# Patient Record
Sex: FEMALE | Race: WHITE | NOT HISPANIC OR LATINO | Employment: UNEMPLOYED | URBAN - METROPOLITAN AREA
[De-identification: names, ages, dates, MRNs, and addresses within clinical notes are randomized per-mention and may not be internally consistent; named-entity substitution may affect disease eponyms.]

---

## 2017-01-11 ENCOUNTER — RESULTS ENCOUNTER (OUTPATIENT)
Dept: ENDOCRINOLOGY | Age: 42
End: 2017-01-11

## 2017-01-11 DIAGNOSIS — E88.81 DYSMETABOLIC SYNDROME: ICD-10-CM

## 2017-01-11 DIAGNOSIS — O24.419 GESTATIONAL DIABETES MELLITUS (GDM), ANTEPARTUM, GESTATIONAL DIABETES METHOD OF CONTROL UNSPECIFIED: ICD-10-CM

## 2017-01-11 DIAGNOSIS — E03.9 ADULT HYPOTHYROIDISM: ICD-10-CM

## 2017-01-11 DIAGNOSIS — E55.9 AVITAMINOSIS D: ICD-10-CM

## 2017-01-11 DIAGNOSIS — E66.9 ADIPOSITY: ICD-10-CM

## 2017-06-23 ENCOUNTER — RESULTS ENCOUNTER (OUTPATIENT)
Dept: ENDOCRINOLOGY | Age: 42
End: 2017-06-23

## 2017-06-23 DIAGNOSIS — E55.9 AVITAMINOSIS D: ICD-10-CM

## 2017-06-23 DIAGNOSIS — E03.9 ADULT HYPOTHYROIDISM: ICD-10-CM

## 2017-06-23 DIAGNOSIS — E88.81 DYSMETABOLIC SYNDROME: ICD-10-CM

## 2017-06-23 DIAGNOSIS — O24.419 GESTATIONAL DIABETES MELLITUS (GDM), ANTEPARTUM, GESTATIONAL DIABETES METHOD OF CONTROL UNSPECIFIED: ICD-10-CM

## 2017-06-23 DIAGNOSIS — E66.9 ADIPOSITY: ICD-10-CM

## 2018-03-20 ENCOUNTER — LAB (OUTPATIENT)
Dept: ENDOCRINOLOGY | Age: 43
End: 2018-03-20

## 2018-03-20 DIAGNOSIS — E03.9 ADULT HYPOTHYROIDISM: ICD-10-CM

## 2018-03-20 DIAGNOSIS — E88.81 DYSMETABOLIC SYNDROME: ICD-10-CM

## 2018-03-20 DIAGNOSIS — E55.9 AVITAMINOSIS D: ICD-10-CM

## 2018-03-20 DIAGNOSIS — E11.8 TYPE 2 DIABETES MELLITUS WITH COMPLICATION (HCC): ICD-10-CM

## 2018-03-21 LAB
25(OH)D3+25(OH)D2 SERPL-MCNC: 39.3 NG/ML (ref 30–100)
ALBUMIN SERPL-MCNC: 4.1 G/DL (ref 3.5–5.2)
ALBUMIN/GLOB SERPL: 1.4 G/DL
ALP SERPL-CCNC: 56 U/L (ref 39–117)
ALT SERPL-CCNC: 26 U/L (ref 1–33)
AST SERPL-CCNC: 21 U/L (ref 1–32)
BILIRUB SERPL-MCNC: 0.4 MG/DL (ref 0.1–1.2)
BUN SERPL-MCNC: 15 MG/DL (ref 6–20)
BUN/CREAT SERPL: 17.4 (ref 7–25)
C PEPTIDE SERPL-MCNC: 9.1 NG/ML (ref 1.1–4.4)
CALCIUM SERPL-MCNC: 10.1 MG/DL (ref 8.6–10.5)
CHLORIDE SERPL-SCNC: 98 MMOL/L (ref 98–107)
CO2 SERPL-SCNC: 28.8 MMOL/L (ref 22–29)
CREAT SERPL-MCNC: 0.86 MG/DL (ref 0.57–1)
GFR SERPLBLD CREATININE-BSD FMLA CKD-EPI: 72 ML/MIN/1.73
GFR SERPLBLD CREATININE-BSD FMLA CKD-EPI: 87 ML/MIN/1.73
GLOBULIN SER CALC-MCNC: 3 GM/DL
GLUCOSE SERPL-MCNC: 119 MG/DL (ref 65–99)
HBA1C MFR BLD: 5.4 % (ref 4.8–5.6)
MICROALBUMIN UR-MCNC: <3 UG/ML
POTASSIUM SERPL-SCNC: 4.4 MMOL/L (ref 3.5–5.2)
PROT SERPL-MCNC: 7.1 G/DL (ref 6–8.5)
SODIUM SERPL-SCNC: 139 MMOL/L (ref 136–145)
T3FREE SERPL-MCNC: 3.1 PG/ML (ref 2–4.4)
T4 FREE SERPL-MCNC: 0.89 NG/DL (ref 0.93–1.7)
T4 SERPL-MCNC: 7.02 MCG/DL (ref 4.5–11.7)
THYROGLOB AB SERPL-ACNC: <1 IU/ML (ref 0–0.9)
THYROGLOB SERPL-MCNC: 20.9 NG/ML (ref 1.5–38.5)
TSH SERPL DL<=0.005 MIU/L-ACNC: 15.64 MIU/ML (ref 0.27–4.2)

## 2018-03-27 ENCOUNTER — OFFICE VISIT (OUTPATIENT)
Dept: ENDOCRINOLOGY | Age: 43
End: 2018-03-27

## 2018-03-27 VITALS
SYSTOLIC BLOOD PRESSURE: 124 MMHG | HEIGHT: 61 IN | RESPIRATION RATE: 16 BRPM | WEIGHT: 217 LBS | DIASTOLIC BLOOD PRESSURE: 74 MMHG | BODY MASS INDEX: 40.97 KG/M2

## 2018-03-27 DIAGNOSIS — E03.9 ADULT HYPOTHYROIDISM: Primary | ICD-10-CM

## 2018-03-27 DIAGNOSIS — K75.81 NASH (NONALCOHOLIC STEATOHEPATITIS): ICD-10-CM

## 2018-03-27 DIAGNOSIS — E88.81 DYSMETABOLIC SYNDROME: ICD-10-CM

## 2018-03-27 DIAGNOSIS — E55.9 AVITAMINOSIS D: ICD-10-CM

## 2018-03-27 DIAGNOSIS — O24.419 GESTATIONAL DIABETES MELLITUS (GDM), ANTEPARTUM, GESTATIONAL DIABETES METHOD OF CONTROL UNSPECIFIED: ICD-10-CM

## 2018-03-27 PROCEDURE — 99214 OFFICE O/P EST MOD 30 MIN: CPT | Performed by: INTERNAL MEDICINE

## 2018-03-27 RX ORDER — LIRAGLUTIDE 6 MG/ML
3 INJECTION, SOLUTION SUBCUTANEOUS DAILY
Qty: 5 PEN | Refills: 5 | Status: SHIPPED | OUTPATIENT
Start: 2018-03-27 | End: 2018-10-31 | Stop reason: SDUPTHER

## 2018-03-27 RX ORDER — NORETHINDRONE 0.35 MG/1
1 TABLET ORAL DAILY
Refills: 12 | COMMUNITY
Start: 2018-03-07 | End: 2019-03-27

## 2018-03-27 RX ORDER — LEVOTHYROXINE SODIUM 175 MCG
TABLET ORAL
Qty: 90 TABLET | Refills: 3 | Status: SHIPPED | OUTPATIENT
Start: 2018-03-27 | End: 2018-10-31 | Stop reason: SDUPTHER

## 2018-03-27 NOTE — PROGRESS NOTES
"Subjective   Oralia Sánchez is a 42 y.o. female seen for follow up for DM2, hypothyroidism, vit d deficiency, lab review. Patient is not checking BG. She has been out of Synthroid since 09/2017. She denies any symptoms or concerns.     History of Present Illness this is a 42-year-old female known patient with prediabetes and hypothyroidism as well as vitamin D deficiency and fatty liver with morbid obesity.  Over the course of last 6 months she has had no significant health problems for which to go to the emergency room or hospital.    /74   Resp 16   Ht 154.9 cm (61\")   Wt 98.4 kg (217 lb)   BMI 41.00 kg/m²      Allergies   Allergen Reactions   • No Known Drug Allergy        Current Outpatient Prescriptions:   •  DESTIN 0.35 MG tablet, Take 1 tablet by mouth Daily., Disp: , Rfl: 12  •  SYNTHROID 175 MCG tablet, Take 1 tablet daily by mouth., Disp: 90 tablet, Rfl: 1      The following portions of the patient's history were reviewed and updated as appropriate: allergies, current medications, past family history, past medical history, past social history, past surgical history and problem list.    Review of Systems   Constitutional: Negative.    HENT: Negative.    Eyes: Negative.    Respiratory: Negative.    Cardiovascular: Negative.    Gastrointestinal: Negative.    Endocrine: Negative.    Genitourinary: Negative.    Musculoskeletal: Negative.    Skin: Negative.    Allergic/Immunologic: Negative.    Neurological: Negative.    Hematological: Negative.    Psychiatric/Behavioral: Negative.        Objective   Physical Exam     Results for orders placed or performed in visit on 03/20/18   Comprehensive Metabolic Panel   Result Value Ref Range    Glucose 119 (H) 65 - 99 mg/dL    BUN 15 6 - 20 mg/dL    Creatinine 0.86 0.57 - 1.00 mg/dL    eGFR Non African Am 72 >60 mL/min/1.73    eGFR African Am 87 >60 mL/min/1.73    BUN/Creatinine Ratio 17.4 7.0 - 25.0    Sodium 139 136 - 145 mmol/L    Potassium 4.4 3.5 - 5.2 " mmol/L    Chloride 98 98 - 107 mmol/L    Total CO2 28.8 22.0 - 29.0 mmol/L    Calcium 10.1 8.6 - 10.5 mg/dL    Total Protein 7.1 6.0 - 8.5 g/dL    Albumin 4.10 3.50 - 5.20 g/dL    Globulin 3.0 gm/dL    A/G Ratio 1.4 g/dL    Total Bilirubin 0.4 0.1 - 1.2 mg/dL    Alkaline Phosphatase 56 39 - 117 U/L    AST (SGOT) 21 1 - 32 U/L    ALT (SGPT) 26 1 - 33 U/L   T4 & TSH ([facility])   Result Value Ref Range    TSH 15.640 (H) 0.270 - 4.200 mIU/mL    T4, Total 7.02 4.50 - 11.70 mcg/dL   Hemoglobin A1c   Result Value Ref Range    Hemoglobin A1C 5.40 4.80 - 5.60 %   T4, Free   Result Value Ref Range    Free T4 0.89 (L) 0.93 - 1.70 ng/dL   C-Peptide   Result Value Ref Range    C-Peptide 9.1 (H) 1.1 - 4.4 ng/mL   Vitamin D 25 Hydroxy   Result Value Ref Range    25 Hydroxy, Vitamin D 39.3 30.0 - 100.0 ng/ml   MicroAlbumin, Urine, Random   Result Value Ref Range    Microalbumin, Urine <3.0 Not Estab. ug/mL   Thyroglobulin With Anti-TG   Result Value Ref Range    Thyroglobulin Ab <1.0 0.0 - 0.9 IU/mL   Thyroglobulin By PATY   Result Value Ref Range    THYROGLOBULIN BY PATY 20.9 1.5 - 38.5 ng/mL   T3, Free   Result Value Ref Range    T3, Free 3.1 2.0 - 4.4 pg/mL         Assessment/Plan   Diagnoses and all orders for this visit:    Adult hypothyroidism  -     T3, Free; Future  -     T4 & TSH ([facility]); Future  -     T4, Free; Future  -     Uric Acid; Future  -     Vitamin D 25 Hydroxy; Future  -     Thyroglobulin With Anti-TG; Future  -     Comprehensive Metabolic Panel; Future  -     C-Peptide; Future  -     Hemoglobin A1c; Future  -     Lipid Panel; Future    Gestational diabetes mellitus (GDM), antepartum, gestational diabetes method of control unspecified  -     T3, Free; Future  -     T4 & TSH ([facility]); Future  -     T4, Free; Future  -     Uric Acid; Future  -     Vitamin D 25 Hydroxy; Future  -     Thyroglobulin With Anti-TG; Future  -     Comprehensive Metabolic Panel; Future  -     C-Peptide; Future  -     Hemoglobin A1c;  Future  -     Lipid Panel; Future    Dysmetabolic syndrome  -     T3, Free; Future  -     T4 & TSH ([facility]); Future  -     T4, Free; Future  -     Uric Acid; Future  -     Vitamin D 25 Hydroxy; Future  -     Thyroglobulin With Anti-TG; Future  -     Comprehensive Metabolic Panel; Future  -     C-Peptide; Future  -     Hemoglobin A1c; Future  -     Lipid Panel; Future    Avitaminosis D  -     T3, Free; Future  -     T4 & TSH ([facility]); Future  -     T4, Free; Future  -     Uric Acid; Future  -     Vitamin D 25 Hydroxy; Future  -     Thyroglobulin With Anti-TG; Future  -     Comprehensive Metabolic Panel; Future  -     C-Peptide; Future  -     Hemoglobin A1c; Future  -     Lipid Panel; Future    YOUNG (nonalcoholic steatohepatitis)  -     T3, Free; Future  -     T4 & TSH ([facility]); Future  -     T4, Free; Future  -     Uric Acid; Future  -     Vitamin D 25 Hydroxy; Future  -     Thyroglobulin With Anti-TG; Future  -     Comprehensive Metabolic Panel; Future  -     C-Peptide; Future  -     Hemoglobin A1c; Future  -     Lipid Panel; Future    Other orders  -     SYNTHROID 175 MCG tablet; Take 1 tablet daily by mouth.  -     SAXENDA 18 MG/3ML solution pen-injector; Inject 3 mg under the skin Daily.               In summary I saw and examined this 42-year-old female for above-mentioned problems.  I reviewed her laboratory evaluation of 03/28/2018 and provided her with a hard copy of it.  Her TSH understandably is elevated by virtue of the fact that she has not been taking her medication Synthroid.  We will resume her Synthroid 175 µg daily and also for her weight as well as the fatty liver and going to start her on sex send the 0.6 mg daily for 7 days and 1.2, 1.8, 2.4 mg daily each for 7 days and then the maintenance of 3.0 mg daily.  I will see her in 6 months or sooner if needed with laboratory evaluation prior to each office visit.

## 2018-09-20 ENCOUNTER — RESULTS ENCOUNTER (OUTPATIENT)
Dept: ENDOCRINOLOGY | Age: 43
End: 2018-09-20

## 2018-09-20 DIAGNOSIS — K75.81 NASH (NONALCOHOLIC STEATOHEPATITIS): ICD-10-CM

## 2018-09-20 DIAGNOSIS — E88.81 DYSMETABOLIC SYNDROME: ICD-10-CM

## 2018-09-20 DIAGNOSIS — E03.9 ADULT HYPOTHYROIDISM: ICD-10-CM

## 2018-09-20 DIAGNOSIS — O24.419 GESTATIONAL DIABETES MELLITUS (GDM), ANTEPARTUM, GESTATIONAL DIABETES METHOD OF CONTROL UNSPECIFIED: ICD-10-CM

## 2018-09-20 DIAGNOSIS — E55.9 AVITAMINOSIS D: ICD-10-CM

## 2018-10-31 ENCOUNTER — OFFICE VISIT (OUTPATIENT)
Dept: ENDOCRINOLOGY | Age: 43
End: 2018-10-31

## 2018-10-31 VITALS
SYSTOLIC BLOOD PRESSURE: 112 MMHG | BODY MASS INDEX: 41.84 KG/M2 | DIASTOLIC BLOOD PRESSURE: 68 MMHG | WEIGHT: 221.6 LBS | HEIGHT: 61 IN

## 2018-10-31 DIAGNOSIS — E03.8 HYPOTHYROIDISM DUE TO HASHIMOTO'S THYROIDITIS: Primary | ICD-10-CM

## 2018-10-31 DIAGNOSIS — K75.81 NASH (NONALCOHOLIC STEATOHEPATITIS): ICD-10-CM

## 2018-10-31 DIAGNOSIS — E55.9 VITAMIN D DEFICIENCY: ICD-10-CM

## 2018-10-31 DIAGNOSIS — E06.3 HYPOTHYROIDISM DUE TO HASHIMOTO'S THYROIDITIS: Primary | ICD-10-CM

## 2018-10-31 DIAGNOSIS — O24.419 GESTATIONAL DIABETES MELLITUS (GDM), ANTEPARTUM, GESTATIONAL DIABETES METHOD OF CONTROL UNSPECIFIED: ICD-10-CM

## 2018-10-31 DIAGNOSIS — E88.81 METABOLIC SYNDROME: ICD-10-CM

## 2018-10-31 DIAGNOSIS — E66.01 CLASS 3 SEVERE OBESITY WITHOUT SERIOUS COMORBIDITY WITH BODY MASS INDEX (BMI) OF 40.0 TO 44.9 IN ADULT, UNSPECIFIED OBESITY TYPE (HCC): ICD-10-CM

## 2018-10-31 PROCEDURE — 99214 OFFICE O/P EST MOD 30 MIN: CPT | Performed by: INTERNAL MEDICINE

## 2018-10-31 RX ORDER — LEVOTHYROXINE SODIUM 175 MCG
TABLET ORAL
Qty: 90 TABLET | Refills: 3 | Status: SHIPPED | OUTPATIENT
Start: 2018-10-31 | End: 2019-03-27

## 2018-10-31 RX ORDER — LIRAGLUTIDE 6 MG/ML
3 INJECTION, SOLUTION SUBCUTANEOUS DAILY
Qty: 5 PEN | Refills: 5 | Status: SHIPPED | OUTPATIENT
Start: 2018-10-31 | End: 2019-03-27 | Stop reason: SDDI

## 2018-10-31 NOTE — PROGRESS NOTES
"Subjective   Oralia Sánchez is a 43 y.o. female is here for follow up for hypothyroidism, dysmetabolic syndrome and vit d deficicency. No lab review. Pt denies any issues or concerns. /68   Ht 154.9 cm (61\")   Wt 101 kg (221 lb 9.6 oz)   BMI 41.87 kg/m²   Allergies   Allergen Reactions   • No Known Drug Allergy        Current Outpatient Prescriptions:   •  DESTIN 0.35 MG tablet, Take 1 tablet by mouth Daily., Disp: , Rfl: 12  •  SYNTHROID 175 MCG tablet, Take 1 tablet daily by mouth., Disp: 90 tablet, Rfl: 3  •  SAXENDA 18 MG/3ML solution pen-injector, Inject 3 mg under the skin Daily., Disp: 5 pen, Rfl: 5      History of Present Illness this is a 43-year-old known patient for hypothyroidism as well as vitamin D deficiency and metabolic syndrome as well as obesity.  Over the course of last 6 months she has had no significant health problems for which to go to the ER or hospital.  She continues to smoke and also said because she does not want to take any more medicine she has not taken Saxenda for the past 6 months.    The following portions of the patient's history were reviewed and updated as appropriate: allergies, current medications, past family history, past medical history, past social history, past surgical history and problem list.    Review of Systems   Constitutional: Negative for fatigue.   HENT: Negative for trouble swallowing.    Eyes: Negative for visual disturbance.   Respiratory: Negative for shortness of breath.    Cardiovascular: Negative for leg swelling.   Endocrine: Negative for polyuria.   Skin: Negative for wound.   Neurological: Negative for numbness.       Objective   Physical Exam   Constitutional: She is oriented to person, place, and time. She appears well-developed and well-nourished. No distress.   HENT:   Head: Normocephalic and atraumatic.   Right Ear: External ear normal.   Left Ear: External ear normal.   Nose: Nose normal.   Mouth/Throat: Oropharynx is clear and moist. " No oropharyngeal exudate.   Eyes: Pupils are equal, round, and reactive to light. Conjunctivae and EOM are normal. Right eye exhibits no discharge. Left eye exhibits no discharge. No scleral icterus.   Neck: Normal range of motion. Neck supple. No JVD present. No tracheal deviation present. No thyromegaly present.   Cardiovascular: Normal rate, regular rhythm, normal heart sounds and intact distal pulses.  Exam reveals no gallop and no friction rub.    No murmur heard.  Pulmonary/Chest: Effort normal and breath sounds normal. No stridor. No respiratory distress. She has no wheezes. She has no rales. She exhibits no tenderness.   Abdominal: Soft. Bowel sounds are normal. She exhibits no distension and no mass. There is no tenderness. There is no rebound and no guarding. No hernia.   Musculoskeletal: Normal range of motion. She exhibits no edema, tenderness or deformity.   Lymphadenopathy:     She has no cervical adenopathy.   Neurological: She is alert and oriented to person, place, and time. She has normal reflexes. She displays normal reflexes. No cranial nerve deficit or sensory deficit. She exhibits normal muscle tone. Coordination normal.   Skin: Skin is warm and dry. No rash noted. She is not diaphoretic. No erythema. No pallor.   Psychiatric: She has a normal mood and affect. Her behavior is normal. Judgment and thought content normal.   Nursing note and vitals reviewed.        Assessment/Plan   Diagnoses and all orders for this visit:    Hypothyroidism due to Hashimoto's thyroiditis  -     T3, Free  -     T4 & TSH ([facility])  -     T4, Free  -     Thyroglobulin With Anti-TG  -     Uric Acid  -     Vitamin D 25 Hydroxy  -     Comprehensive Metabolic Panel  -     C-Peptide  -     Hemoglobin A1c  -     Lipid Panel    Vitamin D deficiency  -     T3, Free  -     T4 & TSH ([facility])  -     T4, Free  -     Thyroglobulin With Anti-TG  -     Uric Acid  -     Vitamin D 25 Hydroxy  -     Comprehensive Metabolic Panel  -      C-Peptide  -     Hemoglobin A1c  -     Lipid Panel    YOUNG (nonalcoholic steatohepatitis)  -     T3, Free  -     T4 & TSH ([facility])  -     T4, Free  -     Thyroglobulin With Anti-TG  -     Uric Acid  -     Vitamin D 25 Hydroxy  -     Comprehensive Metabolic Panel  -     C-Peptide  -     Hemoglobin A1c  -     Lipid Panel    Gestational diabetes mellitus (GDM), antepartum, gestational diabetes method of control unspecified  -     T3, Free  -     T4 & TSH ([facility])  -     T4, Free  -     Thyroglobulin With Anti-TG  -     Uric Acid  -     Vitamin D 25 Hydroxy  -     Comprehensive Metabolic Panel  -     C-Peptide  -     Hemoglobin A1c  -     Lipid Panel    Metabolic syndrome  -     T3, Free  -     T4 & TSH ([facility])  -     T4, Free  -     Thyroglobulin With Anti-TG  -     Uric Acid  -     Vitamin D 25 Hydroxy  -     Comprehensive Metabolic Panel  -     C-Peptide  -     Hemoglobin A1c  -     Lipid Panel    Class 3 severe obesity without serious comorbidity with body mass index (BMI) of 40.0 to 44.9 in adult, unspecified obesity type (CMS/HCC)  -     T3, Free  -     T4 & TSH ([facility])  -     T4, Free  -     Thyroglobulin With Anti-TG  -     Uric Acid  -     Vitamin D 25 Hydroxy  -     Comprehensive Metabolic Panel  -     C-Peptide  -     Hemoglobin A1c  -     Lipid Panel    Other orders  -     SYNTHROID 175 MCG tablet; Take 1 tablet daily by mouth.  -     SAXENDA 18 MG/3ML solution pen-injector; Inject 3 mg under the skin into the appropriate area as directed Daily.             in summary I saw and examined this 43-year-old female for above-mentioned problems.  At this time we will go ahead and order additional laboratory evaluation and once the results come back we will go ahead and call for any possible modification or new medications.  Otherwise she will continue her current medications and I will see her in 6 months or sooner if needed with laboratory evaluation prior to each office visit.  She also told me to go  ahead and give her a prescription for Saxenda in case she changed her mind.

## 2018-11-01 LAB
25(OH)D3+25(OH)D2 SERPL-MCNC: NORMAL NG/ML
ALBUMIN SERPL-MCNC: 4.5 G/DL (ref 3.5–5.2)
ALBUMIN/GLOB SERPL: 1.6 G/DL
ALP SERPL-CCNC: 61 U/L (ref 39–117)
ALT SERPL-CCNC: 47 U/L (ref 1–33)
AST SERPL-CCNC: 36 U/L (ref 1–32)
BILIRUB SERPL-MCNC: 0.2 MG/DL (ref 0.1–1.2)
BUN SERPL-MCNC: 12 MG/DL (ref 6–20)
BUN/CREAT SERPL: 14.5 (ref 7–25)
C PEPTIDE SERPL-MCNC: 5 NG/ML (ref 1.1–4.4)
CALCIUM SERPL-MCNC: 9.7 MG/DL (ref 8.6–10.5)
CHLORIDE SERPL-SCNC: 102 MMOL/L (ref 98–107)
CHOLEST SERPL-MCNC: 153 MG/DL (ref 0–200)
CO2 SERPL-SCNC: 23.7 MMOL/L (ref 22–29)
CREAT SERPL-MCNC: 0.83 MG/DL (ref 0.57–1)
GLOBULIN SER CALC-MCNC: 2.8 GM/DL
GLUCOSE SERPL-MCNC: 122 MG/DL (ref 65–99)
HBA1C MFR BLD: 5.6 % (ref 4.8–5.6)
HDLC SERPL-MCNC: 59 MG/DL (ref 40–60)
INTERPRETATION: NORMAL
LDLC SERPL CALC-MCNC: 75 MG/DL (ref 0–100)
Lab: NORMAL
POTASSIUM SERPL-SCNC: 4.6 MMOL/L (ref 3.5–5.2)
PROT SERPL-MCNC: 7.3 G/DL (ref 6–8.5)
SODIUM SERPL-SCNC: 140 MMOL/L (ref 136–145)
SPECIMEN STATUS: NORMAL
T3FREE SERPL-MCNC: 2.6 PG/ML (ref 2–4.4)
T4 FREE SERPL-MCNC: 1.45 NG/DL (ref 0.93–1.7)
T4 SERPL-MCNC: 9.17 MCG/DL (ref 4.5–11.7)
THYROGLOB AB SERPL-ACNC: <1 IU/ML (ref 0–0.9)
THYROGLOB SERPL-MCNC: 5 NG/ML (ref 1.5–38.5)
TRIGL SERPL-MCNC: 96 MG/DL (ref 0–150)
TSH SERPL DL<=0.005 MIU/L-ACNC: 1.89 MIU/ML (ref 0.27–4.2)
URATE SERPL-MCNC: 6.5 MG/DL (ref 2.4–5.7)
VLDLC SERPL CALC-MCNC: 19.2 MG/DL (ref 5–40)

## 2019-02-19 ENCOUNTER — PRIOR AUTHORIZATION (OUTPATIENT)
Dept: ENDOCRINOLOGY | Age: 44
End: 2019-02-19

## 2019-03-27 ENCOUNTER — APPOINTMENT (OUTPATIENT)
Dept: CT IMAGING | Facility: HOSPITAL | Age: 44
End: 2019-03-27

## 2019-03-27 ENCOUNTER — HOSPITAL ENCOUNTER (INPATIENT)
Facility: HOSPITAL | Age: 44
LOS: 5 days | Discharge: HOME | End: 2019-04-01
Attending: EMERGENCY MEDICINE | Admitting: HOSPITALIST

## 2019-03-27 ENCOUNTER — APPOINTMENT (OUTPATIENT)
Dept: GENERAL RADIOLOGY | Facility: HOSPITAL | Age: 44
End: 2019-03-27

## 2019-03-27 DIAGNOSIS — I67.1 ANEURYSM OF ANTERIOR CEREBRAL ARTERY: ICD-10-CM

## 2019-03-27 DIAGNOSIS — G44.53 PRIMARY THUNDERCLAP HEADACHE: Primary | ICD-10-CM

## 2019-03-27 PROBLEM — R79.89 ELEVATED LFTS: Status: ACTIVE | Noted: 2019-03-27

## 2019-03-27 LAB
ALBUMIN SERPL-MCNC: 4.1 G/DL (ref 3.5–5.2)
ALBUMIN/GLOB SERPL: 1.3 G/DL
ALP SERPL-CCNC: 60 U/L (ref 39–117)
ALT SERPL W P-5'-P-CCNC: 62 U/L (ref 1–33)
ANION GAP SERPL CALCULATED.3IONS-SCNC: 7.4 MMOL/L
APPEARANCE CSF: CLEAR
APPEARANCE CSF: CLEAR
AST SERPL-CCNC: 60 U/L (ref 1–32)
BASOPHILS # BLD AUTO: 0.04 10*3/MM3 (ref 0–0.2)
BASOPHILS NFR BLD AUTO: 0.4 % (ref 0–1.5)
BILIRUB SERPL-MCNC: 0.3 MG/DL (ref 0.2–1.2)
BUN BLD-MCNC: 12 MG/DL (ref 6–20)
BUN/CREAT SERPL: 18.5 (ref 7–25)
CALCIUM SPEC-SCNC: 9.1 MG/DL (ref 8.6–10.5)
CHLORIDE SERPL-SCNC: 97 MMOL/L (ref 98–107)
CO2 SERPL-SCNC: 29.6 MMOL/L (ref 22–29)
COLOR CSF: COLORLESS
COLOR CSF: COLORLESS
CREAT BLD-MCNC: 0.65 MG/DL (ref 0.57–1)
DEPRECATED RDW RBC AUTO: 54.9 FL (ref 37–54)
EOSINOPHIL # BLD AUTO: 0.2 10*3/MM3 (ref 0–0.4)
EOSINOPHIL NFR BLD AUTO: 2.2 % (ref 0.3–6.2)
ERYTHROCYTE [DISTWIDTH] IN BLOOD BY AUTOMATED COUNT: 15 % (ref 12.3–15.4)
GFR SERPL CREATININE-BSD FRML MDRD: 99 ML/MIN/1.73
GLOBULIN UR ELPH-MCNC: 3.2 GM/DL
GLUCOSE BLD-MCNC: 120 MG/DL (ref 65–99)
GLUCOSE CSF-MCNC: 60 MG/DL (ref 40–70)
HCG SERPL QL: NEGATIVE
HCT VFR BLD AUTO: 50.7 % (ref 34–46.6)
HGB BLD-MCNC: 17.1 G/DL (ref 12–15.9)
HOLD SPECIMEN: NORMAL
HOLD SPECIMEN: NORMAL
IMM GRANULOCYTES # BLD AUTO: 0.02 10*3/MM3 (ref 0–0.05)
IMM GRANULOCYTES NFR BLD AUTO: 0.2 % (ref 0–0.5)
INR PPP: 0.91 (ref 0.9–1.1)
LYMPHOCYTES # BLD AUTO: 1.56 10*3/MM3 (ref 0.7–3.1)
LYMPHOCYTES NFR BLD AUTO: 17.5 % (ref 19.6–45.3)
MCH RBC QN AUTO: 33.3 PG (ref 26.6–33)
MCHC RBC AUTO-ENTMCNC: 33.7 G/DL (ref 31.5–35.7)
MCV RBC AUTO: 98.6 FL (ref 79–97)
METHOD: ABNORMAL
METHOD: ABNORMAL
MONOCYTES # BLD AUTO: 0.69 10*3/MM3 (ref 0.1–0.9)
MONOCYTES NFR BLD AUTO: 7.7 % (ref 5–12)
NEUTROPHILS # BLD AUTO: 6.42 10*3/MM3 (ref 1.4–7)
NEUTROPHILS NFR BLD AUTO: 72 % (ref 42.7–76)
NRBC BLD AUTO-RTO: 0 /100 WBC (ref 0–0)
NUC CELL # CSF MANUAL: 1 /MM3 (ref 0–5)
NUC CELL # CSF MANUAL: 1 /MM3 (ref 0–5)
PLATELET # BLD AUTO: 175 10*3/MM3 (ref 140–450)
PMV BLD AUTO: 10 FL (ref 6–12)
POTASSIUM BLD-SCNC: 4.7 MMOL/L (ref 3.5–5.2)
PROT CSF-MCNC: 105 MG/DL (ref 15–45)
PROT SERPL-MCNC: 7.3 G/DL (ref 6–8.5)
PROTHROMBIN TIME: 12.1 SECONDS (ref 11.7–14.2)
RBC # BLD AUTO: 5.14 10*6/MM3 (ref 3.77–5.28)
RBC # CSF MANUAL: 10 /MM3 (ref 0–0)
RBC # CSF MANUAL: 51 /MM3 (ref 0–0)
SODIUM BLD-SCNC: 134 MMOL/L (ref 136–145)
TUBE # CSF: 1
TUBE # CSF: 4
WBC NRBC COR # BLD: 8.93 10*3/MM3 (ref 3.4–10.8)
WHOLE BLOOD HOLD SPECIMEN: NORMAL
WHOLE BLOOD HOLD SPECIMEN: NORMAL

## 2019-03-27 PROCEDURE — 70450 CT HEAD/BRAIN W/O DYE: CPT

## 2019-03-27 PROCEDURE — 89050 BODY FLUID CELL COUNT: CPT | Performed by: EMERGENCY MEDICINE

## 2019-03-27 PROCEDURE — 25010000002 ONDANSETRON PER 1 MG: Performed by: EMERGENCY MEDICINE

## 2019-03-27 PROCEDURE — 82945 GLUCOSE OTHER FLUID: CPT | Performed by: EMERGENCY MEDICINE

## 2019-03-27 PROCEDURE — 25010000002 HYDROMORPHONE PER 4 MG: Performed by: EMERGENCY MEDICINE

## 2019-03-27 PROCEDURE — 25010000002 KETOROLAC TROMETHAMINE PER 15 MG: Performed by: EMERGENCY MEDICINE

## 2019-03-27 PROCEDURE — G0378 HOSPITAL OBSERVATION PER HR: HCPCS

## 2019-03-27 PROCEDURE — 25010000002 PROCHLORPERAZINE EDISYLATE PER 10 MG: Performed by: EMERGENCY MEDICINE

## 2019-03-27 PROCEDURE — 25010000002 KETOROLAC TROMETHAMINE PER 15 MG: Performed by: NURSE PRACTITIONER

## 2019-03-27 PROCEDURE — 99284 EMERGENCY DEPT VISIT MOD MDM: CPT

## 2019-03-27 PROCEDURE — 87070 CULTURE OTHR SPECIMN AEROBIC: CPT | Performed by: EMERGENCY MEDICINE

## 2019-03-27 PROCEDURE — 25010000002 DIPHENHYDRAMINE PER 50 MG: Performed by: EMERGENCY MEDICINE

## 2019-03-27 PROCEDURE — 25010000002 IOPAMIDOL 61 % SOLUTION: Performed by: EMERGENCY MEDICINE

## 2019-03-27 PROCEDURE — 87015 SPECIMEN INFECT AGNT CONCNTJ: CPT | Performed by: EMERGENCY MEDICINE

## 2019-03-27 PROCEDURE — 85025 COMPLETE CBC W/AUTO DIFF WBC: CPT | Performed by: EMERGENCY MEDICINE

## 2019-03-27 PROCEDURE — 77003 FLUOROGUIDE FOR SPINE INJECT: CPT

## 2019-03-27 PROCEDURE — 84157 ASSAY OF PROTEIN OTHER: CPT | Performed by: EMERGENCY MEDICINE

## 2019-03-27 PROCEDURE — 87529 HSV DNA AMP PROBE: CPT | Performed by: EMERGENCY MEDICINE

## 2019-03-27 PROCEDURE — 70496 CT ANGIOGRAPHY HEAD: CPT

## 2019-03-27 PROCEDURE — 80053 COMPREHEN METABOLIC PANEL: CPT | Performed by: EMERGENCY MEDICINE

## 2019-03-27 PROCEDURE — 85610 PROTHROMBIN TIME: CPT | Performed by: EMERGENCY MEDICINE

## 2019-03-27 PROCEDURE — 87205 SMEAR GRAM STAIN: CPT | Performed by: EMERGENCY MEDICINE

## 2019-03-27 PROCEDURE — 84703 CHORIONIC GONADOTROPIN ASSAY: CPT | Performed by: EMERGENCY MEDICINE

## 2019-03-27 RX ORDER — DIPHENHYDRAMINE HYDROCHLORIDE 50 MG/ML
25 INJECTION INTRAMUSCULAR; INTRAVENOUS EVERY 6 HOURS PRN
Status: DISCONTINUED | OUTPATIENT
Start: 2019-03-27 | End: 2019-04-01 | Stop reason: HOSPADM

## 2019-03-27 RX ORDER — ONDANSETRON 4 MG/1
4 TABLET, ORALLY DISINTEGRATING ORAL EVERY 6 HOURS PRN
Status: DISCONTINUED | OUTPATIENT
Start: 2019-03-27 | End: 2019-04-01 | Stop reason: HOSPADM

## 2019-03-27 RX ORDER — SODIUM CHLORIDE 0.9 % (FLUSH) 0.9 %
3 SYRINGE (ML) INJECTION EVERY 12 HOURS SCHEDULED
Status: DISCONTINUED | OUTPATIENT
Start: 2019-03-27 | End: 2019-04-01 | Stop reason: HOSPADM

## 2019-03-27 RX ORDER — LEVOTHYROXINE SODIUM 175 MCG
175 TABLET ORAL DAILY
COMMUNITY
End: 2019-05-31 | Stop reason: SDUPTHER

## 2019-03-27 RX ORDER — ONDANSETRON 2 MG/ML
4 INJECTION INTRAMUSCULAR; INTRAVENOUS ONCE
Status: COMPLETED | OUTPATIENT
Start: 2019-03-27 | End: 2019-03-27

## 2019-03-27 RX ORDER — KETOROLAC TROMETHAMINE 30 MG/ML
30 INJECTION, SOLUTION INTRAMUSCULAR; INTRAVENOUS ONCE
Status: COMPLETED | OUTPATIENT
Start: 2019-03-27 | End: 2019-03-27

## 2019-03-27 RX ORDER — LIDOCAINE HYDROCHLORIDE 10 MG/ML
5 INJECTION, SOLUTION EPIDURAL; INFILTRATION; INTRACAUDAL; PERINEURAL ONCE
Status: COMPLETED | OUTPATIENT
Start: 2019-03-27 | End: 2019-03-27

## 2019-03-27 RX ORDER — DIPHENHYDRAMINE HYDROCHLORIDE 50 MG/ML
25 INJECTION INTRAMUSCULAR; INTRAVENOUS ONCE
Status: COMPLETED | OUTPATIENT
Start: 2019-03-27 | End: 2019-03-27

## 2019-03-27 RX ORDER — ACETAMINOPHEN AND CODEINE PHOSPHATE 120; 12 MG/5ML; MG/5ML
1 SOLUTION ORAL DAILY
COMMUNITY
End: 2020-04-01

## 2019-03-27 RX ORDER — KETOROLAC TROMETHAMINE 30 MG/ML
30 INJECTION, SOLUTION INTRAMUSCULAR; INTRAVENOUS EVERY 6 HOURS PRN
Status: DISCONTINUED | OUTPATIENT
Start: 2019-03-27 | End: 2019-04-01 | Stop reason: HOSPADM

## 2019-03-27 RX ORDER — HYDROMORPHONE HYDROCHLORIDE 1 MG/ML
0.5 INJECTION, SOLUTION INTRAMUSCULAR; INTRAVENOUS; SUBCUTANEOUS ONCE
Status: COMPLETED | OUTPATIENT
Start: 2019-03-27 | End: 2019-03-27

## 2019-03-27 RX ORDER — SODIUM CHLORIDE 0.9 % (FLUSH) 0.9 %
1-10 SYRINGE (ML) INJECTION AS NEEDED
Status: DISCONTINUED | OUTPATIENT
Start: 2019-03-27 | End: 2019-04-01 | Stop reason: HOSPADM

## 2019-03-27 RX ORDER — ONDANSETRON 2 MG/ML
4 INJECTION INTRAMUSCULAR; INTRAVENOUS EVERY 6 HOURS PRN
Status: DISCONTINUED | OUTPATIENT
Start: 2019-03-27 | End: 2019-04-01 | Stop reason: HOSPADM

## 2019-03-27 RX ORDER — ONDANSETRON 4 MG/1
4 TABLET, FILM COATED ORAL EVERY 6 HOURS PRN
Status: DISCONTINUED | OUTPATIENT
Start: 2019-03-27 | End: 2019-04-01 | Stop reason: HOSPADM

## 2019-03-27 RX ADMIN — LIDOCAINE HYDROCHLORIDE 5 ML: 10 INJECTION, SOLUTION EPIDURAL; INFILTRATION; INTRACAUDAL; PERINEURAL at 13:03

## 2019-03-27 RX ADMIN — IOPAMIDOL 95 ML: 612 INJECTION, SOLUTION INTRAVENOUS at 16:41

## 2019-03-27 RX ADMIN — SODIUM CHLORIDE, PRESERVATIVE FREE 3 ML: 5 INJECTION INTRAVENOUS at 23:19

## 2019-03-27 RX ADMIN — PROCHLORPERAZINE EDISYLATE 10 MG: 5 INJECTION INTRAMUSCULAR; INTRAVENOUS at 08:45

## 2019-03-27 RX ADMIN — DIPHENHYDRAMINE HYDROCHLORIDE 25 MG: 50 INJECTION, SOLUTION INTRAMUSCULAR; INTRAVENOUS at 08:45

## 2019-03-27 RX ADMIN — ONDANSETRON 4 MG: 2 INJECTION INTRAMUSCULAR; INTRAVENOUS at 16:53

## 2019-03-27 RX ADMIN — SODIUM CHLORIDE 1000 ML: 9 INJECTION, SOLUTION INTRAVENOUS at 08:44

## 2019-03-27 RX ADMIN — SODIUM CHLORIDE 1000 ML: 9 INJECTION, SOLUTION INTRAVENOUS at 16:51

## 2019-03-27 RX ADMIN — KETOROLAC TROMETHAMINE 30 MG: 30 INJECTION, SOLUTION INTRAMUSCULAR at 23:18

## 2019-03-27 RX ADMIN — HYDROMORPHONE HYDROCHLORIDE 0.5 MG: 1 INJECTION, SOLUTION INTRAMUSCULAR; INTRAVENOUS; SUBCUTANEOUS at 16:57

## 2019-03-27 RX ADMIN — KETOROLAC TROMETHAMINE 30 MG: 30 INJECTION INTRAMUSCULAR; INTRAVENOUS at 08:46

## 2019-03-28 ENCOUNTER — ANESTHESIA (OUTPATIENT)
Dept: PERIOP | Facility: HOSPITAL | Age: 44
End: 2019-03-28

## 2019-03-28 ENCOUNTER — APPOINTMENT (OUTPATIENT)
Dept: GENERAL RADIOLOGY | Facility: HOSPITAL | Age: 44
End: 2019-03-28

## 2019-03-28 ENCOUNTER — ANESTHESIA EVENT (OUTPATIENT)
Dept: PERIOP | Facility: HOSPITAL | Age: 44
End: 2019-03-28

## 2019-03-28 PROBLEM — E11.9 TYPE 2 DIABETES MELLITUS (HCC): Chronic | Status: ACTIVE | Noted: 2019-03-28

## 2019-03-28 PROBLEM — E66.01 MORBID OBESITY (HCC): Chronic | Status: ACTIVE | Noted: 2019-03-28

## 2019-03-28 PROBLEM — M06.9 RHEUMATOID ARTHRITIS (HCC): Chronic | Status: ACTIVE | Noted: 2019-03-28

## 2019-03-28 PROBLEM — A49.9 UTI (URINARY TRACT INFECTION), BACTERIAL: Status: ACTIVE | Noted: 2019-03-28

## 2019-03-28 PROBLEM — I60.7 CEREBRAL ANEURYSM RUPTURE (HCC): Status: ACTIVE | Noted: 2019-03-28

## 2019-03-28 PROBLEM — N39.0 UTI (URINARY TRACT INFECTION), BACTERIAL: Status: ACTIVE | Noted: 2019-03-28

## 2019-03-28 PROBLEM — R93.0 ABNORMAL CT OF THE HEAD: Status: ACTIVE | Noted: 2019-03-28

## 2019-03-28 PROBLEM — Z72.0 TOBACCO ABUSE: Chronic | Status: ACTIVE | Noted: 2019-03-28

## 2019-03-28 LAB
ALBUMIN SERPL-MCNC: 3.4 G/DL (ref 3.5–5.2)
ALBUMIN/GLOB SERPL: 1.2 G/DL
ALP SERPL-CCNC: 44 U/L (ref 39–117)
ALT SERPL W P-5'-P-CCNC: 74 U/L (ref 1–33)
ANION GAP SERPL CALCULATED.3IONS-SCNC: 12.4 MMOL/L
AST SERPL-CCNC: 94 U/L (ref 1–32)
BACTERIA UR QL AUTO: ABNORMAL /HPF
BILIRUB SERPL-MCNC: 0.5 MG/DL (ref 0.2–1.2)
BILIRUB UR QL STRIP: NEGATIVE
BUN BLD-MCNC: 11 MG/DL (ref 6–20)
BUN/CREAT SERPL: 18.6 (ref 7–25)
CALCIUM SPEC-SCNC: 8.7 MG/DL (ref 8.6–10.5)
CHLORIDE SERPL-SCNC: 103 MMOL/L (ref 98–107)
CLARITY UR: CLEAR
CO2 SERPL-SCNC: 22.6 MMOL/L (ref 22–29)
COLOR UR: YELLOW
CREAT BLD-MCNC: 0.59 MG/DL (ref 0.57–1)
DEPRECATED RDW RBC AUTO: 57.7 FL (ref 37–54)
ERYTHROCYTE [DISTWIDTH] IN BLOOD BY AUTOMATED COUNT: 15.2 % (ref 12.3–15.4)
GFR SERPL CREATININE-BSD FRML MDRD: 110 ML/MIN/1.73
GLOBULIN UR ELPH-MCNC: 2.9 GM/DL
GLUCOSE BLD-MCNC: 82 MG/DL (ref 65–99)
GLUCOSE BLDC GLUCOMTR-MCNC: 80 MG/DL (ref 70–130)
GLUCOSE BLDC GLUCOMTR-MCNC: 82 MG/DL (ref 70–130)
GLUCOSE UR STRIP-MCNC: NEGATIVE MG/DL
HCT VFR BLD AUTO: 46.6 % (ref 34–46.6)
HGB BLD-MCNC: 15.1 G/DL (ref 12–15.9)
HGB UR QL STRIP.AUTO: ABNORMAL
HYALINE CASTS UR QL AUTO: ABNORMAL /LPF
KETONES UR QL STRIP: ABNORMAL
LEUKOCYTE ESTERASE UR QL STRIP.AUTO: ABNORMAL
MCH RBC QN AUTO: 33.1 PG (ref 26.6–33)
MCHC RBC AUTO-ENTMCNC: 32.4 G/DL (ref 31.5–35.7)
MCV RBC AUTO: 102.2 FL (ref 79–97)
NITRITE UR QL STRIP: NEGATIVE
PH UR STRIP.AUTO: 5.5 [PH] (ref 5–8)
PLATELET # BLD AUTO: 162 10*3/MM3 (ref 140–450)
PMV BLD AUTO: 9.7 FL (ref 6–12)
POTASSIUM BLD-SCNC: 3.8 MMOL/L (ref 3.5–5.2)
PROT SERPL-MCNC: 6.3 G/DL (ref 6–8.5)
PROT UR QL STRIP: NEGATIVE
RBC # BLD AUTO: 4.56 10*6/MM3 (ref 3.77–5.28)
RBC # UR: ABNORMAL /HPF
REF LAB TEST METHOD: ABNORMAL
SODIUM BLD-SCNC: 138 MMOL/L (ref 136–145)
SP GR UR STRIP: >=1.03 (ref 1–1.03)
SQUAMOUS #/AREA URNS HPF: ABNORMAL /HPF
UROBILINOGEN UR QL STRIP: ABNORMAL
WBC NRBC COR # BLD: 6.65 10*3/MM3 (ref 3.4–10.8)
WBC UR QL AUTO: ABNORMAL /HPF

## 2019-03-28 PROCEDURE — 0 IODIXANOL PER 1 ML: Performed by: NEUROLOGICAL SURGERY

## 2019-03-28 PROCEDURE — 76377 3D RENDER W/INTRP POSTPROCES: CPT

## 2019-03-28 PROCEDURE — C1760 CLOSURE DEV, VASC: HCPCS | Performed by: NEUROLOGICAL SURGERY

## 2019-03-28 PROCEDURE — 36227 PLACE CATH XTRNL CAROTID: CPT | Performed by: NEUROLOGICAL SURGERY

## 2019-03-28 PROCEDURE — 93886 INTRACRANIAL COMPLETE STUDY: CPT

## 2019-03-28 PROCEDURE — 25010000002 FENTANYL CITRATE (PF) 100 MCG/2ML SOLUTION: Performed by: NURSE ANESTHETIST, CERTIFIED REGISTERED

## 2019-03-28 PROCEDURE — B31FYZZ FLUOROSCOPY OF LEFT VERTEBRAL ARTERY USING OTHER CONTRAST: ICD-10-PCS | Performed by: NEUROLOGICAL SURGERY

## 2019-03-28 PROCEDURE — C1894 INTRO/SHEATH, NON-LASER: HCPCS | Performed by: NEUROLOGICAL SURGERY

## 2019-03-28 PROCEDURE — 36226 PLACE CATH VERTEBRAL ART: CPT | Performed by: NEUROLOGICAL SURGERY

## 2019-03-28 PROCEDURE — 25010000002 FENTANYL CITRATE (PF) 100 MCG/2ML SOLUTION: Performed by: ANESTHESIOLOGY

## 2019-03-28 PROCEDURE — 99205 OFFICE O/P NEW HI 60 MIN: CPT | Performed by: NEUROLOGICAL SURGERY

## 2019-03-28 PROCEDURE — 82962 GLUCOSE BLOOD TEST: CPT

## 2019-03-28 PROCEDURE — 81001 URINALYSIS AUTO W/SCOPE: CPT | Performed by: NURSE PRACTITIONER

## 2019-03-28 PROCEDURE — B318YZZ FLUOROSCOPY OF BILATERAL INTERNAL CAROTID ARTERIES USING OTHER CONTRAST: ICD-10-PCS | Performed by: NEUROLOGICAL SURGERY

## 2019-03-28 PROCEDURE — 99254 IP/OBS CNSLTJ NEW/EST MOD 60: CPT | Performed by: PSYCHIATRY & NEUROLOGY

## 2019-03-28 PROCEDURE — 85027 COMPLETE CBC AUTOMATED: CPT | Performed by: NURSE PRACTITIONER

## 2019-03-28 PROCEDURE — 87088 URINE BACTERIA CULTURE: CPT | Performed by: NURSE PRACTITIONER

## 2019-03-28 PROCEDURE — 87086 URINE CULTURE/COLONY COUNT: CPT | Performed by: NURSE PRACTITIONER

## 2019-03-28 PROCEDURE — 25010000002 HEPARIN (PORCINE) PER 1000 UNITS: Performed by: NEUROLOGICAL SURGERY

## 2019-03-28 PROCEDURE — 36224 PLACE CATH CAROTD ART: CPT | Performed by: NEUROLOGICAL SURGERY

## 2019-03-28 PROCEDURE — 25010000002 CEFTRIAXONE PER 250 MG: Performed by: NURSE PRACTITIONER

## 2019-03-28 PROCEDURE — C1769 GUIDE WIRE: HCPCS | Performed by: NEUROLOGICAL SURGERY

## 2019-03-28 PROCEDURE — 25010000002 HYDRALAZINE PER 20 MG: Performed by: NURSE ANESTHETIST, CERTIFIED REGISTERED

## 2019-03-28 PROCEDURE — 25010000002 ONDANSETRON PER 1 MG: Performed by: NURSE ANESTHETIST, CERTIFIED REGISTERED

## 2019-03-28 PROCEDURE — 25010000002 MIDAZOLAM PER 1 MG: Performed by: NURSE ANESTHETIST, CERTIFIED REGISTERED

## 2019-03-28 PROCEDURE — 87186 SC STD MICRODIL/AGAR DIL: CPT | Performed by: NURSE PRACTITIONER

## 2019-03-28 PROCEDURE — B31CYZZ FLUOROSCOPY OF BILATERAL EXTERNAL CAROTID ARTERIES USING OTHER CONTRAST: ICD-10-PCS | Performed by: NEUROLOGICAL SURGERY

## 2019-03-28 PROCEDURE — 80053 COMPREHEN METABOLIC PANEL: CPT | Performed by: HOSPITALIST

## 2019-03-28 PROCEDURE — 25010000002 KETOROLAC TROMETHAMINE PER 15 MG: Performed by: NURSE PRACTITIONER

## 2019-03-28 RX ORDER — FENTANYL CITRATE 50 UG/ML
50 INJECTION, SOLUTION INTRAMUSCULAR; INTRAVENOUS
Status: DISCONTINUED | OUTPATIENT
Start: 2019-03-28 | End: 2019-03-28

## 2019-03-28 RX ORDER — MIDAZOLAM HYDROCHLORIDE 1 MG/ML
1 INJECTION INTRAMUSCULAR; INTRAVENOUS
Status: DISCONTINUED | OUTPATIENT
Start: 2019-03-28 | End: 2019-03-28 | Stop reason: HOSPADM

## 2019-03-28 RX ORDER — HYDRALAZINE HYDROCHLORIDE 20 MG/ML
INJECTION INTRAMUSCULAR; INTRAVENOUS AS NEEDED
Status: DISCONTINUED | OUTPATIENT
Start: 2019-03-28 | End: 2019-03-28 | Stop reason: SURG

## 2019-03-28 RX ORDER — DIPHENHYDRAMINE HCL 25 MG
25 CAPSULE ORAL
Status: DISCONTINUED | OUTPATIENT
Start: 2019-03-28 | End: 2019-03-28

## 2019-03-28 RX ORDER — ACETAMINOPHEN 325 MG/1
650 TABLET ORAL ONCE AS NEEDED
Status: DISCONTINUED | OUTPATIENT
Start: 2019-03-28 | End: 2019-03-28

## 2019-03-28 RX ORDER — ATORVASTATIN CALCIUM 20 MG/1
40 TABLET, FILM COATED ORAL NIGHTLY
Status: DISCONTINUED | OUTPATIENT
Start: 2019-03-28 | End: 2019-04-01 | Stop reason: HOSPADM

## 2019-03-28 RX ORDER — OXYCODONE AND ACETAMINOPHEN 7.5; 325 MG/1; MG/1
1 TABLET ORAL ONCE AS NEEDED
Status: DISCONTINUED | OUTPATIENT
Start: 2019-03-28 | End: 2019-03-28

## 2019-03-28 RX ORDER — PROMETHAZINE HYDROCHLORIDE 25 MG/1
25 TABLET ORAL ONCE AS NEEDED
Status: DISCONTINUED | OUTPATIENT
Start: 2019-03-28 | End: 2019-03-28

## 2019-03-28 RX ORDER — NIMODIPINE 30 MG/1
60 CAPSULE, LIQUID FILLED ORAL
Status: DISCONTINUED | OUTPATIENT
Start: 2019-03-28 | End: 2019-04-01

## 2019-03-28 RX ORDER — FLUMAZENIL 0.1 MG/ML
0.2 INJECTION INTRAVENOUS AS NEEDED
Status: DISCONTINUED | OUTPATIENT
Start: 2019-03-28 | End: 2019-03-28

## 2019-03-28 RX ORDER — HYDRALAZINE HYDROCHLORIDE 20 MG/ML
5 INJECTION INTRAMUSCULAR; INTRAVENOUS
Status: DISCONTINUED | OUTPATIENT
Start: 2019-03-28 | End: 2019-03-28

## 2019-03-28 RX ORDER — MIDAZOLAM HYDROCHLORIDE 1 MG/ML
2 INJECTION INTRAMUSCULAR; INTRAVENOUS
Status: DISCONTINUED | OUTPATIENT
Start: 2019-03-28 | End: 2019-03-28 | Stop reason: HOSPADM

## 2019-03-28 RX ORDER — LEVOTHYROXINE SODIUM 175 UG/1
175 TABLET ORAL
Status: DISCONTINUED | OUTPATIENT
Start: 2019-03-28 | End: 2019-03-28

## 2019-03-28 RX ORDER — FENTANYL CITRATE 50 UG/ML
INJECTION, SOLUTION INTRAMUSCULAR; INTRAVENOUS AS NEEDED
Status: DISCONTINUED | OUTPATIENT
Start: 2019-03-28 | End: 2019-03-28 | Stop reason: SURG

## 2019-03-28 RX ORDER — FENTANYL CITRATE 50 UG/ML
50 INJECTION, SOLUTION INTRAMUSCULAR; INTRAVENOUS
Status: DISCONTINUED | OUTPATIENT
Start: 2019-03-28 | End: 2019-03-28 | Stop reason: HOSPADM

## 2019-03-28 RX ORDER — PROMETHAZINE HYDROCHLORIDE 25 MG/ML
12.5 INJECTION, SOLUTION INTRAMUSCULAR; INTRAVENOUS ONCE AS NEEDED
Status: DISCONTINUED | OUTPATIENT
Start: 2019-03-28 | End: 2019-03-28

## 2019-03-28 RX ORDER — ONDANSETRON 2 MG/ML
4 INJECTION INTRAMUSCULAR; INTRAVENOUS ONCE AS NEEDED
Status: DISCONTINUED | OUTPATIENT
Start: 2019-03-28 | End: 2019-03-28

## 2019-03-28 RX ORDER — DIPHENHYDRAMINE HYDROCHLORIDE 50 MG/ML
12.5 INJECTION INTRAMUSCULAR; INTRAVENOUS
Status: DISCONTINUED | OUTPATIENT
Start: 2019-03-28 | End: 2019-03-28

## 2019-03-28 RX ORDER — EPHEDRINE SULFATE 50 MG/ML
5 INJECTION, SOLUTION INTRAVENOUS ONCE AS NEEDED
Status: DISCONTINUED | OUTPATIENT
Start: 2019-03-28 | End: 2019-03-28

## 2019-03-28 RX ORDER — SODIUM CHLORIDE, SODIUM LACTATE, POTASSIUM CHLORIDE, CALCIUM CHLORIDE 600; 310; 30; 20 MG/100ML; MG/100ML; MG/100ML; MG/100ML
9 INJECTION, SOLUTION INTRAVENOUS CONTINUOUS
Status: DISCONTINUED | OUTPATIENT
Start: 2019-03-28 | End: 2019-03-29

## 2019-03-28 RX ORDER — PROMETHAZINE HYDROCHLORIDE 25 MG/1
25 SUPPOSITORY RECTAL ONCE AS NEEDED
Status: DISCONTINUED | OUTPATIENT
Start: 2019-03-28 | End: 2019-03-28

## 2019-03-28 RX ORDER — HYDROMORPHONE HYDROCHLORIDE 1 MG/ML
0.5 INJECTION, SOLUTION INTRAMUSCULAR; INTRAVENOUS; SUBCUTANEOUS
Status: DISCONTINUED | OUTPATIENT
Start: 2019-03-28 | End: 2019-03-28

## 2019-03-28 RX ORDER — MIDAZOLAM HYDROCHLORIDE 1 MG/ML
INJECTION INTRAMUSCULAR; INTRAVENOUS AS NEEDED
Status: DISCONTINUED | OUTPATIENT
Start: 2019-03-28 | End: 2019-03-28 | Stop reason: SURG

## 2019-03-28 RX ORDER — LABETALOL HYDROCHLORIDE 5 MG/ML
5 INJECTION, SOLUTION INTRAVENOUS
Status: DISCONTINUED | OUTPATIENT
Start: 2019-03-28 | End: 2019-03-28

## 2019-03-28 RX ORDER — LEVOTHYROXINE SODIUM 175 UG/1
175 TABLET ORAL NIGHTLY
Status: DISCONTINUED | OUTPATIENT
Start: 2019-03-28 | End: 2019-03-28 | Stop reason: CLARIF

## 2019-03-28 RX ORDER — FAMOTIDINE 10 MG/ML
20 INJECTION, SOLUTION INTRAVENOUS ONCE
Status: COMPLETED | OUTPATIENT
Start: 2019-03-28 | End: 2019-03-28

## 2019-03-28 RX ORDER — ONDANSETRON 2 MG/ML
INJECTION INTRAMUSCULAR; INTRAVENOUS AS NEEDED
Status: DISCONTINUED | OUTPATIENT
Start: 2019-03-28 | End: 2019-03-28 | Stop reason: SURG

## 2019-03-28 RX ORDER — CEFTRIAXONE SODIUM 1 G/50ML
1 INJECTION, SOLUTION INTRAVENOUS EVERY 24 HOURS
Status: DISCONTINUED | OUTPATIENT
Start: 2019-03-28 | End: 2019-04-01

## 2019-03-28 RX ORDER — LIDOCAINE HYDROCHLORIDE 10 MG/ML
0.5 INJECTION, SOLUTION EPIDURAL; INFILTRATION; INTRACAUDAL; PERINEURAL ONCE AS NEEDED
Status: DISCONTINUED | OUTPATIENT
Start: 2019-03-28 | End: 2019-03-28 | Stop reason: HOSPADM

## 2019-03-28 RX ORDER — SODIUM CHLORIDE 0.9 % (FLUSH) 0.9 %
1-10 SYRINGE (ML) INJECTION AS NEEDED
Status: DISCONTINUED | OUTPATIENT
Start: 2019-03-28 | End: 2019-03-28 | Stop reason: HOSPADM

## 2019-03-28 RX ORDER — LEVOTHYROXINE SODIUM 175 UG/1
175 TABLET ORAL
Status: DISCONTINUED | OUTPATIENT
Start: 2019-03-29 | End: 2019-04-01 | Stop reason: HOSPADM

## 2019-03-28 RX ORDER — NALOXONE HCL 0.4 MG/ML
0.2 VIAL (ML) INJECTION AS NEEDED
Status: DISCONTINUED | OUTPATIENT
Start: 2019-03-28 | End: 2019-03-28

## 2019-03-28 RX ORDER — IODIXANOL 320 MG/ML
300 INJECTION, SOLUTION INTRAVASCULAR
Status: COMPLETED | OUTPATIENT
Start: 2019-03-28 | End: 2019-03-28

## 2019-03-28 RX ORDER — HYDROCODONE BITARTRATE AND ACETAMINOPHEN 7.5; 325 MG/1; MG/1
1 TABLET ORAL ONCE AS NEEDED
Status: DISCONTINUED | OUTPATIENT
Start: 2019-03-28 | End: 2019-03-28

## 2019-03-28 RX ADMIN — CEFTRIAXONE SODIUM 1 G: 1 INJECTION, SOLUTION INTRAVENOUS at 09:15

## 2019-03-28 RX ADMIN — FENTANYL CITRATE 50 MCG: 50 INJECTION INTRAMUSCULAR; INTRAVENOUS at 11:11

## 2019-03-28 RX ADMIN — NIMODIPINE 60 MG: 30 CAPSULE ORAL at 17:14

## 2019-03-28 RX ADMIN — SODIUM CHLORIDE, POTASSIUM CHLORIDE, SODIUM LACTATE AND CALCIUM CHLORIDE: 600; 310; 30; 20 INJECTION, SOLUTION INTRAVENOUS at 10:31

## 2019-03-28 RX ADMIN — KETOROLAC TROMETHAMINE 30 MG: 30 INJECTION, SOLUTION INTRAMUSCULAR at 06:06

## 2019-03-28 RX ADMIN — IODIXANOL 170.4 ML: 320 INJECTION, SOLUTION INTRAVASCULAR at 11:26

## 2019-03-28 RX ADMIN — HYDRALAZINE HYDROCHLORIDE 5 MG: 20 INJECTION INTRAMUSCULAR; INTRAVENOUS at 11:31

## 2019-03-28 RX ADMIN — FENTANYL CITRATE 50 MCG: 50 INJECTION INTRAMUSCULAR; INTRAVENOUS at 11:13

## 2019-03-28 RX ADMIN — HYDRALAZINE HYDROCHLORIDE 5 MG: 20 INJECTION INTRAMUSCULAR; INTRAVENOUS at 11:12

## 2019-03-28 RX ADMIN — FENTANYL CITRATE 50 MCG: 50 INJECTION INTRAMUSCULAR; INTRAVENOUS at 13:15

## 2019-03-28 RX ADMIN — NIMODIPINE 60 MG: 30 CAPSULE ORAL at 23:33

## 2019-03-28 RX ADMIN — FENTANYL CITRATE 25 MCG: 50 INJECTION INTRAMUSCULAR; INTRAVENOUS at 11:46

## 2019-03-28 RX ADMIN — NICARDIPINE HYDROCHLORIDE 5 MG/HR: 2.5 INJECTION INTRAVENOUS at 15:16

## 2019-03-28 RX ADMIN — ONDANSETRON 4 MG: 2 INJECTION INTRAMUSCULAR; INTRAVENOUS at 11:48

## 2019-03-28 RX ADMIN — HYDRALAZINE HYDROCHLORIDE 10 MG: 20 INJECTION INTRAMUSCULAR; INTRAVENOUS at 11:51

## 2019-03-28 RX ADMIN — FENTANYL CITRATE 50 MCG: 50 INJECTION INTRAMUSCULAR; INTRAVENOUS at 10:13

## 2019-03-28 RX ADMIN — ATORVASTATIN CALCIUM 40 MG: 20 TABLET, FILM COATED ORAL at 20:04

## 2019-03-28 RX ADMIN — Medication 2 MG: at 10:32

## 2019-03-28 RX ADMIN — SODIUM CHLORIDE, POTASSIUM CHLORIDE, SODIUM LACTATE AND CALCIUM CHLORIDE 9 ML/HR: 600; 310; 30; 20 INJECTION, SOLUTION INTRAVENOUS at 10:13

## 2019-03-28 RX ADMIN — SODIUM CHLORIDE, PRESERVATIVE FREE 3 ML: 5 INJECTION INTRAVENOUS at 21:11

## 2019-03-28 RX ADMIN — Medication 1 MG: at 11:51

## 2019-03-28 RX ADMIN — SODIUM CHLORIDE 5 MG/HR: 9 INJECTION, SOLUTION INTRAVENOUS at 15:16

## 2019-03-28 RX ADMIN — FENTANYL CITRATE 25 MCG: 50 INJECTION INTRAMUSCULAR; INTRAVENOUS at 11:51

## 2019-03-28 RX ADMIN — FENTANYL CITRATE 25 MCG: 50 INJECTION INTRAMUSCULAR; INTRAVENOUS at 11:10

## 2019-03-28 RX ADMIN — NIMODIPINE 60 MG: 30 CAPSULE ORAL at 20:05

## 2019-03-28 RX ADMIN — FAMOTIDINE 20 MG: 10 INJECTION INTRAVENOUS at 10:13

## 2019-03-28 RX ADMIN — NICARDIPINE HYDROCHLORIDE 3 MG/HR: 2.5 INJECTION INTRAVENOUS at 20:45

## 2019-03-28 RX ADMIN — Medication 1 MG: at 11:10

## 2019-03-28 RX ADMIN — Medication 1 MG: at 11:46

## 2019-03-28 RX ADMIN — FENTANYL CITRATE 25 MCG: 50 INJECTION INTRAMUSCULAR; INTRAVENOUS at 11:09

## 2019-03-28 RX ADMIN — FENTANYL CITRATE 50 MCG: 50 INJECTION INTRAMUSCULAR; INTRAVENOUS at 13:45

## 2019-03-28 RX ADMIN — Medication 1 MG: at 10:42

## 2019-03-28 NOTE — ANESTHESIA PREPROCEDURE EVALUATION
Anesthesia Evaluation     Patient summary reviewed and Nursing notes reviewed   no history of anesthetic complications:  NPO Solid Status: > 8 hours  NPO Liquid Status: > 8 hours           Airway   Mallampati: II  TM distance: >3 FB  Neck ROM: full  No difficulty expected  Dental - normal exam     Pulmonary    (+) a smoker Current Abstained day of surgery, sleep apnea,   (-) COPD, asthma, rhonchi, decreased breath sounds, wheezes  Cardiovascular     Rhythm: regular  Rate: normal    (-) hypertension, valvular problems/murmurs, past MI, dysrhythmias, angina, murmur, cardiac stents      Neuro/Psych  (+) headaches (thunderclap ha, r/o sAH), numbness (CTS),     GI/Hepatic/Renal/Endo    (+) obesity,   hepatitis (nonalcoholic steatohepatitis), liver disease (elevated LFTs), diabetes mellitus type 2 well controlled, hypothyroidism,     Musculoskeletal     Abdominal     Abdomen: soft.   Substance History      OB/GYN          Other   (+) arthritis (RA)                   Anesthesia Plan    ASA 3     MAC     Anesthetic plan, all risks, benefits, and alternatives have been provided, discussed and informed consent has been obtained with: patient.

## 2019-03-28 NOTE — ANESTHESIA POSTPROCEDURE EVALUATION
Patient: Oralia Sánchez    Procedure Summary     Date:  03/28/19 Room / Location:  [facility] OR 19 INV / [facility] HYBRID OR 18/19    Anesthesia Start:  1031 Anesthesia Stop:  1210    Procedure:  DIAGNOSTIC CEREBRAL ANGIOGRAM (N/A ) Diagnosis:      Surgeon:  Alexx Barrow MD Provider:  Phill Vargas MD    Anesthesia Type:  MAC ASA Status:  3          Anesthesia Type: MAC  Last vitals  BP   140/86 (03/28/19 1230)   Temp   36.8 °C (98.2 °F) (03/28/19 1210)   Pulse   78 (03/28/19 1230)   Resp   16 (03/28/19 1230)     SpO2   95 % (03/28/19 1230)     Post Anesthesia Care and Evaluation    Patient location during evaluation: PACU  Patient participation: complete - patient participated  Level of consciousness: awake and alert  Pain management: adequate  Airway patency: patent  Anesthetic complications: No anesthetic complications    Cardiovascular status: acceptable  Respiratory status: acceptable  Hydration status: acceptable    Comments: --------------------            03/28/19               1230     --------------------   BP:       140/86     Pulse:      78       Resp:       16       Temp:                SpO2:      95%      --------------------

## 2019-03-29 ENCOUNTER — ANESTHESIA (OUTPATIENT)
Dept: PERIOP | Facility: HOSPITAL | Age: 44
End: 2019-03-29

## 2019-03-29 ENCOUNTER — ANESTHESIA EVENT (OUTPATIENT)
Dept: PERIOP | Facility: HOSPITAL | Age: 44
End: 2019-03-29

## 2019-03-29 ENCOUNTER — APPOINTMENT (OUTPATIENT)
Dept: GENERAL RADIOLOGY | Facility: HOSPITAL | Age: 44
End: 2019-03-29

## 2019-03-29 LAB
ALBUMIN SERPL-MCNC: 3.7 G/DL (ref 3.5–5.2)
ALBUMIN/GLOB SERPL: 1.5 G/DL
ALP SERPL-CCNC: 49 U/L (ref 39–117)
ALT SERPL W P-5'-P-CCNC: 71 U/L (ref 1–33)
ANION GAP SERPL CALCULATED.3IONS-SCNC: 11.2 MMOL/L
ASA PLATELET INHIBITION: 397 ARU
AST SERPL-CCNC: 62 U/L (ref 1–32)
BASOPHILS # BLD AUTO: 0.02 10*3/MM3 (ref 0–0.2)
BASOPHILS NFR BLD AUTO: 0.3 % (ref 0–1.5)
BILIRUB SERPL-MCNC: 0.4 MG/DL (ref 0.2–1.2)
BUN BLD-MCNC: 10 MG/DL (ref 6–20)
BUN/CREAT SERPL: 15.2 (ref 7–25)
CALCIUM SPEC-SCNC: 8.5 MG/DL (ref 8.6–10.5)
CHLORIDE SERPL-SCNC: 104 MMOL/L (ref 98–107)
CO2 SERPL-SCNC: 25.8 MMOL/L (ref 22–29)
CREAT BLD-MCNC: 0.66 MG/DL (ref 0.57–1)
DEPRECATED RDW RBC AUTO: 56.2 FL (ref 37–54)
EOSINOPHIL # BLD AUTO: 0.19 10*3/MM3 (ref 0–0.4)
EOSINOPHIL NFR BLD AUTO: 2.7 % (ref 0.3–6.2)
ERYTHROCYTE [DISTWIDTH] IN BLOOD BY AUTOMATED COUNT: 15 % (ref 12.3–15.4)
FOLATE SERPL-MCNC: >20 NG/ML (ref 4.78–24.2)
GFR SERPL CREATININE-BSD FRML MDRD: 97 ML/MIN/1.73
GLOBULIN UR ELPH-MCNC: 2.5 GM/DL
GLUCOSE BLD-MCNC: 101 MG/DL (ref 65–99)
GLUCOSE BLDC GLUCOMTR-MCNC: 178 MG/DL (ref 70–130)
GLUCOSE BLDC GLUCOMTR-MCNC: 87 MG/DL (ref 70–130)
GLUCOSE BLDC GLUCOMTR-MCNC: 91 MG/DL (ref 70–130)
HCT VFR BLD AUTO: 47.3 % (ref 34–46.6)
HGB BLD-MCNC: 15.5 G/DL (ref 12–15.9)
IMM GRANULOCYTES # BLD AUTO: 0.03 10*3/MM3 (ref 0–0.05)
IMM GRANULOCYTES NFR BLD AUTO: 0.4 % (ref 0–0.5)
LYMPHOCYTES # BLD AUTO: 1.07 10*3/MM3 (ref 0.7–3.1)
LYMPHOCYTES NFR BLD AUTO: 15.5 % (ref 19.6–45.3)
MCH RBC QN AUTO: 32.8 PG (ref 26.6–33)
MCHC RBC AUTO-ENTMCNC: 32.8 G/DL (ref 31.5–35.7)
MCV RBC AUTO: 100.2 FL (ref 79–97)
MONOCYTES # BLD AUTO: 0.59 10*3/MM3 (ref 0.1–0.9)
MONOCYTES NFR BLD AUTO: 8.5 % (ref 5–12)
NEUTROPHILS # BLD AUTO: 5.02 10*3/MM3 (ref 1.4–7)
NEUTROPHILS NFR BLD AUTO: 72.6 % (ref 42.7–76)
NRBC BLD AUTO-RTO: 0 /100 WBC (ref 0–0)
PA ADP PRP-ACNC: 155 PRU (ref 194–418)
PLATELET # BLD AUTO: 169 10*3/MM3 (ref 140–450)
PMV BLD AUTO: 10 FL (ref 6–12)
POTASSIUM BLD-SCNC: 4.5 MMOL/L (ref 3.5–5.2)
PROT SERPL-MCNC: 6.2 G/DL (ref 6–8.5)
RBC # BLD AUTO: 4.72 10*6/MM3 (ref 3.77–5.28)
SODIUM BLD-SCNC: 141 MMOL/L (ref 136–145)
VIT B12 BLD-MCNC: 757 PG/ML (ref 211–946)
WBC NRBC COR # BLD: 6.92 10*3/MM3 (ref 3.4–10.8)

## 2019-03-29 PROCEDURE — 85347 COAGULATION TIME ACTIVATED: CPT

## 2019-03-29 PROCEDURE — 25010000002 DEXAMETHASONE PER 1 MG: Performed by: NURSE ANESTHETIST, CERTIFIED REGISTERED

## 2019-03-29 PROCEDURE — 25010000002 HEPARIN (PORCINE) PER 1000 UNITS: Performed by: NURSE ANESTHETIST, CERTIFIED REGISTERED

## 2019-03-29 PROCEDURE — 36217 PLACE CATHETER IN ARTERY: CPT | Performed by: NEUROLOGICAL SURGERY

## 2019-03-29 PROCEDURE — B316YZZ FLUOROSCOPY OF RIGHT INTERNAL CAROTID ARTERY USING OTHER CONTRAST: ICD-10-PCS | Performed by: NEUROLOGICAL SURGERY

## 2019-03-29 PROCEDURE — 94799 UNLISTED PULMONARY SVC/PX: CPT

## 2019-03-29 PROCEDURE — 25010000002 PROTAMINE SULFATE PER 10 MG: Performed by: NURSE ANESTHETIST, CERTIFIED REGISTERED

## 2019-03-29 PROCEDURE — C1887 CATHETER, GUIDING: HCPCS | Performed by: NEUROLOGICAL SURGERY

## 2019-03-29 PROCEDURE — 25810000003 SODIUM CHLORIDE 0.9 % WITH KCL 20 MEQ 20-0.9 MEQ/L-% SOLUTION: Performed by: NEUROLOGICAL SURGERY

## 2019-03-29 PROCEDURE — 25010000002 HEPARIN (PORCINE) PER 1000 UNITS: Performed by: NEUROLOGICAL SURGERY

## 2019-03-29 PROCEDURE — C1894 INTRO/SHEATH, NON-LASER: HCPCS | Performed by: NEUROLOGICAL SURGERY

## 2019-03-29 PROCEDURE — 0 IOVERSOL 68 % SOLUTION: Performed by: NEUROLOGICAL SURGERY

## 2019-03-29 PROCEDURE — 85576 BLOOD PLATELET AGGREGATION: CPT | Performed by: NEUROLOGICAL SURGERY

## 2019-03-29 PROCEDURE — 25010000002 MIDAZOLAM PER 1 MG: Performed by: ANESTHESIOLOGY

## 2019-03-29 PROCEDURE — C1876 STENT, NON-COA/NON-COV W/DEL: HCPCS | Performed by: NEUROLOGICAL SURGERY

## 2019-03-29 PROCEDURE — 25010000002 CEFTRIAXONE PER 250 MG: Performed by: NURSE PRACTITIONER

## 2019-03-29 PROCEDURE — 82607 VITAMIN B-12: CPT | Performed by: HOSPITALIST

## 2019-03-29 PROCEDURE — 61624 TCAT PERM OCCLS/EMBOLJ CNS: CPT | Performed by: NEUROLOGICAL SURGERY

## 2019-03-29 PROCEDURE — 75894 X-RAYS TRANSCATH THERAPY: CPT | Performed by: NEUROLOGICAL SURGERY

## 2019-03-29 PROCEDURE — 85025 COMPLETE CBC W/AUTO DIFF WBC: CPT | Performed by: HOSPITALIST

## 2019-03-29 PROCEDURE — 76377 3D RENDER W/INTRP POSTPROCES: CPT

## 2019-03-29 PROCEDURE — C1769 GUIDE WIRE: HCPCS | Performed by: NEUROLOGICAL SURGERY

## 2019-03-29 PROCEDURE — 25010000002 PROPOFOL 10 MG/ML EMULSION: Performed by: NURSE ANESTHETIST, CERTIFIED REGISTERED

## 2019-03-29 PROCEDURE — 75894 X-RAYS TRANSCATH THERAPY: CPT

## 2019-03-29 PROCEDURE — 25010000002 ONDANSETRON PER 1 MG: Performed by: NURSE ANESTHETIST, CERTIFIED REGISTERED

## 2019-03-29 PROCEDURE — 80053 COMPREHEN METABOLIC PANEL: CPT | Performed by: HOSPITALIST

## 2019-03-29 PROCEDURE — 75898 FOLLOW-UP ANGIOGRAPHY: CPT

## 2019-03-29 PROCEDURE — 25010000002 FENTANYL CITRATE (PF) 100 MCG/2ML SOLUTION: Performed by: NURSE ANESTHETIST, CERTIFIED REGISTERED

## 2019-03-29 PROCEDURE — 25010000002 NEOSTIGMINE PER 0.5 MG: Performed by: NURSE ANESTHETIST, CERTIFIED REGISTERED

## 2019-03-29 PROCEDURE — 82746 ASSAY OF FOLIC ACID SERUM: CPT | Performed by: HOSPITALIST

## 2019-03-29 PROCEDURE — 25010000002 VANCOMYCIN 10 G RECONSTITUTED SOLUTION: Performed by: NEUROLOGICAL SURGERY

## 2019-03-29 PROCEDURE — C1760 CLOSURE DEV, VASC: HCPCS

## 2019-03-29 PROCEDURE — 93886 INTRACRANIAL COMPLETE STUDY: CPT

## 2019-03-29 PROCEDURE — 03VG3DZ RESTRICTION OF INTRACRANIAL ARTERY WITH INTRALUMINAL DEVICE, PERCUTANEOUS APPROACH: ICD-10-PCS | Performed by: NEUROLOGICAL SURGERY

## 2019-03-29 PROCEDURE — 99231 SBSQ HOSP IP/OBS SF/LOW 25: CPT | Performed by: PSYCHIATRY & NEUROLOGY

## 2019-03-29 PROCEDURE — 82962 GLUCOSE BLOOD TEST: CPT

## 2019-03-29 DEVICE — IMPLANTABLE DEVICE: Type: IMPLANTABLE DEVICE | Status: FUNCTIONAL

## 2019-03-29 RX ORDER — LIDOCAINE HYDROCHLORIDE 10 MG/ML
0.5 INJECTION, SOLUTION EPIDURAL; INFILTRATION; INTRACAUDAL; PERINEURAL ONCE AS NEEDED
Status: DISCONTINUED | OUTPATIENT
Start: 2019-03-29 | End: 2019-03-29 | Stop reason: HOSPADM

## 2019-03-29 RX ORDER — HYDROCODONE BITARTRATE AND ACETAMINOPHEN 7.5; 325 MG/1; MG/1
1 TABLET ORAL ONCE AS NEEDED
Status: DISCONTINUED | OUTPATIENT
Start: 2019-03-29 | End: 2019-03-29 | Stop reason: HOSPADM

## 2019-03-29 RX ORDER — PROMETHAZINE HYDROCHLORIDE 25 MG/ML
12.5 INJECTION, SOLUTION INTRAMUSCULAR; INTRAVENOUS ONCE AS NEEDED
Status: DISCONTINUED | OUTPATIENT
Start: 2019-03-29 | End: 2019-03-29 | Stop reason: HOSPADM

## 2019-03-29 RX ORDER — PROMETHAZINE HYDROCHLORIDE 25 MG/1
25 SUPPOSITORY RECTAL ONCE AS NEEDED
Status: DISCONTINUED | OUTPATIENT
Start: 2019-03-29 | End: 2019-03-29 | Stop reason: HOSPADM

## 2019-03-29 RX ORDER — LABETALOL HYDROCHLORIDE 5 MG/ML
5 INJECTION, SOLUTION INTRAVENOUS
Status: DISCONTINUED | OUTPATIENT
Start: 2019-03-29 | End: 2019-03-29 | Stop reason: HOSPADM

## 2019-03-29 RX ORDER — EPHEDRINE SULFATE 50 MG/ML
INJECTION, SOLUTION INTRAVENOUS AS NEEDED
Status: DISCONTINUED | OUTPATIENT
Start: 2019-03-29 | End: 2019-03-29 | Stop reason: SURG

## 2019-03-29 RX ORDER — SODIUM CHLORIDE 9 MG/ML
INJECTION, SOLUTION INTRAVENOUS CONTINUOUS PRN
Status: DISCONTINUED | OUTPATIENT
Start: 2019-03-29 | End: 2019-03-29 | Stop reason: SURG

## 2019-03-29 RX ORDER — MIDAZOLAM HYDROCHLORIDE 1 MG/ML
1 INJECTION INTRAMUSCULAR; INTRAVENOUS
Status: DISCONTINUED | OUTPATIENT
Start: 2019-03-29 | End: 2019-03-29 | Stop reason: HOSPADM

## 2019-03-29 RX ORDER — LIDOCAINE HYDROCHLORIDE 20 MG/ML
INJECTION, SOLUTION INFILTRATION; PERINEURAL AS NEEDED
Status: DISCONTINUED | OUTPATIENT
Start: 2019-03-29 | End: 2019-03-29 | Stop reason: SURG

## 2019-03-29 RX ORDER — PROTAMINE SULFATE 10 MG/ML
INJECTION, SOLUTION INTRAVENOUS AS NEEDED
Status: DISCONTINUED | OUTPATIENT
Start: 2019-03-29 | End: 2019-03-29 | Stop reason: SURG

## 2019-03-29 RX ORDER — FAMOTIDINE 10 MG/ML
20 INJECTION, SOLUTION INTRAVENOUS ONCE
Status: COMPLETED | OUTPATIENT
Start: 2019-03-29 | End: 2019-03-29

## 2019-03-29 RX ORDER — ASPIRIN 325 MG
325 TABLET ORAL DAILY
Status: DISCONTINUED | OUTPATIENT
Start: 2019-03-29 | End: 2019-04-01 | Stop reason: HOSPADM

## 2019-03-29 RX ORDER — ONDANSETRON 2 MG/ML
4 INJECTION INTRAMUSCULAR; INTRAVENOUS ONCE AS NEEDED
Status: DISCONTINUED | OUTPATIENT
Start: 2019-03-29 | End: 2019-03-29 | Stop reason: HOSPADM

## 2019-03-29 RX ORDER — DEXAMETHASONE SODIUM PHOSPHATE 10 MG/ML
INJECTION INTRAMUSCULAR; INTRAVENOUS AS NEEDED
Status: DISCONTINUED | OUTPATIENT
Start: 2019-03-29 | End: 2019-03-29 | Stop reason: SURG

## 2019-03-29 RX ORDER — LIDOCAINE AND PRILOCAINE 25; 25 MG/G; MG/G
CREAM TOPICAL ONCE
Status: COMPLETED | OUTPATIENT
Start: 2019-03-29 | End: 2019-03-29

## 2019-03-29 RX ORDER — SODIUM CHLORIDE, SODIUM LACTATE, POTASSIUM CHLORIDE, CALCIUM CHLORIDE 600; 310; 30; 20 MG/100ML; MG/100ML; MG/100ML; MG/100ML
9 INJECTION, SOLUTION INTRAVENOUS CONTINUOUS
Status: DISCONTINUED | OUTPATIENT
Start: 2019-03-29 | End: 2019-03-29

## 2019-03-29 RX ORDER — ROCURONIUM BROMIDE 10 MG/ML
INJECTION, SOLUTION INTRAVENOUS AS NEEDED
Status: DISCONTINUED | OUTPATIENT
Start: 2019-03-29 | End: 2019-03-29 | Stop reason: SURG

## 2019-03-29 RX ORDER — HEPARIN SODIUM 1000 [USP'U]/ML
INJECTION, SOLUTION INTRAVENOUS; SUBCUTANEOUS AS NEEDED
Status: DISCONTINUED | OUTPATIENT
Start: 2019-03-29 | End: 2019-03-29 | Stop reason: SURG

## 2019-03-29 RX ORDER — GLYCOPYRROLATE 0.2 MG/ML
INJECTION INTRAMUSCULAR; INTRAVENOUS AS NEEDED
Status: DISCONTINUED | OUTPATIENT
Start: 2019-03-29 | End: 2019-03-29 | Stop reason: SURG

## 2019-03-29 RX ORDER — SODIUM CHLORIDE 9 MG/ML
100 INJECTION, SOLUTION INTRAVENOUS CONTINUOUS
Status: DISCONTINUED | OUTPATIENT
Start: 2019-03-29 | End: 2019-03-31

## 2019-03-29 RX ORDER — SODIUM CHLORIDE 0.9 % (FLUSH) 0.9 %
3 SYRINGE (ML) INJECTION EVERY 12 HOURS SCHEDULED
Status: DISCONTINUED | OUTPATIENT
Start: 2019-03-29 | End: 2019-03-29 | Stop reason: HOSPADM

## 2019-03-29 RX ORDER — SODIUM CHLORIDE 0.9 % (FLUSH) 0.9 %
1-10 SYRINGE (ML) INJECTION AS NEEDED
Status: DISCONTINUED | OUTPATIENT
Start: 2019-03-29 | End: 2019-03-29 | Stop reason: HOSPADM

## 2019-03-29 RX ORDER — FENTANYL CITRATE 50 UG/ML
50 INJECTION, SOLUTION INTRAMUSCULAR; INTRAVENOUS
Status: DISCONTINUED | OUTPATIENT
Start: 2019-03-29 | End: 2019-03-29 | Stop reason: HOSPADM

## 2019-03-29 RX ORDER — ALBUTEROL SULFATE 2.5 MG/3ML
2.5 SOLUTION RESPIRATORY (INHALATION) ONCE AS NEEDED
Status: DISCONTINUED | OUTPATIENT
Start: 2019-03-29 | End: 2019-03-29 | Stop reason: HOSPADM

## 2019-03-29 RX ORDER — ACETAMINOPHEN 325 MG/1
650 TABLET ORAL ONCE AS NEEDED
Status: DISCONTINUED | OUTPATIENT
Start: 2019-03-29 | End: 2019-03-29 | Stop reason: HOSPADM

## 2019-03-29 RX ORDER — MEPERIDINE HYDROCHLORIDE 25 MG/ML
12.5 INJECTION INTRAMUSCULAR; INTRAVENOUS; SUBCUTANEOUS
Status: DISCONTINUED | OUTPATIENT
Start: 2019-03-29 | End: 2019-03-29 | Stop reason: HOSPADM

## 2019-03-29 RX ORDER — HYDROMORPHONE HYDROCHLORIDE 1 MG/ML
0.5 INJECTION, SOLUTION INTRAMUSCULAR; INTRAVENOUS; SUBCUTANEOUS
Status: DISCONTINUED | OUTPATIENT
Start: 2019-03-29 | End: 2019-03-29 | Stop reason: HOSPADM

## 2019-03-29 RX ORDER — CLOPIDOGREL BISULFATE 75 MG/1
150 TABLET ORAL ONCE
Status: COMPLETED | OUTPATIENT
Start: 2019-03-29 | End: 2019-03-29

## 2019-03-29 RX ORDER — SCOLOPAMINE TRANSDERMAL SYSTEM 1 MG/1
1 PATCH, EXTENDED RELEASE TRANSDERMAL ONCE
Status: DISCONTINUED | OUTPATIENT
Start: 2019-03-29 | End: 2019-03-29

## 2019-03-29 RX ORDER — DIPHENHYDRAMINE HYDROCHLORIDE 50 MG/ML
12.5 INJECTION INTRAMUSCULAR; INTRAVENOUS
Status: DISCONTINUED | OUTPATIENT
Start: 2019-03-29 | End: 2019-03-29 | Stop reason: HOSPADM

## 2019-03-29 RX ORDER — FAMOTIDINE 20 MG/1
20 TABLET, FILM COATED ORAL
Status: DISCONTINUED | OUTPATIENT
Start: 2019-03-29 | End: 2019-03-29 | Stop reason: HOSPADM

## 2019-03-29 RX ORDER — PROPOFOL 10 MG/ML
VIAL (ML) INTRAVENOUS AS NEEDED
Status: DISCONTINUED | OUTPATIENT
Start: 2019-03-29 | End: 2019-03-29 | Stop reason: SURG

## 2019-03-29 RX ORDER — FLUMAZENIL 0.1 MG/ML
0.2 INJECTION INTRAVENOUS AS NEEDED
Status: DISCONTINUED | OUTPATIENT
Start: 2019-03-29 | End: 2019-03-29 | Stop reason: HOSPADM

## 2019-03-29 RX ORDER — DIPHENHYDRAMINE HCL 25 MG
25 CAPSULE ORAL
Status: DISCONTINUED | OUTPATIENT
Start: 2019-03-29 | End: 2019-03-29 | Stop reason: HOSPADM

## 2019-03-29 RX ORDER — MIDAZOLAM HYDROCHLORIDE 1 MG/ML
2 INJECTION INTRAMUSCULAR; INTRAVENOUS
Status: DISCONTINUED | OUTPATIENT
Start: 2019-03-29 | End: 2019-03-29 | Stop reason: HOSPADM

## 2019-03-29 RX ORDER — HYDRALAZINE HYDROCHLORIDE 20 MG/ML
5 INJECTION INTRAMUSCULAR; INTRAVENOUS
Status: DISCONTINUED | OUTPATIENT
Start: 2019-03-29 | End: 2019-03-29 | Stop reason: HOSPADM

## 2019-03-29 RX ORDER — ONDANSETRON 2 MG/ML
INJECTION INTRAMUSCULAR; INTRAVENOUS AS NEEDED
Status: DISCONTINUED | OUTPATIENT
Start: 2019-03-29 | End: 2019-03-29 | Stop reason: SURG

## 2019-03-29 RX ORDER — NALOXONE HCL 0.4 MG/ML
0.2 VIAL (ML) INJECTION AS NEEDED
Status: DISCONTINUED | OUTPATIENT
Start: 2019-03-29 | End: 2019-03-29 | Stop reason: HOSPADM

## 2019-03-29 RX ORDER — FENTANYL CITRATE 50 UG/ML
INJECTION, SOLUTION INTRAMUSCULAR; INTRAVENOUS AS NEEDED
Status: DISCONTINUED | OUTPATIENT
Start: 2019-03-29 | End: 2019-03-29 | Stop reason: SURG

## 2019-03-29 RX ORDER — SODIUM CHLORIDE AND POTASSIUM CHLORIDE 150; 900 MG/100ML; MG/100ML
60 INJECTION, SOLUTION INTRAVENOUS CONTINUOUS
Status: DISCONTINUED | OUTPATIENT
Start: 2019-03-29 | End: 2019-03-30

## 2019-03-29 RX ORDER — PROMETHAZINE HYDROCHLORIDE 25 MG/1
25 TABLET ORAL ONCE AS NEEDED
Status: DISCONTINUED | OUTPATIENT
Start: 2019-03-29 | End: 2019-03-29 | Stop reason: HOSPADM

## 2019-03-29 RX ADMIN — SODIUM CHLORIDE: 9 INJECTION, SOLUTION INTRAVENOUS at 13:16

## 2019-03-29 RX ADMIN — POTASSIUM CHLORIDE AND SODIUM CHLORIDE 60 ML/HR: 900; 150 INJECTION, SOLUTION INTRAVENOUS at 18:53

## 2019-03-29 RX ADMIN — FENTANYL CITRATE 50 MCG: 50 INJECTION, SOLUTION INTRAMUSCULAR; INTRAVENOUS at 16:12

## 2019-03-29 RX ADMIN — ROCURONIUM BROMIDE 50 MG: 10 INJECTION INTRAVENOUS at 13:25

## 2019-03-29 RX ADMIN — FENTANYL CITRATE 100 MCG: 50 INJECTION INTRAMUSCULAR; INTRAVENOUS at 13:22

## 2019-03-29 RX ADMIN — PROTAMINE SULFATE 40 MG: 10 INJECTION, SOLUTION INTRAVENOUS at 14:30

## 2019-03-29 RX ADMIN — GLYCOPYRROLATE 0.6 MG: 0.2 INJECTION INTRAMUSCULAR; INTRAVENOUS at 14:48

## 2019-03-29 RX ADMIN — LIDOCAINE HYDROCHLORIDE 100 MG: 20 INJECTION, SOLUTION INFILTRATION; PERINEURAL at 13:25

## 2019-03-29 RX ADMIN — HEPARIN SODIUM 7000 UNITS: 1000 INJECTION, SOLUTION INTRAVENOUS; SUBCUTANEOUS at 14:12

## 2019-03-29 RX ADMIN — NIMODIPINE 60 MG: 30 CAPSULE ORAL at 18:13

## 2019-03-29 RX ADMIN — NIMODIPINE 60 MG: 30 CAPSULE ORAL at 08:30

## 2019-03-29 RX ADMIN — NIMODIPINE 60 MG: 30 CAPSULE ORAL at 11:00

## 2019-03-29 RX ADMIN — EPHEDRINE SULFATE 5 MG: 50 INJECTION INTRAMUSCULAR; INTRAVENOUS; SUBCUTANEOUS at 14:26

## 2019-03-29 RX ADMIN — NEOSTIGMINE METHYLSULFATE 5 MG: 1 INJECTION INTRAMUSCULAR; INTRAVENOUS; SUBCUTANEOUS at 14:48

## 2019-03-29 RX ADMIN — ASPIRIN 325 MG: 325 TABLET ORAL at 10:54

## 2019-03-29 RX ADMIN — NIMODIPINE 60 MG: 30 CAPSULE ORAL at 04:00

## 2019-03-29 RX ADMIN — DEXAMETHASONE SODIUM PHOSPHATE 4 MG: 10 INJECTION INTRAMUSCULAR; INTRAVENOUS at 13:59

## 2019-03-29 RX ADMIN — SODIUM CHLORIDE 100 ML/HR: 9 INJECTION, SOLUTION INTRAVENOUS at 12:28

## 2019-03-29 RX ADMIN — CEFTRIAXONE SODIUM 1 G: 1 INJECTION, SOLUTION INTRAVENOUS at 06:30

## 2019-03-29 RX ADMIN — SODIUM CHLORIDE, PRESERVATIVE FREE 3 ML: 5 INJECTION INTRAVENOUS at 08:32

## 2019-03-29 RX ADMIN — LEVOTHYROXINE SODIUM 175 MCG: 175 TABLET ORAL at 06:30

## 2019-03-29 RX ADMIN — SCOPALAMINE 1 PATCH: 1 PATCH, EXTENDED RELEASE TRANSDERMAL at 12:40

## 2019-03-29 RX ADMIN — ATORVASTATIN CALCIUM 40 MG: 20 TABLET, FILM COATED ORAL at 21:33

## 2019-03-29 RX ADMIN — NIMODIPINE 60 MG: 30 CAPSULE ORAL at 21:33

## 2019-03-29 RX ADMIN — FAMOTIDINE 20 MG: 10 INJECTION INTRAVENOUS at 12:41

## 2019-03-29 RX ADMIN — NICARDIPINE HYDROCHLORIDE 3 MG/HR: 2.5 INJECTION INTRAVENOUS at 06:15

## 2019-03-29 RX ADMIN — ROCURONIUM BROMIDE 10 MG: 10 INJECTION INTRAVENOUS at 13:56

## 2019-03-29 RX ADMIN — CLOPIDOGREL 150 MG: 75 TABLET, FILM COATED ORAL at 10:53

## 2019-03-29 RX ADMIN — LIDOCAINE AND PRILOCAINE 1 APPLICATION: 25; 25 CREAM TOPICAL at 13:00

## 2019-03-29 RX ADMIN — MIDAZOLAM 1 MG: 1 INJECTION INTRAMUSCULAR; INTRAVENOUS at 12:43

## 2019-03-29 RX ADMIN — ONDANSETRON 4 MG: 2 INJECTION INTRAMUSCULAR; INTRAVENOUS at 14:42

## 2019-03-29 RX ADMIN — SODIUM CHLORIDE, PRESERVATIVE FREE 3 ML: 5 INJECTION INTRAVENOUS at 21:00

## 2019-03-29 RX ADMIN — FENTANYL CITRATE 50 MCG: 50 INJECTION INTRAMUSCULAR; INTRAVENOUS at 15:10

## 2019-03-29 RX ADMIN — IOVERSOL 115.8 ML: 678 INJECTION INTRA-ARTERIAL; INTRAVENOUS at 16:15

## 2019-03-29 RX ADMIN — VANCOMYCIN HYDROCHLORIDE 1500 MG: 10 INJECTION, POWDER, LYOPHILIZED, FOR SOLUTION INTRAVENOUS at 12:28

## 2019-03-29 RX ADMIN — PROPOFOL 200 MG: 10 INJECTION, EMULSION INTRAVENOUS at 13:25

## 2019-03-29 NOTE — ANESTHESIA PREPROCEDURE EVALUATION
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

## 2019-03-29 NOTE — ANESTHESIA POSTPROCEDURE EVALUATION
"Patient: Oralia Sánchez    Procedure Summary     Date:  03/29/19 Room / Location:  [facility] OR 19 INV / [facility] HYBRID OR 18/19    Anesthesia Start:  1316 Anesthesia Stop:  1515    Procedure:  Cererbal angiogram and embolization of cerebral aneurysm (N/A ) Diagnosis:       Primary thunderclap headache      (Primary thunderclap headache [G44.53])    Surgeon:  Alexx Barrow MD Provider:  Lars Ramirez MD    Anesthesia Type:  general ASA Status:  3          Anesthesia Type: general  Last vitals  BP   105/70 (03/29/19 1645)   Temp   36.8 °C (98.3 °F) (03/29/19 1509)   Pulse   61 (03/29/19 1645)   Resp   14 (03/29/19 1645)     SpO2   96 % (03/29/19 1645)     Post Anesthesia Care and Evaluation    Patient location during evaluation: bedside  Patient participation: complete - patient participated  Level of consciousness: awake  Pain score: 2  Pain management: adequate  Airway patency: patent  Anesthetic complications: No anesthetic complications  PONV Status: none  Cardiovascular status: acceptable  Respiratory status: acceptable  Hydration status: acceptable    Comments: /70   Pulse 61   Temp 36.8 °C (98.3 °F) (Oral)   Resp 14   Ht 164.6 cm (64.8\")   Wt 107 kg (235 lb 14.3 oz)   LMP 03/29/2019 Comment: hcg- neg on 3/27/19  SpO2 96%   Breastfeeding? No   BMI 39.50 kg/m²         "

## 2019-03-29 NOTE — ANESTHESIA PROCEDURE NOTES
Airway  Urgency: elective    Airway not difficult    General Information and Staff    Patient location during procedure: OR  Anesthesiologist: Lars Ramirez MD  CRNA: Sheree Marin CRNA    Indications and Patient Condition  Indications for airway management: airway protection    Preoxygenated: yes (pt pre-O2 with 100% O2)  Mask difficulty assessment: 2 - vent by mask + OA or adjuvant +/- NMBA (easy BMV )    Final Airway Details  Final airway type: endotracheal airway      Successful airway: ETT  Cuffed: yes   Successful intubation technique: direct laryngoscopy  Facilitating devices/methods: anterior pressure/BURP  Endotracheal tube insertion site: oral  Blade: Mariely  Blade size: 3  ETT size (mm): 7.0  Cormack-Lehane Classification: grade IIa - partial view of glottis  Placement verified by: chest auscultation and capnometry   Cuff volume (mL): 7  Measured from: lips  ETT to lips (cm): 21  Number of attempts at approach: 1    Additional Comments  Appears ATOETx1. No change in dentition.

## 2019-03-30 LAB
BACTERIA SPEC AEROBE CULT: ABNORMAL
BACTERIA SPEC AEROBE CULT: NO GROWTH
CLOSURE TME COLL+ADP BLD: 94 SECONDS (ref 52–122)
CLOSURE TME COLL+EPINEP BLD: 152 SECONDS (ref 68–148)
GRAM STN SPEC: NORMAL
HSV1 DNA SPEC QL NAA+PROBE: NEGATIVE
HSV2 DNA SPEC QL NAA+PROBE: NEGATIVE
PA ADP PRP-ACNC: 190 PRU (ref 194–418)

## 2019-03-30 PROCEDURE — 85576 BLOOD PLATELET AGGREGATION: CPT | Performed by: NURSE PRACTITIONER

## 2019-03-30 PROCEDURE — 93886 INTRACRANIAL COMPLETE STUDY: CPT

## 2019-03-30 PROCEDURE — 99231 SBSQ HOSP IP/OBS SF/LOW 25: CPT | Performed by: PSYCHIATRY & NEUROLOGY

## 2019-03-30 PROCEDURE — 25010000002 CEFTRIAXONE PER 250 MG: Performed by: NURSE PRACTITIONER

## 2019-03-30 PROCEDURE — 25010000002 KETOROLAC TROMETHAMINE PER 15 MG: Performed by: NURSE PRACTITIONER

## 2019-03-30 RX ORDER — TRAMADOL HYDROCHLORIDE 50 MG/1
50 TABLET ORAL EVERY 8 HOURS PRN
Status: DISCONTINUED | OUTPATIENT
Start: 2019-03-30 | End: 2019-04-01 | Stop reason: HOSPADM

## 2019-03-30 RX ADMIN — NIMODIPINE 60 MG: 30 CAPSULE ORAL at 05:10

## 2019-03-30 RX ADMIN — KETOROLAC TROMETHAMINE 30 MG: 30 INJECTION, SOLUTION INTRAMUSCULAR at 13:40

## 2019-03-30 RX ADMIN — KETOROLAC TROMETHAMINE 30 MG: 30 INJECTION, SOLUTION INTRAMUSCULAR at 20:59

## 2019-03-30 RX ADMIN — NIMODIPINE 60 MG: 30 CAPSULE ORAL at 20:30

## 2019-03-30 RX ADMIN — NIMODIPINE 60 MG: 30 CAPSULE ORAL at 01:40

## 2019-03-30 RX ADMIN — CEFTRIAXONE SODIUM 1 G: 1 INJECTION, SOLUTION INTRAVENOUS at 05:58

## 2019-03-30 RX ADMIN — NIMODIPINE 60 MG: 30 CAPSULE ORAL at 08:04

## 2019-03-30 RX ADMIN — ATORVASTATIN CALCIUM 40 MG: 20 TABLET, FILM COATED ORAL at 20:51

## 2019-03-30 RX ADMIN — TRAMADOL HYDROCHLORIDE 50 MG: 50 TABLET ORAL at 20:55

## 2019-03-30 RX ADMIN — ASPIRIN 325 MG: 325 TABLET ORAL at 08:04

## 2019-03-30 RX ADMIN — LEVOTHYROXINE SODIUM 175 MCG: 175 TABLET ORAL at 08:05

## 2019-03-30 RX ADMIN — NIMODIPINE 60 MG: 30 CAPSULE ORAL at 11:37

## 2019-03-30 RX ADMIN — KETOROLAC TROMETHAMINE 30 MG: 30 INJECTION, SOLUTION INTRAMUSCULAR at 02:11

## 2019-03-30 RX ADMIN — SODIUM CHLORIDE, PRESERVATIVE FREE 3 ML: 5 INJECTION INTRAVENOUS at 08:05

## 2019-03-30 RX ADMIN — NIMODIPINE 60 MG: 30 CAPSULE ORAL at 15:08

## 2019-03-31 LAB
ANION GAP SERPL CALCULATED.3IONS-SCNC: 12.7 MMOL/L
BUN BLD-MCNC: 15 MG/DL (ref 6–20)
BUN/CREAT SERPL: 20.8 (ref 7–25)
CALCIUM SPEC-SCNC: 8.9 MG/DL (ref 8.6–10.5)
CHLORIDE SERPL-SCNC: 104 MMOL/L (ref 98–107)
CO2 SERPL-SCNC: 23.3 MMOL/L (ref 22–29)
CREAT BLD-MCNC: 0.72 MG/DL (ref 0.57–1)
DEPRECATED RDW RBC AUTO: 55.5 FL (ref 37–54)
ERYTHROCYTE [DISTWIDTH] IN BLOOD BY AUTOMATED COUNT: 14.7 % (ref 12.3–15.4)
GFR SERPL CREATININE-BSD FRML MDRD: 88 ML/MIN/1.73
GLUCOSE BLD-MCNC: 99 MG/DL (ref 65–99)
HCT VFR BLD AUTO: 46.3 % (ref 34–46.6)
HGB BLD-MCNC: 15 G/DL (ref 12–15.9)
MCH RBC QN AUTO: 32.8 PG (ref 26.6–33)
MCHC RBC AUTO-ENTMCNC: 32.4 G/DL (ref 31.5–35.7)
MCV RBC AUTO: 101.1 FL (ref 79–97)
PLATELET # BLD AUTO: 175 10*3/MM3 (ref 140–450)
PMV BLD AUTO: 9.7 FL (ref 6–12)
POTASSIUM BLD-SCNC: 4.6 MMOL/L (ref 3.5–5.2)
RBC # BLD AUTO: 4.58 10*6/MM3 (ref 3.77–5.28)
SODIUM BLD-SCNC: 140 MMOL/L (ref 136–145)
WBC NRBC COR # BLD: 7.86 10*3/MM3 (ref 3.4–10.8)

## 2019-03-31 PROCEDURE — 25010000002 CEFTRIAXONE PER 250 MG: Performed by: NURSE PRACTITIONER

## 2019-03-31 PROCEDURE — 25010000002 KETOROLAC TROMETHAMINE PER 15 MG: Performed by: NURSE PRACTITIONER

## 2019-03-31 PROCEDURE — 80048 BASIC METABOLIC PNL TOTAL CA: CPT | Performed by: INTERNAL MEDICINE

## 2019-03-31 PROCEDURE — 85027 COMPLETE CBC AUTOMATED: CPT | Performed by: INTERNAL MEDICINE

## 2019-03-31 PROCEDURE — 99231 SBSQ HOSP IP/OBS SF/LOW 25: CPT | Performed by: PSYCHIATRY & NEUROLOGY

## 2019-03-31 PROCEDURE — 25010000002 DIPHENHYDRAMINE PER 50 MG: Performed by: NURSE PRACTITIONER

## 2019-03-31 RX ADMIN — SODIUM CHLORIDE, PRESERVATIVE FREE 3 ML: 5 INJECTION INTRAVENOUS at 20:59

## 2019-03-31 RX ADMIN — NIMODIPINE 60 MG: 30 CAPSULE ORAL at 20:57

## 2019-03-31 RX ADMIN — NIMODIPINE 60 MG: 30 CAPSULE ORAL at 15:54

## 2019-03-31 RX ADMIN — CEFTRIAXONE SODIUM 1 G: 1 INJECTION, SOLUTION INTRAVENOUS at 05:11

## 2019-03-31 RX ADMIN — KETOROLAC TROMETHAMINE 30 MG: 30 INJECTION, SOLUTION INTRAMUSCULAR at 21:03

## 2019-03-31 RX ADMIN — NIMODIPINE 60 MG: 30 CAPSULE ORAL at 12:46

## 2019-03-31 RX ADMIN — LEVOTHYROXINE SODIUM 175 MCG: 175 TABLET ORAL at 05:13

## 2019-03-31 RX ADMIN — TRAMADOL HYDROCHLORIDE 50 MG: 50 TABLET ORAL at 15:54

## 2019-03-31 RX ADMIN — NIMODIPINE 60 MG: 30 CAPSULE ORAL at 05:11

## 2019-03-31 RX ADMIN — SODIUM CHLORIDE, PRESERVATIVE FREE 3 ML: 5 INJECTION INTRAVENOUS at 08:03

## 2019-03-31 RX ADMIN — NIMODIPINE 60 MG: 30 CAPSULE ORAL at 08:03

## 2019-03-31 RX ADMIN — ATORVASTATIN CALCIUM 40 MG: 20 TABLET, FILM COATED ORAL at 20:56

## 2019-03-31 RX ADMIN — DIPHENHYDRAMINE HYDROCHLORIDE 25 MG: 50 INJECTION, SOLUTION INTRAMUSCULAR; INTRAVENOUS at 00:14

## 2019-03-31 RX ADMIN — ASPIRIN 325 MG: 325 TABLET ORAL at 08:02

## 2019-03-31 RX ADMIN — NIMODIPINE 60 MG: 30 CAPSULE ORAL at 00:14

## 2019-04-01 ENCOUNTER — TELEPHONE (OUTPATIENT)
Dept: NEUROSURGERY | Facility: CLINIC | Age: 44
End: 2019-04-01

## 2019-04-01 VITALS
OXYGEN SATURATION: 94 % | SYSTOLIC BLOOD PRESSURE: 126 MMHG | RESPIRATION RATE: 18 BRPM | DIASTOLIC BLOOD PRESSURE: 80 MMHG | WEIGHT: 238 LBS | BODY MASS INDEX: 39.65 KG/M2 | HEIGHT: 65 IN | HEART RATE: 62 BPM | TEMPERATURE: 97.6 F

## 2019-04-01 PROBLEM — R93.0 ABNORMAL CT OF THE HEAD: Status: RESOLVED | Noted: 2019-03-28 | Resolved: 2019-04-01

## 2019-04-01 LAB
ACT BLD: 125 SECONDS (ref 82–152)
ACT BLD: 125 SECONDS (ref 82–152)
ACT BLD: 257 SECONDS (ref 82–152)

## 2019-04-01 PROCEDURE — 99024 POSTOP FOLLOW-UP VISIT: CPT | Performed by: NURSE PRACTITIONER

## 2019-04-01 PROCEDURE — 25010000002 CEFTRIAXONE PER 250 MG: Performed by: INTERNAL MEDICINE

## 2019-04-01 RX ORDER — CEFTRIAXONE SODIUM 1 G/50ML
1 INJECTION, SOLUTION INTRAVENOUS EVERY 24 HOURS
Status: COMPLETED | OUTPATIENT
Start: 2019-04-01 | End: 2019-04-01

## 2019-04-01 RX ORDER — CLOPIDOGREL BISULFATE 75 MG/1
75 TABLET ORAL DAILY
Qty: 30 TABLET | Refills: 6 | Status: SHIPPED | OUTPATIENT
Start: 2019-04-02 | End: 2019-10-02 | Stop reason: HOSPADM

## 2019-04-01 RX ORDER — TRAMADOL HYDROCHLORIDE 50 MG/1
50 TABLET ORAL EVERY 8 HOURS PRN
Qty: 12 TABLET | Refills: 0 | Status: SHIPPED | OUTPATIENT
Start: 2019-04-01 | End: 2019-04-09

## 2019-04-01 RX ORDER — ASPIRIN 81 MG/1
81 TABLET ORAL DAILY
Qty: 30 TABLET | Refills: 11 | Status: SHIPPED | OUTPATIENT
Start: 2019-04-01 | End: 2020-04-01

## 2019-04-01 RX ORDER — CLOPIDOGREL BISULFATE 75 MG/1
75 TABLET ORAL DAILY
Status: DISCONTINUED | OUTPATIENT
Start: 2019-04-01 | End: 2019-04-01 | Stop reason: HOSPADM

## 2019-04-01 RX ORDER — ATORVASTATIN CALCIUM 40 MG/1
40 TABLET, FILM COATED ORAL NIGHTLY
Qty: 30 TABLET | Refills: 0 | Status: SHIPPED | OUTPATIENT
Start: 2019-04-01 | End: 2019-04-22 | Stop reason: SDUPTHER

## 2019-04-01 RX ADMIN — NIMODIPINE 60 MG: 30 CAPSULE ORAL at 09:57

## 2019-04-01 RX ADMIN — NIMODIPINE 60 MG: 30 CAPSULE ORAL at 00:29

## 2019-04-01 RX ADMIN — TRAMADOL HYDROCHLORIDE 50 MG: 50 TABLET ORAL at 08:37

## 2019-04-01 RX ADMIN — POLYETHYLENE GLYCOL 3350 17 G: 17 POWDER, FOR SOLUTION ORAL at 14:01

## 2019-04-01 RX ADMIN — CEFTRIAXONE SODIUM 1 G: 1 INJECTION, SOLUTION INTRAVENOUS at 09:57

## 2019-04-01 RX ADMIN — CLOPIDOGREL 75 MG: 75 TABLET, FILM COATED ORAL at 11:17

## 2019-04-01 RX ADMIN — NIMODIPINE 60 MG: 30 CAPSULE ORAL at 04:27

## 2019-04-01 RX ADMIN — ASPIRIN 325 MG: 325 TABLET ORAL at 09:57

## 2019-04-01 RX ADMIN — SODIUM CHLORIDE, PRESERVATIVE FREE 3 ML: 5 INJECTION INTRAVENOUS at 09:58

## 2019-04-01 RX ADMIN — NIMODIPINE 60 MG: 30 CAPSULE ORAL at 11:46

## 2019-04-01 NOTE — PROGRESS NOTES
Case Management Discharge Note    Final Note: DC'd home    Destination      No service has been selected for the patient.      Durable Medical Equipment      No service has been selected for the patient.      Dialysis/Infusion      No service has been selected for the patient.      Home Medical Care      No service has been selected for the patient.      Therapy      No service has been selected for the patient.      Community Resources      No service has been selected for the patient.             Final Discharge Disposition Code: 01 - home or self-care

## 2019-04-01 NOTE — PAYOR COMM NOTE
"Oralia Smith (43 y.o. Female)     PLEASE SEE ATTACHED CLINICAL REVIEW. PT. REMAINS ON A MONITORED TELEM FLOOR.    REF#[id number]    PLEASE CALL   OR  [phone] WITH CONTINUED STAY AUTH AND DAYS APPROVED.     THANK YOU    ANGELA JOYNER LPN CCP        Date of Birth Social Security Number Address Home Phone MRN    1975  [id number] [id number] [phone] [id number]    Confucianist Marital Status          Episcopalian        Admission Date Admission Type Admitting Provider Attending Provider Department, Room/Bed    3/27/19 Emergency Urbano Del Valle MD Hogancamp, David Ryan, MD [address], S520/1    Discharge Date Discharge Disposition Discharge Destination                       Attending Provider:  Prince Paredes MD    Allergies:  No Known Drug Allergy    Isolation:  None   Infection:  None   Code Status:  CPR    Ht:  164.6 cm (64.8\")   Wt:  108 kg (238 lb)    Admission Cmt:  None   Principal Problem:  Primary thunderclap headache [G44.53]                 Active Insurance as of 3/27/2019     Primary Coverage     Payor Plan Insurance Group Employer/Plan Group    [employer]HE[employer] BLUE CROSS AN[employer]M BLUE CROSS BLUE SHIELD PPO [id number]     Payor Plan Address Payor Plan Phone Number Payor Plan Fax Number Effective Dates    [address] [phone]  1/1/2018 - None Entered    [place] [id number]       Subscriber Name Subscriber Birth Date Member ID       ORALIA SMITH 1975 [id number]                 Emergency Contacts      (Rel.) Home Phone Work Phone Mobile Phone    LUCRECIA SMITH (Son) -- -- [phone]              Intake & Output (last day)       03/31 0701 - 04/01 0700 04/01 0701 - 04/02 0700    P.O. 360     I.V. (mL/kg)      Total Intake(mL/kg) 360 (3.3)     Urine (mL/kg/hr)      Total Output      Net +360               Lines, Drains & Airways    Active LDAs     Name:   Placement date:   Placement time:   Site:  "  Days:    Peripheral IV 19 Posterior;Right Hand   19    Hand   3                Operative/Procedure Notes (last 24 hours) (Notes from 3/31/2019 11:48 AM through 2019 11:48 AM)     No notes of this type exist for this encounter.           Physician Progress Notes (last 24 hours) (Notes from 3/31/2019 11:48 AM through 2019 11:48 AM)      Phill Hickman MD at 3/31/2019  4:20 PM            Patient Identification:  NAME:  Oralia Sánchez  Age:  43 y.o.   Sex:  female   :  1975   MRN:  [id number]       Chief complaint: Cerebral aneurysm ruptured, headache    History of present illness: She is awake alert no headache at all no complaints whatsoever and wants to go home.      Past medical history:  Past Medical History:   Diagnosis Date   • Arthritis    • Carpal tunnel syndrome    • Chest pain    • Diabetes mellitus (CMS/HCC)    • Dysmetabolic syndrome X    • Gestational diabetes mellitus    • Hypothyroidism    • Metabolic disorder    • Nonalcoholic steatohepatitis    • Obesity    • Palpitations    • Rheumatoid arthritis (CMS/HCC)    • Spinal headache    • Type 2 diabetes mellitus (CMS/HCC)    • Vitamin D deficiency        Allergies:  No known drug allergy    Home medications:  Medications Prior to Admission   Medication Sig Dispense Refill Last Dose   • norethindrone (DESTIN) 0.35 MG tablet Take 1 tablet by mouth Daily.   3/27/2019 at Unknown time   • SYNTHROID 175 MCG tablet Take 175 mcg by mouth Daily.   3/27/2019 at Unknown time        Hospital medications:    aspirin 325 mg Oral Daily   atorvastatin 40 mg Oral Nightly   ceftriaxone 1 g Intravenous Q24H   levothyroxine 175 mcg Oral Q AM   niMODipine 60 mg Oral Q4H   sodium chloride 400 mL Intravenous Once   sodium chloride 3 mL Intravenous Q12H        diphenhydrAMINE  •  ketorolac  •  ondansetron **OR** ondansetron ODT **OR** ondansetron  •  prochlorperazine  •  sodium chloride  •  traMADol      Objective:  Vitals Ranges:    Temp:  [97.5 °F (36.4 °C)-98.3 °F (36.8 °C)] 97.5 °F (36.4 °C)  Heart Rate:  [60-75] 74  Resp:  [18] 18  BP: (115-143)/(69-96) 130/88      Physical Exam:  Smiling awake alert no headache at all oriented x3 normal cranial nerves II through VII tongue is midline station gait normal reflexes trace throughout symmetrical toes downgoing bilaterally extraocular movements full without diplopia or nystagmus    Results review:   I reviewed the patient's new clinical results.    Data review:  Lab Results (last 24 hours)     Procedure Component Value Units Date/Time    Basic Metabolic Panel [[id number]] Collected:  03/31/19 0540    Specimen:  Blood Updated:  03/31/19 0646     Glucose 99 mg/dL      BUN 15 mg/dL      Creatinine 0.72 mg/dL      Sodium 140 mmol/L      Potassium 4.6 mmol/L      Chloride 104 mmol/L      CO2 23.3 mmol/L      Calcium 8.9 mg/dL      eGFR Non African Amer 88 mL/min/1.73      BUN/Creatinine Ratio 20.8     Anion Gap 12.7 mmol/L     Narrative:       GFR Normal >60  Chronic Kidney Disease <60  Kidney Failure <15    CBC (No Diff) [[id number]]  (Abnormal) Collected:  03/31/19 0540    Specimen:  Blood Updated:  03/31/19 0619     WBC 7.86 10*3/mm3      RBC 4.58 10*6/mm3      Hemoglobin 15.0 g/dL      Hematocrit 46.3 %      .1 fL      MCH 32.8 pg      MCHC 32.4 g/dL      RDW 14.7 %      RDW-SD 55.5 fl      MPV 9.7 fL      Platelets 175 10*3/mm3            Imaging:  Imaging Results (last 24 hours)     ** No results found for the last 24 hours. **             Assessment and Plan:       Primary thunderclap headache    YOUNG (nonalcoholic steatohepatitis)    Hypothyroid    Elevated LFTs    Tobacco abuse    Rheumatoid arthritis (CMS/HCC)    Type 2 diabetes mellitus (CMS/HCC)    Morbid obesity (CMS/HCC)    Abnormal CT of the head    UTI (urinary tract infection), bacterial    Cerebral aneurysm rupture (CMS/HCC)    Wow!  She looks great awake alert no headache and wants to go home at this point I will sign off  and follow-up as needed reconsult thanks      Phill Hickman MD  19  4:20 PM    Electronically signed by Phill Hickman MD at 3/31/2019  4:21 PM     Curt Donaldson MD at 3/31/2019 12:11 PM             LOS: 4 days   Patient Care Team:  Provider, No Known as PCP - General    Chief Complaint: Post op subarachnoid hemorrhage    Subjective     This patient feels pretty good.  She is a little sleepy this morning.    Interval History:     History taken from: patient chart    Objective      She has good movement in all 4 extremities.    Vital Signs  Temp:  [97.5 °F (36.4 °C)-98.5 °F (36.9 °C)] 98.3 °F (36.8 °C)  Heart Rate:  [60-75] 75  Resp:  [12-18] 18  BP: (115-147)/(69-96) 130/88       Results Review:     I reviewed the patient's new clinical results.  She is afebrile with a white count of 7.9      Assessment/Plan       Primary thunderclap headache    YOUNG (nonalcoholic steatohepatitis)    Hypothyroid    Elevated LFTs    Tobacco abuse    Rheumatoid arthritis (CMS/HCC)    Type 2 diabetes mellitus (CMS/HCC)    Morbid obesity (CMS/HCC)    Abnormal CT of the head    UTI (urinary tract infection), bacterial    Cerebral aneurysm rupture (CMS/HCC)      I told the patient she could get up and move around as much as she wants.  It is okay for her to shower.      Curt Donaldson MD  19  12:11 PM          Electronically signed by Curt Donaldson MD at 3/31/2019 12:12 PM     Prince Paredes MD at 3/31/2019 11:52 AM                  Name: Oralia Sánchez ADMIT: 3/27/2019   : 1975  PCP: Provider, No Known    MRN: [id number] LOS: 4 days   AGE/SEX: 43 y.o. female  ROOM: [facility]   Subjective   Felt feverish this morning but did not have a fever.  Body aches and productive cough are present.  Feels good today.  Had a mild headache last night.  She reports likely it was from the cold front that moved through yesterday.  No headache at all now.  No numbness or weakness.  No nausea or vomiting.  No change  in vision.  Tolerating diet without difficulty.  Doing well out of the ICU.  Objective   Vital Signs  Temp:  [97.5 °F (36.4 °C)-98.5 °F (36.9 °C)] 98.3 °F (36.8 °C)  Heart Rate:  [60-75] 75  Resp:  [12-18] 18  BP: (115-147)/(69-96) 130/88  SpO2:  [93 %-97 %] 94 %  on  Flow (L/min):  [2] 2;   Device (Oxygen Therapy): room air  Body mass index is 39.52 kg/m².    Physical Exam   Constitutional: She appears well-developed. No distress.   Sitting up comfortably in the bed   HENT:   Head: Normocephalic and atraumatic.   Mouth/Throat: No oropharyngeal exudate.   Eyes: Conjunctivae and EOM are normal. Pupils are equal, round, and reactive to light.   Cardiovascular: Normal rate and regular rhythm.   No murmur heard.  Pulmonary/Chest: Effort normal and breath sounds normal. She has no wheezes. She has no rales.   Abdominal: Soft. Bowel sounds are normal. She exhibits no distension and no mass. There is no tenderness.   Musculoskeletal: She exhibits no edema or tenderness.   Neurological: She is alert.   Skin: Skin is warm and dry. She is not diaphoretic. No erythema. No pallor.   Psychiatric: She has a normal mood and affect. Her behavior is normal.   Vitals reviewed.      Results Review:       I reviewed the patient's new clinical results.  Results from last 7 days   Lab Units 03/31/19  0540 03/29/19  0626 03/28/19 0349 03/27/19  0818   WBC 10*3/mm3 7.86 6.92 6.65 8.93   HEMOGLOBIN g/dL 15.0 15.5 15.1 17.1*   PLATELETS 10*3/mm3 175 169 162 175     Results from last 7 days   Lab Units 03/31/19  0540 03/29/19  0626 03/28/19 0349 03/27/19  0818   SODIUM mmol/L 140 141 138 134*   POTASSIUM mmol/L 4.6 4.5 3.8 4.7   CHLORIDE mmol/L 104 104 103 97*   CO2 mmol/L 23.3 25.8 22.6 29.6*   BUN mg/dL 15 10 11 12   CREATININE mg/dL 0.72 0.66 0.59 0.65   GLUCOSE mg/dL 99 101* 82 120*   Estimated Creatinine Clearance: 119.5 mL/min (by C-G formula based on SCr of 0.72 mg/dL).  Results from last 7 days   Lab Units 03/29/19  4809  03/28/19  0349 03/27/19  0818   ALBUMIN g/dL 3.70 3.40* 4.10   BILIRUBIN mg/dL 0.4 0.5 0.3   ALK PHOS U/L 49 44 60   AST (SGOT) U/L 62* 94* 60*   ALT (SGPT) U/L 71* 74* 62*     Results from last 7 days   Lab Units 03/31/19  0540 03/29/19  0626 03/28/19  0349 03/27/19  0818   CALCIUM mg/dL 8.9 8.5* 8.7 9.1   ALBUMIN g/dL  --  3.70 3.40* 4.10         Lab Results   Component Value Date    HGBA1C 5.60 10/31/2018    HGBA1C 5.40 03/20/2018    HGBA1C 5.2 02/11/2016     Glucose   Date/Time Value Ref Range Status   03/29/2019 1807 178 (H) 70 - 130 mg/dL Final   03/29/2019 1522 91 70 - 130 mg/dL Final   03/29/2019 1235 87 70 - 130 mg/dL Final   03/28/2019 1507 82 70 - 130 mg/dL Final   03/28/2019 1353 80 70 - 130 mg/dL Final           aspirin 325 mg Oral Daily   atorvastatin 40 mg Oral Nightly   ceftriaxone 1 g Intravenous Q24H   levothyroxine 175 mcg Oral Q AM   niMODipine 60 mg Oral Q4H   sodium chloride 400 mL Intravenous Once   sodium chloride 3 mL Intravenous Q12H       sodium chloride 100 mL/hr Last Rate: Stopped (03/30/19 1209)   Diet Regular      Assessment/Plan     Active Hospital Problems    Diagnosis  POA   • **Primary thunderclap headache [G44.53]  Yes   • Tobacco abuse [Z72.0]  Yes   • Rheumatoid arthritis (CMS/HCC) [M06.9]  Yes   • Type 2 diabetes mellitus (CMS/HCC) [E11.9]  Yes   • Morbid obesity (CMS/HCC) [E66.01]  Yes   • Abnormal CT of the head [R93.0]  Yes   • UTI (urinary tract infection), bacterial [N39.0, A49.9]  Yes   • Cerebral aneurysm rupture (CMS/HCC) [I60.9]  Unknown   • Elevated LFTs [R94.5]  Yes   • Hypothyroid [E03.9]  Yes   • YOUNG (nonalcoholic steatohepatitis) [K75.81]  Yes      Resolved Hospital Problems   No resolved problems to display.       · Postop day 2 status post mobilization for CATHERINE aneurysm (10 mm).  Doing well and has no symptoms  · Subarachnoid hemorrhage secondary to above.  Doing well out of ICU.  On nimodipine and aspirin per neurosurgery  · Productive cough: No fever, will  order incentive spirometer.  No need for further workup at the moment.  Likely related to her recent quitting smoking and surgery.  · Urinary tract infection: Rocephin for total of 5 days, last dose tomorrow morning  · Diet-controlled diabetes: Continue current management, blood sugars have been fine here  · Hypothyroidism: Continue Synthroid  · Mild elevation of LFTs: Secondary to nonalcoholic steatohepatitis, stable  · She needs to quit smoking  · Disposition: Home whenever okay from neurosurgery standpoint, suspect tomorrow      Prince Paredes MD  [facility]  03/31/19  1:08 PM          Electronically signed by Prince Paredes MD at 3/31/2019 11:56 AM         All medication doses during the admission are shown, including meds that are no longer on order.   Scheduled Meds Sorted by Name   for Oralia Sánchez as of 3/30/19 through 4/1/19     1 Day 3 Days 7 Days 10 Days < Today >    Legend:                          Inactive     Active     Other Encounter    Linked               Medications 03/30/19 03/31/19 04/01/19   aspirin tablet 325 mg   Dose: 325 mg  Freq: Daily Route: PO  Start: 03/29/19 0945    Admin Instructions:   Give ASAP  Do not exceed 4 grams of aspirin in a 24 hr period.    If given for pain, use the following pain scale:   Mild Pain = Pain Score of 1-3, CPOT 1-2  Moderate Pain = Pain Score of 4-6, CPOT 3-4  Severe Pain = Pain Score of 7-10, CPOT 5-8    0804          0802          0957            atorvastatin (LIPITOR) tablet 40 mg   Dose: 40 mg  Freq: Nightly Route: PO  Start: 03/28/19 2100    Admin Instructions:   Avoid grapefruit juice.    2051 2056 2100            cefTRIAXone (ROCEPHIN) IVPB 1 g   Dose: 1 g  Freq: Every 24 Hours Route: IV  Indications of Use: URINARY TRACT INFECTION  Last Dose: 1 g (04/01/19 0957)  Start: 04/01/19 0915 End: 04/01/19 1027    Admin Instructions:   Last dose....................  Caution: Look alike/sound alike  drug alert. Refrigerate      0957            cefTRIAXone (ROCEPHIN) IVPB 1 g   Dose: 1 g  Freq: Every 24 Hours Route: IV  Indications of Use: URINARY TRACT INFECTION  Last Dose: 1 g (03/31/19 0511)  Start: 03/28/19 0600 End: 04/01/19 0823    Admin Instructions:   Caution: Look alike/sound alike drug alert. Refrigerate    0558          0511          0823-D/C'd  (9972) [C]            clopidogrel (PLAVIX) tablet 150 mg   Dose: 150 mg  Freq: Once Route: PO  Start: 03/29/19 0900 End: 03/29/19 1053         clopidogrel (PLAVIX) tablet 75 mg   Dose: 75 mg  Freq: Daily Route: PO  Start: 04/01/19 1130      1117            diphenhydrAMINE (BENADRYL) injection 25 mg   Dose: 25 mg  Freq: Once Route: IV  Start: 03/27/19 0842 End: 03/27/19 0845    Admin Instructions:    This med may be ordered in other forms and routes. Before giving verify the last time the drug was given by any route/form. 25 mg may be given IV push over less than 1 minute.         famotidine (PEPCID) injection 20 mg   Dose: 20 mg  Freq: Once Route: IV  Start: 03/29/19 1315 End: 03/29/19 1241         famotidine (PEPCID) injection 20 mg   Dose: 20 mg  Freq: Once Route: IV  Start: 03/28/19 1013 End: 03/28/19 1013         famotidine (PEPCID) tablet 20 mg   Dose: 20 mg  Freq: 30 min pre-op Route: PO  Start: 03/29/19 1204 End: 03/29/19 1525    Admin Instructions:   Give with sip of water the morning of surgery         HYDROmorphone (DILAUDID) injection 0.5 mg   Dose: 0.5 mg  Freq: Once Route: IV  Start: 03/27/19 1631 End: 03/27/19 1657    Admin Instructions:   If given for pain, use the following pain scale:  Mild Pain = Pain Score of 1-3, CPOT 1-2  Moderate Pain = Pain Score of 4-6, CPOT 3-4  Severe Pain = Pain Score of 7-10, CPOT 5-8         iodixanol (VISIPAQUE) 320 MG/ML injection 300 mL   Dose: 300 mL  Freq: Once in Imaging Route: IA  Start: 03/28/19 1126 End: 03/28/19 1126         iopamidol (ISOVUE-300) 61 % injection 100 mL   Dose: 100 mL  Freq: Once in  Imaging Route: IV  Start: 03/27/19 1641 End: 03/27/19 1641         Ioversol (OPTIRAY 320) injection 300 mL   Dose: 300 mL  Freq: Once in Imaging Route: IA  Start: 03/29/19 1615 End: 03/29/19 1615         ketorolac (TORADOL) injection 30 mg   Dose: 30 mg  Freq: Once Route: IV  Start: 03/27/19 0842 End: 03/27/19 0846    Admin Instructions:       If given for pain, use the following pain scale:  Mild Pain = Pain Score of 1-3, CPOT 1-2  Moderate Pain = Pain Score of 4-6, CPOT 3-4  Severe Pain = Pain Score of 7-10, CPOT 5-8         levothyroxine (SYNTHROID, LEVOTHROID) tablet 175 mcg   Dose: 175 mcg  Freq: Every Early Morning Route: PO  Start: 03/29/19 0600    Admin Instructions:   Take on empty stomach.    0805 [C]          0513 2100            levothyroxine (SYNTHROID, LEVOTHROID) tablet 175 mcg   Dose: 175 mcg  Freq: Nightly Route: PO  Start: 03/28/19 2100 End: 03/28/19 2002    Admin Instructions:   Take on empty stomach.         levothyroxine (SYNTHROID, LEVOTHROID) tablet 175 mcg   Dose: 175 mcg  Freq: Every Early Morning Route: PO  Start: 03/28/19 0600 End: 03/28/19 1721    Admin Instructions:   Take on empty stomach.         levothyroxine (SYNTHROID, LEVOTHROID) tablet 175 mcg   Dose: 175 mcg  Freq: Every Early Morning Route: PO  Start: 03/28/19 0600 End: 03/28/19 0029    Admin Instructions:   Take on empty stomach.         lidocaine PF 1% (XYLOCAINE) injection 5 mL   Dose: 5 mL  Freq: Once Route: IJ  Start: 03/27/19 1305 End: 03/27/19 1303         lidocaine-prilocaine (EMLA) 2.5-2.5 % cream   Freq: Once Route: TOP  Indications of Use: LOCAL ANESTHESIA  Start: 03/29/19 1300 End: 03/29/19 1300    Admin Instructions:   Apply to right groin over femoral pulse after clipping.  If given for pain, use the following pain scale:  Mild Pain = Pain Score of 1-3, CPOT 1-2  Moderate Pain = Pain Score of 4-6, CPOT 3-4  Severe Pain = Pain Score of 7-10, CPOT 5-8         niMODipine (NIMOTOP) capsule 60 mg   Dose: 60  mg  Freq: Every 4 Hours Scheduled Route: PO  Start: 03/28/19 1515    Admin Instructions:   For oral administration only. If patient unable to swallow capsules, call pharmacy. Avoid grapefruit juice.    0140   0510   0804     1137   1508   2030      0014   0511 [C]   0803     1246   1554   2057      0029   0427   0957     1146   1600   2000        ondansetron (ZOFRAN) injection 4 mg   Dose: 4 mg  Freq: Once Route: IV  Start: 03/27/19 1631 End: 03/27/19 1653         prochlorperazine (COMPAZINE) injection 10 mg   Dose: 10 mg  Freq: Once Route: IV  Start: 03/27/19 0842 End: 03/27/19 0845         Scopolamine (TRANSDERM-SCOP) 1.5 MG/3DAYS patch 1 patch   Dose: 1 patch  Freq: Once Route: TD  Start: 03/29/19 1315 End: 03/29/19 1810    Admin Instructions:            sodium chloride 0.9 % bolus 1,000 mL   Dose: 1,000 mL  Freq: Once Route: IV  Last Dose: Stopped (03/27/19 1730)  Start: 03/27/19 1631 End: 03/27/19 1730         sodium chloride 0.9 % bolus 1,000 mL   Dose: 1,000 mL  Freq: Once Route: IV  Last Dose: Stopped (03/27/19 1510)  Start: 03/27/19 0842 End: 03/27/19 1510         sodium chloride 0.9 % bolus 400 mL   Dose: 400 mL  Freq: Once Route: IV  Last Dose: 400 mL (03/29/19 1947)  Start: 03/29/19 1900    Admin Instructions:   After 4 hours change to NS p;us 20meqs of KCL/L at 60 cc/hour         sodium chloride 0.9 % flush 3 mL   Dose: 3 mL  Freq: Every 12 Hours Scheduled Route: IV  Start: 03/29/19 1300 End: 03/29/19 1525         sodium chloride 0.9 % flush 3 mL   Dose: 3 mL  Freq: Every 12 Hours Scheduled Route: IV  Start: 03/27/19 2200    0805   (2100)        0803   2059        0958   2100          vancomycin 1500 mg/500 mL 0.9% NS IVPB ([facility])   Dose: 15 mg/kg  Weight Dosing Info: 104 kg  Freq: Once Route: IV  Indications of Use: PERIOPERATIVE PHARMACOPROPHYLAXIS  Last Dose: 1,500 mg (03/29/19 1228)  Start: 03/29/19 1300 End: 03/29/19 1228    Admin Instructions:   Administer within 2 hours of surgical incision.     Order specific questions:   SCIP Justification for use: Inpatient More Than 24 Hours Prior to Surgery         Medications 03/30/19 03/31/19 04/01/19       Continuous Meds Sorted by Name   for Oralia Sánchez as of 3/30/19 through 4/1/19    Legend:                          Inactive     Active     Other Encounter    Linked               Medications 03/30/19 03/31/19 04/01/19   lactated ringers infusion   Rate: 9 mL/hr Dose: 9 mL/hr  Freq: Continuous Route: IV  Last Dose: Stopped (03/29/19 1812)  Start: 03/29/19 1315 End: 03/29/19 1814         lactated ringers infusion   Rate: 9 mL/hr Dose: 9 mL/hr  Freq: Continuous Route: IV  Last Dose: 9 mL/hr (03/28/19 1013)  Start: 03/28/19 1013 End: 03/29/19 1814         niCARdipine (CARDENE) 25 mg/250 mL (0.1 mg/mL) NS infusion kit   Rate:  mL/hr Dose: 5-15 mg/hr  Freq: Titrated Route: IV  Last Dose: Stopped (03/29/19 1408)  Start: 03/28/19 1325 End: 03/30/19 1102    Admin Instructions:   Initiate Infusion at 5 mg/hr & Titrate By 2.5 mg/hr Every 15 Minutes to use the lowest dose possible to maintain SBP Less Than 140 mm Hg. Maximum Infusion Rate 15 mg/hr. Hold infusion for SBP less than 100 mm Hg. Contact provider if unable to maintain SBP Less Than 140 mm Hg on maximum dose. Once SBP target achieved obtain vitals a minimum of every 60 minutes.  Caution: Look alike/sound alike drug alert    1102-D/C'd            propofol (DIPRIVAN) infusion 10 mg/mL 100 mL   Rate: 3.21-32.1 mL/hr Dose: 5-50 mcg/kg/min  Weight Dosing Info: 107 kg  Freq: Titrated Route: IV  Start: 03/29/19 0900 End: 03/29/19 0827    Admin Instructions:   Do not administer through the same IV catheter with blood or plasma.  Tubing and any unused portions of propofol vials should be discarded after 12 hours.  Begin infusion at 5 mcg/kg/min and titrate by by 5 mcg/kg/min every 5 minutes to maintain RASS goal. Maximum rate 50 mcg/kg/min.  Notify MD if not at goal and requiring more than 50 mcg/kg/min. Infuse  into dedicated line, change vial and tube every 12 hours. Patient must be intubated.    Order specific questions:   RASS Goal: 0 TO -1         sodium chloride 0.9 % infusion   Freq: Continuous PRN  Start: 03/29/19 1316 End: 03/29/19 1515         sodium chloride 0.9 % infusion   Rate: 100 mL/hr Dose: 100 mL/hr  Freq: Continuous Route: IV  Last Dose: Stopped (03/30/19 1209)  Start: 03/29/19 1300 End: 03/31/19 1328    1209          1328-D/C'd           sodium chloride 0.9 % with KCl 20 mEq/L infusion   Rate: 60 mL/hr Dose: 60 mL/hr  Freq: Continuous Route: IV  Last Dose: 60 mL/hr (03/29/19 1853)  Start: 03/29/19 1915 End: 03/30/19 0925    0925-D/C'd            Medications 03/30/19 03/31/19 04/01/19       PRN Meds Sorted by Name   for Oralia Sánchez as of 3/30/19 through 4/1/19    Legend:                          Inactive     Active     Other Encounter    Linked               Medications 03/30/19 03/31/19 04/01/19   acetaminophen (TYLENOL) tablet 650 mg   Dose: 650 mg  Freq: Once As Needed Route: PO  PRN Reason: Mild Pain   Start: 03/29/19 1530 End: 03/29/19 1810         Or  acetaminophen (TYLENOL) suppository 650 mg   Dose: 650 mg  Freq: Once As Needed Route: RE  PRN Reason: Mild Pain   Start: 03/29/19 1530 End: 03/29/19 1810         acetaminophen (TYLENOL) tablet 650 mg   Dose: 650 mg  Freq: Once As Needed Route: PO  PRN Reason: Mild Pain   Start: 03/28/19 1203 End: 03/28/19 1442         Or  acetaminophen (TYLENOL) suppository 650 mg   Dose: 650 mg  Freq: Once As Needed Route: RE  PRN Reason: Mild Pain   Start: 03/28/19 1203 End: 03/28/19 1442         albuterol (PROVENTIL) nebulizer solution 0.083% 2.5 mg/3mL   Dose: 2.5 mg  Freq: Once As Needed Route: NEBULIZATION  PRN Reasons: Wheezing,Shortness of Air  PRN Comment: bronchospasm  Start: 03/29/19 1530 End: 03/29/19 1810         bupivacaine PF 30 mL, lidocaine 1 % 30 mL mixture   Freq: As Needed  Start: 03/29/19 1403 End: 03/29/19 1505         bupivacaine PF 30 mL,  lidocaine 1 % 30 mL, sodium bicarbonate 5 mL mixture   Freq: As Needed  Start: 03/28/19 1052 End: 03/28/19 1205         dexamethasone (DECADRON) injection   Freq: As Needed Route: IV  Start: 03/29/19 1359 End: 03/29/19 1515         diphenhydrAMINE (BENADRYL) capsule 25 mg   Dose: 25 mg  Freq: Every 30 Minutes PRN Route: PO  PRN Reason: Itching  PRN Comment: May repeat x 1  Indications of Use: EXTRAPYRAMIDAL REACTION,PRURITUS  Start: 03/29/19 1530 End: 03/29/19 1810    Admin Instructions:   This med may be ordered in other forms and routes. Before giving verify the last time the drug was given by any route/form.         diphenhydrAMINE (BENADRYL) capsule 25 mg   Dose: 25 mg  Freq: Every 30 Minutes PRN Route: PO  PRN Reason: Itching  PRN Comment: May repeat x 1  Indications of Use: EXTRAPYRAMIDAL REACTION,PRURITUS  Start: 03/28/19 1203 End: 03/28/19 1442    Admin Instructions:   This med may be ordered in other forms and routes. Before giving verify the last time the drug was given by any route/form.         diphenhydrAMINE (BENADRYL) injection 12.5 mg   Dose: 12.5 mg  Freq: Every 15 Minutes PRN Route: IV  PRN Reason: Itching  PRN Comment: May repeat x 1  Start: 03/29/19 1530 End: 03/29/19 1810    Admin Instructions:    This med may be ordered in other forms and routes. Before giving verify the last time the drug was given by any route/form. 25 mg may be given IV push over less than 1 minute.         diphenhydrAMINE (BENADRYL) injection 12.5 mg   Dose: 12.5 mg  Freq: Every 15 Minutes PRN Route: IV  PRN Reason: Itching  PRN Comment: May repeat x 1  Start: 03/28/19 1203 End: 03/28/19 1442    Admin Instructions:    This med may be ordered in other forms and routes. Before giving verify the last time the drug was given by any route/form. 25 mg may be given IV push over less than 1 minute.         diphenhydrAMINE (BENADRYL) injection 25 mg   Dose: 25 mg  Freq: Every 6 Hours PRN Route: IV  PRN Reason: Itching  Start:  03/27/19 2158    Admin Instructions:    This med may be ordered in other forms and routes. Before giving verify the last time the drug was given by any route/form. 25 mg may be given IV push over less than 1 minute.     0014             ePHEDrine injection   Freq: As Needed Route: IV  Start: 03/29/19 1426 End: 03/29/19 1515         ePHEDrine injection 5 mg   Dose: 5 mg  Freq: Once As Needed Route: IV  PRN Comment: symptomatic hypotension - Notify attending anesthesiologist if this needs to be given  Start: 03/28/19 1203 End: 03/28/19 1442    Admin Instructions:   Caution: Look alike/sound alike drug alert               Dilute with NS to 5-10 mg/mL.         fentaNYL citrate (PF) (SUBLIMAZE) injection   Freq: As Needed Route: IV  Start: 03/29/19 1322 End: 03/29/19 1515         fentaNYL citrate (PF) (SUBLIMAZE) injection   Freq: As Needed Route: IV  Start: 03/28/19 1109 End: 03/28/19 1210         fentaNYL citrate (PF) (SUBLIMAZE) injection 50 mcg   Dose: 50 mcg  Freq: Every 5 Minutes PRN Route: IV  PRN Reasons: Moderate Pain ,Severe Pain   Start: 03/29/19 1530 End: 03/29/19 1810    Admin Instructions:   May alternate fentanyl with hydromorphone using fentanyl first.    Maximum total dose of fentanyl is 200 mcg.  If given for pain, use the following pain scale:  Mild Pain = Pain Score of 1-3, CPOT 1-2  Moderate Pain = Pain Score of 4-6, CPOT 3-4  Severe Pain = Pain Score of 7-10, CPOT 5-8         fentaNYL citrate (PF) (SUBLIMAZE) injection 50 mcg   Dose: 50 mcg  Freq: Every 10 Minutes PRN Route: IV  PRN Reason: Severe Pain   Start: 03/29/19 1229 End: 03/29/19 1525    Admin Instructions:   Maximum total dose of fentanyl is 100 mcg.  If given for pain, use the following pain scale:  Mild Pain = Pain Score of 1-3, CPOT 1-2  Moderate Pain = Pain Score of 4-6, CPOT 3-4  Severe Pain = Pain Score of 7-10, CPOT 5-8         fentaNYL citrate (PF) (SUBLIMAZE) injection 50 mcg   Dose: 50 mcg  Freq: Every 5 Minutes PRN Route:  IV  PRN Reasons: Moderate Pain ,Severe Pain   Start: 03/28/19 1203 End: 03/28/19 1442    Admin Instructions:   May alternate fentanyl with hydromorphone using fentanyl first.    Maximum total dose of fentanyl is 200 mcg.  If given for pain, use the following pain scale:  Mild Pain = Pain Score of 1-3, CPOT 1-2  Moderate Pain = Pain Score of 4-6, CPOT 3-4  Severe Pain = Pain Score of 7-10, CPOT 5-8         fentaNYL citrate (PF) (SUBLIMAZE) injection 50 mcg   Dose: 50 mcg  Freq: Every 10 Minutes PRN Route: IV  PRN Reason: Severe Pain   Start: 03/28/19 1010 End: 03/28/19 1218    Admin Instructions:   Maximum total dose of fentanyl is 100 mcg.  If given for pain, use the following pain scale:  Mild Pain = Pain Score of 1-3, CPOT 1-2  Moderate Pain = Pain Score of 4-6, CPOT 3-4  Severe Pain = Pain Score of 7-10, CPOT 5-8         flumazenil (ROMAZICON) injection 0.2 mg   Dose: 0.2 mg  Freq: As Needed Route: IV  PRN Comment: for benzodiazepine induced unresponsiveness or sedation  Indications of Use: BENZODIAZEPINE-INDUCED SEDATION  Start: 03/29/19 1530 End: 03/29/19 1810    Admin Instructions:   Notify Anesthesia if given  ** give IV over 15-30 seconds **         flumazenil (ROMAZICON) injection 0.2 mg   Dose: 0.2 mg  Freq: As Needed Route: IV  PRN Comment: for benzodiazepine induced unresponsiveness or sedation  Indications of Use: BENZODIAZEPINE-INDUCED SEDATION  Start: 03/28/19 1203 End: 03/28/19 1442    Admin Instructions:   Notify Anesthesia if given  ** give IV over 15-30 seconds **         glycopyrrolate (ROBINUL) injection   Freq: As Needed Route: IV  Start: 03/29/19 1448 End: 03/29/19 1515         heparin (porcine) injection   Freq: As Needed  Start: 03/29/19 1412 End: 03/29/19 1515         hydrALAZINE (APRESOLINE) injection   Freq: As Needed  Start: 03/28/19 1112 End: 03/28/19 1210         hydrALAZINE (APRESOLINE) injection 5 mg   Dose: 5 mg  Freq: Every 10 Minutes PRN Route: IV  PRN Reason: High Blood  Pressure  PRN Comment: for systolic blood pressure greater than 180 mmHg or diastolic blood pressure greater than 105 mmHg  Start: 03/29/19 1530 End: 03/29/19 1810    Admin Instructions:   Use labetalol first THEN hydralazine second if labetalol fails to reduce systolic blood pressure to less than 180 mmHg or diastolic blood pressure less than 105 mmHg    Maximum dose of hydralazine is 20 mg  Caution: Look alike/sound alike drug alert         hydrALAZINE (APRESOLINE) injection 5 mg   Dose: 5 mg  Freq: Every 10 Minutes PRN Route: IV  PRN Reason: High Blood Pressure  PRN Comment: for systolic blood pressure greater than 180 mmHg or diastolic blood pressure greater than 105 mmHg  Start: 03/28/19 1203 End: 03/28/19 1442    Admin Instructions:   Use labetalol first THEN hydralazine second if labetalol fails to reduce systolic blood pressure to less than 180 mmHg or diastolic blood pressure less than 105 mmHg    Maximum dose of hydralazine is 20 mg  Caution: Look alike/sound alike drug alert         HYDROcodone-acetaminophen (NORCO) 7.5-325 MG per tablet 1 tablet   Dose: 1 tablet  Freq: Once As Needed Route: PO  PRN Reason: Mild Pain   Start: 03/29/19 1530 End: 03/29/19 1810         HYDROcodone-acetaminophen (NORCO) 7.5-325 MG per tablet 1 tablet   Dose: 1 tablet  Freq: Once As Needed Route: PO  PRN Reason: Mild Pain   Start: 03/28/19 1203 End: 03/28/19 1442         HYDROmorphone (DILAUDID) injection 0.5 mg   Dose: 0.5 mg  Freq: Every 5 Minutes PRN Route: IV  PRN Reasons: Moderate Pain ,Severe Pain   Start: 03/29/19 1530 End: 03/29/19 1810    Admin Instructions:   May alternate fentanyl with hydromorphone using fentanyl first.    Maximum total dose of hydromorphone is 2 mg.  If given for pain, use the following pain scale:  Mild Pain = Pain Score of 1-3, CPOT 1-2  Moderate Pain = Pain Score of 4-6, CPOT 3-4  Severe Pain = Pain Score of 7-10, CPOT 5-8         HYDROmorphone (DILAUDID) injection 0.5 mg   Dose: 0.5 mg  Freq:  Every 5 Minutes PRN Route: IV  PRN Reasons: Moderate Pain ,Severe Pain   Start: 03/28/19 1203 End: 03/28/19 1442    Admin Instructions:   May alternate fentanyl with hydromorphone using fentanyl first.    Maximum total dose of hydromorphone is 2 mg.  If given for pain, use the following pain scale:  Mild Pain = Pain Score of 1-3, CPOT 1-2  Moderate Pain = Pain Score of 4-6, CPOT 3-4  Severe Pain = Pain Score of 7-10, CPOT 5-8         ketorolac (TORADOL) injection 30 mg   Dose: 30 mg  Freq: Every 6 Hours PRN Route: IV  PRN Reason: Severe Pain   Start: 03/27/19 2158 End: 04/01/19 2157    Admin Instructions:       If given for pain, use the following pain scale:  Mild Pain = Pain Score of 1-3, CPOT 1-2  Moderate Pain = Pain Score of 4-6, CPOT 3-4  Severe Pain = Pain Score of 7-10, CPOT 5-8    0211   1340   2059      2103          2157-D/C'd          labetalol (NORMODYNE,TRANDATE) injection 5 mg   Dose: 5 mg  Freq: Every 5 Minutes PRN Route: IV  PRN Reason: High Blood Pressure  PRN Comment: for systolic blood pressure greater than 180 mmHg or diastolic blood pressure greater than 105 mmHg  Start: 03/29/19 1530 End: 03/29/19 1810    Admin Instructions:   Use labetalol first THEN hydralazine second if labetalol fails to reduce systolic blood pressure to less than 180 mmHg or diastolic blood pressure less than 105 mmHg    Hold for heart rate less than 60.    Maximum dose of labetalol is 20 mg  Give by slow IV Push each 20mg (or less) over 2 minutes         labetalol (NORMODYNE,TRANDATE) injection 5 mg   Dose: 5 mg  Freq: Every 5 Minutes PRN Route: IV  PRN Reason: High Blood Pressure  PRN Comment: for systolic blood pressure greater than 180 mmHg or diastolic blood pressure greater than 105 mmHg  Start: 03/28/19 1203 End: 03/28/19 1442    Admin Instructions:   Use labetalol first THEN hydralazine second if labetalol fails to reduce systolic blood pressure to less than 180 mmHg or diastolic blood pressure less than 105  mmHg    Hold for heart rate less than 60.    Maximum dose of labetalol is 20 mg  Give by slow IV Push each 20mg (or less) over 2 minutes         lidocaine (XYLOCAINE) 2% injection   Freq: As Needed Route: IJ  Start: 03/29/19 1325 End: 03/29/19 1515         lidocaine PF 1% (XYLOCAINE) injection 0.5 mL   Dose: 0.5 mL  Freq: Once As Needed Route: IJ  PRN Comment: IV Start  Start: 03/29/19 1229 End: 03/29/19 1525         lidocaine PF 1% (XYLOCAINE) injection 0.5 mL   Dose: 0.5 mL  Freq: Once As Needed Route: IJ  PRN Comment: IV Start  Start: 03/28/19 1010 End: 03/28/19 1218         meperidine (DEMEROL) injection 12.5 mg   Dose: 12.5 mg  Freq: Every 5 Minutes PRN Route: IV  PRN Reason: Shivering  PRN Comment: May repeat x 1  Start: 03/29/19 1530 End: 03/29/19 1810    Admin Instructions:            midazolam (VERSED) injection   Freq: As Needed Route: IV  Start: 03/28/19 1032 End: 03/28/19 1210         midazolam (VERSED) injection 1 mg   Dose: 1 mg  Freq: Every 5 Minutes PRN Route: IV  PRN Reason: Anxiety  PRN Comment: Max 4mg midazolam TOTAL  Start: 03/29/19 1229 End: 03/29/19 1525    Admin Instructions:   Maximum total dose of midazolam is 4 mg.           Or  midazolam (VERSED) injection 2 mg   Dose: 2 mg  Freq: Every 5 Minutes PRN Route: IV  PRN Reason: Anxiety  PRN Comment: Max 4mg midazolam TOTAL  Start: 03/29/19 1229 End: 03/29/19 1525    Admin Instructions:   Maximum total dose of midazolam is 4 mg.           midazolam (VERSED) injection 1 mg   Dose: 1 mg  Freq: Every 5 Minutes PRN Route: IV  PRN Reason: Anxiety  PRN Comment: Max 4mg midazolam TOTAL  Start: 03/28/19 1010 End: 03/28/19 1218    Admin Instructions:   Maximum total dose of midazolam is 4 mg.           Or  midazolam (VERSED) injection 2 mg   Dose: 2 mg  Freq: Every 5 Minutes PRN Route: IV  PRN Reason: Anxiety  PRN Comment: Max 4mg midazolam TOTAL  Start: 03/28/19 1010 End: 03/28/19 1218    Admin Instructions:   Maximum total dose of midazolam is 4 mg.            naloxone (NARCAN) injection 0.2 mg   Dose: 0.2 mg  Freq: As Needed Route: IV  PRN Reasons: Opioid Reversal,Respiratory Depression  PRN Comment: unresponsiveness, decrease oxygen saturation  Indications of Use: ACUTE RESPIRATORY FAILURE,OPIOID-INDUCED RESPIRATORY DEPRESSION  Start: 03/29/19 1530 End: 03/29/19 1810    Admin Instructions:   Notify Anesthesia if given         naloxone (NARCAN) injection 0.2 mg   Dose: 0.2 mg  Freq: As Needed Route: IV  PRN Reasons: Opioid Reversal,Respiratory Depression  PRN Comment: unresponsiveness, decrease oxygen saturation  Indications of Use: ACUTE RESPIRATORY FAILURE,OPIOID-INDUCED RESPIRATORY DEPRESSION  Start: 03/28/19 1203 End: 03/28/19 1442    Admin Instructions:   Notify Anesthesia if given         neostigmine (PROSTIGMINE) injection   Freq: As Needed Route: IV  Start: 03/29/19 1448 End: 03/29/19 1515         ondansetron (ZOFRAN) injection   Freq: As Needed Route: IV  Start: 03/29/19 1442 End: 03/29/19 1515         ondansetron (ZOFRAN) injection   Freq: As Needed  Start: 03/28/19 1148 End: 03/28/19 1210         ondansetron (ZOFRAN) injection 4 mg   Dose: 4 mg  Freq: Once As Needed Route: IV  PRN Reasons: Nausea,Vomiting  Indications of Use: POSTOPERATIVE NAUSEA AND VOMITING  Start: 03/29/19 1530 End: 03/29/19 1810    Admin Instructions:   If BOTH ondansetron (ZOFRAN) and promethazine (PHENERGAN) are ordered use ondansetron first and THEN promethazine IF ondansetron is ineffective.         ondansetron (ZOFRAN) injection 4 mg   Dose: 4 mg  Freq: Once As Needed Route: IV  PRN Reasons: Nausea,Vomiting  Indications of Use: POSTOPERATIVE NAUSEA AND VOMITING  Start: 03/28/19 1203 End: 03/28/19 1442    Admin Instructions:   If BOTH ondansetron (ZOFRAN) and promethazine (PHENERGAN) are ordered use ondansetron first and THEN promethazine IF ondansetron is ineffective.         ondansetron (ZOFRAN) tablet 4 mg   Dose: 4 mg  Freq: Every 6 Hours PRN Route: PO  PRN Reasons:  Nausea,Vomiting  Start: 03/27/19 2157    Admin Instructions:   If BOTH ondansetron (ZOFRAN) and promethazine (PHENERGAN) are ordered use ondansetron first and THEN promethazine IF ondansetron is ineffective.         Or  ondansetron ODT (ZOFRAN-ODT) disintegrating tablet 4 mg   Dose: 4 mg  Freq: Every 6 Hours PRN Route: PO  PRN Reasons: Nausea,Vomiting  Start: 03/27/19 2157    Admin Instructions:   If BOTH ondansetron (ZOFRAN) and promethazine (PHENERGAN) are ordered use ondansetron first and THEN promethazine IF ondansetron is ineffective.  Place on tongue and allow to dissolve.         Or  ondansetron (ZOFRAN) injection 4 mg   Dose: 4 mg  Freq: Every 6 Hours PRN Route: IV  PRN Reasons: Nausea,Vomiting  Start: 03/27/19 2157    Admin Instructions:   If BOTH ondansetron (ZOFRAN) and promethazine (PHENERGAN) are ordered use ondansetron first and THEN promethazine IF ondansetron is ineffective.         oxyCODONE-acetaminophen (PERCOCET) 7.5-325 MG per tablet 1 tablet   Dose: 1 tablet  Freq: Once As Needed Route: PO  PRN Reason: Moderate Pain   Start: 03/28/19 1203 End: 03/28/19 1442         prochlorperazine (COMPAZINE) injection 10 mg   Dose: 10 mg  Freq: Every 6 Hours PRN Route: IV  PRN Reasons: Nausea,Vomiting  Start: 03/27/19 2157         promethazine (PHENERGAN) injection 12.5 mg   Dose: 12.5 mg  Freq: Once As Needed Route: IV  PRN Reasons: Nausea,Vomiting  Start: 03/29/19 1530 End: 03/29/19 1810    Admin Instructions:   If BOTH ondansetron (ZOFRAN) and promethazine (PHENERGAN) are ordered use ondansetron first and THEN promethazine IF ondansetron is ineffective.  If giving IV, dilute dose in 20 mL NS and slow IV push over 3-5 minutes. Administer through large bore vein (not hand or wrist) through running IV line at port furthest from patient's vein.         Or  promethazine (PHENERGAN) injection 12.5 mg   Dose: 12.5 mg  Freq: Once As Needed Route: IM  PRN Reasons: Nausea,Vomiting  Start: 03/29/19 1530 End: 03/29/19  1810    Admin Instructions:   If BOTH ondansetron (ZOFRAN) and promethazine (PHENERGAN) are ordered use ondansetron first and THEN promethazine IF ondansetron is ineffective.  If giving IV, dilute dose in 20 mL NS and slow IV push over 3-5 minutes.  Administer through large bore vein (not hand or wrist) through running IV line at port furthest from patient's vein.         Or  promethazine (PHENERGAN) suppository 25 mg   Dose: 25 mg  Freq: Once As Needed Route: RE  PRN Reasons: Nausea,Vomiting  Start: 03/29/19 1530 End: 03/29/19 1810    Admin Instructions:   If BOTH ondansetron (ZOFRAN) and promethazine (PHENERGAN) are ordered use ondansetron first and THEN promethazine IF ondansetron is ineffective.         Or  promethazine (PHENERGAN) tablet 25 mg   Dose: 25 mg  Freq: Once As Needed Route: PO  PRN Reasons: Nausea,Vomiting  Start: 03/29/19 1530 End: 03/29/19 1810    Admin Instructions:   If BOTH ondansetron (ZOFRAN) and promethazine (PHENERGAN) are ordered use ondansetron first and THEN promethazine IF ondansetron is ineffective.           promethazine (PHENERGAN) injection 12.5 mg   Dose: 12.5 mg  Freq: Once As Needed Route: IV  PRN Reasons: Nausea,Vomiting  Start: 03/28/19 1203 End: 03/28/19 1442    Admin Instructions:   If BOTH ondansetron (ZOFRAN) and promethazine (PHENERGAN) are ordered use ondansetron first and THEN promethazine IF ondansetron is ineffective.  If giving IV, dilute dose in 20 mL NS and slow IV push over 3-5 minutes. Administer through large bore vein (not hand or wrist) through running IV line at port furthest from patient's vein.         Or  promethazine (PHENERGAN) injection 12.5 mg   Dose: 12.5 mg  Freq: Once As Needed Route: IM  PRN Reasons: Nausea,Vomiting  Start: 03/28/19 1203 End: 03/28/19 1442    Admin Instructions:   If BOTH ondansetron (ZOFRAN) and promethazine (PHENERGAN) are ordered use ondansetron first and THEN promethazine IF ondansetron is ineffective.  If giving IV, dilute dose  in 20 mL NS and slow IV push over 3-5 minutes.  Administer through large bore vein (not hand or wrist) through running IV line at port furthest from patient's vein.         Or  promethazine (PHENERGAN) suppository 25 mg   Dose: 25 mg  Freq: Once As Needed Route: RE  PRN Reasons: Nausea,Vomiting  Start: 03/28/19 1203 End: 03/28/19 1442    Admin Instructions:   If BOTH ondansetron (ZOFRAN) and promethazine (PHENERGAN) are ordered use ondansetron first and THEN promethazine IF ondansetron is ineffective.         Or  promethazine (PHENERGAN) tablet 25 mg   Dose: 25 mg  Freq: Once As Needed Route: PO  PRN Reasons: Nausea,Vomiting  Start: 03/28/19 1203 End: 03/28/19 1442    Admin Instructions:   If BOTH ondansetron (ZOFRAN) and promethazine (PHENERGAN) are ordered use ondansetron first and THEN promethazine IF ondansetron is ineffective.           Propofol (DIPRIVAN) injection   Freq: As Needed Route: IV  Start: 03/29/19 1325 End: 03/29/19 1515         protamine injection   Freq: As Needed  Start: 03/29/19 1430 End: 03/29/19 1515         rocuronium (ZEMURON) injection   Freq: As Needed Route: IV  Start: 03/29/19 1325 End: 03/29/19 1515         sodium chloride 0.9 % flush 1-10 mL   Dose: 1-10 mL  Freq: As Needed Route: IV  PRN Reason: Line Care  Start: 03/29/19 1229 End: 03/29/19 1525         sodium chloride 0.9 % flush 1-10 mL   Dose: 1-10 mL  Freq: As Needed Route: IV  PRN Reason: Line Care  Start: 03/29/19 1204 End: 03/29/19 1525         sodium chloride 0.9 % flush 1-10 mL   Dose: 1-10 mL  Freq: As Needed Route: IV  PRN Reason: Line Care  Start: 03/28/19 1010 End: 03/28/19 1218         sodium chloride 0.9 % flush 1-10 mL   Dose: 1-10 mL  Freq: As Needed Route: IV  PRN Reason: Line Care  Start: 03/27/19 2156         sodium chloride 0.9 % infusion   Freq: Continuous PRN  Start: 03/29/19 1316 End: 03/29/19 1515         sodium chloride 1,000 mL with heparin (porcine) 2,000 Units mixture   Freq: As Needed  Start: 03/28/19  1054 End: 03/28/19 1205         sodium chloride 5,000 mL with heparin (porcine) 5000 UNIT/ML 10,000 Units mixture   Freq: As Needed  Start: 03/29/19 1403 End: 03/29/19 1505         sodium chloride 500 mL with heparin (porcine) 1,000 Units mixture   Freq: As Needed  Start: 03/28/19 1053 End: 03/28/19 1205         traMADol (ULTRAM) tablet 50 mg   Dose: 50 mg  Freq: Every 8 Hours PRN Route: PO  PRN Reason: Moderate Pain   PRN Comment: ha  Start: 03/30/19 1429 End: 04/09/19 1428    Admin Instructions:       Caution: Look alike/sound alike drug alert    If given for pain, use the following pain scale:  Mild Pain = Pain Score of 1-3, CPOT 1-2  Moderate Pain = Pain Score of 4-6, CPOT 3-4  Severe Pain = Pain Score of 7-10, CPOT 5-8    [id number]            Medications 03/30/19 03/31/19 04/01/19

## 2019-04-01 NOTE — TELEPHONE ENCOUNTER
Appt with LB on 4/18 @9:45am. 5 [facility] notified of appt date/time.  Letter forms mailed to pt.

## 2019-04-01 NOTE — PROGRESS NOTES
To whom it may concern, Ms. Sánchez was admitted to my service from March 27, 2019 through April 1, 2019.  She is excused from work during this time and for 2-3 weeks after this.  She will follow-up with physicians to determine when she can return back to work.  Please call with questions.  Thank you.

## 2019-04-01 NOTE — DISCHARGE INSTRUCTIONS
No lift, push, pull more than 10 pounds for 1 month. No exertional activity. OK to drive once off any/all sedating or narcotic medications. Avoid NSAIDs (anti-inflammatory medications). OK to use Tylenol.     Go to [facility] outpatient lab on Wed afternoon or Thursday morning for labs- already ordered. Call next day for results. [phone]

## 2019-04-01 NOTE — PROGRESS NOTES
[facility] PROGRESS NOTE    PATIENT IDENTIFICATION:   Name:  Oralia Sánchez      MRN:  [id number]     43 y.o.  female               CC: POD 3 s/p LVIS embolization device of RACA aneurysm      Subjective     Interval History: Since last encounter, patient has not had new complaints. Has mild, intermittent pin stick pain in right temple. No severe headaches. No N/V. Begging to go home.     ROS:  Constitutional: No fever, chills  HEENT: mild headache, no vision changes  GI: No nausea, vomiting  Neuro: No numbness, tingling, or weakness, no speech difficulties, no balance difficulties    Objective     Vital signs in last 24 hours:  Temp:  [97.6 °F (36.4 °C)-98.6 °F (37 °C)] 97.6 °F (36.4 °C)  Heart Rate:  [62-67] 62  Resp:  [18] 18  BP: (124-145)/(73-94) 126/80      Intake/Output this shift:  No intake/output data recorded.      Intake/Output last 3 shifts:  I/O last 3 completed shifts:  In: 360 [P.O.:360]  Out: -     LABS:  Results from last 7 days   Lab Units 03/31/19  0540 03/29/19  0626 03/28/19  0349   WBC 10*3/mm3 7.86 6.92 6.65   HEMOGLOBIN g/dL 15.0 15.5 15.1   HEMATOCRIT % 46.3 47.3* 46.6   PLATELETS 10*3/mm3 175 169 162     Results from last 7 days   Lab Units 03/31/19  0540 03/29/19  0626 03/28/19  0349 03/27/19  0818   SODIUM mmol/L 140 141 138 134*   POTASSIUM mmol/L 4.6 4.5 3.8 4.7   CHLORIDE mmol/L 104 104 103 97*   CO2 mmol/L 23.3 25.8 22.6 29.6*   BUN mg/dL 15 10 11 12   CREATININE mg/dL 0.72 0.66 0.59 0.65   CALCIUM mg/dL 8.9 8.5* 8.7 9.1   BILIRUBIN mg/dL  --  0.4 0.5 0.3   ALK PHOS U/L  --  49 44 60   ALT (SGPT) U/L  --  71* 74* 62*   AST (SGOT) U/L  --  62* 94* 60*   GLUCOSE mg/dL 99 101* 82 120*       IMAGING STUDIES:  No new imaging    I personally viewed and interpreted the patient's chart.    Meds reviewed/changed: Yes    Current Facility-Administered Medications:   •  aspirin tablet 325 mg, 325 mg, Oral, Daily, Alexx Barrow MD, 325 mg at 04/01/19 0957  •   atorvastatin (LIPITOR) tablet 40 mg, 40 mg, Oral, Nightly, Concepcion Rizvi, APRN, 40 mg at 03/31/19 2056  •  clopidogrel (PLAVIX) tablet 75 mg, 75 mg, Oral, Daily, Concepcion Rizvi APRN, 75 mg at 04/01/19 1117  •  diphenhydrAMINE (BENADRYL) injection 25 mg, 25 mg, Intravenous, Q6H PRN, Asia Griffiths APRN, 25 mg at 03/31/19 0014  •  ketorolac (TORADOL) injection 30 mg, 30 mg, Intravenous, Q6H PRN, Asia Griffiths APRN, 30 mg at 03/31/19 2103  •  levothyroxine (SYNTHROID, LEVOTHROID) tablet 175 mcg, 175 mcg, Oral, Q AM, Dawn Wagner MD, 175 mcg at 03/31/19 0513  •  ondansetron (ZOFRAN) tablet 4 mg, 4 mg, Oral, Q6H PRN **OR** ondansetron ODT (ZOFRAN-ODT) disintegrating tablet 4 mg, 4 mg, Oral, Q6H PRN **OR** ondansetron (ZOFRAN) injection 4 mg, 4 mg, Intravenous, Q6H PRN, Asia Griffiths APRN  •  polyethylene glycol 3350 powder (packet), 17 g, Oral, Daily, HogPrince javier MD, 17 g at 04/01/19 1401  •  prochlorperazine (COMPAZINE) injection 10 mg, 10 mg, Intravenous, Q6H PRN, Asia Griffiths APRN  •  sodium chloride 0.9 % bolus 400 mL, 400 mL, Intravenous, Once, Alexx Barrow MD, Last Rate: 100 mL/hr at 03/29/19 1947, 400 mL at 03/29/19 1947  •  sodium chloride 0.9 % flush 1-10 mL, 1-10 mL, Intravenous, PRN, Asia Griffiths APRN  •  sodium chloride 0.9 % flush 3 mL, 3 mL, Intravenous, Q12H, Asia Griffiths APRN, 3 mL at 04/01/19 0958  •  traMADol (ULTRAM) tablet 50 mg, 50 mg, Oral, Q8H PRN, Moe Lozano MD, 50 mg at 04/01/19 0837      Physical Exam:    General:   Awake, alert, oriented x3. Speech clear with no aphasia    Neck:    supple  CN II    VFFTC  CN III IV VI: Extraocular movements are full , PERRL   CN V: Normal facial sensation and strength of muscles of mastication.  CN VII: Facial movements are symmetric. No weakness.      Motor: No drift  Station and Gait: Normal gait and station.    Coordination: Finger to nose shows no dysmetria.   Extremities:   1+ distal pulses; pink,  warm, dry; right groin site with small amount of bruising on upper thigh and groin area. No hematom    Assessment/Plan     ASSESSMENT:      Primary thunderclap headache    YOUNG (nonalcoholic steatohepatitis)    Hypothyroid    Elevated LFTs    Tobacco abuse    Rheumatoid arthritis (CMS/HCC)    Type 2 diabetes mellitus (CMS/HCC)    Morbid obesity (CMS/HCC)    Abnormal CT of the head    UTI (urinary tract infection), bacterial    Cerebral aneurysm rupture (CMS/HCC)      PLAN: Patient doing well and desires to go home. She has minimal HA now. No red flags on exam. She does not need nimotop at [place]. She is receiving DAPT and will continue at home. I have sent RX to pharmacy. She should remain off work until follow up in 2-3 weeks. No exertion or heavy lifting. Labs for medication monitoring order for later this week.     I discussed the patients findings and my recommendations with patient and family       LOS: 5 days       Concepcion Rizvi, APRN  4/1/2019  3:08 PM    Dragon disclaimer:   Much of this encounter note is an electronic transcription/translation of spoken language to printed text. The electronic translation of spoken language may permit erroneous, or at times, nonsensical words or phrases to be inadvertently transcribed; Although I have reviewed the note for such errors, some may still exist.

## 2019-04-01 NOTE — PLAN OF CARE
Problem: Patient Care Overview  Goal: Plan of Care Review  Outcome: Ongoing (interventions implemented as appropriate)   04/01/19 1432   Coping/Psychosocial   Plan of Care Reviewed With patient   Plan of Care Review   Progress improving   OTHER   Outcome Summary VSS, miralax for constipation, 1st dose of plavix today. Possible discharge today.Will continue to monitor.     Goal: Individualization and Mutuality  Outcome: Ongoing (interventions implemented as appropriate)      Problem: Pain, Acute (Adult)  Goal: Acceptable Pain Control/Comfort Level  Outcome: Ongoing (interventions implemented as appropriate)

## 2019-04-01 NOTE — PLAN OF CARE
Problem: Patient Care Overview  Goal: Plan of Care Review   04/01/19 6201   OTHER   Outcome Summary Patient has remained AOx4 overnight, VSS, treated for headache X2 with relief status post administration of Toradol IV prn per mar. NIH=0, rt groin dressing removed and band aid placed. Pt resting quietly at this time with no s/s of distress observed. Will cont to monitor.

## 2019-04-01 NOTE — TELEPHONE ENCOUNTER
----- Message from ADOLPH Phan sent at 4/1/2019  2:50 PM EDT -----  Regarding: f/u  Pt seen for cerebral aneurysm s/p Lvis Jr stent  by Dr. Alexx Barrow on March 29, 2019. Need follow up in 2 week(s)  without imaging. Ok to see NP.

## 2019-04-01 NOTE — PLAN OF CARE
Problem: Patient Care Overview  Goal: Plan of Care Review  Outcome: Ongoing (interventions implemented as appropriate)    Goal: Discharge Needs Assessment  Outcome: Ongoing (interventions implemented as appropriate)      Problem: Pain, Acute (Adult)  Goal: Acceptable Pain Control/Comfort Level  Outcome: Ongoing (interventions implemented as appropriate)

## 2019-04-01 NOTE — PROGRESS NOTES
Discharge Planning Assessment  Psychiatric     Patient Name: Oralia Sánchez  MRN: [id number]  Today's Date: 4/1/2019    Admit Date: 3/27/2019    Discharge Needs Assessment     Row Name 04/01/19 7554       Living Environment    Lives With  child(nicci), dependent;child(nicci), adult 3 dependent children and one son age 18.    Current Living Arrangements  home/apartment/condo    Primary Care Provided by  self    Provides Primary Care For  child(nicci)    Family Caregiver if Needed  child(nicci), adult    Able to Return to Prior Arrangements  yes       Transition Planning    Patient/Family Anticipates Transition to  home with family    Transportation Anticipated  family or friend will provide;car, drives self       Discharge Needs Assessment    Readmission Within the Last 30 Days  no previous admission in last 30 days    Equipment Currently Used at Home  none    Offered/Gave Vendor List  yes        Discharge Plan     Row Name 04/01/19 4662       Plan    Plan  Home with children.  Denies needs at this time.      Plan Comments  Spoke with pt at bedside.  Facesheet and pharmacy info confirmed.  Pt lives in a house with her children, is IADLs and can drive.  She has a CPAP, but does not use.  No hx of HH or SNF.  She does not have and advance directive. Pt states she does not have a PCP.  [facility] appt liason contact # given.          Destination      No service coordination in this encounter.      Durable Medical Equipment      No service coordination in this encounter.      Dialysis/Infusion      No service coordination in this encounter.      Home Medical Care      No service coordination in this encounter.      Therapy      No service coordination in this encounter.      Community Resources      No service coordination in this encounter.          Demographic Summary     Row Name 04/01/19 3983       General Information    Admission Type  inpatient    Arrived From  home    Referral Source  admission list    Reason for Consult   discharge planning    Preferred Language  English        Functional Status     Row Name 04/01/19 1353       Functional Status    Usual Activity Tolerance  good    Current Activity Tolerance  good       Functional Status, IADL    Medications  independent    Meal Preparation  independent    Housekeeping  independent    Laundry  independent    Shopping  independent       Mental Status    General Appearance WDL  WDL        Psychosocial    No documentation.       Abuse/Neglect    No documentation.       Legal    No documentation.       Substance Abuse    No documentation.       Patient Forms    No documentation.           Tsering Ro RN

## 2019-04-02 ENCOUNTER — TELEPHONE (OUTPATIENT)
Dept: NEUROSURGERY | Facility: CLINIC | Age: 44
End: 2019-04-02

## 2019-04-02 ENCOUNTER — READMISSION MANAGEMENT (OUTPATIENT)
Dept: CALL CENTER | Facility: HOSPITAL | Age: 44
End: 2019-04-02

## 2019-04-02 NOTE — PAYOR COMM NOTE
"Oralia Smith (43 y.o. Female)     PLEASE SEE ATTACHED DC SUMMARY    REF#[id number]    THANK YOU    ANGELA JOYNER LPN CCP    Date of Birth Social Security Number Address Home Phone MRN    1975  [address] [id number] [phone] [id number]    Restorationist Marital Status          Alevism        Admission Date Admission Type Admitting Provider Attending Provider Department, Room/Bed    3/27/19 Emergency Urbano Del Valle MD  [address], [address]/    Discharge Date Discharge Disposition Discharge Destination        2019 Home or Self Care              Attending Provider:  (none)   Allergies:  No Known Drug Allergy    Isolation:  None   Infection:  None   Code Status:  Prior    Ht:  164.6 cm (64.8\")   Wt:  108 kg (238 lb)    Admission Cmt:  None   Principal Problem:  Cerebral aneurysm rupture (CMS/HCC) [I60.9]                 Active Insurance as of 3/27/2019     Primary Coverage     Payor Plan Insurance Group Employer/Plan Group    [employer] PPO [id number]     Payor Plan Address Payor Plan Phone Number Payor Plan Fax Number Effective Dates    [address] [phone]  2018 - None Entered    [place] [address]       Subscriber Name Subscriber Birth Date Member ID       ORALIA SMITH 1975 [id number]                 Emergency Contacts      (Rel.) Home Phone Work Phone Mobile Phone    LUCRECIA SMITH (Son) -- -- [phone]               Discharge Summary      Prince Paredes MD at 2019  4:05 PM              Patient Name: Oralia Smith  : 1975  MRN: [id number]    Date of Admission: 3/27/2019  Date of Discharge:  2019  Primary Care Physician: Provider, No Known      Hospital Course     Chief Complaint:   Headache (pt reports \"corby had three episdoes this week of a migraine headache\". pt denies hx of migraines. pt c/o generalized headache. )      Active Hospital " Problems    Diagnosis  POA   • **Cerebral aneurysm rupture (CMS/HCC) [I60.9]  Yes   • Tobacco abuse [Z72.0]  Yes   • Rheumatoid arthritis (CMS/HCC) [M06.9]  Yes   • Type 2 diabetes mellitus (CMS/HCC) [E11.9]  Yes   • Morbid obesity (CMS/HCC) [E66.01]  Yes   • UTI (urinary tract infection), bacterial [N39.0, A49.9]  Yes   • Primary thunderclap headache [G44.53]  Yes   • Elevated LFTs [R94.5]  Yes   • Hypothyroid [E03.9]  Yes   • YOUNG (nonalcoholic steatohepatitis) [K75.81]  Yes      Resolved Hospital Problems    Diagnosis Date Resolved POA   • Abnormal CT of the head [R93.0] 04/01/2019 Yes        Hospital Course:  Ms. Sánchez is a 43 y.o. female smoker who has a history of hypothyroidism, diet controlled diabetes, Young who came to the hospital for headache.  CT in the emergency room showed no evidence of acute intracranial pathology.  Lumbar puncture showed 51 RBCs and elevated protein.  This was concerning for subarachnoid hemorrhage and CTA was obtained showing a fusiform dilation of 10 mm in the right anterior cerebral artery without a definable aneurysm.  She was admitted to our service for further care and management.  She was evaluated by neurosurgery who recommended cerebral angiography.  She underwent this procedure on March 28th which showed a 15 mm aneurysmal fusiform dilation..  She then underwent embolization of the cerebral aneurysm by Dr. Barrow on March 29.  She was watched in the ICU where she did well.  She continued to have very mild headaches but is much better than when she came in.  She had no neurologic complications.  She was watched on the floor for 2 days and has tolerated this well.  She was also treated for urinary tract infection but does not require any antibiotics on discharge.  She is medically stable for discharge today and has been cleared for discharge from a neurosurgical standpoint.  She will follow-up with them as indicated below.  They have prescribed aspirin and Plavix.      Day  of Discharge     Physical Exam:  Temp:  [97.6 °F (36.4 °C)-98.6 °F (37 °C)] 97.6 °F (36.4 °C)  Heart Rate:  [62-67] 62  Resp:  [18] 18  BP: (124-145)/(73-94) 126/80  Body mass index is 39.85 kg/m².  Physical Exam  Constitutional: She appears well-developed. No distress.   Sitting up comfortably in the bed    Cardiovascular: Normal rate and regular rhythm.   No murmur heard.  Pulmonary/Chest: Effort normal and breath sounds normal. She has no wheezes. She has no rales.   Abdominal: Soft. Bowel sounds are normal. She exhibits no distension and no mass. There is no tenderness.   Musculoskeletal: She exhibits no edema or tenderness.   Neurological: She is alert.   Skin: Skin is warm and dry. She is not diaphoretic. No erythema. No pallor.   Psychiatric: She has a normal mood and affect. Her behavior is normal.   Vitals reviewed.        Consultants     Consult Orders (all) (From admission, onward)    Start     Ordered    03/28/19 1532  Inpatient Pulmonology Consult  Once     Specialty:  Pulmonary Disease  Provider:  Dawn Wagner MD    03/28/19 1532    03/28/19 0020  Inpatient Neurology Consult General  Once     Specialty:  Neurology  Provider:  Phill Hickman MD    03/28/19 0019    03/27/19 2158  Inpatient Neurosurgery Consult  Once     Specialty:  Neurosurgery  Provider:  Curt Donaldson MD    03/27/19 2158          Pertinent Labs and Procedures     Results from last 7 days   Lab Units 03/31/19  0540 03/29/19  0626 03/28/19  0349 03/27/19  0818   WBC 10*3/mm3 7.86 6.92 6.65 8.93   HEMOGLOBIN g/dL 15.0 15.5 15.1 17.1*   PLATELETS 10*3/mm3 175 169 162 175     Results from last 7 days   Lab Units 03/31/19  0540 03/29/19  0626 03/28/19  0349 03/27/19  0818   SODIUM mmol/L 140 141 138 134*   POTASSIUM mmol/L 4.6 4.5 3.8 4.7   CHLORIDE mmol/L 104 104 103 97*   CO2 mmol/L 23.3 25.8 22.6 29.6*   BUN mg/dL 15 10 11 12   CREATININE mg/dL 0.72 0.66 0.59 0.65   GLUCOSE mg/dL 99 101* 82 120*   Estimated Creatinine Clearance:  120.1 mL/min (by C-G formula based on SCr of 0.72 mg/dL).  Results from last 7 days   Lab Units 03/29/19  0626 03/28/19  0349 03/27/19  0818   ALBUMIN g/dL 3.70 3.40* 4.10   BILIRUBIN mg/dL 0.4 0.5 0.3   ALK PHOS U/L 49 44 60   AST (SGOT) U/L 62* 94* 60*   ALT (SGPT) U/L 71* 74* 62*     Results from last 7 days   Lab Units 03/31/19  0540 03/29/19  0626 03/28/19  0349 03/27/19  0818   CALCIUM mg/dL 8.9 8.5* 8.7 9.1   ALBUMIN g/dL  --  3.70 3.40* 4.10               Invalid input(s): LDLCALC  Results from last 7 days   Lab Units 03/28/19  0124   URINECX  >100,000 CFU/mL Escherichia coli*       Cererbal angiogram and embolization of cerebral aneurysm    Ct Angiogram Head With & Without Contrast    Result Date: 3/28/2019  There is mild to moderately fusiformly dilated, dysplastic 10 mm segment of the right anterior cerebral artery from the distal A1 segment to the proximal A2 segment without definable aneurysm. There is minimal narrowing of the intracranial segments of the vertebral arteries. There is narrowing of the mid to distal right transverse sinus felt to be a chronic finding. No acute abnormality is seen to account for the patient's headaches. If the patient truly had a positive lumbar puncture for subarachnoid hemorrhage an arteriogram as goal standard could be obtained for more comprehensive assessment.  Results were communicated to Dr. Chiu from the emergency room by telephone 03/27/2019 at 6 PM.  Radiation dose reduction techniques were utilized, including automated exposure control and exposure modulation based on body size.  This report was finalized on 3/28/2019 11:53 AM by Dr. Gerber Silva M.D.      Ct Head Without Contrast    Result Date: 3/27/2019   No evidence for acute intracranial pathology.  The adenoidal pad is prominent in size. The findings may simply represent adenoidal hypertrophy. However, the adenoid pad is only partially visualized on this study and correlation with direct visual  inspection is suggested. These findings are recommendations were discussed with Dr. Lewis on 03/27/2019 at approximately 10:09 AM.  Radiation dose reduction techniques were utilized, including automated exposure control and exposure modulation based on body size.  This report was finalized on 3/27/2019 12:13 PM by Dr. Joe Mcnamara M.D.                  Test Results Pending at Discharge   None    Discharge Details        Discharge Medications      New Medications      Instructions Start Date   aspirin 81 MG EC tablet   81 mg, Oral, Daily      atorvastatin 40 MG tablet  Commonly known as:  LIPITOR   40 mg, Oral, Nightly      clopidogrel 75 MG tablet  Commonly known as:  PLAVIX   75 mg, Oral, Daily   Start Date:  4/2/2019     traMADol 50 MG tablet  Commonly known as:  ULTRAM   50 mg, Oral, Every 8 Hours PRN         Continue These Medications      Instructions Start Date   DESTIN 0.35 MG tablet  Generic drug:  norethindrone   1 tablet, Oral, Daily      SYNTHROID 175 MCG tablet  Generic drug:  levothyroxine   175 mcg, Oral, Daily             Allergies   Allergen Reactions   • No Known Drug Allergy          Discharge Disposition:  Home or Self Care    Discharge Diet:  Diet Order   Procedures   • Diet Regular       Discharge Activity:       CODE STATUS:    Code Status and Medical Interventions:   Ordered at: 03/27/19 2158     Code Status:    CPR     Medical Interventions (Level of Support Prior to Arrest):    Full       Future Appointments   Date Time Provider Department Center   4/18/2019  9:45 AM Kandy Moeller APRN [facility] None   5/28/2019  4:00 PM [facility] None   6/11/2019  2:00 PM Severino Choudhury MD [facility] None     Additional Instructions for the Follow-ups that You Need to Schedule     P2Y12 Platelet Inhibition    Apr 04, 2019 (Approximate)      Platelet Response Aspirin    Apr 04, 2019 (Approximate)        Follow-up Information     Alexx Barrow MD Follow up in 2 week(s).     Specialties:  Neurosurgery, Radiology  Why:  An appointment has been made for you  to see Preetilavonne FARFAN on April 4/18/2019 at 9:45 am.  Contact information:  Patricia PRYOR  [address] [address] [phone]             Provider, No Known .    Contact information:  [facility] [address] [phone]                   Additional Instructions for the Follow-ups that You Need to Schedule     P2Y12 Platelet Inhibition    Apr 04, 2019 (Approximate)      Platelet Response Aspirin    Apr 04, 2019 (Approximate)            Time Spent on Discharge:  Greater than 30 minutes      Electronically signed by Prince Paredes MD, 4/1/2019, 4:06 PM            Electronically signed by Prince Paredes MD at 4/1/2019  4:53 PM

## 2019-04-02 NOTE — TELEPHONE ENCOUNTER
She is defined as having a cerebral aneurysm rupture which causes a subarachnoid hemorrhage.  This is based on the lumbar puncture study results indicating positive red blood cells in her spinal fluid.      She should take this information to her insurance company to determine whether it is covered.

## 2019-04-02 NOTE — OUTREACH NOTE
Prep Survey      Responses   Facility patient discharged from?  [facility]   Is patient eligible?  Yes   Discharge diagnosis  Cerebral aneurysm rupture    Does the patient have one of the following disease processes/diagnoses(primary or secondary)?  General Surgery   Does the patient have Home health ordered?  No   Is there a DME ordered?  No   Medication alerts for this patient  ASA, Plavix    Prep survey completed?  Yes          Suri Anglin RN

## 2019-04-02 NOTE — TELEPHONE ENCOUNTER
Patient has a question about critical insurance information, states that aneurysm is not listed on the form but strokes are. Wants to know if what she had would fall under the stroke category definition listed below:       Death of a portion of the brain producing neurological sequela including infarction of brain tissue, hemorrhage and immobilization from an extracranial source. TIA, head injury, chronic cerebral insufficiency, and chronic ischemic neurological deficits are not covered.     Whatever aneurysm it was was not a typical one, states BRENDA told her this was only the third that he had seen in his career.     [facility] callback number [phone]

## 2019-04-03 ENCOUNTER — TELEPHONE (OUTPATIENT)
Dept: NEUROSURGERY | Facility: CLINIC | Age: 44
End: 2019-04-03

## 2019-04-03 ENCOUNTER — LAB (OUTPATIENT)
Dept: LAB | Facility: HOSPITAL | Age: 44
End: 2019-04-03

## 2019-04-03 DIAGNOSIS — I60.7 CEREBRAL ANEURYSM RUPTURE (HCC): Primary | ICD-10-CM

## 2019-04-03 DIAGNOSIS — I67.1 ANEURYSM OF ANTERIOR CEREBRAL ARTERY: ICD-10-CM

## 2019-04-03 LAB
ASA PLATELET INHIBITION: 350 ARU
PA ADP PRP-ACNC: 22 PRU (ref 194–418)

## 2019-04-03 PROCEDURE — 85576 BLOOD PLATELET AGGREGATION: CPT | Performed by: NURSE PRACTITIONER

## 2019-04-03 PROCEDURE — 85576 BLOOD PLATELET AGGREGATION: CPT

## 2019-04-03 PROCEDURE — 36415 COLL VENOUS BLD VENIPUNCTURE: CPT

## 2019-04-03 NOTE — TELEPHONE ENCOUNTER
Patient says she cannot come here on Monday. [place] in the [place] of  [place]. She would like to know if there is somewhere up there she can do the blood work    Oralia [phone]

## 2019-04-04 ENCOUNTER — TELEPHONE (OUTPATIENT)
Dept: NEUROSURGERY | Facility: CLINIC | Age: 44
End: 2019-04-04

## 2019-04-04 ENCOUNTER — RESULTS ENCOUNTER (OUTPATIENT)
Dept: TELEMETRY | Facility: HOSPITAL | Age: 44
End: 2019-04-04

## 2019-04-04 ENCOUNTER — READMISSION MANAGEMENT (OUTPATIENT)
Dept: CALL CENTER | Facility: HOSPITAL | Age: 44
End: 2019-04-04

## 2019-04-04 DIAGNOSIS — I67.1 ANEURYSM OF ANTERIOR CEREBRAL ARTERY: ICD-10-CM

## 2019-04-04 NOTE — TELEPHONE ENCOUNTER
Pt  Called and said that she would like to go to [facility] to do her labs. She said that they take her insurance.  [facility] [phone]

## 2019-04-04 NOTE — OUTREACH NOTE
General Surgery Week 1 Survey      Responses   Facility patient discharged from?  [place]   Does the patient have one of the following disease processes/diagnoses(primary or secondary)?  General Surgery   Is there a successful TCM telephone encounter documented?  No   Week 1 attempt successful?  Yes   Call start time  1407   Call end time  1414   Discharge diagnosis  Cerebral aneurysm rupture    Medication alerts for this patient  ASA, Plavix    Meds reviewed with patient/caregiver?  Yes   Is the patient having any side effects they believe may be caused by any medication additions or changes?  No   Does the patient have all medications related to this admission filled (includes all antibiotics, pain medications, etc.)  Yes   Is the patient taking all medications as directed (includes completed medication regime)?  Yes   Does the patient have a follow up appointment scheduled with their surgeon?  Yes   Has the patient kept scheduled appointments due by today?  N/A   Has home health visited the patient within 72 hours of discharge?  N/A   Psychosocial issues?  No   Did the patient receive a copy of their discharge instructions?  Yes   Nursing interventions  Reviewed instructions with patient   What is the patient's perception of their health status since discharge?  Improving   Nursing interventions  Nurse provided patient education   Is the patient /caregiver able to teach back basic post-op care?  Lifting as instructed by MD in discharge instructions, Continue use of incentive spirometry at least 1 week post discharge, Drive as instructed by MD in discharge instructions, No tub bath, swimming, or hot tub until instructed by MD, Keep incision areas clean,dry and protected   Is the patient/caregiver able to teach back signs and symptoms of incisional infection?  Fever, Pus or odor from incision, Incisional warmth, Increased drainage or bleeding, Increased redness, swelling or pain at the incisonal site   Is the  patient/caregiver able to teach back steps to recovery at home?  Make a list of questions for surgeon's appointment, Eat a well-balance diet, Set small, achievable goals for return to baseline health   Is the patient/caregiver able to teach back the hierarchy of who to call/visit for symptoms/problems? PCP, Specialist, Home health nurse, Urgent Care, ED, 911  Yes   Additional teach back comments  SHe says she is doing fine.  No issues to report.   Week 1 call completed?  Yes          Nelly Schwartz RN

## 2019-04-08 ENCOUNTER — RESULTS ENCOUNTER (OUTPATIENT)
Dept: NEUROSURGERY | Facility: CLINIC | Age: 44
End: 2019-04-08

## 2019-04-08 DIAGNOSIS — I60.7 CEREBRAL ANEURYSM RUPTURE (HCC): ICD-10-CM

## 2019-04-08 NOTE — TELEPHONE ENCOUNTER
Patient called to let you know that she got her labs done today at [facility].

## 2019-04-10 ENCOUNTER — TELEPHONE (OUTPATIENT)
Dept: NEUROSURGERY | Facility: CLINIC | Age: 44
End: 2019-04-10

## 2019-04-10 DIAGNOSIS — I60.7 CEREBRAL ANEURYSM RUPTURE (HCC): ICD-10-CM

## 2019-04-10 NOTE — TELEPHONE ENCOUNTER
So 532 is the result of the platelet function with ASA inhibition, which shows decreased platelet function c/w ASA.     Was a P2Y12 ordered as well to check for Plavix platelet inhibition? This is what I more concerned about

## 2019-04-10 NOTE — TELEPHONE ENCOUNTER
Patient called and wanted to know if you have looked at the labs she just had done? Pt number is [phone].

## 2019-04-10 NOTE — TELEPHONE ENCOUNTER
[facility] has been trying to fax us the pt's Platelet Response Aspirin lab and it keeps getting a busy signal.  I asked her for the result:  It is 532 (decreased)  She will keep trying.

## 2019-04-10 NOTE — TELEPHONE ENCOUNTER
It looks like the labs were done at an outlying facility, will you look in the fax que to see if in there. No new labs are in her chart. Should have been completed on Monday. thx!

## 2019-04-10 NOTE — TELEPHONE ENCOUNTER
I cannot access the fax que however I did call [facility] where she had them done and requested a copy.

## 2019-04-11 ENCOUNTER — READMISSION MANAGEMENT (OUTPATIENT)
Dept: CALL CENTER | Facility: HOSPITAL | Age: 44
End: 2019-04-11

## 2019-04-11 NOTE — TELEPHONE ENCOUNTER
P2Y12 was ordered , but not done.  I spoke with the lab and tried to have it added.  Could not be done.  Faxed the order for it to [facility] again.  LMR for pt to go get lab ASAP.

## 2019-04-11 NOTE — OUTREACH NOTE
General Surgery Week 2 Survey      Responses   Facility patient discharged from?  [place]   Does the patient have one of the following disease processes/diagnoses(primary or secondary)?  General Surgery   Week 2 attempt successful?  Yes   Call start time  1115   Call end time  1117   Meds reviewed with patient/caregiver?  Yes   Is the patient taking all medications as directed (includes completed medication regime)?  Yes   Has the patient kept scheduled appointments due by today?  Yes   Comments  Has appt. next week had labs drawn.    What is the patient's perception of their health status since discharge?  Improving   Week 2 call completed?  Yes   Wrap up additional comments  doing well , no new issues today, still has headaches today rated 0 at this time.           Antonietta Oakes, RN

## 2019-04-15 NOTE — TELEPHONE ENCOUNTER
She needs to decrease to every other day and repeat lab on Thursday or Friday, but do not have it drawn within 6 hours of taking the medication

## 2019-04-16 NOTE — TELEPHONE ENCOUNTER
Patient is calling because she said that she saw the results on line in her my chart.  She just wants to make sure that everything is ok.  Told her that they will go over results when she comes in for her appt.    Please advise

## 2019-04-16 NOTE — TELEPHONE ENCOUNTER
No. She needs to remain on daily ASA. That lab result has nothing to do with ASA response. She has appropriate response to daily ASA

## 2019-04-18 ENCOUNTER — READMISSION MANAGEMENT (OUTPATIENT)
Dept: CALL CENTER | Facility: HOSPITAL | Age: 44
End: 2019-04-18

## 2019-04-18 ENCOUNTER — TELEPHONE (OUTPATIENT)
Dept: NEUROSURGERY | Facility: CLINIC | Age: 44
End: 2019-04-18

## 2019-04-18 NOTE — OUTREACH NOTE
General Surgery Week 3 Survey      Responses   Facility patient discharged from?  [place]   Does the patient have one of the following disease processes/diagnoses(primary or secondary)?  General Surgery   Week 3 attempt successful?  No   Unsuccessful attempts  Attempt 1          Dc Leone RN

## 2019-04-18 NOTE — TELEPHONE ENCOUNTER
"I replied back to the pt via my chart with this message.    \"We have taken you off the schedule for today. Please give us a call at your earliest convenience to reschedule your appointment. Thank you.\"    "

## 2019-04-18 NOTE — TELEPHONE ENCOUNTER
----- Message from Audemat, Generic sent at 4/18/2019  1:57 AM EDT -----    Appointment Request From: Oralia Sánchez    With Provider: ADOLPH Granado [[facility]]    Preferred Date Range: 4/18/2019 - 4/19/2019    Preferred Times: Thursday Afternoon, Friday Afternoon    Reason for visit: Problem Follow-Up Visit    Comments:  I can not make it to my appointment.

## 2019-04-19 ENCOUNTER — READMISSION MANAGEMENT (OUTPATIENT)
Dept: CALL CENTER | Facility: HOSPITAL | Age: 44
End: 2019-04-19

## 2019-04-19 NOTE — OUTREACH NOTE
General Surgery Week 3 Survey      Responses   Facility patient discharged from?  [place]   Does the patient have one of the following disease processes/diagnoses(primary or secondary)?  General Surgery   Week 3 attempt successful?  No   Unsuccessful attempts  Attempt 2          Beulah Sánchez RN

## 2019-04-22 ENCOUNTER — LAB (OUTPATIENT)
Dept: LAB | Facility: HOSPITAL | Age: 44
End: 2019-04-22

## 2019-04-22 ENCOUNTER — OFFICE VISIT (OUTPATIENT)
Dept: NEUROSURGERY | Facility: CLINIC | Age: 44
End: 2019-04-22

## 2019-04-22 VITALS
TEMPERATURE: 97.1 F | SYSTOLIC BLOOD PRESSURE: 126 MMHG | HEIGHT: 64 IN | RESPIRATION RATE: 16 BRPM | HEART RATE: 80 BPM | WEIGHT: 231 LBS | BODY MASS INDEX: 39.44 KG/M2 | DIASTOLIC BLOOD PRESSURE: 84 MMHG

## 2019-04-22 DIAGNOSIS — I60.7 CEREBRAL ANEURYSM RUPTURE (HCC): ICD-10-CM

## 2019-04-22 DIAGNOSIS — G44.53 PRIMARY THUNDERCLAP HEADACHE: ICD-10-CM

## 2019-04-22 LAB — PA ADP PRP-ACNC: 58 PRU (ref 194–418)

## 2019-04-22 PROCEDURE — 85576 BLOOD PLATELET AGGREGATION: CPT

## 2019-04-22 PROCEDURE — 99213 OFFICE O/P EST LOW 20 MIN: CPT | Performed by: NURSE PRACTITIONER

## 2019-04-22 RX ORDER — ATORVASTATIN CALCIUM 40 MG/1
40 TABLET, FILM COATED ORAL NIGHTLY
Qty: 30 TABLET | Refills: 0 | Status: SHIPPED | OUTPATIENT
Start: 2019-04-22 | End: 2019-06-11 | Stop reason: ALTCHOICE

## 2019-04-22 NOTE — PROGRESS NOTES
" HPI:   Oralia Sánchez is a 43 y.o. 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 She is status post cerebral angiogram on March 28 followed by cerebral angiogram and LVIS stent assisted embolization on on March 29, 2019. She was discharged to home. She has not had postoperative complications.  She is on dual antiplatelet therapy with aspirin and Plavix.  Her Plavix response has been supratherapeutic and we have adjusted her dose.  Aspirin response has been appropriately therapeutic.  She did not complete her P2Y12 as requested at the end of last week.  She presents accompanied by her friend. She reports only mild headaches and resolved with caffeine. She works as an  at [employer] on night shift.     Review of Systems   Constitutional: Negative for chills and fever.   Eyes: Negative for visual disturbance.   Cardiovascular: Negative for leg swelling.   Genitourinary: Negative for difficulty urinating and enuresis.   Skin: Negative for color change (or coolness of procedure leg), pallor and wound (swelling, excess bruising or bleeding of groin site ).   Neurological: Positive for headaches. Negative for facial asymmetry, speech difficulty, weakness and numbness (of procedure leg).        /84 (BP Location: Left arm, Patient Position: Sitting, Cuff Size: Large Adult)   Pulse 80   Temp 97.1 °F (36.2 °C)   Resp 16   Ht 162.6 cm (64\")   Wt 105 kg (231 lb)   LMP 03/29/2019 Comment: hcg- neg on 3/27/19  BMI 39.65 kg/m²     Physical Exam   Constitutional: She is oriented to person, place, and time. She appears well-developed and well-nourished.   Neck: Neck supple.   Cardiovascular: Intact distal pulses.   Pulmonary/Chest: Effort normal.   Abdominal: Soft.   Neurological: She is alert and oriented to person, place, and time. She has a normal Finger-Nose-Finger Test and a normal Romberg Test. Gait normal. "   Skin: Skin is warm and dry. No pallor.   No significant ecchymosis   Psychiatric: She has a normal mood and affect. Her behavior is normal. Judgment normal.     Neurologic Exam     Mental Status   Oriented to person, place, and time.   Level of consciousness: alert  Knowledge: good.   Normal comprehension.     Cranial Nerves   Cranial nerves II through XII intact.     Motor Exam   Right arm pronator drift: absent  Left arm pronator drift: absent    Gait, Coordination, and Reflexes     Gait  Gait: normal    Coordination   Romberg: negative  Finger to nose coordination: normal      Findings/Results:  4/8/19-platelet response aspirin= 532 therapeutic  4/12/19-P2Y12= 6    Assessment/Plan:  Oralia was seen today for hfu  aneurysm c-angio/emb.    Diagnoses and all orders for this visit:    Primary thunderclap headache  -     atorvastatin (LIPITOR) 40 MG tablet; Take 1 tablet by mouth Every Night.      Discussion/Summary  Patient presents for follow-up of aneurysm.  She was recently treated for a dissecting right CATHERINE aneurysm by LVIS stent.  She reports only mild headaches.  Overall she is feeling well.  She continues to smoke.  She has had some heavy caffeine intake.  Her exam is as noted above with no neurologic red flags.  Her P2 Y12 has been variable.  She did not repeated as requested the end of last week.  However, she states that she is taking her Plavix every other day as directed and the aspirin daily.  I will have her repeat her lab today and contact her with results.  She remains on Lipitor.  She states that someone in the hospital told her that she needed to remain on this due to the stent.  I will discussed with Dr. Barrow, but for now I have given her a refill.  She will need follow-up cerebral angiogram in 3-6 months and I will discussed with Dr. Barrow and contact the patient with regard to appropriate timing.  She works as  I think it is okay for her to return next week.  She  should avoid heavy lifting more than 25 pounds for another 3 weeks from now.    Plan: Return in about 2 months (around 6/22/2019) for Follow-up with NP.        Patient Care Team    Patient Care Team:  Provider, No Known as PCP - General Concepcion Rizvi, APRN  4/22/2019    Dragon disclaimer:   Much of this encounter note is an electronic transcription/translation of spoken language to printed text. The electronic translation of spoken language may permit erroneous, or at times, nonsensical words or phrases to be inadvertently transcribed; Although I have reviewed the note for such errors, some may still exist.

## 2019-04-24 ENCOUNTER — TELEPHONE (OUTPATIENT)
Dept: NEUROSURGERY | Facility: CLINIC | Age: 44
End: 2019-04-24

## 2019-04-24 NOTE — TELEPHONE ENCOUNTER
----- Message from ADOLPH Phan sent at 4/22/2019 11:45 AM EDT -----  Regarding: appt follow up    Lipitor continuation? Lab results? Angiogram follow up for LVIS.

## 2019-04-24 NOTE — TELEPHONE ENCOUNTER
Patient's follow-up in medications with Dr. Barrow.  Her lab values are stable when she had them repeated on Monday.  She should continue the Plavix every other day and aspirin daily.  She can stop Lipitor from our standpoint, but if she needs to continue for reasons of hyperlipidemia per PCP, it can be provided by them.  Dr. Barrow said that she will need to remain on the Plavix and aspirin both for 6 months and we will plan follow-up cerebral angiogram at that time.  Please put her on a recall for follow-up in 5 months instead of 2.  We can cancel the 2-month follow-up. Please advise patient of above information and adjust appointment plans.

## 2019-04-25 ENCOUNTER — TELEPHONE (OUTPATIENT)
Dept: NEUROSURGERY | Facility: CLINIC | Age: 44
End: 2019-04-25

## 2019-04-25 NOTE — TELEPHONE ENCOUNTER
FMLA update sent to [employer] 4/24/19.  Fax confirmation received.  Patient called today, states that she just spoke with [employer] and they did not have them.  After informed that we sent the forms yesterday, she stated that she will call them back.

## 2019-05-02 ENCOUNTER — TELEPHONE (OUTPATIENT)
Dept: NEUROSURGERY | Facility: CLINIC | Age: 44
End: 2019-05-02

## 2019-05-02 NOTE — TELEPHONE ENCOUNTER
The foot swelling should have nothing to do with her angiogram over a month ago.  If the redness and warmth continues, I would have her discuss this with her primary care provider.  Always need to rule out an acute DVT in this situation, though likely not the case if she woke up with it this morning.

## 2019-05-02 NOTE — TELEPHONE ENCOUNTER
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 She is status post cerebral angiogram on March 28 followed by cerebral angiogram and LVIS stent assisted embolization on on March 29.  Is c/o L foot edema since she woke up this am that is red/warm to the touch.  States she has no other symptoms.  [phone]

## 2019-05-22 ENCOUNTER — TELEPHONE (OUTPATIENT)
Dept: NEUROSURGERY | Facility: CLINIC | Age: 44
End: 2019-05-22

## 2019-05-22 NOTE — TELEPHONE ENCOUNTER
Yes, technically fine to use a tanning bed; however, we typically would never recommend a patient to use a tanning bed secondary to other risks

## 2019-05-22 NOTE — TELEPHONE ENCOUNTER
Pt calls today asking if it would be ok to use a tanning bed while having the shunt.    [phone]

## 2019-05-24 DIAGNOSIS — E55.9 VITAMIN D DEFICIENCY: Primary | ICD-10-CM

## 2019-05-24 DIAGNOSIS — E03.8 HYPOTHYROIDISM DUE TO HASHIMOTO'S THYROIDITIS: ICD-10-CM

## 2019-05-24 DIAGNOSIS — R79.89 ELEVATED LFTS: ICD-10-CM

## 2019-05-24 DIAGNOSIS — E06.3 HYPOTHYROIDISM DUE TO HASHIMOTO'S THYROIDITIS: ICD-10-CM

## 2019-05-24 DIAGNOSIS — E11.69 TYPE 2 DIABETES MELLITUS WITH OTHER SPECIFIED COMPLICATION, UNSPECIFIED WHETHER LONG TERM INSULIN USE (HCC): Chronic | ICD-10-CM

## 2019-05-24 DIAGNOSIS — E88.81 METABOLIC SYNDROME: ICD-10-CM

## 2019-05-31 RX ORDER — LEVOTHYROXINE SODIUM 175 MCG
TABLET ORAL
Qty: 90 TABLET | Refills: 0 | Status: SHIPPED | OUTPATIENT
Start: 2019-05-31 | End: 2019-06-11 | Stop reason: SDUPTHER

## 2019-06-11 ENCOUNTER — OFFICE VISIT (OUTPATIENT)
Dept: ENDOCRINOLOGY | Age: 44
End: 2019-06-11

## 2019-06-11 VITALS
WEIGHT: 236.6 LBS | HEIGHT: 64 IN | BODY MASS INDEX: 40.39 KG/M2 | RESPIRATION RATE: 16 BRPM | SYSTOLIC BLOOD PRESSURE: 136 MMHG | DIASTOLIC BLOOD PRESSURE: 80 MMHG

## 2019-06-11 DIAGNOSIS — Z72.0 TOBACCO ABUSE: Chronic | ICD-10-CM

## 2019-06-11 DIAGNOSIS — E66.01 CLASS 3 SEVERE OBESITY WITHOUT SERIOUS COMORBIDITY WITH BODY MASS INDEX (BMI) OF 40.0 TO 44.9 IN ADULT, UNSPECIFIED OBESITY TYPE (HCC): ICD-10-CM

## 2019-06-11 DIAGNOSIS — E06.3 HYPOTHYROIDISM DUE TO HASHIMOTO'S THYROIDITIS: ICD-10-CM

## 2019-06-11 DIAGNOSIS — E88.81 METABOLIC SYNDROME: ICD-10-CM

## 2019-06-11 DIAGNOSIS — E55.9 VITAMIN D DEFICIENCY: ICD-10-CM

## 2019-06-11 DIAGNOSIS — E03.8 HYPOTHYROIDISM DUE TO HASHIMOTO'S THYROIDITIS: ICD-10-CM

## 2019-06-11 DIAGNOSIS — K75.81 NASH (NONALCOHOLIC STEATOHEPATITIS): ICD-10-CM

## 2019-06-11 DIAGNOSIS — E11.69 TYPE 2 DIABETES MELLITUS WITH OTHER SPECIFIED COMPLICATION, UNSPECIFIED WHETHER LONG TERM INSULIN USE (HCC): Primary | Chronic | ICD-10-CM

## 2019-06-11 PROCEDURE — 99214 OFFICE O/P EST MOD 30 MIN: CPT | Performed by: INTERNAL MEDICINE

## 2019-06-11 RX ORDER — ROSUVASTATIN CALCIUM 40 MG/1
40 TABLET, COATED ORAL DAILY
Qty: 90 TABLET | Refills: 3 | Status: SHIPPED | OUTPATIENT
Start: 2019-06-11 | End: 2020-04-01

## 2019-06-11 RX ORDER — LEVOTHYROXINE SODIUM 175 MCG
175 TABLET ORAL DAILY
Qty: 90 TABLET | Refills: 3 | Status: SHIPPED | OUTPATIENT
Start: 2019-06-11 | End: 2019-06-21 | Stop reason: SDUPTHER

## 2019-06-11 NOTE — PROGRESS NOTES
"Subjective   Oralia Sánchez is a 43 y.o. female seen for follow up for hypothyroidism, vit d deficiency. No lab review. Patient denies any problems or concerns.     History of Present Illness this is a 43-year-old female known patient with hypothyroidism and vitamin D deficiency with morbid obesity and fatty liver and type 2 diabetes.  On March 27, 2019 after experiencing an excruciating headache she went to the emergency room and after doing further evaluation was diagnosed of having a brain aneurysm which was treated.  She is a still smoking and was told in no uncertain terms to stop because of multiple risk factors.    /80   Resp 16   Ht 162.6 cm (64\")   Wt 107 kg (236 lb 9.6 oz)   BMI 40.61 kg/m²      Allergies   Allergen Reactions   • No Known Drug Allergy        Current Outpatient Medications:   •  aspirin 81 MG EC tablet, Take 1 tablet by mouth Daily., Disp: 30 tablet, Rfl: 11  •  clopidogrel (PLAVIX) 75 MG tablet, Take 1 tablet by mouth Daily., Disp: 30 tablet, Rfl: 6  •  norethindrone (DESTIN) 0.35 MG tablet, Take 1 tablet by mouth Daily., Disp: , Rfl:   •  SYNTHROID 175 MCG tablet, TAKE 1 TABLET DAILY BY MOUTH., Disp: 90 tablet, Rfl: 0      The following portions of the patient's history were reviewed and updated as appropriate: allergies, current medications, past family history, past medical history, past social history, past surgical history and problem list.    Review of Systems   Constitutional: Negative.    HENT: Negative.    Eyes: Negative.    Respiratory: Negative.    Cardiovascular: Negative.    Gastrointestinal: Negative.    Endocrine: Negative.    Genitourinary: Negative.    Musculoskeletal: Negative.    Skin: Negative.    Allergic/Immunologic: Negative.    Neurological: Negative.    Hematological: Negative.    Psychiatric/Behavioral: Negative.        Objective   Physical Exam   Constitutional: She is oriented to person, place, and time. She appears well-developed and well-nourished. " No distress.   HENT:   Head: Normocephalic and atraumatic.   Right Ear: External ear normal.   Left Ear: External ear normal.   Nose: Nose normal.   Mouth/Throat: Oropharynx is clear and moist. No oropharyngeal exudate.   Eyes: Conjunctivae and EOM are normal. Pupils are equal, round, and reactive to light. Right eye exhibits no discharge. Left eye exhibits no discharge. No scleral icterus.   Neck: Normal range of motion. Neck supple. No JVD present. No tracheal deviation present. No thyromegaly present.   Cardiovascular: Normal rate, regular rhythm, normal heart sounds and intact distal pulses. Exam reveals no gallop and no friction rub.   No murmur heard.  Pulmonary/Chest: Effort normal and breath sounds normal. No stridor. No respiratory distress. She has no wheezes. She has no rales. She exhibits no tenderness.   Abdominal: Soft. Bowel sounds are normal. She exhibits no distension and no mass. There is no tenderness. There is no rebound and no guarding. No hernia.   Musculoskeletal: Normal range of motion. She exhibits no edema, tenderness or deformity.   Lymphadenopathy:     She has no cervical adenopathy.   Neurological: She is alert and oriented to person, place, and time. She has normal reflexes. She displays normal reflexes. No cranial nerve deficit or sensory deficit. She exhibits normal muscle tone. Coordination normal.   Skin: Skin is warm and dry. No rash noted. She is not diaphoretic. No erythema. No pallor.   Psychiatric: She has a normal mood and affect. Her behavior is normal. Judgment and thought content normal.   Nursing note and vitals reviewed.        Assessment/Plan   Diagnoses and all orders for this visit:    Type 2 diabetes mellitus with other specified complication, unspecified whether long term insulin use (CMS/[facility])  -     T3, Free  -     T4 & TSH ([facility])  -     T4, Free  -     Thyroglobulin With Anti-TG  -     Uric Acid  -     Vitamin D 25 Hydroxy  -     Comprehensive Metabolic Panel  -      C-Peptide  -     Hemoglobin A1c  -     Lipid Panel  -     MicroAlbumin, Urine, Random - Urine, Clean Catch  -     ACTH  -     Cortisol    Hypothyroidism due to Hashimoto's thyroiditis  -     T3, Free  -     T4 & TSH ([facility])  -     T4, Free  -     Thyroglobulin With Anti-TG  -     Uric Acid  -     Vitamin D 25 Hydroxy  -     Comprehensive Metabolic Panel  -     C-Peptide  -     Hemoglobin A1c  -     Lipid Panel  -     MicroAlbumin, Urine, Random - Urine, Clean Catch  -     ACTH  -     Cortisol    Metabolic syndrome  -     T3, Free  -     T4 & TSH ([facility])  -     T4, Free  -     Thyroglobulin With Anti-TG  -     Uric Acid  -     Vitamin D 25 Hydroxy  -     Comprehensive Metabolic Panel  -     C-Peptide  -     Hemoglobin A1c  -     Lipid Panel  -     MicroAlbumin, Urine, Random - Urine, Clean Catch  -     ACTH  -     Cortisol    Vitamin D deficiency  -     T3, Free  -     T4 & TSH ([facility])  -     T4, Free  -     Thyroglobulin With Anti-TG  -     Uric Acid  -     Vitamin D 25 Hydroxy  -     Comprehensive Metabolic Panel  -     C-Peptide  -     Hemoglobin A1c  -     Lipid Panel  -     MicroAlbumin, Urine, Random - Urine, Clean Catch  -     ACTH  -     Cortisol    Class 3 severe obesity without serious comorbidity with body mass index (BMI) of 40.0 to 44.9 in adult, unspecified obesity type (CMS/HCC)  -     T3, Free  -     T4 & TSH ([facility])  -     T4, Free  -     Thyroglobulin With Anti-TG  -     Uric Acid  -     Vitamin D 25 Hydroxy  -     Comprehensive Metabolic Panel  -     C-Peptide  -     Hemoglobin A1c  -     Lipid Panel  -     MicroAlbumin, Urine, Random - Urine, Clean Catch  -     ACTH  -     Cortisol    YOUNG (nonalcoholic steatohepatitis)  -     T3, Free  -     T4 & TSH ([facility])  -     T4, Free  -     Thyroglobulin With Anti-TG  -     Uric Acid  -     Vitamin D 25 Hydroxy  -     Comprehensive Metabolic Panel  -     C-Peptide  -     Hemoglobin A1c  -     Lipid Panel  -     MicroAlbumin, Urine, Random -  Urine, Clean Catch  -     ACTH  -     Cortisol    Tobacco abuse  -     T3, Free  -     T4 & TSH ([facility])  -     T4, Free  -     Thyroglobulin With Anti-TG  -     Uric Acid  -     Vitamin D 25 Hydroxy  -     Comprehensive Metabolic Panel  -     C-Peptide  -     Hemoglobin A1c  -     Lipid Panel  -     MicroAlbumin, Urine, Random - Urine, Clean Catch  -     ACTH  -     Cortisol    Other orders  -     SYNTHROID 175 MCG tablet; Take 1 tablet by mouth Daily.  -     rosuvastatin (CRESTOR) 40 MG tablet; Take 1 tablet by mouth Daily.      In summary I saw and examined this 43-year-old female for above-mentioned problems.  I reviewed her laboratory evaluation of October 31, 2018 and provided her with a hard copy of it.  We will however go ahead and order additional laboratory evaluation and once the results come back we will go ahead and call for any possible modification or new medications.  I will see her in 6 months or sooner if needed with laboratory evaluation before next office visit.

## 2019-06-12 LAB
25(OH)D3+25(OH)D2 SERPL-MCNC: 37.9 NG/ML (ref 30–100)
ACTH PLAS-MCNC: 21.8 PG/ML (ref 7.2–63.3)
ALBUMIN SERPL-MCNC: 4.3 G/DL (ref 3.5–5.2)
ALBUMIN/GLOB SERPL: 1.4 G/DL
ALP SERPL-CCNC: 75 U/L (ref 39–117)
ALT SERPL-CCNC: 28 U/L (ref 1–33)
AST SERPL-CCNC: 28 U/L (ref 1–32)
BILIRUB SERPL-MCNC: 0.4 MG/DL (ref 0.2–1.2)
BUN SERPL-MCNC: 7 MG/DL (ref 6–20)
BUN/CREAT SERPL: 10.9 (ref 7–25)
C PEPTIDE SERPL-MCNC: 2.3 NG/ML (ref 1.1–4.4)
CALCIUM SERPL-MCNC: 9.7 MG/DL (ref 8.6–10.5)
CHLORIDE SERPL-SCNC: 100 MMOL/L (ref 98–107)
CHOLEST SERPL-MCNC: 169 MG/DL (ref 0–200)
CO2 SERPL-SCNC: 26.4 MMOL/L (ref 22–29)
CORTIS SERPL-MCNC: 13.2 UG/DL
CREAT SERPL-MCNC: 0.64 MG/DL (ref 0.57–1)
GLOBULIN SER CALC-MCNC: 3 GM/DL
GLUCOSE SERPL-MCNC: 84 MG/DL (ref 65–99)
HBA1C MFR BLD: 5.7 % (ref 4.8–5.6)
HDLC SERPL-MCNC: 61 MG/DL (ref 40–60)
INTERPRETATION: NORMAL
LDLC SERPL CALC-MCNC: 88 MG/DL (ref 0–100)
Lab: NORMAL
MICROALBUMIN UR-MCNC: 29.5 UG/ML
POTASSIUM SERPL-SCNC: 4 MMOL/L (ref 3.5–5.2)
PROT SERPL-MCNC: 7.3 G/DL (ref 6–8.5)
SODIUM SERPL-SCNC: 143 MMOL/L (ref 136–145)
T3FREE SERPL-MCNC: 2.3 PG/ML (ref 2–4.4)
T4 FREE SERPL-MCNC: 1.27 NG/DL (ref 0.93–1.7)
T4 SERPL-MCNC: 8.17 MCG/DL (ref 4.5–11.7)
THYROGLOB AB SERPL-ACNC: <1 IU/ML (ref 0–0.9)
THYROGLOB SERPL-MCNC: 4.3 NG/ML (ref 1.5–38.5)
TRIGL SERPL-MCNC: 102 MG/DL (ref 0–150)
TSH SERPL DL<=0.005 MIU/L-ACNC: 6.8 MIU/ML (ref 0.27–4.2)
URATE SERPL-MCNC: 6.9 MG/DL (ref 2.4–5.7)
VLDLC SERPL CALC-MCNC: 20.4 MG/DL

## 2019-06-13 RX ORDER — ALLOPURINOL 100 MG/1
100 TABLET ORAL DAILY
Qty: 90 TABLET | Refills: 3 | Status: ON HOLD | OUTPATIENT
Start: 2019-06-13 | End: 2019-10-02

## 2019-06-21 RX ORDER — LEVOTHYROXINE SODIUM 175 MCG
175 TABLET ORAL DAILY
Qty: 90 TABLET | Refills: 3 | Status: SHIPPED | OUTPATIENT
Start: 2019-06-21 | End: 2019-07-23

## 2019-06-24 ENCOUNTER — RESULTS ENCOUNTER (OUTPATIENT)
Dept: ENDOCRINOLOGY | Age: 44
End: 2019-06-24

## 2019-06-24 DIAGNOSIS — E03.8 HYPOTHYROIDISM DUE TO HASHIMOTO'S THYROIDITIS: ICD-10-CM

## 2019-06-24 DIAGNOSIS — R79.89 ELEVATED LFTS: ICD-10-CM

## 2019-06-24 DIAGNOSIS — E06.3 HYPOTHYROIDISM DUE TO HASHIMOTO'S THYROIDITIS: ICD-10-CM

## 2019-06-24 DIAGNOSIS — E11.69 TYPE 2 DIABETES MELLITUS WITH OTHER SPECIFIED COMPLICATION, UNSPECIFIED WHETHER LONG TERM INSULIN USE (HCC): Chronic | ICD-10-CM

## 2019-06-24 DIAGNOSIS — E55.9 VITAMIN D DEFICIENCY: ICD-10-CM

## 2019-06-24 DIAGNOSIS — E88.81 METABOLIC SYNDROME: ICD-10-CM

## 2019-07-23 RX ORDER — LEVOTHYROXINE SODIUM 200 MCG
200 TABLET ORAL DAILY
Qty: 30 TABLET | Refills: 11 | Status: SHIPPED | OUTPATIENT
Start: 2019-07-23 | End: 2020-07-22

## 2019-07-30 ENCOUNTER — TELEPHONE (OUTPATIENT)
Dept: NEUROSURGERY | Facility: CLINIC | Age: 44
End: 2019-07-30

## 2019-07-30 NOTE — TELEPHONE ENCOUNTER
She is perfectly fine to use nicotine gum and/or patches.  We would highly encourage smoking cessation, especially with history of a cerebral aneurysm

## 2019-07-30 NOTE — TELEPHONE ENCOUNTER
Pt had surgery on 3/29 for cerebral angiogram and embolization of aneurysm. Pt was last seen on 4/22 by KK     Pt wants to know if it is okay to use nicotine patches and gum.  Pt is a smoker and her company has a quit for life program.  Pt is on Plavix and aspirin from Dr Barrow.      Pt was taken off of lipitor by the APRN.  She says Dr Choudhury feels that she needs to be on the Lipitor because it cleans out the blood vessels.  Please advise      Oralia  [phone]

## 2019-09-19 ENCOUNTER — OFFICE VISIT (OUTPATIENT)
Dept: NEUROSURGERY | Facility: CLINIC | Age: 44
End: 2019-09-19

## 2019-09-19 VITALS
BODY MASS INDEX: 41.66 KG/M2 | DIASTOLIC BLOOD PRESSURE: 92 MMHG | HEART RATE: 64 BPM | RESPIRATION RATE: 20 BRPM | HEIGHT: 64 IN | SYSTOLIC BLOOD PRESSURE: 144 MMHG | WEIGHT: 244 LBS

## 2019-09-19 DIAGNOSIS — I60.7 CEREBRAL ANEURYSM RUPTURE (HCC): Primary | ICD-10-CM

## 2019-09-19 PROCEDURE — 99214 OFFICE O/P EST MOD 30 MIN: CPT | Performed by: NURSE PRACTITIONER

## 2019-09-19 RX ORDER — PROMETHAZINE HYDROCHLORIDE 25 MG/1
TABLET ORAL
Refills: 0 | COMMUNITY
Start: 2019-08-29 | End: 2020-04-01

## 2019-09-19 NOTE — PROGRESS NOTES
"Subjective   Patient ID: Oralia Sánchez is a 43 y.o. female is here today for follow-up for aneurysm to discuss c-angio.  Pt is unaccompanied.    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  She is status post cerebral angiogram in March 28 with stent assisted embolization on March 29, 2019.  She is on dual antiplatelet therapy with aspirin and Plavix.    She states that she is doing and feeling well.  She has been compliant with her medications taking them daily as directed.  She denies any strokelike symptoms.  She denies any other neuro deficits at this time.  She continues to smoke cigarettes.    She presents unaccompanied.       /92 (BP Location: Left arm, Patient Position: Sitting, Cuff Size: Large Adult)   Pulse 64   Resp 20   Ht 162.6 cm (64\")   Wt 111 kg (244 lb)   BMI 41.88 kg/m²       The following portions of the patient's history were reviewed and updated as appropriate: allergies, current medications, past family history, past medical history, past social history, past surgical history and problem list.    Review of Systems   Eyes: Negative for visual disturbance.   Musculoskeletal: Positive for gait problem (chiropractic).   Neurological: Positive for light-headedness (sometimes). Negative for dizziness, tremors, seizures, syncope, facial asymmetry, speech difficulty, weakness, numbness and headaches.   Psychiatric/Behavioral: Negative for confusion and decreased concentration.       Objective   Physical Exam   Constitutional: She is oriented to person, place, and time. She appears well-developed and well-nourished. She is cooperative.  Non-toxic appearance. She does not have a sickly appearance. She does not appear ill.   Very pleasant morbidly obese middle-aged female, smells heavily of cigarette smoke   HENT:   Head: " Normocephalic and atraumatic.   Eyes: EOM are normal.   Neck: Neck supple. Carotid bruit is not present.   Cardiovascular: Normal rate and intact distal pulses.   Pulmonary/Chest: Effort normal. She has wheezes.   Abdominal: Soft.   Musculoskeletal: Normal range of motion. She exhibits no deformity.   JONES well   Neurological: She is alert and oriented to person, place, and time. She has normal strength. She displays no tremor. No cranial nerve deficit. Coordination and gait normal. GCS eye subscore is 4. GCS verbal subscore is 5. GCS motor subscore is 6.   Gait is stable and upright   Skin: Skin is warm and dry.   Psychiatric: She has a normal mood and affect. Her behavior is normal. Thought content normal.   Vitals reviewed.    Neurologic Exam     Mental Status   Oriented to person, place, and time.     Cranial Nerves     CN III, IV, VI   Extraocular motions are normal.     Motor Exam     Strength   Strength 5/5 throughout.       Assessment/Plan   Independent Review of Radiographic Studies:    No new imaging    Medical Decision Making:  She returns to the office today for ongoing follow-up status post stent placement for treatment of a large fusiform aneurysmal dilatation of the right anterior cerebral artery.  Exam as noted above, no red flags.  The procedure was in March 2019 and she is due for follow-up angiogram for continued surveillance.  The aneurysm will be scheduled with Dr. Echeverria following today's office visit since Dr. Barrow is no longer with [facility].  She is agreeable to this plan.  She will be scheduled for preoperative testing.  Lab work will be reviewed accordingly.  Risks and benefits were explained.  She would like to proceed forward.  No neuro deficits on exam.    Plan: Cerebral angiogram to be scheduled with Dr. Echeverria, return to office as directed      Oralia was seen today for aneurysm  discuss c-angio.    Diagnoses and all orders for this visit:    Cerebral aneurysm rupture  (CMS/[facility])  -     Case Request; Standing  -     CBC and Differential; Future  -     Comprehensive metabolic panel; Future  -     Protime-INR; Future  -     Urinalysis With Culture If Indicated -  -     Sedimentation rate; Future  -     sodium chloride 0.9 % infusion  -     XR Chest 1 View; Future  -     Case Request    Other orders  -     Follow Anesthesia Guidelines / Standing Orders; Future  -     Obtain informed consent  -     Provide NPO Instructions to Patient; Future  -     Follow Anesthesia Guidelines / Standing Orders; Standing  -     Verify NPO Status; Standing  -     POC Glucose Once; Standing  -     Verify/Perform Chlorhexidine Skin Prep; Standing      Return for Next scheduled follow up.

## 2019-09-20 RX ORDER — SODIUM CHLORIDE 9 MG/ML
100 INJECTION, SOLUTION INTRAVENOUS CONTINUOUS
Status: CANCELLED | OUTPATIENT
Start: 2019-10-02

## 2019-09-25 ENCOUNTER — RESULTS ENCOUNTER (OUTPATIENT)
Dept: NEUROSURGERY | Facility: CLINIC | Age: 44
End: 2019-09-25

## 2019-09-25 DIAGNOSIS — I60.7 CEREBRAL ANEURYSM RUPTURE (HCC): ICD-10-CM

## 2019-09-26 ENCOUNTER — PREP FOR SURGERY (OUTPATIENT)
Dept: OTHER | Facility: HOSPITAL | Age: 44
End: 2019-09-26

## 2019-09-26 DIAGNOSIS — I67.1 CEREBRAL ANEURYSM: Primary | ICD-10-CM

## 2019-09-26 RX ORDER — SODIUM CHLORIDE 0.9 % (FLUSH) 0.9 %
3 SYRINGE (ML) INJECTION EVERY 12 HOURS SCHEDULED
Status: CANCELLED | OUTPATIENT
Start: 2019-09-26

## 2019-09-26 RX ORDER — SODIUM CHLORIDE 0.9 % (FLUSH) 0.9 %
1-10 SYRINGE (ML) INJECTION AS NEEDED
Status: CANCELLED | OUTPATIENT
Start: 2019-09-26

## 2019-09-26 RX ORDER — FAMOTIDINE 20 MG/1
20 TABLET, FILM COATED ORAL
Status: CANCELLED | OUTPATIENT
Start: 2019-09-26

## 2019-09-26 RX ORDER — LIDOCAINE AND PRILOCAINE 25; 25 MG/G; MG/G
CREAM TOPICAL ONCE
Status: CANCELLED | OUTPATIENT
Start: 2019-09-26 | End: 2019-09-26

## 2019-09-26 RX ORDER — SODIUM CHLORIDE 9 MG/ML
100 INJECTION, SOLUTION INTRAVENOUS CONTINUOUS
Status: CANCELLED | OUTPATIENT
Start: 2019-09-26

## 2019-10-02 ENCOUNTER — APPOINTMENT (OUTPATIENT)
Dept: GENERAL RADIOLOGY | Facility: HOSPITAL | Age: 44
End: 2019-10-02

## 2019-10-02 ENCOUNTER — HOSPITAL ENCOUNTER (OUTPATIENT)
Facility: HOSPITAL | Age: 44
Discharge: HOME | End: 2019-10-02
Attending: RADIOLOGY | Admitting: RADIOLOGY

## 2019-10-02 ENCOUNTER — ANESTHESIA (OUTPATIENT)
Dept: PERIOP | Facility: HOSPITAL | Age: 44
End: 2019-10-02

## 2019-10-02 ENCOUNTER — ANESTHESIA EVENT (OUTPATIENT)
Dept: PERIOP | Facility: HOSPITAL | Age: 44
End: 2019-10-02

## 2019-10-02 VITALS
WEIGHT: 246.3 LBS | OXYGEN SATURATION: 95 % | BODY MASS INDEX: 46.5 KG/M2 | SYSTOLIC BLOOD PRESSURE: 158 MMHG | HEIGHT: 61 IN | DIASTOLIC BLOOD PRESSURE: 91 MMHG | RESPIRATION RATE: 16 BRPM | HEART RATE: 99 BPM | TEMPERATURE: 98.5 F

## 2019-10-02 DIAGNOSIS — I60.7 CEREBRAL ANEURYSM RUPTURE (HCC): ICD-10-CM

## 2019-10-02 DIAGNOSIS — I67.1 CEREBRAL ANEURYSM: ICD-10-CM

## 2019-10-02 LAB
ANION GAP SERPL CALCULATED.3IONS-SCNC: 14.5 MMOL/L (ref 5–15)
BASOPHILS # BLD AUTO: 0.06 10*3/MM3 (ref 0–0.2)
BASOPHILS NFR BLD AUTO: 1 % (ref 0–1.5)
BUN BLD-MCNC: 5 MG/DL (ref 6–20)
BUN/CREAT SERPL: 8.6 (ref 7–25)
CALCIUM SPEC-SCNC: 8.6 MG/DL (ref 8.6–10.5)
CHLORIDE SERPL-SCNC: 103 MMOL/L (ref 98–107)
CO2 SERPL-SCNC: 26.5 MMOL/L (ref 22–29)
CREAT BLD-MCNC: 0.58 MG/DL (ref 0.57–1)
DEPRECATED RDW RBC AUTO: 49.7 FL (ref 37–54)
EOSINOPHIL # BLD AUTO: 0.18 10*3/MM3 (ref 0–0.4)
EOSINOPHIL NFR BLD AUTO: 3.1 % (ref 0.3–6.2)
ERYTHROCYTE [DISTWIDTH] IN BLOOD BY AUTOMATED COUNT: 14.2 % (ref 12.3–15.4)
GFR SERPL CREATININE-BSD FRML MDRD: 112 ML/MIN/1.73
GLUCOSE BLD-MCNC: 122 MG/DL (ref 65–99)
HCT VFR BLD AUTO: 47 % (ref 34–46.6)
HGB BLD-MCNC: 16.2 G/DL (ref 12–15.9)
IMM GRANULOCYTES # BLD AUTO: 0.01 10*3/MM3 (ref 0–0.05)
IMM GRANULOCYTES NFR BLD AUTO: 0.2 % (ref 0–0.5)
LYMPHOCYTES # BLD AUTO: 2.81 10*3/MM3 (ref 0.7–3.1)
LYMPHOCYTES NFR BLD AUTO: 48.8 % (ref 19.6–45.3)
MCH RBC QN AUTO: 32.9 PG (ref 26.6–33)
MCHC RBC AUTO-ENTMCNC: 34.5 G/DL (ref 31.5–35.7)
MCV RBC AUTO: 95.3 FL (ref 79–97)
MONOCYTES # BLD AUTO: 0.39 10*3/MM3 (ref 0.1–0.9)
MONOCYTES NFR BLD AUTO: 6.8 % (ref 5–12)
NEUTROPHILS # BLD AUTO: 2.31 10*3/MM3 (ref 1.7–7)
NEUTROPHILS NFR BLD AUTO: 40.1 % (ref 42.7–76)
NRBC BLD AUTO-RTO: 0 /100 WBC (ref 0–0.2)
PLATELET # BLD AUTO: 260 10*3/MM3 (ref 140–450)
PMV BLD AUTO: 8.8 FL (ref 6–12)
POTASSIUM BLD-SCNC: 3.6 MMOL/L (ref 3.5–5.2)
RBC # BLD AUTO: 4.93 10*6/MM3 (ref 3.77–5.28)
SODIUM BLD-SCNC: 144 MMOL/L (ref 136–145)
WBC NRBC COR # BLD: 5.76 10*3/MM3 (ref 3.4–10.8)

## 2019-10-02 PROCEDURE — C1769 GUIDE WIRE: HCPCS | Performed by: RADIOLOGY

## 2019-10-02 PROCEDURE — 25010000002 FENTANYL CITRATE (PF) 100 MCG/2ML SOLUTION: Performed by: NURSE ANESTHETIST, CERTIFIED REGISTERED

## 2019-10-02 PROCEDURE — 25010000002 HEPARIN (PORCINE) PER 1000 UNITS: Performed by: RADIOLOGY

## 2019-10-02 PROCEDURE — 25010000002 HYDRALAZINE PER 20 MG: Performed by: NURSE ANESTHETIST, CERTIFIED REGISTERED

## 2019-10-02 PROCEDURE — C1894 INTRO/SHEATH, NON-LASER: HCPCS | Performed by: RADIOLOGY

## 2019-10-02 PROCEDURE — 85025 COMPLETE CBC W/AUTO DIFF WBC: CPT | Performed by: RADIOLOGY

## 2019-10-02 PROCEDURE — 0 IODIXANOL PER 1 ML: Performed by: RADIOLOGY

## 2019-10-02 PROCEDURE — 25010000002 MIDAZOLAM PER 1 MG: Performed by: ANESTHESIOLOGY

## 2019-10-02 PROCEDURE — 25010000002 MIDAZOLAM PER 1 MG: Performed by: NURSE ANESTHETIST, CERTIFIED REGISTERED

## 2019-10-02 PROCEDURE — C1760 CLOSURE DEV, VASC: HCPCS | Performed by: RADIOLOGY

## 2019-10-02 PROCEDURE — 80048 BASIC METABOLIC PNL TOTAL CA: CPT | Performed by: RADIOLOGY

## 2019-10-02 PROCEDURE — 76377 3D RENDER W/INTRP POSTPROCES: CPT

## 2019-10-02 PROCEDURE — 25010000002 FENTANYL CITRATE (PF) 100 MCG/2ML SOLUTION: Performed by: ANESTHESIOLOGY

## 2019-10-02 RX ORDER — SODIUM CHLORIDE 0.9 % (FLUSH) 0.9 %
3-10 SYRINGE (ML) INJECTION AS NEEDED
Status: DISCONTINUED | OUTPATIENT
Start: 2019-10-02 | End: 2019-10-02 | Stop reason: HOSPADM

## 2019-10-02 RX ORDER — SODIUM CHLORIDE, SODIUM LACTATE, POTASSIUM CHLORIDE, CALCIUM CHLORIDE 600; 310; 30; 20 MG/100ML; MG/100ML; MG/100ML; MG/100ML
9 INJECTION, SOLUTION INTRAVENOUS CONTINUOUS
Status: DISCONTINUED | OUTPATIENT
Start: 2019-10-02 | End: 2019-10-02 | Stop reason: HOSPADM

## 2019-10-02 RX ORDER — SODIUM CHLORIDE 0.9 % (FLUSH) 0.9 %
1-10 SYRINGE (ML) INJECTION AS NEEDED
Status: DISCONTINUED | OUTPATIENT
Start: 2019-10-02 | End: 2019-10-02 | Stop reason: HOSPADM

## 2019-10-02 RX ORDER — METOPROLOL TARTRATE 5 MG/5ML
INJECTION INTRAVENOUS AS NEEDED
Status: DISCONTINUED | OUTPATIENT
Start: 2019-10-02 | End: 2019-10-02 | Stop reason: SURG

## 2019-10-02 RX ORDER — MIDAZOLAM HYDROCHLORIDE 1 MG/ML
1 INJECTION INTRAMUSCULAR; INTRAVENOUS
Status: DISCONTINUED | OUTPATIENT
Start: 2019-10-02 | End: 2019-10-02 | Stop reason: HOSPADM

## 2019-10-02 RX ORDER — PROMETHAZINE HYDROCHLORIDE 25 MG/1
25 SUPPOSITORY RECTAL ONCE AS NEEDED
Status: DISCONTINUED | OUTPATIENT
Start: 2019-10-02 | End: 2019-10-02 | Stop reason: HOSPADM

## 2019-10-02 RX ORDER — SODIUM CHLORIDE 0.9 % (FLUSH) 0.9 %
3 SYRINGE (ML) INJECTION EVERY 12 HOURS SCHEDULED
Status: DISCONTINUED | OUTPATIENT
Start: 2019-10-02 | End: 2019-10-02 | Stop reason: HOSPADM

## 2019-10-02 RX ORDER — MIDAZOLAM HYDROCHLORIDE 1 MG/ML
INJECTION INTRAMUSCULAR; INTRAVENOUS AS NEEDED
Status: DISCONTINUED | OUTPATIENT
Start: 2019-10-02 | End: 2019-10-02 | Stop reason: SURG

## 2019-10-02 RX ORDER — PROMETHAZINE HYDROCHLORIDE 25 MG/ML
6.25 INJECTION, SOLUTION INTRAMUSCULAR; INTRAVENOUS
Status: DISCONTINUED | OUTPATIENT
Start: 2019-10-02 | End: 2019-10-02 | Stop reason: HOSPADM

## 2019-10-02 RX ORDER — OXYCODONE AND ACETAMINOPHEN 7.5; 325 MG/1; MG/1
1 TABLET ORAL ONCE AS NEEDED
Status: DISCONTINUED | OUTPATIENT
Start: 2019-10-02 | End: 2019-10-02 | Stop reason: HOSPADM

## 2019-10-02 RX ORDER — ACETAMINOPHEN 325 MG/1
650 TABLET ORAL ONCE AS NEEDED
Status: DISCONTINUED | OUTPATIENT
Start: 2019-10-02 | End: 2019-10-02 | Stop reason: HOSPADM

## 2019-10-02 RX ORDER — IODIXANOL 320 MG/ML
200 INJECTION, SOLUTION INTRAVASCULAR
Status: COMPLETED | OUTPATIENT
Start: 2019-10-02 | End: 2019-10-02

## 2019-10-02 RX ORDER — FENTANYL CITRATE 50 UG/ML
50 INJECTION, SOLUTION INTRAMUSCULAR; INTRAVENOUS
Status: DISCONTINUED | OUTPATIENT
Start: 2019-10-02 | End: 2019-10-02 | Stop reason: HOSPADM

## 2019-10-02 RX ORDER — NALOXONE HCL 0.4 MG/ML
0.2 VIAL (ML) INJECTION AS NEEDED
Status: DISCONTINUED | OUTPATIENT
Start: 2019-10-02 | End: 2019-10-02 | Stop reason: HOSPADM

## 2019-10-02 RX ORDER — LIDOCAINE HYDROCHLORIDE 10 MG/ML
0.5 INJECTION, SOLUTION EPIDURAL; INFILTRATION; INTRACAUDAL; PERINEURAL ONCE AS NEEDED
Status: DISCONTINUED | OUTPATIENT
Start: 2019-10-02 | End: 2019-10-02 | Stop reason: HOSPADM

## 2019-10-02 RX ORDER — MIDAZOLAM HYDROCHLORIDE 1 MG/ML
2 INJECTION INTRAMUSCULAR; INTRAVENOUS
Status: DISCONTINUED | OUTPATIENT
Start: 2019-10-02 | End: 2019-10-02 | Stop reason: HOSPADM

## 2019-10-02 RX ORDER — PROMETHAZINE HYDROCHLORIDE 25 MG/ML
12.5 INJECTION, SOLUTION INTRAMUSCULAR; INTRAVENOUS ONCE AS NEEDED
Status: DISCONTINUED | OUTPATIENT
Start: 2019-10-02 | End: 2019-10-02 | Stop reason: HOSPADM

## 2019-10-02 RX ORDER — PROMETHAZINE HYDROCHLORIDE 25 MG/1
25 TABLET ORAL ONCE AS NEEDED
Status: DISCONTINUED | OUTPATIENT
Start: 2019-10-02 | End: 2019-10-02 | Stop reason: HOSPADM

## 2019-10-02 RX ORDER — SODIUM CHLORIDE 9 MG/ML
100 INJECTION, SOLUTION INTRAVENOUS CONTINUOUS
Status: DISCONTINUED | OUTPATIENT
Start: 2019-10-02 | End: 2019-10-02 | Stop reason: HOSPADM

## 2019-10-02 RX ORDER — HYDRALAZINE HYDROCHLORIDE 20 MG/ML
5 INJECTION INTRAMUSCULAR; INTRAVENOUS
Status: DISCONTINUED | OUTPATIENT
Start: 2019-10-02 | End: 2019-10-02 | Stop reason: HOSPADM

## 2019-10-02 RX ORDER — LIDOCAINE HYDROCHLORIDE 10 MG/ML
INJECTION, SOLUTION EPIDURAL; INFILTRATION; INTRACAUDAL; PERINEURAL AS NEEDED
Status: DISCONTINUED | OUTPATIENT
Start: 2019-10-02 | End: 2019-10-02 | Stop reason: HOSPADM

## 2019-10-02 RX ORDER — LIDOCAINE AND PRILOCAINE 25; 25 MG/G; MG/G
CREAM TOPICAL ONCE
Status: COMPLETED | OUTPATIENT
Start: 2019-10-02 | End: 2019-10-02

## 2019-10-02 RX ORDER — DIPHENHYDRAMINE HYDROCHLORIDE 50 MG/ML
12.5 INJECTION INTRAMUSCULAR; INTRAVENOUS
Status: DISCONTINUED | OUTPATIENT
Start: 2019-10-02 | End: 2019-10-02 | Stop reason: HOSPADM

## 2019-10-02 RX ORDER — HYDROMORPHONE HYDROCHLORIDE 1 MG/ML
0.5 INJECTION, SOLUTION INTRAMUSCULAR; INTRAVENOUS; SUBCUTANEOUS
Status: DISCONTINUED | OUTPATIENT
Start: 2019-10-02 | End: 2019-10-02 | Stop reason: HOSPADM

## 2019-10-02 RX ORDER — ONDANSETRON 2 MG/ML
4 INJECTION INTRAMUSCULAR; INTRAVENOUS ONCE AS NEEDED
Status: DISCONTINUED | OUTPATIENT
Start: 2019-10-02 | End: 2019-10-02 | Stop reason: HOSPADM

## 2019-10-02 RX ORDER — HYDRALAZINE HYDROCHLORIDE 20 MG/ML
INJECTION INTRAMUSCULAR; INTRAVENOUS AS NEEDED
Status: DISCONTINUED | OUTPATIENT
Start: 2019-10-02 | End: 2019-10-02 | Stop reason: SURG

## 2019-10-02 RX ORDER — EPHEDRINE SULFATE 50 MG/ML
5 INJECTION, SOLUTION INTRAVENOUS ONCE AS NEEDED
Status: DISCONTINUED | OUTPATIENT
Start: 2019-10-02 | End: 2019-10-02 | Stop reason: HOSPADM

## 2019-10-02 RX ORDER — DIPHENHYDRAMINE HCL 25 MG
25 CAPSULE ORAL
Status: DISCONTINUED | OUTPATIENT
Start: 2019-10-02 | End: 2019-10-02 | Stop reason: HOSPADM

## 2019-10-02 RX ORDER — FLUMAZENIL 0.1 MG/ML
0.2 INJECTION INTRAVENOUS AS NEEDED
Status: DISCONTINUED | OUTPATIENT
Start: 2019-10-02 | End: 2019-10-02 | Stop reason: HOSPADM

## 2019-10-02 RX ORDER — HYDROCODONE BITARTRATE AND ACETAMINOPHEN 7.5; 325 MG/1; MG/1
1 TABLET ORAL ONCE AS NEEDED
Status: DISCONTINUED | OUTPATIENT
Start: 2019-10-02 | End: 2019-10-02 | Stop reason: HOSPADM

## 2019-10-02 RX ORDER — FAMOTIDINE 10 MG/ML
20 INJECTION, SOLUTION INTRAVENOUS ONCE
Status: COMPLETED | OUTPATIENT
Start: 2019-10-02 | End: 2019-10-02

## 2019-10-02 RX ORDER — FAMOTIDINE 20 MG/1
20 TABLET, FILM COATED ORAL
Status: DISCONTINUED | OUTPATIENT
Start: 2019-10-02 | End: 2019-10-02 | Stop reason: HOSPADM

## 2019-10-02 RX ORDER — FENTANYL CITRATE 50 UG/ML
INJECTION, SOLUTION INTRAMUSCULAR; INTRAVENOUS AS NEEDED
Status: DISCONTINUED | OUTPATIENT
Start: 2019-10-02 | End: 2019-10-02 | Stop reason: SURG

## 2019-10-02 RX ADMIN — FAMOTIDINE 20 MG: 10 INJECTION, SOLUTION INTRAVENOUS at 10:35

## 2019-10-02 RX ADMIN — HYDRALAZINE HYDROCHLORIDE 10 MG: 20 INJECTION INTRAMUSCULAR; INTRAVENOUS at 16:33

## 2019-10-02 RX ADMIN — LIDOCAINE AND PRILOCAINE: 25; 25 CREAM TOPICAL at 10:14

## 2019-10-02 RX ADMIN — FENTANYL CITRATE 50 MCG: 50 INJECTION INTRAMUSCULAR; INTRAVENOUS at 16:22

## 2019-10-02 RX ADMIN — MIDAZOLAM 1 MG: 1 INJECTION INTRAMUSCULAR; INTRAVENOUS at 10:35

## 2019-10-02 RX ADMIN — METOPROLOL TARTRATE 2 MG: 1 INJECTION, SOLUTION INTRAVENOUS at 15:50

## 2019-10-02 RX ADMIN — IODIXANOL 48 ML: 320 INJECTION, SOLUTION INTRAVASCULAR at 16:41

## 2019-10-02 RX ADMIN — FENTANYL CITRATE 50 MCG: 50 INJECTION INTRAMUSCULAR; INTRAVENOUS at 15:40

## 2019-10-02 RX ADMIN — FENTANYL CITRATE 50 MCG: 50 INJECTION INTRAMUSCULAR; INTRAVENOUS at 16:01

## 2019-10-02 RX ADMIN — Medication 2 MG: at 15:40

## 2019-10-02 RX ADMIN — Medication 1 MG: at 16:01

## 2019-10-02 RX ADMIN — SODIUM CHLORIDE, POTASSIUM CHLORIDE, SODIUM LACTATE AND CALCIUM CHLORIDE: 600; 310; 30; 20 INJECTION, SOLUTION INTRAVENOUS at 15:15

## 2019-10-02 RX ADMIN — METOPROLOL TARTRATE 1 MG: 1 INJECTION, SOLUTION INTRAVENOUS at 16:03

## 2019-10-02 RX ADMIN — HYDRALAZINE HYDROCHLORIDE 10 MG: 20 INJECTION INTRAMUSCULAR; INTRAVENOUS at 16:28

## 2019-10-02 RX ADMIN — FENTANYL CITRATE 50 MCG: 50 INJECTION INTRAMUSCULAR; INTRAVENOUS at 10:36

## 2019-10-02 RX ADMIN — FENTANYL CITRATE 50 MCG: 50 INJECTION INTRAMUSCULAR; INTRAVENOUS at 15:55

## 2019-10-02 RX ADMIN — Medication 1 MG: at 16:22

## 2019-10-02 RX ADMIN — METOPROLOL TARTRATE 1 MG: 1 INJECTION, SOLUTION INTRAVENOUS at 16:07

## 2019-10-02 RX ADMIN — METOPROLOL TARTRATE 1 MG: 1 INJECTION, SOLUTION INTRAVENOUS at 16:12

## 2019-10-02 NOTE — OP NOTE
Pre-Op   Dx: Right CATHERINE Dissecting Cerebral Aneurysm    Post-Op Dx: Same    Procedure: Cerebral DSA    Surgeon: Santosh Garza MD    Findings: Widely patent stent in the R A1/ A2 segment with no in-stent stenosis or intimal hyperplasia. Artery caliber has returned to normal size since previous angiogram.    EBL: 20 cc.    MAC Sedation.    No complication.    6F FISH closure R CFA puncture site.

## 2019-10-02 NOTE — ANESTHESIA POSTPROCEDURE EVALUATION
"Patient: Oralia Sánchez    Procedure Summary     Date:  10/02/19 Room / Location:  [facility] OR 19 INV / [facility] HYBRID OR 18/19    Anesthesia Start:  1536 Anesthesia Stop:  1648    Procedure:  CEREBRAL ANGIOGRAM (N/A ) Diagnosis:       Cerebral aneurysm rupture (CMS/HCC)      (Cerebral aneurysm rupture (CMS/HCC) [I60.9])    Surgeon:  Santosh Garza MD Provider:  Lydia Koch MD    Anesthesia Type:  MAC ASA Status:  3          Anesthesia Type: MAC  Last vitals  BP   150/94 (10/02/19 1642)   Temp   36.9 °C (98.5 °F) (10/02/19 1642)   Pulse   78 (10/02/19 1642)   Resp   16 (10/02/19 1642)     SpO2   97 % (10/02/19 1642)     Post Anesthesia Care and Evaluation    Patient location during evaluation: bedside  Patient participation: complete - patient participated  Level of consciousness: awake and alert  Pain management: adequate  Airway patency: patent  Anesthetic complications: No anesthetic complications    Cardiovascular status: acceptable  Respiratory status: acceptable  Hydration status: acceptable    Comments: /94 (BP Location: Left arm, Patient Position: Lying)   Pulse 78   Temp 36.9 °C (98.5 °F) (Oral)   Resp 16   Ht 154.9 cm (61\")   Wt 112 kg (246 lb 4.8 oz)   SpO2 97%   BMI 46.54 kg/m²       "

## 2019-10-02 NOTE — ANESTHESIA PREPROCEDURE EVALUATION
Anesthesia Evaluation     Patient summary reviewed and Nursing notes reviewed   NPO Solid Status: > 8 hours  NPO Liquid Status: > 4 hours           Airway   Mallampati: II  Neck ROM: full  No difficulty expected  Dental - normal exam     Pulmonary     breath sounds clear to auscultation  (+) sleep apnea,   Cardiovascular     Rhythm: regular        Neuro/Psych  (+) CVA, headaches, numbness,     GI/Hepatic/Renal/Endo    (+) obesity, morbid obesity,  hepatitis, liver disease, diabetes mellitus, hypothyroidism,     Musculoskeletal     Abdominal   (+) obese,    Substance History      OB/GYN          Other   (+) arthritis                     Anesthesia Plan    ASA 3     MAC   (Last time after procedure, pt became agitated, Dr Barrow treated with benedryl)  intravenous induction   Anesthetic plan, all risks, benefits, and alternatives have been provided, discussed and informed consent has been obtained with: patient.

## 2019-10-02 NOTE — PERIOPERATIVE NURSING NOTE
Patient has been kept informed of emergencies and the delay of her procedure.  She states that she understands.  Dr. Garza at this time does not want to cancel her procedure today.

## 2019-10-14 ENCOUNTER — TELEPHONE (OUTPATIENT)
Dept: NEUROSURGERY | Facility: CLINIC | Age: 44
End: 2019-10-14

## 2019-10-14 NOTE — TELEPHONE ENCOUNTER
Pt was a no show for her appt today. Concepcion asked if we would call pt to reschedule her. LVM for pt tpo callbAck

## 2019-11-10 ENCOUNTER — APPOINTMENT (OUTPATIENT)
Dept: GENERAL RADIOLOGY | Facility: HOSPITAL | Age: 44
End: 2019-11-10

## 2019-11-10 ENCOUNTER — HOSPITAL ENCOUNTER (EMERGENCY)
Facility: HOSPITAL | Age: 44
Discharge: HOME | End: 2019-11-10
Attending: EMERGENCY MEDICINE | Admitting: EMERGENCY MEDICINE

## 2019-11-10 ENCOUNTER — APPOINTMENT (OUTPATIENT)
Dept: CT IMAGING | Facility: HOSPITAL | Age: 44
End: 2019-11-10

## 2019-11-10 VITALS
WEIGHT: 240 LBS | BODY MASS INDEX: 45.31 KG/M2 | OXYGEN SATURATION: 96 % | HEART RATE: 90 BPM | HEIGHT: 61 IN | DIASTOLIC BLOOD PRESSURE: 101 MMHG | RESPIRATION RATE: 14 BRPM | SYSTOLIC BLOOD PRESSURE: 155 MMHG | TEMPERATURE: 98.5 F

## 2019-11-10 DIAGNOSIS — J44.1 COPD EXACERBATION (HCC): ICD-10-CM

## 2019-11-10 DIAGNOSIS — J40 BRONCHITIS: Primary | ICD-10-CM

## 2019-11-10 LAB
ALBUMIN SERPL-MCNC: 3.7 G/DL (ref 3.5–5.2)
ALBUMIN/GLOB SERPL: 1.2 G/DL
ALP SERPL-CCNC: 98 U/L (ref 39–117)
ALT SERPL W P-5'-P-CCNC: 57 U/L (ref 1–33)
ANION GAP SERPL CALCULATED.3IONS-SCNC: 14.2 MMOL/L (ref 5–15)
AST SERPL-CCNC: 79 U/L (ref 1–32)
B PARAPERT DNA SPEC QL NAA+PROBE: NOT DETECTED
B PERT DNA SPEC QL NAA+PROBE: NOT DETECTED
BACTERIA UR QL AUTO: ABNORMAL /HPF
BASOPHILS # BLD AUTO: 0.05 10*3/MM3 (ref 0–0.2)
BASOPHILS NFR BLD AUTO: 0.5 % (ref 0–1.5)
BILIRUB SERPL-MCNC: 0.6 MG/DL (ref 0.2–1.2)
BILIRUB UR QL STRIP: NEGATIVE
BUN BLD-MCNC: 3 MG/DL (ref 6–20)
BUN/CREAT SERPL: 5.7 (ref 7–25)
C PNEUM DNA NPH QL NAA+NON-PROBE: NOT DETECTED
CALCIUM SPEC-SCNC: 9.3 MG/DL (ref 8.6–10.5)
CHLORIDE SERPL-SCNC: 88 MMOL/L (ref 98–107)
CLARITY UR: CLEAR
CO2 SERPL-SCNC: 33.8 MMOL/L (ref 22–29)
COLOR UR: YELLOW
CREAT BLD-MCNC: 0.53 MG/DL (ref 0.57–1)
D DIMER PPP FEU-MCNC: 0.55 MCGFEU/ML (ref 0–0.49)
DEPRECATED RDW RBC AUTO: 49.5 FL (ref 37–54)
EOSINOPHIL # BLD AUTO: 0.07 10*3/MM3 (ref 0–0.4)
EOSINOPHIL NFR BLD AUTO: 0.7 % (ref 0.3–6.2)
ERYTHROCYTE [DISTWIDTH] IN BLOOD BY AUTOMATED COUNT: 14.2 % (ref 12.3–15.4)
FLUAV H1 2009 PAND RNA NPH QL NAA+PROBE: NOT DETECTED
FLUAV H1 HA GENE NPH QL NAA+PROBE: NOT DETECTED
FLUAV H3 RNA NPH QL NAA+PROBE: NOT DETECTED
FLUAV SUBTYP SPEC NAA+PROBE: NOT DETECTED
FLUBV RNA ISLT QL NAA+PROBE: NOT DETECTED
GFR SERPL CREATININE-BSD FRML MDRD: 124 ML/MIN/1.73
GLOBULIN UR ELPH-MCNC: 3.2 GM/DL
GLUCOSE BLD-MCNC: 122 MG/DL (ref 65–99)
GLUCOSE UR STRIP-MCNC: NEGATIVE MG/DL
HADV DNA SPEC NAA+PROBE: NOT DETECTED
HCG SERPL QL: NEGATIVE
HCOV 229E RNA SPEC QL NAA+PROBE: NOT DETECTED
HCOV HKU1 RNA SPEC QL NAA+PROBE: NOT DETECTED
HCOV NL63 RNA SPEC QL NAA+PROBE: NOT DETECTED
HCOV OC43 RNA SPEC QL NAA+PROBE: NOT DETECTED
HCT VFR BLD AUTO: 44.3 % (ref 34–46.6)
HGB BLD-MCNC: 15.9 G/DL (ref 12–15.9)
HGB UR QL STRIP.AUTO: NEGATIVE
HMPV RNA NPH QL NAA+NON-PROBE: NOT DETECTED
HPIV1 RNA SPEC QL NAA+PROBE: NOT DETECTED
HPIV2 RNA SPEC QL NAA+PROBE: NOT DETECTED
HPIV3 RNA NPH QL NAA+PROBE: NOT DETECTED
HPIV4 P GENE NPH QL NAA+PROBE: NOT DETECTED
HYALINE CASTS UR QL AUTO: ABNORMAL /LPF
IMM GRANULOCYTES # BLD AUTO: 0.05 10*3/MM3 (ref 0–0.05)
IMM GRANULOCYTES NFR BLD AUTO: 0.5 % (ref 0–0.5)
KETONES UR QL STRIP: NEGATIVE
LEUKOCYTE ESTERASE UR QL STRIP.AUTO: ABNORMAL
LYMPHOCYTES # BLD AUTO: 1.58 10*3/MM3 (ref 0.7–3.1)
LYMPHOCYTES NFR BLD AUTO: 16.6 % (ref 19.6–45.3)
M PNEUMO IGG SER IA-ACNC: NOT DETECTED
MCH RBC QN AUTO: 34.6 PG (ref 26.6–33)
MCHC RBC AUTO-ENTMCNC: 35.9 G/DL (ref 31.5–35.7)
MCV RBC AUTO: 96.5 FL (ref 79–97)
MONOCYTES # BLD AUTO: 0.38 10*3/MM3 (ref 0.1–0.9)
MONOCYTES NFR BLD AUTO: 4 % (ref 5–12)
MUCOUS THREADS URNS QL MICRO: ABNORMAL /HPF
NEUTROPHILS # BLD AUTO: 7.36 10*3/MM3 (ref 1.7–7)
NEUTROPHILS NFR BLD AUTO: 77.7 % (ref 42.7–76)
NITRITE UR QL STRIP: NEGATIVE
NRBC BLD AUTO-RTO: 0 /100 WBC (ref 0–0.2)
NT-PROBNP SERPL-MCNC: 197.3 PG/ML (ref 5–450)
PH UR STRIP.AUTO: 8 [PH] (ref 5–8)
PLATELET # BLD AUTO: 222 10*3/MM3 (ref 140–450)
PMV BLD AUTO: 9.1 FL (ref 6–12)
POTASSIUM BLD-SCNC: 3.5 MMOL/L (ref 3.5–5.2)
PROT SERPL-MCNC: 6.9 G/DL (ref 6–8.5)
PROT UR QL STRIP: NEGATIVE
RBC # BLD AUTO: 4.59 10*6/MM3 (ref 3.77–5.28)
RBC # UR: ABNORMAL /HPF
REF LAB TEST METHOD: ABNORMAL
RHINOVIRUS RNA SPEC NAA+PROBE: NOT DETECTED
RSV RNA NPH QL NAA+NON-PROBE: NOT DETECTED
SODIUM BLD-SCNC: 136 MMOL/L (ref 136–145)
SP GR UR STRIP: 1.01 (ref 1–1.03)
SQUAMOUS #/AREA URNS HPF: ABNORMAL /HPF
TROPONIN T SERPL-MCNC: <0.01 NG/ML (ref 0–0.03)
UROBILINOGEN UR QL STRIP: ABNORMAL
WBC NRBC COR # BLD: 9.49 10*3/MM3 (ref 3.4–10.8)
WBC UR QL AUTO: ABNORMAL /HPF

## 2019-11-10 PROCEDURE — 94799 UNLISTED PULMONARY SVC/PX: CPT

## 2019-11-10 PROCEDURE — 96374 THER/PROPH/DIAG INJ IV PUSH: CPT

## 2019-11-10 PROCEDURE — 0 IOPAMIDOL PER 1 ML: Performed by: EMERGENCY MEDICINE

## 2019-11-10 PROCEDURE — 93005 ELECTROCARDIOGRAM TRACING: CPT | Performed by: EMERGENCY MEDICINE

## 2019-11-10 PROCEDURE — 71046 X-RAY EXAM CHEST 2 VIEWS: CPT

## 2019-11-10 PROCEDURE — 84703 CHORIONIC GONADOTROPIN ASSAY: CPT | Performed by: NURSE PRACTITIONER

## 2019-11-10 PROCEDURE — 96361 HYDRATE IV INFUSION ADD-ON: CPT

## 2019-11-10 PROCEDURE — 80053 COMPREHEN METABOLIC PANEL: CPT | Performed by: NURSE PRACTITIONER

## 2019-11-10 PROCEDURE — 99284 EMERGENCY DEPT VISIT MOD MDM: CPT

## 2019-11-10 PROCEDURE — 85025 COMPLETE CBC W/AUTO DIFF WBC: CPT | Performed by: NURSE PRACTITIONER

## 2019-11-10 PROCEDURE — 25010000002 KETOROLAC TROMETHAMINE PER 15 MG: Performed by: NURSE PRACTITIONER

## 2019-11-10 PROCEDURE — 71275 CT ANGIOGRAPHY CHEST: CPT

## 2019-11-10 PROCEDURE — 84484 ASSAY OF TROPONIN QUANT: CPT | Performed by: EMERGENCY MEDICINE

## 2019-11-10 PROCEDURE — 85379 FIBRIN DEGRADATION QUANT: CPT | Performed by: EMERGENCY MEDICINE

## 2019-11-10 PROCEDURE — 94640 AIRWAY INHALATION TREATMENT: CPT

## 2019-11-10 PROCEDURE — 81001 URINALYSIS AUTO W/SCOPE: CPT | Performed by: NURSE PRACTITIONER

## 2019-11-10 PROCEDURE — 0100U HC BIOFIRE FILMARRAY RESP PANEL 2: CPT | Performed by: NURSE PRACTITIONER

## 2019-11-10 PROCEDURE — 83880 ASSAY OF NATRIURETIC PEPTIDE: CPT | Performed by: EMERGENCY MEDICINE

## 2019-11-10 PROCEDURE — 93010 ELECTROCARDIOGRAM REPORT: CPT | Performed by: INTERNAL MEDICINE

## 2019-11-10 RX ORDER — IPRATROPIUM BROMIDE AND ALBUTEROL SULFATE 2.5; .5 MG/3ML; MG/3ML
3 SOLUTION RESPIRATORY (INHALATION) ONCE
Status: COMPLETED | OUTPATIENT
Start: 2019-11-10 | End: 2019-11-10

## 2019-11-10 RX ORDER — PREDNISONE 20 MG/1
20 TABLET ORAL 2 TIMES DAILY
Qty: 10 TABLET | Refills: 0 | Status: SHIPPED | OUTPATIENT
Start: 2019-11-10 | End: 2019-11-15

## 2019-11-10 RX ORDER — BENZONATATE 100 MG/1
200 CAPSULE ORAL ONCE
Status: COMPLETED | OUTPATIENT
Start: 2019-11-10 | End: 2019-11-10

## 2019-11-10 RX ORDER — ALBUTEROL SULFATE 90 UG/1
2 AEROSOL, METERED RESPIRATORY (INHALATION) EVERY 4 HOURS PRN
Qty: 1 INHALER | Refills: 0 | Status: ON HOLD | OUTPATIENT
Start: 2019-11-10 | End: 2020-04-20

## 2019-11-10 RX ORDER — SODIUM CHLORIDE 0.9 % (FLUSH) 0.9 %
10 SYRINGE (ML) INJECTION AS NEEDED
Status: DISCONTINUED | OUTPATIENT
Start: 2019-11-10 | End: 2019-11-10 | Stop reason: HOSPADM

## 2019-11-10 RX ORDER — KETOROLAC TROMETHAMINE 15 MG/ML
15 INJECTION, SOLUTION INTRAMUSCULAR; INTRAVENOUS ONCE
Status: COMPLETED | OUTPATIENT
Start: 2019-11-10 | End: 2019-11-10

## 2019-11-10 RX ORDER — ALBUTEROL SULFATE 2.5 MG/3ML
2.5 SOLUTION RESPIRATORY (INHALATION) ONCE
Status: COMPLETED | OUTPATIENT
Start: 2019-11-10 | End: 2019-11-10

## 2019-11-10 RX ADMIN — ALBUTEROL SULFATE 2.5 MG: 2.5 SOLUTION RESPIRATORY (INHALATION) at 17:50

## 2019-11-10 RX ADMIN — IOPAMIDOL 95 ML: 755 INJECTION, SOLUTION INTRAVENOUS at 20:07

## 2019-11-10 RX ADMIN — SODIUM CHLORIDE 500 ML: 9 INJECTION, SOLUTION INTRAVENOUS at 15:45

## 2019-11-10 RX ADMIN — KETOROLAC TROMETHAMINE 15 MG: 15 INJECTION, SOLUTION INTRAMUSCULAR; INTRAVENOUS at 15:45

## 2019-11-10 RX ADMIN — BENZONATATE 200 MG: 100 CAPSULE ORAL at 17:06

## 2019-11-10 RX ADMIN — IPRATROPIUM BROMIDE AND ALBUTEROL SULFATE 3 ML: 2.5; .5 SOLUTION RESPIRATORY (INHALATION) at 17:46

## 2019-11-10 NOTE — ED NOTES
Pt reports recent tx for strep with Amoxicillin since Friday, Nov 1st; has not been compliant with med regimen.  Pt reports no relief of symptoms since not taking her prescribed amoxicillin.  Reports productive cough with clear sputum, neck pain; also reports right flank pain x12 hours.  Pt coughing frequently, guarding R flank.     Steve Serrano RN  11/10/19 [id number]

## 2019-11-10 NOTE — ED PROVIDER NOTES
EMERGENCY DEPARTMENT ENCOUNTER    Room Number:  08/08  Date seen:  11/13/2019  Time seen: 3:28 PM  PCP: Provider, No Known  Historian: patient      HPI:  Chief complaint: flu-like symptoms   Context:Oralia Sánchez is a 44 y.o. female who presents to the ED with c/o flu-like symptoms that have been ongoing for a few days. Pt confirms productive cough, neck stiffness, R flank pain, fever, SOA, and decreased appetite. Pt states she went to the [facility] over a week ago and was dx with strep and bronchitis. She was given Prednisone and Amoxicillin. Pt denies any urinary sx. Pt reports she was on ASA for aneurysm, but she is no longer taking ASA anymore. No hx of COPD. There are no other complaints at this time.      MEDICAL RECORD REVIEW  Pt's previous medical records show pt was admitted to general surgery on 10/2/2019 for cerebral aneurysm rupture. Pt had a cerebral angiogram performed at that time.     ALLERGIES  No known drug allergy    PAST MEDICAL HISTORY  Active Ambulatory Problems     Diagnosis Date Noted   • Vitamin D deficiency 09/07/2016   • Awareness of heartbeats 09/07/2016   • Adiposity 09/07/2016   • YOUNG (nonalcoholic steatohepatitis) 09/07/2016   • Hypothyroid 09/07/2016   • Diabetes mellitus arising in pregnancy 09/07/2016   • Diabetes (CMS/HCC) 09/07/2016   • Metabolic syndrome 09/07/2016   • Chest pain 09/07/2016   • Primary thunderclap headache 03/27/2019   • Elevated LFTs 03/27/2019   • Tobacco abuse 03/28/2019   • Rheumatoid arthritis (CMS/HCC) 03/28/2019   • Type 2 diabetes mellitus (CMS/HCC) 03/28/2019   • Morbid obesity (CMS/HCC) 03/28/2019   • UTI (urinary tract infection), bacterial 03/28/2019   • Cerebral aneurysm 10/02/2019     Resolved Ambulatory Problems     Diagnosis Date Noted   • Abnormal CT of the head 03/28/2019   • Cerebral aneurysm rupture (CMS/HCC) 03/28/2019     Past Medical History:   Diagnosis Date   • Aneurysm (CMS/HCC)    • Arthritis    • Carpal tunnel syndrome    •  Chest pain    • Diabetes mellitus (CMS/HCC)    • Dysmetabolic syndrome X    • Gestational diabetes mellitus    • Hypothyroidism    • Metabolic disorder    • Nonalcoholic steatohepatitis    • Obesity    • Palpitations    • Rheumatoid arthritis (CMS/HCC)    • Sleep apnea    • Spinal headache    • Type 2 diabetes mellitus (CMS/HCC)    • Vitamin D deficiency        PAST SURGICAL HISTORY  Past Surgical History:   Procedure Laterality Date   • CARPAL TUNNEL RELEASE     • CEREBRAL ANGIOGRAM N/A 3/28/2019    Procedure: DIAGNOSTIC CEREBRAL ANGIOGRAM;  Surgeon: Alexx Barrow MD;  [facility] ;  Service: Neurosurgery   • CEREBRAL ANGIOGRAM N/A 10/2/2019    Procedure: CEREBRAL ANGIOGRAM;  Surgeon: Santosh Garza MD;  [facility] ;  Service: Interventional Radiology   •  SECTION      x6   • DILATATION AND CURETTAGE      x 2   • EMBOLIZATION CEREBRAL N/A 3/29/2019    Procedure: Cererbal angiogram and embolization of cerebral aneurysm;  Surgeon: Alexx Barrow MD;  [facility] ;  Service: Neurosurgery   • MYRINGOTOMY     • TONSILLECTOMY AND ADENOIDECTOMY         FAMILY HISTORY  Family History   Problem Relation Age of Onset   • Hypertension Mother    • Alcohol abuse Father    • Heart attack Father         acute MI   • Stroke Father         CVA   • Diabetes Maternal Grandmother    • Diabetes Paternal Grandmother        SOCIAL HISTORY  Social History     Socioeconomic History   • Marital status:      Spouse name: Not on file   • Number of children: Not on file   • Years of education: Not on file   • Highest education level: Not on file   Occupational History   • Occupation: admin   Tobacco Use   • Smoking status: Current Every Day Smoker     Packs/day: 1.50     Types: Cigarettes   • Smokeless tobacco: Never Used   • Tobacco comment: x 10 yrs   Substance and Sexual Activity   • Alcohol use: Yes     Alcohol/week: 0.6 - 1.2 oz     Types: 1 - 2 Shots  of liquor per week     Comment: 1-2/week   • Drug use: No   • Sexual activity: Defer       REVIEW OF SYSTEMS  Review of Systems   Constitutional: Positive for appetite change (decreased) and fever. Negative for activity change and diaphoresis.   HENT: Negative for trouble swallowing.    Eyes: Negative for visual disturbance.   Respiratory: Positive for cough (productive) and shortness of breath. Negative for chest tightness and wheezing.    Cardiovascular: Negative for chest pain, palpitations and leg swelling.   Gastrointestinal: Negative for abdominal pain, diarrhea, nausea and vomiting.   Genitourinary: Positive for flank pain (R). Negative for difficulty urinating, dysuria and hematuria.   Musculoskeletal: Positive for neck stiffness. Negative for back pain.   Skin: Negative for rash.   Neurological: Negative for dizziness, speech difficulty and light-headedness.       PHYSICAL EXAM  ED Triage Vitals   Temp Heart Rate Resp BP SpO2   11/10/19 1414 11/10/19 1414 11/10/19 1500 11/10/19 1500 11/10/19 1414   98.5 °F (36.9 °C) 108 18 (!) 145/110 97 %      Temp src Heart Rate Source Patient Position BP Location FiO2 (%)   -- -- -- -- --            Physical Exam   Constitutional: She is oriented to person, place, and time. She has a sickly appearance. No distress.   HENT:   Head: Normocephalic and atraumatic.   Mouth/Throat: Uvula is midline and mucous membranes are normal. Posterior oropharyngeal erythema present. No oropharyngeal exudate or posterior oropharyngeal edema.   No tonsillar swelling.  Tongue has white patches and yellow exudates.   Eyes: EOM are normal. Pupils are equal, round, and reactive to light.   Neck: Normal range of motion. Neck supple.   Cardiovascular: Normal rate, regular rhythm, S1 normal, S2 normal and normal heart sounds. Exam reveals no gallop and no friction rub.   No murmur heard.  Pulmonary/Chest: Effort normal. She has no decreased breath sounds. She has wheezes (bilateral upper lobes).  She has no rhonchi. She has no rales.   Pt is actively coughing on exam.   Abdominal: Soft. Normal appearance and bowel sounds are normal. There is no tenderness. There is no rebound and no guarding.   Musculoskeletal: Normal range of motion.   Neurological: She is alert and oriented to person, place, and time. GCS score is 15.   Skin: Skin is warm, dry and intact.   Skin is very warm.  Healing bruises to R lateral lower leg.   Psychiatric: Affect and judgment normal.   Nursing note and vitals reviewed.      LAB RESULTS  Labs Reviewed   COMPREHENSIVE METABOLIC PANEL - Abnormal; Notable for the following components:       Result Value    Glucose 122 (*)     BUN 3 (*)     Creatinine 0.53 (*)     Chloride 88 (*)     CO2 33.8 (*)     ALT (SGPT) 57 (*)     AST (SGOT) 79 (*)     BUN/Creatinine Ratio 5.7 (*)     All other components within normal limits    Narrative:     GFR Normal >60  Chronic Kidney Disease <60  Kidney Failure <15   URINALYSIS W/ MICROSCOPIC IF INDICATED (NO CULTURE) - Abnormal; Notable for the following components:    Leuk Esterase, UA Moderate (2+) (*)     All other components within normal limits   CBC WITH AUTO DIFFERENTIAL - Abnormal; Notable for the following components:    MCH 34.6 (*)     MCHC 35.9 (*)     Neutrophil % 77.7 (*)     Lymphocyte % 16.6 (*)     Monocyte % 4.0 (*)     Neutrophils, Absolute 7.36 (*)     All other components within normal limits   D-DIMER, QUANTITATIVE - Abnormal; Notable for the following components:    D-Dimer, Quantitative 0.55 (*)     All other components within normal limits    Narrative:     The Stago D-Dimer test used in conjunction with a clinical pretest probability (PTP) assessment model, has been approved by the FDA to rule out the presence of venous thromboembolism (VTE) in outpatients suspected of deep venous thrombosis (DVT) or pulmonary embolism (PE). The cut-off for negative predictive value is <0.50 MCGFEU/mL.   URINALYSIS, MICROSCOPIC ONLY - Abnormal;  Notable for the following components:    WBC, UA 6-12 (*)     Bacteria, UA Trace (*)     All other components within normal limits   RESPIRATORY PANEL, PCR - Normal   HCG, SERUM, QUALITATIVE - Normal   TROPONIN (IN-HOUSE) - Normal    Narrative:     Troponin T Reference Range:  <= 0.03 ng/mL-   Negative for AMI  >0.03 ng/mL-     Abnormal for myocardial necrosis.  Clinicians would have to utilize clinical acumen, EKG, Troponin and serial changes to determine if it is an Acute Myocardial Infarction or myocardial injury due to an underlying chronic condition.    BNP (IN-HOUSE) - Normal    Narrative:     Among patients with dyspnea, NT-proBNP is highly sensitive for the detection of acute congestive heart failure. In addition NT-proBNP of <300 pg/ml effectively rules out acute congestive heart failure with 99% negative predictive value.   CBC AND DIFFERENTIAL    Narrative:     The following orders were created for panel order CBC & Differential.  Procedure                               Abnormality         Status                     ---------                               -----------         ------                     CBC Auto Differential[[id number]]        Abnormal            Final result                 Please view results for these tests on the individual orders.       Ordered the above labs and independently reviewed the results.     RADIOLOGY  CT Angiogram Chest   Final Result       1. No acute pulmonary thromboembolus seen.   2. Enlargement of the main pulmonary artery, which can be seen in   setting of pulmonary arterial hypertension.   3. Hepatomegaly and diffuse hepatic steatosis.       Radiation dose reduction techniques were utilized, including automated   exposure control and exposure modulation based on body size.       This report was finalized on 11/10/2019 8:38 PM by Dr. Homa Fontana M.D.          XR Chest 2 View   Final Result   No evidence for acute pulmonary process. Follow-up as   clinical  "indications persist.       This report was finalized on 11/10/2019 3:55 PM by Dr. Rogerio Campo M.D.              I ordered the above noted radiological studies and reviewed the images on the PACS system.  Reviewed by me and discussed with radiologist.  See dictation for official radiology interpretation.       MEDICATIONS GIVEN IN ER  Medications   benzonatate (TESSALON) capsule 200 mg (200 mg Oral Given 11/10/19 1706)   sodium chloride 0.9 % bolus 500 mL (0 mL Intravenous Stopped 11/10/19 1658)   ketorolac (TORADOL) injection 15 mg (15 mg Intravenous Given 11/10/19 1545)   ipratropium-albuterol (DUO-NEB) nebulizer solution 3 mL (3 mL Nebulization Given 11/10/19 1746)   albuterol (PROVENTIL) nebulizer solution 0.083% 2.5 mg/3mL (2.5 mg Nebulization Given 11/10/19 1750)   iopamidol (ISOVUE-370) 76 % injection 100 mL (95 mL Intravenous Given by Other 11/10/19 2007)       EKG  Interpreted by ED Physician      PROCEDURES  Procedures        PROGRESS, DATA ANALYSIS, CONSULTS AND MEDICAL DECISION MAKING  All labs have been independently reviewed by me.  All radiology studies have been reviewed by me and discussed with radiologist dictating the report.  EKG's independently viewed and interpreted by me unless stated otherwise. Discussion below represents my analysis of pertinent findings related to patient's condition, differential diagnosis, treatment plan and final disposition.     ED Course as of Nov 13 1000   Sun Nov 10, 2019   1613 With my exam the \"flank\"  pain seemed much more to be musculoskeletal and more likely related to her coughing.   [EP]      ED Course User Index  [EP] Irlanda Stapleton, APRN     1520  Ordered CBC, CMP, HCG, and UA for further evaluation.     1524  Ordered CXR for further evaluation.     1532  Ordered Toradol for pain, IVF bolus for hydration, and Tessalon for pt's sx.    1700  Reviewed pt's history and workup with Dr. Pandey (ER physician). After a bedside evaluation, Dr. Pandey " "agrees with the plan of care.    1709  Ordered Duo-Neb breathing treatment.    1820  Ordered CTA of chest for further evaluation. Pt care turned over to Dr. Pandey (ER physician) pending remainder of workup and final disposition.     Disposition vitals:  BP (!) 155/101   Pulse 90   Temp 98.5 °F (36.9 °C)   Resp 14   Ht 154.9 cm (61\")   Wt 109 kg (240 lb)   LMP 11/10/2019   SpO2 96%   BMI 45.35 kg/m²       DIAGNOSIS  Final diagnoses:   Bronchitis   COPD exacerbation (CMS/[facility])       Documentation assistance provided by michell Mckenzie for ADOLPH Patterson.  Information recorded by the michell was done at my direction and has been verified and validated by me.               Shania Mckenzie  11/10/19 1823       Irlanda Stapleton APRN  11/13/19 1001    "

## 2019-11-10 NOTE — ED PROVIDER NOTES
MD ATTESTATION NOTE  The MIREYA and I have discussed this patient's history, physical exam, and treatment plan.  I have reviewed the documentation and personally had a face to face interaction with the patient. I affirm the documentation and agree with the treatment and plan.  The attached note describes my personal findings.    Hx- Pt presents c/o intermittent cough for the past few weeks, becoming constant about 3 weeks ago. Pt reports that she went to [facility] 9 days ago and was given prednisone and Amoxicillin for Dx of Strep Throat and Bronchitis (through positive strep test). She finished her course of prednisone, but only took 4 doses of the Amoxicillin. Her cough is mostly dry, but she notes she will occasionally have clear sputum. She confirms CP with cough, SOA with cough, post-tussive emesis, subjective fever, lower back pain, and chills; all of which began yesterday. She confirms that she came back to the ED today due to her continuing cough and the onset of these new Sx. Current smoker. Denies urinary Sx. No known Hx of COPD.     PEx-  On exam, pt is well-appearing and in NAD.  Cardio: RRR, no murmur  Pulm: mild expiratory wheeze, no respiratory distress, O2 is 94% on RA  Abd: soft, non-tender, no rebound, no guarding, morbidly obese  Neuro:  A/Ox3    Plan- Labs and imaging reviewed. Respiratory PCR negative. CXR is NAD. Will check additional labs, EKG, and administer a breathing treatment.    EKG          EKG time: 1815  Rhythm/Rate: Sinus tach 101  QRS, axis: narrow complex, nml axis, borderline RAD  ST and T waves: nonspecific ST and T wave changes  Nml QT    Interpreted Contemporaneously by me, independently viewed  No prior     PROGRESS NOTES AND CONSULTS  1820- D-dimer elevated at 0.55. Will ordered CTA chest. At this time, pt care was assumed from ADOLPH Patterson pending remaining work-up and final disposition.     2126- Rechecked pt. Pt is resting comfortably and is sleeping. Her O2 is 97% on RA.  Notified pt of her CTA chest results and remaining lab results. Discussed the plan to discharge the pt home with Rx for Prednisone and Albuterol. I instructed the pt to f/u with her PCP. Advised pt to use OTC cough medications. RTED instructions given. Pt understands and agrees with the plan, all questions answered.    DIAGNOSES:  Final diagnoses:   Bronchitis   COPD exacerbation (CMS/[facility])       DISPOSITION  DISCHARGE    Patient discharged in stable condition.    Reviewed implications of results, diagnosis, meds, responsibility to follow up, warning signs and symptoms of possible worsening, potential complications and reasons to return to ER.    Patient/Family voiced understanding of above instructions.    Discussed plan for discharge, as there is no emergent indication for admission. Patient referred to primary care provider for BP management due to today's BP. Pt/family is agreeable and understands need for follow up and repeat testing.  Pt is aware that discharge does not mean that nothing is wrong but it indicates no emergency is present that requires admission and they must continue care with follow-up as given below or physician of their choice.     FOLLOW-UP  PATIENT LIAISON [place] [id number]  [phone]    Call tomorrow morning to arrange a follow-up with a new primary care provider in the next 1 to 2 weeks.  Return if worsening of symptoms,, shortness of breath, fever, any concerns         Medication List      New Prescriptions    albuterol sulfate  (90 Base) MCG/ACT inhaler  Commonly known as:  PROVENTIL HFA;VENTOLIN HFA;PROAIR HFA  Inhale 2 puffs Every 4 (Four) Hours As Needed for Wheezing.     predniSONE 20 MG tablet  Commonly known as:  DELTASONE  Take 1 tablet by mouth 2 (Two) Times a Day for 5 days.          --  Documentation assistance provided by michell Lobato for Dr. Katherin MD.  Information recorded by the michell was done at my direction and has been verified  and validated by me.       Vinay Lobato  11/10/19 2133       Luis Pandey MD  11/10/19 [id number]

## 2019-11-11 ENCOUNTER — APPOINTMENT (OUTPATIENT)
Dept: WOMENS IMAGING | Facility: HOSPITAL | Age: 44
End: 2019-11-11

## 2019-11-11 DIAGNOSIS — G44.53 PRIMARY THUNDERCLAP HEADACHE: ICD-10-CM

## 2019-11-11 PROCEDURE — 77067 SCR MAMMO BI INCL CAD: CPT | Performed by: RADIOLOGY

## 2019-11-11 RX ORDER — CLOPIDOGREL BISULFATE 75 MG/1
TABLET ORAL
Qty: 90 TABLET | Refills: 2 | OUTPATIENT
Start: 2019-11-11

## 2019-11-11 NOTE — TELEPHONE ENCOUNTER
I spoke to Dr. Garza. She can stop the Plavix but must remain on baby aspirin indefinitely. She still should have follow up to discuss the angio and ongoing care.

## 2019-11-11 NOTE — DISCHARGE INSTRUCTIONS
Take over-the-counter cough medicine.  Take albuterol and prednisone as prescribed.  Try to stop smoking to the best that you can.

## 2020-04-01 ENCOUNTER — HOSPITAL ENCOUNTER (INPATIENT)
Facility: HOSPITAL | Age: 45
LOS: 19 days | End: 2020-04-20
Attending: EMERGENCY MEDICINE | Admitting: HOSPITALIST

## 2020-04-01 ENCOUNTER — APPOINTMENT (OUTPATIENT)
Dept: GENERAL RADIOLOGY | Facility: HOSPITAL | Age: 45
End: 2020-04-01

## 2020-04-01 DIAGNOSIS — R06.00 DYSPNEA, UNSPECIFIED TYPE: Primary | ICD-10-CM

## 2020-04-01 DIAGNOSIS — R05.9 COUGH: ICD-10-CM

## 2020-04-01 DIAGNOSIS — R53.83 FATIGUE, UNSPECIFIED TYPE: ICD-10-CM

## 2020-04-01 DIAGNOSIS — E83.42 HYPOMAGNESEMIA: ICD-10-CM

## 2020-04-01 DIAGNOSIS — I60.7 CEREBRAL ANEURYSM RUPTURE (HCC): ICD-10-CM

## 2020-04-01 DIAGNOSIS — E87.20 METABOLIC ACIDOSIS: ICD-10-CM

## 2020-04-01 DIAGNOSIS — Z87.09 HISTORY OF COPD: ICD-10-CM

## 2020-04-01 DIAGNOSIS — R93.3 ABNORMAL CT SCAN, COLON: ICD-10-CM

## 2020-04-01 LAB
ALBUMIN SERPL-MCNC: 3.7 G/DL (ref 3.5–5.2)
ALBUMIN/GLOB SERPL: 1.1 G/DL
ALP SERPL-CCNC: 128 U/L (ref 39–117)
ALT SERPL W P-5'-P-CCNC: 65 U/L (ref 1–33)
ANION GAP SERPL CALCULATED.3IONS-SCNC: 32.3 MMOL/L (ref 5–15)
ARTERIAL PATENCY WRIST A: POSITIVE
AST SERPL-CCNC: 124 U/L (ref 1–32)
ATMOSPHERIC PRESS: 753 MMHG
B PARAPERT DNA SPEC QL NAA+PROBE: NOT DETECTED
B PERT DNA SPEC QL NAA+PROBE: NOT DETECTED
BASE EXCESS BLDA CALC-SCNC: -1 MMOL/L (ref 0–2)
BASOPHILS # BLD AUTO: 0.05 10*3/MM3 (ref 0–0.2)
BASOPHILS NFR BLD AUTO: 0.6 % (ref 0–1.5)
BDY SITE: ABNORMAL
BILIRUB SERPL-MCNC: 1.4 MG/DL (ref 0.2–1.2)
BUN BLD-MCNC: 3 MG/DL (ref 6–20)
BUN/CREAT SERPL: 3.5 (ref 7–25)
C PNEUM DNA NPH QL NAA+NON-PROBE: NOT DETECTED
CALCIUM SPEC-SCNC: 8.5 MG/DL (ref 8.6–10.5)
CHLORIDE SERPL-SCNC: 84 MMOL/L (ref 98–107)
CO2 SERPL-SCNC: 18.7 MMOL/L (ref 22–29)
CREAT BLD-MCNC: 0.85 MG/DL (ref 0.57–1)
CRP SERPL-MCNC: 0.63 MG/DL (ref 0–0.5)
DEPRECATED RDW RBC AUTO: 57.9 FL (ref 37–54)
EOSINOPHIL # BLD AUTO: 0.03 10*3/MM3 (ref 0–0.4)
EOSINOPHIL NFR BLD AUTO: 0.4 % (ref 0.3–6.2)
ERYTHROCYTE [DISTWIDTH] IN BLOOD BY AUTOMATED COUNT: 15.4 % (ref 12.3–15.4)
ETHANOL BLD-MCNC: <10 MG/DL (ref 0–10)
ETHANOL UR QL: <0.01 %
FERRITIN SERPL-MCNC: 821 NG/ML (ref 13–150)
FLUAV H1 2009 PAND RNA NPH QL NAA+PROBE: NOT DETECTED
FLUAV H1 HA GENE NPH QL NAA+PROBE: NOT DETECTED
FLUAV H3 RNA NPH QL NAA+PROBE: NOT DETECTED
FLUAV SUBTYP SPEC NAA+PROBE: NOT DETECTED
FLUBV RNA ISLT QL NAA+PROBE: NOT DETECTED
GFR SERPL CREATININE-BSD FRML MDRD: 72 ML/MIN/1.73
GLOBULIN UR ELPH-MCNC: 3.4 GM/DL
GLUCOSE BLD-MCNC: 127 MG/DL (ref 65–99)
GLUCOSE BLDC GLUCOMTR-MCNC: 127 MG/DL (ref 70–130)
GLUCOSE BLDC GLUCOMTR-MCNC: 132 MG/DL (ref 70–130)
HADV DNA SPEC NAA+PROBE: NOT DETECTED
HBA1C MFR BLD: 6.68 % (ref 4.8–5.6)
HCO3 BLDA-SCNC: 20.9 MMOL/L (ref 22–28)
HCOV 229E RNA SPEC QL NAA+PROBE: NOT DETECTED
HCOV HKU1 RNA SPEC QL NAA+PROBE: NOT DETECTED
HCOV NL63 RNA SPEC QL NAA+PROBE: NOT DETECTED
HCOV OC43 RNA SPEC QL NAA+PROBE: NOT DETECTED
HCT VFR BLD AUTO: 46.2 % (ref 34–46.6)
HGB BLD-MCNC: 16.3 G/DL (ref 12–15.9)
HMPV RNA NPH QL NAA+NON-PROBE: NOT DETECTED
HOLD SPECIMEN: NORMAL
HOLD SPECIMEN: NORMAL
HPIV1 RNA SPEC QL NAA+PROBE: NOT DETECTED
HPIV2 RNA SPEC QL NAA+PROBE: NOT DETECTED
HPIV3 RNA NPH QL NAA+PROBE: NOT DETECTED
HPIV4 P GENE NPH QL NAA+PROBE: NOT DETECTED
IMM GRANULOCYTES # BLD AUTO: 0.03 10*3/MM3 (ref 0–0.05)
IMM GRANULOCYTES NFR BLD AUTO: 0.4 % (ref 0–0.5)
LDH SERPL-CCNC: 349 U/L (ref 135–214)
LYMPHOCYTES # BLD AUTO: 1.82 10*3/MM3 (ref 0.7–3.1)
LYMPHOCYTES NFR BLD AUTO: 22.6 % (ref 19.6–45.3)
M PNEUMO IGG SER IA-ACNC: NOT DETECTED
MAGNESIUM SERPL-MCNC: 1.1 MG/DL (ref 1.6–2.6)
MCH RBC QN AUTO: 36.4 PG (ref 26.6–33)
MCHC RBC AUTO-ENTMCNC: 35.3 G/DL (ref 31.5–35.7)
MCV RBC AUTO: 103.1 FL (ref 79–97)
MODALITY: ABNORMAL
MONOCYTES # BLD AUTO: 0.58 10*3/MM3 (ref 0.1–0.9)
MONOCYTES NFR BLD AUTO: 7.2 % (ref 5–12)
NEUTROPHILS # BLD AUTO: 5.53 10*3/MM3 (ref 1.7–7)
NEUTROPHILS NFR BLD AUTO: 68.8 % (ref 42.7–76)
NRBC BLD AUTO-RTO: 0.5 /100 WBC (ref 0–0.2)
PCO2 BLDA: 28.2 MM HG (ref 35–45)
PH BLDA: 7.48 PH UNITS (ref 7.35–7.45)
PLATELET # BLD AUTO: 244 10*3/MM3 (ref 140–450)
PMV BLD AUTO: 9.5 FL (ref 6–12)
PO2 BLDA: 99.2 MM HG (ref 80–100)
POTASSIUM BLD-SCNC: 3.2 MMOL/L (ref 3.5–5.2)
PROCALCITONIN SERPL-MCNC: 0.16 NG/ML (ref 0.1–0.25)
PROT SERPL-MCNC: 7.1 G/DL (ref 6–8.5)
RBC # BLD AUTO: 4.48 10*6/MM3 (ref 3.77–5.28)
RHINOVIRUS RNA SPEC NAA+PROBE: NOT DETECTED
RSV RNA NPH QL NAA+NON-PROBE: NOT DETECTED
SALICYLATES SERPL-MCNC: <0.3 MG/DL
SAO2 % BLDCOA: 98.3 % (ref 92–99)
SET MECH RESP RATE: 28
SODIUM BLD-SCNC: 135 MMOL/L (ref 136–145)
TROPONIN T SERPL-MCNC: <0.01 NG/ML (ref 0–0.03)
WBC NRBC COR # BLD: 8.04 10*3/MM3 (ref 3.4–10.8)
WHOLE BLOOD HOLD SPECIMEN: NORMAL
WHOLE BLOOD HOLD SPECIMEN: NORMAL

## 2020-04-01 PROCEDURE — G0378 HOSPITAL OBSERVATION PER HR: HCPCS

## 2020-04-01 PROCEDURE — 25010000002 PROMETHAZINE PER 50 MG: Performed by: HOSPITALIST

## 2020-04-01 PROCEDURE — 82728 ASSAY OF FERRITIN: CPT | Performed by: EMERGENCY MEDICINE

## 2020-04-01 PROCEDURE — 87040 BLOOD CULTURE FOR BACTERIA: CPT | Performed by: HOSPITALIST

## 2020-04-01 PROCEDURE — 25010000002 CEFTRIAXONE PER 250 MG: Performed by: HOSPITALIST

## 2020-04-01 PROCEDURE — U0002 COVID-19 LAB TEST NON-CDC: HCPCS | Performed by: EMERGENCY MEDICINE

## 2020-04-01 PROCEDURE — 86140 C-REACTIVE PROTEIN: CPT | Performed by: EMERGENCY MEDICINE

## 2020-04-01 PROCEDURE — 25010000002 MAGNESIUM SULFATE 2 GM/50ML SOLUTION: Performed by: EMERGENCY MEDICINE

## 2020-04-01 PROCEDURE — 84484 ASSAY OF TROPONIN QUANT: CPT | Performed by: EMERGENCY MEDICINE

## 2020-04-01 PROCEDURE — 82962 GLUCOSE BLOOD TEST: CPT

## 2020-04-01 PROCEDURE — 80307 DRUG TEST PRSMV CHEM ANLYZR: CPT | Performed by: EMERGENCY MEDICINE

## 2020-04-01 PROCEDURE — 85025 COMPLETE CBC W/AUTO DIFF WBC: CPT | Performed by: EMERGENCY MEDICINE

## 2020-04-01 PROCEDURE — 93010 ELECTROCARDIOGRAM REPORT: CPT | Performed by: INTERNAL MEDICINE

## 2020-04-01 PROCEDURE — 80053 COMPREHEN METABOLIC PANEL: CPT | Performed by: EMERGENCY MEDICINE

## 2020-04-01 PROCEDURE — 36600 WITHDRAWAL OF ARTERIAL BLOOD: CPT

## 2020-04-01 PROCEDURE — 84145 PROCALCITONIN (PCT): CPT | Performed by: EMERGENCY MEDICINE

## 2020-04-01 PROCEDURE — 71045 X-RAY EXAM CHEST 1 VIEW: CPT

## 2020-04-01 PROCEDURE — 63710000001 PROMETHAZINE PER 12.5 MG: Performed by: HOSPITALIST

## 2020-04-01 PROCEDURE — 83735 ASSAY OF MAGNESIUM: CPT | Performed by: EMERGENCY MEDICINE

## 2020-04-01 PROCEDURE — 99284 EMERGENCY DEPT VISIT MOD MDM: CPT

## 2020-04-01 PROCEDURE — 83615 LACTATE (LD) (LDH) ENZYME: CPT | Performed by: EMERGENCY MEDICINE

## 2020-04-01 PROCEDURE — 82803 BLOOD GASES ANY COMBINATION: CPT

## 2020-04-01 PROCEDURE — 83036 HEMOGLOBIN GLYCOSYLATED A1C: CPT | Performed by: HOSPITALIST

## 2020-04-01 PROCEDURE — 93005 ELECTROCARDIOGRAM TRACING: CPT | Performed by: EMERGENCY MEDICINE

## 2020-04-01 PROCEDURE — 25010000002 AZITHROMYCIN PER 500 MG: Performed by: HOSPITALIST

## 2020-04-01 PROCEDURE — 0100U HC BIOFIRE FILMARRAY RESP PANEL 2: CPT | Performed by: HOSPITALIST

## 2020-04-01 RX ORDER — ACETAMINOPHEN 650 MG/1
650 SUPPOSITORY RECTAL EVERY 4 HOURS PRN
Status: DISCONTINUED | OUTPATIENT
Start: 2020-04-01 | End: 2020-04-20 | Stop reason: HOSPADM

## 2020-04-01 RX ORDER — DEXTROSE MONOHYDRATE 25 G/50ML
25 INJECTION, SOLUTION INTRAVENOUS
Status: DISCONTINUED | OUTPATIENT
Start: 2020-04-01 | End: 2020-04-20 | Stop reason: HOSPADM

## 2020-04-01 RX ORDER — POTASSIUM CHLORIDE 7.45 MG/ML
10 INJECTION INTRAVENOUS
Status: DISCONTINUED | OUTPATIENT
Start: 2020-04-01 | End: 2020-04-20 | Stop reason: HOSPADM

## 2020-04-01 RX ORDER — SODIUM BICARBONATE 650 MG/1
650 TABLET ORAL 3 TIMES DAILY
Status: DISCONTINUED | OUTPATIENT
Start: 2020-04-01 | End: 2020-04-20

## 2020-04-01 RX ORDER — POTASSIUM CHLORIDE 750 MG/1
40 CAPSULE, EXTENDED RELEASE ORAL AS NEEDED
Status: DISCONTINUED | OUTPATIENT
Start: 2020-04-01 | End: 2020-04-20 | Stop reason: HOSPADM

## 2020-04-01 RX ORDER — ACETAMINOPHEN 160 MG/5ML
650 SOLUTION ORAL EVERY 4 HOURS PRN
Status: DISCONTINUED | OUTPATIENT
Start: 2020-04-01 | End: 2020-04-20 | Stop reason: HOSPADM

## 2020-04-01 RX ORDER — GUAIFENESIN/DEXTROMETHORPHAN 100-10MG/5
5 SYRUP ORAL EVERY 4 HOURS PRN
Status: DISCONTINUED | OUTPATIENT
Start: 2020-04-01 | End: 2020-04-20 | Stop reason: HOSPADM

## 2020-04-01 RX ORDER — CALCIUM CARBONATE 200(500)MG
2 TABLET,CHEWABLE ORAL 3 TIMES DAILY PRN
Status: DISCONTINUED | OUTPATIENT
Start: 2020-04-01 | End: 2020-04-07

## 2020-04-01 RX ORDER — PROMETHAZINE HYDROCHLORIDE 12.5 MG/1
12.5 TABLET ORAL EVERY 6 HOURS PRN
Status: DISCONTINUED | OUTPATIENT
Start: 2020-04-01 | End: 2020-04-20 | Stop reason: HOSPADM

## 2020-04-01 RX ORDER — SODIUM CHLORIDE 9 MG/ML
100 INJECTION, SOLUTION INTRAVENOUS CONTINUOUS
Status: DISCONTINUED | OUTPATIENT
Start: 2020-04-01 | End: 2020-04-07

## 2020-04-01 RX ORDER — NICOTINE POLACRILEX 4 MG
15 LOZENGE BUCCAL
Status: DISCONTINUED | OUTPATIENT
Start: 2020-04-01 | End: 2020-04-20 | Stop reason: HOSPADM

## 2020-04-01 RX ORDER — PROMETHAZINE HYDROCHLORIDE 25 MG/1
12.5 SUPPOSITORY RECTAL EVERY 6 HOURS PRN
Status: DISCONTINUED | OUTPATIENT
Start: 2020-04-01 | End: 2020-04-20 | Stop reason: HOSPADM

## 2020-04-01 RX ORDER — CEFTRIAXONE SODIUM 1 G/50ML
1 INJECTION, SOLUTION INTRAVENOUS EVERY 24 HOURS
Status: DISCONTINUED | OUTPATIENT
Start: 2020-04-01 | End: 2020-04-04

## 2020-04-01 RX ORDER — LEVOTHYROXINE SODIUM 0.1 MG/1
200 TABLET ORAL DAILY
Status: DISCONTINUED | OUTPATIENT
Start: 2020-04-01 | End: 2020-04-20 | Stop reason: HOSPADM

## 2020-04-01 RX ORDER — ACETAMINOPHEN 325 MG/1
650 TABLET ORAL EVERY 4 HOURS PRN
Status: DISCONTINUED | OUTPATIENT
Start: 2020-04-01 | End: 2020-04-20 | Stop reason: HOSPADM

## 2020-04-01 RX ORDER — SODIUM CHLORIDE 0.9 % (FLUSH) 0.9 %
10 SYRINGE (ML) INJECTION EVERY 12 HOURS SCHEDULED
Status: DISCONTINUED | OUTPATIENT
Start: 2020-04-01 | End: 2020-04-08

## 2020-04-01 RX ORDER — PROMETHAZINE HYDROCHLORIDE 25 MG/ML
12.5 INJECTION, SOLUTION INTRAMUSCULAR; INTRAVENOUS EVERY 6 HOURS PRN
Status: DISCONTINUED | OUTPATIENT
Start: 2020-04-01 | End: 2020-04-20 | Stop reason: HOSPADM

## 2020-04-01 RX ORDER — IBUPROFEN 400 MG/1
400 TABLET ORAL EVERY 6 HOURS PRN
Status: ON HOLD | COMMUNITY
End: 2020-04-20

## 2020-04-01 RX ORDER — POTASSIUM CHLORIDE 1.5 G/1.77G
40 POWDER, FOR SOLUTION ORAL AS NEEDED
Status: DISCONTINUED | OUTPATIENT
Start: 2020-04-01 | End: 2020-04-20 | Stop reason: HOSPADM

## 2020-04-01 RX ORDER — SODIUM CHLORIDE 0.9 % (FLUSH) 0.9 %
10 SYRINGE (ML) INJECTION AS NEEDED
Status: DISCONTINUED | OUTPATIENT
Start: 2020-04-01 | End: 2020-04-20 | Stop reason: HOSPADM

## 2020-04-01 RX ORDER — MAGNESIUM SULFATE HEPTAHYDRATE 40 MG/ML
2 INJECTION, SOLUTION INTRAVENOUS ONCE
Status: COMPLETED | OUTPATIENT
Start: 2020-04-01 | End: 2020-04-01

## 2020-04-01 RX ADMIN — CEFTRIAXONE SODIUM 1 G: 1 INJECTION, SOLUTION INTRAVENOUS at 17:18

## 2020-04-01 RX ADMIN — Medication 2 TABLET: at 15:10

## 2020-04-01 RX ADMIN — MAGNESIUM SULFATE 2 G: 2 INJECTION INTRAVENOUS at 10:03

## 2020-04-01 RX ADMIN — POTASSIUM CHLORIDE 40 MEQ: 1.5 POWDER, FOR SOLUTION ORAL at 20:52

## 2020-04-01 RX ADMIN — SODIUM BICARBONATE 650 MG: 650 TABLET ORAL at 15:10

## 2020-04-01 RX ADMIN — SODIUM BICARBONATE 650 MG: 650 TABLET ORAL at 20:50

## 2020-04-01 RX ADMIN — GUAIFENESIN AND DEXTROMETHORPHAN 5 ML: 100; 10 SYRUP ORAL at 22:06

## 2020-04-01 RX ADMIN — PROMETHAZINE HYDROCHLORIDE 12.5 MG: 25 INJECTION INTRAMUSCULAR; INTRAVENOUS at 22:06

## 2020-04-01 RX ADMIN — GUAIFENESIN AND DEXTROMETHORPHAN 5 ML: 100; 10 SYRUP ORAL at 15:10

## 2020-04-01 RX ADMIN — AZITHROMYCIN DIHYDRATE 500 MG: 500 INJECTION, POWDER, LYOPHILIZED, FOR SOLUTION INTRAVENOUS at 20:51

## 2020-04-01 RX ADMIN — POTASSIUM CHLORIDE 40 MEQ: 10 CAPSULE, COATED, EXTENDED RELEASE ORAL at 15:10

## 2020-04-01 RX ADMIN — PROMETHAZINE HYDROCHLORIDE 12.5 MG: 12.5 TABLET ORAL at 17:18

## 2020-04-01 RX ADMIN — SODIUM CHLORIDE, PRESERVATIVE FREE 10 ML: 5 INJECTION INTRAVENOUS at 15:11

## 2020-04-01 RX ADMIN — SODIUM CHLORIDE 1000 ML: 9 INJECTION, SOLUTION INTRAVENOUS at 10:46

## 2020-04-01 RX ADMIN — SODIUM CHLORIDE 100 ML/HR: 9 INJECTION, SOLUTION INTRAVENOUS at 15:11

## 2020-04-01 RX ADMIN — SODIUM CHLORIDE, PRESERVATIVE FREE 10 ML: 5 INJECTION INTRAVENOUS at 21:00

## 2020-04-01 NOTE — H&P
HISTORY AND PHYSICAL   [facility]        Patient Identification:  Name: Oralia Sánchez  Age: 44 y.o.  Sex: female  :  1975  MRN: [id number]                     Primary Care Physician: Provider, No Known    Chief Complaint: Cough and short of air    History of Present Illness:         The patient is a 44-year-old white female with history of arthritis, diabetes, hypothyroidism, Mendez, sleep apnea and obesity who was admitted with a one-week history of a bad cough with shortness of air and some fevers.  She has not had any nausea or vomiting.  She denies having any chest pain.  The patient was evaluated in the ER and admitted for further evaluation with concern for possible coronavirus infection.  The patient was admitted to hospital for further evaluation treatment.  She is also been extremely fatigued.    Past Medical History:  Past Medical History:   Diagnosis Date   • Aneurysm (CMS/HCC)    • Arthritis    • Carpal tunnel syndrome    • Chest pain    • Diabetes mellitus (CMS/HCC)    • Dysmetabolic syndrome X    • Gestational diabetes mellitus    • Hypothyroidism    • Metabolic disorder    • Nonalcoholic steatohepatitis    • Obesity    • Palpitations    • Sleep apnea    • Spinal headache    • Type 2 diabetes mellitus (CMS/HCC)    • Vitamin D deficiency      Past Surgical History:  Past Surgical History:   Procedure Laterality Date   • CARPAL TUNNEL RELEASE     • CEREBRAL ANGIOGRAM N/A 3/28/2019    Procedure: DIAGNOSTIC CEREBRAL ANGIOGRAM;  Surgeon: Alexx Barrow MD;  Location: [facility] OR ;  Service: Neurosurgery   • CEREBRAL ANGIOGRAM N/A 10/2/2019    Procedure: CEREBRAL ANGIOGRAM;  Surgeon: Santosh Garza MD;  Location: [facility] OR ;  Service: Interventional Radiology   •  SECTION      x6   • DILATATION AND CURETTAGE      x 2   • EMBOLIZATION CEREBRAL N/A 3/29/2019    Procedure: Cererbal angiogram and embolization of cerebral aneurysm;  Surgeon: Danial  Alexx SINGH MD;  Location: [facility] OR 18/19;  Service: Neurosurgery   • MYRINGOTOMY     • TONSILLECTOMY AND ADENOIDECTOMY        Home Meds:  Medications Prior to Admission   Medication Sig Dispense Refill Last Dose   • albuterol sulfate  (90 Base) MCG/ACT inhaler Inhale 2 puffs Every 4 (Four) Hours As Needed for Wheezing. 1 inhaler 0 3/31/2020 at Unknown time   • ibuprofen (ADVIL,MOTRIN) 400 MG tablet Take 400 mg by mouth Every 6 (Six) Hours As Needed for Mild Pain .   4/1/2020 at Unknown time   • SYNTHROID 200 MCG tablet Take 1 tablet by mouth Daily. 30 tablet 11 3/31/2020 at Unknown time     Current meds    Current Facility-Administered Medications:   •  acetaminophen (TYLENOL) tablet 650 mg, 650 mg, Oral, Q4H PRN **OR** acetaminophen (TYLENOL) 160 MG/5ML solution 650 mg, 650 mg, Oral, Q4H PRN **OR** acetaminophen (TYLENOL) suppository 650 mg, 650 mg, Rectal, Q4H PRN, Tylor Cruz MD  •  calcium carbonate (TUMS) chewable tablet 500 mg (200 mg elemental), 2 tablet, Oral, TID PRN, Tylor Cruz MD, 2 tablet at 04/01/20 1510  •  dextrose (D50W) 25 g/ 50mL Intravenous Solution 25 g, 25 g, Intravenous, Q15 Min PRN, Tylor Cruz MD  •  dextrose (GLUTOSE) oral gel 15 g, 15 g, Oral, Q15 Min PRN, Tylor Cruz MD  •  glucagon (human recombinant) (GLUCAGEN DIAGNOSTIC) injection 1 mg, 1 mg, Subcutaneous, Q15 Min PRN, Tylor Cruz MD  •  guaiFENesin-dextromethorphan (ROBITUSSIN DM) 100-10 MG/5ML syrup 5 mL, 5 mL, Oral, Q4H PRN, Tylor Cruz MD, 5 mL at 04/01/20 1510  •  insulin lispro (humaLOG) injection 0-7 Units, 0-7 Units, Subcutaneous, 4x Daily With Meals & Nightly, Tylor Cruz MD  •  levothyroxine (SYNTHROID, LEVOTHROID) tablet 200 mcg, 200 mcg, Oral, Daily, Tylor Cruz MD  •  potassium chloride (MICRO-K) CR capsule 40 mEq, 40 mEq, Oral, PRN, 40 mEq at 04/01/20 1510 **OR** potassium chloride (KLOR-CON) packet 40 mEq, 40 mEq, Oral, PRN **OR** potassium chloride 10 mEq in 100 mL IVPB, 10 mEq,  Intravenous, Q1H PRN, Tylor Cruz MD  •  promethazine (PHENERGAN) tablet 12.5 mg, 12.5 mg, Oral, Q6H PRN **OR** promethazine (PHENERGAN) injection 12.5 mg, 12.5 mg, Intramuscular, Q6H PRN **OR** promethazine (PHENERGAN) injection 12.5 mg, 12.5 mg, Intravenous, Q6H PRN **OR** promethazine (PHENERGAN) suppository 12.5 mg, 12.5 mg, Rectal, Q6H PRN, Tylor Cruz MD  •  sodium bicarbonate tablet 650 mg, 650 mg, Oral, TID, Tylor Cruz MD, 650 mg at 04/01/20 1510  •  sodium chloride 0.9 % flush 10 mL, 10 mL, Intravenous, Q12H, Tylor Cruz MD, 10 mL at 04/01/20 1511  •  sodium chloride 0.9 % flush 10 mL, 10 mL, Intravenous, PRN, Tylor Cruz MD  •  sodium chloride 0.9 % infusion, 100 mL/hr, Intravenous, Continuous, Tylor Cruz MD, Last Rate: 100 mL/hr at 04/01/20 1511, 100 mL/hr at 04/01/20 1511  Allergies:  Allergies   Allergen Reactions   • No Known Drug Allergy      Immunizations:    There is no immunization history on file for this patient.  Social History:   Social History     Social History Narrative   • Not on file     Social History     Socioeconomic History   • Marital status:      Spouse name: Not on file   • Number of children: Not on file   • Years of education: Not on file   • Highest education level: Not on file   Occupational History   • Occupation: admin   Tobacco Use   • Smoking status: Current Every Day Smoker     Packs/day: 1.50     Types: Cigarettes   • Smokeless tobacco: Never Used   • Tobacco comment: x 10 yrs   Substance and Sexual Activity   • Alcohol use: Yes     Alcohol/week: 1.0 - 2.0 standard drinks     Types: 1 - 2 Shots of liquor per week     Comment: 1-2/week   • Drug use: No   • Sexual activity: Defer       Family History:  Family History   Problem Relation Age of Onset   • Hypertension Mother    • Alcohol abuse Father    • Heart attack Father         acute MI   • Stroke Father         CVA   • Diabetes Maternal Grandmother    • Diabetes Paternal Grandmother      "    Review of Systems  See history of present illness and past medical history.  Patient denies headache, dizziness, syncope, falls, trauma, change in vision, change in hearing, change in taste, changes in weight, changes in appetite, focal weakness, numbness, or paresthesia.  Patient denies chest pain, palpitations,  orthopnea, PND,  sinus pressure, rhinorrhea, epistaxis, hemoptysis, nausea, vomiting, hematemesis, diarrhea, constipation or hematchezia.  Denies cold or heat intolerance, polydipsia, polyuria, polyphagia. Denies hematuria, pyuria, dysuria, hesitancy, frequency or urgency. Denies consumption of raw and under cooked meats foods or change in water source.  Denies fever, chills, sweats, night sweats.  Denies missing any routine medications. Remainder of ROS is negative.    Objective:  tMax 24 hrs: Temp (24hrs), Av.7 °F (37.6 °C), Min:99.2 °F (37.3 °C), Max:100.1 °F (37.8 °C)    Vitals Ranges:   Temp:  [99.2 °F (37.3 °C)-100.1 °F (37.8 °C)] 99.2 °F (37.3 °C)  Heart Rate:  [104-111] 104  Resp:  [19-22] 20  BP: (104-138)/(60-75) 104/63      Exam:  /63 (BP Location: Left arm)   Pulse 104   Temp 99.2 °F (37.3 °C) (Oral)   Resp 20   Ht 154.9 cm (61\")   Wt 108 kg (237 lb)   LMP 2020   SpO2 94%   BMI 44.78 kg/m²     General Appearance:    Alert, cooperative, no distress, appears stated age   Head:    Normocephalic, without obvious abnormality, atraumatic   Eyes:    PERRL, conjunctiva/corneas clear, EOM's intact, both eyes   Ears:    Normal external ear canals, both ears   Nose:   Nares normal, septum midline, mucosa normal, no drainage    or sinus tenderness   Throat:   Lips, mucosa, and tongue normal   Neck:   Supple, symmetrical, trachea midline, no adenopathy;     thyroid:  no enlargement/tenderness/nodules; no carotid    bruit or JVD   Back:     Symmetric, no curvature, ROM normal, no CVA tenderness   Lungs:     Clear to auscultation bilaterally, respirations unlabored   Chest Wall:    " No tenderness or deformity    Heart:    Regular rate and rhythm, S1 and S2 normal, no murmur, rub   or gallop   Abdomen:     Soft, non-tender, bowel sounds active all four quadrants,     no masses, no hepatomegaly, no splenomegaly   Extremities:   Extremities normal, atraumatic, no cyanosis or edema   Pulses:   2+ and symmetric all extremities   Skin:   Skin color, texture, turgor normal, no rashes or lesions   Lymph nodes:   Cervical, supraclavicular, and axillary nodes normal   Neurologic:   CNII-XII intact, normal strength, sensation intact throughout      .    Data Review:  Lab Results (last 72 hours)     Procedure Component Value Units Date/Time    Respiratory Panel, PCR - Swab, Nasopharynx [[id number]] Collected:  04/01/20 1515    Specimen:  Swab from Nasopharynx Updated:  04/01/20 1525    Hemoglobin A1c [[id number]]  (Abnormal) Collected:  04/01/20 0809    Specimen:  Blood Updated:  04/01/20 1417     Hemoglobin A1C 6.68 %     Narrative:       Hemoglobin A1C Ranges:    Increased Risk for Diabetes  5.7% to 6.4%  Diabetes                     >= 6.5%  Diabetic Goal                < 7.0%    Salicylate Level [[id number]]  (Normal) Collected:  04/01/20 0809    Specimen:  Blood Updated:  04/01/20 1129     Salicylate <0.3 mg/dL     Narrative:       Therapeutic range for Salicylates:  3.0 - 10.0 mg/dL for antipyretic/analgesic conditions  15.0 - 30.0 mg/dL for anti-inflammatory conditions    CORONAVIRUS(COVID-19),RT-PCR,UOFL LAB,NP/OP Swab in Transport Media - Swab, Nasopharynx [[id number]] Collected:  04/01/20 1049    Specimen:  Swab from Nasopharynx Updated:  04/01/20 1101    Blood Gas, Arterial [[id number]]  (Abnormal) Collected:  04/01/20 1051    Specimen:  Arterial Blood Updated:  04/01/20 1053     Site Arterial: left radial     Jovi's Test Positive     pH, Arterial 7.477 pH units      pCO2, Arterial 28.2 mm Hg      pO2, Arterial 99.2 mm Hg      HCO3, Arterial 20.9 mmol/L      Base Excess, Arterial -1.0 mmol/L      O2  Saturation Calculated 98.3 %      Barometric Pressure for Blood Gas 753.0 mmHg      Modality Room Air     Set Mech Resp Rate 28    Ethanol [[id number]] Collected:  04/01/20 0809    Specimen:  Blood Updated:  04/01/20 0951     Ethanol <10 mg/dL      Ethanol % <0.010 %     Ferritin [[id number]]  (Abnormal) Collected:  04/01/20 0809    Specimen:  Blood Updated:  04/01/20 0946     Ferritin 821.00 ng/mL     Narrative:       Results may be falsely decreased if patient taking Biotin.      Calvin Draw [[id number]] Collected:  04/01/20 0809    Specimen:  Blood Updated:  04/01/20 0915    Narrative:       The following orders were created for panel order Calvin Draw.  Procedure                               Abnormality         Status                     ---------                               -----------         ------                     Light Blue Top[[id number]]                                   Final result               Green Top (Gel)[[id number]]                                  Final result               Lavender Top[[id number]]                                     Final result               Gold Top - SST[[id number]]                                   Final result                 Please view results for these tests on the individual orders.    Green Top (Gel) [[id number]] Collected:  04/01/20 0809    Specimen:  Blood Updated:  04/01/20 0915     Extra Tube Hold for add-ons.     Comment: Auto resulted.       Lavender Top [[id number]] Collected:  04/01/20 0809    Specimen:  Blood Updated:  04/01/20 0915     Extra Tube hold for add-on     Comment: Auto resulted       Light Blue Top [[id number]] Collected:  04/01/20 0809    Specimen:  Blood Updated:  04/01/20 0915     Extra Tube hold for add-on     Comment: Auto resulted       Gold Top - SST [[id number]] Collected:  04/01/20 0809    Specimen:  Blood Updated:  04/01/20 0915     Extra Tube Hold for add-ons.     Comment: Auto resulted.       Procalcitonin [[id number]]  (Normal) Collected:  " 04/01/20 0809    Specimen:  Blood Updated:  04/01/20 0846     Procalcitonin 0.16 ng/mL     Narrative:       As a Marker for Sepsis (Non-Neonates):   1. <0.5 ng/mL represents a low risk of severe sepsis and/or septic shock.  1. >2 ng/mL represents a high risk of severe sepsis and/or septic shock.    As a Marker for Lower Respiratory Tract Infections that require antibiotic therapy:  PCT on Admission     Antibiotic Therapy             6-12 Hrs later  > 0.5                Strongly Recommended            >0.25 - <0.5         Recommended  0.1 - 0.25           Discouraged                   Remeasure/reassess PCT  <0.1                 Strongly Discouraged          Remeasure/reassess PCT      As 28 day mortality risk marker: \"Change in Procalcitonin Result\" (> 80 % or <=80 %) if Day 0 (or Day 1) and Day 4 values are available. Refer to http://www.[employer]pct-calculator.[employer]/   Change in PCT <=80 %   A decrease of PCT levels below or equal to 80 % defines a positive change in PCT test result representing a higher risk for 28-day all-cause mortality of patients diagnosed with severe sepsis or septic shock.  Change in PCT > 80 %   A decrease of PCT levels of more than 80 % defines a negative change in PCT result representing a lower risk for 28-day all-cause mortality of patients diagnosed with severe sepsis or septic shock.                Results may be falsely decreased if patient taking Biotin.     Comprehensive Metabolic Panel [[id number]]  (Abnormal) Collected:  04/01/20 0809    Specimen:  Blood Updated:  04/01/20 0841     Glucose 127 mg/dL      BUN 3 mg/dL      Creatinine 0.85 mg/dL      Sodium 135 mmol/L      Potassium 3.2 mmol/L      Chloride 84 mmol/L      CO2 18.7 mmol/L      Calcium 8.5 mg/dL      Total Protein 7.1 g/dL      Albumin 3.70 g/dL      ALT (SGPT) 65 U/L      AST (SGOT) 124 U/L      Alkaline Phosphatase 128 U/L      Total Bilirubin 1.4 mg/dL      eGFR Non African Amer 72 mL/min/1.73      Globulin 3.4 gm/dL  "     A/G Ratio 1.1 g/dL      BUN/Creatinine Ratio 3.5     Anion Gap 32.3 mmol/L     Narrative:       GFR Normal >60  Chronic Kidney Disease <60  Kidney Failure <15      C-reactive Protein [[id number]]  (Abnormal) Collected:  04/01/20 0809    Specimen:  Blood Updated:  04/01/20 0841     C-Reactive Protein 0.63 mg/dL     Lactate Dehydrogenase [[id number]]  (Abnormal) Collected:  04/01/20 0809    Specimen:  Blood Updated:  04/01/20 0841      U/L     Magnesium [[id number]]  (Abnormal) Collected:  04/01/20 0809    Specimen:  Blood Updated:  04/01/20 0841     Magnesium 1.1 mg/dL     Troponin [[id number]]  (Normal) Collected:  04/01/20 0809    Specimen:  Blood Updated:  04/01/20 0841     Troponin T <0.010 ng/mL     Narrative:       Troponin T Reference Range:  <= 0.03 ng/mL-   Negative for AMI  >0.03 ng/mL-     Abnormal for myocardial necrosis.  Clinicians would have to utilize clinical acumen, EKG, Troponin and serial changes to determine if it is an Acute Myocardial Infarction or myocardial injury due to an underlying chronic condition.       Results may be falsely decreased if patient taking Biotin.      CBC Auto Differential [[id number]]  (Abnormal) Collected:  04/01/20 0809    Specimen:  Blood Updated:  04/01/20 0828     WBC 8.04 10*3/mm3      RBC 4.48 10*6/mm3      Hemoglobin 16.3 g/dL      Hematocrit 46.2 %      .1 fL      MCH 36.4 pg      MCHC 35.3 g/dL      RDW 15.4 %      RDW-SD 57.9 fl      MPV 9.5 fL      Platelets 244 10*3/mm3      Neutrophil % 68.8 %      Lymphocyte % 22.6 %      Monocyte % 7.2 %      Eosinophil % 0.4 %      Basophil % 0.6 %      Immature Grans % 0.4 %      Neutrophils, Absolute 5.53 10*3/mm3      Lymphocytes, Absolute 1.82 10*3/mm3      Monocytes, Absolute 0.58 10*3/mm3      Eosinophils, Absolute 0.03 10*3/mm3      Basophils, Absolute 0.05 10*3/mm3      Immature Grans, Absolute 0.03 10*3/mm3      nRBC 0.5 /100 WBC               Results from last 7 days   Lab Units 04/01/20  0806    HEMOGLOBIN A1C % 6.68*      Imaging Results (All)     Procedure Component Value Units Date/Time    XR Chest AP [[id number]] Collected:  04/01/20 1030     Updated:  04/01/20 1114    Narrative:       ONE VIEW PORTABLE CHEST     HISTORY: Cough and fever.     FINDINGS: The exam is slightly limited by the patient's large size. The  lungs are well-expanded and clear and the heart size is normal. The  overall appearance shows no change from 11/10/2019.     This report was finalized on 4/1/2020 11:11 AM by Dr. Elmo Mooney M.D.           Past Medical History:   Diagnosis Date   • Aneurysm (CMS/HCC)    • Arthritis    • Carpal tunnel syndrome    • Chest pain    • Diabetes mellitus (CMS/HCC)    • Dysmetabolic syndrome X    • Gestational diabetes mellitus    • Hypothyroidism    • Metabolic disorder    • Nonalcoholic steatohepatitis    • Obesity    • Palpitations    • Sleep apnea    • Spinal headache    • Type 2 diabetes mellitus (CMS/HCC)    • Vitamin D deficiency        Assessment:  Active Hospital Problems    Diagnosis  POA   • **Dyspnea [R06.00]  Yes   • Cough [R05]  Unknown   • Hypomagnesemia [E83.42]  Unknown   • Metabolic acidosis [E87.2]  Unknown   • Fatigue [R53.83]  Unknown   • History of COPD [Z87.09]  Not Applicable   • Tobacco abuse [Z72.0]  Yes   • Rheumatoid arthritis (CMS/HCC) [M06.9]  Yes   • Type 2 diabetes mellitus (CMS/HCC) [E11.9]  Yes   • Morbid obesity (CMS/HCC) [E66.01]  Yes   • Elevated LFTs [R94.5]  Yes   • YOUNG (nonalcoholic steatohepatitis) [K75.81]  Yes   • Diabetes mellitus arising in pregnancy [O24.419]  Yes   • Hypothyroid [E03.9]  Yes      Resolved Hospital Problems   No resolved problems to display.       Plan:  The patient is admitted to the hospital and will check for the coronavirus and also get respiratory PCR.  We will get cultures and start antibiotics for possible community-acquired pneumonia.  We will get some follow-up lab studies.    Tylor Cruz MD  4/1/2020  15:51

## 2020-04-01 NOTE — ED PROVIDER NOTES
EMERGENCY DEPARTMENT ENCOUNTER    Room Number:  N542/1  Date of encounter:  4/1/2020  PCP: Provider, No Known  Historian: Patient      HPI:  Chief Complaint: Cough, shortness of breath  A complete HPI/ROS/PMH/PSH/SH/FH are unobtainable due to: None    Context: Oralia Sánchez is a 44 y.o. female who presents to the ED c/o 1 week of persistent dry cough, shortness of breath, subjective fevers, and fatigue.  Patient has been at home and self quarantine, but was feeling worse today.  Shortness of breath is worse after coughing and with movement.  Has had some nausea without specific vomiting or diarrhea.  Has been drinking Pedialyte for hydration.  Has been taking some ibuprofen for fever control at home.  Denies any specific chest pain, abdominal pain.  Denies recent travel or known positive COVID contacts. Works for [employer].      MEDICAL RECORD REVIEW    ED visit November 2019 for flulike symptoms, no other recent ED visits.    PAST MEDICAL HISTORY  Active Ambulatory Problems     Diagnosis Date Noted   • Vitamin D deficiency 09/07/2016   • Awareness of heartbeats 09/07/2016   • Adiposity 09/07/2016   • YOUNG (nonalcoholic steatohepatitis) 09/07/2016   • Hypothyroid 09/07/2016   • Diabetes mellitus arising in pregnancy 09/07/2016   • Diabetes (CMS/HCC) 09/07/2016   • Metabolic syndrome 09/07/2016   • Chest pain 09/07/2016   • Primary thunderclap headache 03/27/2019   • Elevated LFTs 03/27/2019   • Tobacco abuse 03/28/2019   • Rheumatoid arthritis (CMS/HCC) 03/28/2019   • Type 2 diabetes mellitus (CMS/HCC) 03/28/2019   • Morbid obesity (CMS/HCC) 03/28/2019   • UTI (urinary tract infection), bacterial 03/28/2019   • Cerebral aneurysm 10/02/2019     Resolved Ambulatory Problems     Diagnosis Date Noted   • Abnormal CT of the head 03/28/2019   • Cerebral aneurysm rupture (CMS/HCC) 03/28/2019     Past Medical History:   Diagnosis Date   • Aneurysm (CMS/[facility])    • Arthritis    • Carpal tunnel syndrome    • Diabetes mellitus  (CMS/[facility])    • Dysmetabolic syndrome X    • Gestational diabetes mellitus    • Hypothyroidism    • Metabolic disorder    • Nonalcoholic steatohepatitis    • Obesity    • Palpitations    • Sleep apnea    • Spinal headache          PAST SURGICAL HISTORY  Past Surgical History:   Procedure Laterality Date   • CARPAL TUNNEL RELEASE     • CEREBRAL ANGIOGRAM N/A 3/28/2019    Procedure: DIAGNOSTIC CEREBRAL ANGIOGRAM;  Surgeon: Alexx Barrow MD;  Location: [facility] OR ;  Service: Neurosurgery   • CEREBRAL ANGIOGRAM N/A 10/2/2019    Procedure: CEREBRAL ANGIOGRAM;  Surgeon: Santosh Garza MD;  Location: [facility] OR ;  Service: Interventional Radiology   •  SECTION      x6   • DILATATION AND CURETTAGE      x 2   • EMBOLIZATION CEREBRAL N/A 3/29/2019    Procedure: Cererbal angiogram and embolization of cerebral aneurysm;  Surgeon: Alexx Barrow MD;  Location: [facility] OR ;  Service: Neurosurgery   • MYRINGOTOMY     • TONSILLECTOMY AND ADENOIDECTOMY           FAMILY HISTORY  Family History   Problem Relation Age of Onset   • Hypertension Mother    • Alcohol abuse Father    • Heart attack Father         acute MI   • Stroke Father         CVA   • Diabetes Maternal Grandmother    • Diabetes Paternal Grandmother          SOCIAL HISTORY  Social History     Socioeconomic History   • Marital status:      Spouse name: Not on file   • Number of children: Not on file   • Years of education: Not on file   • Highest education level: Not on file   Occupational History   • Occupation: admin   Tobacco Use   • Smoking status: Current Every Day Smoker     Packs/day: 1.50     Types: Cigarettes   • Smokeless tobacco: Never Used   • Tobacco comment: x 10 yrs   Substance and Sexual Activity   • Alcohol use: Yes     Alcohol/week: 1.0 - 2.0 standard drinks     Types: 1 - 2 Shots of liquor per week     Comment: 1-2/week   • Drug use: No   • Sexual activity: Defer         ALLERGIES  No known  drug allergy        REVIEW OF SYSTEMS  Review of Systems     All systems reviewed and negative except for those discussed in HPI.       PHYSICAL EXAM    I have reviewed the triage vital signs and nursing notes.    ED Triage Vitals   Temp Heart Rate Resp BP SpO2   04/01/20 0804 04/01/20 0800 04/01/20 0800 04/01/20 0800 04/01/20 0800   100.1 °F (37.8 °C) 110 22 138/75 99 %      Temp src Heart Rate Source Patient Position BP Location FiO2 (%)   04/01/20 0804 -- -- -- --   Tympanic           Physical Exam  General: Awake, alert, mildly ill-appearing, nontoxic, nondiaphoretic  HEENT: Mucous membranes moist, atraumatic, normocephalic, EOMI  Neck: Full ROM  Pulm: Symmetric, mild tachypnea, lungs CTAB without overt wheezes/rales/rhonchi  Cardiovascular: Mild regular rhythm tachycardia, normal S1/S2, intact distal pulses, no peripheral edema  GI: Soft, non-tender, non-distended, no rebound, no guarding, bowel sounds present  MSK: Full ROM, no deformity  Skin: Warm, dry  Neuro: Alert and oriented x 3, GCS 15, moving all extremities, no focal deficits  Psych: Calm, cooperative      Surgical mask, gown, appropriate eye protection, and gloves used during this encounter.      LAB RESULTS  Recent Results (from the past 24 hour(s))   Light Blue Top    Collection Time: 04/01/20  8:09 AM   Result Value Ref Range    Extra Tube hold for add-on    Green Top (Gel)    Collection Time: 04/01/20  8:09 AM   Result Value Ref Range    Extra Tube Hold for add-ons.    Lavender Top    Collection Time: 04/01/20  8:09 AM   Result Value Ref Range    Extra Tube hold for add-on    Gold Top - SST    Collection Time: 04/01/20  8:09 AM   Result Value Ref Range    Extra Tube Hold for add-ons.    C-reactive Protein    Collection Time: 04/01/20  8:09 AM   Result Value Ref Range    C-Reactive Protein 0.63 (H) 0.00 - 0.50 mg/dL   CBC Auto Differential    Collection Time: 04/01/20  8:09 AM   Result Value Ref Range    WBC 8.04 3.40 - 10.80 10*3/mm3    RBC 4.48  3.77 - 5.28 10*6/mm3    Hemoglobin 16.3 (H) 12.0 - 15.9 g/dL    Hematocrit 46.2 34.0 - 46.6 %    .1 (H) 79.0 - 97.0 fL    MCH 36.4 (H) 26.6 - 33.0 pg    MCHC 35.3 31.5 - 35.7 g/dL    RDW 15.4 12.3 - 15.4 %    RDW-SD 57.9 (H) 37.0 - 54.0 fl    MPV 9.5 6.0 - 12.0 fL    Platelets 244 140 - 450 10*3/mm3    Neutrophil % 68.8 42.7 - 76.0 %    Lymphocyte % 22.6 19.6 - 45.3 %    Monocyte % 7.2 5.0 - 12.0 %    Eosinophil % 0.4 0.3 - 6.2 %    Basophil % 0.6 0.0 - 1.5 %    Immature Grans % 0.4 0.0 - 0.5 %    Neutrophils, Absolute 5.53 1.70 - 7.00 10*3/mm3    Lymphocytes, Absolute 1.82 0.70 - 3.10 10*3/mm3    Monocytes, Absolute 0.58 0.10 - 0.90 10*3/mm3    Eosinophils, Absolute 0.03 0.00 - 0.40 10*3/mm3    Basophils, Absolute 0.05 0.00 - 0.20 10*3/mm3    Immature Grans, Absolute 0.03 0.00 - 0.05 10*3/mm3    nRBC 0.5 (H) 0.0 - 0.2 /100 WBC   Comprehensive Metabolic Panel    Collection Time: 04/01/20  8:09 AM   Result Value Ref Range    Glucose 127 (H) 65 - 99 mg/dL    BUN 3 (L) 6 - 20 mg/dL    Creatinine 0.85 0.57 - 1.00 mg/dL    Sodium 135 (L) 136 - 145 mmol/L    Potassium 3.2 (L) 3.5 - 5.2 mmol/L    Chloride 84 (L) 98 - 107 mmol/L    CO2 18.7 (L) 22.0 - 29.0 mmol/L    Calcium 8.5 (L) 8.6 - 10.5 mg/dL    Total Protein 7.1 6.0 - 8.5 g/dL    Albumin 3.70 3.50 - 5.20 g/dL    ALT (SGPT) 65 (H) 1 - 33 U/L    AST (SGOT) 124 (H) 1 - 32 U/L    Alkaline Phosphatase 128 (H) 39 - 117 U/L    Total Bilirubin 1.4 (H) 0.2 - 1.2 mg/dL    eGFR Non African Amer 72 >60 mL/min/1.73    Globulin 3.4 gm/dL    A/G Ratio 1.1 g/dL    BUN/Creatinine Ratio 3.5 (L) 7.0 - 25.0    Anion Gap 32.3 (H) 5.0 - 15.0 mmol/L   Ferritin    Collection Time: 04/01/20  8:09 AM   Result Value Ref Range    Ferritin 821.00 (H) 13.00 - 150.00 ng/mL   Lactate Dehydrogenase    Collection Time: 04/01/20  8:09 AM   Result Value Ref Range     (H) 135 - 214 U/L   Magnesium    Collection Time: 04/01/20  8:09 AM   Result Value Ref Range    Magnesium 1.1 (L) 1.6 - 2.6  mg/dL   Procalcitonin    Collection Time: 04/01/20  8:09 AM   Result Value Ref Range    Procalcitonin 0.16 0.10 - 0.25 ng/mL   Troponin    Collection Time: 04/01/20  8:09 AM   Result Value Ref Range    Troponin T <0.010 0.000 - 0.030 ng/mL   Ethanol    Collection Time: 04/01/20  8:09 AM   Result Value Ref Range    Ethanol <10 0 - 10 mg/dL    Ethanol % <0.010 %   Salicylate Level    Collection Time: 04/01/20  8:09 AM   Result Value Ref Range    Salicylate <0.3 <=30.0 mg/dL   Blood Gas, Arterial    Collection Time: 04/01/20 10:51 AM   Result Value Ref Range    Site Arterial: left radial     Jovi's Test Positive     pH, Arterial 7.477 (H) 7.350 - 7.450 pH units    pCO2, Arterial 28.2 (L) 35.0 - 45.0 mm Hg    pO2, Arterial 99.2 80.0 - 100.0 mm Hg    HCO3, Arterial 20.9 (L) 22.0 - 28.0 mmol/L    Base Excess, Arterial -1.0 (L) 0.0 - 2.0 mmol/L    O2 Saturation Calculated 98.3 92.0 - 99.0 %    Barometric Pressure for Blood Gas 753.0 mmHg    Modality Room Air     Set [facility] Resp Rate 28        Ordered the above labs and independently reviewed the results.        RADIOLOGY  Xr Chest Ap    Result Date: 4/1/2020  ONE VIEW PORTABLE CHEST  HISTORY: Cough and fever.  FINDINGS: The exam is slightly limited by the patient's large size. The lungs are well-expanded and clear and the heart size is normal. The overall appearance shows no change from 11/10/2019.  This report was finalized on 4/1/2020 11:11 AM by Dr. Elmo Mooney M.D.        I ordered the above noted radiological studies. Reviewed by me.  See dictation for official radiology interpretation.      PROCEDURES    Procedures  EKG    EKG Time: 0954  Rhythm/Rate: Sinus tachycardia rate of 104  Borderline right axis deviation  Nml intervals   No acute ischemic changes  No STEMI     Interpreted Contemporaneously by me, independently viewed  No emergent changes as compared to November 2019      MEDICATIONS GIVEN IN ER    Medications   levothyroxine (SYNTHROID, LEVOTHROID) tablet  200 mcg (has no administration in time range)   dextrose (GLUTOSE) oral gel 15 g (has no administration in time range)   dextrose (D50W) 25 g/ 50mL Intravenous Solution 25 g (has no administration in time range)   glucagon (human recombinant) (GLUCAGEN DIAGNOSTIC) injection 1 mg (has no administration in time range)   insulin lispro (humaLOG) injection 0-7 Units (has no administration in time range)   potassium chloride (MICRO-K) CR capsule 40 mEq (has no administration in time range)     Or   potassium chloride (KLOR-CON) packet 40 mEq (has no administration in time range)     Or   potassium chloride 10 mEq in 100 mL IVPB (has no administration in time range)   sodium chloride 0.9 % flush 10 mL (has no administration in time range)   sodium chloride 0.9 % flush 10 mL (has no administration in time range)   acetaminophen (TYLENOL) tablet 650 mg (has no administration in time range)     Or   acetaminophen (TYLENOL) 160 MG/5ML solution 650 mg (has no administration in time range)     Or   acetaminophen (TYLENOL) suppository 650 mg (has no administration in time range)   promethazine (PHENERGAN) tablet 12.5 mg (has no administration in time range)     Or   promethazine (PHENERGAN) injection 12.5 mg (has no administration in time range)     Or   promethazine (PHENERGAN) injection 12.5 mg (has no administration in time range)     Or   promethazine (PHENERGAN) suppository 12.5 mg (has no administration in time range)   guaiFENesin-dextromethorphan (ROBITUSSIN DM) 100-10 MG/5ML syrup 5 mL (has no administration in time range)   calcium carbonate (TUMS) chewable tablet 500 mg (200 mg elemental) (has no administration in time range)   sodium chloride 0.9 % infusion (has no administration in time range)   sodium bicarbonate tablet 650 mg (has no administration in time range)   magnesium sulfate 2g/50 mL (PREMIX) infusion (0 g Intravenous Stopped 4/1/20 8082)   sodium chloride 0.9 % bolus 1,000 mL (0 mL Intravenous Stopped  4/1/20 1355)         PROGRESS, DATA ANALYSIS, CONSULTS, AND MEDICAL DECISION MAKING    All labs have been independently reviewed by me.  All radiology studies have been reviewed by me and discussed with radiologist dictating the report.   EKG's independently viewed and interpreted by me.  Discussion below represents my analysis of pertinent findings related to patient's condition, differential diagnosis, treatment plan and final disposition.    Patient presentation today was initially concerning for respiratory illness, consideration for bacterial pneumonia versus viral pneumonia including COVID-19, evaluation for underlying cardiac issue, with consideration for pleural effusions, pulmonary edema, among others also considered.  Labs are non-concerning for any renal failure or anemia, no leukocytosis.  She does have a negative troponin and normal procalcitonin.  Magnesium noted to be low at 1.1 and has been repleted.  Patient does not have any lymphopenia or thrombocytopenia, but does have elevated CRP, LDH, and ferritin levels which is concerning for possible COVID-19 infection.  Patient also noted to have a significant anion gap elevation of 32 with acidosis with a CO2 of 18 on CMP, ABG shows no evidence of any respiratory acidosis and I will assume that this is a metabolic acidosis at this time.  Unclear etiology of this, patient is not hyperglycemic.  Alcohol level is negative.  Have given IV fluids and will admit for the significant metabolic acidosis and concerns for COVID-19, no pneumonia on CXR.     ED Course as of Apr 01 1405   Wed Apr 01, 2020   1046 Spoke with Dr. Cruz (Hospitalist), and discussed clinical course, lab and imaging findings, and need for hospitalization.  He was updated on the need for COVID-19 testing.     [DC]      ED Course User Index  [DC] Reagan Nichole MD       AS OF 14:05 VITALS:    BP - 104/63  HR - 111  TEMP - 100.1 °F (37.8 °C) (Tympanic)  02 SATS - 97%        DIAGNOSIS  Final  diagnoses:   Dyspnea, unspecified type   Metabolic acidosis   Cough   Fatigue, unspecified type   History of COPD   Hypomagnesemia         DISPOSITION  Admission             Reagan Nichole MD  04/01/20 2592

## 2020-04-01 NOTE — PLAN OF CARE
New admission from er. Intermittent nausea. Awaiting blood cultures to be drawn to start abx. On room air.

## 2020-04-01 NOTE — ED NOTES
Patient was placed in face mask in first look.  Patient was wearing a face mask throughout our encounter.  I wore protective equipment a face mask throughout the encounter.  Hand hygiene was performed before and after patient encounter.        Sobeida Castro RN  04/01/20 0913

## 2020-04-02 LAB
ALBUMIN SERPL-MCNC: 3.4 G/DL (ref 3.5–5.2)
ALBUMIN/GLOB SERPL: 1.4 G/DL
ALP SERPL-CCNC: 103 U/L (ref 39–117)
ALT SERPL W P-5'-P-CCNC: 49 U/L (ref 1–33)
ANION GAP SERPL CALCULATED.3IONS-SCNC: 25.8 MMOL/L (ref 5–15)
AST SERPL-CCNC: 97 U/L (ref 1–32)
BASOPHILS # BLD AUTO: 0.06 10*3/MM3 (ref 0–0.2)
BASOPHILS NFR BLD AUTO: 0.8 % (ref 0–1.5)
BILIRUB SERPL-MCNC: 1.3 MG/DL (ref 0.2–1.2)
BUN BLD-MCNC: 8 MG/DL (ref 6–20)
BUN/CREAT SERPL: 10.5 (ref 7–25)
CALCIUM SPEC-SCNC: 7.8 MG/DL (ref 8.6–10.5)
CHLORIDE SERPL-SCNC: 90 MMOL/L (ref 98–107)
CO2 SERPL-SCNC: 20.2 MMOL/L (ref 22–29)
CREAT BLD-MCNC: 0.76 MG/DL (ref 0.57–1)
DEPRECATED RDW RBC AUTO: 53.6 FL (ref 37–54)
EOSINOPHIL # BLD AUTO: 0.02 10*3/MM3 (ref 0–0.4)
EOSINOPHIL NFR BLD AUTO: 0.3 % (ref 0.3–6.2)
ERYTHROCYTE [DISTWIDTH] IN BLOOD BY AUTOMATED COUNT: 14.3 % (ref 12.3–15.4)
FERRITIN SERPL-MCNC: 803 NG/ML (ref 13–150)
GFR SERPL CREATININE-BSD FRML MDRD: 82 ML/MIN/1.73
GLOBULIN UR ELPH-MCNC: 2.5 GM/DL
GLUCOSE BLD-MCNC: 117 MG/DL (ref 65–99)
GLUCOSE BLDC GLUCOMTR-MCNC: 116 MG/DL (ref 70–130)
GLUCOSE BLDC GLUCOMTR-MCNC: 124 MG/DL (ref 70–130)
GLUCOSE BLDC GLUCOMTR-MCNC: 144 MG/DL (ref 70–130)
GLUCOSE BLDC GLUCOMTR-MCNC: 145 MG/DL (ref 70–130)
HCT VFR BLD AUTO: 40.1 % (ref 34–46.6)
HGB BLD-MCNC: 14.2 G/DL (ref 12–15.9)
IMM GRANULOCYTES # BLD AUTO: 0.04 10*3/MM3 (ref 0–0.05)
IMM GRANULOCYTES NFR BLD AUTO: 0.5 % (ref 0–0.5)
LDH SERPL-CCNC: 336 U/L (ref 135–214)
LYMPHOCYTES # BLD AUTO: 1.81 10*3/MM3 (ref 0.7–3.1)
LYMPHOCYTES NFR BLD AUTO: 24.5 % (ref 19.6–45.3)
MCH RBC QN AUTO: 35.9 PG (ref 26.6–33)
MCHC RBC AUTO-ENTMCNC: 35.4 G/DL (ref 31.5–35.7)
MCV RBC AUTO: 101.5 FL (ref 79–97)
MONOCYTES # BLD AUTO: 0.6 10*3/MM3 (ref 0.1–0.9)
MONOCYTES NFR BLD AUTO: 8.1 % (ref 5–12)
NEUTROPHILS # BLD AUTO: 4.85 10*3/MM3 (ref 1.7–7)
NEUTROPHILS NFR BLD AUTO: 65.8 % (ref 42.7–76)
NRBC BLD AUTO-RTO: 0.4 /100 WBC (ref 0–0.2)
PLATELET # BLD AUTO: 195 10*3/MM3 (ref 140–450)
PMV BLD AUTO: 9.4 FL (ref 6–12)
POTASSIUM BLD-SCNC: 3.8 MMOL/L (ref 3.5–5.2)
PROT SERPL-MCNC: 5.9 G/DL (ref 6–8.5)
RBC # BLD AUTO: 3.95 10*6/MM3 (ref 3.77–5.28)
SODIUM BLD-SCNC: 136 MMOL/L (ref 136–145)
WBC NRBC COR # BLD: 7.38 10*3/MM3 (ref 3.4–10.8)

## 2020-04-02 PROCEDURE — 25010000002 AZITHROMYCIN PER 500 MG: Performed by: HOSPITALIST

## 2020-04-02 PROCEDURE — 25010000002 PROMETHAZINE PER 50 MG: Performed by: HOSPITALIST

## 2020-04-02 PROCEDURE — 25010000002 CEFTRIAXONE PER 250 MG: Performed by: HOSPITALIST

## 2020-04-02 PROCEDURE — 83615 LACTATE (LD) (LDH) ENZYME: CPT | Performed by: HOSPITALIST

## 2020-04-02 PROCEDURE — 85025 COMPLETE CBC W/AUTO DIFF WBC: CPT | Performed by: HOSPITALIST

## 2020-04-02 PROCEDURE — G0378 HOSPITAL OBSERVATION PER HR: HCPCS

## 2020-04-02 PROCEDURE — 82962 GLUCOSE BLOOD TEST: CPT

## 2020-04-02 PROCEDURE — 63710000001 DIPHENHYDRAMINE PER 50 MG: Performed by: HOSPITALIST

## 2020-04-02 PROCEDURE — 82728 ASSAY OF FERRITIN: CPT | Performed by: HOSPITALIST

## 2020-04-02 PROCEDURE — 80053 COMPREHEN METABOLIC PANEL: CPT | Performed by: HOSPITALIST

## 2020-04-02 RX ORDER — DIPHENHYDRAMINE HCL 25 MG
25 CAPSULE ORAL EVERY 6 HOURS PRN
Status: DISCONTINUED | OUTPATIENT
Start: 2020-04-02 | End: 2020-04-07

## 2020-04-02 RX ADMIN — AZITHROMYCIN DIHYDRATE 500 MG: 500 INJECTION, POWDER, LYOPHILIZED, FOR SOLUTION INTRAVENOUS at 16:51

## 2020-04-02 RX ADMIN — SODIUM CHLORIDE, PRESERVATIVE FREE 10 ML: 5 INJECTION INTRAVENOUS at 09:58

## 2020-04-02 RX ADMIN — Medication 2 TABLET: at 17:45

## 2020-04-02 RX ADMIN — CEFTRIAXONE SODIUM 1 G: 1 INJECTION, SOLUTION INTRAVENOUS at 17:45

## 2020-04-02 RX ADMIN — SODIUM CHLORIDE 100 ML/HR: 9 INJECTION, SOLUTION INTRAVENOUS at 11:58

## 2020-04-02 RX ADMIN — GUAIFENESIN AND DEXTROMETHORPHAN 5 ML: 100; 10 SYRUP ORAL at 22:38

## 2020-04-02 RX ADMIN — SODIUM BICARBONATE 650 MG: 650 TABLET ORAL at 20:31

## 2020-04-02 RX ADMIN — SODIUM CHLORIDE 100 ML/HR: 9 INJECTION, SOLUTION INTRAVENOUS at 04:05

## 2020-04-02 RX ADMIN — GUAIFENESIN AND DEXTROMETHORPHAN 5 ML: 100; 10 SYRUP ORAL at 04:55

## 2020-04-02 RX ADMIN — DIPHENHYDRAMINE HYDROCHLORIDE 25 MG: 25 CAPSULE ORAL at 14:14

## 2020-04-02 RX ADMIN — Medication 2 TABLET: at 04:55

## 2020-04-02 RX ADMIN — LEVOTHYROXINE SODIUM 200 MCG: 100 TABLET ORAL at 06:11

## 2020-04-02 RX ADMIN — SODIUM BICARBONATE 650 MG: 650 TABLET ORAL at 09:58

## 2020-04-02 RX ADMIN — SODIUM CHLORIDE, PRESERVATIVE FREE 10 ML: 5 INJECTION INTRAVENOUS at 20:05

## 2020-04-02 RX ADMIN — SODIUM CHLORIDE 100 ML/HR: 9 INJECTION, SOLUTION INTRAVENOUS at 22:38

## 2020-04-02 RX ADMIN — PROMETHAZINE HYDROCHLORIDE 12.5 MG: 25 INJECTION INTRAMUSCULAR; INTRAVENOUS at 20:32

## 2020-04-02 RX ADMIN — PROMETHAZINE HYDROCHLORIDE 12.5 MG: 25 INJECTION INTRAMUSCULAR; INTRAVENOUS at 04:55

## 2020-04-02 RX ADMIN — SODIUM BICARBONATE 650 MG: 650 TABLET ORAL at 16:50

## 2020-04-02 RX ADMIN — GUAIFENESIN AND DEXTROMETHORPHAN 5 ML: 100; 10 SYRUP ORAL at 17:45

## 2020-04-02 RX ADMIN — GUAIFENESIN AND DEXTROMETHORPHAN 5 ML: 100; 10 SYRUP ORAL at 11:49

## 2020-04-02 RX ADMIN — PROMETHAZINE HYDROCHLORIDE 12.5 MG: 25 INJECTION INTRAMUSCULAR; INTRAVENOUS at 11:59

## 2020-04-02 NOTE — PLAN OF CARE
Vitals stable. Afebrile. On room air. Fatigued, vomiting after coughing clear liquid. Not eating much. Patient c/o 'boils' and wanting something for them. Dr. Cruz to address tomorrow. Awaiting covid-19 results.

## 2020-04-02 NOTE — PLAN OF CARE
Problem: Patient Care Overview  Goal: Plan of Care Review  Outcome: Ongoing (interventions implemented as appropriate)  Flowsheets (Taken 4/2/2020 0512)  Plan of Care Reviewed With: patient  Outcome Summary: pt A/O x 4. NSR on monitor. BPs running lower at night. afebrile. medicated for nausea with IV phenergran per MAR. PRN cough medicine given. no SOB. covid19 results still pending. Will CTM.

## 2020-04-02 NOTE — PROGRESS NOTES
Discharge Planning Assessment  [facility]     Patient Name: Oralia Sánchez  MRN: [id number]  Today's Date: 4/2/2020    Admit Date: 4/1/2020    Discharge Needs Assessment     Row Name 04/02/20 1150       Living Environment    Lives With  child(nicci), adult;child(nicci), dependent    Current Living Arrangements  home/apartment/condo    Primary Care Provided by  self    Able to Return to Prior Arrangements  yes       Transition Planning    Patient/Family Anticipates Transition to  home with family    Patient/Family Anticipated Services at Transition  none    Transportation Anticipated  family or friend will provide       Discharge Needs Assessment    Concerns to be Addressed  adjustment to diagnosis/illness;basic needs    Equipment Currently Used at Home  bipap/cpap;glucometer        Discharge Plan     Row Name 04/02/20 1151       Plan    Plan  Plans are home.     Plan Comments  CCP spoke with pt via phone.  Confirmed face sheet and primary pharmacy as [facility] [address].  Pt states she is independent with ADL's / drives.  She says she is compliant with CPAP, and has a glucometer, but does not use that.  Pt also does not have a PMD, but says she is covered by her specialty MD's.  Plans are home.  Instructed that CCP would cont to follow and offer assist as needed. Herlinda Knapp RN        Destination      Coordination has not been started for this encounter.      Durable Medical Equipment      Coordination has not been started for this encounter.      Dialysis/Infusion      Coordination has not been started for this encounter.      Home Medical Care      Coordination has not been started for this encounter.      Therapy      Coordination has not been started for this encounter.      Community Resources      Coordination has not been started for this encounter.          Demographic Summary    No documentation.       Functional Status     Row Name 04/02/20 1149       Functional Status    Current Activity Tolerance  good        Functional Status, IADL    Medications  independent        Psychosocial    No documentation.       Abuse/Neglect    No documentation.       Legal     Row Name 04/02/20 1149       Financial/Legal    Who Manages Finances if Patient Unable  no living will or HCS         Substance Abuse    No documentation.       Patient Forms    No documentation.           Herlinda Knapp RN

## 2020-04-02 NOTE — PROGRESS NOTES
"DAILY PROGRESS NOTE  [facility]    Patient Identification:  Name: Oralia Sánchez  Age: 44 y.o.  Sex: female  :  1975  MRN: [id number]         Primary Care Physician: Provider, No Known    Subjective:  Interval History:She has a bad cough and is short of air. Not requiring any O 2.    Objective:    Scheduled Meds:  azithromycin 500 mg Intravenous Q24H   cefTRIAXone 1 g Intravenous Q24H   insulin lispro 0-7 Units Subcutaneous 4x Daily With Meals & Nightly   levothyroxine 200 mcg Oral Daily   sodium bicarbonate 650 mg Oral TID   sodium chloride 10 mL Intravenous Q12H     Continuous Infusions:  sodium chloride 100 mL/hr Last Rate: 100 mL/hr (20 1158)       Vital signs in last 24 hours:  Temp:  [96.4 °F (35.8 °C)-99.2 °F (37.3 °C)] 96.4 °F (35.8 °C)  Heart Rate:  [] 99  Resp:  [18-20] 18  BP: ()/(47-63) 106/63    Intake/Output:  No intake or output data in the 24 hours ending 20 1342    Exam:  /63   Pulse 99   Temp 96.4 °F (35.8 °C) (Oral)   Resp 18   Ht 154.9 cm (61\")   Wt 108 kg (237 lb)   LMP 2020   SpO2 97%   BMI 44.78 kg/m²     General Appearance:    Alert, cooperative, no distress   Head:    Normocephalic, without obvious abnormality, atraumatic   Eyes:       Throat:   Lips, tongue, gums normal   Neck:   Supple, symmetrical, trachea midline, no JVD   Lungs:     Crackles and wheezes. bilaterally, respirations unlabored   Chest Wall:    No tenderness or deformity    Heart:    Regular rate and rhythm, S1 and S2 normal, no murmur,no  Rub or gallop   Abdomen:     Soft, non-tender, bowel sounds active, no masses, no organomegaly    Extremities:   Extremities normal, atraumatic, no cyanosis or edema   Pulses:      Skin:   Skin is warm and dry,  no rashes or palpable lesions   Neurologic:   no focal deficits noted      Lab Results (last 72 hours)     Procedure Component Value Units Date/Time    POC Glucose Once [[id number]]  (Abnormal) Collected:  " 04/02/20 1147    Specimen:  Blood Updated:  04/02/20 1148     Glucose 144 mg/dL     Comprehensive Metabolic Panel [[id number]]  (Abnormal) Collected:  04/02/20 0634    Specimen:  Blood Updated:  04/02/20 0736     Glucose 117 mg/dL      BUN 8 mg/dL      Creatinine 0.76 mg/dL      Sodium 136 mmol/L      Potassium 3.8 mmol/L      Chloride 90 mmol/L      CO2 20.2 mmol/L      Calcium 7.8 mg/dL      Total Protein 5.9 g/dL      Albumin 3.40 g/dL      ALT (SGPT) 49 U/L      AST (SGOT) 97 U/L      Alkaline Phosphatase 103 U/L      Total Bilirubin 1.3 mg/dL      eGFR Non African Amer 82 mL/min/1.73      Globulin 2.5 gm/dL      A/G Ratio 1.4 g/dL      BUN/Creatinine Ratio 10.5     Anion Gap 25.8 mmol/L     Narrative:       GFR Normal >60  Chronic Kidney Disease <60  Kidney Failure <15      Ferritin [[id number]]  (Abnormal) Collected:  04/02/20 0634    Specimen:  Blood Updated:  04/02/20 0726     Ferritin 803.00 ng/mL     Narrative:       Results may be falsely decreased if patient taking Biotin.      Lactate Dehydrogenase [[id number]]  (Abnormal) Collected:  04/02/20 0634    Specimen:  Blood Updated:  04/02/20 0722      U/L     CBC & Differential [[id number]] Collected:  04/02/20 0639    Specimen:  Blood Updated:  04/02/20 0652    Narrative:       The following orders were created for panel order CBC & Differential.  Procedure                               Abnormality         Status                     ---------                               -----------         ------                     CBC Auto Differential[[id number]]        Abnormal            Final result                 Please view results for these tests on the individual orders.    CBC Auto Differential [[id number]]  (Abnormal) Collected:  04/02/20 0639    Specimen:  Blood Updated:  04/02/20 0652     WBC 7.38 10*3/mm3      RBC 3.95 10*6/mm3      Hemoglobin 14.2 g/dL      Hematocrit 40.1 %      .5 fL      MCH 35.9 pg      MCHC 35.4 g/dL      RDW 14.3 %       RDW-SD 53.6 fl      MPV 9.4 fL      Platelets 195 10*3/mm3      Neutrophil % 65.8 %      Lymphocyte % 24.5 %      Monocyte % 8.1 %      Eosinophil % 0.3 %      Basophil % 0.8 %      Immature Grans % 0.5 %      Neutrophils, Absolute 4.85 10*3/mm3      Lymphocytes, Absolute 1.81 10*3/mm3      Monocytes, Absolute 0.60 10*3/mm3      Eosinophils, Absolute 0.02 10*3/mm3      Basophils, Absolute 0.06 10*3/mm3      Immature Grans, Absolute 0.04 10*3/mm3      nRBC 0.4 /100 WBC     POC Glucose Once [[id number]]  (Normal) Collected:  04/02/20 0616    Specimen:  Blood Updated:  04/02/20 0618     Glucose 116 mg/dL     POC Glucose Once [[id number]]  (Normal) Collected:  04/01/20 2059    Specimen:  Blood Updated:  04/01/20 2059     Glucose 127 mg/dL     Blood Culture - Blood, Arm, Right [[id number]] Collected:  04/01/20 1654    Specimen:  Blood from Arm, Right Updated:  04/01/20 1654    Blood Culture - Blood, Arm, Left [[id number]] Collected:  04/01/20 1654    Specimen:  Blood from Arm, Left Updated:  04/01/20 1654    Respiratory Panel, PCR - Swab, Nasopharynx [[id number]]  (Normal) Collected:  04/01/20 1515    Specimen:  Swab from Nasopharynx Updated:  04/01/20 1653     ADENOVIRUS, PCR Not Detected     Coronavirus 229E Not Detected     Coronavirus HKU1 Not Detected     Coronavirus NL63 Not Detected     Coronavirus OC43 Not Detected     Human Metapneumovirus Not Detected     Human Rhinovirus/Enterovirus Not Detected     Influenza B PCR Not Detected     Parainfluenza Virus 1 Not Detected     Parainfluenza Virus 2 Not Detected     Parainfluenza Virus 3 Not Detected     Parainfluenza Virus 4 Not Detected     Bordetella pertussis pcr Not Detected     Influenza A H1 2009 PCR Not Detected     Chlamydophila pneumoniae PCR Not Detected     Mycoplasma pneumo by PCR Not Detected     Influenza A PCR Not Detected     Influenza A H3 Not Detected     Influenza A H1 Not Detected     RSV, PCR Not Detected     Bordetella parapertussis PCR Not Detected     Narrative:       The coronavirus on the RVP is NOT COVID-19 and is NOT indicative of infection with COVID-19.     POC Glucose Once [[id number]]  (Abnormal) Collected:  04/01/20 1559    Specimen:  Blood Updated:  04/01/20 1601     Glucose 132 mg/dL     Hemoglobin A1c [[id number]]  (Abnormal) Collected:  04/01/20 0809    Specimen:  Blood Updated:  04/01/20 1417     Hemoglobin A1C 6.68 %     Narrative:       Hemoglobin A1C Ranges:    Increased Risk for Diabetes  5.7% to 6.4%  Diabetes                     >= 6.5%  Diabetic Goal                < 7.0%    Salicylate Level [[id number]]  (Normal) Collected:  04/01/20 0809    Specimen:  Blood Updated:  04/01/20 1129     Salicylate <0.3 mg/dL     Narrative:       Therapeutic range for Salicylates:  3.0 - 10.0 mg/dL for antipyretic/analgesic conditions  15.0 - 30.0 mg/dL for anti-inflammatory conditions    CORONAVIRUS(COVID-19),RT-PCR,UOFL LAB,NP/OP Swab in Transport Media - Swab, Nasopharynx [[id number]] Collected:  04/01/20 1049    Specimen:  Swab from Nasopharynx Updated:  04/01/20 1101    Blood Gas, Arterial [[id number]]  (Abnormal) Collected:  04/01/20 1051    Specimen:  Arterial Blood Updated:  04/01/20 1053     Site Arterial: left radial     Jovi's Test Positive     pH, Arterial 7.477 pH units      pCO2, Arterial 28.2 mm Hg      pO2, Arterial 99.2 mm Hg      HCO3, Arterial 20.9 mmol/L      Base Excess, Arterial -1.0 mmol/L      O2 Saturation Calculated 98.3 %      Barometric Pressure for Blood Gas 753.0 mmHg      Modality Room Air     Set Mech Resp Rate 28    Ethanol [[id number]] Collected:  04/01/20 0809    Specimen:  Blood Updated:  04/01/20 0951     Ethanol <10 mg/dL      Ethanol % <0.010 %     Ferritin [[id number]]  (Abnormal) Collected:  04/01/20 0809    Specimen:  Blood Updated:  04/01/20 0946     Ferritin 821.00 ng/mL     Narrative:       Results may be falsely decreased if patient taking Biotin.      [place] Draw [[id number]] Collected:  04/01/20 0809    Specimen:   Blood Updated:  04/01/20 0915    Narrative:       The following orders were created for panel order [place] Draw.  Procedure                               Abnormality         Status                     ---------                               -----------         ------                     Light Blue Top[[id number]]                                   Final result               Green Top (Gel)[[id number]]                                  Final result               Lavender Top[[id number]]                                     Final result               Gold Top - SST[[id number]]                                   Final result                 Please view results for these tests on the individual orders.    Green Top (Gel) [[id number]] Collected:  04/01/20 0809    Specimen:  Blood Updated:  04/01/20 0915     Extra Tube Hold for add-ons.     Comment: Auto resulted.       Lavender Top [[id number]] Collected:  04/01/20 0809    Specimen:  Blood Updated:  04/01/20 0915     Extra Tube hold for add-on     Comment: Auto resulted       Light Blue Top [[id number]] Collected:  04/01/20 0809    Specimen:  Blood Updated:  04/01/20 0915     Extra Tube hold for add-on     Comment: Auto resulted       Gold Top - SST [[id number]] Collected:  04/01/20 0809    Specimen:  Blood Updated:  04/01/20 0915     Extra Tube Hold for add-ons.     Comment: Auto resulted.       Procalcitonin [[id number]]  (Normal) Collected:  04/01/20 0809    Specimen:  Blood Updated:  04/01/20 0846     Procalcitonin 0.16 ng/mL     Narrative:       As a Marker for Sepsis (Non-Neonates):   1. <0.5 ng/mL represents a low risk of severe sepsis and/or septic shock.  1. >2 ng/mL represents a high risk of severe sepsis and/or septic shock.    As a Marker for Lower Respiratory Tract Infections that require antibiotic therapy:  PCT on Admission     Antibiotic Therapy             6-12 Hrs later  > 0.5                Strongly Recommended            >0.25 - <0.5         Recommended  0.1 -  "0.25           Discouraged                   Remeasure/reassess PCT  <0.1                 Strongly Discouraged          Remeasure/reassess PCT      As 28 day mortality risk marker: \"Change in Procalcitonin Result\" (> 80 % or <=80 %) if Day 0 (or Day 1) and Day 4 values are available. Refer to http://www.[employer]pct-calculator.com/   Change in PCT <=80 %   A decrease of PCT levels below or equal to 80 % defines a positive change in PCT test result representing a higher risk for 28-day all-cause mortality of patients diagnosed with severe sepsis or septic shock.  Change in PCT > 80 %   A decrease of PCT levels of more than 80 % defines a negative change in PCT result representing a lower risk for 28-day all-cause mortality of patients diagnosed with severe sepsis or septic shock.                Results may be falsely decreased if patient taking Biotin.     Comprehensive Metabolic Panel [[id number]]  (Abnormal) Collected:  04/01/20 0809    Specimen:  Blood Updated:  04/01/20 0841     Glucose 127 mg/dL      BUN 3 mg/dL      Creatinine 0.85 mg/dL      Sodium 135 mmol/L      Potassium 3.2 mmol/L      Chloride 84 mmol/L      CO2 18.7 mmol/L      Calcium 8.5 mg/dL      Total Protein 7.1 g/dL      Albumin 3.70 g/dL      ALT (SGPT) 65 U/L      AST (SGOT) 124 U/L      Alkaline Phosphatase 128 U/L      Total Bilirubin 1.4 mg/dL      eGFR Non African Amer 72 mL/min/1.73      Globulin 3.4 gm/dL      A/G Ratio 1.1 g/dL      BUN/Creatinine Ratio 3.5     Anion Gap 32.3 mmol/L     Narrative:       GFR Normal >60  Chronic Kidney Disease <60  Kidney Failure <15      C-reactive Protein [[id number]]  (Abnormal) Collected:  04/01/20 0809    Specimen:  Blood Updated:  04/01/20 0841     C-Reactive Protein 0.63 mg/dL     Lactate Dehydrogenase [[id number]]  (Abnormal) Collected:  04/01/20 0809    Specimen:  Blood Updated:  04/01/20 0841      U/L     Magnesium [[id number]]  (Abnormal) Collected:  04/01/20 0809    Specimen:  Blood Updated:  " 04/01/20 0841     Magnesium 1.1 mg/dL     Troponin [[id number]]  (Normal) Collected:  04/01/20 0809    Specimen:  Blood Updated:  04/01/20 0841     Troponin T <0.010 ng/mL     Narrative:       Troponin T Reference Range:  <= 0.03 ng/mL-   Negative for AMI  >0.03 ng/mL-     Abnormal for myocardial necrosis.  Clinicians would have to utilize clinical acumen, EKG, Troponin and serial changes to determine if it is an Acute Myocardial Infarction or myocardial injury due to an underlying chronic condition.       Results may be falsely decreased if patient taking Biotin.      CBC Auto Differential [[id number]]  (Abnormal) Collected:  04/01/20 0809    Specimen:  Blood Updated:  04/01/20 0828     WBC 8.04 10*3/mm3      RBC 4.48 10*6/mm3      Hemoglobin 16.3 g/dL      Hematocrit 46.2 %      .1 fL      MCH 36.4 pg      MCHC 35.3 g/dL      RDW 15.4 %      RDW-SD 57.9 fl      MPV 9.5 fL      Platelets 244 10*3/mm3      Neutrophil % 68.8 %      Lymphocyte % 22.6 %      Monocyte % 7.2 %      Eosinophil % 0.4 %      Basophil % 0.6 %      Immature Grans % 0.4 %      Neutrophils, Absolute 5.53 10*3/mm3      Lymphocytes, Absolute 1.82 10*3/mm3      Monocytes, Absolute 0.58 10*3/mm3      Eosinophils, Absolute 0.03 10*3/mm3      Basophils, Absolute 0.05 10*3/mm3      Immature Grans, Absolute 0.03 10*3/mm3      nRBC 0.5 /100 WBC         Data Review:  Results from last 7 days   Lab Units 04/02/20  0634 04/01/20 0809   SODIUM mmol/L 136 135*   POTASSIUM mmol/L 3.8 3.2*   CHLORIDE mmol/L 90* 84*   CO2 mmol/L 20.2* 18.7*   BUN mg/dL 8 3*   CREATININE mg/dL 0.76 0.85   GLUCOSE mg/dL 117* 127*   CALCIUM mg/dL 7.8* 8.5*     Results from last 7 days   Lab Units 04/02/20  0639 04/01/20 0809   WBC 10*3/mm3 7.38 8.04   HEMOGLOBIN g/dL 14.2 16.3*   HEMATOCRIT % 40.1 46.2   PLATELETS 10*3/mm3 195 244         Results from last 7 days   Lab Units 04/01/20  0809   HEMOGLOBIN A1C % 6.68*     Lab Results   Lab Value Date/Time    TROPONINT <0.010  04/01/2020 0809    TROPONINT <0.010 11/10/2019 1525         Results from last 7 days   Lab Units 04/02/20  0634 04/01/20  0809   ALK PHOS U/L 103 128*   BILIRUBIN mg/dL 1.3* 1.4*   ALT (SGPT) U/L 49* 65*   AST (SGOT) U/L 97* 124*         Results from last 7 days   Lab Units 04/01/20  0809   HEMOGLOBIN A1C % 6.68*     Glucose   Date/Time Value Ref Range Status   04/02/2020 1147 144 (H) 70 - 130 mg/dL Final   04/02/2020 0616 116 70 - 130 mg/dL Final   04/01/2020 2059 127 70 - 130 mg/dL Final   04/01/2020 1559 132 (H) 70 - 130 mg/dL Final           Past Medical History:   Diagnosis Date   • Aneurysm (CMS/HCC)    • Arthritis    • Carpal tunnel syndrome    • Chest pain    • Diabetes mellitus (CMS/HCC)    • Dysmetabolic syndrome X    • Gestational diabetes mellitus    • Hypothyroidism    • Metabolic disorder    • Nonalcoholic steatohepatitis    • Obesity    • Palpitations    • Sleep apnea    • Spinal headache    • Type 2 diabetes mellitus (CMS/HCC)    • Vitamin D deficiency        Assessment:  Active Hospital Problems    Diagnosis  POA   • **Dyspnea [R06.00]  Yes   • Cough [R05]  Unknown   • Hypomagnesemia [E83.42]  Unknown   • Metabolic acidosis [E87.2]  Unknown   • Fatigue [R53.83]  Unknown   • History of COPD [Z87.09]  Not Applicable   • Tobacco abuse [Z72.0]  Yes   • Rheumatoid arthritis (CMS/HCC) [M06.9]  Yes   • Type 2 diabetes mellitus (CMS/HCC) [E11.9]  Yes   • Morbid obesity (CMS/HCC) [E66.01]  Yes   • Elevated LFTs [R94.5]  Yes   • YOUNG (nonalcoholic steatohepatitis) [K75.81]  Yes   • Diabetes mellitus arising in pregnancy [O24.419]  Yes   • Hypothyroid [E03.9]  Yes      Resolved Hospital Problems   No resolved problems to display.       Plan:  Continue with IV antibiotics and follow lab. Await cultures and COVID 19 test.    Tylor Cruz MD  4/2/2020  13:42

## 2020-04-02 NOTE — CONSULTS
Adult Nutrition  Assessment/PES    Patient Name:  Oralia Sánchez  YOB: 1975  MRN: [id number]  Admit Date:  4/1/2020    Assessment Date:  4/2/2020    Comments: Ms Sánchez is a 44-year-old female with history of arthritis, diabetes, hypothyroidism, Mendez, sleep apnea and obesity who was admitted with a one-week history of a bad cough with shortness of air and some fevers. She is on a Consist Carb diet. Wt stable. BMI 44.78.    Reason for Assessment     Row Name 04/02/20 0805          Reason for Assessment    Reason For Assessment  identified at risk by screening criteria     Diagnosis  pulmonary disease SOA, nausea, DM, obesity, liver disease, hypothyroidism COPD         Nutrition/Diet History     Row Name 04/02/20 0818          Nutrition/Diet History    Typical Food/Fluid Intake  appetite usually fine, has been down the last couple of days, c/o nausea         Anthropometrics     Row Name 04/02/20 0806          Body Mass Index (BMI)    BMI Assessment  BMI 40 or greater: obesity grade III         Labs/Tests/Procedures/Meds     Row Name 04/02/20 0809 04/02/20 0807       Labs/Procedures/Meds    Lab Results Reviewed  --  reviewed    Lab Results Comments  ferritin, CRP, Tbili, LDH, A1C, na, k  --       Diagnostic Tests/Procedures    Diagnostic Test/Procedure Reviewed  --  reviewed       Medications    Pertinent Medications Reviewed  --  reviewed    Pertinent Medications Comments  abx, insulin, synthroid, IVF  --        Physical Findings     Row Name 04/02/20 0810          Physical Findings    Overall Physical Appearance  obese     Gastrointestinal  nausea     Skin  -- matti 17           Nutrition Prescription Ordered     Row Name 04/02/20 0813          Nutrition Prescription PO    Common Modifiers  Consistent Carbohydrate                 Problem/Interventions:  Problem 1     Row Name 04/02/20 0818          Nutrition Diagnoses Problem 1    Problem 1  Predicted Suboptimal Intake     Etiology (related to)   Medical Diagnosis               Intervention Goal     Row Name 04/02/20 0819          Intervention Goal    General  Maintain nutrition     PO  Tolerate PO;PO intake (%)     PO Intake %  75 %     Weight  No significant weight loss         Nutrition Intervention     Row Name 04/02/20 0819          Nutrition Intervention    RD/Tech Action  Follow Tx progress;Care plan reviewd           Education/Evaluation     Row Name 04/02/20 0819          Education    Education  Will Instruct as appropriate        Monitor/Evaluation    Monitor  Per protocol           Electronically signed by:  Cara Krause RD  04/02/20 08:28

## 2020-04-03 LAB
ALBUMIN SERPL-MCNC: 3.4 G/DL (ref 3.5–5.2)
ALBUMIN/GLOB SERPL: 1.2 G/DL
ALP SERPL-CCNC: 132 U/L (ref 39–117)
ALT SERPL W P-5'-P-CCNC: 64 U/L (ref 1–33)
ANION GAP SERPL CALCULATED.3IONS-SCNC: 28.4 MMOL/L (ref 5–15)
AST SERPL-CCNC: 177 U/L (ref 1–32)
BASOPHILS # BLD AUTO: 0.02 10*3/MM3 (ref 0–0.2)
BASOPHILS NFR BLD AUTO: 0.3 % (ref 0–1.5)
BILIRUB SERPL-MCNC: 1.6 MG/DL (ref 0.2–1.2)
BUN BLD-MCNC: 13 MG/DL (ref 6–20)
BUN/CREAT SERPL: 16.5 (ref 7–25)
CALCIUM SPEC-SCNC: 8.2 MG/DL (ref 8.6–10.5)
CHLORIDE SERPL-SCNC: 87 MMOL/L (ref 98–107)
CO2 SERPL-SCNC: 17.6 MMOL/L (ref 22–29)
CREAT BLD-MCNC: 0.79 MG/DL (ref 0.57–1)
CRP SERPL-MCNC: 0.55 MG/DL (ref 0–0.5)
D-LACTATE SERPL-SCNC: 11.3 MMOL/L (ref 0.5–2)
D-LACTATE SERPL-SCNC: 11.8 MMOL/L (ref 0.5–2)
DEPRECATED RDW RBC AUTO: 55.2 FL (ref 37–54)
EOSINOPHIL # BLD AUTO: 0.01 10*3/MM3 (ref 0–0.4)
EOSINOPHIL NFR BLD AUTO: 0.1 % (ref 0.3–6.2)
ERYTHROCYTE [DISTWIDTH] IN BLOOD BY AUTOMATED COUNT: 14.7 % (ref 12.3–15.4)
FERRITIN SERPL-MCNC: 1210 NG/ML (ref 13–150)
GFR SERPL CREATININE-BSD FRML MDRD: 78 ML/MIN/1.73
GLOBULIN UR ELPH-MCNC: 2.8 GM/DL
GLUCOSE BLD-MCNC: 129 MG/DL (ref 65–99)
GLUCOSE BLDC GLUCOMTR-MCNC: 144 MG/DL (ref 70–130)
GLUCOSE BLDC GLUCOMTR-MCNC: 148 MG/DL (ref 70–130)
GLUCOSE BLDC GLUCOMTR-MCNC: 161 MG/DL (ref 70–130)
HCT VFR BLD AUTO: 43.4 % (ref 34–46.6)
HGB BLD-MCNC: 15.1 G/DL (ref 12–15.9)
IMM GRANULOCYTES # BLD AUTO: 0.04 10*3/MM3 (ref 0–0.05)
IMM GRANULOCYTES NFR BLD AUTO: 0.5 % (ref 0–0.5)
LACTATE HOLD SPECIMEN: NORMAL
LYMPHOCYTES # BLD AUTO: 1.52 10*3/MM3 (ref 0.7–3.1)
LYMPHOCYTES NFR BLD AUTO: 19 % (ref 19.6–45.3)
MCH RBC QN AUTO: 35.7 PG (ref 26.6–33)
MCHC RBC AUTO-ENTMCNC: 34.8 G/DL (ref 31.5–35.7)
MCV RBC AUTO: 102.6 FL (ref 79–97)
MONOCYTES # BLD AUTO: 0.56 10*3/MM3 (ref 0.1–0.9)
MONOCYTES NFR BLD AUTO: 7 % (ref 5–12)
NEUTROPHILS # BLD AUTO: 5.85 10*3/MM3 (ref 1.7–7)
NEUTROPHILS NFR BLD AUTO: 73.1 % (ref 42.7–76)
NRBC BLD AUTO-RTO: 0.6 /100 WBC (ref 0–0.2)
PLATELET # BLD AUTO: 208 10*3/MM3 (ref 140–450)
PMV BLD AUTO: 9.5 FL (ref 6–12)
POTASSIUM BLD-SCNC: 3.6 MMOL/L (ref 3.5–5.2)
PROT SERPL-MCNC: 6.2 G/DL (ref 6–8.5)
RBC # BLD AUTO: 4.23 10*6/MM3 (ref 3.77–5.28)
SODIUM BLD-SCNC: 133 MMOL/L (ref 136–145)
WBC NRBC COR # BLD: 8 10*3/MM3 (ref 3.4–10.8)

## 2020-04-03 PROCEDURE — 82962 GLUCOSE BLOOD TEST: CPT

## 2020-04-03 PROCEDURE — 25010000002 PROMETHAZINE PER 50 MG: Performed by: HOSPITALIST

## 2020-04-03 PROCEDURE — 80053 COMPREHEN METABOLIC PANEL: CPT | Performed by: HOSPITALIST

## 2020-04-03 PROCEDURE — 82803 BLOOD GASES ANY COMBINATION: CPT

## 2020-04-03 PROCEDURE — 82728 ASSAY OF FERRITIN: CPT | Performed by: HOSPITALIST

## 2020-04-03 PROCEDURE — 63710000001 DIPHENHYDRAMINE PER 50 MG: Performed by: HOSPITALIST

## 2020-04-03 PROCEDURE — 85025 COMPLETE CBC W/AUTO DIFF WBC: CPT | Performed by: HOSPITALIST

## 2020-04-03 PROCEDURE — 36600 WITHDRAWAL OF ARTERIAL BLOOD: CPT

## 2020-04-03 PROCEDURE — 83605 ASSAY OF LACTIC ACID: CPT | Performed by: HOSPITALIST

## 2020-04-03 PROCEDURE — 86140 C-REACTIVE PROTEIN: CPT | Performed by: HOSPITALIST

## 2020-04-03 PROCEDURE — 25010000002 CEFTRIAXONE PER 250 MG: Performed by: HOSPITALIST

## 2020-04-03 RX ORDER — SUCRALFATE ORAL 1 G/10ML
1 SUSPENSION ORAL
Status: DISCONTINUED | OUTPATIENT
Start: 2020-04-03 | End: 2020-04-20 | Stop reason: HOSPADM

## 2020-04-03 RX ADMIN — SODIUM CHLORIDE 100 ML/HR: 9 INJECTION, SOLUTION INTRAVENOUS at 19:49

## 2020-04-03 RX ADMIN — SODIUM BICARBONATE 650 MG: 650 TABLET ORAL at 19:48

## 2020-04-03 RX ADMIN — LEVOTHYROXINE SODIUM 200 MCG: 100 TABLET ORAL at 05:22

## 2020-04-03 RX ADMIN — SODIUM BICARBONATE 650 MG: 650 TABLET ORAL at 09:14

## 2020-04-03 RX ADMIN — Medication 2 TABLET: at 19:53

## 2020-04-03 RX ADMIN — SODIUM BICARBONATE 650 MG: 650 TABLET ORAL at 16:49

## 2020-04-03 RX ADMIN — DIPHENHYDRAMINE HYDROCHLORIDE 25 MG: 25 CAPSULE ORAL at 22:21

## 2020-04-03 RX ADMIN — ACETAMINOPHEN 650 MG: 325 TABLET, FILM COATED ORAL at 09:13

## 2020-04-03 RX ADMIN — GUAIFENESIN AND DEXTROMETHORPHAN 5 ML: 100; 10 SYRUP ORAL at 09:13

## 2020-04-03 RX ADMIN — PROMETHAZINE HYDROCHLORIDE 12.5 MG: 25 INJECTION INTRAMUSCULAR; INTRAVENOUS at 16:43

## 2020-04-03 RX ADMIN — AZITHROMYCIN DIHYDRATE 500 MG: 500 INJECTION, POWDER, LYOPHILIZED, FOR SOLUTION INTRAVENOUS at 17:00

## 2020-04-03 RX ADMIN — CEFTRIAXONE SODIUM 1 G: 1 INJECTION, SOLUTION INTRAVENOUS at 16:50

## 2020-04-03 RX ADMIN — PROMETHAZINE HYDROCHLORIDE 12.5 MG: 25 INJECTION INTRAMUSCULAR; INTRAVENOUS at 09:13

## 2020-04-03 RX ADMIN — SODIUM CHLORIDE, PRESERVATIVE FREE 10 ML: 5 INJECTION INTRAVENOUS at 19:49

## 2020-04-03 NOTE — PROGRESS NOTES
"DAILY PROGRESS NOTE  [facility]    Patient Identification:  Name: Oralia Sánchez  Age: 44 y.o.  Sex: female  :  1975  MRN: [id number]         Primary Care Physician: Provider, No Known    Subjective:  Interval History:She has a bad cough and is short of air. Not requiring any O 2. Complains of boil in groin area.    Objective:    Scheduled Meds:    azithromycin 500 mg Intravenous Q24H   cefTRIAXone 1 g Intravenous Q24H   insulin lispro 0-7 Units Subcutaneous 4x Daily With Meals & Nightly   levothyroxine 200 mcg Oral Daily   sodium bicarbonate 650 mg Oral TID   sodium chloride 10 mL Intravenous Q12H     Continuous Infusions:    sodium chloride 100 mL/hr Last Rate: 100 mL/hr (20 0502)       Vital signs in last 24 hours:  Temp:  [98 °F (36.7 °C)-98.6 °F (37 °C)] 98 °F (36.7 °C)  Heart Rate:  [101-105] 101  Resp:  [20] 20  BP: (102-105)/(43-49) 105/49    Intake/Output:  No intake or output data in the 24 hours ending 20 1501    Exam:  /49 (BP Location: Right arm, Patient Position: Lying)   Pulse 101   Temp 98 °F (36.7 °C) (Oral)   Resp 20   Ht 154.9 cm (61\")   Wt 108 kg (237 lb)   LMP 2020   SpO2 95%   BMI 44.78 kg/m²     General Appearance:    Alert, cooperative, no distress   Head:    Normocephalic, without obvious abnormality, atraumatic   Eyes:       Throat:   Lips, tongue, gums normal   Neck:   Supple, symmetrical, trachea midline, no JVD   Lungs:     Crackles and wheezes. bilaterally, respirations unlabored   Chest Wall:    No tenderness or deformity    Heart:    Regular rate and rhythm, S1 and S2 normal, no murmur,no  Rub or gallop   Abdomen:     Soft, non-tender, bowel sounds active, no masses, no organomegaly    Extremities:   Extremities normal, atraumatic, no cyanosis or edema   Pulses:      Skin:   Skin is warm and dry,  Boil in pubic area   Neurologic:   no focal deficits noted      Lab Results (last 72 hours)     Procedure Component Value Units " Date/Time    POC Glucose Once [[id number]]  (Abnormal) Collected:  04/02/20 1147    Specimen:  Blood Updated:  04/02/20 1148     Glucose 144 mg/dL     Comprehensive Metabolic Panel [[id number]]  (Abnormal) Collected:  04/02/20 0634    Specimen:  Blood Updated:  04/02/20 0736     Glucose 117 mg/dL      BUN 8 mg/dL      Creatinine 0.76 mg/dL      Sodium 136 mmol/L      Potassium 3.8 mmol/L      Chloride 90 mmol/L      CO2 20.2 mmol/L      Calcium 7.8 mg/dL      Total Protein 5.9 g/dL      Albumin 3.40 g/dL      ALT (SGPT) 49 U/L      AST (SGOT) 97 U/L      Alkaline Phosphatase 103 U/L      Total Bilirubin 1.3 mg/dL      eGFR Non African Amer 82 mL/min/1.73      Globulin 2.5 gm/dL      A/G Ratio 1.4 g/dL      BUN/Creatinine Ratio 10.5     Anion Gap 25.8 mmol/L     Narrative:       GFR Normal >60  Chronic Kidney Disease <60  Kidney Failure <15      Ferritin [[id number]]  (Abnormal) Collected:  04/02/20 0634    Specimen:  Blood Updated:  04/02/20 0726     Ferritin 803.00 ng/mL     Narrative:       Results may be falsely decreased if patient taking Biotin.      Lactate Dehydrogenase [[id number]]  (Abnormal) Collected:  04/02/20 0634    Specimen:  Blood Updated:  04/02/20 0722      U/L     CBC & Differential [[id number]] Collected:  04/02/20 0639    Specimen:  Blood Updated:  04/02/20 0652    Narrative:       The following orders were created for panel order CBC & Differential.  Procedure                               Abnormality         Status                     ---------                               -----------         ------                     CBC Auto Differential[[id number]]        Abnormal            Final result                 Please view results for these tests on the individual orders.    CBC Auto Differential [[id number]]  (Abnormal) Collected:  04/02/20 0639    Specimen:  Blood Updated:  04/02/20 0652     WBC 7.38 10*3/mm3      RBC 3.95 10*6/mm3      Hemoglobin 14.2 g/dL      Hematocrit 40.1 %      .5  fL      MCH 35.9 pg      MCHC 35.4 g/dL      RDW 14.3 %      RDW-SD 53.6 fl      MPV 9.4 fL      Platelets 195 10*3/mm3      Neutrophil % 65.8 %      Lymphocyte % 24.5 %      Monocyte % 8.1 %      Eosinophil % 0.3 %      Basophil % 0.8 %      Immature Grans % 0.5 %      Neutrophils, Absolute 4.85 10*3/mm3      Lymphocytes, Absolute 1.81 10*3/mm3      Monocytes, Absolute 0.60 10*3/mm3      Eosinophils, Absolute 0.02 10*3/mm3      Basophils, Absolute 0.06 10*3/mm3      Immature Grans, Absolute 0.04 10*3/mm3      nRBC 0.4 /100 WBC     POC Glucose Once [[id number]]  (Normal) Collected:  04/02/20 0616    Specimen:  Blood Updated:  04/02/20 0618     Glucose 116 mg/dL     POC Glucose Once [[id number]]  (Normal) Collected:  04/01/20 2059    Specimen:  Blood Updated:  04/01/20 2059     Glucose 127 mg/dL     Blood Culture - Blood, Arm, Right [[id number]] Collected:  04/01/20 1654    Specimen:  Blood from Arm, Right Updated:  04/01/20 1654    Blood Culture - Blood, Arm, Left [[id number]] Collected:  04/01/20 1654    Specimen:  Blood from Arm, Left Updated:  04/01/20 1654    Respiratory Panel, PCR - Swab, Nasopharynx [[id number]]  (Normal) Collected:  04/01/20 1515    Specimen:  Swab from Nasopharynx Updated:  04/01/20 1653     ADENOVIRUS, PCR Not Detected     Coronavirus 229E Not Detected     Coronavirus HKU1 Not Detected     Coronavirus NL63 Not Detected     Coronavirus OC43 Not Detected     Human Metapneumovirus Not Detected     Human Rhinovirus/Enterovirus Not Detected     Influenza B PCR Not Detected     Parainfluenza Virus 1 Not Detected     Parainfluenza Virus 2 Not Detected     Parainfluenza Virus 3 Not Detected     Parainfluenza Virus 4 Not Detected     Bordetella pertussis pcr Not Detected     Influenza A H1 2009 PCR Not Detected     Chlamydophila pneumoniae PCR Not Detected     Mycoplasma pneumo by PCR Not Detected     Influenza A PCR Not Detected     Influenza A H3 Not Detected     Influenza A H1 Not Detected     RSV,  PCR Not Detected     Bordetella parapertussis PCR Not Detected    Narrative:       The coronavirus on the RVP is NOT COVID-19 and is NOT indicative of infection with COVID-19.     POC Glucose Once [[id number]]  (Abnormal) Collected:  04/01/20 1559    Specimen:  Blood Updated:  04/01/20 1601     Glucose 132 mg/dL     Hemoglobin A1c [[id number]]  (Abnormal) Collected:  04/01/20 0809    Specimen:  Blood Updated:  04/01/20 1417     Hemoglobin A1C 6.68 %     Narrative:       Hemoglobin A1C Ranges:    Increased Risk for Diabetes  5.7% to 6.4%  Diabetes                     >= 6.5%  Diabetic Goal                < 7.0%    Salicylate Level [[id number]]  (Normal) Collected:  04/01/20 0809    Specimen:  Blood Updated:  04/01/20 1129     Salicylate <0.3 mg/dL     Narrative:       Therapeutic range for Salicylates:  3.0 - 10.0 mg/dL for antipyretic/analgesic conditions  15.0 - 30.0 mg/dL for anti-inflammatory conditions    CORONAVIRUS(COVID-19),RT-PCR,UOFL LAB,NP/OP Swab in Transport Media - Swab, Nasopharynx [[id number]] Collected:  04/01/20 1049    Specimen:  Swab from Nasopharynx Updated:  04/01/20 1101    Blood Gas, Arterial [[id number]]  (Abnormal) Collected:  04/01/20 1051    Specimen:  Arterial Blood Updated:  04/01/20 1053     Site Arterial: left radial     Jovi's Test Positive     pH, Arterial 7.477 pH units      pCO2, Arterial 28.2 mm Hg      pO2, Arterial 99.2 mm Hg      HCO3, Arterial 20.9 mmol/L      Base Excess, Arterial -1.0 mmol/L      O2 Saturation Calculated 98.3 %      Barometric Pressure for Blood Gas 753.0 mmHg      Modality Room Air     Set Mech Resp Rate 28    Ethanol [[id number]] Collected:  04/01/20 0809    Specimen:  Blood Updated:  04/01/20 0951     Ethanol <10 mg/dL      Ethanol % <0.010 %     Ferritin [[id number]]  (Abnormal) Collected:  04/01/20 0809    Specimen:  Blood Updated:  04/01/20 0946     Ferritin 821.00 ng/mL     Narrative:       Results may be falsely decreased if patient taking Biotin.       [place] Draw [[id number]] Collected:  04/01/20 0809    Specimen:  Blood Updated:  04/01/20 0915    Narrative:       The following orders were created for panel order [place] Draw.  Procedure                               Abnormality         Status                     ---------                               -----------         ------                     Light Blue Top[[id number]]                                   Final result               Green Top (Gel)[[id number]]                                  Final result               Lavender Top[[id number]]                                     Final result               Gold Top - SST[[id number]]                                   Final result                 Please view results for these tests on the individual orders.    Green Top (Gel) [[id number]] Collected:  04/01/20 0809    Specimen:  Blood Updated:  04/01/20 0915     Extra Tube Hold for add-ons.     Comment: Auto resulted.       Lavender Top [[id number]] Collected:  04/01/20 0809    Specimen:  Blood Updated:  04/01/20 0915     Extra Tube hold for add-on     Comment: Auto resulted       Light Blue Top [[id number]] Collected:  04/01/20 0809    Specimen:  Blood Updated:  04/01/20 0915     Extra Tube hold for add-on     Comment: Auto resulted       Gold Top - SST [[id number]] Collected:  04/01/20 0809    Specimen:  Blood Updated:  04/01/20 0915     Extra Tube Hold for add-ons.     Comment: Auto resulted.       Procalcitonin [[id number]]  (Normal) Collected:  04/01/20 0809    Specimen:  Blood Updated:  04/01/20 0846     Procalcitonin 0.16 ng/mL     Narrative:       As a Marker for Sepsis (Non-Neonates):   1. <0.5 ng/mL represents a low risk of severe sepsis and/or septic shock.  1. >2 ng/mL represents a high risk of severe sepsis and/or septic shock.    As a Marker for Lower Respiratory Tract Infections that require antibiotic therapy:  PCT on Admission     Antibiotic Therapy             6-12 Hrs later  > 0.5                Strongly  "Recommended            >0.25 - <0.5         Recommended  0.1 - 0.25           Discouraged                   Remeasure/reassess PCT  <0.1                 Strongly Discouraged          Remeasure/reassess PCT      As 28 day mortality risk marker: \"Change in Procalcitonin Result\" (> 80 % or <=80 %) if Day 0 (or Day 1) and Day 4 values are available. Refer to [email or web address]   Change in PCT <=80 %   A decrease of PCT levels below or equal to 80 % defines a positive change in PCT test result representing a higher risk for 28-day all-cause mortality of patients diagnosed with severe sepsis or septic shock.  Change in PCT > 80 %   A decrease of PCT levels of more than 80 % defines a negative change in PCT result representing a lower risk for 28-day all-cause mortality of patients diagnosed with severe sepsis or septic shock.                Results may be falsely decreased if patient taking Biotin.     Comprehensive Metabolic Panel [[id number]]  (Abnormal) Collected:  04/01/20 0809    Specimen:  Blood Updated:  04/01/20 0841     Glucose 127 mg/dL      BUN 3 mg/dL      Creatinine 0.85 mg/dL      Sodium 135 mmol/L      Potassium 3.2 mmol/L      Chloride 84 mmol/L      CO2 18.7 mmol/L      Calcium 8.5 mg/dL      Total Protein 7.1 g/dL      Albumin 3.70 g/dL      ALT (SGPT) 65 U/L      AST (SGOT) 124 U/L      Alkaline Phosphatase 128 U/L      Total Bilirubin 1.4 mg/dL      eGFR Non African Amer 72 mL/min/1.73      Globulin 3.4 gm/dL      A/G Ratio 1.1 g/dL      BUN/Creatinine Ratio 3.5     Anion Gap 32.3 mmol/L     Narrative:       GFR Normal >60  Chronic Kidney Disease <60  Kidney Failure <15      C-reactive Protein [[id number]]  (Abnormal) Collected:  04/01/20 0809    Specimen:  Blood Updated:  04/01/20 0841     C-Reactive Protein 0.63 mg/dL     Lactate Dehydrogenase [[id number]]  (Abnormal) Collected:  04/01/20 0809    Specimen:  Blood Updated:  04/01/20 0841      U/L     Magnesium [[id number]]  " (Abnormal) Collected:  04/01/20 0809    Specimen:  Blood Updated:  04/01/20 0841     Magnesium 1.1 mg/dL     Troponin [[id number]]  (Normal) Collected:  04/01/20 0809    Specimen:  Blood Updated:  04/01/20 0841     Troponin T <0.010 ng/mL     Narrative:       Troponin T Reference Range:  <= 0.03 ng/mL-   Negative for AMI  >0.03 ng/mL-     Abnormal for myocardial necrosis.  Clinicians would have to utilize clinical acumen, EKG, Troponin and serial changes to determine if it is an Acute Myocardial Infarction or myocardial injury due to an underlying chronic condition.       Results may be falsely decreased if patient taking Biotin.      CBC Auto Differential [[id number]]  (Abnormal) Collected:  04/01/20 0809    Specimen:  Blood Updated:  04/01/20 0828     WBC 8.04 10*3/mm3      RBC 4.48 10*6/mm3      Hemoglobin 16.3 g/dL      Hematocrit 46.2 %      .1 fL      MCH 36.4 pg      MCHC 35.3 g/dL      RDW 15.4 %      RDW-SD 57.9 fl      MPV 9.5 fL      Platelets 244 10*3/mm3      Neutrophil % 68.8 %      Lymphocyte % 22.6 %      Monocyte % 7.2 %      Eosinophil % 0.4 %      Basophil % 0.6 %      Immature Grans % 0.4 %      Neutrophils, Absolute 5.53 10*3/mm3      Lymphocytes, Absolute 1.82 10*3/mm3      Monocytes, Absolute 0.58 10*3/mm3      Eosinophils, Absolute 0.03 10*3/mm3      Basophils, Absolute 0.05 10*3/mm3      Immature Grans, Absolute 0.03 10*3/mm3      nRBC 0.5 /100 WBC         Data Review:  Results from last 7 days   Lab Units 04/03/20  0620 04/02/20  0634 04/01/20 0809   SODIUM mmol/L 133* 136 135*   POTASSIUM mmol/L 3.6 3.8 3.2*   CHLORIDE mmol/L 87* 90* 84*   CO2 mmol/L 17.6* 20.2* 18.7*   BUN mg/dL 13 8 3*   CREATININE mg/dL 0.79 0.76 0.85   GLUCOSE mg/dL 129* 117* 127*   CALCIUM mg/dL 8.2* 7.8* 8.5*     Results from last 7 days   Lab Units 04/03/20  0647 04/02/20  0639 04/01/20  0809   WBC 10*3/mm3 8.00 7.38 8.04   HEMOGLOBIN g/dL 15.1 14.2 16.3*   HEMATOCRIT % 43.4 40.1 46.2   PLATELETS 10*3/mm3 208  195 244         Results from last 7 days   Lab Units 04/01/20  0809   HEMOGLOBIN A1C % 6.68*     Lab Results   Lab Value Date/Time    TROPONINT <0.010 04/01/2020 0809    TROPONINT <0.010 11/10/2019 1525         Results from last 7 days   Lab Units 04/03/20  0620 04/02/20  0634 04/01/20  0809   ALK PHOS U/L 132* 103 128*   BILIRUBIN mg/dL 1.6* 1.3* 1.4*   ALT (SGPT) U/L 64* 49* 65*   AST (SGOT) U/L 177* 97* 124*         Results from last 7 days   Lab Units 04/01/20  0809   HEMOGLOBIN A1C % 6.68*     Glucose   Date/Time Value Ref Range Status   04/03/2020 0920 144 (H) 70 - 130 mg/dL Final   04/02/2020 2002 145 (H) 70 - 130 mg/dL Final   04/02/2020 1650 124 70 - 130 mg/dL Final   04/02/2020 1147 144 (H) 70 - 130 mg/dL Final   04/02/2020 0616 116 70 - 130 mg/dL Final   04/01/2020 2059 127 70 - 130 mg/dL Final   04/01/2020 1559 132 (H) 70 - 130 mg/dL Final           Past Medical History:   Diagnosis Date   • Aneurysm (CMS/HCC)    • Arthritis    • Carpal tunnel syndrome    • Chest pain    • Diabetes mellitus (CMS/HCC)    • Dysmetabolic syndrome X    • Gestational diabetes mellitus    • Hypothyroidism    • Metabolic disorder    • Nonalcoholic steatohepatitis    • Obesity    • Palpitations    • Sleep apnea    • Spinal headache    • Type 2 diabetes mellitus (CMS/HCC)    • Vitamin D deficiency        Assessment:  Active Hospital Problems    Diagnosis  POA   • **Dyspnea [R06.00]  Yes   • Cough [R05]  Unknown   • Hypomagnesemia [E83.42]  Unknown   • Metabolic acidosis [E87.2]  Unknown   • Fatigue [R53.83]  Unknown   • History of COPD [Z87.09]  Not Applicable   • Tobacco abuse [Z72.0]  Yes   • Rheumatoid arthritis (CMS/HCC) [M06.9]  Yes   • Type 2 diabetes mellitus (CMS/HCC) [E11.9]  Yes   • Morbid obesity (CMS/HCC) [E66.01]  Yes   • Elevated LFTs [R94.5]  Yes   • YOUNG (nonalcoholic steatohepatitis) [K75.81]  Yes   • Diabetes mellitus arising in pregnancy [O24.419]  Yes   • Hypothyroid [E03.9]  Yes      Resolved Hospital  Problems   No resolved problems to display.       Plan:  Continue with IV antibiotics and follow lab. Await cultures and COVID 19 test. Ask for ID and pulmonary to see. I think she will be positive but no results yet.  Elevated inflammatory markers. She has severe cough that makes her vomit.  Tylor Cruz MD  4/3/2020  15:01

## 2020-04-03 NOTE — CONSULTS
[facility]  [phone]  Moe Lozano MD      Subjective   LOS: 0 days     Thank you for this consultation.  44-year-old female whom I was asked to see for complaints of severe cough intractable for the past 1 week.  She went to an [facility] care center.  She was asked to self isolate.  She came in here because of intractable cough and initial chest x-ray was clear on .  Her cough is so severe that it causes her to sometimes throw up.  She does have acid reflux issues now.  She reports some sore throat.  She had a fever of 103 about a week ago.  She states intermittent low-grade fever since then though the highest I have seen is 100.1 °F since admission.  In the last 24 hours her temperature is 98.4 or lower.  She is a current smoker of a pack and a half a day since age 18.  She reports intermittent wheezing at baseline with shortness of breath on climbing a couple of flights of stairs.  She denies chest pain.  Her cough is nonproductive and dry.  She has prediabetes.  Denies heart disease or kidney disease.  She has sleep apnea and is compliant with her CPAP.  History of cerebral aneurysm presenting with headache last year in 2019 and resolved with clipping and no residual deficit.  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    Oralia Sánchez  reports that she drinks about 1.0 - 2.0 standard drinks of alcohol per week.,  reports that she has been smoking cigarettes. She has been smoking about 1.50 packs per day. She has never used smokeless tobacco.     Past Hx:  has a past medical history of Aneurysm (CMS/HCC), Arthritis, Carpal tunnel syndrome, Chest pain, Diabetes mellitus (CMS/HCC), Dysmetabolic syndrome X, Gestational diabetes mellitus, Hypothyroidism, Metabolic disorder, Nonalcoholic steatohepatitis, Obesity, Palpitations, Sleep apnea, Spinal headache, Type 2 diabetes mellitus (CMS/HCC), and Vitamin D deficiency.  Surg Hx:  has a past surgical history that includes   section; Myringotomy; Tonsillectomy and adenoidectomy; Dilation and curettage of uterus; Cerebral angiogram (N/A, 3/28/2019); embolization cerebral (N/A, 3/29/2019); Carpal tunnel release; and Cerebral angiogram (N/A, 10/2/2019).  FH: family history includes Alcohol abuse in her father; Diabetes in her maternal grandmother and paternal grandmother; Heart attack in her father; Hypertension in her mother; Stroke in her father.  SH:  reports that she has been smoking cigarettes. She has been smoking about 1.50 packs per day. She has never used smokeless tobacco. She reports that she drinks about 1.0 - 2.0 standard drinks of alcohol per week. She reports that she does not use drugs.    Medications Prior to Admission   Medication Sig Dispense Refill Last Dose   • albuterol sulfate  (90 Base) MCG/ACT inhaler Inhale 2 puffs Every 4 (Four) Hours As Needed for Wheezing. 1 inhaler 0 3/31/2020 at Unknown time   • ibuprofen (ADVIL,MOTRIN) 400 MG tablet Take 400 mg by mouth Every 6 (Six) Hours As Needed for Mild Pain .   4/1/2020 at Unknown time   • SYNTHROID 200 MCG tablet Take 1 tablet by mouth Daily. 30 tablet 11 3/31/2020 at Unknown time     Allergies   Allergen Reactions   • No Known Drug Allergy        Review of Systems   Constitutional: Positive for fever. Negative for chills.   HENT: Negative for congestion, postnasal drip and trouble swallowing.    Respiratory: Positive for cough, shortness of breath and wheezing.    Cardiovascular: Negative for chest pain and leg swelling.   Gastrointestinal: Negative for abdominal pain, diarrhea, nausea and vomiting.   Endocrine: Negative for cold intolerance and heat intolerance.   Genitourinary: Negative for frequency and urgency.   Musculoskeletal: Negative for arthralgias and back pain.   Skin: Negative for pallor and rash.   Neurological: Positive for weakness (generalized). Negative for seizures and headaches.   Psychiatric/Behavioral: Negative for confusion. The patient  is not nervous/anxious.      Vital Signs past 24hrs  BP range: BP: (102-105)/(43-49) 105/49  Pulse range: Heart Rate:  [101-105] 101  Resp rate range: Resp:  [20] 20  Temp range: Temp (24hrs), Av.3 °F (36.8 °C), Min:98 °F (36.7 °C), Max:98.6 °F (37 °C)    Oxygen range: SpO2:  [95 %-96 %] 95 %;  ;   Device (Oxygen Therapy): room air  108 kg (237 lb); Body mass index is 44.78 kg/m².  No intake/output data recorded.    Adult female sitting up in bed.  No acute distress.  Currently on room air with saturation adequate.  Pupils equal and reactive to light.  Oropharynx dry with a class III-IV Mallampati airway.  I could not examine properly in the posterior pharynx.  No obvious lesions seen.  Nasopharynx without discharge.  JVP not elevated trachea midline thyroid not enlarged.  Neck is large in size.  Lungs reveal bilateral air entry.  Clear to auscultation no rales rhonchi wheeze.  Percussion note resonant chest expansion equal with no chest wall deformity or tenderness.  Examination will be limited by morbid obesity.  Heart examination S1-S2 present rhythm regular no murmurs.  No edema lower extremities.  Abdomen is morbidly obese, soft, nontender.  Bowel sounds present no liver spleen enlargement.  No peripheral cyanosis clubbing.  Moves all 4 extremities sensorimotor intact.  No cervical, axillary, inguinal adenopathy.  Morbid obesity limits all of the above examination.    Results Review:    I have reviewed the laboratory and imaging data from current admission. My annotations are as noted in assessment and plan.    Medication Review:  I have reviewed the current MAR. My annotations are as noted in assessment and plan.    Plan   PCCM Problems  Acute lactic acidosis  Intractable cough  Acid reflux disease  ERLIN on CPAP  Morbid obesity  Exposure history for Covid-19  COPD without exacerbation  Current cigarette smoker  Relevant Medical Diagnoses  Diabetes mellitus type 2, prediabetes    Plan of Treatment  Etiology  of acute metabolic acidosis with elevated lactate is unclear at this time.  I will obtain a blood gas to review.  She does not appear septic and I suspect this is a type II lactic acidosis.    Despite positive lactic acid the procalcitonin was unremarkable and I do not see a strong indication for antibacterials at this time.  Repeat chest x-ray ordered for the morning.    Intractable cough for the last 10 days or so.  Nonproductive and dry.  However clear chest x-ray on admission.  Fevers initially but now minimal.  My suspicion for Covid-19 is not high.  Will consider repeat chest x-ray in the morning.    Acid reflux disease and episodes of vomiting after severe cough.  She could have severe reflux which is contributing to her cough.  Ideally would obtain an esophagogram study.  For now will wait until results of Covid-19 tests are available.    She is on a CPAP at home.  This should be resumed once Covid-19 has been ruled out.    While no obvious exacerbation of COPD on clinical exam she does have a lot of cough.  She may benefit from a short course of steroids but since this is a soft indication I would again await ruling out of Covid-19.  She should be placed on bronchodilators after rule out.    She is a current smoker and was strongly advised to quit smoking completely.      Part of this note may be an electronic transcription/translation of spoken language to printed text using the Dragon Dictation System.

## 2020-04-03 NOTE — PROGRESS NOTES
Continued Stay Note  [facility]     Patient Name: Oralia Sánchez  MRN: [id number]  Today's Date: 4/3/2020    Admit Date: 4/1/2020    Discharge Plan     Row Name 04/03/20 1230       Plan    Plan  Home- following for additional dc needs    Patient/Family in Agreement with Plan  yes    Plan Comments  Spoke with pt via inbound call to pts room. Introduced self and explained CCP role. Pt denies any concerns, but wishes to have different night nurse. CCP updated Charge nurse. Pt states plan is home, CCP explained will continue to follow along for any dc planning. Pt remains on room air. Jozef MOORE/CCP        Discharge Codes    No documentation.             Melissa Mejia, RN

## 2020-04-03 NOTE — PAYOR COMM NOTE
"Oralia Smith (44 y.o. Female)   PLEASE SEE ATTACHED CLINICAL REVIEW.     REF#[id number]    PLEASE CALL   OR  [phone] WITH INPT AUTH AND DAYS APPROVED.     THANK YOU    ANGELA JOYNER LPN CCP          Date of Birth Social Security Number Address Home Phone MRN    1975  [id number] [id number] [phone] [id number]    [employer] Marital Status          T[employer]        Admission Date Admission Type Admitting Provider Attending Provider Department, Room/Bed    20 Emergency Tylor Cruz MD Beard, Lyle E, MD [address], [address]/1    Discharge Date Discharge Disposition Discharge Destination                       Attending Provider:  Tylor Cruz MD    Allergies:  No Known Drug Allergy    Isolation:  Enh Drop/Con   Infection:  COVID (rule out) (20)   Code Status:  CPR    Ht:  154.9 cm (61\")   Wt:  108 kg (237 lb)    Admission Cmt:  None   Principal Problem:  Dyspnea [R06.00]                 Active Insurance as of 2020     Primary Coverage     Payor Plan Insurance Group Employer/Plan Group    [employer] BLUE CROSS AN[employer] CROSS BLUE SHIELD PPO [id number]     Payor Plan Address Payor Plan Phone Number Payor Plan Fax Number Effective Dates    [address] [phone]  2018 - None Entered    [facility] [id number]       Subscriber Name Subscriber Birth Date Member ID       ORALIA SMITH 1975 [id number]                 Emergency Contacts      (Rel.) Home Phone Work Phone Mobile Phone    LUCRECIA SMITH (Son) -- -- [phone]    scott miller -- -- [phone]               History & Physical      Tylor Cruz MD at 20 1551          HISTORY AND PHYSICAL   [facility]        Patient Identification:  Name: Oralia Smiht  Age: 44 y.o.  Sex: female  :  1975  MRN: [id number]                     Primary Care Physician: Provider, No Known    Chief Complaint: Cough and " short of air    History of Present Illness:         The patient is a 44-year-old white female with history of arthritis, diabetes, hypothyroidism, Mendez, sleep apnea and obesity who was admitted with a one-week history of a bad cough with shortness of air and some fevers.  She has not had any nausea or vomiting.  She denies having any chest pain.  The patient was evaluated in the ER and admitted for further evaluation with concern for possible coronavirus infection.  The patient was admitted to hospital for further evaluation treatment.  She is also been extremely fatigued.    Past Medical History:  Past Medical History:   Diagnosis Date   • Aneurysm (CMS/HCC)    • Arthritis    • Carpal tunnel syndrome    • Chest pain    • Diabetes mellitus (CMS/HCC)    • Dysmetabolic syndrome X    • Gestational diabetes mellitus    • Hypothyroidism    • Metabolic disorder    • Nonalcoholic steatohepatitis    • Obesity    • Palpitations    • Sleep apnea    • Spinal headache    • Type 2 diabetes mellitus (CMS/HCC)    • Vitamin D deficiency      Past Surgical History:  Past Surgical History:   Procedure Laterality Date   • CARPAL TUNNEL RELEASE     • CEREBRAL ANGIOGRAM N/A 3/28/2019    Procedure: DIAGNOSTIC CEREBRAL ANGIOGRAM;  Surgeon: Alexx Barrow MD;  Location: [facility] ;  Service: Neurosurgery   • CEREBRAL ANGIOGRAM N/A 10/2/2019    Procedure: CEREBRAL ANGIOGRAM;  Surgeon: Santosh Garza MD;  Location: [facility] ;  Service: Interventional Radiology   •  SECTION      x6   • DILATATION AND CURETTAGE      x 2   • EMBOLIZATION CEREBRAL N/A 3/29/2019    Procedure: Cererbal angiogram and embolization of cerebral aneurysm;  Surgeon: Alexx Barrow MD;  Location: [facility] ;  Service: Neurosurgery   • MYRINGOTOMY     • TONSILLECTOMY AND ADENOIDECTOMY        Home Meds:  Medications Prior to Admission   Medication Sig Dispense Refill Last Dose   • albuterol sulfate  (90 Base)  MCG/ACT inhaler Inhale 2 puffs Every 4 (Four) Hours As Needed for Wheezing. 1 inhaler 0 3/31/2020 at Unknown time   • ibuprofen (ADVIL,MOTRIN) 400 MG tablet Take 400 mg by mouth Every 6 (Six) Hours As Needed for Mild Pain .   4/1/2020 at Unknown time   • SYNTHROID 200 MCG tablet Take 1 tablet by mouth Daily. 30 tablet 11 3/31/2020 at Unknown time     Current meds    Current Facility-Administered Medications:   •  acetaminophen (TYLENOL) tablet 650 mg, 650 mg, Oral, Q4H PRN **OR** acetaminophen (TYLENOL) 160 MG/5ML solution 650 mg, 650 mg, Oral, Q4H PRN **OR** acetaminophen (TYLENOL) suppository 650 mg, 650 mg, Rectal, Q4H PRN, Tylor Cruz MD  •  calcium carbonate (TUMS) chewable tablet 500 mg (200 mg elemental), 2 tablet, Oral, TID PRN, Tylor Cruz MD, 2 tablet at 04/01/20 1510  •  dextrose (D50W) 25 g/ 50mL Intravenous Solution 25 g, 25 g, Intravenous, Q15 Min PRN, Tylor Cruz MD  •  dextrose (GLUTOSE) oral gel 15 g, 15 g, Oral, Q15 Min PRN, Tylor Cruz MD  •  glucagon (human recombinant) (GLUCAGEN DIAGNOSTIC) injection 1 mg, 1 mg, Subcutaneous, Q15 Min PRN, Tylor Cruz MD  •  guaiFENesin-dextromethorphan (ROBITUSSIN DM) 100-10 MG/5ML syrup 5 mL, 5 mL, Oral, Q4H PRN, Tylor Cruz MD, 5 mL at 04/01/20 1510  •  insulin lispro (humaLOG) injection 0-7 Units, 0-7 Units, Subcutaneous, 4x Daily With Meals & Nightly, Tylor Cruz MD  •  levothyroxine (SYNTHROID, LEVOTHROID) tablet 200 mcg, 200 mcg, Oral, Daily, Tylor Cruz MD  •  potassium chloride (MICRO-K) CR capsule 40 mEq, 40 mEq, Oral, PRN, 40 mEq at 04/01/20 1510 **OR** potassium chloride (KLOR-CON) packet 40 mEq, 40 mEq, Oral, PRN **OR** potassium chloride 10 mEq in 100 mL IVPB, 10 mEq, Intravenous, Q1H PRN, Tylor Cruz MD  •  promethazine (PHENERGAN) tablet 12.5 mg, 12.5 mg, Oral, Q6H PRN **OR** promethazine (PHENERGAN) injection 12.5 mg, 12.5 mg, Intramuscular, Q6H PRN **OR** promethazine (PHENERGAN) injection 12.5 mg, 12.5 mg,  Intravenous, Q6H PRN **OR** promethazine (PHENERGAN) suppository 12.5 mg, 12.5 mg, Rectal, Q6H PRN, Tylor Cruz MD  •  sodium bicarbonate tablet 650 mg, 650 mg, Oral, TID, Tylor Cruz MD, 650 mg at 04/01/20 1510  •  sodium chloride 0.9 % flush 10 mL, 10 mL, Intravenous, Q12H, Tylor Cruz MD, 10 mL at 04/01/20 1511  •  sodium chloride 0.9 % flush 10 mL, 10 mL, Intravenous, PRN, Tylor Cruz MD  •  sodium chloride 0.9 % infusion, 100 mL/hr, Intravenous, Continuous, Tylor Cruz MD, Last Rate: 100 mL/hr at 04/01/20 1511, 100 mL/hr at 04/01/20 1511  Allergies:  Allergies   Allergen Reactions   • No Known Drug Allergy      Immunizations:    There is no immunization history on file for this patient.  Social History:   Social History     Social History Narrative   • Not on file     Social History     Socioeconomic History   • Marital status:      Spouse name: Not on file   • Number of children: Not on file   • Years of education: Not on file   • Highest education level: Not on file   Occupational History   • Occupation: admin   Tobacco Use   • Smoking status: Current Every Day Smoker     Packs/day: 1.50     Types: Cigarettes   • Smokeless tobacco: Never Used   • Tobacco comment: x 10 yrs   Substance and Sexual Activity   • Alcohol use: Yes     Alcohol/week: 1.0 - 2.0 standard drinks     Types: 1 - 2 Shots of liquor per week     Comment: 1-2/week   • Drug use: No   • Sexual activity: Defer       Family History:  Family History   Problem Relation Age of Onset   • Hypertension Mother    • Alcohol abuse Father    • Heart attack Father         acute MI   • Stroke Father         CVA   • Diabetes Maternal Grandmother    • Diabetes Paternal Grandmother         Review of Systems  See history of present illness and past medical history.  Patient denies headache, dizziness, syncope, falls, trauma, change in vision, change in hearing, change in taste, changes in weight, changes in appetite, focal weakness,  "numbness, or paresthesia.  Patient denies chest pain, palpitations,  orthopnea, PND,  sinus pressure, rhinorrhea, epistaxis, hemoptysis, nausea, vomiting, hematemesis, diarrhea, constipation or hematchezia.  Denies cold or heat intolerance, polydipsia, polyuria, polyphagia. Denies hematuria, pyuria, dysuria, hesitancy, frequency or urgency. Denies consumption of raw and under cooked meats foods or change in water source.  Denies fever, chills, sweats, night sweats.  Denies missing any routine medications. Remainder of ROS is negative.    Objective:  tMax 24 hrs: Temp (24hrs), Av.7 °F (37.6 °C), Min:99.2 °F (37.3 °C), Max:100.1 °F (37.8 °C)    Vitals Ranges:   Temp:  [99.2 °F (37.3 °C)-100.1 °F (37.8 °C)] 99.2 °F (37.3 °C)  Heart Rate:  [104-111] 104  Resp:  [19-22] 20  BP: (104-138)/(60-75) 104/63      Exam:  /63 (BP Location: Left arm)   Pulse 104   Temp 99.2 °F (37.3 °C) (Oral)   Resp 20   Ht 154.9 cm (61\")   Wt 108 kg (237 lb)   LMP 2020   SpO2 94%   BMI 44.78 kg/m²      General Appearance:    Alert, cooperative, no distress, appears stated age   Head:    Normocephalic, without obvious abnormality, atraumatic   Eyes:    PERRL, conjunctiva/corneas clear, EOM's intact, both eyes   Ears:    Normal external ear canals, both ears   Nose:   Nares normal, septum midline, mucosa normal, no drainage    or sinus tenderness   Throat:   Lips, mucosa, and tongue normal   Neck:   Supple, symmetrical, trachea midline, no adenopathy;     thyroid:  no enlargement/tenderness/nodules; no carotid    bruit or JVD   Back:     Symmetric, no curvature, ROM normal, no CVA tenderness   Lungs:     Clear to auscultation bilaterally, respirations unlabored   Chest Wall:    No tenderness or deformity    Heart:    Regular rate and rhythm, S1 and S2 normal, no murmur, rub   or gallop   Abdomen:     Soft, non-tender, bowel sounds active all four quadrants,     no masses, no hepatomegaly, no splenomegaly   Extremities:   " Extremities normal, atraumatic, no cyanosis or edema   Pulses:   2+ and symmetric all extremities   Skin:   Skin color, texture, turgor normal, no rashes or lesions   Lymph nodes:   Cervical, supraclavicular, and axillary nodes normal   Neurologic:   CNII-XII intact, normal strength, sensation intact throughout      .    Past Medical History:   Diagnosis Date   • Aneurysm (CMS/HCC)    • Arthritis    • Carpal tunnel syndrome    • Chest pain    • Diabetes mellitus (CMS/HCC)    • Dysmetabolic syndrome X    • Gestational diabetes mellitus    • Hypothyroidism    • Metabolic disorder    • Nonalcoholic steatohepatitis    • Obesity    • Palpitations    • Sleep apnea    • Spinal headache    • Type 2 diabetes mellitus (CMS/HCC)    • Vitamin D deficiency        Assessment:  Active Hospital Problems    Diagnosis  POA   • **Dyspnea [R06.00]  Yes   • Cough [R05]  Unknown   • Hypomagnesemia [E83.42]  Unknown   • Metabolic acidosis [E87.2]  Unknown   • Fatigue [R53.83]  Unknown   • History of COPD [Z87.09]  Not Applicable   • Tobacco abuse [Z72.0]  Yes   • Rheumatoid arthritis (CMS/HCC) [M06.9]  Yes   • Type 2 diabetes mellitus (CMS/HCC) [E11.9]  Yes   • Morbid obesity (CMS/HCC) [E66.01]  Yes   • Elevated LFTs [R94.5]  Yes   • YOUNG (nonalcoholic steatohepatitis) [K75.81]  Yes   • Diabetes mellitus arising in pregnancy [O24.419]  Yes   • Hypothyroid [E03.9]  Yes      Resolved Hospital Problems   No resolved problems to display.       Plan:  The patient is admitted to the hospital and will check for the coronavirus and also get respiratory PCR.  We will get cultures and start antibiotics for possible community-acquired pneumonia.  We will get some follow-up lab studies.    Tylor Cruz MD  4/1/2020  15:51    Electronically signed by Tylor Cruz MD at 04/01/20 1600          Emergency Department Notes      Sobeida Castro RN at 04/01/20 0830        Patient was placed in face mask in first look.  Patient was wearing a face mask  throughout our encounter.  I wore protective equipment a face mask throughout the encounter.  Hand hygiene was performed before and after patient encounter.        Sobeida Castro RN  04/01/20 0913      Electronically signed by Sobeida Castro RN at 04/01/20 0913     Reagan Nichole MD at 04/01/20 1027           EMERGENCY DEPARTMENT ENCOUNTER    Room Number:  N542/1  Date of encounter:  4/1/2020  PCP: Provider, No Known  Historian: Patient      HPI:  Chief Complaint: Cough, shortness of breath  A complete HPI/ROS/PMH/PSH/SH/FH are unobtainable due to: None    Context: Oralia Sánchez is a 44 y.o. female who presents to the ED c/o 1 week of persistent dry cough, shortness of breath, subjective fevers, and fatigue.  Patient has been at home and self quarantine, but was feeling worse today.  Shortness of breath is worse after coughing and with movement.  Has had some nausea without specific vomiting or diarrhea.  Has been drinking Pedialyte for hydration.  Has been taking some ibuprofen for fever control at home.  Denies any specific chest pain, abdominal pain.  Denies recent travel or known positive COVID contacts. Works for [employer].      MEDICAL RECORD REVIEW    ED visit November 2019 for flulike symptoms, no other recent ED visits.    PAST MEDICAL HISTORY  Active Ambulatory Problems     Diagnosis Date Noted   • Vitamin D deficiency 09/07/2016   • Awareness of heartbeats 09/07/2016   • Adiposity 09/07/2016   • YOUNG (nonalcoholic steatohepatitis) 09/07/2016   • Hypothyroid 09/07/2016   • Diabetes mellitus arising in pregnancy 09/07/2016   • Diabetes (CMS/[facility]) 09/07/2016   • Metabolic syndrome 09/07/2016   • Chest pain 09/07/2016   • Primary thunderclap headache 03/27/2019   • Elevated LFTs 03/27/2019   • Tobacco abuse 03/28/2019   • Rheumatoid arthritis (CMS/[facility]) 03/28/2019   • Type 2 diabetes mellitus (CMS/[facility]) 03/28/2019   • Morbid obesity (CMS/[facility]) 03/28/2019   • UTI (urinary tract infection), bacterial 03/28/2019   •  Cerebral aneurysm 10/02/2019     Resolved Ambulatory Problems     Diagnosis Date Noted   • Abnormal CT of the head 2019   • Cerebral aneurysm rupture (CMS/HCC) 2019     Past Medical History:   Diagnosis Date   • Aneurysm (CMS/HCC)    • Arthritis    • Carpal tunnel syndrome    • Diabetes mellitus (CMS/HCC)    • Dysmetabolic syndrome X    • Gestational diabetes mellitus    • Hypothyroidism    • Metabolic disorder    • Nonalcoholic steatohepatitis    • Obesity    • Palpitations    • Sleep apnea    • Spinal headache          PAST SURGICAL HISTORY  Past Surgical History:   Procedure Laterality Date   • CARPAL TUNNEL RELEASE     • CEREBRAL ANGIOGRAM N/A 3/28/2019    Procedure: DIAGNOSTIC CEREBRAL ANGIOGRAM;  Surgeon: Alexx Barrow MD;  Location: [facility] OR ;  Service: Neurosurgery   • CEREBRAL ANGIOGRAM N/A 10/2/2019    Procedure: CEREBRAL ANGIOGRAM;  Surgeon: Santosh Garza MD;  Location: [facility] OR ;  Service: Interventional Radiology   •  SECTION      x6   • DILATATION AND CURETTAGE      x 2   • EMBOLIZATION CEREBRAL N/A 3/29/2019    Procedure: Cererbal angiogram and embolization of cerebral aneurysm;  Surgeon: Alexx Barrow MD;  Location: [facility] OR ;  Service: Neurosurgery   • MYRINGOTOMY     • TONSILLECTOMY AND ADENOIDECTOMY           FAMILY HISTORY  Family History   Problem Relation Age of Onset   • Hypertension Mother    • Alcohol abuse Father    • Heart attack Father         acute MI   • Stroke Father         CVA   • Diabetes Maternal Grandmother    • Diabetes Paternal Grandmother          SOCIAL HISTORY  Social History     Socioeconomic History   • Marital status:      Spouse name: Not on file   • Number of children: Not on file   • Years of education: Not on file   • Highest education level: Not on file   Occupational History   • Occupation: admin   Tobacco Use   • Smoking status: Current Every Day Smoker     Packs/day: 1.50     Types:  Cigarettes   • Smokeless tobacco: Never Used   • Tobacco comment: x 10 yrs   Substance and Sexual Activity   • Alcohol use: Yes     Alcohol/week: 1.0 - 2.0 standard drinks     Types: 1 - 2 Shots of liquor per week     Comment: 1-2/week   • Drug use: No   • Sexual activity: Defer         ALLERGIES  No known drug allergy        REVIEW OF SYSTEMS  Review of Systems     All systems reviewed and negative except for those discussed in HPI.       PHYSICAL EXAM    I have reviewed the triage vital signs and nursing notes.    ED Triage Vitals   Temp Heart Rate Resp BP SpO2   04/01/20 0804 04/01/20 0800 04/01/20 0800 04/01/20 0800 04/01/20 0800   100.1 °F (37.8 °C) 110 22 138/75 99 %      Temp src Heart Rate Source Patient Position BP Location FiO2 (%)   04/01/20 0804 -- -- -- --   Tympanic           Physical Exam  General: Awake, alert, mildly ill-appearing, nontoxic, nondiaphoretic  HEENT: Mucous membranes moist, atraumatic, normocephalic, EOMI  Neck: Full ROM  Pulm: Symmetric, mild tachypnea, lungs CTAB without overt wheezes/rales/rhonchi  Cardiovascular: Mild regular rhythm tachycardia, normal S1/S2, intact distal pulses, no peripheral edema  GI: Soft, non-tender, non-distended, no rebound, no guarding, bowel sounds present  MSK: Full ROM, no deformity  Skin: Warm, dry  Neuro: Alert and oriented x 3, GCS 15, moving all extremities, no focal deficits  Psych: Calm, cooperative      Surgical mask, gown, appropriate eye protection, and gloves used during this encounter.        RADIOLOGY  Xr Chest Ap    Result Date: 4/1/2020  ONE VIEW PORTABLE CHEST  HISTORY: Cough and fever.  FINDINGS: The exam is slightly limited by the patient's large size. The lungs are well-expanded and clear and the heart size is normal. The overall appearance shows no change from 11/10/2019.  This report was finalized on 4/1/2020 11:11 AM by Dr. Elmo Mooney M.D.        I ordered the above noted radiological studies. Reviewed by me.  See dictation for  official radiology interpretation.      PROCEDURES    Procedures  EKG    EKG Time: 0954  Rhythm/Rate: Sinus tachycardia rate of 104  Borderline right axis deviation  Nml intervals   No acute ischemic changes  No STEMI     Interpreted Contemporaneously by me, independently viewed  No emergent changes as compared to November 2019      MEDICATIONS GIVEN IN ER    Medications   levothyroxine (SYNTHROID, LEVOTHROID) tablet 200 mcg (has no administration in time range)   dextrose (GLUTOSE) oral gel 15 g (has no administration in time range)   dextrose (D50W) 25 g/ 50mL Intravenous Solution 25 g (has no administration in time range)   glucagon (human recombinant) (GLUCAGEN DIAGNOSTIC) injection 1 mg (has no administration in time range)   insulin lispro (humaLOG) injection 0-7 Units (has no administration in time range)   potassium chloride (MICRO-K) CR capsule 40 mEq (has no administration in time range)     Or   potassium chloride (KLOR-CON) packet 40 mEq (has no administration in time range)     Or   potassium chloride 10 mEq in 100 mL IVPB (has no administration in time range)   sodium chloride 0.9 % flush 10 mL (has no administration in time range)   sodium chloride 0.9 % flush 10 mL (has no administration in time range)   acetaminophen (TYLENOL) tablet 650 mg (has no administration in time range)     Or   acetaminophen (TYLENOL) 160 MG/5ML solution 650 mg (has no administration in time range)     Or   acetaminophen (TYLENOL) suppository 650 mg (has no administration in time range)   promethazine (PHENERGAN) tablet 12.5 mg (has no administration in time range)     Or   promethazine (PHENERGAN) injection 12.5 mg (has no administration in time range)     Or   promethazine (PHENERGAN) injection 12.5 mg (has no administration in time range)     Or   promethazine (PHENERGAN) suppository 12.5 mg (has no administration in time range)   guaiFENesin-dextromethorphan (ROBITUSSIN DM) 100-10 MG/5ML syrup 5 mL (has no  administration in time range)   calcium carbonate (TUMS) chewable tablet 500 mg (200 mg elemental) (has no administration in time range)   sodium chloride 0.9 % infusion (has no administration in time range)   sodium bicarbonate tablet 650 mg (has no administration in time range)   magnesium sulfate 2g/50 mL (PREMIX) infusion (0 g Intravenous Stopped 4/1/20 1202)   sodium chloride 0.9 % bolus 1,000 mL (0 mL Intravenous Stopped 4/1/20 1355)         PROGRESS, DATA ANALYSIS, CONSULTS, AND MEDICAL DECISION MAKING    All labs have been independently reviewed by me.  All radiology studies have been reviewed by me and discussed with radiologist dictating the report.   EKG's independently viewed and interpreted by me.  Discussion below represents my analysis of pertinent findings related to patient's condition, differential diagnosis, treatment plan and final disposition.    Patient presentation today was initially concerning for respiratory illness, consideration for bacterial pneumonia versus viral pneumonia including COVID-19, evaluation for underlying cardiac issue, with consideration for pleural effusions, pulmonary edema, among others also considered.  Labs are non-concerning for any renal failure or anemia, no leukocytosis.  She does have a negative troponin and normal procalcitonin.  Magnesium noted to be low at 1.1 and has been repleted.  Patient does not have any lymphopenia or thrombocytopenia, but does have elevated CRP, LDH, and ferritin levels which is concerning for possible COVID-19 infection.  Patient also noted to have a significant anion gap elevation of 32 with acidosis with a CO2 of 18 on CMP, ABG shows no evidence of any respiratory acidosis and I will assume that this is a metabolic acidosis at this time.  Unclear etiology of this, patient is not hyperglycemic.  Alcohol level is negative.  Have given IV fluids and will admit for the significant metabolic acidosis and concerns for COVID-19, no  pneumonia on CXR.     ED Course as of Apr 01 1405   Wed Apr 01, 2020   1046 Spoke with Dr. Cruz (Hospitalist), and discussed clinical course, lab and imaging findings, and need for hospitalization.  He was updated on the need for COVID-19 testing.     [DC]      ED Course User Index  [DC] Reagan Nichole MD       AS OF 14:05 VITALS:    BP - 104/63  HR - 111  TEMP - 100.1 °F (37.8 °C) (Tympanic)  02 SATS - 97%        DIAGNOSIS  Final diagnoses:   Dyspnea, unspecified type   Metabolic acidosis   Cough   Fatigue, unspecified type   History of COPD   Hypomagnesemia         DISPOSITION  Admission             Reagan Nichole MD  04/01/20 1408      Electronically signed by Reagan Nichole MD at 04/01/20 1408       {Outbreak/Travel/Exposure Documentation......;  Question Available Choices Patient Response   Outbreak Screen: Do you currently have the following symptoms?        Fever/Cough/Shortness of breath/No/Unknown  (!) Cough;Shortness of breath (04/01/20 0909)   Outbreak Screen: In the last 14 days, have you had contact with anyone to have the 2019 Novel Coronavirus or anyone being tested for the 2019 Novel Coronavirus?  Yes/No/Unknown              No (04/01/20 0909)   Outbreak Screen: Who was notified?    Free text  (not recorded)   Travel Screen: Have you traveled in the last month? If so, to what country have you traveled? If [place] what state? Yes/No/Unknown  List of all countries  List of all States No (04/01/20 0806)  (not recorded)  (not recorded)   Infection Risk: Do you currently have the following symptoms?  (If cough is selected, the Tuberculosis Screen is performed.) Cough/Fever/Rash/No (!) Cough;Fever (04/01/20 0806)   Tuberculosis Screen: Do you have any of the following Tuberculosis Risks?  · Have you lived or spent time with anyone who had or may have TB?  · Have you lived in or visited any of the following areas for more than one month: [place], [place], [place], [place] or [place], [place] or  [place]?  · Do you have HIV/AIDS?  · Have you lived in or worked in a nursing home, homeless shelter, correctional facility, or substance abuse treatment facility?   · No    If Yes do you have any of the following symptoms? Yes responses display to the right    If Yes symptoms listed are:  Cough greater than or equal to 3 weeks/Loss of appetite/Unexplained weight loss/Night sweats/Bloody sputum or hemoptysis/Hoarseness/Fever/Fatique/Chest pain/No No (04/01/20 0806)  (not recorded)   Exposure Screen: Have you been exposed to any of these contagious diseases in the last month? Measles/Chickenpox/Meningitis/Pertussis/Whooping Cough/No No (04/01/20 0806)     Oxygen Therapy (since admission)     Date/Time   SpO2   Device (Oxygen Therapy)   Flow (L/min)   Oxygen Concentration (%)   ETCO2 (mmHg)    04/03/20 0914   95   room air   --   --   --    04/02/20 2008   96   --   --   --   --    04/02/20 1414   --   room air   --   --   --    04/02/20 1413   94   room air   --   --   --    04/02/20 1000   --   room air   --   --   --    04/02/20 0934   --   room air   --   --   --    04/02/20 0453   --   room air   --   --   --    04/02/20 0028   97   room air   --   --   --    04/01/20 2056   --   room air   --   --   --    04/01/20 1507   94   --   --   --   --    04/01/20 1256   --   room air   --   --   --    04/01/20 1246   97   room air   --   --   --    04/01/20 1131   95   --   --   --   --    04/01/20 0920   98   --   --   --   --    04/01/20 0800   99   room air   --   --   --            Intake & Output (last 3 days)       03/31 0701 - 04/01 0700 04/01 0701 - 04/02 0700 04/02 0701 - 04/03 0700 04/03 0701 - 04/04 0700    I.V. (mL/kg)  50 (0.5)      Total Intake(mL/kg)  50 (0.5)      Net  +50                   Lines, Drains & Airways    Active LDAs     Name:   Placement date:   Placement time:   Site:   Days:    Peripheral IV 04/01/20 0817 Right Antecubital   04/01/20 0817    Antecubital   2    Peripheral IV  04/02/20 1604 Left Forearm   04/02/20    1604    Forearm   less than 1         Inactive LDAs     None                Lab Results (all)     Procedure Component Value Units Date/Time    Lactic Acid, Reflex [[id number]] Collected:  04/03/20 1036    Specimen:  Blood Updated:  04/03/20 1057    Lactic Acid, Reflex Timer (This will reflex a repeat order 3-3:15 hours after ordered.) [[id number]] Collected:  04/03/20 0620    Specimen:  Blood Updated:  04/03/20 1015     Hold Tube Hold for add-ons.     Comment: Auto resulted.       POC Glucose Once [[id number]]  (Abnormal) Collected:  04/03/20 0920    Specimen:  Blood Updated:  04/03/20 0932     Glucose 144 mg/dL     Ferritin [[id number]]  (Abnormal) Collected:  04/03/20 0620    Specimen:  Blood Updated:  04/03/20 0728     Ferritin 1,210.00 ng/mL     Narrative:       Results may be falsely decreased if patient taking Biotin.      Comprehensive Metabolic Panel [[id number]]  (Abnormal) Collected:  04/03/20 0620    Specimen:  Blood Updated:  04/03/20 0723     Glucose 129 mg/dL      BUN 13 mg/dL      Creatinine 0.79 mg/dL      Sodium 133 mmol/L      Potassium 3.6 mmol/L      Chloride 87 mmol/L      CO2 17.6 mmol/L      Calcium 8.2 mg/dL      Total Protein 6.2 g/dL      Albumin 3.40 g/dL      ALT (SGPT) 64 U/L      AST (SGOT) 177 U/L      Alkaline Phosphatase 132 U/L      Total Bilirubin 1.6 mg/dL      eGFR Non African Amer 78 mL/min/1.73      Globulin 2.8 gm/dL      A/G Ratio 1.2 g/dL      BUN/Creatinine Ratio 16.5     Anion Gap 28.4 mmol/L     Narrative:       C-reactive Protein [[id number]]  (Abnormal) Collected:  04/03/20 0620    Specimen:  Blood Updated:  04/03/20 0722     C-Reactive Protein 0.55 mg/dL     Lactic Acid, Plasma [[id number]]  (Abnormal) Collected:  04/03/20 0620    Specimen:  Blood Updated:  04/03/20 0705     Lactate 11.3 mmol/L     CBC & Differential [[id number]] Collected:  04/03/20 0647    Specimen:  Blood Updated:  04/03/20 0654    Narrative:       CBC Auto  Differential [[id number]]  (Abnormal) Collected:  04/03/20 0647    Specimen:  Blood Updated:  04/03/20 0654     WBC 8.00 10*3/mm3      RBC 4.23 10*6/mm3      Hemoglobin 15.1 g/dL      Hematocrit 43.4 %      .6 fL      MCH 35.7 pg      MCHC 34.8 g/dL      RDW 14.7 %      RDW-SD 55.2 fl      MPV 9.5 fL      Platelets 208 10*3/mm3      Neutrophil % 73.1 %      Lymphocyte % 19.0 %      Monocyte % 7.0 %      Eosinophil % 0.1 %      Basophil % 0.3 %      Immature Grans % 0.5 %      Neutrophils, Absolute 5.85 10*3/mm3      Lymphocytes, Absolute 1.52 10*3/mm3      Monocytes, Absolute 0.56 10*3/mm3      Eosinophils, Absolute 0.01 10*3/mm3      Basophils, Absolute 0.02 10*3/mm3      Immature Grans, Absolute 0.04 10*3/mm3      nRBC 0.6 /100 WBC     POC Glucose Once [[id number]]  (Abnormal) Collected:  04/02/20 2002    Specimen:  Blood Updated:  04/02/20 2003     Glucose 145 mg/dL     Blood Culture - Blood, Arm, Left [[id number]] Collected:  04/01/20 1654    Specimen:  Blood from Arm, Left Updated:  04/02/20 1700     Blood Culture No growth at 24 hours    Blood Culture - Blood, Arm, Right [[id number]] Collected:  04/01/20 1654    Specimen:  Blood from Arm, Right Updated:  04/02/20 1700     Blood Culture No growth at 24 hours    POC Glucose Once [[id number]]  (Normal) Collected:  04/02/20 1650    Specimen:  Blood Updated:  04/02/20 1651     Glucose 124 mg/dL     POC Glucose Once [[id number]]  (Abnormal) Collected:  04/02/20 1147    Specimen:  Blood Updated:  04/02/20 1148     Glucose 144 mg/dL     Comprehensive Metabolic Panel [[id number]]  (Abnormal) Collected:  04/02/20 0634    Specimen:  Blood Updated:  04/02/20 0736     Glucose 117 mg/dL      BUN 8 mg/dL      Creatinine 0.76 mg/dL      Sodium 136 mmol/L      Potassium 3.8 mmol/L      Chloride 90 mmol/L      CO2 20.2 mmol/L      Calcium 7.8 mg/dL      Total Protein 5.9 g/dL      Albumin 3.40 g/dL      ALT (SGPT) 49 U/L      AST (SGOT) 97 U/L      Alkaline Phosphatase 103 U/L       Total Bilirubin 1.3 mg/dL      eGFR Non African Amer 82 mL/min/1.73      Globulin 2.5 gm/dL      A/G Ratio 1.4 g/dL      BUN/Creatinine Ratio 10.5     Anion Gap 25.8 mmol/L     Narrative:       Ferritin [[id number]]  (Abnormal) Collected:  04/02/20 0634    Specimen:  Blood Updated:  04/02/20 0726     Ferritin 803.00 ng/mL     Narrative:       Results may be falsely decreased if patient taking Biotin.      Lactate Dehydrogenase [[id number]]  (Abnormal) Collected:  04/02/20 0634    Specimen:  Blood Updated:  04/02/20 0722      U/L     CBC & Differential [[id number]] Collected:  04/02/20 0639    Specimen:  Blood Updated:  04/02/20 0652    Narrative:       CBC Auto Differential [[id number]]  (Abnormal) Collected:  04/02/20 0639    Specimen:  Blood Updated:  04/02/20 0652     WBC 7.38 10*3/mm3      RBC 3.95 10*6/mm3      Hemoglobin 14.2 g/dL      Hematocrit 40.1 %      .5 fL      MCH 35.9 pg      MCHC 35.4 g/dL      RDW 14.3 %      RDW-SD 53.6 fl      MPV 9.4 fL      Platelets 195 10*3/mm3      Neutrophil % 65.8 %      Lymphocyte % 24.5 %      Monocyte % 8.1 %      Eosinophil % 0.3 %      Basophil % 0.8 %      Immature Grans % 0.5 %      Neutrophils, Absolute 4.85 10*3/mm3      Lymphocytes, Absolute 1.81 10*3/mm3      Monocytes, Absolute 0.60 10*3/mm3      Eosinophils, Absolute 0.02 10*3/mm3      Basophils, Absolute 0.06 10*3/mm3      Immature Grans, Absolute 0.04 10*3/mm3      nRBC 0.4 /100 WBC     POC Glucose Once [[id number]]  (Normal) Collected:  04/02/20 0616    Specimen:  Blood Updated:  04/02/20 0618     Glucose 116 mg/dL     POC Glucose Once [[id number]]  (Normal) Collected:  04/01/20 2059    Specimen:  Blood Updated:  04/01/20 2059     Glucose 127 mg/dL     Respiratory Panel, PCR - Swab, Nasopharynx [[id number]]  (Normal) Collected:  04/01/20 1515    Specimen:  Swab from Nasopharynx Updated:  04/01/20 1653     ADENOVIRUS, PCR Not Detected     Coronavirus 229E Not Detected     Coronavirus HKU1 Not  Detected     Coronavirus NL63 Not Detected     Coronavirus OC43 Not Detected     Human Metapneumovirus Not Detected     Human Rhinovirus/Enterovirus Not Detected     Influenza B PCR Not Detected     Parainfluenza Virus 1 Not Detected     Parainfluenza Virus 2 Not Detected     Parainfluenza Virus 3 Not Detected     Parainfluenza Virus 4 Not Detected     Bordetella pertussis pcr Not Detected     Influenza A H1 2009 PCR Not Detected     Chlamydophila pneumoniae PCR Not Detected     Mycoplasma pneumo by PCR Not Detected     Influenza A PCR Not Detected     Influenza A H3 Not Detected     Influenza A H1 Not Detected     RSV, PCR Not Detected     Bordetella parapertussis PCR Not Detected    Narrative:       The coronavirus on the RVP is NOT COVID-19 and is NOT indicative of infection with COVID-19.     POC Glucose Once [[id number]]  (Abnormal) Collected:  04/01/20 1559    Specimen:  Blood Updated:  04/01/20 1601     Glucose 132 mg/dL     Hemoglobin A1c [[id number]]  (Abnormal) Collected:  04/01/20 0809    Specimen:  Blood Updated:  04/01/20 1417     Hemoglobin A1C 6.68 %     Narrative:       Salicylate Level [[id number]]  (Normal) Collected:  04/01/20 0809    Specimen:  Blood Updated:  04/01/20 1129     Salicylate <0.3 mg/dL     Narrative:       CORONAVIRUS(COVID-19),RT-PCR,UOFL LAB,NP/OP Swab in Transport Media - Swab, Nasopharynx [[id number]] Collected:  04/01/20 1049    Specimen:  Swab from Nasopharynx Updated:  04/01/20 1101    Blood Gas, Arterial [[id number]]  (Abnormal) Collected:  04/01/20 1051    Specimen:  Arterial Blood Updated:  04/01/20 1053     Site Arterial: left radial     Jovi's Test Positive     pH, Arterial 7.477 pH units      pCO2, Arterial 28.2 mm Hg      pO2, Arterial 99.2 mm Hg      HCO3, Arterial 20.9 mmol/L      Base Excess, Arterial -1.0 mmol/L      O2 Saturation Calculated 98.3 %      Barometric Pressure for Blood Gas 753.0 mmHg      Modality Room Air     Set [facility] Resp Rate 28    Ethanol [[id number]]  Collected:  04/01/20 0809    Specimen:  Blood Updated:  04/01/20 0951     Ethanol <10 mg/dL      Ethanol % <0.010 %     Ferritin [[id number]]  (Abnormal) Collected:  04/01/20 0809    Specimen:  Blood Updated:  04/01/20 0946     Ferritin 821.00 ng/mL      Procalcitonin 0.16 ng/mL     Narrative:       Comprehensive Metabolic Panel [[id number]]  (Abnormal) Collected:  04/01/20 0809    Specimen:  Blood Updated:  04/01/20 0841     Glucose 127 mg/dL      BUN 3 mg/dL      Creatinine 0.85 mg/dL      Sodium 135 mmol/L      Potassium 3.2 mmol/L      Chloride 84 mmol/L      CO2 18.7 mmol/L      Calcium 8.5 mg/dL      Total Protein 7.1 g/dL      Albumin 3.70 g/dL      ALT (SGPT) 65 U/L      AST (SGOT) 124 U/L      Alkaline Phosphatase 128 U/L      Total Bilirubin 1.4 mg/dL      eGFR Non African Amer 72 mL/min/1.73      Globulin 3.4 gm/dL      A/G Ratio 1.1 g/dL      BUN/Creatinine Ratio 3.5     Anion Gap 32.3 mmol/L     Narrative:       C-reactive Protein [[id number]]  (Abnormal) Collected:  04/01/20 0809    Specimen:  Blood Updated:  04/01/20 0841     C-Reactive Protein 0.63 mg/dL     Lactate Dehydrogenase [[id number]]  (Abnormal) Collected:  04/01/20 0809    Specimen:  Blood Updated:  04/01/20 0841      U/L     Magnesium [[id number]]  (Abnormal) Collected:  04/01/20 0809    Specimen:  Blood Updated:  04/01/20 0841     Magnesium 1.1 mg/dL     Troponin [[id number]]  (Normal) Collected:  04/01/20 0809    Specimen:  Blood Updated:  04/01/20 0841     Troponin T <0.010 ng/mL     Narrative:       CBC Auto Differential [[id number]]  (Abnormal) Collected:  04/01/20 0809    Specimen:  Blood Updated:  04/01/20 0828     WBC 8.04 10*3/mm3      RBC 4.48 10*6/mm3      Hemoglobin 16.3 g/dL      Hematocrit 46.2 %      .1 fL      MCH 36.4 pg      MCHC 35.3 g/dL      RDW 15.4 %      RDW-SD 57.9 fl      MPV 9.5 fL      Platelets 244 10*3/mm3      Neutrophil % 68.8 %      Lymphocyte % 22.6 %      Monocyte % 7.2 %      Eosinophil % 0.4 %       Basophil % 0.6 %      Immature Grans % 0.4 %      Neutrophils, Absolute 5.53 10*3/mm3      Lymphocytes, Absolute 1.82 10*3/mm3      Monocytes, Absolute 0.58 10*3/mm3      Eosinophils, Absolute 0.03 10*3/mm3      Basophils, Absolute 0.05 10*3/mm3      Immature Grans, Absolute 0.03 10*3/mm3      nRBC 0.5 /100 WBC           Imaging Results (All)     Procedure Component Value Units Date/Time    XR Chest AP [[id number]] Collected:  04/01/20 1030     Updated:  04/01/20 1114    Narrative:       ONE VIEW PORTABLE CHEST     HISTORY: Cough and fever.     FINDINGS: The exam is slightly limited by the patient's large size. The  lungs are well-expanded and clear and the heart size is normal. The  overall appearance shows no change from 11/10/2019.     This report was finalized on 4/1/2020 11:11 AM by Dr. Elmo Mooney M.D.             ECG/EMG Results (all)     Procedure Component Value Units Date/Time    ECG 12 Lead [[id number]] Collected:  04/01/20 0954     Updated:  04/01/20 1013    Narrative:       HEART RATE= 104  bpm  RR Interval= 576  ms  CA Interval= 141  ms  P Horizontal Axis= -18  deg  P Front Axis= 63  deg  QRSD Interval= 78  ms  QT Interval= 370  ms  QRS Axis= 128  deg  T Wave Axis= 51  deg  - BORDERLINE ECG -  Sinus tachycardia  Probable left atrial enlargement  Probable lateral infarct, old  When compared with ECG of 10-Nov-2019 18:15:28,  No significant change   Electronically Signed By: Chano ArnoldBanner) 01-Apr-2020 10:12:00  Date and Time of Study: 2020-04-01 09:54:40          Orders (all)      Start     Ordered    04/03/20 0916  Inpatient Admission  Once      04/03/20 0915    04/01/20 1700  POC Glucose 4x Daily AC & at Bedtime  4 Times Daily Before Meals & at Bedtime      04/01/20 1346    04/01/20 1600  Vital Signs  Every 4 Hours      04/01/20 1348    04/01/20 1349  Intake & Output  Every Shift      04/01/20 1348    04/01/20 1349  Weigh Patient  Once      04/01/20 1348    04/01/20 1349  Oxygen Therapy-  Nasal Cannula; Titrate for SPO2: 90% - 95%  Continuous      04/01/20 1348    04/01/20 1349  Insert Peripheral IV  Once      04/01/20 1348    04/01/20 1349  Saline Lock & Maintain IV Access  Continuous      04/01/20 1348    04/01/20 1349  Place Sequential Compression Device  Once      04/01/20 1348    04/01/20 1349  Maintain Sequential Compression Device  Continuous      04/01/20 1348    04/01/20 1349  Diet Regular; Consistent Carbohydrate  Diet Effective Now      04/01/20 1348    04/01/20 1348  Patient Currently On Electrolyte Replacement Protocol - Please Refer to MAR for Protocol Details  Misc Nursing Order (Specify)  Daily     Comments:  Patient Currently On Electrolyte Replacement Protocol - Please Refer to MAR for Protocol Details    04/01/20 1347    04/01/20 1347  potassium chloride (MICRO-K) CR capsule 40 mEq  As Needed      04/01/20 1347    04/01/20 1347  potassium chloride (KLOR-CON) packet 40 mEq  As Needed      04/01/20 1347    04/01/20 1347  potassium chloride 10 mEq in 100 mL IVPB  Every 1 Hour PRN      04/01/20 1347    04/01/20 1347  Do NOT Hold Basal or Correction Scale Insulin When Patient is NPO, Hold Scheduled Mealtime (Bolus) Insulin if NPO  Continuous      04/01/20 1346    04/01/20 1347  Follow [facility] Hypoglycemia Standing Orders For Blood Glucose Less Than 70 mg/dL  Until Discontinued     Comments:  ALERT PATIENT - NOT NPO & CAN SAFELY SWALLOW  Administer 4 oz Fruit Juice OR 4 oz Regular Soda OR 8 oz Milk OR 15-30 grams (1 tube) of Glucose Gel.  Recheck Blood Glucose Approximately 15 Minutes After Ingestion, Repeat Treatment & Continue to Recheck Blood Sugar Approximately Every 15 Minutes Until Blood Glucose is 70 or Higher.  Once Blood Glucose is 70 or Higher & if It Will Be More Than 60 Minutes Until Next Meal, Provide Appropriate Snack (Including Carbohydrate Food) Based on Meal Plan Order. Give Meal Tray As Soon As Possible.    PATIENT HAS IV ACCESS - UNRESPONSIVE, NPO OR UNABLE TO SAFELY  SWALLOW  Administer 25g (50ml) D50W IV Push.  Recheck Blood Glucose Approximately 15 Minutes After Administration, if Blood Glucose Remains Less Than 70, Repeat Treatment   Recheck Blood Glucose Approximately 15 Minutes After 2nd Administration, if Blood Glucose Remains Less Than 70 After 2nd Dose of D50W, Contact Provider for Further Treatment Orders & Consider Adding IVF With D5W for Maintenance    PATIENT WITHOUT IV ACCESS - UNRESPONSIVE, NPO OR UNABLE TO SAFELY SWALLOW  Administer 1mg Glucagon SQ & Establish IV Access.  Turn Patient on Side - Nausea / Vomiting May Occur.  Recheck Blood Glucose Approximately 15 Minutes After Administration.  If Blood Glucose Remains Less Than 70, Administer 25g D50W IV Push (50ml).  Recheck Blood Glucose Approximately 15 Minutes After Administration of D50W, if Blood Glucose Remains Less Than 70, Contact Provider for Further Treatment Orders & Consider Adding IVF With D5 for Maintenance    Document Event & Patient Response to Interventions in EMR, Document Medications on MAR  Notify Provider if Hypoglycemia Treatment Needed    04/01/20 1346    04/01/20 1347  Hemoglobin A1c  Once      04/01/20 1346    04/01/20 1346  dextrose (GLUTOSE) oral gel 15 g  Every 15 Minutes PRN      04/01/20 1346    04/01/20 1346  dextrose (D50W) 25 g/ 50mL Intravenous Solution 25 g  Every 15 Minutes PRN      04/01/20 1346    04/01/20 1346  glucagon (human recombinant) (GLUCAGEN DIAGNOSTIC) injection 1 mg  Every 15 Minutes PRN      04/01/20 1346    04/01/20 1258  Inpatient Nutrition Consult  Once     Provider:  (Not yet assigned)    04/01/20 1258    04/01/20 1101  Initiate Observation Status  Once      04/01/20 1100    04/01/20 1039  [facility] (on-call MD unless specified) Details  STAT     Specialty:  Hospitalist  Provider:  (Not yet assigned)    04/01/20 1039    04/01/20 1039  CORONAVIRUS(COVID-19),RT-PCR,UOFL LAB,NP/OP Swab in Transport Media - Swab, Nasopharynx  STAT      04/01/20 1039                    "  Physician Progress Notes (all)      Tylor Cruz MD at 20 1342          DAILY PROGRESS NOTE  [facility]    Patient Identification:  Name: Oralia Sánchez  Age: 44 y.o.  Sex: female  :  1975  MRN: [id number]         Primary Care Physician: Provider, No Known    Subjective:  Interval History:She has a bad cough and is short of air. Not requiring any O 2.    Objective:    Scheduled Meds:  azithromycin 500 mg Intravenous Q24H   cefTRIAXone 1 g Intravenous Q24H   insulin lispro 0-7 Units Subcutaneous 4x Daily With Meals & Nightly   levothyroxine 200 mcg Oral Daily   sodium bicarbonate 650 mg Oral TID   sodium chloride 10 mL Intravenous Q12H     Continuous Infusions:  sodium chloride 100 mL/hr Last Rate: 100 mL/hr (20 1158)       Vital signs in last 24 hours:  Temp:  [96.4 °F (35.8 °C)-99.2 °F (37.3 °C)] 96.4 °F (35.8 °C)  Heart Rate:  [] 99  Resp:  [18-20] 18  BP: ()/(47-63) 106/63    Intake/Output:  No intake or output data in the 24 hours ending 20 1342    Exam:  /63   Pulse 99   Temp 96.4 °F (35.8 °C) (Oral)   Resp 18   Ht 154.9 cm (61\")   Wt 108 kg (237 lb)   LMP 2020   SpO2 97%   BMI 44.78 kg/m²      General Appearance:    Alert, cooperative, no distress   Head:    Normocephalic, without obvious abnormality, atraumatic   Eyes:       Throat:   Lips, tongue, gums normal   Neck:   Supple, symmetrical, trachea midline, no JVD   Lungs:     Crackles and wheezes. bilaterally, respirations unlabored   Chest Wall:    No tenderness or deformity    Heart:    Regular rate and rhythm, S1 and S2 normal, no murmur,no  Rub or gallop   Abdomen:     Soft, non-tender, bowel sounds active, no masses, no organomegaly    Extremities:   Extremities normal, atraumatic, no cyanosis or edema   Pulses:      Skin:   Skin is warm and dry,  no rashes or palpable lesions   Neurologic:   no focal deficits noted                Past Medical History:   Diagnosis Date   • " Aneurysm (CMS/HCC)    • Arthritis    • Carpal tunnel syndrome    • Chest pain    • Diabetes mellitus (CMS/HCC)    • Dysmetabolic syndrome X    • Gestational diabetes mellitus    • Hypothyroidism    • Metabolic disorder    • Nonalcoholic steatohepatitis    • Obesity    • Palpitations    • Sleep apnea    • Spinal headache    • Type 2 diabetes mellitus (CMS/HCC)    • Vitamin D deficiency        Assessment:  Active Hospital Problems    Diagnosis  POA   • **Dyspnea [R06.00]  Yes   • Cough [R05]  Unknown   • Hypomagnesemia [E83.42]  Unknown   • Metabolic acidosis [E87.2]  Unknown   • Fatigue [R53.83]  Unknown   • History of COPD [Z87.09]  Not Applicable   • Tobacco abuse [Z72.0]  Yes   • Rheumatoid arthritis (CMS/HCC) [M06.9]  Yes   • Type 2 diabetes mellitus (CMS/HCC) [E11.9]  Yes   • Morbid obesity (CMS/HCC) [E66.01]  Yes   • Elevated LFTs [R94.5]  Yes   • YOUNG (nonalcoholic steatohepatitis) [K75.81]  Yes   • Diabetes mellitus arising in pregnancy [O24.419]  Yes   • Hypothyroid [E03.9]  Yes      Resolved Hospital Problems   No resolved problems to display.       Plan:  Continue with IV antibiotics and follow lab. Await cultures and COVID 19 test.    Tylor Cruz MD  4/2/2020  13:42      Electronically signed by Tylor Cruz MD at 04/02/20 1344      Karen Montes, RN   Registered Nurse      Plan of Care   Signed   Date of Service:  04/02/20 1841   Creation Time:  04/02/20 1841            Signed             Show:Clear all  [x]Manual[]Template[]Copied    Added by:  [x]Karen Montes, RN    []Hover for details  Vitals stable. Afebrile. On room air. Fatigued, vomiting after coughing clear liquid. Not eating much. Patient c/o 'boils' and wanting something for them. Dr. Cruz to address tomorrow. Awaiting covid-19 results.              All medication doses during the admission are shown, including meds that are no longer on order.   Scheduled Meds Sorted by Name   for Doretha Sánchezissa ANNIE as of 4/1/20 through 4/3/20      1 Day 3 Days 7 Days 10 Days < Today >    Legend:                          Discontinued    Completed    Future    MAR Hold     Other Encounter    Linked           Medications 04/01/20 04/02/20 04/03/20   azithromycin (ZITHROMAX) 500 mg 0.9% NaCl (Add-vantage) 250 mL   Dose: 500 mg  Freq: Every 24 Hours Route: IV  Indications of Use: PNEUMONIA  Last Dose: Stopped (04/02/20 1751)  Start: 04/01/20 1645 End: 04/04/20 1644    Admin Instructions:   Activate vial before using.    2051 1651 1751 1645            cefTRIAXone (ROCEPHIN) IVPB 1 g   Dose: 1 g  Freq: Every 24 Hours Route: IV  Indications of Use: PNEUMONIA  Last Dose: Stopped (04/02/20 2004)  Start: 04/01/20 1645 End: 04/08/20 1644    Admin Instructions:   Caution: Look alike/sound alike drug alert. Refrigerate    8809 0150   2004 1645            insulin lispro (humaLOG) injection 0-7 Units   Dose: 0-7 Units  Freq: 4 Times Daily With Meals & Nightly Route: SC  Start: 04/01/20 1800    Admin Instructions:   Correction - Low Dose.  Less than 40 units/day total insulin dose or lean, elderly, renal patients    Blood glucose 150-199 mg/dL - 2 units  Blood glucose 200-249 mg/dL - 3 units  Blood glucose 250-299 mg/dL - 4 units  Blood glucose 300-349 mg/dL - 5 units  Blood glucose 350-400 mg/dL - 6 units  Blood glucose greater than 400 mg/dL - 7 units and call provider   Caution: Look alike/sound alike drug alert    (5833)   (2100)        (5931)   (1149)   (1731)     (2005)          (5848)   1200   1800     2100            levothyroxine (SYNTHROID, LEVOTHROID) tablet 200 mcg   Dose: 200 mcg  Freq: Daily Route: PO  Start: 04/01/20 1400 End: 07/22/20 0559    Admin Instructions:   Take on empty stomach.    (0250) [phone]            magnesium sulfate 2g/50 mL (PREMIX) infusion   Dose: 2 g  Freq: Once Route: IV  Last Dose: Stopped (04/01/20 1202)  Start: 04/01/20 0901 End: 04/01/20 1202    1003   1202            sodium  bicarbonate tablet 650 mg   Dose: 650 mg  Freq: 3 Times Daily Route: PO  Start: 04/01/20 1600    1510   2050        0958   1650   2031      0914   1600   2100        sodium chloride 0.9 % bolus 1,000 mL   Dose: 1,000 mL  Freq: Once Route: IV  Last Dose: Stopped (04/01/20 1355)  Start: 04/01/20 0930 End: 04/01/20 1355    1046   1355            sodium chloride 0.9 % flush 10 mL   Dose: 10 mL  Freq: Every 12 Hours Scheduled Route: IV  Start: 04/01/20 1445    1511   2100        0958   2005         2100          Medications 04/01/20 04/02/20 04/03/20       Continuous Meds Sorted by Name   for Oralia Sánchez as of 4/1/20 through 4/3/20    Legend:                          Discontinued    Completed    Future    MAR Hold     Other Encounter    Linked           Medications 04/01/20 04/02/20 04/03/20   sodium chloride 0.9 % infusion   Rate: 100 mL/hr Dose: 100 mL/hr  Freq: Continuous Route: IV  Last Dose: 100 mL/hr (04/03/20 0502)  Start: 04/01/20 1445    1511   1859        0405   0959   1158     1502   1730   2004     2238          0231   0502              PRN Meds Sorted by Name   for Oralia Sánchez as of 4/1/20 through 4/3/20    Legend:                          Discontinued    Completed    Future    MAR Hold     Other Encounter    Linked           Medications 04/01/20 04/02/20 04/03/20   acetaminophen (TYLENOL) tablet 650 mg   Dose: 650 mg  Freq: Every 4 Hours PRN Route: PO  PRN Reason: Mild Pain   Start: 04/01/20 1348    Admin Instructions:   Do not exceed 4 grams of acetaminophen in a 24 hr period.    If given for pain, use the following pain scale:   Mild Pain = Pain Score of 1-3, CPOT 1-2  Moderate Pain = Pain Score of 4-6, CPOT 3-4  Severe Pain = Pain Score of 7-10, CPOT 5-8      0913            Or  acetaminophen (TYLENOL) 160 MG/5ML solution 650 mg   Dose: 650 mg  Freq: Every 4 Hours PRN Route: PO  PRN Reason: Mild Pain   Start: 04/01/20 1348    Admin Instructions:   Do not exceed 4 grams of acetaminophen in a  24 hr period.    If given for pain, use the following pain scale:   Mild Pain = Pain Score of 1-3, CPOT 1-2  Moderate Pain = Pain Score of 4-6, CPOT 3-4  Severe Pain = Pain Score of 7-10, CPOT 5-8                Or  acetaminophen (TYLENOL) suppository 650 mg   Dose: 650 mg  Freq: Every 4 Hours PRN Route: RE  PRN Reason: Mild Pain   Start: 04/01/20 1348    Admin Instructions:   Do not exceed 4 grams of acetaminophen in a 24 hr period.    If given for pain, use the following pain scale:   Mild Pain = Pain Score of 1-3, CPOT 1-2  Moderate Pain = Pain Score of 4-6, CPOT 3-4  Severe Pain = Pain Score of 7-10, CPOT 5-8                calcium carbonate (TUMS) chewable tablet 500 mg (200 mg elemental)   Dose: 2 tablet  Freq: 3 Times Daily PRN Route: PO  PRN Reasons: Indigestion,Heartburn  Start: 04/01/20 1349    Admin Instructions:   One tablet contains 200 mg elemental calcium. Take with food.    [id number]           dextrose (D50W) 25 g/ 50mL Intravenous Solution 25 g   Dose: 25 g  Freq: Every 15 Minutes PRN Route: IV  PRN Reason: Low Blood Sugar  PRN Comment: Blood Sugar Less Than 70  Start: 04/01/20 1346    Admin Instructions:   Blood sugar less than 70; patient has IV access - Unresponsive, NPO or Unable To Safely Swallow         dextrose (GLUTOSE) oral gel 15 g   Dose: 15 g  Freq: Every 15 Minutes PRN Route: PO  PRN Reason: Low Blood Sugar  PRN Comment: Blood sugar less than 70  Start: 04/01/20 1346    Admin Instructions:   BS<70, Patient Alert, Is not NPO, Can safely swallow.         diphenhydrAMINE (BENADRYL) capsule 25 mg   Dose: 25 mg  Freq: Every 6 Hours PRN Route: PO  PRN Reason: Itching  Start: 04/02/20 1345    Admin Instructions:   Caution: Look alike/sound alike drug alert. This med may be ordered in other forms and routes. Before giving verify the last time the drug was given by any route/form.     1414             glucagon (human recombinant) (GLUCAGEN DIAGNOSTIC) injection 1 mg   Dose: 1  mg  Freq: Every 15 Minutes PRN Route: SC  PRN Reason: Low Blood Sugar  PRN Comment: Blood Glucose Less Than 70  Start: 04/01/20 1346    Admin Instructions:   Blood Glucose Less Than 70 - Patient Without IV Access - Unresponsive, NPO or Unable To Safely Swallow         guaiFENesin-dextromethorphan (ROBITUSSIN DM) 100-10 MG/5ML syrup 5 mL   Dose: 5 mL  Freq: Every 4 Hours PRN Route: PO  PRN Reason: Cough  Start: 04/01/20 1349    Admin Instructions:   Caution: Look alike/sound alike drug alert    1510   2206        0455   1149   1745     2238          0913            potassium chloride (MICRO-K) CR capsule 40 mEq   Dose: 40 mEq  Freq: As Needed Route: PO  PRN Comment: Potassium Replacement.  See Admin Instructions  Start: 04/01/20 1347    Admin Instructions:   Potassium 3.1 or Less Give KCl 40 mEq q4h x3 Doses   Potassium 3.2 - 3.6 Give KCl 40 mEq q4h x2 Doses     Check Potassium 4 Hours After Last Dose Given   Check Magnesium if Potassium Level Remains Low After Replacement   DO NOT GIVE if CrCl is Less Than 30 mL/minute or Urine Output Less Than 30 mL/hr    1510             Or  potassium chloride (KLOR-CON) packet 40 mEq   Dose: 40 mEq  Freq: As Needed Route: PO  PRN Comment: potassium replacement, see admin instructions  Start: 04/01/20 1347    Admin Instructions:   Potassium 3.1 or Less Give KCl 40 mEq q4h x3 Doses   Potassium 3.2 - 3.6 Give KCl 40 mEq q4h x2 Doses     Check Potassium 4 Hours After Last Dose Given   Check Magnesium if Potassium Level Remains Low After Replacement   DO NOT GIVE if CrCl is Less Than 30 mL/minute or Urine Output Less Than 30 mL/hr     2052            Or  potassium chloride 10 mEq in 100 mL IVPB   Dose: 10 mEq  Freq: Every 1 Hour PRN Route: IV  PRN Comment: Potassium Replacement - See Admin Instructions  Start: 04/01/20 1347    Admin Instructions:   Peripheral or Central IV  Potassium 3.1 or Less Give KCl 10 mEq/100 mL NS IV q1h x6 Doses  Potassium 3.2 - 3.6 Give KCl 10 mEq/100 mL NS  q1h x4 Doses    Check Potassium 4 Hours After Last Dose Given  Check Magnesium if Potassium Remains Low After Replacement  DO NOT GIVE if CrCl is Less Than 30 mL/minute or Urine Output Less Than 30 mL/hr.     Rates Greater Than 10 mEq/hr Require ECG Monitoring.  OUTPATIENT/NON-MONITORED UNITS: Potassium Chloride standard bolus infusion rate is a maximum of 10 mEq/hr on unmonitored patients    MONITORED UNITS: Potassium Chloride standard bolus infusion rate is a maximum of 20 mEq/hr on ECG monitored patients ONLY               promethazine (PHENERGAN) tablet 12.5 mg   Dose: 12.5 mg  Freq: Every 6 Hours PRN Route: PO  PRN Reasons: Nausea,Vomiting  Start: 04/01/20 1348    Admin Instructions:       1718          (1158)                        Or  promethazine (PHENERGAN) injection 12.5 mg   Dose: 12.5 mg  Freq: Every 6 Hours PRN Route: IM  PRN Reasons: Nausea,Vomiting  Start: 04/01/20 1348    Admin Instructions:   IF GIVING IV ONLY, dilute dose in 20 mL NS and infuse over 10 minutes. Administer through large bore vein (not hand or wrist) through running IV line at port furthest from patient's vein.                                  Or  promethazine (PHENERGAN) injection 12.5 mg   Dose: 12.5 mg  Freq: Every 6 Hours PRN Route: IV  PRN Reasons: Nausea,Vomiting  Start: 04/01/20 1348    Admin Instructions:   IF GIVING IV ONLY, dilute dose in 20 mL NS and infuse over 10 minutes. Administer through large bore vein (not hand or wrist) through running IV line at port furthest from patient's vein.     2206        0455    1159     2032          0913            Or  promethazine (PHENERGAN) suppository 12.5 mg   Dose: 12.5 mg  Freq: Every 6 Hours PRN Route: RE  PRN Reasons: Nausea,Vomiting  Start: 04/01/20 1348                                  sodium chloride 0.9 % flush 10 mL   Dose: 10 mL  Freq: As Needed Route: IV  PRN Reason: Line Care  Start: 04/01/20 1348         Medications 04/01/20 04/02/20 04/03/20

## 2020-04-04 ENCOUNTER — APPOINTMENT (OUTPATIENT)
Dept: CT IMAGING | Facility: HOSPITAL | Age: 45
End: 2020-04-04

## 2020-04-04 ENCOUNTER — APPOINTMENT (OUTPATIENT)
Dept: GENERAL RADIOLOGY | Facility: HOSPITAL | Age: 45
End: 2020-04-04

## 2020-04-04 LAB
ALBUMIN SERPL-MCNC: 3.2 G/DL (ref 3.5–5.2)
ALBUMIN/GLOB SERPL: 1.5 G/DL
ALP SERPL-CCNC: 132 U/L (ref 39–117)
ALT SERPL W P-5'-P-CCNC: 72 U/L (ref 1–33)
ANION GAP SERPL CALCULATED.3IONS-SCNC: 24.2 MMOL/L (ref 5–15)
ARTERIAL PATENCY WRIST A: POSITIVE
AST SERPL-CCNC: 193 U/L (ref 1–32)
ATMOSPHERIC PRESS: 751.4 MMHG
BASE EXCESS BLDA CALC-SCNC: -3.4 MMOL/L (ref 0–2)
BASOPHILS # BLD AUTO: 0.02 10*3/MM3 (ref 0–0.2)
BASOPHILS NFR BLD AUTO: 0.3 % (ref 0–1.5)
BDY SITE: ABNORMAL
BILIRUB SERPL-MCNC: 1.6 MG/DL (ref 0.2–1.2)
BUN BLD-MCNC: 14 MG/DL (ref 6–20)
BUN/CREAT SERPL: 19.4 (ref 7–25)
CALCIUM SPEC-SCNC: 8.3 MG/DL (ref 8.6–10.5)
CHLORIDE SERPL-SCNC: 90 MMOL/L (ref 98–107)
CK MB SERPL-CCNC: 5.59 NG/ML
CK SERPL-CCNC: 216 U/L (ref 20–180)
CO2 SERPL-SCNC: 18.8 MMOL/L (ref 22–29)
CREAT BLD-MCNC: 0.72 MG/DL (ref 0.57–1)
D-LACTATE SERPL-SCNC: 10.7 MMOL/L (ref 0.5–2)
D-LACTATE SERPL-SCNC: 8.1 MMOL/L (ref 0.5–2)
DEPRECATED RDW RBC AUTO: 54.6 FL (ref 37–54)
EOSINOPHIL # BLD AUTO: 0.02 10*3/MM3 (ref 0–0.4)
EOSINOPHIL NFR BLD AUTO: 0.3 % (ref 0.3–6.2)
ERYTHROCYTE [DISTWIDTH] IN BLOOD BY AUTOMATED COUNT: 14.5 % (ref 12.3–15.4)
FERRITIN SERPL-MCNC: 1260 NG/ML (ref 13–150)
GFR SERPL CREATININE-BSD FRML MDRD: 87 ML/MIN/1.73
GLOBULIN UR ELPH-MCNC: 2.2 GM/DL
GLUCOSE BLD-MCNC: 151 MG/DL (ref 65–99)
GLUCOSE BLDC GLUCOMTR-MCNC: 152 MG/DL (ref 70–130)
GLUCOSE BLDC GLUCOMTR-MCNC: 153 MG/DL (ref 70–130)
GLUCOSE BLDC GLUCOMTR-MCNC: 161 MG/DL (ref 70–130)
HCO3 BLDA-SCNC: 18.5 MMOL/L (ref 22–28)
HCT VFR BLD AUTO: 39.8 % (ref 34–46.6)
HGB BLD-MCNC: 14.1 G/DL (ref 12–15.9)
IMM GRANULOCYTES # BLD AUTO: 0.05 10*3/MM3 (ref 0–0.05)
IMM GRANULOCYTES NFR BLD AUTO: 0.6 % (ref 0–0.5)
LDH SERPL-CCNC: 414 U/L (ref 135–214)
LYMPHOCYTES # BLD AUTO: 1.22 10*3/MM3 (ref 0.7–3.1)
LYMPHOCYTES NFR BLD AUTO: 15.3 % (ref 19.6–45.3)
MAGNESIUM SERPL-MCNC: 1.7 MG/DL (ref 1.6–2.6)
MCH RBC QN AUTO: 36.1 PG (ref 26.6–33)
MCHC RBC AUTO-ENTMCNC: 35.4 G/DL (ref 31.5–35.7)
MCV RBC AUTO: 101.8 FL (ref 79–97)
MODALITY: ABNORMAL
MONOCYTES # BLD AUTO: 0.64 10*3/MM3 (ref 0.1–0.9)
MONOCYTES NFR BLD AUTO: 8.1 % (ref 5–12)
NEUTROPHILS # BLD AUTO: 6 10*3/MM3 (ref 1.7–7)
NEUTROPHILS NFR BLD AUTO: 75.4 % (ref 42.7–76)
NRBC BLD AUTO-RTO: 1.6 /100 WBC (ref 0–0.2)
PCO2 BLDA: 25.6 MM HG (ref 35–45)
PH BLDA: 7.47 PH UNITS (ref 7.35–7.45)
PLATELET # BLD AUTO: 209 10*3/MM3 (ref 140–450)
PMV BLD AUTO: 9.8 FL (ref 6–12)
PO2 BLDA: 93.9 MM HG (ref 80–100)
POTASSIUM BLD-SCNC: 3.3 MMOL/L (ref 3.5–5.2)
PROT SERPL-MCNC: 5.4 G/DL (ref 6–8.5)
RBC # BLD AUTO: 3.91 10*6/MM3 (ref 3.77–5.28)
REF LAB TEST METHOD: NORMAL
SAO2 % BLDCOA: 97.9 % (ref 92–99)
SARS-COV-2 RNA RESP QL NAA+PROBE: NOT DETECTED
SET MECH RESP RATE: 20
SODIUM BLD-SCNC: 133 MMOL/L (ref 136–145)
WBC NRBC COR # BLD: 7.95 10*3/MM3 (ref 3.4–10.8)

## 2020-04-04 PROCEDURE — 82962 GLUCOSE BLOOD TEST: CPT

## 2020-04-04 PROCEDURE — 83735 ASSAY OF MAGNESIUM: CPT | Performed by: HOSPITALIST

## 2020-04-04 PROCEDURE — 82550 ASSAY OF CK (CPK): CPT | Performed by: INTERNAL MEDICINE

## 2020-04-04 PROCEDURE — 85025 COMPLETE CBC W/AUTO DIFF WBC: CPT | Performed by: HOSPITALIST

## 2020-04-04 PROCEDURE — 82085 ASSAY OF ALDOLASE: CPT | Performed by: INTERNAL MEDICINE

## 2020-04-04 PROCEDURE — 82728 ASSAY OF FERRITIN: CPT | Performed by: HOSPITALIST

## 2020-04-04 PROCEDURE — 71045 X-RAY EXAM CHEST 1 VIEW: CPT

## 2020-04-04 PROCEDURE — 83605 ASSAY OF LACTIC ACID: CPT | Performed by: INTERNAL MEDICINE

## 2020-04-04 PROCEDURE — 83615 LACTATE (LD) (LDH) ENZYME: CPT | Performed by: HOSPITALIST

## 2020-04-04 PROCEDURE — 25010000002 PROMETHAZINE PER 50 MG: Performed by: HOSPITALIST

## 2020-04-04 PROCEDURE — 25010000002 FLUCONAZOLE PER 200 MG: Performed by: INTERNAL MEDICINE

## 2020-04-04 PROCEDURE — 71250 CT THORAX DX C-: CPT

## 2020-04-04 PROCEDURE — 63710000001 PROMETHAZINE PER 12.5 MG: Performed by: HOSPITALIST

## 2020-04-04 PROCEDURE — 82553 CREATINE MB FRACTION: CPT | Performed by: INTERNAL MEDICINE

## 2020-04-04 PROCEDURE — 80053 COMPREHEN METABOLIC PANEL: CPT | Performed by: HOSPITALIST

## 2020-04-04 PROCEDURE — 99254 IP/OBS CNSLTJ NEW/EST MOD 60: CPT | Performed by: INTERNAL MEDICINE

## 2020-04-04 PROCEDURE — 63710000001 DIPHENHYDRAMINE PER 50 MG: Performed by: HOSPITALIST

## 2020-04-04 RX ORDER — FLUCONAZOLE 2 MG/ML
100 INJECTION, SOLUTION INTRAVENOUS EVERY 24 HOURS
Status: DISCONTINUED | OUTPATIENT
Start: 2020-04-04 | End: 2020-04-08 | Stop reason: CLARIF

## 2020-04-04 RX ORDER — BENZONATATE 100 MG/1
200 CAPSULE ORAL 3 TIMES DAILY PRN
Status: DISCONTINUED | OUTPATIENT
Start: 2020-04-04 | End: 2020-04-20 | Stop reason: HOSPADM

## 2020-04-04 RX ORDER — PANTOPRAZOLE SODIUM 40 MG/10ML
40 INJECTION, POWDER, LYOPHILIZED, FOR SOLUTION INTRAVENOUS EVERY 12 HOURS SCHEDULED
Status: DISCONTINUED | OUTPATIENT
Start: 2020-04-04 | End: 2020-04-06

## 2020-04-04 RX ADMIN — PROMETHAZINE HYDROCHLORIDE 12.5 MG: 12.5 TABLET ORAL at 00:09

## 2020-04-04 RX ADMIN — GUAIFENESIN AND DEXTROMETHORPHAN 5 ML: 100; 10 SYRUP ORAL at 00:10

## 2020-04-04 RX ADMIN — SUCRALFATE 1 G: 1 SUSPENSION ORAL at 20:37

## 2020-04-04 RX ADMIN — PROMETHAZINE HYDROCHLORIDE 12.5 MG: 25 INJECTION INTRAMUSCULAR; INTRAVENOUS at 11:53

## 2020-04-04 RX ADMIN — BENZONATATE 200 MG: 100 CAPSULE ORAL at 20:37

## 2020-04-04 RX ADMIN — PROMETHAZINE HYDROCHLORIDE 12.5 MG: 25 INJECTION INTRAMUSCULAR; INTRAVENOUS at 18:04

## 2020-04-04 RX ADMIN — LEVOTHYROXINE SODIUM 200 MCG: 100 TABLET ORAL at 12:37

## 2020-04-04 RX ADMIN — PANTOPRAZOLE SODIUM 40 MG: 40 INJECTION, POWDER, FOR SOLUTION INTRAVENOUS at 20:37

## 2020-04-04 RX ADMIN — PANTOPRAZOLE SODIUM 40 MG: 40 INJECTION, POWDER, FOR SOLUTION INTRAVENOUS at 14:48

## 2020-04-04 RX ADMIN — SUCRALFATE 1 G: 1 SUSPENSION ORAL at 16:36

## 2020-04-04 RX ADMIN — GUAIFENESIN AND DEXTROMETHORPHAN 5 ML: 100; 10 SYRUP ORAL at 11:53

## 2020-04-04 RX ADMIN — SODIUM CHLORIDE, PRESERVATIVE FREE 10 ML: 5 INJECTION INTRAVENOUS at 09:10

## 2020-04-04 RX ADMIN — BENZONATATE 200 MG: 100 CAPSULE ORAL at 14:48

## 2020-04-04 RX ADMIN — SUCRALFATE 1 G: 1 SUSPENSION ORAL at 09:09

## 2020-04-04 RX ADMIN — PROMETHAZINE HYDROCHLORIDE 12.5 MG: 25 INJECTION INTRAMUSCULAR; INTRAVENOUS at 23:45

## 2020-04-04 RX ADMIN — SODIUM BICARBONATE 650 MG: 650 TABLET ORAL at 09:09

## 2020-04-04 RX ADMIN — SODIUM BICARBONATE 650 MG: 650 TABLET ORAL at 16:36

## 2020-04-04 RX ADMIN — SODIUM BICARBONATE 650 MG: 650 TABLET ORAL at 20:37

## 2020-04-04 RX ADMIN — DIPHENHYDRAMINE HYDROCHLORIDE 25 MG: 25 CAPSULE ORAL at 22:21

## 2020-04-04 RX ADMIN — FLUCONAZOLE 100 MG: 2 INJECTION, SOLUTION INTRAVENOUS at 14:49

## 2020-04-04 RX ADMIN — SUCRALFATE 1 G: 1 SUSPENSION ORAL at 11:20

## 2020-04-04 RX ADMIN — PROMETHAZINE HYDROCHLORIDE 12.5 MG: 25 INJECTION INTRAMUSCULAR; INTRAVENOUS at 07:07

## 2020-04-04 RX ADMIN — Medication 2 TABLET: at 18:04

## 2020-04-04 NOTE — PLAN OF CARE
Problem: Patient Care Overview  Goal: Plan of Care Review  Outcome: Ongoing (interventions implemented as appropriate)  Flowsheets (Taken 4/4/2020 6667)  Progress: improving  Plan of Care Reviewed With: patient  Outcome Summary: Afebrile this shift. VSS- HR tachy. Continues to complain of cough, nausea and weakness. SOB when ambulaing. Medicated PRN for nausea. Remains on room air. Continue to monitor.

## 2020-04-04 NOTE — PROGRESS NOTES
LOS: 1 day   Patient Care Team:  Provider, No Known as PCP - General    Subjective     Following up cough lactic acidosis.  I reviewed with patient extensively cough started over a month ago she thought it was just like a smoker's cough.  It was not too bad when it started she did not have any infection type symptoms no fevers chills sweats sore throat or runny nose.  The cough started getting more intense about 11 days ago.  Starting about 5 or 6 days ago she did have a fever to 103 degrees and some off and on low-grade fever.  Her cough has been hard and paroxysmal she will get up a small amount of mucus mostly clear.  No sore throat no runny nose no chest pain she is short of breath after a coughing paroxysm.  Otherwise not really terribly short of breath if she climbs a couple flights of stairs she will get short of breath but that is not necessarily acute that is been more chronic.  No chest pain.  She has had reflux for a long time but it has been worse the last week or so she can feel the burning stuff sort of coming up the middle of her chest sometimes it goes back down sometimes it comes on up.  Her cough is been so bad that she has been throwing up but she is found it hard to eat or drink she is having trouble keeping medicines down.  Couple of days ago she did have a little bit of a sore throat.  She has some benzocaine spray that helps the sore throat currently.  She is a smoker and has been smoking for many years she has no history of TB or TB risk factors no history of HIV or HIV risk factors.  No recent travel she does work she is not been knowingly exposed any Covid 19.  Dr. Lozano's consult reviewed and Dr. Cruz's notes.  Review of Systems:   No abdominal pain no diarrhea she has had a couple of bowel movements since she has been in the hospital so they were small but normal since she has not eaten a whole lot so she did not expect a large amount.  No hematochezia or melena.  No dysuria  hematuria urinary urgency or frequency no easy bleeding or bruising no new skin rashes lumps bumps or nodules no chest pain or palpitations.  She did have a little bit of pain in both legs below her knees a few days ago but that resolved she went real sure what it was it did last real long.  She does have some sleep apnea and has a Pap machine at home       Objective     Vital Signs  Vital Sign Min/Max for last 24 hours  Temp  Min: 97.5 °F (36.4 °C)  Max: 98.6 °F (37 °C)   BP  Min: 98/51  Max: 118/66   Pulse  Min: 106  Max: 109   Resp  Min: 20  Max: 20   SpO2  Min: 95 %  Max: 95 %   No data recorded   No data recorded        Ventilator/Non-Invasive Ventilation Settings (From admission, onward)    None                       Body mass index is 44.78 kg/m².  I/O last 3 completed shifts:  In: 1000 [P.O.:1000]  Out: -   No intake/output data recorded.        Physical Exam:  General Appearance: Morbidly obese white female she is resting comfortably in bed she does not appear in any acute distress  Eyes: Conjunctiva are clear and anicteric pupils are equal and reactive to light  ENT: Mucous membranes are moist she has extensive white plaques with an erythematous base on the soft palate and uvula there is also a little bit of coating at the posterior tongue base.  Mallampati type II airway nasal septum appears midline  Neck: No palpable adenopathy or thyromegaly no jugular venous tension trachea midline  Lungs: Clear no wheezes no rales no rhonchi she had no cough with deep respiration and she did not cough once while I was in the room which was about 20 minutes  Cardiac: Regular rate rhythm no murmur no gallop  Abdomen: Soft nontender no palpable hepatosplenomegaly or masses obese  : No flank or costovertebral angle tenderness  Musculoskeletal: She has no tenderness major muscle groups upper and lower extremities  Skin: No jaundice no petechiae skin is warm and dry  Neuro: She is alert oriented cooperative following  commands moving extremities grossly intact  Extremities/P Vascular: No clubbing no cyanosis no edema palpable radial and dorsalis pedis pulses bilaterally  MSE: She is anxious to get home but otherwise seems to be fairly appropriate       Labs:  Results from last 7 days   Lab Units 04/04/20  0549 04/03/20  0620 04/02/20  0634 04/01/20  0809   GLUCOSE mg/dL 151* 129* 117* 127*   SODIUM mmol/L 133* 133* 136 135*   POTASSIUM mmol/L 3.3* 3.6 3.8 3.2*   MAGNESIUM mg/dL 1.7  --   --  1.1*   CO2 mmol/L 18.8* 17.6* 20.2* 18.7*   CHLORIDE mmol/L 90* 87* 90* 84*   ANION GAP mmol/L 24.2* 28.4* 25.8* 32.3*   CREATININE mg/dL 0.72 0.79 0.76 0.85   BUN mg/dL 14 13 8 3*   BUN / CREAT RATIO  19.4 16.5 10.5 3.5*   CALCIUM mg/dL 8.3* 8.2* 7.8* 8.5*   EGFR IF NONAFRICN AM mL/min/1.73 87 78 82 72   ALK PHOS U/L 132* 132* 103 128*   TOTAL PROTEIN g/dL 5.4* 6.2 5.9* 7.1   ALT (SGPT) U/L 72* 64* 49* 65*   AST (SGOT) U/L 193* 177* 97* 124*   BILIRUBIN mg/dL 1.6* 1.6* 1.3* 1.4*   ALBUMIN g/dL 3.20* 3.40* 3.40* 3.70   GLOBULIN gm/dL 2.2 2.8 2.5 3.4     Estimated Creatinine Clearance: 110.8 mL/min (by C-G formula based on SCr of 0.72 mg/dL).      Results from last 7 days   Lab Units 04/04/20  0549 04/03/20  0647 04/02/20  0639 04/01/20  0809   WBC 10*3/mm3 7.95 8.00 7.38 8.04   RBC 10*6/mm3 3.91 4.23 3.95 4.48   HEMOGLOBIN g/dL 14.1 15.1 14.2 16.3*   HEMATOCRIT % 39.8 43.4 40.1 46.2   MCV fL 101.8* 102.6* 101.5* 103.1*   MCH pg 36.1* 35.7* 35.9* 36.4*   MCHC g/dL 35.4 34.8 35.4 35.3   RDW % 14.5 14.7 14.3 15.4   RDW-SD fl 54.6* 55.2* 53.6 57.9*   MPV fL 9.8 9.5 9.4 9.5   PLATELETS 10*3/mm3 209 208 195 244   NEUTROPHIL % % 75.4 73.1 65.8 68.8   LYMPHOCYTE % % 15.3* 19.0* 24.5 22.6   MONOCYTES % % 8.1 7.0 8.1 7.2   EOSINOPHIL % % 0.3 0.1* 0.3 0.4   BASOPHIL % % 0.3 0.3 0.8 0.6   IMM GRAN % % 0.6* 0.5 0.5 0.4   NEUTROS ABS 10*3/mm3 6.00 5.85 4.85 5.53   LYMPHS ABS 10*3/mm3 1.22 1.52 1.81 1.82   MONOS ABS 10*3/mm3 0.64 0.56 0.60 0.58   EOS ABS  10*3/mm3 0.02 0.01 0.02 0.03   BASOS ABS 10*3/mm3 0.02 0.02 0.06 0.05   IMMATURE GRANS (ABS) 10*3/mm3 0.05 0.04 0.04 0.03   NRBC /100 WBC 1.6* 0.6* 0.4* 0.5*     Results from last 7 days   Lab Units 04/03/20  2358   PH, ARTERIAL pH units 7.466*   PO2 ART mm Hg 93.9   PCO2, ARTERIAL mm Hg 25.6*   HCO3 ART mmol/L 18.5*     Results from last 7 days   Lab Units 04/04/20  0549 04/01/20  0809   CK TOTAL U/L 216*  --    TROPONIN T ng/mL  --  <0.010             Results from last 7 days   Lab Units 04/04/20  0549 04/03/20  1036 04/03/20  0620 04/01/20  0809   LACTATE mmol/L 10.7* 11.8* 11.3*  --    PROCALCITONIN ng/mL  --   --   --  0.16         Microbiology Results (last 10 days)     Procedure Component Value - Date/Time    Blood Culture - Blood, Arm, Left [[id number]] Collected:  04/01/20 1654    Lab Status:  Preliminary result Specimen:  Blood from Arm, Left Updated:  04/03/20 1700     Blood Culture No growth at 2 days    Blood Culture - Blood, Arm, Right [[id number]] Collected:  04/01/20 1654    Lab Status:  Preliminary result Specimen:  Blood from Arm, Right Updated:  04/03/20 1700     Blood Culture No growth at 2 days    Respiratory Panel, PCR - Swab, Nasopharynx [[id number]]  (Normal) Collected:  04/01/20 1515    Lab Status:  Final result Specimen:  Swab from Nasopharynx Updated:  04/01/20 1653     ADENOVIRUS, PCR Not Detected     Coronavirus 229E Not Detected     Coronavirus HKU1 Not Detected     Coronavirus NL63 Not Detected     Coronavirus OC43 Not Detected     Human Metapneumovirus Not Detected     Human Rhinovirus/Enterovirus Not Detected     Influenza B PCR Not Detected     Parainfluenza Virus 1 Not Detected     Parainfluenza Virus 2 Not Detected     Parainfluenza Virus 3 Not Detected     Parainfluenza Virus 4 Not Detected     Bordetella pertussis pcr Not Detected     Influenza A H1 2009 PCR Not Detected     Chlamydophila pneumoniae PCR Not Detected     Mycoplasma pneumo by PCR Not Detected     Influenza A PCR Not  Detected     Influenza A H3 Not Detected     Influenza A H1 Not Detected     RSV, PCR Not Detected     Bordetella parapertussis PCR Not Detected    Narrative:       The coronavirus on the RVP is NOT COVID-19 and is NOT indicative of infection with COVID-19.                 cefTRIAXone 1 g Intravenous Q24H   insulin lispro 0-7 Units Subcutaneous 4x Daily With Meals & Nightly   levothyroxine 200 mcg Oral Daily   sodium bicarbonate 650 mg Oral TID   sodium chloride 10 mL Intravenous Q12H   sucralfate 1 g Oral 4x Daily AC & at Bedtime       sodium chloride 100 mL/hr Last Rate: 100 mL/hr (04/03/20 1949)       Diagnostics:  Xr Chest 1 View    Result Date: 4/4/2020  ONE VIEW PORTABLE CHEST AT 5:50 AM  HISTORY: Cough and fever.  FINDINGS: The exam is limited by the patient's marked obesity. The lungs remain well-expanded with some minimal vague haziness in the lower left chest predominantly related to overlying soft tissues. I cannot completely exclude some minimal underlying pneumonia in this region. The heart size is normal.  This report was finalized on 4/4/2020 7:30 AM by Dr. Elmo Mooney M.D.      Xr Chest Ap    Result Date: 4/1/2020  ONE VIEW PORTABLE CHEST  HISTORY: Cough and fever.  FINDINGS: The exam is slightly limited by the patient's large size. The lungs are well-expanded and clear and the heart size is normal. The overall appearance shows no change from 11/10/2019.  This report was finalized on 4/1/2020 11:11 AM by Dr. Elmo Mooney M.D.               Active Hospital Problems    Diagnosis  POA   • **Dyspnea [R06.00]  Yes   • Cough [R05]  Unknown   • Hypomagnesemia [E83.42]  Unknown   • Metabolic acidosis [E87.2]  Unknown   • Fatigue [R53.83]  Unknown   • History of COPD [Z87.09]  Not Applicable   • Tobacco abuse [Z72.0]  Yes   • Rheumatoid arthritis (CMS/HCC) [M06.9]  Yes   • Type 2 diabetes mellitus (CMS/HCC) [E11.9]  Yes   • Morbid obesity (CMS/HCC) [E66.01]  Yes   • Elevated LFTs [R94.5]  Yes   • YOUNG  (nonalcoholic steatohepatitis) [K75.81]  Yes   • Diabetes mellitus arising in pregnancy [O24.419]  Yes   • Hypothyroid [E03.9]  Yes      Resolved Hospital Problems   No resolved problems to display.     Chest x-ray reviewed I do not see any definite infiltrates lung markings however are increased    Assessment/Plan     1. Cough I am not certain etiology I wonder if reflux is an exacerbating it, she probably has some underlying obstructive lung disease as well with her smoking history.  Continue antitussive regimen treat reflux and obstructive lung disease.  Her x-ray and her chest exam is clear I do not really suspect pneumonia no history for foreign body aspiration etc. if this cough does not clear again it is markedly better today than I would do a CT scan first  2. Lactic acidosis, the etiology is not entirely clear it is slowly improving it needs to be continued monitoring.  This may be the most puzzling piece she is not been on any medications other than a little bit of albuterol at home not enough that I would expect the lactic acidosis is not on lactated Ringer's she is not been on metformin, she does not seem to have any hypoperfused areas compartment syndromes etc. that would cause this, she is not hypoxic.  Again I would follow this until it normalizes  3. Rule out Covid 19 she has a mildly elevated LDH and ferritin is fairly high is an acute phase reactant short of Covid 19 I am not sure why it would be elevated, as I really cannot find that significant and inflammation other than little bit of thrush  4. Tobacco abuse 1-1/2 packs a day for 26 years absolutely needs to stop smoking.  I did discuss this with her  5. Suspected COPD he should have pulmonary functions when all the Covid 19 issues have resolved  6. Obstructive sleep apnea continue home CPAP once Covid 19 is ruled out  7. Gastroesophageal reflux disease she is having a lot of issues with this and all her abdominal exam is benign if this does  not respond, she may need GI consult I am good of in addition to the Carafate she is on put her on twice daily PPI and have instructed on reflux precautions not to lie down for at least 2 hours after eating or drinking and try and keep the head of her bed when she sleeps and sits at least 30 to 45 degree elevation.  8. Diabetes mellitus type 2 diet controlled  9. History of cerebral aneurysm with clipping  10. Morbid obesity weight loss would be beneficial  11. Elevated liver functions mildly elevated history of nonalcoholic steatohepatitis this will just need to be followed  12. Hyponatremia mild follow.  13. Thrush she does have fairly significant thrush she has been on antibiotics I will defer to Dr. Gamez continue antibiotics.  I wanted to give the patient some nystatin swish and swallow she did not think that she would be able to keep it down and I am not sure swish and spit she is going to be effective it looks like she is got fairly deep involvement on the base of her tongue, go ahead and give her some Diflucan.  And since she says she is having trouble keeping pills down I will give her IV dose to start    Plan for disposition:    Everton Mcrae MD  04/04/20  11:42  I wore a surgical facemask, gown, gloves and goggles for exam  Time: Spent over 45 minutes on patient's case today

## 2020-04-04 NOTE — CONSULTS
Referring Provider: Tylor Cruz [place] [address]  Reason for Consultation: Fever    Subjective   History of present illness: This is a 44-year-old female with a history of type 2 diabetes, nonalcoholic steatohepatitis, hypothyroidism, and obstructive sleep apnea who was admitted on April 1 with dry cough and subjective fevers.  Patient states she developed a cough about 1 month ago.  This was dry.  About 10 days ago the cough started to increase in frequency.  She states 5 to 6 days prior to admission she developed a fever with a T-max of 103.  Her cough got so bad that she had some posttussive emesis.  She denies any rhinorrhea or sore throat.  No myalgias or arthralgias.  No real shortness of breath.  She does report some heartburn that is also been giving her problems.  Presented to the emergency room on April 1.  Admission blood work revealed a normal white blood cell count and a negative procalcitonin.  Chest x-ray was clear but due to fever and cough COVID testing was obtained.  He came back negative today.  Of note her other labs are significant for a lactic acidosis with a lactate of 11.8 yesterday and 10.7 today.  Significantly elevated ferritin of 1260, hyponatremia and hypokalemia.  Respiratory viral is negative.  Blood cultures are negative to date.  She has remained afebrile in the hospital.  She is oxygenating well on room air.  Repeat chest x-ray continues to be clear.    Past Medical History:   Diagnosis Date   • Aneurysm (CMS/HCC)    • Arthritis    • Carpal tunnel syndrome    • Dysmetabolic syndrome X    • Hypothyroidism    • Metabolic disorder    • Nonalcoholic steatohepatitis    • Sleep apnea    • Type 2 diabetes mellitus (CMS/HCC)    • Vitamin D deficiency        Past Surgical History:   Procedure Laterality Date   • CARPAL TUNNEL RELEASE     • CEREBRAL ANGIOGRAM N/A 3/28/2019    Procedure: DIAGNOSTIC CEREBRAL ANGIOGRAM;  Surgeon: Alexx Barrow MD;  Location:   [facility] OR 18/19;  Service: Neurosurgery   • CEREBRAL ANGIOGRAM N/A 10/2/2019   • EMBOLIZATION CEREBRAL N/A 3/29/2019    Procedure: Cererbal angiogram and embolization of cerebral aneurysm;  Surgeon: Alexx Barrow MD;  Location: [facility] OR 18/19;  Service: Neurosurgery   • MYRINGOTOMY     • TONSILLECTOMY AND ADENOIDECTOMY          reports that she has been smoking cigarettes. She has been smoking about 1.50 packs per day. She has never used smokeless tobacco. She reports that she drinks about 1.0 - 2.0 standard drinks of alcohol per week. She reports that she does not use drugs.    family history includes Alcohol abuse in her father; Diabetes in her maternal grandmother and paternal grandmother; Heart attack in her father; Hypertension in her mother; Stroke in her father.    Allergies   Allergen Reactions   • No Known Drug Allergy        Medication:  Antibiotics:  Ceftriaxone 1 g IV every 24 hours  Fluconazole 100 mg IV every 24 hours    Please refer to the medical record for a full medication list    Review of Systems  Pertinent items are noted in HPI, all other systems reviewed and negative    Objective   Vital Signs   Temp:  [97.5 °F (36.4 °C)-98.9 °F (37.2 °C)] 98.9 °F (37.2 °C)  Heart Rate:  [106-109] 106  Resp:  [20] 20  BP: ()/(51-75) 120/75    Physical Exam:   General: In no acute distress  HEENT: Normocephalic, atraumatic,  no scleral icterus. Oropharynx + thrush   Neck: Supple, trachea is midline  Cardiovascular: Tachycardic, regular rhythm, oly S1 and S2, no murmurs, rubs, or gallops    Respiratory: Lungs are cleat to ascultation bilaterally, no wheezing   GI: Abdomen is soft, non-tender, non-distended, positive bowel sounds bilaterally  Musculoskeletal:  no edema, tenderness or deformity  Skin: No rashes   Extremities: no C/C, + LE edema   Neurological: Alert and oriented, moving all 4 extremities  Psychiatric: Normal mood and affect     Results Review:   I reviewed the patient's  new clinical results.  I reviewed the patient's new imaging results and agree with the interpretation.    Lab Results   Component Value Date    WBC 7.95 04/04/2020    HGB 14.1 04/04/2020    HCT 39.8 04/04/2020    .8 (H) 04/04/2020     04/04/2020       Lab Results   Component Value Date    GLUCOSE 151 (H) 04/04/2020    BUN 14 04/04/2020    CREATININE 0.72 04/04/2020    EGFRIFNONA 87 04/04/2020    EGFRIFAFRI 122 06/11/2019    BCR 19.4 04/04/2020    CO2 18.8 (L) 04/04/2020    CALCIUM 8.3 (L) 04/04/2020    PROTENTOTREF 7.3 06/11/2019    ALBUMIN 3.20 (L) 04/04/2020    LABIL2 1.4 06/11/2019     (H) 04/04/2020    ALT 72 (H) 04/04/2020      CK-MB 5.59    Ferritin 821 -> 803 -> 1210 -> 1260  Lactic acid 11.8 -> 10.7  Procalcitonin 0.16  CRP 0.63 -> 0.55    Microbiology:  4/1 BCx NGTD x2  4/1 RVP neg  4/1 COVID 19 negative     Radiology:  Admission chest x-ray personally reviewed by me shows no evidence of infiltrate pleural effusion pneumothorax    4/4 chest x-ray does not reveal any infiltrates or pleural effusion    Assessment/Plan   Hyponatremia  Hypokalemia  Lactic acidosis  Elevated ferritin  Fever  Elevated LFTs  Thrush    Obtain an acute hepatitis panel in the morning as well as HIV, EBV and CMV PCR studies.    Discontinue ceftriaxone there is no evidence of a bacterial infection at this time.    Continue fluconazole for thrush  Elevated LFTs    COVID-19 testing is negative.  Her chest x-ray is clear.  Having said that though I do not have a good explanation for her elevated ferritin and this is used as a surrogate marker for COVID infection.  I discussed the case with Dr. Atif Mcrae and we will obtain a CT of the chest.  Maintain enhanced isolation for now    I discussed the patients findings and my recommendations with patient, nursing staff and consulting provider

## 2020-04-04 NOTE — PROGRESS NOTES
[facility]     LOS: 1 day     Name: Oralia Sánchez  Age: 44 y.o.  Sex: female  :  1975  MRN: [id number]         Primary Care Physician: Provider, No Known    Diet Regular; Consistent Carbohydrate    Subjective     Patient is seen by bedside, no new complaints.    Objective   Temp:  [97.5 °F (36.4 °C)-98.9 °F (37.2 °C)] 98.9 °F (37.2 °C)  Heart Rate:  [106-109] 106  Resp:  [20] 20  BP: ()/(51-75) 120/75  SpO2:  [94 %-95 %] 94 %  on   ;   Device (Oxygen Therapy): room air  Body mass index is 44.78 kg/m².    Physical Exam     General: patient is awake and alert.  Head and ENT: normocephalic and atraumatic.  Lungs: symmetric expansion and equal air entry bilaterally.  Heart: regular rate, rhythm, no murmurs.  Abdomen: soft, nontender, bowel sounds present.  Extremities:  No clubbing, cyanosis or edema.  Neurologic:  No focal neurological deficits present.  Cranial nerves intact.  Psychiatry:  Normal mood and affect.  Skin:  Warm and no rash.    Reviewed medications and new clinical results        Results from last 7 days   Lab Units 20  0549 20  0647 20  0639 20  0809   WBC 10*3/mm3 7.95 8.00 7.38 8.04   HEMOGLOBIN g/dL 14.1 15.1 14.2 16.3*   PLATELETS 10*3/mm3 209 208 195 244     Results from last 7 days   Lab Units 20  0549 20  0620 20  0634 20  0809   SODIUM mmol/L 133* 133* 136 135*   POTASSIUM mmol/L 3.3* 3.6 3.8 3.2*   CHLORIDE mmol/L 90* 87* 90* 84*   CO2 mmol/L 18.8* 17.6* 20.2* 18.7*   BUN mg/dL 14 13 8 3*   CREATININE mg/dL 0.72 0.79 0.76 0.85   CALCIUM mg/dL 8.3* 8.2* 7.8* 8.5*   GLUCOSE mg/dL 151* 129* 117* 127*     Lab Results   Component Value Date    ANIONGAP 24.2 (H) 2020     Glucose   Date/Time Value Ref Range Status   2020 1616 153 (H) 70 - 130 mg/dL Final   2020 1104 152 (H) 70 - 130 mg/dL Final   2020 2225 148 (H) 70 - 130 mg/dL Final   2020 1649 161  (H) 70 - 130 mg/dL Final   04/03/2020 0920 144 (H) 70 - 130 mg/dL Final   04/02/2020 2002 145 (H) 70 - 130 mg/dL Final   04/02/2020 1650 124 70 - 130 mg/dL Final   04/02/2020 1147 144 (H) 70 - 130 mg/dL Final     No results found for: HGBA1C  Estimated Creatinine Clearance: 110.8 mL/min (by C-G formula based on SCr of 0.72 mg/dL).    Assessment/Plan   Active Hospital Problems    Diagnosis  POA   • **Dyspnea [R06.00]  Yes   • Cough [R05]  Unknown   • Hypomagnesemia [E83.42]  Unknown   • Metabolic acidosis [E87.2]  Unknown   • Fatigue [R53.83]  Unknown   • History of COPD [Z87.09]  Not Applicable   • Tobacco abuse [Z72.0]  Yes   • Rheumatoid arthritis (CMS/HCC) [M06.9]  Yes   • Type 2 diabetes mellitus (CMS/HCC) [E11.9]  Yes   • Morbid obesity (CMS/HCC) [E66.01]  Yes   • Elevated LFTs [R94.5]  Yes   • YOUNG (nonalcoholic steatohepatitis) [K75.81]  Yes   • Diabetes mellitus arising in pregnancy [O24.419]  Yes   • Hypothyroid [E03.9]  Yes      Resolved Hospital Problems   No resolved problems to display.     44 y.o. female       Assessment and plan:  1. Cough and dyspnea. Pulmonary service on board and following.  Continue to follow recommendations.  2.  Tobacco abuse disorder, patient was counseled to quit smoking.  3. COVID rule out.  Test results are still pending.  4. Morbid obesity, patient was encouraged to lose weight.  5.  Further plans based on hospital course.      Rony Bowman MD   04/04/20  18:54

## 2020-04-05 LAB
ANION GAP SERPL CALCULATED.3IONS-SCNC: 23.1 MMOL/L (ref 5–15)
BASOPHILS # BLD AUTO: 0.04 10*3/MM3 (ref 0–0.2)
BASOPHILS NFR BLD AUTO: 0.4 % (ref 0–1.5)
BUN BLD-MCNC: 14 MG/DL (ref 6–20)
BUN/CREAT SERPL: 19.4 (ref 7–25)
CALCIUM SPEC-SCNC: 8.9 MG/DL (ref 8.6–10.5)
CHLORIDE SERPL-SCNC: 89 MMOL/L (ref 98–107)
CO2 SERPL-SCNC: 19.9 MMOL/L (ref 22–29)
CREAT BLD-MCNC: 0.72 MG/DL (ref 0.57–1)
D-LACTATE SERPL-SCNC: 5.4 MMOL/L (ref 0.5–2)
D-LACTATE SERPL-SCNC: 6 MMOL/L (ref 0.5–2)
D-LACTATE SERPL-SCNC: 7.4 MMOL/L (ref 0.5–2)
D-LACTATE SERPL-SCNC: 7.5 MMOL/L (ref 0.5–2)
DEPRECATED RDW RBC AUTO: 56 FL (ref 37–54)
EOSINOPHIL # BLD AUTO: 0.05 10*3/MM3 (ref 0–0.4)
EOSINOPHIL NFR BLD AUTO: 0.5 % (ref 0.3–6.2)
ERYTHROCYTE [DISTWIDTH] IN BLOOD BY AUTOMATED COUNT: 14.7 % (ref 12.3–15.4)
FERRITIN SERPL-MCNC: 1616 NG/ML (ref 13–150)
GFR SERPL CREATININE-BSD FRML MDRD: 87 ML/MIN/1.73
GLUCOSE BLD-MCNC: 154 MG/DL (ref 65–99)
GLUCOSE BLDC GLUCOMTR-MCNC: 169 MG/DL (ref 70–130)
GLUCOSE BLDC GLUCOMTR-MCNC: 173 MG/DL (ref 70–130)
GLUCOSE BLDC GLUCOMTR-MCNC: 185 MG/DL (ref 70–130)
GLUCOSE BLDC GLUCOMTR-MCNC: 188 MG/DL (ref 70–130)
HAV IGM SERPL QL IA: NORMAL
HBV CORE IGM SERPL QL IA: NORMAL
HBV SURFACE AG SERPL QL IA: NORMAL
HCT VFR BLD AUTO: 43.7 % (ref 34–46.6)
HCV AB SER DONR QL: NORMAL
HGB BLD-MCNC: 15.4 G/DL (ref 12–15.9)
HIV1+2 AB SER QL: NORMAL
IMM GRANULOCYTES # BLD AUTO: 0.05 10*3/MM3 (ref 0–0.05)
IMM GRANULOCYTES NFR BLD AUTO: 0.5 % (ref 0–0.5)
LDH SERPL-CCNC: 501 U/L (ref 135–214)
LYMPHOCYTES # BLD AUTO: 1.27 10*3/MM3 (ref 0.7–3.1)
LYMPHOCYTES NFR BLD AUTO: 13.5 % (ref 19.6–45.3)
MCH RBC QN AUTO: 36.5 PG (ref 26.6–33)
MCHC RBC AUTO-ENTMCNC: 35.2 G/DL (ref 31.5–35.7)
MCV RBC AUTO: 103.6 FL (ref 79–97)
MONOCYTES # BLD AUTO: 0.76 10*3/MM3 (ref 0.1–0.9)
MONOCYTES NFR BLD AUTO: 8.1 % (ref 5–12)
NEUTROPHILS # BLD AUTO: 7.22 10*3/MM3 (ref 1.7–7)
NEUTROPHILS NFR BLD AUTO: 77 % (ref 42.7–76)
NRBC BLD AUTO-RTO: 1.9 /100 WBC (ref 0–0.2)
PLATELET # BLD AUTO: 184 10*3/MM3 (ref 140–450)
PMV BLD AUTO: 9.8 FL (ref 6–12)
POTASSIUM BLD-SCNC: 3.4 MMOL/L (ref 3.5–5.2)
RBC # BLD AUTO: 4.22 10*6/MM3 (ref 3.77–5.28)
SODIUM BLD-SCNC: 132 MMOL/L (ref 136–145)
WBC NRBC COR # BLD: 9.39 10*3/MM3 (ref 3.4–10.8)

## 2020-04-05 PROCEDURE — 80074 ACUTE HEPATITIS PANEL: CPT | Performed by: INTERNAL MEDICINE

## 2020-04-05 PROCEDURE — 83615 LACTATE (LD) (LDH) ENZYME: CPT | Performed by: HOSPITALIST

## 2020-04-05 PROCEDURE — 87799 DETECT AGENT NOS DNA QUANT: CPT | Performed by: INTERNAL MEDICINE

## 2020-04-05 PROCEDURE — 82728 ASSAY OF FERRITIN: CPT | Performed by: HOSPITALIST

## 2020-04-05 PROCEDURE — 80048 BASIC METABOLIC PNL TOTAL CA: CPT | Performed by: INTERNAL MEDICINE

## 2020-04-05 PROCEDURE — G0432 EIA HIV-1/HIV-2 SCREEN: HCPCS | Performed by: INTERNAL MEDICINE

## 2020-04-05 PROCEDURE — 83605 ASSAY OF LACTIC ACID: CPT | Performed by: INTERNAL MEDICINE

## 2020-04-05 PROCEDURE — 85025 COMPLETE CBC W/AUTO DIFF WBC: CPT | Performed by: INTERNAL MEDICINE

## 2020-04-05 PROCEDURE — 63710000001 INSULIN LISPRO (HUMAN) PER 5 UNITS: Performed by: HOSPITALIST

## 2020-04-05 PROCEDURE — 25010000002 FLUCONAZOLE PER 200 MG: Performed by: INTERNAL MEDICINE

## 2020-04-05 PROCEDURE — 82962 GLUCOSE BLOOD TEST: CPT

## 2020-04-05 RX ADMIN — INSULIN LISPRO 2 UNITS: 100 INJECTION, SOLUTION INTRAVENOUS; SUBCUTANEOUS at 11:51

## 2020-04-05 RX ADMIN — SODIUM BICARBONATE 650 MG: 650 TABLET ORAL at 21:21

## 2020-04-05 RX ADMIN — POTASSIUM CHLORIDE 40 MEQ: 10 CAPSULE, COATED, EXTENDED RELEASE ORAL at 17:23

## 2020-04-05 RX ADMIN — SUCRALFATE 1 G: 1 SUSPENSION ORAL at 07:57

## 2020-04-05 RX ADMIN — SODIUM BICARBONATE 650 MG: 650 TABLET ORAL at 17:23

## 2020-04-05 RX ADMIN — SUCRALFATE 1 G: 1 SUSPENSION ORAL at 21:22

## 2020-04-05 RX ADMIN — POTASSIUM CHLORIDE 40 MEQ: 10 CAPSULE, COATED, EXTENDED RELEASE ORAL at 11:51

## 2020-04-05 RX ADMIN — Medication 2 TABLET: at 17:26

## 2020-04-05 RX ADMIN — FLUCONAZOLE 100 MG: 2 INJECTION, SOLUTION INTRAVENOUS at 15:56

## 2020-04-05 RX ADMIN — PANTOPRAZOLE SODIUM 40 MG: 40 INJECTION, POWDER, FOR SOLUTION INTRAVENOUS at 21:21

## 2020-04-05 RX ADMIN — PANTOPRAZOLE SODIUM 40 MG: 40 INJECTION, POWDER, FOR SOLUTION INTRAVENOUS at 08:58

## 2020-04-05 RX ADMIN — INSULIN LISPRO 2 UNITS: 100 INJECTION, SOLUTION INTRAVENOUS; SUBCUTANEOUS at 07:57

## 2020-04-05 RX ADMIN — SUCRALFATE 1 G: 1 SUSPENSION ORAL at 11:51

## 2020-04-05 RX ADMIN — INSULIN LISPRO 2 UNITS: 100 INJECTION, SOLUTION INTRAVENOUS; SUBCUTANEOUS at 17:23

## 2020-04-05 RX ADMIN — SODIUM CHLORIDE 100 ML/HR: 9 INJECTION, SOLUTION INTRAVENOUS at 15:56

## 2020-04-05 RX ADMIN — INSULIN LISPRO 2 UNITS: 100 INJECTION, SOLUTION INTRAVENOUS; SUBCUTANEOUS at 21:22

## 2020-04-05 RX ADMIN — SUCRALFATE 1 G: 1 SUSPENSION ORAL at 17:23

## 2020-04-05 RX ADMIN — SODIUM BICARBONATE 650 MG: 650 TABLET ORAL at 07:57

## 2020-04-05 RX ADMIN — Medication 2 TABLET: at 01:30

## 2020-04-05 RX ADMIN — LEVOTHYROXINE SODIUM 200 MCG: 100 TABLET ORAL at 07:57

## 2020-04-05 NOTE — PROGRESS NOTES
[facility]     LOS: 2 days     Name: Oralia Sánchez  Age: 44 y.o.  Sex: female  :  1975  MRN: [id number]         Primary Care Physician: Provider, No Known    Diet Regular; Consistent Carbohydrate    Subjective   Patient is seen by bedside, she has been ruled out for covid 19.    Objective   Temp:  [98.6 °F (37 °C)-99.4 °F (37.4 °C)] 98.7 °F (37.1 °C)  Heart Rate:  [101-110] 109  Resp:  [18-20] 18  BP: (104-126)/(45-75) 120/70  SpO2:  [94 %-97 %] 95 %  on   ;   Device (Oxygen Therapy): room air  Body mass index is 44.78 kg/m².    Physical Exam    General: patient is awake and alert.  Head and ENT: normocephalic and atraumatic.  Lungs: symmetric expansion and equal air entry bilaterally.  Heart: regular rate, rhythm, no murmurs.  Abdomen: soft, nontender, bowel sounds present.  Extremities:  No clubbing, cyanosis or edema.  Neurologic:  No focal neurological deficits present.  Cranial nerves intact.  Psychiatry:  Normal mood and affect.  Skin:  Warm and no rash.    Reviewed medications and new clinical results    Results from last 7 days   Lab Units 20  0601 20  0549 20  0647 20  0639 20  0809   WBC 10*3/mm3 9.39 7.95 8.00 7.38 8.04   HEMOGLOBIN g/dL 15.4 14.1 15.1 14.2 16.3*   PLATELETS 10*3/mm3 184 209 208 195 244     Results from last 7 days   Lab Units 20  0601 20  0549 20  0620 20  0634 20  0809   SODIUM mmol/L 132* 133* 133* 136 135*   POTASSIUM mmol/L 3.4* 3.3* 3.6 3.8 3.2*   CHLORIDE mmol/L 89* 90* 87* 90* 84*   CO2 mmol/L 19.9* 18.8* 17.6* 20.2* 18.7*   BUN mg/dL 14 14 13 8 3*   CREATININE mg/dL 0.72 0.72 0.79 0.76 0.85   CALCIUM mg/dL 8.9 8.3* 8.2* 7.8* 8.5*   GLUCOSE mg/dL 154* 151* 129* 117* 127*     Lab Results   Component Value Date    ANIONGAP 23.1 (H) 2020     Glucose   Date/Time Value Ref Range Status   2020 1143 185 (H) 70 - 130 mg/dL Final   2020 0736 173  (H) 70 - 130 mg/dL Final   04/04/2020 2045 161 (H) 70 - 130 mg/dL Final   04/04/2020 1616 153 (H) 70 - 130 mg/dL Final   04/04/2020 1104 152 (H) 70 - 130 mg/dL Final   04/03/2020 2225 148 (H) 70 - 130 mg/dL Final   04/03/2020 1649 161 (H) 70 - 130 mg/dL Final   04/03/2020 0920 144 (H) 70 - 130 mg/dL Final     No results found for: HGBA1C  Estimated Creatinine Clearance: 110.8 mL/min (by C-G formula based on SCr of 0.72 mg/dL).    Assessment/Plan   Active Hospital Problems    Diagnosis  POA   • **Dyspnea [R06.00]  Yes   • Cough [R05]  Unknown   • Hypomagnesemia [E83.42]  Unknown   • Metabolic acidosis [E87.2]  Unknown   • Fatigue [R53.83]  Unknown   • History of COPD [Z87.09]  Not Applicable   • Tobacco abuse [Z72.0]  Yes   • Rheumatoid arthritis (CMS/HCC) [M06.9]  Yes   • Type 2 diabetes mellitus (CMS/HCC) [E11.9]  Yes   • Morbid obesity (CMS/HCC) [E66.01]  Yes   • Elevated LFTs [R94.5]  Yes   • YOUNG (nonalcoholic steatohepatitis) [K75.81]  Yes   • Diabetes mellitus arising in pregnancy [O24.419]  Yes   • Hypothyroid [E03.9]  Yes      Resolved Hospital Problems   No resolved problems to display.     44 y.o. female          Assessment and plan:  1. Cough and dyspnea. Pulmonary service on board and following.  Continue to follow recommendations.    2.  Tobacco abuse disorder, patient was counseled to quit smoking.  3. COVID rule out. She has been ruled out for covid 19.  4. Morbid obesity, patient was encouraged to lose weight.  5.  Further plans based on hospital course. Infectious Disease and Pulmonary Service following.    Rony Bowman MD   04/05/20  13:54

## 2020-04-05 NOTE — PLAN OF CARE
Problem: Patient Care Overview  Goal: Plan of Care Review  Outcome: Ongoing (interventions implemented as appropriate)  Flowsheets (Taken 4/5/2020 1502)  Progress: improving  Plan of Care Reviewed With: patient  Outcome Summary: Patient on room air, no c/o SOA. Occasional cough. No c/o pain or nausea, resting comfortably between care. IVF infusing. NCS diet. ID ordering more tests, pulmonary continues to follow.

## 2020-04-05 NOTE — PROGRESS NOTES
LOS: 2 days   Patient Care Team:  Provider, No Known as PCP - General    Subjective     Patient still has a cough its not the worst she is throwing up or not able to eat now she is anxious to actually go home today.  She is not short of breath.  No pain.    Objective     Vital Signs  Vital Sign Min/Max for last 24 hours  Temp  Min: 98.6 °F (37 °C)  Max: 99.4 °F (37.4 °C)   BP  Min: 104/45  Max: 126/66   Pulse  Min: 101  Max: 110   Resp  Min: 18  Max: 20   SpO2  Min: 94 %  Max: 97 %   No data recorded   No data recorded        Ventilator/Non-Invasive Ventilation Settings (From admission, onward)    None                       Body mass index is 44.78 kg/m².  I/O last 3 completed shifts:  In: 1000 [P.O.:1000]  Out: -   No intake/output data recorded.        Physical Exam:  General Appearance: Morbidly obese white female she is sitting up in a chair in no acute distress  Eyes: Conjunctiva are clear and anicteric pupils are equal and reactive to light  ENT: Mucous membranes are moist marked improvement in the white exudates on her soft palate and the base of her tongue  Neck: No palpable adenopathy or thyromegaly no jugular venous tension trachea midline  Lungs: Clear no wheezes no rales no rhonchi she had no cough with deep respiration and she did not cough once while I was in the room   Cardiac: Regular rate rhythm no murmur no gallop  Abdomen: Soft nontender no palpable hepatosplenomegaly or masses obese  : No flank or costovertebral angle tenderness  Musculoskeletal: Grossly normal  Skin: No jaundice no petechiae skin is warm and dry  Neuro: She is alert oriented cooperative following commands moving extremities grossly intact  Extremities/P Vascular: No clubbing no cyanosis no edema palpable radial and dorsalis pedis pulses bilaterally  MSE: She is anxious to get home but otherwise seems to be fairly appropriate       Labs:  Results from last 7 days   Lab Units 04/05/20  0601 04/04/20  0549 04/03/20  0620  04/02/20  0634 04/01/20  0809   GLUCOSE mg/dL 154* 151* 129* 117* 127*   SODIUM mmol/L 132* 133* 133* 136 135*   POTASSIUM mmol/L 3.4* 3.3* 3.6 3.8 3.2*   MAGNESIUM mg/dL  --  1.7  --   --  1.1*   CO2 mmol/L 19.9* 18.8* 17.6* 20.2* 18.7*   CHLORIDE mmol/L 89* 90* 87* 90* 84*   ANION GAP mmol/L 23.1* 24.2* 28.4* 25.8* 32.3*   CREATININE mg/dL 0.72 0.72 0.79 0.76 0.85   BUN mg/dL 14 14 13 8 3*   BUN / CREAT RATIO  19.4 19.4 16.5 10.5 3.5*   CALCIUM mg/dL 8.9 8.3* 8.2* 7.8* 8.5*   EGFR IF NONAFRICN AM mL/min/1.73 87 87 78 82 72   ALK PHOS U/L  --  132* 132* 103 128*   TOTAL PROTEIN g/dL  --  5.4* 6.2 5.9* 7.1   ALT (SGPT) U/L  --  72* 64* 49* 65*   AST (SGOT) U/L  --  193* 177* 97* 124*   BILIRUBIN mg/dL  --  1.6* 1.6* 1.3* 1.4*   ALBUMIN g/dL  --  3.20* 3.40* 3.40* 3.70   GLOBULIN gm/dL  --  2.2 2.8 2.5 3.4     Estimated Creatinine Clearance: 110.8 mL/min (by C-G formula based on SCr of 0.72 mg/dL).      Results from last 7 days   Lab Units 04/05/20  0601 04/04/20  0549 04/03/20  0647 04/02/20  0639 04/01/20  0809   WBC 10*3/mm3 9.39 7.95 8.00 7.38 8.04   RBC 10*6/mm3 4.22 3.91 4.23 3.95 4.48   HEMOGLOBIN g/dL 15.4 14.1 15.1 14.2 16.3*   HEMATOCRIT % 43.7 39.8 43.4 40.1 46.2   MCV fL 103.6* 101.8* 102.6* 101.5* 103.1*   MCH pg 36.5* 36.1* 35.7* 35.9* 36.4*   MCHC g/dL 35.2 35.4 34.8 35.4 35.3   RDW % 14.7 14.5 14.7 14.3 15.4   RDW-SD fl 56.0* 54.6* 55.2* 53.6 57.9*   MPV fL 9.8 9.8 9.5 9.4 9.5   PLATELETS 10*3/mm3 184 209 208 195 244   NEUTROPHIL % % 77.0* 75.4 73.1 65.8 68.8   LYMPHOCYTE % % 13.5* 15.3* 19.0* 24.5 22.6   MONOCYTES % % 8.1 8.1 7.0 8.1 7.2   EOSINOPHIL % % 0.5 0.3 0.1* 0.3 0.4   BASOPHIL % % 0.4 0.3 0.3 0.8 0.6   IMM GRAN % % 0.5 0.6* 0.5 0.5 0.4   NEUTROS ABS 10*3/mm3 7.22* 6.00 5.85 4.85 5.53   LYMPHS ABS 10*3/mm3 1.27 1.22 1.52 1.81 1.82   MONOS ABS 10*3/mm3 0.76 0.64 0.56 0.60 0.58   EOS ABS 10*3/mm3 0.05 0.02 0.01 0.02 0.03   BASOS ABS 10*3/mm3 0.04 0.02 0.02 0.06 0.05   IMMATURE GRANS (ABS)  10*3/mm3 0.05 0.05 0.04 0.04 0.03   NRBC /100 WBC 1.9* 1.6* 0.6* 0.4* 0.5*     Results from last 7 days   Lab Units 04/03/20  2358   PH, ARTERIAL pH units 7.466*   PO2 ART mm Hg 93.9   PCO2, ARTERIAL mm Hg 25.6*   HCO3 ART mmol/L 18.5*     Results from last 7 days   Lab Units 04/04/20  0549 04/01/20  0809   CK TOTAL U/L 216*  --    TROPONIN T ng/mL  --  <0.010             Results from last 7 days   Lab Units 04/05/20  0601 04/05/20  0006 04/04/20  1813 04/04/20  0549 04/03/20  1036 04/03/20  0620 04/01/20  0809   LACTATE mmol/L 7.4* 7.5* 8.1* 10.7* 11.8* 11.3*  --    PROCALCITONIN ng/mL  --   --   --   --   --   --  0.16         Microbiology Results (last 10 days)     Procedure Component Value - Date/Time    Blood Culture - Blood, Arm, Left [[id number]] Collected:  04/01/20 1654    Lab Status:  Preliminary result Specimen:  Blood from Arm, Left Updated:  04/04/20 1700     Blood Culture No growth at 3 days    Blood Culture - Blood, Arm, Right [[id number]] Collected:  04/01/20 1654    Lab Status:  Preliminary result Specimen:  Blood from Arm, Right Updated:  04/04/20 1700     Blood Culture No growth at 3 days    Respiratory Panel, PCR - Swab, Nasopharynx [[id number]]  (Normal) Collected:  04/01/20 1515    Lab Status:  Final result Specimen:  Swab from Nasopharynx Updated:  04/01/20 1653     ADENOVIRUS, PCR Not Detected     Coronavirus 229E Not Detected     Coronavirus HKU1 Not Detected     Coronavirus NL63 Not Detected     Coronavirus OC43 Not Detected     Human Metapneumovirus Not Detected     Human Rhinovirus/Enterovirus Not Detected     Influenza B PCR Not Detected     Parainfluenza Virus 1 Not Detected     Parainfluenza Virus 2 Not Detected     Parainfluenza Virus 3 Not Detected     Parainfluenza Virus 4 Not Detected     Bordetella pertussis pcr Not Detected     Influenza A H1 2009 PCR Not Detected     Chlamydophila pneumoniae PCR Not Detected     Mycoplasma pneumo by PCR Not Detected     Influenza A PCR Not Detected      Influenza A H3 Not Detected     Influenza A H1 Not Detected     RSV, PCR Not Detected     Bordetella parapertussis PCR Not Detected    Narrative:       The coronavirus on the RVP is NOT COVID-19 and is NOT indicative of infection with COVID-19.     CORONAVIRUS(COVID-19),RT-PCR,UOFL LAB,NP/OP Swab in [employer] - Swab, Nasopharynx [[id number]] Collected:  04/01/20 1049    Lab Status:  Final result Specimen:  Swab from Nasopharynx Updated:  04/04/20 1406     Reference Lab Report Original report from ID[facility] available in [facility].     COVID19 Not Detected    Narrative:       Test was performed at:   [facility]   [address], [place] [address]                fluconazole 100 mg Intravenous Q24H   insulin lispro 0-7 Units Subcutaneous 4x Daily With Meals & Nightly   levothyroxine 200 mcg Oral Daily   pantoprazole 40 mg Intravenous Q12H   sodium bicarbonate 650 mg Oral TID   sodium chloride 10 mL Intravenous Q12H   sucralfate 1 g Oral 4x Daily AC & at Bedtime       sodium chloride 100 mL/hr Last Rate: 100 mL/hr (04/03/20 1949)       Diagnostics:  Xr Chest 1 View    Result Date: 4/4/2020  ONE VIEW PORTABLE CHEST AT 5:50 AM  HISTORY: Cough and fever.  FINDINGS: The exam is limited by the patient's marked obesity. The lungs remain well-expanded with some minimal vague haziness in the lower left chest predominantly related to overlying soft tissues. I cannot completely exclude some minimal underlying pneumonia in this region. The heart size is normal.  This report was finalized on 4/4/2020 7:30 AM by Dr. Elmo Mooney M.D.      Xr Chest Ap    Result Date: 4/1/2020  ONE VIEW PORTABLE CHEST  HISTORY: Cough and fever.  FINDINGS: The exam is slightly limited by the patient's large size. The lungs are well-expanded and clear and the heart size is normal. The overall appearance shows no change from 11/10/2019.  This report was  finalized on 4/1/2020 11:11 AM by Dr. Elmo Mooney M.D.               Active Hospital Problems    Diagnosis  POA   • **Dyspnea [R06.00]  Yes   • Cough [R05]  Unknown   • Hypomagnesemia [E83.42]  Unknown   • Metabolic acidosis [E87.2]  Unknown   • Fatigue [R53.83]  Unknown   • History of COPD [Z87.09]  Not Applicable   • Tobacco abuse [Z72.0]  Yes   • Rheumatoid arthritis (CMS/HCC) [M06.9]  Yes   • Type 2 diabetes mellitus (CMS/HCC) [E11.9]  Yes   • Morbid obesity (CMS/HCC) [E66.01]  Yes   • Elevated LFTs [R94.5]  Yes   • YOUNG (nonalcoholic steatohepatitis) [K75.81]  Yes   • Diabetes mellitus arising in pregnancy [O24.419]  Yes   • Hypothyroid [E03.9]  Yes      Resolved Hospital Problems   No resolved problems to display.     I reviewed CT chest lungs are clear    Assessment/Plan     1. Cough I am not certain etiology I wonder if reflux is an exacerbating it, she probably has some underlying obstructive lung disease as well with her smoking history.  This may all have started with a post infectious type cough syndrome. continue antitussive regimen treat reflux and obstructive lung disease.  CT is clear  2. Lactic acidosis, the etiology is not entirely clear it is slowly improving it needs to be continued monitoring.  This may be the most puzzling piece she is not been on any medications other than a little bit of albuterol at home not enough that I would expect the lactic acidosis is not on lactated Ringer's she is not been on metformin, she does not seem to have any hypoperfused areas compartment syndromes etc. that would cause this, she is not hypoxic or hypovolemic..  Slowly continues to improve etiology eludes me, this is one instance where I definitely would want to see what an HIV test reveals..  3. Covid 19 I think ruled out based on test and CT scan  4. Tobacco abuse 1-1/2 packs a day for 26 years absolutely needs to stop smoking.  I did discuss this with her  5. Suspected COPD he should have pulmonary  functions when all the Covid 19 issues have resolved  6. Obstructive sleep apnea continue home CPAP once Covid 19 is ruled out  7. Gastroesophageal reflux disease she is having a lot of issues with this and all her abdominal exam is benign if this does not respond, she may need GI consult I am good of in addition to the Carafate she is on put her on twice daily PPI and have instructed on reflux precautions not to lie down for at least 2 hours after eating or drinking and try and keep the head of her bed when she sleeps and sits at least 30 to 45 degree elevation.  8. Diabetes mellitus type 2 diet controlled  9. History of cerebral aneurysm with clipping  10. Morbid obesity weight loss would be beneficial  11. Elevated liver functions mildly elevated history of nonalcoholic steatohepatitis this will just need to be followed  12. Hyponatremia mild follow.  13. Thrush much improved today  14. I agree with ID particularly checking HIV with her thrush and is lactic acidosis that we cannot explain well.    Plan for disposition:    Everton Mcrae MD  04/05/20  09:29  I wore a surgical facemask,

## 2020-04-05 NOTE — NURSING NOTE
Patient found wandering her room without her IV trying to take a shower and looking for her dog. I assessed her and verified she is still alert and oriented. Patient encouraged to go to sleep.

## 2020-04-05 NOTE — PLAN OF CARE
Problem: Patient Care Overview  Goal: Plan of Care Review  Outcome: Ongoing (interventions implemented as appropriate)  Flowsheets (Taken 4/5/2020 0405)  Progress: improving  Plan of Care Reviewed With: patient  Outcome Summary: Afebrile this shift. VSS. No pain. Cough and nausea both improving. Some odd behavior overnight like seeing her dog in the room and saying she was in group home. Reoriented and now resting. Continue to monitor

## 2020-04-06 LAB
ALDOLASE SERPL-CCNC: 13.7 U/L (ref 3.3–10.3)
ANION GAP SERPL CALCULATED.3IONS-SCNC: 13.6 MMOL/L (ref 5–15)
BACTERIA SPEC AEROBE CULT: NORMAL
BACTERIA SPEC AEROBE CULT: NORMAL
BASOPHILS # BLD AUTO: 0.03 10*3/MM3 (ref 0–0.2)
BASOPHILS NFR BLD AUTO: 0.4 % (ref 0–1.5)
BUN BLD-MCNC: 14 MG/DL (ref 6–20)
BUN/CREAT SERPL: 25.5 (ref 7–25)
CALCIUM SPEC-SCNC: 8.1 MG/DL (ref 8.6–10.5)
CHLORIDE SERPL-SCNC: 94 MMOL/L (ref 98–107)
CO2 SERPL-SCNC: 23.4 MMOL/L (ref 22–29)
CREAT BLD-MCNC: 0.55 MG/DL (ref 0.57–1)
D-LACTATE SERPL-SCNC: 3.6 MMOL/L (ref 0.5–2)
D-LACTATE SERPL-SCNC: 4.2 MMOL/L (ref 0.5–2)
D-LACTATE SERPL-SCNC: 4.3 MMOL/L (ref 0.5–2)
D-LACTATE SERPL-SCNC: 4.7 MMOL/L (ref 0.5–2)
DEPRECATED RDW RBC AUTO: 55.7 FL (ref 37–54)
EOSINOPHIL # BLD AUTO: 0.08 10*3/MM3 (ref 0–0.4)
EOSINOPHIL NFR BLD AUTO: 1 % (ref 0.3–6.2)
ERYTHROCYTE [DISTWIDTH] IN BLOOD BY AUTOMATED COUNT: 14.9 % (ref 12.3–15.4)
GFR SERPL CREATININE-BSD FRML MDRD: 119 ML/MIN/1.73
GLUCOSE BLD-MCNC: 147 MG/DL (ref 65–99)
GLUCOSE BLDC GLUCOMTR-MCNC: 140 MG/DL (ref 70–130)
GLUCOSE BLDC GLUCOMTR-MCNC: 150 MG/DL (ref 70–130)
GLUCOSE BLDC GLUCOMTR-MCNC: 151 MG/DL (ref 70–130)
GLUCOSE BLDC GLUCOMTR-MCNC: 186 MG/DL (ref 70–130)
HCT VFR BLD AUTO: 41.1 % (ref 34–46.6)
HGB BLD-MCNC: 14.4 G/DL (ref 12–15.9)
HOLD SPECIMEN: NORMAL
LYMPHOCYTES # BLD AUTO: 1.19 10*3/MM3 (ref 0.7–3.1)
LYMPHOCYTES NFR BLD AUTO: 15.2 % (ref 19.6–45.3)
MCH RBC QN AUTO: 36 PG (ref 26.6–33)
MCHC RBC AUTO-ENTMCNC: 35 G/DL (ref 31.5–35.7)
MCV RBC AUTO: 102.8 FL (ref 79–97)
MONOCYTES # BLD AUTO: 0.7 10*3/MM3 (ref 0.1–0.9)
MONOCYTES NFR BLD AUTO: 9 % (ref 5–12)
NEUTROPHILS # BLD AUTO: 5.78 10*3/MM3 (ref 1.7–7)
NEUTROPHILS NFR BLD AUTO: 73.9 % (ref 42.7–76)
PLATELET # BLD AUTO: 195 10*3/MM3 (ref 140–450)
PMV BLD AUTO: 9.8 FL (ref 6–12)
POTASSIUM BLD-SCNC: 3.9 MMOL/L (ref 3.5–5.2)
RBC # BLD AUTO: 4 10*6/MM3 (ref 3.77–5.28)
SODIUM BLD-SCNC: 131 MMOL/L (ref 136–145)
WBC NRBC COR # BLD: 7.82 10*3/MM3 (ref 3.4–10.8)

## 2020-04-06 PROCEDURE — 82962 GLUCOSE BLOOD TEST: CPT

## 2020-04-06 PROCEDURE — 85025 COMPLETE CBC W/AUTO DIFF WBC: CPT | Performed by: INTERNAL MEDICINE

## 2020-04-06 PROCEDURE — 94664 DEMO&/EVAL PT USE INHALER: CPT

## 2020-04-06 PROCEDURE — 63710000001 DIPHENHYDRAMINE PER 50 MG: Performed by: HOSPITALIST

## 2020-04-06 PROCEDURE — 94640 AIRWAY INHALATION TREATMENT: CPT

## 2020-04-06 PROCEDURE — 80048 BASIC METABOLIC PNL TOTAL CA: CPT | Performed by: INTERNAL MEDICINE

## 2020-04-06 PROCEDURE — 63710000001 INSULIN LISPRO (HUMAN) PER 5 UNITS: Performed by: HOSPITALIST

## 2020-04-06 PROCEDURE — 25010000002 FLUCONAZOLE PER 200 MG: Performed by: INTERNAL MEDICINE

## 2020-04-06 PROCEDURE — 83605 ASSAY OF LACTIC ACID: CPT | Performed by: INTERNAL MEDICINE

## 2020-04-06 RX ORDER — PANTOPRAZOLE SODIUM 40 MG/1
40 TABLET, DELAYED RELEASE ORAL
Status: DISCONTINUED | OUTPATIENT
Start: 2020-04-06 | End: 2020-04-20 | Stop reason: HOSPADM

## 2020-04-06 RX ORDER — BUDESONIDE AND FORMOTEROL FUMARATE DIHYDRATE 160; 4.5 UG/1; UG/1
2 AEROSOL RESPIRATORY (INHALATION)
Status: DISCONTINUED | OUTPATIENT
Start: 2020-04-06 | End: 2020-04-20 | Stop reason: HOSPADM

## 2020-04-06 RX ADMIN — SODIUM CHLORIDE 100 ML/HR: 9 INJECTION, SOLUTION INTRAVENOUS at 22:28

## 2020-04-06 RX ADMIN — SODIUM BICARBONATE 650 MG: 650 TABLET ORAL at 21:29

## 2020-04-06 RX ADMIN — INSULIN LISPRO 2 UNITS: 100 INJECTION, SOLUTION INTRAVENOUS; SUBCUTANEOUS at 08:54

## 2020-04-06 RX ADMIN — SUCRALFATE 1 G: 1 SUSPENSION ORAL at 21:29

## 2020-04-06 RX ADMIN — INSULIN LISPRO 2 UNITS: 100 INJECTION, SOLUTION INTRAVENOUS; SUBCUTANEOUS at 18:10

## 2020-04-06 RX ADMIN — SODIUM CHLORIDE, PRESERVATIVE FREE 10 ML: 5 INJECTION INTRAVENOUS at 08:55

## 2020-04-06 RX ADMIN — LEVOTHYROXINE SODIUM 200 MCG: 100 TABLET ORAL at 08:54

## 2020-04-06 RX ADMIN — SUCRALFATE 1 G: 1 SUSPENSION ORAL at 16:27

## 2020-04-06 RX ADMIN — SODIUM BICARBONATE 650 MG: 650 TABLET ORAL at 08:54

## 2020-04-06 RX ADMIN — SODIUM BICARBONATE 650 MG: 650 TABLET ORAL at 18:10

## 2020-04-06 RX ADMIN — INSULIN LISPRO 2 UNITS: 100 INJECTION, SOLUTION INTRAVENOUS; SUBCUTANEOUS at 12:18

## 2020-04-06 RX ADMIN — BENZOCAINE 1 APPLICATION: 20 OINTMENT TOPICAL at 10:22

## 2020-04-06 RX ADMIN — BUDESONIDE AND FORMOTEROL FUMARATE DIHYDRATE 2 PUFF: 160; 4.5 AEROSOL RESPIRATORY (INHALATION) at 20:17

## 2020-04-06 RX ADMIN — Medication 2 TABLET: at 18:20

## 2020-04-06 RX ADMIN — SODIUM CHLORIDE 100 ML/HR: 9 INJECTION, SOLUTION INTRAVENOUS at 02:19

## 2020-04-06 RX ADMIN — SUCRALFATE 1 G: 1 SUSPENSION ORAL at 06:33

## 2020-04-06 RX ADMIN — DIPHENHYDRAMINE HYDROCHLORIDE 25 MG: 25 CAPSULE ORAL at 18:10

## 2020-04-06 RX ADMIN — FLUCONAZOLE 100 MG: 2 INJECTION, SOLUTION INTRAVENOUS at 15:05

## 2020-04-06 RX ADMIN — SODIUM CHLORIDE 100 ML/HR: 9 INJECTION, SOLUTION INTRAVENOUS at 11:57

## 2020-04-06 RX ADMIN — PANTOPRAZOLE SODIUM 40 MG: 40 TABLET, DELAYED RELEASE ORAL at 22:29

## 2020-04-06 RX ADMIN — PANTOPRAZOLE SODIUM 40 MG: 40 INJECTION, POWDER, FOR SOLUTION INTRAVENOUS at 08:54

## 2020-04-06 RX ADMIN — Medication 2 TABLET: at 03:50

## 2020-04-06 NOTE — PLAN OF CARE
"  Problem: Patient Care Overview  Goal: Plan of Care Review  Outcome: Ongoing (interventions implemented as appropriate)  Flowsheets (Taken 4/6/2020 3076)  Progress: no change  Plan of Care Reviewed With: patient  Outcome Summary: No c/o SOA, cough infrequent and nonproductive. No c/o pain or nausea. Tums given x1 for heartburn. Up with assist. IVFs infusing. Blood sugars monitored, sliding scale insulin given as ordered. Lactate level drawn q 6 hrs - slowly improving. More labs ordered for this AM. Pt stated feeling \"swollen\" and requested to be weighed via standing scale, 261.8 lbs - last weight 237 lbs on 4/1. VSS. Rested comfortably between care. Will continue to monitor.     "

## 2020-04-06 NOTE — PROGRESS NOTES
"DAILY PROGRESS NOTE  [facility]    Patient Identification:  Name: Oralia Sánchez  Age: 44 y.o.  Sex: female  :  1975  MRN: [id number]         Primary Care Physician: Provider, No Known    Subjective:  Interval History:She has a bad cough and is short of air but better. Not requiring any O 2. Extreme fatigue.    Objective:    Scheduled Meds:    fluconazole 100 mg Intravenous Q24H   insulin lispro 0-7 Units Subcutaneous 4x Daily With Meals & Nightly   levothyroxine 200 mcg Oral Daily   pantoprazole 40 mg Intravenous Q12H   sodium bicarbonate 650 mg Oral TID   sodium chloride 10 mL Intravenous Q12H   sucralfate 1 g Oral 4x Daily AC & at Bedtime     Continuous Infusions:    sodium chloride 100 mL/hr Last Rate: 100 mL/hr (20 0219)       Vital signs in last 24 hours:  Temp:  [98.1 °F (36.7 °C)-98.3 °F (36.8 °C)] 98.3 °F (36.8 °C)  Heart Rate:  [] 96  Resp:  [18] 18  BP: ()/(57-70) 100/57    Intake/Output:    Intake/Output Summary (Last 24 hours) at 2020 1115  Last data filed at 2020 0918  Gross per 24 hour   Intake 2466.7 ml   Output --   Net 2466.7 ml       Exam:  /57 (BP Location: Right arm, Patient Position: Lying)   Pulse 96   Temp 98.3 °F (36.8 °C) (Oral)   Resp 18   Ht 154.9 cm (61\")   Wt 119 kg (261 lb 12.8 oz)   LMP 2020   SpO2 99%   BMI 49.47 kg/m²     General Appearance:    Alert, cooperative, no distress   Head:    Normocephalic, without obvious abnormality, atraumatic   Eyes:       Throat:   Lips, tongue, gums normal   Neck:   Supple, symmetrical, trachea midline, no JVD   Lungs:     Crackles and wheezes. bilaterally, respirations unlabored   Chest Wall:    No tenderness or deformity    Heart:    Regular rate and rhythm, S1 and S2 normal, no murmur,no  Rub or gallop   Abdomen:     Soft, non-tender, bowel sounds active, no masses, no organomegaly    Extremities:   Extremities normal, atraumatic, no cyanosis or edema   Pulses:      Skin:   " Skin is warm and dry,  Boil in pubic area   Neurologic:   no focal deficits noted      Lab Results (last 72 hours)     Procedure Component Value Units Date/Time    POC Glucose Once [[id number]]  (Abnormal) Collected:  04/02/20 1147    Specimen:  Blood Updated:  04/02/20 1148     Glucose 144 mg/dL     Comprehensive Metabolic Panel [[id number]]  (Abnormal) Collected:  04/02/20 0634    Specimen:  Blood Updated:  04/02/20 0736     Glucose 117 mg/dL      BUN 8 mg/dL      Creatinine 0.76 mg/dL      Sodium 136 mmol/L      Potassium 3.8 mmol/L      Chloride 90 mmol/L      CO2 20.2 mmol/L      Calcium 7.8 mg/dL      Total Protein 5.9 g/dL      Albumin 3.40 g/dL      ALT (SGPT) 49 U/L      AST (SGOT) 97 U/L      Alkaline Phosphatase 103 U/L      Total Bilirubin 1.3 mg/dL      eGFR Non African Amer 82 mL/min/1.73      Globulin 2.5 gm/dL      A/G Ratio 1.4 g/dL      BUN/Creatinine Ratio 10.5     Anion Gap 25.8 mmol/L     Narrative:       GFR Normal >60  Chronic Kidney Disease <60  Kidney Failure <15      Ferritin [[id number]]  (Abnormal) Collected:  04/02/20 0634    Specimen:  Blood Updated:  04/02/20 0726     Ferritin 803.00 ng/mL     Narrative:       Results may be falsely decreased if patient taking Biotin.      Lactate Dehydrogenase [[id number]]  (Abnormal) Collected:  04/02/20 0634    Specimen:  Blood Updated:  04/02/20 0722      U/L     CBC & Differential [[id number]] Collected:  04/02/20 0639    Specimen:  Blood Updated:  04/02/20 0652    Narrative:       The following orders were created for panel order CBC & Differential.  Procedure                               Abnormality         Status                     ---------                               -----------         ------                     CBC Auto Differential[[id number]]        Abnormal            Final result                 Please view results for these tests on the individual orders.    CBC Auto Differential [[id number]]  (Abnormal) Collected:  04/02/20 0639     Specimen:  Blood Updated:  04/02/20 0652     WBC 7.38 10*3/mm3      RBC 3.95 10*6/mm3      Hemoglobin 14.2 g/dL      Hematocrit 40.1 %      .5 fL      MCH 35.9 pg      MCHC 35.4 g/dL      RDW 14.3 %      RDW-SD 53.6 fl      MPV 9.4 fL      Platelets 195 10*3/mm3      Neutrophil % 65.8 %      Lymphocyte % 24.5 %      Monocyte % 8.1 %      Eosinophil % 0.3 %      Basophil % 0.8 %      Immature Grans % 0.5 %      Neutrophils, Absolute 4.85 10*3/mm3      Lymphocytes, Absolute 1.81 10*3/mm3      Monocytes, Absolute 0.60 10*3/mm3      Eosinophils, Absolute 0.02 10*3/mm3      Basophils, Absolute 0.06 10*3/mm3      Immature Grans, Absolute 0.04 10*3/mm3      nRBC 0.4 /100 WBC     POC Glucose Once [[id number]]  (Normal) Collected:  04/02/20 0616    Specimen:  Blood Updated:  04/02/20 0618     Glucose 116 mg/dL     POC Glucose Once [[id number]]  (Normal) Collected:  04/01/20 2059    Specimen:  Blood Updated:  04/01/20 2059     Glucose 127 mg/dL     Blood Culture - Blood, Arm, Right [[id number]] Collected:  04/01/20 1654    Specimen:  Blood from Arm, Right Updated:  04/01/20 1654    Blood Culture - Blood, Arm, Left [[id number]] Collected:  04/01/20 1654    Specimen:  Blood from Arm, Left Updated:  04/01/20 1654    Respiratory Panel, PCR - Swab, Nasopharynx [[id number]]  (Normal) Collected:  04/01/20 1515    Specimen:  Swab from Nasopharynx Updated:  04/01/20 1653     ADENOVIRUS, PCR Not Detected     Coronavirus 229E Not Detected     Coronavirus HKU1 Not Detected     Coronavirus NL63 Not Detected     Coronavirus OC43 Not Detected     Human Metapneumovirus Not Detected     Human Rhinovirus/Enterovirus Not Detected     Influenza B PCR Not Detected     Parainfluenza Virus 1 Not Detected     Parainfluenza Virus 2 Not Detected     Parainfluenza Virus 3 Not Detected     Parainfluenza Virus 4 Not Detected     Bordetella pertussis pcr Not Detected     Influenza A H1 2009 PCR Not Detected     Chlamydophila pneumoniae PCR Not  Detected     Mycoplasma pneumo by PCR Not Detected     Influenza A PCR Not Detected     Influenza A H3 Not Detected     Influenza A H1 Not Detected     RSV, PCR Not Detected     Bordetella parapertussis PCR Not Detected    Narrative:       The coronavirus on the RVP is NOT COVID-19 and is NOT indicative of infection with COVID-19.     POC Glucose Once [[id number]]  (Abnormal) Collected:  04/01/20 1559    Specimen:  Blood Updated:  04/01/20 1601     Glucose 132 mg/dL     Hemoglobin A1c [[id number]]  (Abnormal) Collected:  04/01/20 0809    Specimen:  Blood Updated:  04/01/20 1417     Hemoglobin A1C 6.68 %     Narrative:       Hemoglobin A1C Ranges:    Increased Risk for Diabetes  5.7% to 6.4%  Diabetes                     >= 6.5%  Diabetic Goal                < 7.0%    Salicylate Level [[id number]]  (Normal) Collected:  04/01/20 0809    Specimen:  Blood Updated:  04/01/20 1129     Salicylate <0.3 mg/dL     Narrative:       Therapeutic range for Salicylates:  3.0 - 10.0 mg/dL for antipyretic/analgesic conditions  15.0 - 30.0 mg/dL for anti-inflammatory conditions    CORONAVIRUS(COVID-19),RT-PCR,UOFL LAB,NP/OP Swab in Transport Media - Swab, Nasopharynx [[id number]] Collected:  04/01/20 1049    Specimen:  Swab from Nasopharynx Updated:  04/01/20 1101    Blood Gas, Arterial [[id number]]  (Abnormal) Collected:  04/01/20 1051    Specimen:  Arterial Blood Updated:  04/01/20 1053     Site Arterial: left radial     Jovi's Test Positive     pH, Arterial 7.477 pH units      pCO2, Arterial 28.2 mm Hg      pO2, Arterial 99.2 mm Hg      HCO3, Arterial 20.9 mmol/L      Base Excess, Arterial -1.0 mmol/L      O2 Saturation Calculated 98.3 %      Barometric Pressure for Blood Gas 753.0 mmHg      Modality Room Air     Set Mech Resp Rate 28    Ethanol [[id number]] Collected:  04/01/20 0809    Specimen:  Blood Updated:  04/01/20 0951     Ethanol <10 mg/dL      Ethanol % <0.010 %     Ferritin [[id number]]  (Abnormal) Collected:  04/01/20 0809     Specimen:  Blood Updated:  04/01/20 0946     Ferritin 821.00 ng/mL     Narrative:       Results may be falsely decreased if patient taking Biotin.      [place] Draw [[id number]] Collected:  04/01/20 0809    Specimen:  Blood Updated:  04/01/20 0915    Narrative:       The following orders were created for panel order [place] Draw.  Procedure                               Abnormality         Status                     ---------                               -----------         ------                     Light Blue Top[[id number]]                                   Final result               Green Top (Gel)[[id number]]                                  Final result               Lavender Top[[id number]]                                     Final result               Gold Top - SST[[id number]]                                   Final result                 Please view results for these tests on the individual orders.    Green Top (Gel) [[id number]] Collected:  04/01/20 0809    Specimen:  Blood Updated:  04/01/20 0915     Extra Tube Hold for add-ons.     Comment: Auto resulted.       Lavender Top [[id number]] Collected:  04/01/20 0809    Specimen:  Blood Updated:  04/01/20 0915     Extra Tube hold for add-on     Comment: Auto resulted       Light Blue Top [[id number]] Collected:  04/01/20 0809    Specimen:  Blood Updated:  04/01/20 0915     Extra Tube hold for add-on     Comment: Auto resulted       Gold Top - SST [[id number]] Collected:  04/01/20 0809    Specimen:  Blood Updated:  04/01/20 0915     Extra Tube Hold for add-ons.     Comment: Auto resulted.       Procalcitonin [[id number]]  (Normal) Collected:  04/01/20 0809    Specimen:  Blood Updated:  04/01/20 0846     Procalcitonin 0.16 ng/mL     Narrative:       As a Marker for Sepsis (Non-Neonates):   1. <0.5 ng/mL represents a low risk of severe sepsis and/or septic shock.  1. >2 ng/mL represents a high risk of severe sepsis and/or septic shock.    As a Marker for Lower  "Respiratory Tract Infections that require antibiotic therapy:  PCT on Admission     Antibiotic Therapy             6-12 Hrs later  > 0.5                Strongly Recommended            >0.25 - <0.5         Recommended  0.1 - 0.25           Discouraged                   Remeasure/reassess PCT  <0.1                 Strongly Discouraged          Remeasure/reassess PCT      As 28 day mortality risk marker: \"Change in Procalcitonin Result\" (> 80 % or <=80 %) if Day 0 (or Day 1) and Day 4 values are available. Refer to [email or web address]   Change in PCT <=80 %   A decrease of PCT levels below or equal to 80 % defines a positive change in PCT test result representing a higher risk for 28-day all-cause mortality of patients diagnosed with severe sepsis or septic shock.  Change in PCT > 80 %   A decrease of PCT levels of more than 80 % defines a negative change in PCT result representing a lower risk for 28-day all-cause mortality of patients diagnosed with severe sepsis or septic shock.                Results may be falsely decreased if patient taking Biotin.     Comprehensive Metabolic Panel [[id number]]  (Abnormal) Collected:  04/01/20 0809    Specimen:  Blood Updated:  04/01/20 0841     Glucose 127 mg/dL      BUN 3 mg/dL      Creatinine 0.85 mg/dL      Sodium 135 mmol/L      Potassium 3.2 mmol/L      Chloride 84 mmol/L      CO2 18.7 mmol/L      Calcium 8.5 mg/dL      Total Protein 7.1 g/dL      Albumin 3.70 g/dL      ALT (SGPT) 65 U/L      AST (SGOT) 124 U/L      Alkaline Phosphatase 128 U/L      Total Bilirubin 1.4 mg/dL      eGFR Non African Amer 72 mL/min/1.73      Globulin 3.4 gm/dL      A/G Ratio 1.1 g/dL      BUN/Creatinine Ratio 3.5     Anion Gap 32.3 mmol/L     Narrative:       GFR Normal >60  Chronic Kidney Disease <60  Kidney Failure <15      C-reactive Protein [[id number]]  (Abnormal) Collected:  04/01/20 0809    Specimen:  Blood Updated:  04/01/20 0841     C-Reactive Protein 0.63 mg/dL     " Lactate Dehydrogenase [[id number]]  (Abnormal) Collected:  04/01/20 0809    Specimen:  Blood Updated:  04/01/20 0841      U/L     Magnesium [[id number]]  (Abnormal) Collected:  04/01/20 0809    Specimen:  Blood Updated:  04/01/20 0841     Magnesium 1.1 mg/dL     Troponin [[id number]]  (Normal) Collected:  04/01/20 0809    Specimen:  Blood Updated:  04/01/20 0841     Troponin T <0.010 ng/mL     Narrative:       Troponin T Reference Range:  <= 0.03 ng/mL-   Negative for AMI  >0.03 ng/mL-     Abnormal for myocardial necrosis.  Clinicians would have to utilize clinical acumen, EKG, Troponin and serial changes to determine if it is an Acute Myocardial Infarction or myocardial injury due to an underlying chronic condition.       Results may be falsely decreased if patient taking Biotin.      CBC Auto Differential [[id number]]  (Abnormal) Collected:  04/01/20 0809    Specimen:  Blood Updated:  04/01/20 0828     WBC 8.04 10*3/mm3      RBC 4.48 10*6/mm3      Hemoglobin 16.3 g/dL      Hematocrit 46.2 %      .1 fL      MCH 36.4 pg      MCHC 35.3 g/dL      RDW 15.4 %      RDW-SD 57.9 fl      MPV 9.5 fL      Platelets 244 10*3/mm3      Neutrophil % 68.8 %      Lymphocyte % 22.6 %      Monocyte % 7.2 %      Eosinophil % 0.4 %      Basophil % 0.6 %      Immature Grans % 0.4 %      Neutrophils, Absolute 5.53 10*3/mm3      Lymphocytes, Absolute 1.82 10*3/mm3      Monocytes, Absolute 0.58 10*3/mm3      Eosinophils, Absolute 0.03 10*3/mm3      Basophils, Absolute 0.05 10*3/mm3      Immature Grans, Absolute 0.03 10*3/mm3      nRBC 0.5 /100 WBC         Data Review:  Results from last 7 days   Lab Units 04/06/20 0551 04/05/20 0601 04/04/20  0549   SODIUM mmol/L 131* 132* 133*   POTASSIUM mmol/L 3.9 3.4* 3.3*   CHLORIDE mmol/L 94* 89* 90*   CO2 mmol/L 23.4 19.9* 18.8*   BUN mg/dL 14 14 14   CREATININE mg/dL 0.55* 0.72 0.72   GLUCOSE mg/dL 147* 154* 151*   CALCIUM mg/dL 8.1* 8.9 8.3*     Results from last 7 days   Lab Units  04/06/20  0551 04/05/20  0601 04/04/20  0549   WBC 10*3/mm3 7.82 9.39 7.95   HEMOGLOBIN g/dL 14.4 15.4 14.1   HEMATOCRIT % 41.1 43.7 39.8   PLATELETS 10*3/mm3 195 184 209         Results from last 7 days   Lab Units 04/01/20  0809   HEMOGLOBIN A1C % 6.68*     Lab Results   Lab Value Date/Time    TROPONINT <0.010 04/01/2020 0809    TROPONINT <0.010 11/10/2019 1525         Results from last 7 days   Lab Units 04/04/20  0549 04/03/20  0620 04/02/20  0634   ALK PHOS U/L 132* 132* 103   BILIRUBIN mg/dL 1.6* 1.6* 1.3*   ALT (SGPT) U/L 72* 64* 49*   AST (SGOT) U/L 193* 177* 97*         Results from last 7 days   Lab Units 04/01/20  0809   HEMOGLOBIN A1C % 6.68*     Glucose   Date/Time Value Ref Range Status   04/06/2020 0640 150 (H) 70 - 130 mg/dL Final   04/05/2020 2114 169 (H) 70 - 130 mg/dL Final   04/05/2020 1716 188 (H) 70 - 130 mg/dL Final   04/05/2020 1143 185 (H) 70 - 130 mg/dL Final   04/05/2020 0736 173 (H) 70 - 130 mg/dL Final   04/04/2020 2045 161 (H) 70 - 130 mg/dL Final   04/04/2020 1616 153 (H) 70 - 130 mg/dL Final   04/04/2020 1104 152 (H) 70 - 130 mg/dL Final           Past Medical History:   Diagnosis Date   • Aneurysm (CMS/HCC)    • Arthritis    • Carpal tunnel syndrome    • Chest pain    • Diabetes mellitus (CMS/HCC)    • Dysmetabolic syndrome X    • Gestational diabetes mellitus    • Hypothyroidism    • Metabolic disorder    • Nonalcoholic steatohepatitis    • Obesity    • Palpitations    • Sleep apnea    • Spinal headache    • Type 2 diabetes mellitus (CMS/HCC)    • Vitamin D deficiency        Assessment:  Active Hospital Problems    Diagnosis  POA   • **Dyspnea [R06.00]  Yes   • Cough [R05]  Unknown   • Hypomagnesemia [E83.42]  Unknown   • Metabolic acidosis [E87.2]  Unknown   • Fatigue [R53.83]  Unknown   • History of COPD [Z87.09]  Not Applicable   • Tobacco abuse [Z72.0]  Yes   • Rheumatoid arthritis (CMS/HCC) [M06.9]  Yes   • Type 2 diabetes mellitus (CMS/HCC) [E11.9]  Yes   • Morbid obesity  (CMS/HCC) [E66.01]  Yes   • Elevated LFTs [R94.5]  Yes   • YOUNG (nonalcoholic steatohepatitis) [K75.81]  Yes   • Diabetes mellitus arising in pregnancy [O24.419]  Yes   • Hypothyroid [E03.9]  Yes      Resolved Hospital Problems   No resolved problems to display.       Plan:  Continue with IV fluids and follow lab. Check ECHO.  Elevated inflammatory markers. Will follow  Tylor Cruz MD  4/6/2020  11:15

## 2020-04-06 NOTE — PROGRESS NOTES
"[facility]     Mar/chart reviewed  Follow up cough  Says her cough is improved  Says she feels weak, tired and dizzy when she stands    Vital Sign Min/Max for last 24 hours  Temp  Min: 98.1 °F (36.7 °C)  Max: 98.3 °F (36.8 °C)   BP  Min: 98/61  Max: 113/70   Pulse  Min: 95  Max: 101   Resp  Min: 18  Max: 20   SpO2  Min: 96 %  Max: 99 %   No data recorded   Weight  Min: 119 kg (261 lb 12.8 oz)  Max: 119 kg (261 lb 12.8 oz)     Nad, axox3,   perrl, eomi, no icterus,  mmm, no jvd, trachea midline, neck supple, no palpable thyroid nodules  chest cta bilaterally, no crackles, no wheezes,   rrr,   soft, nt, nd +bs,  no c/c/ e  Skin warm dry no rashes    A/P:  1. Cough -- will start symbicort bid continue treatment for gerd, ok to d/c from pulmonary standpoint  2. Tobacco abuse -- needs outpatient pft's  3. Probable COPD -- \"  \"  4. GERD  5. ERLIN    Ok to d/c from  My standpoint follow up in office in 4-6 weeks    Patient is new to me today  "

## 2020-04-06 NOTE — PROGRESS NOTES
Pt remains afebrile. Chest CT with no infiltrate. Discussed with Dr. Mcnamara there are some minimal ground glass opacities at the R apex. Non specific.     COVID-19 negative    HIV negative    I will f/u CMV/EBV PCR    ID will sign off. Please do not hesitate to call us with further questions or concerns.

## 2020-04-06 NOTE — PLAN OF CARE
Problem: Patient Care Overview  Goal: Plan of Care Review  Outcome: Ongoing (interventions implemented as appropriate)  Flowsheets (Taken 4/6/2020 1721)  Progress: no change  Plan of Care Reviewed With: patient  Outcome Summary: vss and afebrile, up in chair x2 today, significant generalized edema today, pt c/o generalized swelling to extremities, MD aware and ECHO ordered - not completed today, ivf infusing, urine dark yellow, scds on while in bed, dry non-productive cough noted, denies SOA, c/o weakness to legs while ambulating, monitoring blood sugars

## 2020-04-07 ENCOUNTER — APPOINTMENT (OUTPATIENT)
Dept: CARDIOLOGY | Facility: HOSPITAL | Age: 45
End: 2020-04-07

## 2020-04-07 ENCOUNTER — APPOINTMENT (OUTPATIENT)
Dept: CT IMAGING | Facility: HOSPITAL | Age: 45
End: 2020-04-07

## 2020-04-07 LAB
ALBUMIN SERPL-MCNC: 2.5 G/DL (ref 3.5–5.2)
ALBUMIN/GLOB SERPL: 1.2 G/DL
ALP SERPL-CCNC: 169 U/L (ref 39–117)
ALT SERPL W P-5'-P-CCNC: 64 U/L (ref 1–33)
ANION GAP SERPL CALCULATED.3IONS-SCNC: 11.4 MMOL/L (ref 5–15)
AORTIC DIMENSIONLESS INDEX: 0.9 (DI)
AST SERPL-CCNC: 161 U/L (ref 1–32)
BASOPHILS # BLD AUTO: 0.02 10*3/MM3 (ref 0–0.2)
BASOPHILS NFR BLD AUTO: 0.3 % (ref 0–1.5)
BH CV ECHO MEAS - AO MAX PG (FULL): -5.4 MMHG
BH CV ECHO MEAS - AO MAX PG: 10.1 MMHG
BH CV ECHO MEAS - AO MEAN PG (FULL): -1 MMHG
BH CV ECHO MEAS - AO MEAN PG: 6 MMHG
BH CV ECHO MEAS - AO ROOT AREA (BSA CORRECTED): 1.4
BH CV ECHO MEAS - AO ROOT AREA: 7.1 CM^2
BH CV ECHO MEAS - AO ROOT DIAM: 3 CM
BH CV ECHO MEAS - AO V2 MAX: 159 CM/SEC
BH CV ECHO MEAS - AO V2 MEAN: 119 CM/SEC
BH CV ECHO MEAS - AO V2 VTI: 26.4 CM
BH CV ECHO MEAS - AVA(I,A): 4.4 CM^2
BH CV ECHO MEAS - AVA(I,D): 4.4 CM^2
BH CV ECHO MEAS - AVA(V,A): 5.1 CM^2
BH CV ECHO MEAS - AVA(V,D): 5.1 CM^2
BH CV ECHO MEAS - BSA(HAYCOCK): 2.4 M^2
BH CV ECHO MEAS - BSA: 2.1 M^2
BH CV ECHO MEAS - BZI_BMI: 51.2 KILOGRAMS/M^2
BH CV ECHO MEAS - BZI_METRIC_HEIGHT: 154.9 CM
BH CV ECHO MEAS - BZI_METRIC_WEIGHT: 122.9 KG
BH CV ECHO MEAS - EDV(CUBED): 85.2 ML
BH CV ECHO MEAS - EDV(MOD-SP2): 58 ML
BH CV ECHO MEAS - EDV(MOD-SP4): 65 ML
BH CV ECHO MEAS - EDV(TEICH): 87.7 ML
BH CV ECHO MEAS - EF(CUBED): 74.2 %
BH CV ECHO MEAS - EF(MOD-BP): 69 %
BH CV ECHO MEAS - EF(MOD-SP2): 70.7 %
BH CV ECHO MEAS - EF(MOD-SP4): 67.7 %
BH CV ECHO MEAS - EF(TEICH): 66.3 %
BH CV ECHO MEAS - ESV(CUBED): 22 ML
BH CV ECHO MEAS - ESV(MOD-SP2): 17 ML
BH CV ECHO MEAS - ESV(MOD-SP4): 21 ML
BH CV ECHO MEAS - ESV(TEICH): 29.6 ML
BH CV ECHO MEAS - FS: 36.4 %
BH CV ECHO MEAS - IVS/LVPW: 1.1
BH CV ECHO MEAS - IVSD: 1 CM
BH CV ECHO MEAS - LAT PEAK E' VEL: 11.3 CM/SEC
BH CV ECHO MEAS - LV DIASTOLIC VOL/BSA (35-75): 30.2 ML/M^2
BH CV ECHO MEAS - LV MASS(C)D: 137.8 GRAMS
BH CV ECHO MEAS - LV MASS(C)DI: 64.1 GRAMS/M^2
BH CV ECHO MEAS - LV MAX PG: 15.5 MMHG
BH CV ECHO MEAS - LV MEAN PG: 7 MMHG
BH CV ECHO MEAS - LV SYSTOLIC VOL/BSA (12-30): 9.8 ML/M^2
BH CV ECHO MEAS - LV V1 MAX: 197 CM/SEC
BH CV ECHO MEAS - LV V1 MEAN: 119 CM/SEC
BH CV ECHO MEAS - LV V1 VTI: 27.8 CM
BH CV ECHO MEAS - LVIDD: 4.4 CM
BH CV ECHO MEAS - LVIDS: 2.8 CM
BH CV ECHO MEAS - LVLD AP2: 6.7 CM
BH CV ECHO MEAS - LVLD AP4: 6.7 CM
BH CV ECHO MEAS - LVLS AP2: 5.2 CM
BH CV ECHO MEAS - LVLS AP4: 5.5 CM
BH CV ECHO MEAS - LVOT AREA (M): 4.2 CM^2
BH CV ECHO MEAS - LVOT AREA: 4.2 CM^2
BH CV ECHO MEAS - LVOT DIAM: 2.3 CM
BH CV ECHO MEAS - LVPWD: 0.9 CM
BH CV ECHO MEAS - MED PEAK E' VEL: 9.4 CM/SEC
BH CV ECHO MEAS - MV A MAX VEL: 85.9 CM/SEC
BH CV ECHO MEAS - MV DEC SLOPE: 484.5 CM/SEC^2
BH CV ECHO MEAS - MV DEC TIME: 0.22 SEC
BH CV ECHO MEAS - MV E MAX VEL: 97 CM/SEC
BH CV ECHO MEAS - MV E/A: 1.1
BH CV ECHO MEAS - MV MAX PG: 3.9 MMHG
BH CV ECHO MEAS - MV MEAN PG: 2 MMHG
BH CV ECHO MEAS - MV P1/2T MAX VEL: 98.5 CM/SEC
BH CV ECHO MEAS - MV P1/2T: 59.5 MSEC
BH CV ECHO MEAS - MV V2 MAX: 99.1 CM/SEC
BH CV ECHO MEAS - MV V2 MEAN: 75.7 CM/SEC
BH CV ECHO MEAS - MV V2 VTI: 25.4 CM
BH CV ECHO MEAS - MVA P1/2T LCG: 2.2 CM^2
BH CV ECHO MEAS - MVA(P1/2T): 3.7 CM^2
BH CV ECHO MEAS - MVA(VTI): 4.5 CM^2
BH CV ECHO MEAS - RAP SYSTOLE: 3 MMHG
BH CV ECHO MEAS - SI(AO): 86.8 ML/M^2
BH CV ECHO MEAS - SI(CUBED): 29.4 ML/M^2
BH CV ECHO MEAS - SI(LVOT): 53.7 ML/M^2
BH CV ECHO MEAS - SI(MOD-SP2): 19.1 ML/M^2
BH CV ECHO MEAS - SI(MOD-SP4): 20.5 ML/M^2
BH CV ECHO MEAS - SI(TEICH): 27 ML/M^2
BH CV ECHO MEAS - SV(AO): 186.6 ML
BH CV ECHO MEAS - SV(CUBED): 63.2 ML
BH CV ECHO MEAS - SV(LVOT): 115.5 ML
BH CV ECHO MEAS - SV(MOD-SP2): 41 ML
BH CV ECHO MEAS - SV(MOD-SP4): 44 ML
BH CV ECHO MEAS - SV(TEICH): 58.1 ML
BH CV ECHO MEASUREMENTS AVERAGE E/E' RATIO: 9.37
BH CV XLRA - RV BASE: 3.5 CM
BILIRUB SERPL-MCNC: 1.1 MG/DL (ref 0.2–1.2)
BUN BLD-MCNC: 12 MG/DL (ref 6–20)
BUN/CREAT SERPL: 22.2 (ref 7–25)
CALCIUM SPEC-SCNC: 7.6 MG/DL (ref 8.6–10.5)
CHLORIDE SERPL-SCNC: 94 MMOL/L (ref 98–107)
CO2 SERPL-SCNC: 24.6 MMOL/L (ref 22–29)
CREAT BLD-MCNC: 0.54 MG/DL (ref 0.57–1)
D-LACTATE SERPL-SCNC: 3.6 MMOL/L (ref 0.5–2)
D-LACTATE SERPL-SCNC: 4.1 MMOL/L (ref 0.5–2)
D-LACTATE SERPL-SCNC: 4.1 MMOL/L (ref 0.5–2)
D-LACTATE SERPL-SCNC: 5.4 MMOL/L (ref 0.5–2)
DEPRECATED RDW RBC AUTO: 55.2 FL (ref 37–54)
EOSINOPHIL # BLD AUTO: 0.07 10*3/MM3 (ref 0–0.4)
EOSINOPHIL NFR BLD AUTO: 0.9 % (ref 0.3–6.2)
ERYTHROCYTE [DISTWIDTH] IN BLOOD BY AUTOMATED COUNT: 14.5 % (ref 12.3–15.4)
FERRITIN SERPL-MCNC: 1890 NG/ML (ref 13–150)
GFR SERPL CREATININE-BSD FRML MDRD: 122 ML/MIN/1.73
GLOBULIN UR ELPH-MCNC: 2.1 GM/DL
GLUCOSE BLD-MCNC: 172 MG/DL (ref 65–99)
GLUCOSE BLDC GLUCOMTR-MCNC: 152 MG/DL (ref 70–130)
GLUCOSE BLDC GLUCOMTR-MCNC: 155 MG/DL (ref 70–130)
GLUCOSE BLDC GLUCOMTR-MCNC: 188 MG/DL (ref 70–130)
GLUCOSE BLDC GLUCOMTR-MCNC: 188 MG/DL (ref 70–130)
HCT VFR BLD AUTO: 37.8 % (ref 34–46.6)
HGB BLD-MCNC: 13.4 G/DL (ref 12–15.9)
IMM GRANULOCYTES # BLD AUTO: 0.03 10*3/MM3 (ref 0–0.05)
IMM GRANULOCYTES NFR BLD AUTO: 0.4 % (ref 0–0.5)
LEFT ATRIUM VOLUME INDEX: 13 ML/M2
LV EF 2D ECHO EST: 69 %
LYMPHOCYTES # BLD AUTO: 1.05 10*3/MM3 (ref 0.7–3.1)
LYMPHOCYTES NFR BLD AUTO: 14 % (ref 19.6–45.3)
MCH RBC QN AUTO: 36.6 PG (ref 26.6–33)
MCHC RBC AUTO-ENTMCNC: 35.4 G/DL (ref 31.5–35.7)
MCV RBC AUTO: 103.3 FL (ref 79–97)
MONOCYTES # BLD AUTO: 0.73 10*3/MM3 (ref 0.1–0.9)
MONOCYTES NFR BLD AUTO: 9.7 % (ref 5–12)
NEUTROPHILS # BLD AUTO: 5.61 10*3/MM3 (ref 1.7–7)
NEUTROPHILS NFR BLD AUTO: 74.7 % (ref 42.7–76)
NRBC BLD AUTO-RTO: 1.6 /100 WBC (ref 0–0.2)
PLATELET # BLD AUTO: 202 10*3/MM3 (ref 140–450)
PMV BLD AUTO: 10 FL (ref 6–12)
POTASSIUM BLD-SCNC: 3.9 MMOL/L (ref 3.5–5.2)
PROT SERPL-MCNC: 4.6 G/DL (ref 6–8.5)
RBC # BLD AUTO: 3.66 10*6/MM3 (ref 3.77–5.28)
SODIUM BLD-SCNC: 130 MMOL/L (ref 136–145)
WBC NRBC COR # BLD: 7.51 10*3/MM3 (ref 3.4–10.8)

## 2020-04-07 PROCEDURE — 94799 UNLISTED PULMONARY SVC/PX: CPT

## 2020-04-07 PROCEDURE — 93306 TTE W/DOPPLER COMPLETE: CPT | Performed by: INTERNAL MEDICINE

## 2020-04-07 PROCEDURE — 85025 COMPLETE CBC W/AUTO DIFF WBC: CPT | Performed by: HOSPITALIST

## 2020-04-07 PROCEDURE — 25010000002 FLUCONAZOLE PER 200 MG: Performed by: INTERNAL MEDICINE

## 2020-04-07 PROCEDURE — 87635 SARS-COV-2 COVID-19 AMP PRB: CPT | Performed by: INTERNAL MEDICINE

## 2020-04-07 PROCEDURE — 93306 TTE W/DOPPLER COMPLETE: CPT

## 2020-04-07 PROCEDURE — 82728 ASSAY OF FERRITIN: CPT | Performed by: HOSPITALIST

## 2020-04-07 PROCEDURE — 82962 GLUCOSE BLOOD TEST: CPT

## 2020-04-07 PROCEDURE — 74176 CT ABD & PELVIS W/O CONTRAST: CPT

## 2020-04-07 PROCEDURE — 63710000001 INSULIN LISPRO (HUMAN) PER 5 UNITS: Performed by: HOSPITALIST

## 2020-04-07 PROCEDURE — 71250 CT THORAX DX C-: CPT

## 2020-04-07 PROCEDURE — 63710000001 DIPHENHYDRAMINE PER 50 MG: Performed by: HOSPITALIST

## 2020-04-07 PROCEDURE — 80053 COMPREHEN METABOLIC PANEL: CPT | Performed by: HOSPITALIST

## 2020-04-07 PROCEDURE — 83605 ASSAY OF LACTIC ACID: CPT | Performed by: INTERNAL MEDICINE

## 2020-04-07 RX ORDER — DIPHENHYDRAMINE HCL 25 MG
25 CAPSULE ORAL EVERY 4 HOURS PRN
Status: DISCONTINUED | OUTPATIENT
Start: 2020-04-07 | End: 2020-04-20 | Stop reason: HOSPADM

## 2020-04-07 RX ORDER — MIDODRINE HYDROCHLORIDE 5 MG/1
5 TABLET ORAL
Status: DISCONTINUED | OUTPATIENT
Start: 2020-04-07 | End: 2020-04-08

## 2020-04-07 RX ORDER — CALCIUM CARBONATE 200(500)MG
2 TABLET,CHEWABLE ORAL 4 TIMES DAILY PRN
Status: DISCONTINUED | OUTPATIENT
Start: 2020-04-07 | End: 2020-04-20 | Stop reason: HOSPADM

## 2020-04-07 RX ADMIN — DIPHENHYDRAMINE HYDROCHLORIDE 25 MG: 25 CAPSULE ORAL at 09:11

## 2020-04-07 RX ADMIN — SUCRALFATE 1 G: 1 SUSPENSION ORAL at 17:11

## 2020-04-07 RX ADMIN — DIPHENHYDRAMINE HYDROCHLORIDE 25 MG: 25 CAPSULE ORAL at 14:37

## 2020-04-07 RX ADMIN — FLUCONAZOLE 100 MG: 2 INJECTION, SOLUTION INTRAVENOUS at 17:10

## 2020-04-07 RX ADMIN — LEVOTHYROXINE SODIUM 200 MCG: 100 TABLET ORAL at 05:43

## 2020-04-07 RX ADMIN — BUDESONIDE AND FORMOTEROL FUMARATE DIHYDRATE 2 PUFF: 160; 4.5 AEROSOL RESPIRATORY (INHALATION) at 19:49

## 2020-04-07 RX ADMIN — INSULIN LISPRO 2 UNITS: 100 INJECTION, SOLUTION INTRAVENOUS; SUBCUTANEOUS at 09:03

## 2020-04-07 RX ADMIN — SODIUM BICARBONATE 650 MG: 650 TABLET ORAL at 09:03

## 2020-04-07 RX ADMIN — SUCRALFATE 1 G: 1 SUSPENSION ORAL at 20:44

## 2020-04-07 RX ADMIN — INSULIN LISPRO 2 UNITS: 100 INJECTION, SOLUTION INTRAVENOUS; SUBCUTANEOUS at 18:19

## 2020-04-07 RX ADMIN — SODIUM CHLORIDE, PRESERVATIVE FREE 10 ML: 5 INJECTION INTRAVENOUS at 20:46

## 2020-04-07 RX ADMIN — MIDODRINE HYDROCHLORIDE 5 MG: 5 TABLET ORAL at 17:11

## 2020-04-07 RX ADMIN — SODIUM BICARBONATE 650 MG: 650 TABLET ORAL at 17:11

## 2020-04-07 RX ADMIN — SUCRALFATE 1 G: 1 SUSPENSION ORAL at 11:51

## 2020-04-07 RX ADMIN — PANTOPRAZOLE SODIUM 40 MG: 40 TABLET, DELAYED RELEASE ORAL at 18:18

## 2020-04-07 RX ADMIN — SUCRALFATE 1 G: 1 SUSPENSION ORAL at 06:31

## 2020-04-07 RX ADMIN — SODIUM BICARBONATE 650 MG: 650 TABLET ORAL at 20:45

## 2020-04-07 RX ADMIN — PANTOPRAZOLE SODIUM 40 MG: 40 TABLET, DELAYED RELEASE ORAL at 06:31

## 2020-04-07 RX ADMIN — INSULIN LISPRO 2 UNITS: 100 INJECTION, SOLUTION INTRAVENOUS; SUBCUTANEOUS at 20:45

## 2020-04-07 RX ADMIN — BUDESONIDE AND FORMOTEROL FUMARATE DIHYDRATE 2 PUFF: 160; 4.5 AEROSOL RESPIRATORY (INHALATION) at 07:26

## 2020-04-07 RX ADMIN — INSULIN LISPRO 2 UNITS: 100 INJECTION, SOLUTION INTRAVENOUS; SUBCUTANEOUS at 12:42

## 2020-04-07 NOTE — SIGNIFICANT NOTE
04/07/20 1559   Rehab Time/Intention   Evaluation Not Performed patient unavailable for evaluation  (Pt being transferred to 4E. Will follow up tomorrow)   Rehab Treatment   Discipline physical therapist   Recommendation   PT - Next Appointment 04/08/20

## 2020-04-07 NOTE — PLAN OF CARE
Problem: Patient Care Overview  Goal: Plan of Care Review  Outcome: Ongoing (interventions implemented as appropriate)   Ivf, up with assist, lactic acid level decreasing, echo TBD, open area on pubis-pt states its a carbuncle

## 2020-04-07 NOTE — PROGRESS NOTES
"DAILY PROGRESS NOTE  [facility]    Patient Identification:  Name: Oralia Sánchez  Age: 44 y.o.  Sex: female  :  1975  MRN: [id number]         Primary Care Physician: Provider, No Known    Subjective:  Interval History:She has a bad cough and is short of air but better. Not requiring any O 2. Extreme fatigue.  Abdominal pain and swelling.    Objective:    Scheduled Meds:    budesonide-formoterol 2 puff Inhalation BID - RT   fluconazole 100 mg Intravenous Q24H   insulin lispro 0-7 Units Subcutaneous 4x Daily With Meals & Nightly   levothyroxine 200 mcg Oral Daily   midodrine 5 mg Oral TID AC   pantoprazole 40 mg Oral BID AC   sodium bicarbonate 650 mg Oral TID   sodium chloride 10 mL Intravenous Q12H   sucralfate 1 g Oral 4x Daily AC & at Bedtime     Continuous Infusions:       Vital signs in last 24 hours:  Temp:  [97.5 °F (36.4 °C)-98.3 °F (36.8 °C)] 98.3 °F (36.8 °C)  Heart Rate:  [] 98  Resp:  [16-18] 18  BP: ()/(50-60) 94/50    Intake/Output:    Intake/Output Summary (Last 24 hours) at 2020 1339  Last data filed at 2020 1300  Gross per 24 hour   Intake 1938 ml   Output 1000 ml   Net 938 ml       Exam:  BP 94/50 (BP Location: Right arm, Patient Position: Lying)   Pulse 98   Temp 98.3 °F (36.8 °C) (Oral)   Resp 18   Ht 154.9 cm (61\")   Wt 123 kg (271 lb 8 oz)   LMP 2020   SpO2 97%   BMI 51.30 kg/m²     General Appearance:    Alert, cooperative, no distress   Head:    Normocephalic, without obvious abnormality, atraumatic   Eyes:       Throat:   Lips, tongue, gums normal   Neck:   Supple, symmetrical, trachea midline, no JVD   Lungs:     Crackles and wheezes. bilaterally, respirations unlabored   Chest Wall:    No tenderness or deformity    Heart:    Regular rate and rhythm, S1 and S2 normal, no murmur,no  Rub or gallop   Abdomen:     Soft, non-tender, bowel sounds active, no masses, no organomegaly    Extremities:   Extremities normal, atraumatic, no " cyanosis or edema   Pulses:      Skin:   Skin is warm and dry,  Boil in pubic area   Neurologic:   no focal deficits noted      Lab Results (last 72 hours)     Procedure Component Value Units Date/Time    POC Glucose Once [[id number]]  (Abnormal) Collected:  04/02/20 1147    Specimen:  Blood Updated:  04/02/20 1148     Glucose 144 mg/dL     Comprehensive Metabolic Panel [[id number]]  (Abnormal) Collected:  04/02/20 0634    Specimen:  Blood Updated:  04/02/20 0736     Glucose 117 mg/dL      BUN 8 mg/dL      Creatinine 0.76 mg/dL      Sodium 136 mmol/L      Potassium 3.8 mmol/L      Chloride 90 mmol/L      CO2 20.2 mmol/L      Calcium 7.8 mg/dL      Total Protein 5.9 g/dL      Albumin 3.40 g/dL      ALT (SGPT) 49 U/L      AST (SGOT) 97 U/L      Alkaline Phosphatase 103 U/L      Total Bilirubin 1.3 mg/dL      eGFR Non African Amer 82 mL/min/1.73      Globulin 2.5 gm/dL      A/G Ratio 1.4 g/dL      BUN/Creatinine Ratio 10.5     Anion Gap 25.8 mmol/L     Narrative:       GFR Normal >60  Chronic Kidney Disease <60  Kidney Failure <15      Ferritin [[id number]]  (Abnormal) Collected:  04/02/20 0634    Specimen:  Blood Updated:  04/02/20 0726     Ferritin 803.00 ng/mL     Narrative:       Results may be falsely decreased if patient taking Biotin.      Lactate Dehydrogenase [[id number]]  (Abnormal) Collected:  04/02/20 0634    Specimen:  Blood Updated:  04/02/20 0722      U/L     CBC & Differential [[id number]] Collected:  04/02/20 0639    Specimen:  Blood Updated:  04/02/20 0652    Narrative:       The following orders were created for panel order CBC & Differential.  Procedure                               Abnormality         Status                     ---------                               -----------         ------                     CBC Auto Differential[[id number]]        Abnormal            Final result                 Please view results for these tests on the individual orders.    CBC Auto Differential  [[id number]]  (Abnormal) Collected:  04/02/20 0639    Specimen:  Blood Updated:  04/02/20 0652     WBC 7.38 10*3/mm3      RBC 3.95 10*6/mm3      Hemoglobin 14.2 g/dL      Hematocrit 40.1 %      .5 fL      MCH 35.9 pg      MCHC 35.4 g/dL      RDW 14.3 %      RDW-SD 53.6 fl      MPV 9.4 fL      Platelets 195 10*3/mm3      Neutrophil % 65.8 %      Lymphocyte % 24.5 %      Monocyte % 8.1 %      Eosinophil % 0.3 %      Basophil % 0.8 %      Immature Grans % 0.5 %      Neutrophils, Absolute 4.85 10*3/mm3      Lymphocytes, Absolute 1.81 10*3/mm3      Monocytes, Absolute 0.60 10*3/mm3      Eosinophils, Absolute 0.02 10*3/mm3      Basophils, Absolute 0.06 10*3/mm3      Immature Grans, Absolute 0.04 10*3/mm3      nRBC 0.4 /100 WBC     POC Glucose Once [[id number]]  (Normal) Collected:  04/02/20 0616    Specimen:  Blood Updated:  04/02/20 0618     Glucose 116 mg/dL     POC Glucose Once [[id number]]  (Normal) Collected:  04/01/20 2059    Specimen:  Blood Updated:  04/01/20 2059     Glucose 127 mg/dL     Blood Culture - Blood, Arm, Right [[id number]] Collected:  04/01/20 1654    Specimen:  Blood from Arm, Right Updated:  04/01/20 1654    Blood Culture - Blood, Arm, Left [[id number]] Collected:  04/01/20 1654    Specimen:  Blood from Arm, Left Updated:  04/01/20 1654    Respiratory Panel, PCR - Swab, Nasopharynx [[id number]]  (Normal) Collected:  04/01/20 1515    Specimen:  Swab from Nasopharynx Updated:  04/01/20 1653     ADENOVIRUS, PCR Not Detected     Coronavirus 229E Not Detected     Coronavirus HKU1 Not Detected     Coronavirus NL63 Not Detected     Coronavirus OC43 Not Detected     Human Metapneumovirus Not Detected     Human Rhinovirus/Enterovirus Not Detected     Influenza B PCR Not Detected     Parainfluenza Virus 1 Not Detected     Parainfluenza Virus 2 Not Detected     Parainfluenza Virus 3 Not Detected     Parainfluenza Virus 4 Not Detected     Bordetella pertussis pcr Not Detected     Influenza A H1 2009 PCR Not  Detected     Chlamydophila pneumoniae PCR Not Detected     Mycoplasma pneumo by PCR Not Detected     Influenza A PCR Not Detected     Influenza A H3 Not Detected     Influenza A H1 Not Detected     RSV, PCR Not Detected     Bordetella parapertussis PCR Not Detected    Narrative:       The coronavirus on the RVP is NOT COVID-19 and is NOT indicative of infection with COVID-19.     POC Glucose Once [[id number]]  (Abnormal) Collected:  04/01/20 1559    Specimen:  Blood Updated:  04/01/20 1601     Glucose 132 mg/dL     Hemoglobin A1c [[id number]]  (Abnormal) Collected:  04/01/20 0809    Specimen:  Blood Updated:  04/01/20 1417     Hemoglobin A1C 6.68 %     Narrative:       Hemoglobin A1C Ranges:    Increased Risk for Diabetes  5.7% to 6.4%  Diabetes                     >= 6.5%  Diabetic Goal                < 7.0%    Salicylate Level [[id number]]  (Normal) Collected:  04/01/20 0809    Specimen:  Blood Updated:  04/01/20 1129     Salicylate <0.3 mg/dL     Narrative:       Therapeutic range for Salicylates:  3.0 - 10.0 mg/dL for antipyretic/analgesic conditions  15.0 - 30.0 mg/dL for anti-inflammatory conditions    CORONAVIRUS(COVID-19),RT-PCR,UOFL LAB,NP/OP Swab in Transport Media - Swab, Nasopharynx [[id number]] Collected:  04/01/20 1049    Specimen:  Swab from Nasopharynx Updated:  04/01/20 1101    Blood Gas, Arterial [[id number]]  (Abnormal) Collected:  04/01/20 1051    Specimen:  Arterial Blood Updated:  04/01/20 1053     Site Arterial: left radial     Jovi's Test Positive     pH, Arterial 7.477 pH units      pCO2, Arterial 28.2 mm Hg      pO2, Arterial 99.2 mm Hg      HCO3, Arterial 20.9 mmol/L      Base Excess, Arterial -1.0 mmol/L      O2 Saturation Calculated 98.3 %      Barometric Pressure for Blood Gas 753.0 mmHg      Modality Room Air     Set [facility] Resp Rate 28    Ethanol [[id number]] Collected:  04/01/20 0809    Specimen:  Blood Updated:  04/01/20 0951     Ethanol <10 mg/dL      Ethanol % <0.010 %     Ferritin  [[id number]]  (Abnormal) Collected:  04/01/20 0809    Specimen:  Blood Updated:  04/01/20 0946     Ferritin 821.00 ng/mL     Narrative:       Results may be falsely decreased if patient taking Biotin.      [place] Draw [[id number]] Collected:  04/01/20 0809    Specimen:  Blood Updated:  04/01/20 0915    Narrative:       The following orders were created for panel order [place] Draw.  Procedure                               Abnormality         Status                     ---------                               -----------         ------                     Light Blue Top[[id number]]                                   Final result               Green Top (Gel)[[id number]]                                  Final result               Lavender Top[[id number]]                                     Final result               Gold Top - SST[[id number]]                                   Final result                 Please view results for these tests on the individual orders.    Green Top (Gel) [[id number]] Collected:  04/01/20 0809    Specimen:  Blood Updated:  04/01/20 0915     Extra Tube Hold for add-ons.     Comment: Auto resulted.       Lavender Top [[id number]] Collected:  04/01/20 0809    Specimen:  Blood Updated:  04/01/20 0915     Extra Tube hold for add-on     Comment: Auto resulted       Light Blue Top [[id number]] Collected:  04/01/20 0809    Specimen:  Blood Updated:  04/01/20 0915     Extra Tube hold for add-on     Comment: Auto resulted       Gold Top - SST [[id number]] Collected:  04/01/20 0809    Specimen:  Blood Updated:  04/01/20 0915     Extra Tube Hold for add-ons.     Comment: Auto resulted.       Procalcitonin [[id number]]  (Normal) Collected:  04/01/20 0809    Specimen:  Blood Updated:  04/01/20 0846     Procalcitonin 0.16 ng/mL     Narrative:       As a Marker for Sepsis (Non-Neonates):   1. <0.5 ng/mL represents a low risk of severe sepsis and/or septic shock.  1. >2 ng/mL represents a high risk of severe sepsis  "and/or septic shock.    As a Marker for Lower Respiratory Tract Infections that require antibiotic therapy:  PCT on Admission     Antibiotic Therapy             6-12 Hrs later  > 0.5                Strongly Recommended            >0.25 - <0.5         Recommended  0.1 - 0.25           Discouraged                   Remeasure/reassess PCT  <0.1                 Strongly Discouraged          Remeasure/reassess PCT      As 28 day mortality risk marker: \"Change in Procalcitonin Result\" (> 80 % or <=80 %) if Day 0 (or Day 1) and Day 4 values are available. Refer to http://[email or web address] [email or web address]-[email or web address]/   Change in PCT <=80 %   A decrease of PCT levels below or equal to 80 % defines a positive change in PCT test result representing a higher risk for 28-day all-cause mortality of patients diagnosed with severe sepsis or septic shock.  Change in PCT > 80 %   A decrease of PCT levels of more than 80 % defines a negative change in PCT result representing a lower risk for 28-day all-cause mortality of patients diagnosed with severe sepsis or septic shock.                Results may be falsely decreased if patient taking Biotin.     Comprehensive Metabolic Panel [[id number]]  (Abnormal) Collected:  04/01/20 0809    Specimen:  Blood Updated:  04/01/20 0841     Glucose 127 mg/dL      BUN 3 mg/dL      Creatinine 0.85 mg/dL      Sodium 135 mmol/L      Potassium 3.2 mmol/L      Chloride 84 mmol/L      CO2 18.7 mmol/L      Calcium 8.5 mg/dL      Total Protein 7.1 g/dL      Albumin 3.70 g/dL      ALT (SGPT) 65 U/L      AST (SGOT) 124 U/L      Alkaline Phosphatase 128 U/L      Total Bilirubin 1.4 mg/dL      eGFR Non African Amer 72 mL/min/1.73      Globulin 3.4 gm/dL      A/G Ratio 1.1 g/dL      BUN/Creatinine Ratio 3.5     Anion Gap 32.3 mmol/L     Narrative:       GFR Normal >60  Chronic Kidney Disease <60  Kidney Failure <15      C-reactive Protein [[id number]]  (Abnormal) Collected:  04/01/20 0809    Specimen:  Blood Updated:  04/01/20 " 0841     C-Reactive Protein 0.63 mg/dL     Lactate Dehydrogenase [[id number]]  (Abnormal) Collected:  04/01/20 0809    Specimen:  Blood Updated:  04/01/20 0841      U/L     Magnesium [[id number]]  (Abnormal) Collected:  04/01/20 0809    Specimen:  Blood Updated:  04/01/20 0841     Magnesium 1.1 mg/dL     Troponin [[id number]]  (Normal) Collected:  04/01/20 0809    Specimen:  Blood Updated:  04/01/20 0841     Troponin T <0.010 ng/mL     Narrative:       Troponin T Reference Range:  <= 0.03 ng/mL-   Negative for AMI  >0.03 ng/mL-     Abnormal for myocardial necrosis.  Clinicians would have to utilize clinical acumen, EKG, Troponin and serial changes to determine if it is an Acute Myocardial Infarction or myocardial injury due to an underlying chronic condition.       Results may be falsely decreased if patient taking Biotin.      CBC Auto Differential [[id number]]  (Abnormal) Collected:  04/01/20 0809    Specimen:  Blood Updated:  04/01/20 0828     WBC 8.04 10*3/mm3      RBC 4.48 10*6/mm3      Hemoglobin 16.3 g/dL      Hematocrit 46.2 %      .1 fL      MCH 36.4 pg      MCHC 35.3 g/dL      RDW 15.4 %      RDW-SD 57.9 fl      MPV 9.5 fL      Platelets 244 10*3/mm3      Neutrophil % 68.8 %      Lymphocyte % 22.6 %      Monocyte % 7.2 %      Eosinophil % 0.4 %      Basophil % 0.6 %      Immature Grans % 0.4 %      Neutrophils, Absolute 5.53 10*3/mm3      Lymphocytes, Absolute 1.82 10*3/mm3      Monocytes, Absolute 0.58 10*3/mm3      Eosinophils, Absolute 0.03 10*3/mm3      Basophils, Absolute 0.05 10*3/mm3      Immature Grans, Absolute 0.03 10*3/mm3      nRBC 0.5 /100 WBC         Data Review:  Results from last 7 days   Lab Units 04/07/20  0545 04/06/20  0551 04/05/20  0601   SODIUM mmol/L 130* 131* 132*   POTASSIUM mmol/L 3.9 3.9 3.4*   CHLORIDE mmol/L 94* 94* 89*   CO2 mmol/L 24.6 23.4 19.9*   BUN mg/dL 12 14 14   CREATININE mg/dL 0.54* 0.55* 0.72   GLUCOSE mg/dL 172* 147* 154*   CALCIUM mg/dL 7.6* 8.1* 8.9      Results from last 7 days   Lab Units 04/07/20  0545 04/06/20  0551 04/05/20  0601   WBC 10*3/mm3 7.51 7.82 9.39   HEMOGLOBIN g/dL 13.4 14.4 15.4   HEMATOCRIT % 37.8 41.1 43.7   PLATELETS 10*3/mm3 202 195 184         Results from last 7 days   Lab Units 04/01/20  0809   HEMOGLOBIN A1C % 6.68*     Lab Results   Lab Value Date/Time    TROPONINT <0.010 04/01/2020 0809    TROPONINT <0.010 11/10/2019 1525         Results from last 7 days   Lab Units 04/07/20  0545 04/04/20  0549 04/03/20  0620   ALK PHOS U/L 169* 132* 132*   BILIRUBIN mg/dL 1.1 1.6* 1.6*   ALT (SGPT) U/L 64* 72* 64*   AST (SGOT) U/L 161* 193* 177*         Results from last 7 days   Lab Units 04/01/20  0809   HEMOGLOBIN A1C % 6.68*     Glucose   Date/Time Value Ref Range Status   04/07/2020 1106 188 (H) 70 - 130 mg/dL Final   04/07/2020 0623 188 (H) 70 - 130 mg/dL Final   04/06/2020 2122 140 (H) 70 - 130 mg/dL Final   04/06/2020 1636 151 (H) 70 - 130 mg/dL Final   04/06/2020 1159 186 (H) 70 - 130 mg/dL Final   04/06/2020 0640 150 (H) 70 - 130 mg/dL Final   04/05/2020 2114 169 (H) 70 - 130 mg/dL Final   04/05/2020 1716 188 (H) 70 - 130 mg/dL Final           Past Medical History:   Diagnosis Date   • Aneurysm (CMS/HCC)    • Arthritis    • Carpal tunnel syndrome    • Chest pain    • Diabetes mellitus (CMS/HCC)    • Dysmetabolic syndrome X    • Gestational diabetes mellitus    • Hypothyroidism    • Metabolic disorder    • Nonalcoholic steatohepatitis    • Obesity    • Palpitations    • Sleep apnea    • Spinal headache    • Type 2 diabetes mellitus (CMS/HCC)    • Vitamin D deficiency        Assessment:  Active Hospital Problems    Diagnosis  POA   • **Dyspnea [R06.00]  Yes   • Cough [R05]  Unknown   • Hypomagnesemia [E83.42]  Unknown   • Metabolic acidosis [E87.2]  Unknown   • Fatigue [R53.83]  Unknown   • History of COPD [Z87.09]  Not Applicable   • Tobacco abuse [Z72.0]  Yes   • Rheumatoid arthritis (CMS/HCC) [M06.9]  Yes   • Type 2 diabetes mellitus  (CMS/HCC) [E11.9]  Yes   • Morbid obesity (CMS/HCC) [E66.01]  Yes   • Elevated LFTs [R94.5]  Yes   • YOUNG (nonalcoholic steatohepatitis) [K75.81]  Yes   • Diabetes mellitus arising in pregnancy [O24.419]  Yes   • Hypothyroid [E03.9]  Yes      Resolved Hospital Problems   No resolved problems to display.       Plan:  Continue with IV fluids and follow lab. ECHO report noted. Will DC IV fluids and add proamatine for low BP   Elevated inflammatory markers. Will follow. Check CY abdomen. Repeat COVID test.  PT eval.  Tylor Cruz MD  4/7/2020  13:39

## 2020-04-07 NOTE — PROGRESS NOTES
Nothing to add from pulmonary standpoint  Continue symbicort and PPI  Follow up outpatient in 4-6 weeks    Will see prn while here.

## 2020-04-08 LAB
ALBUMIN SERPL-MCNC: 2.3 G/DL (ref 3.5–5.2)
ALBUMIN/GLOB SERPL: 0.9 G/DL
ALP SERPL-CCNC: 177 U/L (ref 39–117)
ALT SERPL W P-5'-P-CCNC: 58 U/L (ref 1–33)
ANION GAP SERPL CALCULATED.3IONS-SCNC: 14.7 MMOL/L (ref 5–15)
AST SERPL-CCNC: 129 U/L (ref 1–32)
ATMOSPHERIC PRESS: 745 MMHG
B PARAPERT DNA SPEC QL NAA+PROBE: NOT DETECTED
B PERT DNA SPEC QL NAA+PROBE: NOT DETECTED
BASE EXCESS BLDV CALC-SCNC: 2.7 MMOL/L
BASOPHILS # BLD AUTO: 0.03 10*3/MM3 (ref 0–0.2)
BASOPHILS NFR BLD AUTO: 0.4 % (ref 0–1.5)
BDY SITE: ABNORMAL
BILIRUB SERPL-MCNC: 0.9 MG/DL (ref 0.2–1.2)
BUN BLD-MCNC: 10 MG/DL (ref 6–20)
BUN/CREAT SERPL: 17.9 (ref 7–25)
C PNEUM DNA NPH QL NAA+NON-PROBE: NOT DETECTED
CALCIUM SPEC-SCNC: 8 MG/DL (ref 8.6–10.5)
CHLORIDE SERPL-SCNC: 93 MMOL/L (ref 98–107)
CO2 SERPL-SCNC: 23.3 MMOL/L (ref 22–29)
CORTIS SERPL-MCNC: 14.34 MCG/DL
CREAT BLD-MCNC: 0.56 MG/DL (ref 0.57–1)
D-LACTATE SERPL-SCNC: 4.1 MMOL/L (ref 0.5–2)
D-LACTATE SERPL-SCNC: 6.2 MMOL/L (ref 0.5–2)
DEPRECATED RDW RBC AUTO: 53.8 FL (ref 37–54)
EOSINOPHIL # BLD AUTO: 0.07 10*3/MM3 (ref 0–0.4)
EOSINOPHIL NFR BLD AUTO: 0.9 % (ref 0.3–6.2)
ERYTHROCYTE [DISTWIDTH] IN BLOOD BY AUTOMATED COUNT: 14.2 % (ref 12.3–15.4)
FERRITIN SERPL-MCNC: 1790 NG/ML (ref 13–150)
FLUAV H1 2009 PAND RNA NPH QL NAA+PROBE: NOT DETECTED
FLUAV H1 HA GENE NPH QL NAA+PROBE: NOT DETECTED
FLUAV H3 RNA NPH QL NAA+PROBE: NOT DETECTED
FLUAV SUBTYP SPEC NAA+PROBE: NOT DETECTED
FLUBV RNA ISLT QL NAA+PROBE: NOT DETECTED
GFR SERPL CREATININE-BSD FRML MDRD: 117 ML/MIN/1.73
GLOBULIN UR ELPH-MCNC: 2.6 GM/DL
GLUCOSE BLD-MCNC: 178 MG/DL (ref 65–99)
GLUCOSE BLDC GLUCOMTR-MCNC: 129 MG/DL (ref 70–130)
GLUCOSE BLDC GLUCOMTR-MCNC: 137 MG/DL (ref 70–130)
GLUCOSE BLDC GLUCOMTR-MCNC: 200 MG/DL (ref 70–130)
GLUCOSE BLDC GLUCOMTR-MCNC: 204 MG/DL (ref 70–130)
HADV DNA SPEC NAA+PROBE: NOT DETECTED
HCO3 BLDV-SCNC: 26.5 MMOL/L (ref 22–28)
HCOV 229E RNA SPEC QL NAA+PROBE: NOT DETECTED
HCOV HKU1 RNA SPEC QL NAA+PROBE: NOT DETECTED
HCOV NL63 RNA SPEC QL NAA+PROBE: NOT DETECTED
HCOV OC43 RNA SPEC QL NAA+PROBE: NOT DETECTED
HCT VFR BLD AUTO: 37 % (ref 34–46.6)
HGB BLD-MCNC: 13.2 G/DL (ref 12–15.9)
HMPV RNA NPH QL NAA+NON-PROBE: NOT DETECTED
HOROWITZ INDEX BLD+IHG-RTO: 21 %
HPIV1 RNA SPEC QL NAA+PROBE: NOT DETECTED
HPIV2 RNA SPEC QL NAA+PROBE: NOT DETECTED
HPIV3 RNA NPH QL NAA+PROBE: NOT DETECTED
HPIV4 P GENE NPH QL NAA+PROBE: NOT DETECTED
IMM GRANULOCYTES # BLD AUTO: 0.03 10*3/MM3 (ref 0–0.05)
IMM GRANULOCYTES NFR BLD AUTO: 0.4 % (ref 0–0.5)
L PNEUMO1 AG UR QL IA: NEGATIVE
LDH SERPL-CCNC: 311 U/L (ref 135–214)
LYMPHOCYTES # BLD AUTO: 1.07 10*3/MM3 (ref 0.7–3.1)
LYMPHOCYTES NFR BLD AUTO: 13.5 % (ref 19.6–45.3)
M PNEUMO IGG SER IA-ACNC: NOT DETECTED
MCH RBC QN AUTO: 36.5 PG (ref 26.6–33)
MCHC RBC AUTO-ENTMCNC: 35.7 G/DL (ref 31.5–35.7)
MCV RBC AUTO: 102.2 FL (ref 79–97)
MODALITY: ABNORMAL
MONOCYTES # BLD AUTO: 0.87 10*3/MM3 (ref 0.1–0.9)
MONOCYTES NFR BLD AUTO: 11 % (ref 5–12)
NEUTROPHILS # BLD AUTO: 5.86 10*3/MM3 (ref 1.7–7)
NEUTROPHILS NFR BLD AUTO: 73.8 % (ref 42.7–76)
NRBC BLD AUTO-RTO: 1.9 /100 WBC (ref 0–0.2)
PCO2 BLDV: 37.3 MM HG (ref 41–51)
PH BLDV: 7.46 PH UNITS (ref 7.31–7.41)
PLATELET # BLD AUTO: 215 10*3/MM3 (ref 140–450)
PMV BLD AUTO: 9.4 FL (ref 6–12)
PO2 BLDV: 45.5 MM HG (ref 35–45)
POTASSIUM BLD-SCNC: 3.6 MMOL/L (ref 3.5–5.2)
PROCALCITONIN SERPL-MCNC: 0.29 NG/ML (ref 0.1–0.25)
PROT SERPL-MCNC: 4.9 G/DL (ref 6–8.5)
RBC # BLD AUTO: 3.62 10*6/MM3 (ref 3.77–5.28)
RHINOVIRUS RNA SPEC NAA+PROBE: NOT DETECTED
RSV RNA NPH QL NAA+NON-PROBE: NOT DETECTED
SAO2 % BLDCOA: 83.7 % (ref 92–99)
SARS-COV-2 RNA RESP QL NAA+PROBE: NOT DETECTED
SODIUM BLD-SCNC: 131 MMOL/L (ref 136–145)
TOTAL RATE: 16 BREATHS/MINUTE
WBC NRBC COR # BLD: 7.93 10*3/MM3 (ref 3.4–10.8)

## 2020-04-08 PROCEDURE — 83605 ASSAY OF LACTIC ACID: CPT | Performed by: INTERNAL MEDICINE

## 2020-04-08 PROCEDURE — 0100U HC BIOFIRE FILMARRAY RESP PANEL 2: CPT | Performed by: INTERNAL MEDICINE

## 2020-04-08 PROCEDURE — 82962 GLUCOSE BLOOD TEST: CPT

## 2020-04-08 PROCEDURE — 87899 AGENT NOS ASSAY W/OPTIC: CPT | Performed by: INTERNAL MEDICINE

## 2020-04-08 PROCEDURE — 84145 PROCALCITONIN (PCT): CPT | Performed by: INTERNAL MEDICINE

## 2020-04-08 PROCEDURE — 99254 IP/OBS CNSLTJ NEW/EST MOD 60: CPT | Performed by: INTERNAL MEDICINE

## 2020-04-08 PROCEDURE — 83615 LACTATE (LD) (LDH) ENZYME: CPT | Performed by: HOSPITALIST

## 2020-04-08 PROCEDURE — 82728 ASSAY OF FERRITIN: CPT | Performed by: HOSPITALIST

## 2020-04-08 PROCEDURE — 94799 UNLISTED PULMONARY SVC/PX: CPT

## 2020-04-08 PROCEDURE — 97162 PT EVAL MOD COMPLEX 30 MIN: CPT

## 2020-04-08 PROCEDURE — 63710000001 DIPHENHYDRAMINE PER 50 MG: Performed by: HOSPITALIST

## 2020-04-08 PROCEDURE — 63710000001 INSULIN LISPRO (HUMAN) PER 5 UNITS: Performed by: HOSPITALIST

## 2020-04-08 PROCEDURE — 80053 COMPREHEN METABOLIC PANEL: CPT | Performed by: HOSPITALIST

## 2020-04-08 PROCEDURE — 82803 BLOOD GASES ANY COMBINATION: CPT

## 2020-04-08 PROCEDURE — 85025 COMPLETE CBC W/AUTO DIFF WBC: CPT | Performed by: HOSPITALIST

## 2020-04-08 PROCEDURE — 25010000002 FLUCONAZOLE PER 200 MG: Performed by: INTERNAL MEDICINE

## 2020-04-08 PROCEDURE — 82533 TOTAL CORTISOL: CPT | Performed by: HOSPITALIST

## 2020-04-08 PROCEDURE — 97110 THERAPEUTIC EXERCISES: CPT

## 2020-04-08 PROCEDURE — 25010000002 FUROSEMIDE PER 20 MG: Performed by: INTERNAL MEDICINE

## 2020-04-08 RX ORDER — MIDODRINE HYDROCHLORIDE 5 MG/1
10 TABLET ORAL
Status: DISCONTINUED | OUTPATIENT
Start: 2020-04-08 | End: 2020-04-13

## 2020-04-08 RX ORDER — FUROSEMIDE 10 MG/ML
40 INJECTION INTRAMUSCULAR; INTRAVENOUS EVERY 6 HOURS
Status: COMPLETED | OUTPATIENT
Start: 2020-04-08 | End: 2020-04-08

## 2020-04-08 RX ORDER — FLUCONAZOLE 100 MG/1
100 TABLET ORAL EVERY 24 HOURS
Status: COMPLETED | OUTPATIENT
Start: 2020-04-09 | End: 2020-04-10

## 2020-04-08 RX ADMIN — DIPHENHYDRAMINE HYDROCHLORIDE 25 MG: 25 CAPSULE ORAL at 13:16

## 2020-04-08 RX ADMIN — MIDODRINE HYDROCHLORIDE 5 MG: 5 TABLET ORAL at 06:38

## 2020-04-08 RX ADMIN — SODIUM BICARBONATE 650 MG: 650 TABLET ORAL at 08:14

## 2020-04-08 RX ADMIN — INSULIN LISPRO 3 UNITS: 100 INJECTION, SOLUTION INTRAVENOUS; SUBCUTANEOUS at 08:13

## 2020-04-08 RX ADMIN — BUDESONIDE AND FORMOTEROL FUMARATE DIHYDRATE 2 PUFF: 160; 4.5 AEROSOL RESPIRATORY (INHALATION) at 19:36

## 2020-04-08 RX ADMIN — SODIUM BICARBONATE 650 MG: 650 TABLET ORAL at 20:56

## 2020-04-08 RX ADMIN — INSULIN LISPRO 3 UNITS: 100 INJECTION, SOLUTION INTRAVENOUS; SUBCUTANEOUS at 11:08

## 2020-04-08 RX ADMIN — MIDODRINE HYDROCHLORIDE 5 MG: 5 TABLET ORAL at 10:56

## 2020-04-08 RX ADMIN — SUCRALFATE 1 G: 1 SUSPENSION ORAL at 20:56

## 2020-04-08 RX ADMIN — BUDESONIDE AND FORMOTEROL FUMARATE DIHYDRATE 2 PUFF: 160; 4.5 AEROSOL RESPIRATORY (INHALATION) at 08:30

## 2020-04-08 RX ADMIN — FUROSEMIDE 40 MG: 10 INJECTION, SOLUTION INTRAMUSCULAR; INTRAVENOUS at 16:32

## 2020-04-08 RX ADMIN — SODIUM CHLORIDE, PRESERVATIVE FREE 10 ML: 5 INJECTION INTRAVENOUS at 08:14

## 2020-04-08 RX ADMIN — SODIUM BICARBONATE 650 MG: 650 TABLET ORAL at 16:32

## 2020-04-08 RX ADMIN — FLUCONAZOLE 100 MG: 2 INJECTION, SOLUTION INTRAVENOUS at 11:09

## 2020-04-08 RX ADMIN — SUCRALFATE 1 G: 1 SUSPENSION ORAL at 06:38

## 2020-04-08 RX ADMIN — SUCRALFATE 1 G: 1 SUSPENSION ORAL at 11:03

## 2020-04-08 RX ADMIN — LEVOTHYROXINE SODIUM 200 MCG: 100 TABLET ORAL at 06:38

## 2020-04-08 RX ADMIN — PANTOPRAZOLE SODIUM 40 MG: 40 TABLET, DELAYED RELEASE ORAL at 06:38

## 2020-04-08 RX ADMIN — SUCRALFATE 1 G: 1 SUSPENSION ORAL at 16:32

## 2020-04-08 RX ADMIN — FUROSEMIDE 40 MG: 10 INJECTION, SOLUTION INTRAMUSCULAR; INTRAVENOUS at 10:57

## 2020-04-08 RX ADMIN — PANTOPRAZOLE SODIUM 40 MG: 40 TABLET, DELAYED RELEASE ORAL at 16:32

## 2020-04-08 NOTE — PLAN OF CARE
Problem: Patient Care Overview  Goal: Plan of Care Review  Outcome: Ongoing (interventions implemented as appropriate)  Flowsheets (Taken 4/8/2020 5169)  Progress: no change  Plan of Care Reviewed With: patient  Outcome Summary: VSS. RA. Afebrile. SR/ST with movement.  Generalized edema. No complaints of pain. Up with 1 with walker to BR. Dry cough. Complains of SOA with movement. Complains of weakness in legs while ambulating. Covid swab redone tonight. Results pending. Will continue to monitor.

## 2020-04-08 NOTE — PLAN OF CARE
Problem: Patient Care Overview  Goal: Plan of Care Review  4/8/2020 1732 by Shanta Vila, RN  Outcome: Ongoing (interventions implemented as appropriate)  Flowsheets (Taken 4/8/2020 1732)  Plan of Care Reviewed With: patient  Note:   Pt is transfer from .  No longer needs to be in Isolation as per ID.  Asst x1, refusing to have bed alarm on . Weakness and lots of coughing.  Will continue to monitor.   4/8/2020 1728 by Shanta Vila, RN  Outcome: Ongoing (interventions implemented as appropriate)  Flowsheets (Taken 4/8/2020 1556 by [place] Ray, PT)  Plan of Care Reviewed With: patient  Note:   Pt transferred from .  Tested neg for covid.  Pt is stand by asst.  Pt will be npo of    Goal: Individualization and Mutuality  Outcome: Ongoing (interventions implemented as appropriate)  Goal: Discharge Needs Assessment  Outcome: Ongoing (interventions implemented as appropriate)  Goal: Interprofessional Rounds/Family Conf  Outcome: Ongoing (interventions implemented as appropriate)

## 2020-04-08 NOTE — PROGRESS NOTES
[facility]     Mar/chart reviewed  Follow up cough  Still with cough, dry  Has had lactic acid continued to be checked in and remains elevated  She says her legs are weak.    Vital Sign Min/Max for last 24 hours  Temp  Min: 97.4 °F (36.3 °C)  Max: 98.9 °F (37.2 °C)   BP  Min: 89/42  Max: 109/55   Pulse  Min: 91  Max: 105   Resp  Min: 18  Max: 18   SpO2  Min: 93 %  Max: 99 %   No data recorded   Weight  Min: 128 kg (282 lb)  Max: 128 kg (282 lb)     Nad, axox3,   perrl, eomi, no icterus,  mmm, no jvd, trachea midline, neck supple,  chest cta bilaterally, no crackles, no wheezes,   rrr,   soft, nt, nd +bs,  no c/c/ 1+edema  Skin warm, dry no rashes    Labs: 4/8: reviewed:  Lactate 6.2  Na 131  Bicarb 23  Alt 58  ast 129  Alk phos 177  Ferritin 1790  ldh 311  Albumin 2.3    Ct chest/abd/pelvis: there are some very faint bilateral upper lobe infiltrates  3.3cm density in the right adnexa -- ultrasound advised; associated stranding  Presacral edema/stranding and fluid -- infiltrative process involving the rectum?      A/P:  1. Abnormal ct chest --   Repeat procalcitonin.  Doubtful significance.  I don't think it is the cause of her problems  2. Lactic acidosis -- unusual as her bicarb is 23.  Will check gas.  Her lactic has been much higher and there does not appear to be any clear reason for it.  I would consider stopping checking the lactic acid.  3. Elevated liver enzymes -- she does have some cholecylithiasis on ct  4. Hyponatremia  5. Cough -- continue symbicort and ppi   6. Abnormal ct abd/pelvis -- check ultrasound and ask GI to see  7. Anasarca -- diuresis    Discussed with Dr. Gamez

## 2020-04-08 NOTE — PROGRESS NOTES
"DAILY PROGRESS NOTE  [facility]    Patient Identification:  Name: Oralia Sánchez  Age: 44 y.o.  Sex: female  :  1975  MRN: [id number]         Primary Care Physician: Provider, No Known    Subjective:  Interval History:She has a bad cough and is short of air but better. Not requiring any O 2. Extreme fatigue.  Abdominal pain and swelling.    Objective:    Scheduled Meds:    budesonide-formoterol 2 puff Inhalation BID - RT   [START ON 2020] fluconazole 100 mg Oral Q24H   furosemide 40 mg Intravenous Q6H   insulin lispro 0-7 Units Subcutaneous 4x Daily With Meals & Nightly   levothyroxine 200 mcg Oral Daily   midodrine 10 mg Oral TID AC   pantoprazole 40 mg Oral BID AC   sodium bicarbonate 650 mg Oral TID   sodium chloride 10 mL Intravenous Q12H   sucralfate 1 g Oral 4x Daily AC & at Bedtime     Continuous Infusions:       Vital signs in last 24 hours:  Temp:  [97.4 °F (36.3 °C)-98.9 °F (37.2 °C)] 98.5 °F (36.9 °C)  Heart Rate:  [] 102  Resp:  [18] 18  BP: ()/(42-85) 94/56    Intake/Output:    Intake/Output Summary (Last 24 hours) at 2020 1144  Last data filed at 2020 0636  Gross per 24 hour   Intake 720 ml   Output 400 ml   Net 320 ml       Exam:  BP 94/56 (BP Location: Left arm, Patient Position: Sitting)   Pulse 102   Temp 98.5 °F (36.9 °C) (Oral)   Resp 18   Ht 154.9 cm (61\")   Wt 128 kg (282 lb)   LMP 2020   SpO2 97%   BMI 53.28 kg/m²     General Appearance:    Alert, cooperative, no distress   Head:    Normocephalic, without obvious abnormality, atraumatic   Eyes:       Throat:   Lips, tongue, gums normal   Neck:   Supple, symmetrical, trachea midline, no JVD   Lungs:     Crackles and wheezes. bilaterally, respirations unlabored   Chest Wall:    No tenderness or deformity    Heart:    Regular rate and rhythm, S1 and S2 normal, no murmur,no  Rub or gallop   Abdomen:     Soft, non-tender, bowel sounds active, no masses, no organomegaly    Extremities: "   Extremities normal, atraumatic, no cyanosis or edema   Pulses:      Skin:   Skin is warm and dry,  Boil in pubic area   Neurologic:   no focal deficits noted      Lab Results (last 72 hours)     Procedure Component Value Units Date/Time    POC Glucose Once [[id number]]  (Abnormal) Collected:  04/02/20 1147    Specimen:  Blood Updated:  04/02/20 1148     Glucose 144 mg/dL     Comprehensive Metabolic Panel [[id number]]  (Abnormal) Collected:  04/02/20 0634    Specimen:  Blood Updated:  04/02/20 0736     Glucose 117 mg/dL      BUN 8 mg/dL      Creatinine 0.76 mg/dL      Sodium 136 mmol/L      Potassium 3.8 mmol/L      Chloride 90 mmol/L      CO2 20.2 mmol/L      Calcium 7.8 mg/dL      Total Protein 5.9 g/dL      Albumin 3.40 g/dL      ALT (SGPT) 49 U/L      AST (SGOT) 97 U/L      Alkaline Phosphatase 103 U/L      Total Bilirubin 1.3 mg/dL      eGFR Non African Amer 82 mL/min/1.73      Globulin 2.5 gm/dL      A/G Ratio 1.4 g/dL      BUN/Creatinine Ratio 10.5     Anion Gap 25.8 mmol/L     Narrative:       GFR Normal >60  Chronic Kidney Disease <60  Kidney Failure <15      Ferritin [[id number]]  (Abnormal) Collected:  04/02/20 0634    Specimen:  Blood Updated:  04/02/20 0726     Ferritin 803.00 ng/mL     Narrative:       Results may be falsely decreased if patient taking Biotin.      Lactate Dehydrogenase [[id number]]  (Abnormal) Collected:  04/02/20 0634    Specimen:  Blood Updated:  04/02/20 0722      U/L     CBC & Differential [[id number]] Collected:  04/02/20 0639    Specimen:  Blood Updated:  04/02/20 0652    Narrative:       The following orders were created for panel order CBC & Differential.  Procedure                               Abnormality         Status                     ---------                               -----------         ------                     CBC Auto Differential[[id number]]        Abnormal            Final result                 Please view results for these tests on the individual  orders.    CBC Auto Differential [[id number]]  (Abnormal) Collected:  04/02/20 0639    Specimen:  Blood Updated:  04/02/20 0652     WBC 7.38 10*3/mm3      RBC 3.95 10*6/mm3      Hemoglobin 14.2 g/dL      Hematocrit 40.1 %      .5 fL      MCH 35.9 pg      MCHC 35.4 g/dL      RDW 14.3 %      RDW-SD 53.6 fl      MPV 9.4 fL      Platelets 195 10*3/mm3      Neutrophil % 65.8 %      Lymphocyte % 24.5 %      Monocyte % 8.1 %      Eosinophil % 0.3 %      Basophil % 0.8 %      Immature Grans % 0.5 %      Neutrophils, Absolute 4.85 10*3/mm3      Lymphocytes, Absolute 1.81 10*3/mm3      Monocytes, Absolute 0.60 10*3/mm3      Eosinophils, Absolute 0.02 10*3/mm3      Basophils, Absolute 0.06 10*3/mm3      Immature Grans, Absolute 0.04 10*3/mm3      nRBC 0.4 /100 WBC     POC Glucose Once [[id number]]  (Normal) Collected:  04/02/20 0616    Specimen:  Blood Updated:  04/02/20 0618     Glucose 116 mg/dL     POC Glucose Once [[id number]]  (Normal) Collected:  04/01/20 2059    Specimen:  Blood Updated:  04/01/20 2059     Glucose 127 mg/dL     Blood Culture - Blood, Arm, Right [[id number]] Collected:  04/01/20 1654    Specimen:  Blood from Arm, Right Updated:  04/01/20 1654    Blood Culture - Blood, Arm, Left [[id number]] Collected:  04/01/20 1654    Specimen:  Blood from Arm, Left Updated:  04/01/20 1654    Respiratory Panel, PCR - Swab, Nasopharynx [[id number]]  (Normal) Collected:  04/01/20 1515    Specimen:  Swab from Nasopharynx Updated:  04/01/20 1653     ADENOVIRUS, PCR Not Detected     Coronavirus 229E Not Detected     Coronavirus HKU1 Not Detected     Coronavirus NL63 Not Detected     Coronavirus OC43 Not Detected     Human Metapneumovirus Not Detected     Human Rhinovirus/Enterovirus Not Detected     Influenza B PCR Not Detected     Parainfluenza Virus 1 Not Detected     Parainfluenza Virus 2 Not Detected     Parainfluenza Virus 3 Not Detected     Parainfluenza Virus 4 Not Detected     Bordetella pertussis pcr Not Detected      Influenza A H1 2009 PCR Not Detected     Chlamydophila pneumoniae PCR Not Detected     Mycoplasma pneumo by PCR Not Detected     Influenza A PCR Not Detected     Influenza A H3 Not Detected     Influenza A H1 Not Detected     RSV, PCR Not Detected     Bordetella parapertussis PCR Not Detected    Narrative:       The coronavirus on the RVP is NOT COVID-19 and is NOT indicative of infection with COVID-19.     POC Glucose Once [[id number]]  (Abnormal) Collected:  04/01/20 1559    Specimen:  Blood Updated:  04/01/20 1601     Glucose 132 mg/dL     Hemoglobin A1c [[id number]]  (Abnormal) Collected:  04/01/20 0809    Specimen:  Blood Updated:  04/01/20 1417     Hemoglobin A1C 6.68 %     Narrative:       Hemoglobin A1C Ranges:    Increased Risk for Diabetes  5.7% to 6.4%  Diabetes                     >= 6.5%  Diabetic Goal                < 7.0%    Salicylate Level [[id number]]  (Normal) Collected:  04/01/20 0809    Specimen:  Blood Updated:  04/01/20 1129     Salicylate <0.3 mg/dL     Narrative:       Therapeutic range for Salicylates:  3.0 - 10.0 mg/dL for antipyretic/analgesic conditions  15.0 - 30.0 mg/dL for anti-inflammatory conditions    CORONAVIRUS(COVID-19),RT-PCR,UOFL LAB,NP/OP Swab in Transport Media - Swab, Nasopharynx [[id number]] Collected:  04/01/20 1049    Specimen:  Swab from Nasopharynx Updated:  04/01/20 1101    Blood Gas, Arterial [[id number]]  (Abnormal) Collected:  04/01/20 1051    Specimen:  Arterial Blood Updated:  04/01/20 1053     Site Arterial: left radial     Jovi's Test Positive     pH, Arterial 7.477 pH units      pCO2, Arterial 28.2 mm Hg      pO2, Arterial 99.2 mm Hg      HCO3, Arterial 20.9 mmol/L      Base Excess, Arterial -1.0 mmol/L      O2 Saturation Calculated 98.3 %      Barometric Pressure for Blood Gas 753.0 mmHg      Modality Room Air     Set Mech Resp Rate 28    Ethanol [[id number]] Collected:  04/01/20 0809    Specimen:  Blood Updated:  04/01/20 0951     Ethanol <10 mg/dL       Ethanol % <0.010 %     Ferritin [[id number]]  (Abnormal) Collected:  04/01/20 0809    Specimen:  Blood Updated:  04/01/20 0946     Ferritin 821.00 ng/mL     Narrative:       Results may be falsely decreased if patient taking Biotin.      [place] Draw [[id number]] Collected:  04/01/20 0809    Specimen:  Blood Updated:  04/01/20 0915    Narrative:       The following orders were created for panel order [place] Draw.  Procedure                               Abnormality         Status                     ---------                               -----------         ------                     Light Blue Top[[id number]]                                   Final result               Green Top (Gel)[[id number]]                                  Final result               Lavender Top[[id number]]                                     Final result               Gold Top - SST[[id number]]                                   Final result                 Please view results for these tests on the individual orders.    Green Top (Gel) [[id number]] Collected:  04/01/20 0809    Specimen:  Blood Updated:  04/01/20 0915     Extra Tube Hold for add-ons.     Comment: Auto resulted.       Lavender Top [[id number]] Collected:  04/01/20 0809    Specimen:  Blood Updated:  04/01/20 0915     Extra Tube hold for add-on     Comment: Auto resulted       Light Blue Top [[id number]] Collected:  04/01/20 0809    Specimen:  Blood Updated:  04/01/20 0915     Extra Tube hold for add-on     Comment: Auto resulted       Gold Top - SST [[id number]] Collected:  04/01/20 0809    Specimen:  Blood Updated:  04/01/20 0915     Extra Tube Hold for add-ons.     Comment: Auto resulted.       Procalcitonin [[id number]]  (Normal) Collected:  04/01/20 0809    Specimen:  Blood Updated:  04/01/20 0846     Procalcitonin 0.16 ng/mL     Narrative:       As a Marker for Sepsis (Non-Neonates):   1. <0.5 ng/mL represents a low risk of severe sepsis and/or septic shock.  1. >2 ng/mL represents  "a high risk of severe sepsis and/or septic shock.    As a Marker for Lower Respiratory Tract Infections that require antibiotic therapy:  PCT on Admission     Antibiotic Therapy             6-12 Hrs later  > 0.5                Strongly Recommended            >0.25 - <0.5         Recommended  0.1 - 0.25           Discouraged                   Remeasure/reassess PCT  <0.1                 Strongly Discouraged          Remeasure/reassess PCT      As 28 day mortality risk marker: \"Change in Procalcitonin Result\" (> 80 % or <=80 %) if Day 0 (or Day 1) and Day 4 values are available. Refer to [email or web address] [email or web address]ct-[email or web address]/   Change in PCT <=80 %   A decrease of PCT levels below or equal to 80 % defines a positive change in PCT test result representing a higher risk for 28-day all-cause mortality of patients diagnosed with severe sepsis or septic shock.  Change in PCT > 80 %   A decrease of PCT levels of more than 80 % defines a negative change in PCT result representing a lower risk for 28-day all-cause mortality of patients diagnosed with severe sepsis or septic shock.                Results may be falsely decreased if patient taking Biotin.     Comprehensive Metabolic Panel [[id number]]  (Abnormal) Collected:  04/01/20 0809    Specimen:  Blood Updated:  04/01/20 0841     Glucose 127 mg/dL      BUN 3 mg/dL      Creatinine 0.85 mg/dL      Sodium 135 mmol/L      Potassium 3.2 mmol/L      Chloride 84 mmol/L      CO2 18.7 mmol/L      Calcium 8.5 mg/dL      Total Protein 7.1 g/dL      Albumin 3.70 g/dL      ALT (SGPT) 65 U/L      AST (SGOT) 124 U/L      Alkaline Phosphatase 128 U/L      Total Bilirubin 1.4 mg/dL      eGFR Non African Amer 72 mL/min/1.73      Globulin 3.4 gm/dL      A/G Ratio 1.1 g/dL      BUN/Creatinine Ratio 3.5     Anion Gap 32.3 mmol/L     Narrative:       GFR Normal >60  Chronic Kidney Disease <60  Kidney Failure <15      C-reactive Protein [[id number]]  (Abnormal) Collected:  04/01/20 0809    " Specimen:  Blood Updated:  04/01/20 0841     C-Reactive Protein 0.63 mg/dL     Lactate Dehydrogenase [[id number]]  (Abnormal) Collected:  04/01/20 0809    Specimen:  Blood Updated:  04/01/20 0841      U/L     Magnesium [[id number]]  (Abnormal) Collected:  04/01/20 0809    Specimen:  Blood Updated:  04/01/20 0841     Magnesium 1.1 mg/dL     Troponin [[id number]]  (Normal) Collected:  04/01/20 0809    Specimen:  Blood Updated:  04/01/20 0841     Troponin T <0.010 ng/mL     Narrative:       Troponin T Reference Range:  <= 0.03 ng/mL-   Negative for AMI  >0.03 ng/mL-     Abnormal for myocardial necrosis.  Clinicians would have to utilize clinical acumen, EKG, Troponin and serial changes to determine if it is an Acute Myocardial Infarction or myocardial injury due to an underlying chronic condition.       Results may be falsely decreased if patient taking Biotin.      CBC Auto Differential [[id number]]  (Abnormal) Collected:  04/01/20 0809    Specimen:  Blood Updated:  04/01/20 0828     WBC 8.04 10*3/mm3      RBC 4.48 10*6/mm3      Hemoglobin 16.3 g/dL      Hematocrit 46.2 %      .1 fL      MCH 36.4 pg      MCHC 35.3 g/dL      RDW 15.4 %      RDW-SD 57.9 fl      MPV 9.5 fL      Platelets 244 10*3/mm3      Neutrophil % 68.8 %      Lymphocyte % 22.6 %      Monocyte % 7.2 %      Eosinophil % 0.4 %      Basophil % 0.6 %      Immature Grans % 0.4 %      Neutrophils, Absolute 5.53 10*3/mm3      Lymphocytes, Absolute 1.82 10*3/mm3      Monocytes, Absolute 0.58 10*3/mm3      Eosinophils, Absolute 0.03 10*3/mm3      Basophils, Absolute 0.05 10*3/mm3      Immature Grans, Absolute 0.03 10*3/mm3      nRBC 0.5 /100 WBC         Data Review:  Results from last 7 days   Lab Units 04/08/20  0623 04/07/20  0545 04/06/20  0551   SODIUM mmol/L 131* 130* 131*   POTASSIUM mmol/L 3.6 3.9 3.9   CHLORIDE mmol/L 93* 94* 94*   CO2 mmol/L 23.3 24.6 23.4   BUN mg/dL 10 12 14   CREATININE mg/dL 0.56* 0.54* 0.55*   GLUCOSE mg/dL 178* 172*  147*   CALCIUM mg/dL 8.0* 7.6* 8.1*     Results from last 7 days   Lab Units 04/08/20  0623 04/07/20  0545 04/06/20  0551   WBC 10*3/mm3 7.93 7.51 7.82   HEMOGLOBIN g/dL 13.2 13.4 14.4   HEMATOCRIT % 37.0 37.8 41.1   PLATELETS 10*3/mm3 215 202 195             Lab Results   Lab Value Date/Time    TROPONINT <0.010 04/01/2020 0809    TROPONINT <0.010 11/10/2019 1525         Results from last 7 days   Lab Units 04/08/20  0623 04/07/20  0545 04/04/20  0549   ALK PHOS U/L 177* 169* 132*   BILIRUBIN mg/dL 0.9 1.1 1.6*   ALT (SGPT) U/L 58* 64* 72*   AST (SGOT) U/L 129* 161* 193*             Glucose   Date/Time Value Ref Range Status   04/08/2020 1056 200 (H) 70 - 130 mg/dL Final   04/08/2020 0621 204 (H) 70 - 130 mg/dL Final   04/07/2020 2028 152 (H) 70 - 130 mg/dL Final   04/07/2020 1733 155 (H) 70 - 130 mg/dL Final   04/07/2020 1106 188 (H) 70 - 130 mg/dL Final   04/07/2020 0623 188 (H) 70 - 130 mg/dL Final   04/06/2020 2122 140 (H) 70 - 130 mg/dL Final   04/06/2020 1636 151 (H) 70 - 130 mg/dL Final           Past Medical History:   Diagnosis Date   • Aneurysm (CMS/HCC)    • Arthritis    • Carpal tunnel syndrome    • Chest pain    • Diabetes mellitus (CMS/HCC)    • Dysmetabolic syndrome X    • Gestational diabetes mellitus    • Hypothyroidism    • Metabolic disorder    • Nonalcoholic steatohepatitis    • Obesity    • Palpitations    • Sleep apnea    • Spinal headache    • Type 2 diabetes mellitus (CMS/HCC)    • Vitamin D deficiency        Assessment:  Active Hospital Problems    Diagnosis  POA   • **Dyspnea [R06.00]  Yes   • Cough [R05]  Unknown   • Hypomagnesemia [E83.42]  Unknown   • Metabolic acidosis [E87.2]  Unknown   • Fatigue [R53.83]  Unknown   • History of COPD [Z87.09]  Not Applicable   • Tobacco abuse [Z72.0]  Yes   • Rheumatoid arthritis (CMS/HCC) [M06.9]  Yes   • Type 2 diabetes mellitus (CMS/HCC) [E11.9]  Yes   • Morbid obesity (CMS/HCC) [E66.01]  Yes   • Elevated LFTs [R94.5]  Yes   • YOUNG (nonalcoholic  steatohepatitis) [K75.81]  Yes   • Diabetes mellitus arising in pregnancy [O24.419]  Yes   • Hypothyroid [E03.9]  Yes      Resolved Hospital Problems   No resolved problems to display.       Plan:  Continue with IV fluids and follow lab. ECHO report noted. Off IV fluids and increase proamatine for low BP   Elevated inflammatory markers. Will follow. CT abdomen, chest and GI consult noted.. Repeat COVID test was negative..  PT eval.  Tylor Cruz MD  4/8/2020  11:44

## 2020-04-08 NOTE — PROGRESS NOTES
Continued Stay Note  [facility]     Patient Name: Oralia Sánchez  MRN: [id number]  Today's Date: 4/8/2020    Admit Date: 4/1/2020    Discharge Plan     Row Name 04/08/20 1055       Plan    Plan  Home with no needs    Patient/Family in Agreement with Plan  yes    Plan Comments  Covid 19 negative times 2. Plan to transfer off Covid 19 unit to [facility] today. Avelino Griffith RN-BC        Discharge Codes    No documentation.             Avelino Griffith, RN

## 2020-04-08 NOTE — PLAN OF CARE
Problem: Patient Care Overview  Goal: Plan of Care Review  Flowsheets (Taken 4/8/2020 0140)  Plan of Care Reviewed With: patient  Outcome Summary: Pt is a 45 yo  F who was admitted with SOA and cough. Pt presents to PT with impaired functional mobility and gait secondary to generalized weakness, imparied balance, and decreased activity tolerance. Pt may benefit from skilled PT to address strength, mobility, and gait.

## 2020-04-08 NOTE — CONSULTS
"[facility]  Initial Inpatient Consult Note    Referring Provider: Dr. FAMILIA Cruz    Reason for Consultation: Abnormal CT abd showing \"infiltrative process involving the rectum\"    Subjective     History of present illness:    44 y.o. female [employer] employee and smoker who was admitted 4/1/2020 with cough and shortness of breath, exhaustion and lightheadedness.  The patient has ruled out for Covid 19 with 2 different tests that have been negative.  Patient does feel better.  She does smoke 1 and half packs per day.  She also drinks 2 to 3 glasses of alcohol per week.  She has 6 children although she lives alone.  3 of her children live with her ex-.  Patient states she is better though she does walk with a walker.  She has no vomiting now.  She does complain of some mid abdominal discomfort.  This is worse when she is gagging and decreased when she sleeps.  Her weight is increasing.  She has no diarrhea.  She is constipated.  No rectal bleeding or melena.  Patient is never had a colonoscopy and never had an EGD.  She does have heartburn.  No dysphasia.  She did have a CT of the abdomen and pelvis on 4/7/2020 that showed:    IMPRESSION:     1. Mild groundglass opacities in the right more than left upper lungs  show interval worsening, clinical correlation and continued follow-up  recommended.  2. Presacral edema/stranding and fluid may represent inflammatory  process or potentially infiltrative process involving the rectum,  suggest clinical correlation and direct visualization as indicated.  3. Stranding around the right adnexa may be evidence of inflammatory  process involving the right adnexa, and an indeterminate rounded density  is apparent at the right adnexa. Follow-up evaluation with ultrasound is  advised. No hydronephrosis. Suspected cholelithiasis. Hepatic steatosis,  hepatomegaly.  4. Skin thickening at the anterior lower abdomen with subcutaneous  stranding density may reflect " cellulitis, correlate with clinical exam.      This report was finalized on 2020 4:14 PM by Dr. Rogerio Campo M.D.     Past Medical History:  Past Medical History:   Diagnosis Date   • Aneurysm (CMS/HCC)    • Arthritis    • Carpal tunnel syndrome    • Chest pain    • Diabetes mellitus (CMS/HCC)    • Dysmetabolic syndrome X    • Gestational diabetes mellitus    • Hypothyroidism    • Metabolic disorder    • Nonalcoholic steatohepatitis    • Obesity    • Palpitations    • Sleep apnea    • Spinal headache    • Type 2 diabetes mellitus (CMS/HCC)    • Vitamin D deficiency      Past Surgical History:  Past Surgical History:   Procedure Laterality Date   • CARPAL TUNNEL RELEASE     • CEREBRAL ANGIOGRAM N/A 3/28/2019    Procedure: DIAGNOSTIC CEREBRAL ANGIOGRAM;  Surgeon: Alexx Barrow MD;  Location: [facility] ;  Service: Neurosurgery   • CEREBRAL ANGIOGRAM N/A 10/2/2019    Procedure: CEREBRAL ANGIOGRAM;  Surgeon: Santosh Garza MD;  Location: [facility] ;  Service: Interventional Radiology   •  SECTION      x6   • DILATATION AND CURETTAGE      x 2   • EMBOLIZATION CEREBRAL N/A 3/29/2019    Procedure: Cererbal angiogram and embolization of cerebral aneurysm;  Surgeon: Alexx Barrow MD;  Location: [facility] ;  Service: Neurosurgery   • MYRINGOTOMY     • TONSILLECTOMY AND ADENOIDECTOMY        Social History:   Social History     Tobacco Use   • Smoking status: Current Every Day Smoker     Packs/day: 1.50     Types: Cigarettes   • Smokeless tobacco: Never Used   • Tobacco comment: x 10 yrs   Substance Use Topics   • Alcohol use: Yes     Alcohol/week: 1.0 - 2.0 standard drinks     Types: 1 - 2 Shots of liquor per week     Comment: 1-2/week      Family History:  Family History   Problem Relation Age of Onset   • Hypertension Mother    • Alcohol abuse Father    • Heart attack Father         acute MI   • Stroke Father         CVA   • Diabetes Maternal Grandmother    •  Diabetes Paternal Grandmother        Home Meds:  Medications Prior to Admission   Medication Sig Dispense Refill Last Dose   • albuterol sulfate  (90 Base) MCG/ACT inhaler Inhale 2 puffs Every 4 (Four) Hours As Needed for Wheezing. 1 inhaler 0 3/31/2020 at Unknown time   • ibuprofen (ADVIL,MOTRIN) 400 MG tablet Take 400 mg by mouth Every 6 (Six) Hours As Needed for Mild Pain .   4/1/2020 at Unknown time   • SYNTHROID 200 MCG tablet Take 1 tablet by mouth Daily. 30 tablet 11 3/31/2020 at Unknown time     Current Meds:     budesonide-formoterol 2 puff Inhalation BID - RT   [START ON 4/9/2020] fluconazole 100 mg Oral Q24H   furosemide 40 mg Intravenous Q6H   insulin lispro 0-7 Units Subcutaneous 4x Daily With Meals & Nightly   levothyroxine 200 mcg Oral Daily   midodrine 10 mg Oral TID AC   pantoprazole 40 mg Oral BID AC   sodium bicarbonate 650 mg Oral TID   sodium chloride 10 mL Intravenous Q12H   sucralfate 1 g Oral 4x Daily AC & at Bedtime     Allergies:  Allergies   Allergen Reactions   • No Known Drug Allergy      Review of Systems  The following systems were reviewed and negative;  constitution, ENT, genitourinary, breast, hematologic / lymphatic, musculoskeletal and neurological     Objective     Vital Signs  Temp:  [97.4 °F (36.3 °C)-98.9 °F (37.2 °C)] 98.5 °F (36.9 °C)  Heart Rate:  [] 102  Resp:  [18] 18  BP: ()/(42-85) 94/56  Physical Exam:  General Appearance:    Alert, cooperative, in no acute distress   Head:    Normocephalic, without obvious abnormality, atraumatic   Eyes:            Lids and lashes normal, conjunctivae and sclerae normal, no   icterus   Throat:   No oral lesions, no thrush, oral mucosa moist   Neck:   No adenopathy, supple, trachea midline, no thyromegaly, no   carotid bruit, no JVD   Lungs:     Clear to auscultation,respirations regular, even and                   unlabored    Heart:    Regular rhythm and normal rate, normal S1 and S2, no            murmur, no  gallop, no rub, no click   Chest Wall:    No abnormalities observed   Abdomen:     Normal bowel sounds, no masses, no organomegaly, soft        non-tender, non-distended, no guarding, no rebound                 tenderness   Rectal:     Deferred   Extremities:   no edema, no cyanosis, no redness   Skin:   No bleeding, bruising or rash   Lymph nodes:   No palpable adenopathy   Psychiatric:  Judgement and insight: normal   Orientation to person place and time: normal   Mood and affect: normal   Results Review:   I reviewed the patient's new clinical results.    Results from last 7 days   Lab Units 04/08/20 0623 04/07/20  0545 04/06/20  0551   WBC 10*3/mm3 7.93 7.51 7.82   HEMOGLOBIN g/dL 13.2 13.4 14.4   HEMATOCRIT % 37.0 37.8 41.1   PLATELETS 10*3/mm3 215 202 195     Results from last 7 days   Lab Units 04/08/20 0623 04/07/20  0545 04/06/20  0551  04/04/20  0549   SODIUM mmol/L 131* 130* 131*   < > 133*   POTASSIUM mmol/L 3.6 3.9 3.9   < > 3.3*   CHLORIDE mmol/L 93* 94* 94*   < > 90*   CO2 mmol/L 23.3 24.6 23.4   < > 18.8*   BUN mg/dL 10 12 14   < > 14   CREATININE mg/dL 0.56* 0.54* 0.55*   < > 0.72   CALCIUM mg/dL 8.0* 7.6* 8.1*   < > 8.3*   BILIRUBIN mg/dL 0.9 1.1  --   --  1.6*   ALK PHOS U/L 177* 169*  --   --  132*   ALT (SGPT) U/L 58* 64*  --   --  72*   AST (SGOT) U/L 129* 161*  --   --  193*   GLUCOSE mg/dL 178* 172* 147*   < > 151*    < > = values in this interval not displayed.         No results found for: LIPASE    Radiology:  CT Abdomen Pelvis Stone Protocol   Final Result           1. Mild groundglass opacities in the right more than left upper lungs   show interval worsening, clinical correlation and continued follow-up   recommended.   2. Presacral edema/stranding and fluid may represent inflammatory   process or potentially infiltrative process involving the rectum,   suggest clinical correlation and direct visualization as indicated.   3. Stranding around the right adnexa may be evidence of  inflammatory   process involving the right adnexa, and an indeterminate rounded density   is apparent at the right adnexa. Follow-up evaluation with ultrasound is   advised. No hydronephrosis. Suspected cholelithiasis. Hepatic steatosis,   hepatomegaly.   4. Skin thickening at the anterior lower abdomen with subcutaneous   stranding density may reflect cellulitis, correlate with clinical exam.        This report was finalized on 4/7/2020 4:14 PM by Dr. Rogerio Campo M.D.          CT Chest Without Contrast   Final Result           1. Mild groundglass opacities in the right more than left upper lungs   show interval worsening, clinical correlation and continued follow-up   recommended.   2. Presacral edema/stranding and fluid may represent inflammatory   process or potentially infiltrative process involving the rectum,   suggest clinical correlation and direct visualization as indicated.   3. Stranding around the right adnexa may be evidence of inflammatory   process involving the right adnexa, and an indeterminate rounded density   is apparent at the right adnexa. Follow-up evaluation with ultrasound is   advised. No hydronephrosis. Suspected cholelithiasis. Hepatic steatosis,   hepatomegaly.   4. Skin thickening at the anterior lower abdomen with subcutaneous   stranding density may reflect cellulitis, correlate with clinical exam.        This report was finalized on 4/7/2020 4:14 PM by Dr. Rogerio Campo M.D.          CT Chest Without Contrast   Final Result       There is no evidence for an acute infiltrate within the left lung base.   However, there are very subtle small patchy areas of groundglass opacity   within the right lung apex as best seen on images #15 and 19 that were   not evident on the prior chest CT of 11/10/2019. These findings are   entirely nonspecific in nature and could represent an age-indeterminate   pneumonitis. If the patient's symptoms do not improve or worsen, I would    recommend a follow-up CT scan of the chest without use of IV contrast   during this admission. However, if the patient's symptoms do improve, I   would still recommend a follow-up CT scan of the chest but, in this   particular case, at interval of approximately 2-3 weeks. These findings   and recommendations were directly discussed with Dr. Johana Gamez on the   morning of 04/06/2020 at approximately 7:45 AM.       Radiation dose reduction techniques were utilized, including automated   exposure control and exposure modulation based on body size.       This report was finalized on 4/6/2020 7:56 AM by Dr. Joe Mcnamara M.D.          XR Chest 1 View   Final Result      XR Chest AP   Final Result      XR Chest 1 View    (Results Pending)   US Pelvis Complete    (Results Pending)       Assessment/Plan   Assessment:   1.  Abnormal CAT scan of the abdomen showing a possible infiltrative process involving the rectum?  The patient has never had a colonoscopy.  I feel that she is too ill to have a colonoscopy at this moment.  2.  This patient has some type of respiratory illness that does not appear to be coronavirus.  3.  She has elevated liver function tests with steatosis of the liver on CAT scan of the abdomen.  Her last liver function tests were normal in 6 of 2019 and seem to be intermittently elevated.  4.  The patient is obese.    Plan:   1.  I will check a hepatitis profile as well as an ultrasound of the gallbladder.  2.  I would recommend that she stop smoking.  3.  She needs to lose weight.  4.  She should eventually have a colonoscopy but I feel that this pulmonary illness needs to be resolved before a colonoscopy is done.  The colonoscopy could be done as an outpatient.    I discussed the patients findings and my recommendations with patient and primary care team.         Edson Sharma M.D.  [facility]  [address] [place] [address]  Office: [phone]

## 2020-04-08 NOTE — THERAPY EVALUATION
Patient Name: Oralia Sánchez  : 1975    MRN: [id number]                              Today's Date: 2020       Admit Date: 2020    Visit Dx:     ICD-10-CM ICD-9-CM   1. Dyspnea, unspecified type R06.00 786.09   2. Metabolic acidosis E87.2 276.2   3. Cough R05 786.2   4. Fatigue, unspecified type R53.83 780.79   5. History of COPD Z87.09 V12.69   6. Hypomagnesemia E83.42 275.2   7. Cerebral aneurysm rupture (CMS/HCC) I60.7 430     Patient Active Problem List   Diagnosis   • Vitamin D deficiency   • Awareness of heartbeats   • Adiposity   • YOUNG (nonalcoholic steatohepatitis)   • Hypothyroid   • Diabetes mellitus arising in pregnancy   • Diabetes (CMS/HCC)   • Metabolic syndrome   • Chest pain   • Primary thunderclap headache   • Elevated LFTs   • Tobacco abuse   • Rheumatoid arthritis (CMS/HCC)   • Type 2 diabetes mellitus (CMS/HCC)   • Morbid obesity (CMS/HCC)   • UTI (urinary tract infection), bacterial   • Cerebral aneurysm   • Dyspnea   • Cough   • Hypomagnesemia   • Metabolic acidosis   • Fatigue   • History of COPD     Past Medical History:   Diagnosis Date   • Aneurysm (CMS/HCC)    • Arthritis    • Carpal tunnel syndrome    • Chest pain    • Diabetes mellitus (CMS/HCC)    • Dysmetabolic syndrome X    • Gestational diabetes mellitus    • Hypothyroidism    • Metabolic disorder    • Nonalcoholic steatohepatitis    • Obesity    • Palpitations    • Sleep apnea    • Spinal headache    • Type 2 diabetes mellitus (CMS/HCC)    • Vitamin D deficiency      Past Surgical History:   Procedure Laterality Date   • CARPAL TUNNEL RELEASE     • CEREBRAL ANGIOGRAM N/A 3/28/2019    Procedure: DIAGNOSTIC CEREBRAL ANGIOGRAM;  Surgeon: Alexx Barrow MD;  Location: [facility] ;  Service: Neurosurgery   • CEREBRAL ANGIOGRAM N/A 10/2/2019    Procedure: CEREBRAL ANGIOGRAM;  Surgeon: Santosh Garza MD;  Location: [facility] ;  Service: Interventional Radiology   •  SECTION      x6    • DILATATION AND CURETTAGE      x 2   • EMBOLIZATION CEREBRAL N/A 3/29/2019    Procedure: Cererbal angiogram and embolization of cerebral aneurysm;  Surgeon: Alexx Barrow MD;  Location: [facility] 18/19;  Service: Neurosurgery   • MYRINGOTOMY     • TONSILLECTOMY AND ADENOIDECTOMY       General Information     Row Name 04/08/20 1551          PT Evaluation Time/Intention    Document Type  evaluation  -     Mode of Treatment  individual therapy;physical therapy  -     Row Name 04/08/20 1551          General Information    Prior Level of Function  independent:;gait;transfer;bed mobility no AD  -     Existing Precautions/Restrictions  fall  -     Barriers to Rehab  medically complex  -     Row Name 04/08/20 1551          Relationship/Environment    Lives With  child(nicci), dependent  -     Row Name 04/08/20 1551          Resource/Environmental Concerns    Current Living Arrangements  home/apartment/condo  -     Row Name 04/08/20 1551          Cognitive Assessment/Intervention- PT/OT    Orientation Status (Cognition)  oriented x 3  -     Row Name 04/08/20 1551          Safety Issues, Functional Mobility    Impairments Affecting Function (Mobility)  balance;endurance/activity tolerance;pain;strength  -       User Key  (r) = Recorded By, (t) = Taken By, (c) = Cosigned By    Initials Name Provider Type     [place] Ray, PT Physical Therapist        Mobility     Row Name 04/08/20 1552          Bed Mobility Assessment/Treatment    Bed Mobility Assessment/Treatment  supine-sit;sit-supine  -     Supine-Sit [place] (Bed Mobility)  verbal cues;nonverbal cues (demo/gesture);contact guard  -     Sit-Supine [place] (Bed Mobility)  verbal cues;nonverbal cues (demo/gesture);minimum assist (75% patient effort)  -     Row Name 04/08/20 1552          Sit-Stand Transfer    Sit-Stand [place] (Transfers)  verbal cues;nonverbal cues (demo/gesture);minimum assist (75% patient effort)   -     Assistive Device (Sit-Stand Transfers)  walker, front-wheeled  -CH     Row Name 04/08/20 1552          Gait/Stairs Assessment/Training    [place] Level (Gait)  verbal cues;nonverbal cues (demo/gesture);minimum assist (75% patient effort)  -     Assistive Device (Gait)  walker, front-wheeled  -     Distance in Feet (Gait)  7  -     Deviations/Abnormal Patterns (Gait)  quang decreased;gait speed decreased;stride length decreased;ataxic  -     Bilateral Gait Deviations  forward flexed posture  -     Comment (Gait/Stairs)  B LE very shakey, pt having some difficulty with weight shifting and feels as though knees will buckle  -       User Key  (r) = Recorded By, (t) = Taken By, (c) = Cosigned By    Initials Name Provider Type    [place] Britton, PT Physical Therapist        Obj/Interventions     Row Name 04/08/20 1558          General ROM    GENERAL ROM COMMENTS  B LE AROM WFL for age  -     Row Name 04/08/20 1553          MMT (Manual Muscle Testing)    General MMT Comments  B LE strength is grossly >/=3/5, generalized weakness noted with functional mobility  -     Row Name 04/08/20 1553          Therapeutic Exercise    Comment (Therapeutic Exercise)  10 reps B LE LAQ and AP  -     Row Name 04/08/20 1553          Static Standing Balance    Level of [place] (Supported Standing, Static Balance)  minimal assist, 75% patient effort  -     Assistive Device Utilized (Supported Standing, Static Balance)  walker, rolling  -     Row Name 04/08/20 1553          Dynamic Standing Balance    Level of [place], Reaches Outside Midline (Standing, Dynamic Balance)  minimal assist, 75% patient effort  -     Assistive Device Utilized (Supported Standing, Dynamic Balance)  walker, rolling  -       User Key  (r) = Recorded By, (t) = Taken By, (c) = Cosigned By    Initials Name Provider Type    Florida Britton PT Physical Therapist        Goals/Plan     Row Name 04/08/20 3839           Bed Mobility Goal 1 (PT)    Activity/Assistive Device (Bed Mobility Goal 1, PT)  bed mobility activities, all  -CH     [place] Level/Cues Needed (Bed Mobility Goal 1, PT)  supervision required  -CH     Time Frame (Bed Mobility Goal 1, PT)  1 week  -     Row Name 04/08/20 1559          Transfer Goal 1 (PT)    Activity/Assistive Device (Transfer Goal 1, PT)  transfers, all;walker, rolling  -CH     [place] Level/Cues Needed (Transfer Goal 1, PT)  supervision required  -CH     Time Frame (Transfer Goal 1, PT)  1 week  -     Row Name 04/08/20 1559          Gait Training Goal 1 (PT)    Activity/Assistive Device (Gait Training Goal 1, PT)  gait (walking locomotion);walker, rolling  -CH     [place] Level (Gait Training Goal 1, PT)  supervision required  -CH     Distance (Gait Goal 1, PT)  100  -CH     Time Frame (Gait Training Goal 1, PT)  1 week  -       User Key  (r) = Recorded By, (t) = Taken By, (c) = Cosigned By    Initials Name Provider Type     [place] Ray, PT Physical Therapist        Clinical Impression     Row Name 04/08/20 1558          Pain Assessment    Additional Documentation  Pain Scale: FACES Pre/Post-Treatment (Group)  -[facility] Name 04/08/20 9424          Pain Scale: Numbers Pre/Post-Treatment    Pain Location - Side  Bilateral  -     Pain Location - Orientation  lower  -     Pain Location  extremity and abdomin  -     Pain Intervention(s)  Repositioned  -     Row Name 04/08/20 6339          Pain Scale: FACES Pre/Post-Treatment    Pain: FACES Scale, Pretreatment  4-->hurts little more  -     Pain: FACES Scale, Post-Treatment  6-->hurts even more  -     Row Name 04/08/20 6242          Plan of Care Review    Plan of Care Reviewed With  patient  -     Outcome Summary  Pt is a 43 yo  F who was admitted with SOA and cough. Pt presents to PT with impaired functional mobility and gait secondary to generalized weakness, imparied balance, and decreased activity  tolerance. Pt may benefit from skilled PT to address strength, mobility, and gait.  -     Row Name 04/08/20 1556          Physical Therapy Clinical Impression    Patient/Family Goals Statement (PT Clinical Impression)  to return to [facility]  -     Criteria for Skilled Interventions Met (PT Clinical Impression)  treatment indicated  -     Rehab Potential (PT Clinical Summary)  good, to achieve stated therapy goals  -     Row Name 04/08/20 1556          Positioning and Restraints    Pre-Treatment Position  in bed  -     Post Treatment Position  bed  -CH     In Bed  supine;call light within reach;encouraged to call for assist  -       User Key  (r) = Recorded By, (t) = Taken By, (c) = Cosigned By    Initials Name Provider Type     [place] Ray, PT Physical Therapist        Outcome Measures     Row Name 04/08/20 1559          How much help from another person do you currently need...    Turning from your back to your side while in flat bed without using bedrails?  3  -CH     Moving from lying on back to sitting on the side of a flat bed without bedrails?  3  -CH     Moving to and from a bed to a chair (including a wheelchair)?  3  -CH     Standing up from a chair using your arms (e.g., wheelchair, bedside chair)?  3  -CH     Climbing 3-5 steps with a railing?  1  -CH     To walk in hospital room?  2  -CH     AM-PAC 6 Clicks Score (PT)  15  -     Row Name 04/08/20 1559          Functional Assessment    Outcome Measure Options  AM-PAC 6 Clicks Basic Mobility (PT)  -       User Key  (r) = Recorded By, (t) = Taken By, (c) = Cosigned By    Initials Name Provider Type    [place] Britton, PT Physical Therapist          PT Recommendation and Plan  Planned Therapy Interventions (PT Eval): balance training, bed mobility training, gait training, home exercise program, patient/family education, strengthening, transfer training  Outcome Summary/Treatment Plan (PT)  Anticipated Discharge Disposition (PT):  home with home health, skilled nursing facility(pending progress)  Plan of Care Reviewed With: patient  Outcome Summary: Pt is a 43 yo  F who was admitted with SOA and cough. Pt presents to PT with impaired functional mobility and gait secondary to generalized weakness, imparied balance, and decreased activity tolerance. Pt may benefit from skilled PT to address strength, mobility, and gait.     Time Calculation:   PT Charges     Row Name 04/08/20 1600             Time Calculation    Start Time  1432  -      Stop Time  1451  -      Time Calculation (min)  19 min  -      PT Received On  04/08/20  -      PT - Next Appointment  04/09/20  -      PT Goal Re-Cert Due Date  04/15/20  -         Time Calculation- PT    Total Timed Code Minutes- PT  10 minute(s)  -        User Key  (r) = Recorded By, (t) = Taken By, (c) = Cosigned By    Initials Name Provider Type     [place] Ray, PT Physical Therapist        Therapy Charges for Today     Code Description Service Date Service Provider Modifiers Qty    [id number]  PT EVAL MOD COMPLEXITY 2 4/8/2020 Florida Ray, PT GP 1    [id number]  PT THER PROC EA 15 MIN 4/8/2020 Florida Ray, PT GP 1          PT G-Codes  Outcome Measure Options: AM-PAC 6 Clicks Basic Mobility (PT)  AM-PAC 6 Clicks Score (PT): 15    Florida Ray PT  4/8/2020

## 2020-04-08 NOTE — PROGRESS NOTES
4/7 COVID-19 (in house) is negative    I discussed the case with Dr. Shawn Lopez.  Both of us agree that the patient's clinical presentation is not consistent with COVID-19 infection.     Given the abnormalities see on the CAT scan of the abdomen pelvis I am concerned about a possible malignancy.  Agree with GI consultation.    Okay to discontinue COVID isolation

## 2020-04-09 ENCOUNTER — APPOINTMENT (OUTPATIENT)
Dept: ULTRASOUND IMAGING | Facility: HOSPITAL | Age: 45
End: 2020-04-09

## 2020-04-09 LAB
ALBUMIN SERPL-MCNC: 2.6 G/DL (ref 3.5–5.2)
ALBUMIN/GLOB SERPL: 1 G/DL
ALP SERPL-CCNC: 145 U/L (ref 39–117)
ALT SERPL W P-5'-P-CCNC: 51 U/L (ref 1–33)
ANION GAP SERPL CALCULATED.3IONS-SCNC: 13.6 MMOL/L (ref 5–15)
AST SERPL-CCNC: 98 U/L (ref 1–32)
BASOPHILS # BLD AUTO: 0.03 10*3/MM3 (ref 0–0.2)
BASOPHILS NFR BLD AUTO: 0.4 % (ref 0–1.5)
BILIRUB SERPL-MCNC: 1.3 MG/DL (ref 0.2–1.2)
BUN BLD-MCNC: 7 MG/DL (ref 6–20)
BUN/CREAT SERPL: 14.9 (ref 7–25)
CALCIUM SPEC-SCNC: 8.2 MG/DL (ref 8.6–10.5)
CHLORIDE SERPL-SCNC: 92 MMOL/L (ref 98–107)
CO2 SERPL-SCNC: 29.4 MMOL/L (ref 22–29)
CREAT BLD-MCNC: 0.47 MG/DL (ref 0.57–1)
DEPRECATED RDW RBC AUTO: 50.6 FL (ref 37–54)
EOSINOPHIL # BLD AUTO: 0.05 10*3/MM3 (ref 0–0.4)
EOSINOPHIL NFR BLD AUTO: 0.6 % (ref 0.3–6.2)
ERYTHROCYTE [DISTWIDTH] IN BLOOD BY AUTOMATED COUNT: 13.8 % (ref 12.3–15.4)
FERRITIN SERPL-MCNC: 1509 NG/ML (ref 13–150)
GFR SERPL CREATININE-BSD FRML MDRD: 143 ML/MIN/1.73
GLOBULIN UR ELPH-MCNC: 2.6 GM/DL
GLUCOSE BLD-MCNC: 115 MG/DL (ref 65–99)
GLUCOSE BLDC GLUCOMTR-MCNC: 124 MG/DL (ref 70–130)
GLUCOSE BLDC GLUCOMTR-MCNC: 130 MG/DL (ref 70–130)
GLUCOSE BLDC GLUCOMTR-MCNC: 139 MG/DL (ref 70–130)
GLUCOSE BLDC GLUCOMTR-MCNC: 157 MG/DL (ref 70–130)
HAV IGM SERPL QL IA: NORMAL
HBV CORE IGM SERPL QL IA: NORMAL
HBV SURFACE AG SERPL QL IA: NORMAL
HCT VFR BLD AUTO: 35.2 % (ref 34–46.6)
HCV AB SER DONR QL: NORMAL
HGB BLD-MCNC: 12.6 G/DL (ref 12–15.9)
HOLD SPECIMEN: NORMAL
IMM GRANULOCYTES # BLD AUTO: 0.04 10*3/MM3 (ref 0–0.05)
IMM GRANULOCYTES NFR BLD AUTO: 0.5 % (ref 0–0.5)
LDH SERPL-CCNC: 284 U/L (ref 135–214)
LYMPHOCYTES # BLD AUTO: 1.34 10*3/MM3 (ref 0.7–3.1)
LYMPHOCYTES NFR BLD AUTO: 15.7 % (ref 19.6–45.3)
MCH RBC QN AUTO: 36.2 PG (ref 26.6–33)
MCHC RBC AUTO-ENTMCNC: 35.8 G/DL (ref 31.5–35.7)
MCV RBC AUTO: 101.1 FL (ref 79–97)
MONOCYTES # BLD AUTO: 0.7 10*3/MM3 (ref 0.1–0.9)
MONOCYTES NFR BLD AUTO: 8.2 % (ref 5–12)
NEUTROPHILS # BLD AUTO: 6.4 10*3/MM3 (ref 1.7–7)
NEUTROPHILS NFR BLD AUTO: 74.6 % (ref 42.7–76)
NRBC BLD AUTO-RTO: 0.8 /100 WBC (ref 0–0.2)
PLATELET # BLD AUTO: 235 10*3/MM3 (ref 140–450)
PMV BLD AUTO: 9.5 FL (ref 6–12)
POTASSIUM BLD-SCNC: 3.5 MMOL/L (ref 3.5–5.2)
PROT SERPL-MCNC: 5.2 G/DL (ref 6–8.5)
RBC # BLD AUTO: 3.48 10*6/MM3 (ref 3.77–5.28)
SODIUM BLD-SCNC: 135 MMOL/L (ref 136–145)
WBC NRBC COR # BLD: 8.56 10*3/MM3 (ref 3.4–10.8)

## 2020-04-09 PROCEDURE — 63710000001 DIPHENHYDRAMINE PER 50 MG: Performed by: HOSPITALIST

## 2020-04-09 PROCEDURE — 25010000002 FUROSEMIDE PER 20 MG: Performed by: INTERNAL MEDICINE

## 2020-04-09 PROCEDURE — 86160 COMPLEMENT ANTIGEN: CPT | Performed by: INTERNAL MEDICINE

## 2020-04-09 PROCEDURE — 86225 DNA ANTIBODY NATIVE: CPT | Performed by: INTERNAL MEDICINE

## 2020-04-09 PROCEDURE — 86235 NUCLEAR ANTIGEN ANTIBODY: CPT | Performed by: INTERNAL MEDICINE

## 2020-04-09 PROCEDURE — 63710000001 INSULIN LISPRO (HUMAN) PER 5 UNITS: Performed by: HOSPITALIST

## 2020-04-09 PROCEDURE — 83520 IMMUNOASSAY QUANT NOS NONAB: CPT | Performed by: INTERNAL MEDICINE

## 2020-04-09 PROCEDURE — 80074 ACUTE HEPATITIS PANEL: CPT | Performed by: INTERNAL MEDICINE

## 2020-04-09 PROCEDURE — 85025 COMPLETE CBC W/AUTO DIFF WBC: CPT | Performed by: HOSPITALIST

## 2020-04-09 PROCEDURE — 83615 LACTATE (LD) (LDH) ENZYME: CPT | Performed by: HOSPITALIST

## 2020-04-09 PROCEDURE — 82962 GLUCOSE BLOOD TEST: CPT

## 2020-04-09 PROCEDURE — 86256 FLUORESCENT ANTIBODY TITER: CPT | Performed by: INTERNAL MEDICINE

## 2020-04-09 PROCEDURE — 83516 IMMUNOASSAY NONANTIBODY: CPT | Performed by: INTERNAL MEDICINE

## 2020-04-09 PROCEDURE — 94799 UNLISTED PULMONARY SVC/PX: CPT

## 2020-04-09 PROCEDURE — 76856 US EXAM PELVIC COMPLETE: CPT

## 2020-04-09 PROCEDURE — 82728 ASSAY OF FERRITIN: CPT | Performed by: HOSPITALIST

## 2020-04-09 PROCEDURE — 76830 TRANSVAGINAL US NON-OB: CPT

## 2020-04-09 PROCEDURE — 76705 ECHO EXAM OF ABDOMEN: CPT

## 2020-04-09 PROCEDURE — 80053 COMPREHEN METABOLIC PANEL: CPT | Performed by: HOSPITALIST

## 2020-04-09 PROCEDURE — 99232 SBSQ HOSP IP/OBS MODERATE 35: CPT | Performed by: INTERNAL MEDICINE

## 2020-04-09 RX ORDER — FUROSEMIDE 10 MG/ML
40 INJECTION INTRAMUSCULAR; INTRAVENOUS EVERY 6 HOURS
Status: COMPLETED | OUTPATIENT
Start: 2020-04-09 | End: 2020-04-09

## 2020-04-09 RX ADMIN — SODIUM BICARBONATE 650 MG: 650 TABLET ORAL at 20:48

## 2020-04-09 RX ADMIN — MIDODRINE HYDROCHLORIDE 10 MG: 5 TABLET ORAL at 16:51

## 2020-04-09 RX ADMIN — BUDESONIDE AND FORMOTEROL FUMARATE DIHYDRATE 2 PUFF: 160; 4.5 AEROSOL RESPIRATORY (INHALATION) at 08:14

## 2020-04-09 RX ADMIN — MIDODRINE HYDROCHLORIDE 10 MG: 5 TABLET ORAL at 11:19

## 2020-04-09 RX ADMIN — PANTOPRAZOLE SODIUM 40 MG: 40 TABLET, DELAYED RELEASE ORAL at 16:52

## 2020-04-09 RX ADMIN — SUCRALFATE 1 G: 1 SUSPENSION ORAL at 11:19

## 2020-04-09 RX ADMIN — LEVOTHYROXINE SODIUM 200 MCG: 100 TABLET ORAL at 11:20

## 2020-04-09 RX ADMIN — FLUCONAZOLE 100 MG: 100 TABLET ORAL at 11:20

## 2020-04-09 RX ADMIN — DIPHENHYDRAMINE HYDROCHLORIDE 25 MG: 25 CAPSULE ORAL at 15:03

## 2020-04-09 RX ADMIN — SUCRALFATE 1 G: 1 SUSPENSION ORAL at 16:51

## 2020-04-09 RX ADMIN — FUROSEMIDE 40 MG: 10 INJECTION, SOLUTION INTRAMUSCULAR; INTRAVENOUS at 16:52

## 2020-04-09 RX ADMIN — SODIUM BICARBONATE 650 MG: 650 TABLET ORAL at 16:51

## 2020-04-09 RX ADMIN — INSULIN LISPRO 2 UNITS: 100 INJECTION, SOLUTION INTRAVENOUS; SUBCUTANEOUS at 17:00

## 2020-04-09 RX ADMIN — SUCRALFATE 1 G: 1 SUSPENSION ORAL at 20:48

## 2020-04-09 RX ADMIN — BUDESONIDE AND FORMOTEROL FUMARATE DIHYDRATE 2 PUFF: 160; 4.5 AEROSOL RESPIRATORY (INHALATION) at 21:38

## 2020-04-09 RX ADMIN — FUROSEMIDE 40 MG: 10 INJECTION, SOLUTION INTRAMUSCULAR; INTRAVENOUS at 11:20

## 2020-04-09 NOTE — PROGRESS NOTES
Adult Nutrition  Assessment/PES    Patient Name:  Oralia Sánchez  YOB: 1975  MRN: [id number]  Admit Date:  4/1/2020    Assessment Date:  4/9/2020   Nutrition follow up.  Reason for Assessment     Row Name 04/09/20 1404          Reason for Assessment    Reason For Assessment  follow-up protocol         Nutrition/Diet History     Row Name 04/09/20 1404          Nutrition/Diet History    Typical Food/Fluid Intake  EMR reviewed. spoke with RN via phone regarding patient's care. was NPO this morning. Lunch 100% consumed. +fatigue  RD to continue to follow/monitor           Labs/Tests/Procedures/Meds     Row Name 04/09/20 1404          Labs/Procedures/Meds    Lab Results Reviewed  reviewed, pertinent     Lab Results Comments  Na, AST, ALT        Diagnostic Tests/Procedures    Diagnostic Test/Procedure Reviewed  reviewed, pertinent        Medications    Pertinent Medications Reviewed  reviewed, pertinent             Nutrition Prescription Ordered     Row Name 04/09/20 1405          Nutrition Prescription PO    Common Modifiers  Consistent Carbohydrate         Evaluation of Received Nutrient/Fluid Intake     Row Name 04/09/20 1405          PO Evaluation    Number of Meals  1     % PO Intake  100               Problem/Interventions:          Intervention Goal     Row Name 04/09/20 1402          Intervention Goal    General  Maintain nutrition;Meet nutritional needs for age/condition     PO  Tolerate PO;Maintain intake     Weight  Appropriate weight loss         Nutrition Intervention     Row Name 04/09/20 1406          Nutrition Intervention    RD/Tech Action  Follow Tx progress;Care plan reviewd           Education/Evaluation     Row Name 04/09/20 1407          Education    Education  Will Instruct as appropriate        Monitor/Evaluation    Monitor  Per protocol;I&O;PO intake;Pertinent labs;Weight;Skin status;Symptoms           Electronically signed by:  Cristal Troncoso RD  04/09/20 14:05

## 2020-04-09 NOTE — PLAN OF CARE
Problem: Patient Care Overview  Goal: Plan of Care Review  Outcome: Ongoing (interventions implemented as appropriate)  Flowsheets (Taken 4/9/2020 0606)  Outcome Summary: VSS, no CPAP overnight. No c/o pain overnight. Rested through entire shift, some confusion on waking for assessments and med passes, reorients easily. Will continue to monitor  Goal: Individualization and Mutuality  Outcome: Ongoing (interventions implemented as appropriate)  Goal: Discharge Needs Assessment  Outcome: Ongoing (interventions implemented as appropriate)  Goal: Interprofessional Rounds/Family Conf  Outcome: Ongoing (interventions implemented as appropriate)     Problem: Fall Risk (Adult)  Goal: Absence of Fall  Outcome: Ongoing (interventions implemented as appropriate)     Problem: Skin Injury Risk (Adult)  Goal: Skin Health and Integrity  Outcome: Ongoing (interventions implemented as appropriate)     Problem: Nausea/Vomiting (Adult)  Goal: Symptom Relief  Outcome: Ongoing (interventions implemented as appropriate)  Goal: Adequate Hydration  Outcome: Ongoing (interventions implemented as appropriate)     Problem: Infection, Risk/Actual (Adult)  Goal: Infection Prevention/Resolution  Outcome: Ongoing (interventions implemented as appropriate)     Problem: Diabetes, Type 1 (Adult)  Goal: Signs and Symptoms of Listed Potential Problems Will be Absent, Minimized or Managed (Diabetes, Type 1)  Outcome: Ongoing (interventions implemented as appropriate)

## 2020-04-09 NOTE — PROGRESS NOTES
[facility]      Mar/chart reviewed  Follow up cough  Still with cough, dry  Says she feels a lot better after lasix  She doesn't cough during our interview    Vital Sign Min/Max for last 24 hours  Temp  Min: 98.3 °F (36.8 °C)  Max: 98.6 °F (37 °C)   BP  Min: 94/48  Max: 121/63   Pulse  Min: 95  Max: 102   Resp  Min: 18  Max: 18   SpO2  Min: 93 %  Max: 97 %   Flow (L/min)  Min: 2  Max: 2   Weight  Min: 119 kg (262 lb 11.2 oz)  Max: 122 kg (269 lb 4.8 oz)   I?/3100    Nad, axox3,   perrl, eomi, no icterus,  mmm, no jvd, trachea midline, neck supple,  chest cta bilaterally, no crackles, no wheezes,   rrr,   soft, nt, nd +bs,  no c/c/ 1+edema  Skin warm, dry no rashes    Labs: 4/9: reviewed:  Na 135  Bicarb 29  Alt 51  ast 98  Alk phos 145  tbili 1.3  Ferritin 1509  ldh 284  Wbc 8.5  hgb 12.6  plts 235  Cardiac echo: unremarkable    A/P:  1. Abnormal ct chest --   Dr. Sharma thinks her primary problem is respiratory,  I will discussed bronch with patient --defer for now.   2. Lactic acidosis -- unusual as her bicarb is 23.   Her lactic has been much higher and there does not appear to be any clear reason for it.  I would consider stopping checking the lactic acid.  3. Elevated liver enzymes -- she does have some cholecylithiasis on ct --ultrasound has been ordered  4. Hyponatremia  5. Cough -- continue symbicort and ppi   6. Abnormal ct abd/pelvis -- ultrasound ordered, consider GyN consult  7. Anasarca -- diuresis    Could she have dermatomyositis?  Other autoimmune disorder?  Will check autoimmune work up  Consider emperic steroids vs. Bronch with biopsy

## 2020-04-09 NOTE — PROGRESS NOTES
"DAILY PROGRESS NOTE  [facility]    Patient Identification:  Name: Oralia Sánchez  Age: 44 y.o.  Sex: female  :  1975  MRN: [id number]         Primary Care Physician: Provider, No Known    Subjective:  Interval History:She has a bad cough and is short of air but not much better.  requiring  O 2 at night. Extreme fatigue.  Abdominal pain and swelling.    Objective:    Scheduled Meds:    budesonide-formoterol 2 puff Inhalation BID - RT   fluconazole 100 mg Oral Q24H   furosemide 40 mg Intravenous Q6H   insulin lispro 0-7 Units Subcutaneous 4x Daily With Meals & Nightly   levothyroxine 200 mcg Oral Daily   midodrine 10 mg Oral TID AC   pantoprazole 40 mg Oral BID AC   sodium bicarbonate 650 mg Oral TID   sucralfate 1 g Oral 4x Daily AC & at Bedtime     Continuous Infusions:       Vital signs in last 24 hours:  Temp:  [98 °F (36.7 °C)-98.6 °F (37 °C)] 98 °F (36.7 °C)  Heart Rate:  [85-98] 89  Resp:  [16-18] 18  BP: ()/(48-74) 123/74    Intake/Output:    Intake/Output Summary (Last 24 hours) at 2020 1343  Last data filed at 2020 1100  Gross per 24 hour   Intake --   Output 4000 ml   Net -4000 ml       Exam:  /74 (BP Location: Left arm, Patient Position: Lying)   Pulse 89   Temp 98 °F (36.7 °C) (Oral)   Resp 18   Ht 154.9 cm (61\")   Wt 119 kg (262 lb 11.2 oz)   LMP 2020   SpO2 92%   BMI 49.64 kg/m²     General Appearance:    Alert, cooperative, no distress   Head:    Normocephalic, without obvious abnormality, atraumatic   Eyes:       Throat:   Lips, tongue, gums normal   Neck:   Supple, symmetrical, trachea midline, no JVD   Lungs:     Crackles and wheezes. bilaterally, respirations unlabored   Chest Wall:    No tenderness or deformity    Heart:    Regular rate and rhythm, S1 and S2 normal, no murmur,no  Rub or gallop   Abdomen:     Soft, non-tender, bowel sounds active, no masses, no organomegaly    Extremities:   Extremities normal, atraumatic, no cyanosis or " edema   Pulses:      Skin:   Skin is warm and dry,  Boil in pubic area   Neurologic:   no focal deficits noted      Lab Results (last 72 hours)     Procedure Component Value Units Date/Time    POC Glucose Once [[id number]]  (Abnormal) Collected:  04/02/20 1147    Specimen:  Blood Updated:  04/02/20 1148     Glucose 144 mg/dL     Comprehensive Metabolic Panel [[id number]]  (Abnormal) Collected:  04/02/20 0634    Specimen:  Blood Updated:  04/02/20 0736     Glucose 117 mg/dL      BUN 8 mg/dL      Creatinine 0.76 mg/dL      Sodium 136 mmol/L      Potassium 3.8 mmol/L      Chloride 90 mmol/L      CO2 20.2 mmol/L      Calcium 7.8 mg/dL      Total Protein 5.9 g/dL      Albumin 3.40 g/dL      ALT (SGPT) 49 U/L      AST (SGOT) 97 U/L      Alkaline Phosphatase 103 U/L      Total Bilirubin 1.3 mg/dL      eGFR Non African Amer 82 mL/min/1.73      Globulin 2.5 gm/dL      A/G Ratio 1.4 g/dL      BUN/Creatinine Ratio 10.5     Anion Gap 25.8 mmol/L     Narrative:       GFR Normal >60  Chronic Kidney Disease <60  Kidney Failure <15      Ferritin [[id number]]  (Abnormal) Collected:  04/02/20 0634    Specimen:  Blood Updated:  04/02/20 0726     Ferritin 803.00 ng/mL     Narrative:       Results may be falsely decreased if patient taking Biotin.      Lactate Dehydrogenase [[id number]]  (Abnormal) Collected:  04/02/20 0634    Specimen:  Blood Updated:  04/02/20 0722      U/L     CBC & Differential [[id number]] Collected:  04/02/20 0639    Specimen:  Blood Updated:  04/02/20 0652    Narrative:       The following orders were created for panel order CBC & Differential.  Procedure                               Abnormality         Status                     ---------                               -----------         ------                     CBC Auto Differential[[id number]]        Abnormal            Final result                 Please view results for these tests on the individual orders.    CBC Auto Differential [[id number]]   (Abnormal) Collected:  04/02/20 0639    Specimen:  Blood Updated:  04/02/20 0652     WBC 7.38 10*3/mm3      RBC 3.95 10*6/mm3      Hemoglobin 14.2 g/dL      Hematocrit 40.1 %      .5 fL      MCH 35.9 pg      MCHC 35.4 g/dL      RDW 14.3 %      RDW-SD 53.6 fl      MPV 9.4 fL      Platelets 195 10*3/mm3      Neutrophil % 65.8 %      Lymphocyte % 24.5 %      Monocyte % 8.1 %      Eosinophil % 0.3 %      Basophil % 0.8 %      Immature Grans % 0.5 %      Neutrophils, Absolute 4.85 10*3/mm3      Lymphocytes, Absolute 1.81 10*3/mm3      Monocytes, Absolute 0.60 10*3/mm3      Eosinophils, Absolute 0.02 10*3/mm3      Basophils, Absolute 0.06 10*3/mm3      Immature Grans, Absolute 0.04 10*3/mm3      nRBC 0.4 /100 WBC     POC Glucose Once [[id number]]  (Normal) Collected:  04/02/20 0616    Specimen:  Blood Updated:  04/02/20 0618     Glucose 116 mg/dL     POC Glucose Once [[id number]]  (Normal) Collected:  04/01/20 2059    Specimen:  Blood Updated:  04/01/20 2059     Glucose 127 mg/dL     Blood Culture - Blood, Arm, Right [[id number]] Collected:  04/01/20 1654    Specimen:  Blood from Arm, Right Updated:  04/01/20 1654    Blood Culture - Blood, Arm, Left [[id number]] Collected:  04/01/20 1654    Specimen:  Blood from Arm, Left Updated:  04/01/20 1654    Respiratory Panel, PCR - Swab, Nasopharynx [[id number]]  (Normal) Collected:  04/01/20 1515    Specimen:  Swab from Nasopharynx Updated:  04/01/20 1653     ADENOVIRUS, PCR Not Detected     Coronavirus 229E Not Detected     Coronavirus HKU1 Not Detected     Coronavirus NL63 Not Detected     Coronavirus OC43 Not Detected     Human Metapneumovirus Not Detected     Human Rhinovirus/Enterovirus Not Detected     Influenza B PCR Not Detected     Parainfluenza Virus 1 Not Detected     Parainfluenza Virus 2 Not Detected     Parainfluenza Virus 3 Not Detected     Parainfluenza Virus 4 Not Detected     Bordetella pertussis pcr Not Detected     Influenza A H1 2009 PCR Not Detected      Chlamydophila pneumoniae PCR Not Detected     Mycoplasma pneumo by PCR Not Detected     Influenza A PCR Not Detected     Influenza A H3 Not Detected     Influenza A H1 Not Detected     RSV, PCR Not Detected     Bordetella parapertussis PCR Not Detected    Narrative:       The coronavirus on the RVP is NOT COVID-19 and is NOT indicative of infection with COVID-19.     POC Glucose Once [[id number]]  (Abnormal) Collected:  04/01/20 1559    Specimen:  Blood Updated:  04/01/20 1601     Glucose 132 mg/dL     Hemoglobin A1c [[id number]]  (Abnormal) Collected:  04/01/20 0809    Specimen:  Blood Updated:  04/01/20 1417     Hemoglobin A1C 6.68 %     Narrative:       Hemoglobin A1C Ranges:    Increased Risk for Diabetes  5.7% to 6.4%  Diabetes                     >= 6.5%  Diabetic Goal                < 7.0%    Salicylate Level [[id number]]  (Normal) Collected:  04/01/20 0809    Specimen:  Blood Updated:  04/01/20 1129     Salicylate <0.3 mg/dL     Narrative:       Therapeutic range for Salicylates:  3.0 - 10.0 mg/dL for antipyretic/analgesic conditions  15.0 - 30.0 mg/dL for anti-inflammatory conditions    CORONAVIRUS(COVID-19),RT-PCR,UOFL LAB,NP/OP Swab in Transport Media - Swab, Nasopharynx [[id number]] Collected:  04/01/20 1049    Specimen:  Swab from Nasopharynx Updated:  04/01/20 1101    Blood Gas, Arterial [[id number]]  (Abnormal) Collected:  04/01/20 1051    Specimen:  Arterial Blood Updated:  04/01/20 1053     Site Arterial: left radial     Jovi's Test Positive     pH, Arterial 7.477 pH units      pCO2, Arterial 28.2 mm Hg      pO2, Arterial 99.2 mm Hg      HCO3, Arterial 20.9 mmol/L      Base Excess, Arterial -1.0 mmol/L      O2 Saturation Calculated 98.3 %      Barometric Pressure for Blood Gas 753.0 mmHg      Modality Room Air     Set Mech Resp Rate 28    Ethanol [[id number]] Collected:  04/01/20 0809    Specimen:  Blood Updated:  04/01/20 0951     Ethanol <10 mg/dL      Ethanol % <0.010 %     Ferritin [[id number]]   (Abnormal) Collected:  04/01/20 0809    Specimen:  Blood Updated:  04/01/20 0946     Ferritin 821.00 ng/mL     Narrative:       Results may be falsely decreased if patient taking Biotin.      [place] Draw [[id number]] Collected:  04/01/20 0809    Specimen:  Blood Updated:  04/01/20 0915    Narrative:       The following orders were created for panel order [place] Draw.  Procedure                               Abnormality         Status                     ---------                               -----------         ------                     Light Blue Top[[id number]]                                   Final result               Green Top (Gel)[[id number]]                                  Final result               Lavender Top[[id number]]                                     Final result               Gold Top - SST[[id number]]                                   Final result                 Please view results for these tests on the individual orders.    Green Top (Gel) [[id number]] Collected:  04/01/20 0809    Specimen:  Blood Updated:  04/01/20 0915     Extra Tube Hold for add-ons.     Comment: Auto resulted.       Lavender Top [[id number]] Collected:  04/01/20 0809    Specimen:  Blood Updated:  04/01/20 0915     Extra Tube hold for add-on     Comment: Auto resulted       Light Blue Top [[id number]] Collected:  04/01/20 0809    Specimen:  Blood Updated:  04/01/20 0915     Extra Tube hold for add-on     Comment: Auto resulted       Gold Top - SST [[id number]] Collected:  04/01/20 0809    Specimen:  Blood Updated:  04/01/20 0915     Extra Tube Hold for add-ons.     Comment: Auto resulted.       Procalcitonin [[id number]]  (Normal) Collected:  04/01/20 0809    Specimen:  Blood Updated:  04/01/20 0846     Procalcitonin 0.16 ng/mL     Narrative:       As a Marker for Sepsis (Non-Neonates):   1. <0.5 ng/mL represents a low risk of severe sepsis and/or septic shock.  1. >2 ng/mL represents a high risk of severe sepsis and/or septic  "shock.    As a Marker for Lower Respiratory Tract Infections that require antibiotic therapy:  PCT on Admission     Antibiotic Therapy             6-12 Hrs later  > 0.5                Strongly Recommended            >0.25 - <0.5         Recommended  0.1 - 0.25           Discouraged                   Remeasure/reassess PCT  <0.1                 Strongly Discouraged          Remeasure/reassess PCT      As 28 day mortality risk marker: \"Change in Procalcitonin Result\" (> 80 % or <=80 %) if Day 0 (or Day 1) and Day 4 values are available. Refer to http://[email or web address]-[email or web address]   Change in PCT <=80 %   A decrease of PCT levels below or equal to 80 % defines a positive change in PCT test result representing a higher risk for 28-day all-cause mortality of patients diagnosed with severe sepsis or septic shock.  Change in PCT > 80 %   A decrease of PCT levels of more than 80 % defines a negative change in PCT result representing a lower risk for 28-day all-cause mortality of patients diagnosed with severe sepsis or septic shock.                Results may be falsely decreased if patient taking Biotin.     Comprehensive Metabolic Panel [[id number]]  (Abnormal) Collected:  04/01/20 0809    Specimen:  Blood Updated:  04/01/20 0841     Glucose 127 mg/dL      BUN 3 mg/dL      Creatinine 0.85 mg/dL      Sodium 135 mmol/L      Potassium 3.2 mmol/L      Chloride 84 mmol/L      CO2 18.7 mmol/L      Calcium 8.5 mg/dL      Total Protein 7.1 g/dL      Albumin 3.70 g/dL      ALT (SGPT) 65 U/L      AST (SGOT) 124 U/L      Alkaline Phosphatase 128 U/L      Total Bilirubin 1.4 mg/dL      eGFR Non African Amer 72 mL/min/1.73      Globulin 3.4 gm/dL      A/G Ratio 1.1 g/dL      BUN/Creatinine Ratio 3.5     Anion Gap 32.3 mmol/L     Narrative:       GFR Normal >60  Chronic Kidney Disease <60  Kidney Failure <15      C-reactive Protein [[id number]]  (Abnormal) Collected:  04/01/20 0809    Specimen:  Blood Updated:  04/01/20 0841     " C-Reactive Protein 0.63 mg/dL     Lactate Dehydrogenase [[id number]]  (Abnormal) Collected:  04/01/20 0809    Specimen:  Blood Updated:  04/01/20 0841      U/L     Magnesium [[id number]]  (Abnormal) Collected:  04/01/20 0809    Specimen:  Blood Updated:  04/01/20 0841     Magnesium 1.1 mg/dL     Troponin [[id number]]  (Normal) Collected:  04/01/20 0809    Specimen:  Blood Updated:  04/01/20 0841     Troponin T <0.010 ng/mL     Narrative:       Troponin T Reference Range:  <= 0.03 ng/mL-   Negative for AMI  >0.03 ng/mL-     Abnormal for myocardial necrosis.  Clinicians would have to utilize clinical acumen, EKG, Troponin and serial changes to determine if it is an Acute Myocardial Infarction or myocardial injury due to an underlying chronic condition.       Results may be falsely decreased if patient taking Biotin.      CBC Auto Differential [[id number]]  (Abnormal) Collected:  04/01/20 0809    Specimen:  Blood Updated:  04/01/20 0828     WBC 8.04 10*3/mm3      RBC 4.48 10*6/mm3      Hemoglobin 16.3 g/dL      Hematocrit 46.2 %      .1 fL      MCH 36.4 pg      MCHC 35.3 g/dL      RDW 15.4 %      RDW-SD 57.9 fl      MPV 9.5 fL      Platelets 244 10*3/mm3      Neutrophil % 68.8 %      Lymphocyte % 22.6 %      Monocyte % 7.2 %      Eosinophil % 0.4 %      Basophil % 0.6 %      Immature Grans % 0.4 %      Neutrophils, Absolute 5.53 10*3/mm3      Lymphocytes, Absolute 1.82 10*3/mm3      Monocytes, Absolute 0.58 10*3/mm3      Eosinophils, Absolute 0.03 10*3/mm3      Basophils, Absolute 0.05 10*3/mm3      Immature Grans, Absolute 0.03 10*3/mm3      nRBC 0.5 /100 WBC         Data Review:  Results from last 7 days   Lab Units 04/09/20  0532 04/08/20  0623 04/07/20  0545   SODIUM mmol/L 135* 131* 130*   POTASSIUM mmol/L 3.5 3.6 3.9   CHLORIDE mmol/L 92* 93* 94*   CO2 mmol/L 29.4* 23.3 24.6   BUN mg/dL 7 10 12   CREATININE mg/dL 0.47* 0.56* 0.54*   GLUCOSE mg/dL 115* 178* 172*   CALCIUM mg/dL 8.2* 8.0* 7.6*     Results  from last 7 days   Lab Units 04/09/20  0532 04/08/20  0623 04/07/20  0545   WBC 10*3/mm3 8.56 7.93 7.51   HEMOGLOBIN g/dL 12.6 13.2 13.4   HEMATOCRIT % 35.2 37.0 37.8   PLATELETS 10*3/mm3 235 215 202             Lab Results   Lab Value Date/Time    TROPONINT <0.010 04/01/2020 0809    TROPONINT <0.010 11/10/2019 1525         Results from last 7 days   Lab Units 04/09/20  0532 04/08/20  0623 04/07/20  0545   ALK PHOS U/L 145* 177* 169*   BILIRUBIN mg/dL 1.3* 0.9 1.1   ALT (SGPT) U/L 51* 58* 64*   AST (SGOT) U/L 98* 129* 161*             Glucose   Date/Time Value Ref Range Status   04/09/2020 1150 124 70 - 130 mg/dL Final   04/09/2020 0618 130 70 - 130 mg/dL Final   04/08/2020 2025 129 70 - 130 mg/dL Final   04/08/2020 1637 137 (H) 70 - 130 mg/dL Final   04/08/2020 1056 200 (H) 70 - 130 mg/dL Final   04/08/2020 0621 204 (H) 70 - 130 mg/dL Final   04/07/2020 2028 152 (H) 70 - 130 mg/dL Final   04/07/2020 1733 155 (H) 70 - 130 mg/dL Final           Past Medical History:   Diagnosis Date   • Aneurysm (CMS/HCC)    • Arthritis    • Carpal tunnel syndrome    • Chest pain    • Diabetes mellitus (CMS/HCC)    • Dysmetabolic syndrome X    • Gestational diabetes mellitus    • Hypothyroidism    • Metabolic disorder    • Nonalcoholic steatohepatitis    • Obesity    • Palpitations    • Sleep apnea    • Spinal headache    • Type 2 diabetes mellitus (CMS/HCC)    • Vitamin D deficiency        Assessment:  Active Hospital Problems    Diagnosis  POA   • **Dyspnea [R06.00]  Yes   • Cough [R05]  Unknown   • Hypomagnesemia [E83.42]  Unknown   • Metabolic acidosis [E87.2]  Unknown   • Fatigue [R53.83]  Unknown   • History of COPD [Z87.09]  Not Applicable   • Tobacco abuse [Z72.0]  Yes   • Rheumatoid arthritis (CMS/HCC) [M06.9]  Yes   • Type 2 diabetes mellitus (CMS/HCC) [E11.9]  Yes   • Morbid obesity (CMS/HCC) [E66.01]  Yes   • Elevated LFTs [R94.5]  Yes   • YOUNG (nonalcoholic steatohepatitis) [K75.81]  Yes   • Diabetes mellitus arising in  pregnancy [O24.419]  Yes   • Hypothyroid [E03.9]  Yes      Resolved Hospital Problems   No resolved problems to display.       Plan:  Continue with IV fluids and follow lab. ECHO report noted. Off IV fluids and increase proamatine for low BP. Got diuretics.  Elevated inflammatory markers. Will follow. CT abdomen, chest and GI consult noted.. Repeat COVID test was negative..  PT eval.  Colonoscopy later as outpatient.  Continue supportive care.  Tylor Cruz MD  4/9/2020  13:43

## 2020-04-09 NOTE — PROGRESS NOTES
[facility]  Inpatient Progress Note    Reason for Follow Up: Abnormal imaging, rectum    Subjective     Interval History:   Shortness of breath this morning, no rectal pain rectal bleeding    Current Facility-Administered Medications:   •  acetaminophen (TYLENOL) tablet 650 mg, 650 mg, Oral, Q4H PRN, 650 mg at 04/03/20 0913 **OR** acetaminophen (TYLENOL) 160 MG/5ML solution 650 mg, 650 mg, Oral, Q4H PRN **OR** acetaminophen (TYLENOL) suppository 650 mg, 650 mg, Rectal, Q4H PRN, Tylor Cruz MD  •  Benzocaine 20 % ointment 1 application, 1 application, Apply externally, BID PRN, Tylor Cruz MD, 1 application at 04/06/20 1022  •  benzonatate (TESSALON) capsule 200 mg, 200 mg, Oral, TID PRN, Everton Mcrae MD, 200 mg at 04/04/20 2037  •  budesonide-formoterol (SYMBICORT) 160-4.5 MCG/ACT inhaler 2 puff, 2 puff, Inhalation, BID - RT, Shawn Lopez MD, 2 puff at 04/09/20 0814  •  calcium carbonate (TUMS) chewable tablet 500 mg (200 mg elemental), 2 tablet, Oral, 4x Daily PRN, Tylor Cruz MD  •  dextrose (D50W) 25 g/ 50mL Intravenous Solution 25 g, 25 g, Intravenous, Q15 Min PRN, Tylor Cruz MD  •  dextrose (GLUTOSE) oral gel 15 g, 15 g, Oral, Q15 Min PRN, Tylor Cruz MD  •  diphenhydrAMINE (BENADRYL) capsule 25 mg, 25 mg, Oral, Q4H PRN, Tylor Cruz MD, 25 mg at 04/08/20 1316  •  fluconazole (DIFLUCAN) tablet 100 mg, 100 mg, Oral, Q24H, Everton Mcrae MD, 100 mg at 04/09/20 1120  •  furosemide (LASIX) injection 40 mg, 40 mg, Intravenous, Q6H, Shawn Lopez MD, 40 mg at 04/09/20 1120  •  glucagon (human recombinant) (GLUCAGEN DIAGNOSTIC) injection 1 mg, 1 mg, Subcutaneous, Q15 Min PRN, Tylor Cruz MD  •  guaiFENesin-dextromethorphan (ROBITUSSIN DM) 100-10 MG/5ML syrup 5 mL, 5 mL, Oral, Q4H PRN, Tylor Cruz MD, 5 mL at 04/04/20 1153  •  insulin lispro (humaLOG) injection 0-7 Units, 0-7 Units, Subcutaneous, 4x Daily With Meals & Nightly, Tylor Cruz MD, 3  Units at 04/08/20 1108  •  levothyroxine (SYNTHROID, LEVOTHROID) tablet 200 mcg, 200 mcg, Oral, Daily, Tylor Cruz MD, 200 mcg at 04/09/20 1120  •  midodrine (PROAMATINE) tablet 10 mg, 10 mg, Oral, TID AC, Tylor Cruz MD, 10 mg at 04/09/20 1119  •  pantoprazole (PROTONIX) EC tablet 40 mg, 40 mg, Oral, BID AC, Jayshree Payne, APRN, 40 mg at 04/08/20 1632  •  potassium chloride (MICRO-K) CR capsule 40 mEq, 40 mEq, Oral, PRN, 40 mEq at 04/05/20 1723 **OR** potassium chloride (KLOR-CON) packet 40 mEq, 40 mEq, Oral, PRN, 40 mEq at 04/01/20 2052 **OR** potassium chloride 10 mEq in 100 mL IVPB, 10 mEq, Intravenous, Q1H PRN, Tylor Cruz MD  •  promethazine (PHENERGAN) tablet 12.5 mg, 12.5 mg, Oral, Q6H PRN, 12.5 mg at 04/04/20 0009 **OR** promethazine (PHENERGAN) injection 12.5 mg, 12.5 mg, Intramuscular, Q6H PRN **OR** promethazine (PHENERGAN) injection 12.5 mg, 12.5 mg, Intravenous, Q6H PRN, 12.5 mg at 04/04/20 2345 **OR** promethazine (PHENERGAN) suppository 12.5 mg, 12.5 mg, Rectal, Q6H PRN, Tylor Cruz MD  •  sodium bicarbonate tablet 650 mg, 650 mg, Oral, TID, Tylor Cruz MD, 650 mg at 04/08/20 2056  •  sodium chloride 0.9 % flush 10 mL, 10 mL, Intravenous, PRN, Tylor Cruz MD  •  sucralfate (CARAFATE) 1 GM/10ML suspension 1 g, 1 g, Oral, 4x Daily AC & at Bedtime, Moe Lozano MD, 1 g at 04/09/20 1119  Review of Systems:    She has weakness and fatigue all other systems reviewed and negative    Objective     Vital Signs  Temp:  [98 °F (36.7 °C)-98.6 °F (37 °C)] 98 °F (36.7 °C)  Heart Rate:  [85-98] 89  Resp:  [16-18] 18  BP: ()/(48-74) 123/74  Body mass index is 49.64 kg/m².    Intake/Output Summary (Last 24 hours) at 4/9/2020 1325  Last data filed at 4/9/2020 1100  Gross per 24 hour   Intake --   Output 4000 ml   Net -4000 ml     I/O this shift:  In: -   Out: 900 [Urine:900]     Physical Exam:   General: patient awake, alert and cooperative   Eyes: Normal lids and lashes, no scleral  icterus   Neck: supple, normal ROM   Skin: warm and dry, not jaundiced   Cardiovascular: regular rhythm and rate, no murmurs auscultated   Pulm: clear to auscultation bilaterally, regular and unlabored   Abdomen: soft, nontender, nondistended; normal bowel sounds   Extremities: no rash or edema   Psychiatric: Normal mood and behavior; memory intact     Results Review:     I reviewed the patient's new clinical results.    Results from last 7 days   Lab Units 04/09/20  0532 04/08/20  0623 04/07/20  0545   WBC 10*3/mm3 8.56 7.93 7.51   HEMOGLOBIN g/dL 12.6 13.2 13.4   HEMATOCRIT % 35.2 37.0 37.8   PLATELETS 10*3/mm3 235 215 202     Results from last 7 days   Lab Units 04/09/20  0532 04/08/20  0623 04/07/20  0545   SODIUM mmol/L 135* 131* 130*   POTASSIUM mmol/L 3.5 3.6 3.9   CHLORIDE mmol/L 92* 93* 94*   CO2 mmol/L 29.4* 23.3 24.6   BUN mg/dL 7 10 12   CREATININE mg/dL 0.47* 0.56* 0.54*   CALCIUM mg/dL 8.2* 8.0* 7.6*   BILIRUBIN mg/dL 1.3* 0.9 1.1   ALK PHOS U/L 145* 177* 169*   ALT (SGPT) U/L 51* 58* 64*   AST (SGOT) U/L 98* 129* 161*   GLUCOSE mg/dL 115* 178* 172*         No results found for: LIPASE    Radiology:  US Pelvis Complete   Final Result   1. There is a 4.0 cm right ovarian benign-appearing cyst which may   represent a paraovarian cyst or possibly a follicular cyst. Six-month   sonographic follow-up is recommended.   2. Normal sonographic appearance of the pelvis and left ovary.       This report was finalized on 4/9/2020 10:48 AM by Dr. Gregorio Price M.D.          US Liver   Final Result   1. There are sonographic features of the liver that are consistent with   hepatic steatosis. Chronic hepatitis, possibly due to chronic hepatic   steatosis is also suspected.   2. Gallbladder sludge.       This report was finalized on 4/9/2020 10:09 AM by Dr. Gregorio Price M.D.          CT Abdomen Pelvis Stone Protocol   Final Result           1. Mild groundglass opacities in the right more than left upper lungs    show interval worsening, clinical correlation and continued follow-up   recommended.   2. Presacral edema/stranding and fluid may represent inflammatory   process or potentially infiltrative process involving the rectum,   suggest clinical correlation and direct visualization as indicated.   3. Stranding around the right adnexa may be evidence of inflammatory   process involving the right adnexa, and an indeterminate rounded density   is apparent at the right adnexa. Follow-up evaluation with ultrasound is   advised. No hydronephrosis. Suspected cholelithiasis. Hepatic steatosis,   hepatomegaly.   4. Skin thickening at the anterior lower abdomen with subcutaneous   stranding density may reflect cellulitis, correlate with clinical exam.        This report was finalized on 4/7/2020 4:14 PM by Dr. Rogerio Campo M.D.          CT Chest Without Contrast   Final Result           1. Mild groundglass opacities in the right more than left upper lungs   show interval worsening, clinical correlation and continued follow-up   recommended.   2. Presacral edema/stranding and fluid may represent inflammatory   process or potentially infiltrative process involving the rectum,   suggest clinical correlation and direct visualization as indicated.   3. Stranding around the right adnexa may be evidence of inflammatory   process involving the right adnexa, and an indeterminate rounded density   is apparent at the right adnexa. Follow-up evaluation with ultrasound is   advised. No hydronephrosis. Suspected cholelithiasis. Hepatic steatosis,   hepatomegaly.   4. Skin thickening at the anterior lower abdomen with subcutaneous   stranding density may reflect cellulitis, correlate with clinical exam.        This report was finalized on 4/7/2020 4:14 PM by Dr. Rogerio Campo M.D.          CT Chest Without Contrast   Final Result       There is no evidence for an acute infiltrate within the left lung base.   However, there are  very subtle small patchy areas of groundglass opacity   within the right lung apex as best seen on images #15 and 19 that were   not evident on the prior chest CT of 11/10/2019. These findings are   entirely nonspecific in nature and could represent an age-indeterminate   pneumonitis. If the patient's symptoms do not improve or worsen, I would   recommend a follow-up CT scan of the chest without use of IV contrast   during this admission. However, if the patient's symptoms do improve, I   would still recommend a follow-up CT scan of the chest but, in this   particular case, at interval of approximately 2-3 weeks. These findings   and recommendations were directly discussed with Dr. Johana Gamez on the   morning of 04/06/2020 at approximately 7:45 AM.       Radiation dose reduction techniques were utilized, including automated   exposure control and exposure modulation based on body size.       This report was finalized on 4/6/2020 7:56 AM by Dr. Joe Mcnamara M.D.          XR Chest 1 View   Final Result      XR Chest AP   Final Result      XR Chest 1 View    (Results Pending)   US Non-ob Transvaginal    (Results Pending)   CT Head Without Contrast    (Results Pending)       Assessment/Plan     Patient Active Problem List   Diagnosis   • Vitamin D deficiency   • Awareness of heartbeats   • Adiposity   • YOUNG (nonalcoholic steatohepatitis)   • Hypothyroid   • Diabetes mellitus arising in pregnancy   • Diabetes (CMS/HCC)   • Metabolic syndrome   • Chest pain   • Primary thunderclap headache   • Elevated LFTs   • Tobacco abuse   • Rheumatoid arthritis (CMS/HCC)   • Type 2 diabetes mellitus (CMS/HCC)   • Morbid obesity (CMS/HCC)   • UTI (urinary tract infection), bacterial   • Cerebral aneurysm   • Dyspnea   • Cough   • Hypomagnesemia   • Metabolic acidosis   • Fatigue   • History of COPD   FINDINGS: Sonographic evaluation of the liver and selected structures of  the right upper quadrant was performed. Comparison is made to  a CT of  the abdomen dated 4/7/2020.  The right kidney has a normal appearance. A  coarsened echotexture with increased echogenicity is seen throughout the  liver without evidence for focal abnormality. The liver measures on the  order of 17.9 cm in anterior posterior dimension. The portal vein is  patent with appropriate directional flow. There is no intrahepatic bile  duct dilatation. Minimal sludge is seen within the gallbladder. There is  no gallbladder wall thickening or pericholecystic fluid. The common bile  duct measures 0.5 cm in diameter. The visualized portion of the pancreas  appears normal.     IMPRESSION:  1. There are sonographic features of the liver that are consistent with  hepatic steatosis. Chronic hepatitis, possibly due to chronic hepatic  steatosis is also suspected.  2. Gallbladder sludge     Assessment:   1.  Abnormal CAT scan of the abdomen showing a possible infiltrative process involving the rectum.  The patient has never had a colonoscopy.  I feel that she is too ill (SOA)  to have a colonoscopy at this moment.  2.  This patient has some type of respiratory illness that does not appear to be coronavirus.  3.  She has elevated liver function tests with steatosis of the liver on CAT scan of the abdomen.  Her last liver function tests were normal in 6 of 2019 and seem to be intermittently elevated.  4.  The patient is obese.     Plan:   1.  Gallbladder sludge, steatosis on ultrasound.  Steatosis likely source of elevated liver tests, asymptomatic from the sludge in the gallbladder absolutely does not need a surgical evaluation  2.  I would recommend that she stop smoking.  3.  She needs to lose weight.  4.  She should eventually have a colonoscopy but I feel that this pulmonary illness needs to be resolved before a colonoscopy is done.  The colonoscopy could be done as an outpatient.     I discussed the patients findings and my recommendations with patient and nursing staff.    Chano RICHMOND  MD [place]

## 2020-04-10 ENCOUNTER — APPOINTMENT (OUTPATIENT)
Dept: GENERAL RADIOLOGY | Facility: HOSPITAL | Age: 45
End: 2020-04-10

## 2020-04-10 ENCOUNTER — APPOINTMENT (OUTPATIENT)
Dept: CT IMAGING | Facility: HOSPITAL | Age: 45
End: 2020-04-10

## 2020-04-10 LAB
ALBUMIN SERPL-MCNC: 2.9 G/DL (ref 3.5–5.2)
ALBUMIN/GLOB SERPL: 1.2 G/DL
ALP SERPL-CCNC: 144 U/L (ref 39–117)
ALT SERPL W P-5'-P-CCNC: 45 U/L (ref 1–33)
ANION GAP SERPL CALCULATED.3IONS-SCNC: 15.5 MMOL/L (ref 5–15)
AST SERPL-CCNC: 91 U/L (ref 1–32)
BILIRUB SERPL-MCNC: 1 MG/DL (ref 0.2–1.2)
BUN BLD-MCNC: 8 MG/DL (ref 6–20)
BUN/CREAT SERPL: 17.8 (ref 7–25)
CALCIUM SPEC-SCNC: 8.6 MG/DL (ref 8.6–10.5)
CHLORIDE SERPL-SCNC: 90 MMOL/L (ref 98–107)
CO2 SERPL-SCNC: 27.5 MMOL/L (ref 22–29)
CREAT BLD-MCNC: 0.45 MG/DL (ref 0.57–1)
CRP SERPL-MCNC: 5.14 MG/DL (ref 0–0.5)
ERYTHROCYTE [SEDIMENTATION RATE] IN BLOOD: 27 MM/HR (ref 0–20)
FERRITIN SERPL-MCNC: 1348 NG/ML (ref 13–150)
GFR SERPL CREATININE-BSD FRML MDRD: 150 ML/MIN/1.73
GLOBULIN UR ELPH-MCNC: 2.4 GM/DL
GLUCOSE BLD-MCNC: 137 MG/DL (ref 65–99)
GLUCOSE BLDC GLUCOMTR-MCNC: 115 MG/DL (ref 70–130)
GLUCOSE BLDC GLUCOMTR-MCNC: 126 MG/DL (ref 70–130)
GLUCOSE BLDC GLUCOMTR-MCNC: 155 MG/DL (ref 70–130)
GLUCOSE BLDC GLUCOMTR-MCNC: 187 MG/DL (ref 70–130)
LDH SERPL-CCNC: 314 U/L (ref 135–214)
POTASSIUM BLD-SCNC: 3.5 MMOL/L (ref 3.5–5.2)
PROT SERPL-MCNC: 5.3 G/DL (ref 6–8.5)
SODIUM BLD-SCNC: 133 MMOL/L (ref 136–145)

## 2020-04-10 PROCEDURE — 70450 CT HEAD/BRAIN W/O DYE: CPT

## 2020-04-10 PROCEDURE — 80053 COMPREHEN METABOLIC PANEL: CPT | Performed by: HOSPITALIST

## 2020-04-10 PROCEDURE — 94762 N-INVAS EAR/PLS OXIMTRY CONT: CPT

## 2020-04-10 PROCEDURE — 25010000002 FUROSEMIDE PER 20 MG: Performed by: INTERNAL MEDICINE

## 2020-04-10 PROCEDURE — 82728 ASSAY OF FERRITIN: CPT | Performed by: HOSPITALIST

## 2020-04-10 PROCEDURE — 83615 LACTATE (LD) (LDH) ENZYME: CPT | Performed by: HOSPITALIST

## 2020-04-10 PROCEDURE — 63710000001 PREDNISONE PER 1 MG: Performed by: INTERNAL MEDICINE

## 2020-04-10 PROCEDURE — 94799 UNLISTED PULMONARY SVC/PX: CPT

## 2020-04-10 PROCEDURE — 86140 C-REACTIVE PROTEIN: CPT | Performed by: HOSPITALIST

## 2020-04-10 PROCEDURE — 71046 X-RAY EXAM CHEST 2 VIEWS: CPT

## 2020-04-10 PROCEDURE — 97110 THERAPEUTIC EXERCISES: CPT

## 2020-04-10 PROCEDURE — 85652 RBC SED RATE AUTOMATED: CPT | Performed by: HOSPITALIST

## 2020-04-10 PROCEDURE — 63710000001 DIPHENHYDRAMINE PER 50 MG: Performed by: HOSPITALIST

## 2020-04-10 PROCEDURE — 63710000001 INSULIN LISPRO (HUMAN) PER 5 UNITS: Performed by: HOSPITALIST

## 2020-04-10 PROCEDURE — 99232 SBSQ HOSP IP/OBS MODERATE 35: CPT | Performed by: INTERNAL MEDICINE

## 2020-04-10 PROCEDURE — 82962 GLUCOSE BLOOD TEST: CPT

## 2020-04-10 RX ORDER — HYDROCODONE BITARTRATE AND ACETAMINOPHEN 5; 325 MG/1; MG/1
1 TABLET ORAL EVERY 6 HOURS PRN
Status: DISCONTINUED | OUTPATIENT
Start: 2020-04-10 | End: 2020-04-20

## 2020-04-10 RX ORDER — FUROSEMIDE 10 MG/ML
40 INJECTION INTRAMUSCULAR; INTRAVENOUS EVERY 6 HOURS
Status: COMPLETED | OUTPATIENT
Start: 2020-04-10 | End: 2020-04-10

## 2020-04-10 RX ORDER — PREDNISONE 20 MG/1
40 TABLET ORAL
Status: DISCONTINUED | OUTPATIENT
Start: 2020-04-10 | End: 2020-04-20 | Stop reason: HOSPADM

## 2020-04-10 RX ADMIN — MIDODRINE HYDROCHLORIDE 10 MG: 5 TABLET ORAL at 08:35

## 2020-04-10 RX ADMIN — INSULIN LISPRO 2 UNITS: 100 INJECTION, SOLUTION INTRAVENOUS; SUBCUTANEOUS at 08:34

## 2020-04-10 RX ADMIN — SUCRALFATE 1 G: 1 SUSPENSION ORAL at 08:35

## 2020-04-10 RX ADMIN — PANTOPRAZOLE SODIUM 40 MG: 40 TABLET, DELAYED RELEASE ORAL at 17:47

## 2020-04-10 RX ADMIN — MIDODRINE HYDROCHLORIDE 10 MG: 5 TABLET ORAL at 17:48

## 2020-04-10 RX ADMIN — DIPHENHYDRAMINE HYDROCHLORIDE 25 MG: 25 CAPSULE ORAL at 08:39

## 2020-04-10 RX ADMIN — SUCRALFATE 1 G: 1 SUSPENSION ORAL at 11:11

## 2020-04-10 RX ADMIN — ANTACID TABLETS 2 TABLET: 500 TABLET, CHEWABLE ORAL at 18:32

## 2020-04-10 RX ADMIN — SODIUM BICARBONATE 650 MG: 650 TABLET ORAL at 17:47

## 2020-04-10 RX ADMIN — FUROSEMIDE 40 MG: 10 INJECTION, SOLUTION INTRAMUSCULAR; INTRAVENOUS at 17:48

## 2020-04-10 RX ADMIN — INSULIN LISPRO 2 UNITS: 100 INJECTION, SOLUTION INTRAVENOUS; SUBCUTANEOUS at 21:15

## 2020-04-10 RX ADMIN — HYDROCODONE BITARTRATE AND ACETAMINOPHEN 1 TABLET: 5; 325 TABLET ORAL at 21:27

## 2020-04-10 RX ADMIN — SODIUM BICARBONATE 650 MG: 650 TABLET ORAL at 08:35

## 2020-04-10 RX ADMIN — SUCRALFATE 1 G: 1 SUSPENSION ORAL at 21:15

## 2020-04-10 RX ADMIN — BUDESONIDE AND FORMOTEROL FUMARATE DIHYDRATE 2 PUFF: 160; 4.5 AEROSOL RESPIRATORY (INHALATION) at 07:37

## 2020-04-10 RX ADMIN — FUROSEMIDE 40 MG: 10 INJECTION, SOLUTION INTRAMUSCULAR; INTRAVENOUS at 11:45

## 2020-04-10 RX ADMIN — PANTOPRAZOLE SODIUM 40 MG: 40 TABLET, DELAYED RELEASE ORAL at 08:35

## 2020-04-10 RX ADMIN — HYDROCODONE BITARTRATE AND ACETAMINOPHEN 1 TABLET: 5; 325 TABLET ORAL at 13:32

## 2020-04-10 RX ADMIN — SODIUM BICARBONATE 650 MG: 650 TABLET ORAL at 21:15

## 2020-04-10 RX ADMIN — FLUCONAZOLE 100 MG: 100 TABLET ORAL at 11:11

## 2020-04-10 RX ADMIN — BUDESONIDE AND FORMOTEROL FUMARATE DIHYDRATE 2 PUFF: 160; 4.5 AEROSOL RESPIRATORY (INHALATION) at 20:20

## 2020-04-10 RX ADMIN — SUCRALFATE 1 G: 1 SUSPENSION ORAL at 17:47

## 2020-04-10 RX ADMIN — PREDNISONE 40 MG: 20 TABLET ORAL at 17:47

## 2020-04-10 RX ADMIN — MIDODRINE HYDROCHLORIDE 10 MG: 5 TABLET ORAL at 11:11

## 2020-04-10 RX ADMIN — LEVOTHYROXINE SODIUM 200 MCG: 100 TABLET ORAL at 08:35

## 2020-04-10 NOTE — PROGRESS NOTES
[facility]      Mar/chart reviewed  Follow up cough  Still with cough, dry  Says she feels a lot better after lasix    Vital Sign Min/Max for last 24 hours  Temp  Min: 97.5 °F (36.4 °C)  Max: 98.8 °F (37.1 °C)   BP  Min: 92/51  Max: 123/74   Pulse  Min: 85  Max: 99   Resp  Min: 11  Max: 20   SpO2  Min: 91 %  Max: 98 %   Flow (L/min)  Min: 1  Max: 1   Weight  Min: 115 kg (253 lb 3.2 oz)  Max: 115 kg (253 lb 3.2 oz)   540/4425  Nad, axox3,   perrl, eomi, no icterus,  mmm, no jvd, trachea midline, neck supple,  chest cta bilaterally, no crackles, no wheezes,   rrr,   soft, nt, nd +bs,  no c/c/ 1+edema  Skin warm, dry no rashes    Labs: 4/10:  Na 133  Bicarb 27  Alt 45  ast 91  Alk phos 144  Ferritin 1348  ldh 314  crp 5  Esr 27    A/P:  1. Abnormal ct chest --   Dr. Sharma thinks her primary problem is respiratory,  I will discussed bronch with patient --defer for now.   2. Lactic acidosis -- unusual as her bicarb is 23.   Her lactic has been much higher and there does not appear to be any clear reason for it.  I would consider stopping checking the lactic acid.  3. Elevated liver enzymes -- she does have some cholecylithiasis on ct --ultrasound has been ordered  4. Hyponatremia  5. Cough -- continue symbicort and ppi   6. Abnormal ct abd/pelvis -- ultrasound ordered, consider GyN consult  7. Anasarca -- diuresis     Could she have dermatomyositis?  Other autoimmune disorder?  Will check autoimmune work up  Continue diuresis  Consider emperic steroids vs. Bronch with biopsy.    Discussed with patient, will trial emperic steroids

## 2020-04-10 NOTE — PLAN OF CARE
Problem: Patient Care Overview  Goal: Plan of Care Review  Outcome: Ongoing (interventions implemented as appropriate)  Flowsheets (Taken 4/10/2020 1538)  Progress: improving  Plan of Care Reviewed With: patient  Outcome Summary: pt still presents w/pain and swelling in extremities, skin hypersensitive to touch, agreeable to amb, but req seated rest due to fatigue; pt will need some follow up PT prior to DC home alone; feel pt motivated and would benefit from acute RHB, but also open to SNU pending progress and INS

## 2020-04-10 NOTE — PLAN OF CARE
Problem: Patient Care Overview  Goal: Plan of Care Review  Outcome: Ongoing (interventions implemented as appropriate)  Flowsheets  Taken 4/10/2020 1451 by Sheree Welsh PTA  Progress: improving  Plan of Care Reviewed With: patient  Taken 4/10/2020 1834 by Monisha Maciel, RN  Outcome Summary: VSS, pt complains of pain in abdomen, MD aware and prn medication prescribed which pt reports has eased the pain, steroids ordered to help with shortness of air, pt up with PT today up to bedside commode often due to diuretics, wcm     Problem: Fall Risk (Adult)  Goal: Absence of Fall  Outcome: Ongoing (interventions implemented as appropriate)  Flowsheets (Taken 4/9/2020 1633 by Ashely Mcdonough, RN)  Absence of Fall: making progress toward outcome     Problem: Infection, Risk/Actual (Adult)  Goal: Infection Prevention/Resolution  Outcome: Ongoing (interventions implemented as appropriate)  Flowsheets (Taken 4/6/2020 1721 by Silva Gao, RN)  Infection Prevention/Resolution: making progress toward outcome

## 2020-04-10 NOTE — PROGRESS NOTES
"DAILY PROGRESS NOTE  [facility]    Patient Identification:  Name: Oralia Sánchez  Age: 44 y.o.  Sex: female  :  1975  MRN: [id number]         Primary Care Physician: Provider, No Known    Subjective:  Interval History:She has a bad cough and is short of air but worse today.  requiring  O 2 at night. Extreme fatigue.  Abdominal pain and swelling.    Objective:    Scheduled Meds:    budesonide-formoterol 2 puff Inhalation BID - RT   furosemide 40 mg Intravenous Q6H   insulin lispro 0-7 Units Subcutaneous 4x Daily With Meals & Nightly   levothyroxine 200 mcg Oral Daily   midodrine 10 mg Oral TID AC   pantoprazole 40 mg Oral BID AC   sodium bicarbonate 650 mg Oral TID   sucralfate 1 g Oral 4x Daily AC & at Bedtime     Continuous Infusions:       Vital signs in last 24 hours:  Temp:  [97.7 °F (36.5 °C)-98.8 °F (37.1 °C)] 98.1 °F (36.7 °C)  Heart Rate:  [93-99] 93  Resp:  [11-20] 11  BP: ()/(51-70) 112/64    Intake/Output:    Intake/Output Summary (Last 24 hours) at 4/10/2020 1310  Last data filed at 4/10/2020 0601  Gross per 24 hour   Intake 240 ml   Output 3525 ml   Net -3285 ml       Exam:  /64   Pulse 93   Temp 98.1 °F (36.7 °C) (Oral)   Resp 11   Ht 154.9 cm (61\")   Wt 115 kg (253 lb 3.2 oz)   LMP 2020   SpO2 98%   BMI 47.84 kg/m²     General Appearance:    Alert, cooperative, no distress   Head:    Normocephalic, without obvious abnormality, atraumatic   Eyes:       Throat:   Lips, tongue, gums normal   Neck:   Supple, symmetrical, trachea midline, no JVD   Lungs:     Crackles and wheezes. bilaterally, respirations unlabored   Chest Wall:    No tenderness or deformity    Heart:    Regular rate and rhythm, S1 and S2 normal, no murmur,no  Rub or gallop   Abdomen:     Soft, non-tender, bowel sounds active, no masses, no organomegaly    Extremities:   Extremities normal, atraumatic, no cyanosis or edema   Pulses:      Skin:   Skin is warm and dry,  Boil in pubic area "   Neurologic:   no focal deficits noted      Lab Results (last 72 hours)     Procedure Component Value Units Date/Time    POC Glucose Once [[id number]]  (Abnormal) Collected:  04/02/20 1147    Specimen:  Blood Updated:  04/02/20 1148     Glucose 144 mg/dL     Comprehensive Metabolic Panel [[id number]]  (Abnormal) Collected:  04/02/20 0634    Specimen:  Blood Updated:  04/02/20 0736     Glucose 117 mg/dL      BUN 8 mg/dL      Creatinine 0.76 mg/dL      Sodium 136 mmol/L      Potassium 3.8 mmol/L      Chloride 90 mmol/L      CO2 20.2 mmol/L      Calcium 7.8 mg/dL      Total Protein 5.9 g/dL      Albumin 3.40 g/dL      ALT (SGPT) 49 U/L      AST (SGOT) 97 U/L      Alkaline Phosphatase 103 U/L      Total Bilirubin 1.3 mg/dL      eGFR Non African Amer 82 mL/min/1.73      Globulin 2.5 gm/dL      A/G Ratio 1.4 g/dL      BUN/Creatinine Ratio 10.5     Anion Gap 25.8 mmol/L     Narrative:       GFR Normal >60  Chronic Kidney Disease <60  Kidney Failure <15      Ferritin [[id number]]  (Abnormal) Collected:  04/02/20 0634    Specimen:  Blood Updated:  04/02/20 0726     Ferritin 803.00 ng/mL     Narrative:       Results may be falsely decreased if patient taking Biotin.      Lactate Dehydrogenase [[id number]]  (Abnormal) Collected:  04/02/20 0634    Specimen:  Blood Updated:  04/02/20 0722      U/L     CBC & Differential [[id number]] Collected:  04/02/20 0639    Specimen:  Blood Updated:  04/02/20 0652    Narrative:       The following orders were created for panel order CBC & Differential.  Procedure                               Abnormality         Status                     ---------                               -----------         ------                     CBC Auto Differential[[id number]]        Abnormal            Final result                 Please view results for these tests on the individual orders.    CBC Auto Differential [[id number]]  (Abnormal) Collected:  04/02/20 0639    Specimen:  Blood Updated:  04/02/20 0652      WBC 7.38 10*3/mm3      RBC 3.95 10*6/mm3      Hemoglobin 14.2 g/dL      Hematocrit 40.1 %      .5 fL      MCH 35.9 pg      MCHC 35.4 g/dL      RDW 14.3 %      RDW-SD 53.6 fl      MPV 9.4 fL      Platelets 195 10*3/mm3      Neutrophil % 65.8 %      Lymphocyte % 24.5 %      Monocyte % 8.1 %      Eosinophil % 0.3 %      Basophil % 0.8 %      Immature Grans % 0.5 %      Neutrophils, Absolute 4.85 10*3/mm3      Lymphocytes, Absolute 1.81 10*3/mm3      Monocytes, Absolute 0.60 10*3/mm3      Eosinophils, Absolute 0.02 10*3/mm3      Basophils, Absolute 0.06 10*3/mm3      Immature Grans, Absolute 0.04 10*3/mm3      nRBC 0.4 /100 WBC     POC Glucose Once [[id number]]  (Normal) Collected:  04/02/20 0616    Specimen:  Blood Updated:  04/02/20 0618     Glucose 116 mg/dL     POC Glucose Once [[id number]]  (Normal) Collected:  04/01/20 2059    Specimen:  Blood Updated:  04/01/20 2059     Glucose 127 mg/dL     Blood Culture - Blood, Arm, Right [[id number]] Collected:  04/01/20 1654    Specimen:  Blood from Arm, Right Updated:  04/01/20 1654    Blood Culture - Blood, Arm, Left [[id number]] Collected:  04/01/20 1654    Specimen:  Blood from Arm, Left Updated:  04/01/20 1654    Respiratory Panel, PCR - Swab, Nasopharynx [[id number]]  (Normal) Collected:  04/01/20 1515    Specimen:  Swab from Nasopharynx Updated:  04/01/20 1653     ADENOVIRUS, PCR Not Detected     Coronavirus 229E Not Detected     Coronavirus HKU1 Not Detected     Coronavirus NL63 Not Detected     Coronavirus OC43 Not Detected     Human Metapneumovirus Not Detected     Human Rhinovirus/Enterovirus Not Detected     Influenza B PCR Not Detected     Parainfluenza Virus 1 Not Detected     Parainfluenza Virus 2 Not Detected     Parainfluenza Virus 3 Not Detected     Parainfluenza Virus 4 Not Detected     Bordetella pertussis pcr Not Detected     Influenza A H1 2009 PCR Not Detected     Chlamydophila pneumoniae PCR Not Detected     Mycoplasma pneumo by PCR Not Detected      Influenza A PCR Not Detected     Influenza A H3 Not Detected     Influenza A H1 Not Detected     RSV, PCR Not Detected     Bordetella parapertussis PCR Not Detected    Narrative:       The coronavirus on the RVP is NOT COVID-19 and is NOT indicative of infection with COVID-19.     POC Glucose Once [[id number]]  (Abnormal) Collected:  04/01/20 1559    Specimen:  Blood Updated:  04/01/20 1601     Glucose 132 mg/dL     Hemoglobin A1c [[id number]]  (Abnormal) Collected:  04/01/20 0809    Specimen:  Blood Updated:  04/01/20 1417     Hemoglobin A1C 6.68 %     Narrative:       Hemoglobin A1C Ranges:    Increased Risk for Diabetes  5.7% to 6.4%  Diabetes                     >= 6.5%  Diabetic Goal                < 7.0%    Salicylate Level [[id number]]  (Normal) Collected:  04/01/20 0809    Specimen:  Blood Updated:  04/01/20 1129     Salicylate <0.3 mg/dL     Narrative:       Therapeutic range for Salicylates:  3.0 - 10.0 mg/dL for antipyretic/analgesic conditions  15.0 - 30.0 mg/dL for anti-inflammatory conditions    CORONAVIRUS(COVID-19),RT-PCR,UOFL LAB,NP/OP Swab in Transport Media - Swab, Nasopharynx [[id number]] Collected:  04/01/20 1049    Specimen:  Swab from Nasopharynx Updated:  04/01/20 1101    Blood Gas, Arterial [[id number]]  (Abnormal) Collected:  04/01/20 1051    Specimen:  Arterial Blood Updated:  04/01/20 1053     Site Arterial: left radial     Jovi's Test Positive     pH, Arterial 7.477 pH units      pCO2, Arterial 28.2 mm Hg      pO2, Arterial 99.2 mm Hg      HCO3, Arterial 20.9 mmol/L      Base Excess, Arterial -1.0 mmol/L      O2 Saturation Calculated 98.3 %      Barometric Pressure for Blood Gas 753.0 mmHg      Modality Room Air     Set [facility], Inch Resp Rate 28    Ethanol [[id number]] Collected:  04/01/20 0809    Specimen:  Blood Updated:  04/01/20 0951     Ethanol <10 mg/dL      Ethanol % <0.010 %     Ferritin [[id number]]  (Abnormal) Collected:  04/01/20 0809    Specimen:  Blood Updated:  04/01/20 0946      Ferritin 821.00 ng/mL     Narrative:       Results may be falsely decreased if patient taking Biotin.      [place] Draw [[id number]] Collected:  04/01/20 0809    Specimen:  Blood Updated:  04/01/20 0915    Narrative:       The following orders were created for panel order [place] Draw.  Procedure                               Abnormality         Status                     ---------                               -----------         ------                     Light Blue Top[[id number]]                                   Final result               Green Top (Gel)[[id number]]                                  Final result               Lavender Top[[id number]]                                     Final result               Gold Top - SST[[id number]]                                   Final result                 Please view results for these tests on the individual orders.    Green Top (Gel) [[id number]] Collected:  04/01/20 0809    Specimen:  Blood Updated:  04/01/20 0915     Extra Tube Hold for add-ons.     Comment: Auto resulted.       Lavender Top [[id number]] Collected:  04/01/20 0809    Specimen:  Blood Updated:  04/01/20 0915     Extra Tube hold for add-on     Comment: Auto resulted       Light Blue Top [[id number]] Collected:  04/01/20 0809    Specimen:  Blood Updated:  04/01/20 0915     Extra Tube hold for add-on     Comment: Auto resulted       Gold Top - SST [[id number]] Collected:  04/01/20 0809    Specimen:  Blood Updated:  04/01/20 0915     Extra Tube Hold for add-ons.     Comment: Auto resulted.       Procalcitonin [[id number]]  (Normal) Collected:  04/01/20 0809    Specimen:  Blood Updated:  04/01/20 0846     Procalcitonin 0.16 ng/mL     Narrative:       As a Marker for Sepsis (Non-Neonates):   1. <0.5 ng/mL represents a low risk of severe sepsis and/or septic shock.  1. >2 ng/mL represents a high risk of severe sepsis and/or septic shock.    As a Marker for Lower Respiratory Tract Infections that require antibiotic  "therapy:  PCT on Admission     Antibiotic Therapy             6-12 Hrs later  > 0.5                Strongly Recommended            >0.25 - <0.5         Recommended  0.1 - 0.25           Discouraged                   Remeasure/reassess PCT  <0.1                 Strongly Discouraged          Remeasure/reassess PCT      As 28 day mortality risk marker: \"Change in Procalcitonin Result\" (> 80 % or <=80 %) if Day 0 (or Day 1) and Day 4 values are available. Refer to http://[email or web address]-[email or web address]/   Change in PCT <=80 %   A decrease of PCT levels below or equal to 80 % defines a positive change in PCT test result representing a higher risk for 28-day all-cause mortality of patients diagnosed with severe sepsis or septic shock.  Change in PCT > 80 %   A decrease of PCT levels of more than 80 % defines a negative change in PCT result representing a lower risk for 28-day all-cause mortality of patients diagnosed with severe sepsis or septic shock.                Results may be falsely decreased if patient taking Biotin.     Comprehensive Metabolic Panel [[id number]]  (Abnormal) Collected:  04/01/20 0809    Specimen:  Blood Updated:  04/01/20 0841     Glucose 127 mg/dL      BUN 3 mg/dL      Creatinine 0.85 mg/dL      Sodium 135 mmol/L      Potassium 3.2 mmol/L      Chloride 84 mmol/L      CO2 18.7 mmol/L      Calcium 8.5 mg/dL      Total Protein 7.1 g/dL      Albumin 3.70 g/dL      ALT (SGPT) 65 U/L      AST (SGOT) 124 U/L      Alkaline Phosphatase 128 U/L      Total Bilirubin 1.4 mg/dL      eGFR Non African Amer 72 mL/min/1.73      Globulin 3.4 gm/dL      A/G Ratio 1.1 g/dL      BUN/Creatinine Ratio 3.5     Anion Gap 32.3 mmol/L     Narrative:       GFR Normal >60  Chronic Kidney Disease <60  Kidney Failure <15      C-reactive Protein [[id number]]  (Abnormal) Collected:  04/01/20 0809    Specimen:  Blood Updated:  04/01/20 0841     C-Reactive Protein 0.63 mg/dL     Lactate Dehydrogenase [[id number]]  (Abnormal) Collected: "  04/01/20 0809    Specimen:  Blood Updated:  04/01/20 0841      U/L     Magnesium [[id number]]  (Abnormal) Collected:  04/01/20 0809    Specimen:  Blood Updated:  04/01/20 0841     Magnesium 1.1 mg/dL     Troponin [[id number]]  (Normal) Collected:  04/01/20 0809    Specimen:  Blood Updated:  04/01/20 0841     Troponin T <0.010 ng/mL     Narrative:       Troponin T Reference Range:  <= 0.03 ng/mL-   Negative for AMI  >0.03 ng/mL-     Abnormal for myocardial necrosis.  Clinicians would have to utilize clinical acumen, EKG, Troponin and serial changes to determine if it is an Acute Myocardial Infarction or myocardial injury due to an underlying chronic condition.       Results may be falsely decreased if patient taking Biotin.      CBC Auto Differential [[id number]]  (Abnormal) Collected:  04/01/20 0809    Specimen:  Blood Updated:  04/01/20 0828     WBC 8.04 10*3/mm3      RBC 4.48 10*6/mm3      Hemoglobin 16.3 g/dL      Hematocrit 46.2 %      .1 fL      MCH 36.4 pg      MCHC 35.3 g/dL      RDW 15.4 %      RDW-SD 57.9 fl      MPV 9.5 fL      Platelets 244 10*3/mm3      Neutrophil % 68.8 %      Lymphocyte % 22.6 %      Monocyte % 7.2 %      Eosinophil % 0.4 %      Basophil % 0.6 %      Immature Grans % 0.4 %      Neutrophils, Absolute 5.53 10*3/mm3      Lymphocytes, Absolute 1.82 10*3/mm3      Monocytes, Absolute 0.58 10*3/mm3      Eosinophils, Absolute 0.03 10*3/mm3      Basophils, Absolute 0.05 10*3/mm3      Immature Grans, Absolute 0.03 10*3/mm3      nRBC 0.5 /100 WBC         Data Review:  Results from last 7 days   Lab Units 04/10/20  0524 04/09/20  0532 04/08/20 0623   SODIUM mmol/L 133* 135* 131*   POTASSIUM mmol/L 3.5 3.5 3.6   CHLORIDE mmol/L 90* 92* 93*   CO2 mmol/L 27.5 29.4* 23.3   BUN mg/dL 8 7 10   CREATININE mg/dL 0.45* 0.47* 0.56*   GLUCOSE mg/dL 137* 115* 178*   CALCIUM mg/dL 8.6 8.2* 8.0*     Results from last 7 days   Lab Units 04/09/20  0532 04/08/20  0623 04/07/20  0545   WBC 10*3/mm3  8.56 7.93 7.51   HEMOGLOBIN g/dL 12.6 13.2 13.4   HEMATOCRIT % 35.2 37.0 37.8   PLATELETS 10*3/mm3 235 215 202             Lab Results   Lab Value Date/Time    TROPONINT <0.010 04/01/2020 0809    TROPONINT <0.010 11/10/2019 1525         Results from last 7 days   Lab Units 04/10/20  0524 04/09/20  0532 04/08/20  0623   ALK PHOS U/L 144* 145* 177*   BILIRUBIN mg/dL 1.0 1.3* 0.9   ALT (SGPT) U/L 45* 51* 58*   AST (SGOT) U/L 91* 98* 129*             Glucose   Date/Time Value Ref Range Status   04/10/2020 1123 126 70 - 130 mg/dL Final   04/10/2020 0555 155 (H) 70 - 130 mg/dL Final   04/09/2020 2119 139 (H) 70 - 130 mg/dL Final   04/09/2020 1638 157 (H) 70 - 130 mg/dL Final   04/09/2020 1150 124 70 - 130 mg/dL Final   04/09/2020 0618 130 70 - 130 mg/dL Final   04/08/2020 2025 129 70 - 130 mg/dL Final   04/08/2020 1637 137 (H) 70 - 130 mg/dL Final           Past Medical History:   Diagnosis Date   • Aneurysm (CMS/HCC)    • Arthritis    • Carpal tunnel syndrome    • Chest pain    • Diabetes mellitus (CMS/HCC)    • Dysmetabolic syndrome X    • Gestational diabetes mellitus    • Hypothyroidism    • Metabolic disorder    • Nonalcoholic steatohepatitis    • Obesity    • Palpitations    • Sleep apnea    • Spinal headache    • Type 2 diabetes mellitus (CMS/HCC)    • Vitamin D deficiency        Assessment:  Active Hospital Problems    Diagnosis  POA   • **Dyspnea [R06.00]  Yes   • Cough [R05]  Unknown   • Hypomagnesemia [E83.42]  Unknown   • Metabolic acidosis [E87.2]  Unknown   • Fatigue [R53.83]  Unknown   • History of COPD [Z87.09]  Not Applicable   • Tobacco abuse [Z72.0]  Yes   • Rheumatoid arthritis (CMS/HCC) [M06.9]  Yes   • Type 2 diabetes mellitus (CMS/HCC) [E11.9]  Yes   • Morbid obesity (CMS/HCC) [E66.01]  Yes   • Elevated LFTs [R94.5]  Yes   • YOUNG (nonalcoholic steatohepatitis) [K75.81]  Yes   • Diabetes mellitus arising in pregnancy [O24.419]  Yes   • Hypothyroid [E03.9]  Yes      Resolved Hospital Problems   No  resolved problems to display.       Plan:  Continue OFF IV fluids and follow lab. ECHO report noted. Off IV fluids and increase proamatine for low BP. Got diuretics.  Elevated inflammatory markers. Will follow. CT abdomen, chest and GI consult noted.. Repeat COVID test was negative..  PT eval.  Colonoscopy later as outpatient.  Continue supportive care. Repeat CXR.  Tylor Cruz MD  4/10/2020  13:10

## 2020-04-10 NOTE — PROGRESS NOTES
Continued Stay Note  [facility]     Patient Name: Oralia Sánchez  MRN: [id number]  Today's Date: 4/10/2020    Admit Date: 4/1/2020    Discharge Plan     Row Name 04/10/20 1207       Plan    Plan  Probable SNF vs HH    Patient/Family in Agreement with Plan  yes    Plan Comments  Met with patient at bedside to discuss discharge plans. Pt states she is not comfortable returning home at discharge as she feels she needs more physical therapy to get stronger. She states she does not want to return home with home health and feels she needs SNF/Rehab. CCP confirmed with Physical Therapy that they will see patient today and discussed the patient's concerns with them. SNF/Rehab Quality Measures List given to patient. Await patient's choices. CCP will continue to follow. --OSCAR Aguilar        Discharge Codes    No documentation.             Karen Lopez RN

## 2020-04-10 NOTE — PROGRESS NOTES
[facility]  Inpatient Progress Note    Reason for Follow Up: Abnormal imaging elevated liver test steatosis    Subjective     Interval History:   Still short of air, awaiting pulmonary critical care doctor to see her this morning as she has questions    Current Facility-Administered Medications:   •  acetaminophen (TYLENOL) tablet 650 mg, 650 mg, Oral, Q4H PRN, 650 mg at 04/03/20 0913 **OR** acetaminophen (TYLENOL) 160 MG/5ML solution 650 mg, 650 mg, Oral, Q4H PRN **OR** acetaminophen (TYLENOL) suppository 650 mg, 650 mg, Rectal, Q4H PRN, Tylor rCuz MD  •  Benzocaine 20 % ointment 1 application, 1 application, Apply externally, BID PRN, Tylor Cruz MD, 1 application at 04/06/20 1022  •  benzonatate (TESSALON) capsule 200 mg, 200 mg, Oral, TID PRN, Everton Mcrae MD, 200 mg at 04/04/20 2037  •  budesonide-formoterol (SYMBICORT) 160-4.5 MCG/ACT inhaler 2 puff, 2 puff, Inhalation, BID - RT, Shawn Lopez MD, 2 puff at 04/10/20 0737  •  calcium carbonate (TUMS) chewable tablet 500 mg (200 mg elemental), 2 tablet, Oral, 4x Daily PRN, Tylor Cruz MD  •  dextrose (D50W) 25 g/ 50mL Intravenous Solution 25 g, 25 g, Intravenous, Q15 Min PRN, Tylor Cruz MD  •  dextrose (GLUTOSE) oral gel 15 g, 15 g, Oral, Q15 Min PRN, Tylor Cruz MD  •  diphenhydrAMINE (BENADRYL) capsule 25 mg, 25 mg, Oral, Q4H PRN, Tylor Cruz MD, 25 mg at 04/10/20 0839  •  furosemide (LASIX) injection 40 mg, 40 mg, Intravenous, Q6H, Shawn Lopez MD, 40 mg at 04/10/20 1145  •  glucagon (human recombinant) (GLUCAGEN DIAGNOSTIC) injection 1 mg, 1 mg, Subcutaneous, Q15 Min PRN, Tylor Cruz MD  •  guaiFENesin-dextromethorphan (ROBITUSSIN DM) 100-10 MG/5ML syrup 5 mL, 5 mL, Oral, Q4H PRN, Tylor Cruz MD, 5 mL at 04/04/20 1153  •  insulin lispro (humaLOG) injection 0-7 Units, 0-7 Units, Subcutaneous, 4x Daily With Meals & Nightly, Tylor Cruz MD, 2 Units at 04/10/20 0834  •  levothyroxine  (SYNTHROID, LEVOTHROID) tablet 200 mcg, 200 mcg, Oral, Daily, Tylor Cruz MD, 200 mcg at 04/10/20 0835  •  midodrine (PROAMATINE) tablet 10 mg, 10 mg, Oral, TID AC, Tylor Cruz MD, 10 mg at 04/10/20 1111  •  pantoprazole (PROTONIX) EC tablet 40 mg, 40 mg, Oral, BID AC, Jayshree Payne APRN, 40 mg at 04/10/20 0835  •  potassium chloride (MICRO-K) CR capsule 40 mEq, 40 mEq, Oral, PRN, 40 mEq at 04/05/20 1723 **OR** potassium chloride (KLOR-CON) packet 40 mEq, 40 mEq, Oral, PRN, 40 mEq at 04/01/20 2052 **OR** potassium chloride 10 mEq in 100 mL IVPB, 10 mEq, Intravenous, Q1H PRN, Tylor Cruz MD  •  promethazine (PHENERGAN) tablet 12.5 mg, 12.5 mg, Oral, Q6H PRN, 12.5 mg at 04/04/20 0009 **OR** promethazine (PHENERGAN) injection 12.5 mg, 12.5 mg, Intramuscular, Q6H PRN **OR** promethazine (PHENERGAN) injection 12.5 mg, 12.5 mg, Intravenous, Q6H PRN, 12.5 mg at 04/04/20 2345 **OR** promethazine (PHENERGAN) suppository 12.5 mg, 12.5 mg, Rectal, Q6H PRN, Tylor Cruz MD  •  sodium bicarbonate tablet 650 mg, 650 mg, Oral, TID, Tylor Cruz MD, 650 mg at 04/10/20 0835  •  sodium chloride 0.9 % flush 10 mL, 10 mL, Intravenous, PRN, Tylor Cruz MD  •  sucralfate (CARAFATE) 1 GM/10ML suspension 1 g, 1 g, Oral, 4x Daily AC & at Bedtime, Moe Lozano MD, 1 g at 04/10/20 1111  Review of Systems:    She has weakness fatigue all other systems reviewed and negative    Objective     Vital Signs  Temp:  [97.7 °F (36.5 °C)-98.8 °F (37.1 °C)] 98.1 °F (36.7 °C)  Heart Rate:  [93-99] 93  Resp:  [11-20] 11  BP: ()/(51-70) 112/64  Body mass index is 47.84 kg/m².    Intake/Output Summary (Last 24 hours) at 4/10/2020 1216  Last data filed at 4/10/2020 0601  Gross per 24 hour   Intake 540 ml   Output 3525 ml   Net -2985 ml     No intake/output data recorded.     Physical Exam:   General: patient awake, alert and cooperative   Eyes: Normal lids and lashes, no scleral icterus   Neck: supple, normal ROM   Skin: warm and  dry, not jaundiced   Cardiovascular: regular rhythm and rate, no murmurs auscultated   Pulm: clear to auscultation bilaterally, regular and unlabored   Abdomen: soft, nontender, nondistended; normal bowel sounds   Extremities: no rash or edema   Psychiatric: Normal mood and behavior; memory intact     Results Review:     I reviewed the patient's new clinical results.    Results from last 7 days   Lab Units 04/09/20  0532 04/08/20  0623 04/07/20  0545   WBC 10*3/mm3 8.56 7.93 7.51   HEMOGLOBIN g/dL 12.6 13.2 13.4   HEMATOCRIT % 35.2 37.0 37.8   PLATELETS 10*3/mm3 235 215 202     Results from last 7 days   Lab Units 04/10/20  0524 04/09/20  0532 04/08/20  0623   SODIUM mmol/L 133* 135* 131*   POTASSIUM mmol/L 3.5 3.5 3.6   CHLORIDE mmol/L 90* 92* 93*   CO2 mmol/L 27.5 29.4* 23.3   BUN mg/dL 8 7 10   CREATININE mg/dL 0.45* 0.47* 0.56*   CALCIUM mg/dL 8.6 8.2* 8.0*   BILIRUBIN mg/dL 1.0 1.3* 0.9   ALK PHOS U/L 144* 145* 177*   ALT (SGPT) U/L 45* 51* 58*   AST (SGOT) U/L 91* 98* 129*   GLUCOSE mg/dL 137* 115* 178*         No results found for: LIPASE    Radiology:  CT Head Without Contrast   Final Result      US Pelvis Complete   Final Result   1. There is a 4.0 cm right ovarian benign-appearing cyst which may   represent a paraovarian cyst or possibly a follicular cyst. Six-month   sonographic follow-up is recommended.   2. Normal sonographic appearance of the pelvis and left ovary.       This report was finalized on 4/9/2020 10:48 AM by Dr. Gregorio Price M.D.          US Liver   Final Result   1. There are sonographic features of the liver that are consistent with   hepatic steatosis. Chronic hepatitis, possibly due to chronic hepatic   steatosis is also suspected.   2. Gallbladder sludge.       This report was finalized on 4/9/2020 10:09 AM by Dr. Gregorio Price M.D.          CT Abdomen Pelvis Stone Protocol   Final Result           1. Mild groundglass opacities in the right more than left upper lungs   show  interval worsening, clinical correlation and continued follow-up   recommended.   2. Presacral edema/stranding and fluid may represent inflammatory   process or potentially infiltrative process involving the rectum,   suggest clinical correlation and direct visualization as indicated.   3. Stranding around the right adnexa may be evidence of inflammatory   process involving the right adnexa, and an indeterminate rounded density   is apparent at the right adnexa. Follow-up evaluation with ultrasound is   advised. No hydronephrosis. Suspected cholelithiasis. Hepatic steatosis,   hepatomegaly.   4. Skin thickening at the anterior lower abdomen with subcutaneous   stranding density may reflect cellulitis, correlate with clinical exam.        This report was finalized on 4/7/2020 4:14 PM by Dr. Rogerio Campo M.D.          CT Chest Without Contrast   Final Result           1. Mild groundglass opacities in the right more than left upper lungs   show interval worsening, clinical correlation and continued follow-up   recommended.   2. Presacral edema/stranding and fluid may represent inflammatory   process or potentially infiltrative process involving the rectum,   suggest clinical correlation and direct visualization as indicated.   3. Stranding around the right adnexa may be evidence of inflammatory   process involving the right adnexa, and an indeterminate rounded density   is apparent at the right adnexa. Follow-up evaluation with ultrasound is   advised. No hydronephrosis. Suspected cholelithiasis. Hepatic steatosis,   hepatomegaly.   4. Skin thickening at the anterior lower abdomen with subcutaneous   stranding density may reflect cellulitis, correlate with clinical exam.        This report was finalized on 4/7/2020 4:14 PM by Dr. Rogerio Campo M.D.          CT Chest Without Contrast   Final Result       There is no evidence for an acute infiltrate within the left lung base.   However, there are very  subtle small patchy areas of groundglass opacity   within the right lung apex as best seen on images #15 and 19 that were   not evident on the prior chest CT of 11/10/2019. These findings are   entirely nonspecific in nature and could represent an age-indeterminate   pneumonitis. If the patient's symptoms do not improve or worsen, I would   recommend a follow-up CT scan of the chest without use of IV contrast   during this admission. However, if the patient's symptoms do improve, I   would still recommend a follow-up CT scan of the chest but, in this   particular case, at interval of approximately 2-3 weeks. These findings   and recommendations were directly discussed with Dr. Johana Gamez on the   morning of 04/06/2020 at approximately 7:45 AM.       Radiation dose reduction techniques were utilized, including automated   exposure control and exposure modulation based on body size.       This report was finalized on 4/6/2020 7:56 AM by Dr. Joe Mcnamara M.D.          XR Chest 1 View   Final Result      XR Chest AP   Final Result      XR Chest 1 View    (Results Pending)   US Non-ob Transvaginal    (Results Pending)       Assessment/Plan     Patient Active Problem List   Diagnosis   • Vitamin D deficiency   • Awareness of heartbeats   • Adiposity   • YOUNG (nonalcoholic steatohepatitis)   • Hypothyroid   • Diabetes mellitus arising in pregnancy   • Diabetes (CMS/HCC)   • Metabolic syndrome   • Chest pain   • Primary thunderclap headache   • Elevated LFTs   • Tobacco abuse   • Rheumatoid arthritis (CMS/HCC)   • Type 2 diabetes mellitus (CMS/HCC)   • Morbid obesity (CMS/HCC)   • UTI (urinary tract infection), bacterial   • Cerebral aneurysm   • Dyspnea   • Cough   • Hypomagnesemia   • Metabolic acidosis   • Fatigue   • History of COPD       Assessment:   1.  Abnormal CAT scan of the abdomen showing a possible infiltrative process involving the rectum.  The patient has never had a colonoscopy.  I feel that she is too ill  (SOA)  to have a colonoscopy at this moment.  2.  This patient has some type of respiratory illness that does not appear to be coronavirus.  3.  She has elevated liver function tests with steatosis of the liver on CAT scan of the abdomen.  Her last liver function tests were normal in 6 of 2019 and seem to be intermittently elevated.  4.  The patient is obese.     Plan:   1.  Gallbladder sludge, steatosis on ultrasound.  Steatosis likely source of elevated liver tests, asymptomatic from the sludge in the gallbladder absolutely does not need a surgical evaluation  2.  I would recommend that she stop smoking.  3.  She needs to lose weight.  4.  She should eventually have a colonoscopy but I feel that this pulmonary illness needs to be resolved before a colonoscopy is done.  The colonoscopy could be done as an outpatient.     I discussed the patients findings and my recommendations with patient and nursing staff.    Chano Monique MD

## 2020-04-10 NOTE — PAYOR COMM NOTE
"P: [phone]  F: [phone]    CONTINUED STAY REVIEW    REF #[id number]        Oralia Smith (44 y.o. Female)     Date of Birth Social Security Number Address Home Phone MRN    1975  [id number] [facility] [address] [phone] [id number]    P[employer]al Marital Status          Nondenominational        Admission Date Admission Type Admitting Provider Attending Provider Department, Room/Bed    20 Emergency Tylor Cruz MD Beard, Lyle E, MD [facility] 5 [facility], E564/1    Discharge Date Discharge Disposition Discharge Destination                       Attending Provider:  Tylor Cruz MD    Allergies:  No Known Drug Allergy    Isolation:  None   Infection:  None   Code Status:  CPR    Ht:  154.9 cm (61\")   Wt:  115 kg (253 lb 3.2 oz)    Admission Cmt:  None   Principal Problem:  Dyspnea [R06.00]                 Active Insurance as of 2020     Primary Coverage     Payor Plan Insurance Group Employer/Plan Group    [employer]HEM BLUE CROSS ANTHEM BLUE CROSS BLUE SHIELD PPO [id number]     Payor Plan Address Payor Plan Phone Number Payor Plan Fax Number Effective Dates    [address] [phone]  2018 - None Entered    [place] [address]       Subscriber Name Subscriber Birth Date Member ID       ORALIA SMITH 1975 [id number]                 Emergency Contacts      (Rel.) Home Phone Work Phone Mobile Phone    LUCRECIA SMITH (Son) -- -- [phone]    scott miller -- -- [phone]          Lines, Drains & Airways    Active LDAs     Name:   Placement date:   Placement time:   Site:   Days:    Peripheral IV 20 1809 Right Forearm   20    1809    Forearm   2                     Physician Progress Notes       Tylor Cruz MD at 04/10/20 1310          DAILY PROGRESS NOTE  [facility]    Patient Identification:  Name: Oralia Smith  Age: 44 y.o.  Sex: female  :  1975  MRN: [id number]         Primary Care " "Physician: Provider, No Known    Subjective:  Interval History:She has a bad cough and is short of air but worse today.  requiring  O 2 at night. Extreme fatigue.  Abdominal pain and swelling.    Objective:    Scheduled Meds:    budesonide-formoterol 2 puff Inhalation BID - RT   furosemide 40 mg Intravenous Q6H   insulin lispro 0-7 Units Subcutaneous 4x Daily With Meals & Nightly   levothyroxine 200 mcg Oral Daily   midodrine 10 mg Oral TID AC   pantoprazole 40 mg Oral BID AC   sodium bicarbonate 650 mg Oral TID   sucralfate 1 g Oral 4x Daily AC & at Bedtime     Continuous Infusions:       Vital signs in last 24 hours:  Temp:  [97.7 °F (36.5 °C)-98.8 °F (37.1 °C)] 98.1 °F (36.7 °C)  Heart Rate:  [93-99] 93  Resp:  [11-20] 11  BP: ()/(51-70) 112/64    Intake/Output:    Intake/Output Summary (Last 24 hours) at 4/10/2020 1310  Last data filed at 4/10/2020 0601  Gross per 24 hour   Intake 240 ml   Output 3525 ml   Net -3285 ml       Exam:  /64   Pulse 93   Temp 98.1 °F (36.7 °C) (Oral)   Resp 11   Ht 154.9 cm (61\")   Wt 115 kg (253 lb 3.2 oz)   LMP 03/28/2020   SpO2 98%   BMI 47.84 kg/m²      General Appearance:    Alert, cooperative, no distress   Head:    Normocephalic, without obvious abnormality, atraumatic   Eyes:       Throat:   Lips, tongue, gums normal   Neck:   Supple, symmetrical, trachea midline, no JVD   Lungs:     Crackles and wheezes. bilaterally, respirations unlabored   Chest Wall:    No tenderness or deformity    Heart:    Regular rate and rhythm, S1 and S2 normal, no murmur,no  Rub or gallop   Abdomen:     Soft, non-tender, bowel sounds active, no masses, no organomegaly    Extremities:   Extremities normal, atraumatic, no cyanosis or edema   Pulses:      Skin:   Skin is warm and dry,  Boil in pubic area   Neurologic:   no focal deficits noted      Data Review:  Results from last 7 days   Lab Units 04/10/20  0524 04/09/20  0532 04/08/20  0623   SODIUM mmol/L 133* 135* 131* "   POTASSIUM mmol/L 3.5 3.5 3.6   CHLORIDE mmol/L 90* 92* 93*   CO2 mmol/L 27.5 29.4* 23.3   BUN mg/dL 8 7 10   CREATININE mg/dL 0.45* 0.47* 0.56*   GLUCOSE mg/dL 137* 115* 178*   CALCIUM mg/dL 8.6 8.2* 8.0*     Results from last 7 days   Lab Units 04/09/20  0532 04/08/20  0623 04/07/20  0545   WBC 10*3/mm3 8.56 7.93 7.51   HEMOGLOBIN g/dL 12.6 13.2 13.4   HEMATOCRIT % 35.2 37.0 37.8   PLATELETS 10*3/mm3 235 215 202               Results from last 7 days   Lab Units 04/10/20  0524 04/09/20  0532 04/08/20  0623   ALK PHOS U/L 144* 145* 177*   BILIRUBIN mg/dL 1.0 1.3* 0.9   ALT (SGPT) U/L 45* 51* 58*   AST (SGOT) U/L 91* 98* 129*             Glucose   Date/Time Value Ref Range Status   04/10/2020 1123 126 70 - 130 mg/dL Final   04/10/2020 0555 155 (H) 70 - 130 mg/dL Final   04/09/2020 2119 139 (H) 70 - 130 mg/dL Final   04/09/2020 1638 157 (H) 70 - 130 mg/dL Final   04/09/2020 1150 124 70 - 130 mg/dL Final   04/09/2020 0618 130 70 - 130 mg/dL Final   04/08/2020 2025 129 70 - 130 mg/dL Final   04/08/2020 1637 137 (H) 70 - 130 mg/dL Final         Assessment:  Active Hospital Problems    Diagnosis  POA   • **Dyspnea [R06.00]  Yes   • Cough [R05]  Unknown   • Hypomagnesemia [E83.42]  Unknown   • Metabolic acidosis [E87.2]  Unknown   • Fatigue [R53.83]  Unknown   • History of COPD [Z87.09]  Not Applicable   • Tobacco abuse [Z72.0]  Yes   • Rheumatoid arthritis (CMS/HCC) [M06.9]  Yes   • Type 2 diabetes mellitus (CMS/HCC) [E11.9]  Yes   • Morbid obesity (CMS/HCC) [E66.01]  Yes   • Elevated LFTs [R94.5]  Yes   • YOUNG (nonalcoholic steatohepatitis) [K75.81]  Yes   • Diabetes mellitus arising in pregnancy [O24.419]  Yes   • Hypothyroid [E03.9]  Yes      Resolved Hospital Problems   No resolved problems to display.       Plan:  Continue OFF IV fluids and follow lab. ECHO report noted. Off IV fluids and increase proamatine for low BP. Got diuretics.  Elevated inflammatory markers. Will follow. CT abdomen, chest and GI consult  noted.. Repeat COVID test was negative..  PT eval.  Colonoscopy later as outpatient.  Continue supportive care. Repeat CXR.  Tylor Cruz MD  4/10/2020  13:10      Electronically signed by Tylor Cruz MD at 04/10/20 1312     Chano Monique MD at 04/10/20 1216          [facility]  Inpatient Progress Note    Reason for Follow Up: Abnormal imaging elevated liver test steatosis    Subjective     Interval History:   Still short of air, awaiting pulmonary critical care doctor to see her this morning as she has questions    Current Facility-Administered Medications:   •  acetaminophen (TYLENOL) tablet 650 mg, 650 mg, Oral, Q4H PRN, 650 mg at 04/03/20 0913 **OR** acetaminophen (TYLENOL) 160 MG/5ML solution 650 mg, 650 mg, Oral, Q4H PRN **OR** acetaminophen (TYLENOL) suppository 650 mg, 650 mg, Rectal, Q4H PRN, Tylor Cruz MD  •  Benzocaine 20 % ointment 1 application, 1 application, Apply externally, BID PRN, Tylor Cruz MD, 1 application at 04/06/20 1022  •  benzonatate (TESSALON) capsule 200 mg, 200 mg, Oral, TID PRN, Everton Mcrae MD, 200 mg at 04/04/20 2037  •  budesonide-formoterol (SYMBICORT) 160-4.5 MCG/ACT inhaler 2 puff, 2 puff, Inhalation, BID - RT, Shawn Lopez MD, 2 puff at 04/10/20 0737  •  calcium carbonate (TUMS) chewable tablet 500 mg (200 mg elemental), 2 tablet, Oral, 4x Daily PRN, Tylor Cruz MD  •  dextrose (D50W) 25 g/ 50mL Intravenous Solution 25 g, 25 g, Intravenous, Q15 Min PRN, Tylor Cruz MD  •  dextrose (GLUTOSE) oral gel 15 g, 15 g, Oral, Q15 Min PRN, Tylor Cruz MD  •  diphenhydrAMINE (BENADRYL) capsule 25 mg, 25 mg, Oral, Q4H PRN, Tylor Cruz MD, 25 mg at 04/10/20 0839  •  furosemide (LASIX) injection 40 mg, 40 mg, Intravenous, Q6H, Shawn Lopez MD, 40 mg at 04/10/20 1145  •  glucagon (human recombinant) (GLUCAGEN DIAGNOSTIC) injection 1 mg, 1 mg, Subcutaneous, Q15 Min PRN, Tylor Cruz MD  •  guaiFENesin-dextromethorphan  (ROBITUSSIN DM) 100-10 MG/5ML syrup 5 mL, 5 mL, Oral, Q4H PRN, Tylor Curz MD, 5 mL at 04/04/20 1153  •  insulin lispro (humaLOG) injection 0-7 Units, 0-7 Units, Subcutaneous, 4x Daily With Meals & Nightly, Tylor Cruz MD, 2 Units at 04/10/20 0834  •  levothyroxine (SYNTHROID, LEVOTHROID) tablet 200 mcg, 200 mcg, Oral, Daily, Tylor Cruz MD, 200 mcg at 04/10/20 0835  •  midodrine (PROAMATINE) tablet 10 mg, 10 mg, Oral, TID AC, Tylor Cruz MD, 10 mg at 04/10/20 1111  •  pantoprazole (PROTONIX) EC tablet 40 mg, 40 mg, Oral, BID AC, Jayshree Payne, APRN, 40 mg at 04/10/20 0835  •  potassium chloride (MICRO-K) CR capsule 40 mEq, 40 mEq, Oral, PRN, 40 mEq at 04/05/20 1723 **OR** potassium chloride (KLOR-CON) packet 40 mEq, 40 mEq, Oral, PRN, 40 mEq at 04/01/20 2052 **OR** potassium chloride 10 mEq in 100 mL IVPB, 10 mEq, Intravenous, Q1H PRN, Tylor Cruz MD  •  promethazine (PHENERGAN) tablet 12.5 mg, 12.5 mg, Oral, Q6H PRN, 12.5 mg at 04/04/20 0009 **OR** promethazine (PHENERGAN) injection 12.5 mg, 12.5 mg, Intramuscular, Q6H PRN **OR** promethazine (PHENERGAN) injection 12.5 mg, 12.5 mg, Intravenous, Q6H PRN, 12.5 mg at 04/04/20 2345 **OR** promethazine (PHENERGAN) suppository 12.5 mg, 12.5 mg, Rectal, Q6H PRN, Tylor Cruz MD  •  sodium bicarbonate tablet 650 mg, 650 mg, Oral, TID, Tylor Cruz MD, 650 mg at 04/10/20 0835  •  sodium chloride 0.9 % flush 10 mL, 10 mL, Intravenous, PRN, Tylor Cruz MD  •  sucralfate (CARAFATE) 1 GM/10ML suspension 1 g, 1 g, Oral, 4x Daily AC & at Bedtime, Moe Lozano MD, 1 g at 04/10/20 1111  Review of Systems:    She has weakness fatigue all other systems reviewed and negative    Objective     Vital Signs  Temp:  [97.7 °F (36.5 °C)-98.8 °F (37.1 °C)] 98.1 °F (36.7 °C)  Heart Rate:  [93-99] 93  Resp:  [11-20] 11  BP: ()/(51-70) 112/64  Body mass index is 47.84 kg/m².    Intake/Output Summary (Last 24 hours) at 4/10/2020 1216  Last data filed at  4/10/2020 0601  Gross per 24 hour   Intake 540 ml   Output 3525 ml   Net -2985 ml     No intake/output data recorded.     Physical Exam:   General: patient awake, alert and cooperative   Eyes: Normal lids and lashes, no scleral icterus   Neck: supple, normal ROM   Skin: warm and dry, not jaundiced   Cardiovascular: regular rhythm and rate, no murmurs auscultated   Pulm: clear to auscultation bilaterally, regular and unlabored   Abdomen: soft, nontender, nondistended; normal bowel sounds   Extremities: no rash or edema   Psychiatric: Normal mood and behavior; memory intact     Results Review:     I reviewed the patient's new clinical results.    Results from last 7 days   Lab Units 04/09/20  0532 04/08/20  0623 04/07/20  0545   WBC 10*3/mm3 8.56 7.93 7.51   HEMOGLOBIN g/dL 12.6 13.2 13.4   HEMATOCRIT % 35.2 37.0 37.8   PLATELETS 10*3/mm3 235 215 202     Results from last 7 days   Lab Units 04/10/20  0524 04/09/20  0532 04/08/20  0623   SODIUM mmol/L 133* 135* 131*   POTASSIUM mmol/L 3.5 3.5 3.6   CHLORIDE mmol/L 90* 92* 93*   CO2 mmol/L 27.5 29.4* 23.3   BUN mg/dL 8 7 10   CREATININE mg/dL 0.45* 0.47* 0.56*   CALCIUM mg/dL 8.6 8.2* 8.0*   BILIRUBIN mg/dL 1.0 1.3* 0.9   ALK PHOS U/L 144* 145* 177*   ALT (SGPT) U/L 45* 51* 58*   AST (SGOT) U/L 91* 98* 129*   GLUCOSE mg/dL 137* 115* 178*         No results found for: LIPASE    Radiology:  CT Head Without Contrast   Final Result      US Pelvis Complete   Final Result   1. There is a 4.0 cm right ovarian benign-appearing cyst which may   represent a paraovarian cyst or possibly a follicular cyst. Six-month   sonographic follow-up is recommended.   2. Normal sonographic appearance of the pelvis and left ovary.       This report was finalized on 4/9/2020 10:48 AM by Dr. Gregorio Price M.D.          US Liver   Final Result   1. There are sonographic features of the liver that are consistent with   hepatic steatosis. Chronic hepatitis, possibly due to chronic hepatic    steatosis is also suspected.   2. Gallbladder sludge.       This report was finalized on 4/9/2020 10:09 AM by Dr. Gregorio Price M.D.          CT Abdomen Pelvis Stone Protocol   Final Result           1. Mild groundglass opacities in the right more than left upper lungs   show interval worsening, clinical correlation and continued follow-up   recommended.   2. Presacral edema/stranding and fluid may represent inflammatory   process or potentially infiltrative process involving the rectum,   suggest clinical correlation and direct visualization as indicated.   3. Stranding around the right adnexa may be evidence of inflammatory   process involving the right adnexa, and an indeterminate rounded density   is apparent at the right adnexa. Follow-up evaluation with ultrasound is   advised. No hydronephrosis. Suspected cholelithiasis. Hepatic steatosis,   hepatomegaly.   4. Skin thickening at the anterior lower abdomen with subcutaneous   stranding density may reflect cellulitis, correlate with clinical exam.        This report was finalized on 4/7/2020 4:14 PM by Dr. Rogerio Campo M.D.          CT Chest Without Contrast   Final Result           1. Mild groundglass opacities in the right more than left upper lungs   show interval worsening, clinical correlation and continued follow-up   recommended.   2. Presacral edema/stranding and fluid may represent inflammatory   process or potentially infiltrative process involving the rectum,   suggest clinical correlation and direct visualization as indicated.   3. Stranding around the right adnexa may be evidence of inflammatory   process involving the right adnexa, and an indeterminate rounded density   is apparent at the right adnexa. Follow-up evaluation with ultrasound is   advised. No hydronephrosis. Suspected cholelithiasis. Hepatic steatosis,   hepatomegaly.   4. Skin thickening at the anterior lower abdomen with subcutaneous   stranding density may reflect  cellulitis, correlate with clinical exam.        This report was finalized on 4/7/2020 4:14 PM by Dr. Rogerio Campo M.D.          CT Chest Without Contrast   Final Result       There is no evidence for an acute infiltrate within the left lung base.   However, there are very subtle small patchy areas of groundglass opacity   within the right lung apex as best seen on images #15 and 19 that were   not evident on the prior chest CT of 11/10/2019. These findings are   entirely nonspecific in nature and could represent an age-indeterminate   pneumonitis. If the patient's symptoms do not improve or worsen, I would   recommend a follow-up CT scan of the chest without use of IV contrast   during this admission. However, if the patient's symptoms do improve, I   would still recommend a follow-up CT scan of the chest but, in this   particular case, at interval of approximately 2-3 weeks. These findings   and recommendations were directly discussed with Dr. Johana Gamez on the   morning of 04/06/2020 at approximately 7:45 AM.       Radiation dose reduction techniques were utilized, including automated   exposure control and exposure modulation based on body size.       This report was finalized on 4/6/2020 7:56 AM by Dr. Joe Mcnamara M.D.          XR Chest 1 View   Final Result      XR Chest AP   Final Result      XR Chest 1 View    (Results Pending)   US Non-ob Transvaginal    (Results Pending)       Assessment/Plan     Patient Active Problem List   Diagnosis   • Vitamin D deficiency   • Awareness of heartbeats   • Adiposity   • YOUNG (nonalcoholic steatohepatitis)   • Hypothyroid   • Diabetes mellitus arising in pregnancy   • Diabetes (CMS/HCC)   • Metabolic syndrome   • Chest pain   • Primary thunderclap headache   • Elevated LFTs   • Tobacco abuse   • Rheumatoid arthritis (CMS/HCC)   • Type 2 diabetes mellitus (CMS/HCC)   • Morbid obesity (CMS/HCC)   • UTI (urinary tract infection), bacterial   • Cerebral aneurysm   •  Dyspnea   • Cough   • Hypomagnesemia   • Metabolic acidosis   • Fatigue   • History of COPD       Assessment:   1.  Abnormal CAT scan of the abdomen showing a possible infiltrative process involving the rectum.  The patient has never had a colonoscopy.  I feel that she is too ill (SOA)  to have a colonoscopy at this moment.  2.  This patient has some type of respiratory illness that does not appear to be coronavirus.  3.  She has elevated liver function tests with steatosis of the liver on CAT scan of the abdomen.  Her last liver function tests were normal in 6 of 2019 and seem to be intermittently elevated.  4.  The patient is obese.     Plan:   1.  Gallbladder sludge, steatosis on ultrasound.  Steatosis likely source of elevated liver tests, asymptomatic from the sludge in the gallbladder absolutely does not need a surgical evaluation  2.  I would recommend that she stop smoking.  3.  She needs to lose weight.  4.  She should eventually have a colonoscopy but I feel that this pulmonary illness needs to be resolved before a colonoscopy is done.  The colonoscopy could be done as an outpatient.     I discussed the patients findings and my recommendations with patient and nursing staff.    Chano Monique MD                Electronically signed by Chano Monique MD at 04/10/20 1217             Karen Lopez RN      Case Management   Progress Notes   Signed   Date of Service:  04/10/20 1213   Creation Time:  04/10/20 1213            Signed             Continued Stay Note  [facility]     Patient Name: Oralia Sánchez                 MRN: [id number]  Today's Date: 4/10/2020                     Admit Date: 4/1/2020          Discharge Plan      Row Name 04/10/20 1207           Plan     Plan  Probable SNF vs      Patient/Family in Agreement with Plan  yes     Plan Comments  Met with patient at bedside to discuss discharge plans. Pt states she is not comfortable returning home at discharge as she  feels she needs more physical therapy to get stronger. She states she does not want to return home with home health and feels she needs SNF/Rehab. CCP confirmed with Physical Therapy that they will see patient today and discussed the patient's concerns with them. SNF/Rehab Quality Measures List given to patient. Await patient's choices. CCP will continue to follow. --OSCAR Aguilar

## 2020-04-10 NOTE — THERAPY TREATMENT NOTE
Acute Care - Physical Therapy Treatment Note  [facility]     Patient Name: Oralia Sánchez  : 1975  MRN: [id number]  Today's Date: 4/10/2020             Admit Date: 2020    Visit Dx:    ICD-10-CM ICD-9-CM   1. Dyspnea, unspecified type R06.00 786.09   2. Metabolic acidosis E87.2 276.2   3. Cough R05 786.2   4. Fatigue, unspecified type R53.83 780.79   5. History of COPD Z87.09 V12.69   6. Hypomagnesemia E83.42 275.2   7. Cerebral aneurysm rupture (CMS/HCC) I60.7 430     Patient Active Problem List   Diagnosis   • Vitamin D deficiency   • Awareness of heartbeats   • Adiposity   • YOUNG (nonalcoholic steatohepatitis)   • Hypothyroid   • Diabetes mellitus arising in pregnancy   • Diabetes (CMS/HCC)   • Metabolic syndrome   • Chest pain   • Primary thunderclap headache   • Elevated LFTs   • Tobacco abuse   • Rheumatoid arthritis (CMS/HCC)   • Type 2 diabetes mellitus (CMS/HCC)   • Morbid obesity (CMS/HCC)   • UTI (urinary tract infection), bacterial   • Cerebral aneurysm   • Dyspnea   • Cough   • Hypomagnesemia   • Metabolic acidosis   • Fatigue   • History of COPD       Therapy Treatment    Rehabilitation Treatment Summary     Row Name 04/10/20 0945             Treatment Time/Intention    Discipline  physical therapy assistant  -      Document Type  therapy note (daily note)  -CHANTAL      Subjective Information  complains of;weakness;fatigue;pain  -CHANTAL      Mode of Treatment  individual therapy;physical therapy  -      Care Plan Review  patient/other agree to care plan  -      Comment  very agreeable, wkness and extreme fatigue limiting  -      Recorded by [CHANTAL] Sheree Welsh PTA 04/10/20 1421      Row Name 04/10/20 7018             Bed Mobility Assessment/Treatment    Supine-Sit Alexander (Bed Mobility)  moderate assist (50% patient effort)  -CHANTAL      Sit-Supine Alexander (Bed Mobility)  minimum assist (75% patient effort);contact guard  -CHANTAL      Bed Mobility, Safety Issues  decreased use of  arms for pushing/pulling;decreased use of legs for bridging/pushing;impaired trunk control for bed mobility  -      Assistive Device (Bed Mobility)  bed rails;draw sheet  -      Comment (Bed Mobility)  post lean initially, then sat SBA EOB  -JM      Recorded by [] Sheree Welsh PTA 04/10/20 1421      Row Name 04/10/20 0915             Sit-Stand Transfer    Sit-Stand Rye (Transfers)  2 person assist;moderate assist (50% patient effort);minimum assist (75% patient effort);verbal cues  -      Assistive Device (Sit-Stand Transfers)  walker, front-wheeled  -      Recorded by [JM] Sheree Welsh PTA 04/10/20 1451      Row Name 04/10/20 0902             Gait/Stairs Assessment/Training    Rye Level (Gait)  2 person assist;minimum assist (75% patient effort);moderate assist (50% patient effort)  -      Assistive Device (Gait)  walker, front-wheeled  -      Distance in Feet (Gait)  25ft x2, req seated rest  -      Deviations/Abnormal Patterns (Gait)  antalgic;base of support, wide;quang decreased;festinating/shuffling;stride length decreased  -JM      Bilateral Gait Deviations  forward flexed posture;weight shift ability decreased  -      Comment (Gait/Stairs)  has to lock knees into ext bilat to avoid buckling, also has to lock elbows to keep trunk upright for amb  -JM      Recorded by [] Sheree Welsh PTA 04/10/20 1451      Row Name 04/10/20 0920             Therapeutic Exercise    Lower Extremity (Therapeutic Exercise)  heel slides, bilateral;LAQ (long arc quad), bilateral  -      Lower Extremity Range of Motion (Therapeutic Exercise)  ankle dorsiflexion/plantar flexion, bilateral  -      Sets/Reps (Therapeutic Exercise)  10 reps  -JM      Comment (Therapeutic Exercise)  difficulty w/all due to swelling  -      Recorded by [] Shreee Welsh PTA 04/10/20 1451      Row Name 04/10/20 0968             Positioning and Restraints    Pre-Treatment Position  in bed  -       In Bed  side lying left;call light within reach;encouraged to call for assist;exit alarm on  -      Recorded by [CHANTAL] Sheree Welsh PTA 04/10/20 2309      Row Name 04/10/20 0945             Pain Scale: Numbers Pre/Post-Treatment    Pain Scale: Numbers, Pretreatment  5/10  -      Pain Location - Side  Bilateral  -      Pain Location - Orientation  lower  -      Pain Location  extremity  -      Pre/Post Treatment Pain Comment  some abd pain also  -      Pain Intervention(s)  Repositioned;Ambulation/increased activity  -      Recorded by [CHANTAL] Sheree Welsh PTA 04/10/20 2501        User Key  (r) = Recorded By, (t) = Taken By, (c) = Cosigned By    Initials Name Effective Dates Discipline    Sheree Morton PTA 03/07/18 -  PT                       PT Recommendation and Plan     Plan of Care Reviewed With: patient  Progress: improving  Outcome Summary: pt still presents w/pain and swelling in extremities, skin hypersensitive to touch, agreeable to amb, but req seated rest due to fatigue; pt will need some follow up PT prior to DC home alone; feel pt motivated and would benefit from acute RHB, but also open to SNU pending progress and INS  Outcome Measures     Row Name 04/10/20 1400             How much help from another person do you currently need...    Turning from your back to your side while in flat bed without using bedrails?  3  -JM      Moving from lying on back to sitting on the side of a flat bed without bedrails?  2  -JM      Moving to and from a bed to a chair (including a wheelchair)?  3  -JM      Standing up from a chair using your arms (e.g., wheelchair, bedside chair)?  3  -JM      Climbing 3-5 steps with a railing?  1  -JM      To walk in hospital room?  2  -JM      AM-PAC 6 Clicks Score (PT)  14  -        User Key  (r) = Recorded By, (t) = Taken By, (c) = Cosigned By    Initials Name Provider Type    Sheree Morton PTA Physical Therapy Assistant         Time  Calculation:   PT Charges     Row Name 04/10/20 1416 04/10/20 0906          Time Calculation    Start Time  0938  -  --     Stop Time  1002  -  --     Time Calculation (min)  24 min  -CHANTAL  --     PT Received On  04/10/20  -CHANTAL  --     PT - Next Appointment  04/11/20  -CHANTAL  04/10/20  -CHANTAL       User Key  (r) = Recorded By, (t) = Taken By, (c) = Cosigned By    Initials Name Provider Type    Sheree Morton PTA Physical Therapy Assistant        Therapy Charges for Today     Code Description Service Date Service Provider Modifiers Qty    52116751755 HC PT THER PROC EA 15 MIN 4/10/2020 Sheree Welsh PTA GP 2    53574613167 HC PT THER SUPP EA 15 MIN 4/10/2020 Sheree Welsh PTA GP 2          PT G-Codes  Outcome Measure Options: AM-PAC 6 Clicks Basic Mobility (PT)  AM-PAC 6 Clicks Score (PT): 14    Sheree Welsh PTA  4/10/2020

## 2020-04-10 NOTE — PLAN OF CARE
Problem: Patient Care Overview  Goal: Plan of Care Review  Outcome: Ongoing (interventions implemented as appropriate)  Flowsheets (Taken 4/10/2020 0601)  Outcome Summary: VSS< c/o skin tenderness to abd. Asking for diuretics r/t weight gain, told to discuss with MD in AM. Spoke of wanting to increase activity and walk but did not occur. Slept and snacked through night, will continue to monitor  Goal: Individualization and Mutuality  Outcome: Ongoing (interventions implemented as appropriate)  Goal: Discharge Needs Assessment  Outcome: Ongoing (interventions implemented as appropriate)  Goal: Interprofessional Rounds/Family Conf  Outcome: Ongoing (interventions implemented as appropriate)     Problem: Fall Risk (Adult)  Goal: Absence of Fall  Outcome: Ongoing (interventions implemented as appropriate)     Problem: Skin Injury Risk (Adult)  Goal: Skin Health and Integrity  Outcome: Ongoing (interventions implemented as appropriate)     Problem: Nausea/Vomiting (Adult)  Goal: Symptom Relief  Outcome: Ongoing (interventions implemented as appropriate)  Goal: Adequate Hydration  Outcome: Ongoing (interventions implemented as appropriate)     Problem: Infection, Risk/Actual (Adult)  Goal: Infection Prevention/Resolution  Outcome: Ongoing (interventions implemented as appropriate)     Problem: Diabetes, Type 1 (Adult)  Goal: Signs and Symptoms of Listed Potential Problems Will be Absent, Minimized or Managed (Diabetes, Type 1)  Outcome: Ongoing (interventions implemented as appropriate)

## 2020-04-10 NOTE — PROGRESS NOTES
Continued Stay Note  Psychiatric     Patient Name: Oralia Sánchez  MRN: [id number]  Today's Date: 4/10/2020    Admit Date: 4/1/2020    Discharge Plan     Row Name 04/10/20 1542       Plan    Plan  Acute Rehab    Provided Post Acute Provider List?  Yes    Post Acute Provider List  Inpatient Rehab    Provided Post Acute Provider Quality & Resource List?  Yes    Post Acute Provider Quality and Resource List  Inpatient Rehab    Delivered To  Patient    Method of Delivery  In person    Patient/Family in Agreement with Plan  yes    Plan Comments  Met patient at bedside to discuss probable need for acute rehab at discharge. Patient is agreeable and would like to go to [facility]. Consult placed and spoke with April/BRENDON. Await BAR determination. CCP will continue to follow. --OSCAR Aguilar        Discharge Codes    No documentation.             Karen Lopez RN

## 2020-04-11 LAB
ACTIN IGG SERPL-ACNC: 11 UNITS (ref 0–19)
ALBUMIN SERPL-MCNC: 3 G/DL (ref 3.5–5.2)
ALBUMIN/GLOB SERPL: 1 G/DL
ALP SERPL-CCNC: 147 U/L (ref 39–117)
ALT SERPL W P-5'-P-CCNC: 49 U/L (ref 1–33)
ANION GAP SERPL CALCULATED.3IONS-SCNC: 15.9 MMOL/L (ref 5–15)
AST SERPL-CCNC: 85 U/L (ref 1–32)
BASOPHILS # BLD AUTO: 0.02 10*3/MM3 (ref 0–0.2)
BASOPHILS NFR BLD AUTO: 0.2 % (ref 0–1.5)
BILIRUB SERPL-MCNC: 1 MG/DL (ref 0.2–1.2)
BUN BLD-MCNC: 9 MG/DL (ref 6–20)
BUN/CREAT SERPL: 15.3 (ref 7–25)
C3 SERPL-MCNC: 192 MG/DL (ref 82–167)
C4 SERPL-MCNC: 20 MG/DL (ref 14–44)
CALCIUM SPEC-SCNC: 9.2 MG/DL (ref 8.6–10.5)
CHLORIDE SERPL-SCNC: 93 MMOL/L (ref 98–107)
CMV DNA SERPL NAA+PROBE-ACNC: NEGATIVE IU/ML
CO2 SERPL-SCNC: 27.1 MMOL/L (ref 22–29)
CREAT BLD-MCNC: 0.59 MG/DL (ref 0.57–1)
DEPRECATED RDW RBC AUTO: 53.6 FL (ref 37–54)
EOSINOPHIL # BLD AUTO: 0.01 10*3/MM3 (ref 0–0.4)
EOSINOPHIL NFR BLD AUTO: 0.1 % (ref 0.3–6.2)
ERYTHROCYTE [DISTWIDTH] IN BLOOD BY AUTOMATED COUNT: 14.2 % (ref 12.3–15.4)
FERRITIN SERPL-MCNC: 1370 NG/ML (ref 13–150)
GFR SERPL CREATININE-BSD FRML MDRD: 110 ML/MIN/1.73
GLOBULIN UR ELPH-MCNC: 3.1 GM/DL
GLUCOSE BLD-MCNC: 148 MG/DL (ref 65–99)
GLUCOSE BLDC GLUCOMTR-MCNC: 116 MG/DL (ref 70–130)
GLUCOSE BLDC GLUCOMTR-MCNC: 120 MG/DL (ref 70–130)
GLUCOSE BLDC GLUCOMTR-MCNC: 191 MG/DL (ref 70–130)
GLUCOSE BLDC GLUCOMTR-MCNC: 235 MG/DL (ref 70–130)
HCT VFR BLD AUTO: 35 % (ref 34–46.6)
HGB BLD-MCNC: 12.3 G/DL (ref 12–15.9)
IMM GRANULOCYTES # BLD AUTO: 0.06 10*3/MM3 (ref 0–0.05)
IMM GRANULOCYTES NFR BLD AUTO: 0.7 % (ref 0–0.5)
LDH SERPL-CCNC: 293 U/L (ref 135–214)
LOG 10 CMV QN DNA PI: NORMAL
LYMPHOCYTES # BLD AUTO: 1.23 10*3/MM3 (ref 0.7–3.1)
LYMPHOCYTES NFR BLD AUTO: 15.3 % (ref 19.6–45.3)
MCH RBC QN AUTO: 36.2 PG (ref 26.6–33)
MCHC RBC AUTO-ENTMCNC: 35.1 G/DL (ref 31.5–35.7)
MCV RBC AUTO: 102.9 FL (ref 79–97)
MONOCYTES # BLD AUTO: 0.7 10*3/MM3 (ref 0.1–0.9)
MONOCYTES NFR BLD AUTO: 8.7 % (ref 5–12)
NEUTROPHILS # BLD AUTO: 6.04 10*3/MM3 (ref 1.7–7)
NEUTROPHILS NFR BLD AUTO: 75 % (ref 42.7–76)
NRBC BLD AUTO-RTO: 0.6 /100 WBC (ref 0–0.2)
PLATELET # BLD AUTO: 320 10*3/MM3 (ref 140–450)
PMV BLD AUTO: 9.6 FL (ref 6–12)
POTASSIUM BLD-SCNC: 4 MMOL/L (ref 3.5–5.2)
PROT SERPL-MCNC: 6.1 G/DL (ref 6–8.5)
RBC # BLD AUTO: 3.4 10*6/MM3 (ref 3.77–5.28)
SODIUM BLD-SCNC: 136 MMOL/L (ref 136–145)
WBC NRBC COR # BLD: 8.06 10*3/MM3 (ref 3.4–10.8)

## 2020-04-11 PROCEDURE — 80053 COMPREHEN METABOLIC PANEL: CPT | Performed by: HOSPITALIST

## 2020-04-11 PROCEDURE — 82962 GLUCOSE BLOOD TEST: CPT

## 2020-04-11 PROCEDURE — 63710000001 INSULIN LISPRO (HUMAN) PER 5 UNITS: Performed by: HOSPITALIST

## 2020-04-11 PROCEDURE — 83615 LACTATE (LD) (LDH) ENZYME: CPT | Performed by: HOSPITALIST

## 2020-04-11 PROCEDURE — 94799 UNLISTED PULMONARY SVC/PX: CPT

## 2020-04-11 PROCEDURE — 82728 ASSAY OF FERRITIN: CPT | Performed by: HOSPITALIST

## 2020-04-11 PROCEDURE — 63710000001 PREDNISONE PER 1 MG: Performed by: INTERNAL MEDICINE

## 2020-04-11 PROCEDURE — 99232 SBSQ HOSP IP/OBS MODERATE 35: CPT | Performed by: INTERNAL MEDICINE

## 2020-04-11 PROCEDURE — 85025 COMPLETE CBC W/AUTO DIFF WBC: CPT | Performed by: HOSPITALIST

## 2020-04-11 PROCEDURE — 63710000001 DIPHENHYDRAMINE PER 50 MG: Performed by: HOSPITALIST

## 2020-04-11 RX ADMIN — INSULIN LISPRO 3 UNITS: 100 INJECTION, SOLUTION INTRAVENOUS; SUBCUTANEOUS at 17:50

## 2020-04-11 RX ADMIN — PANTOPRAZOLE SODIUM 40 MG: 40 TABLET, DELAYED RELEASE ORAL at 17:50

## 2020-04-11 RX ADMIN — MIDODRINE HYDROCHLORIDE 10 MG: 5 TABLET ORAL at 12:12

## 2020-04-11 RX ADMIN — HYDROCODONE BITARTRATE AND ACETAMINOPHEN 1 TABLET: 5; 325 TABLET ORAL at 09:32

## 2020-04-11 RX ADMIN — BUDESONIDE AND FORMOTEROL FUMARATE DIHYDRATE 2 PUFF: 160; 4.5 AEROSOL RESPIRATORY (INHALATION) at 19:32

## 2020-04-11 RX ADMIN — SUCRALFATE 1 G: 1 SUSPENSION ORAL at 20:56

## 2020-04-11 RX ADMIN — DIPHENHYDRAMINE HYDROCHLORIDE 25 MG: 25 CAPSULE ORAL at 09:32

## 2020-04-11 RX ADMIN — SODIUM BICARBONATE 650 MG: 650 TABLET ORAL at 17:50

## 2020-04-11 RX ADMIN — MIDODRINE HYDROCHLORIDE 10 MG: 5 TABLET ORAL at 06:32

## 2020-04-11 RX ADMIN — HYDROCODONE BITARTRATE AND ACETAMINOPHEN 1 TABLET: 5; 325 TABLET ORAL at 15:45

## 2020-04-11 RX ADMIN — PREDNISONE 40 MG: 20 TABLET ORAL at 09:32

## 2020-04-11 RX ADMIN — SUCRALFATE 1 G: 1 SUSPENSION ORAL at 17:50

## 2020-04-11 RX ADMIN — DIPHENHYDRAMINE HYDROCHLORIDE 25 MG: 25 CAPSULE ORAL at 22:03

## 2020-04-11 RX ADMIN — SODIUM BICARBONATE 650 MG: 650 TABLET ORAL at 20:56

## 2020-04-11 RX ADMIN — SODIUM BICARBONATE 650 MG: 650 TABLET ORAL at 09:33

## 2020-04-11 RX ADMIN — SUCRALFATE 1 G: 1 SUSPENSION ORAL at 06:32

## 2020-04-11 RX ADMIN — SUCRALFATE 1 G: 1 SUSPENSION ORAL at 12:12

## 2020-04-11 RX ADMIN — LEVOTHYROXINE SODIUM 200 MCG: 100 TABLET ORAL at 06:32

## 2020-04-11 RX ADMIN — BUDESONIDE AND FORMOTEROL FUMARATE DIHYDRATE 2 PUFF: 160; 4.5 AEROSOL RESPIRATORY (INHALATION) at 08:06

## 2020-04-11 RX ADMIN — INSULIN LISPRO 2 UNITS: 100 INJECTION, SOLUTION INTRAVENOUS; SUBCUTANEOUS at 20:56

## 2020-04-11 RX ADMIN — PANTOPRAZOLE SODIUM 40 MG: 40 TABLET, DELAYED RELEASE ORAL at 06:33

## 2020-04-11 RX ADMIN — DIPHENHYDRAMINE HYDROCHLORIDE 25 MG: 25 CAPSULE ORAL at 15:45

## 2020-04-11 RX ADMIN — HYDROCODONE BITARTRATE AND ACETAMINOPHEN 1 TABLET: 5; 325 TABLET ORAL at 22:03

## 2020-04-11 NOTE — PROGRESS NOTES
GI Daily Progress Note    Assessment/Plan:      Dyspnea    YOUNG (nonalcoholic steatohepatitis)    Hypothyroid    Diabetes mellitus arising in pregnancy    Elevated LFTs    Tobacco abuse    Rheumatoid arthritis (CMS/HCC)    Type 2 diabetes mellitus (CMS/HCC)    Morbid obesity (CMS/HCC)    Cough    Hypomagnesemia    Metabolic acidosis    Fatigue    History of COPD       LOS: 8 days     Oralia Sánchez is a 44 y.o. female who was admitted with Dyspnea. She reports the symptoms are improving with treatment. Still mildly dyspneic o conversation but eting OK and finishing her trays despite her dyspepsia and complains of constipation too. Reviewed recs for EGD colonoscopy as OP in 4-6 weeks as scheduling allows she feels she is going to rehab from here, possibly today.    Subjective:    Patient expresses abdominal pain, constipation and Dyspnea  Patient denies vomiting and diarrhea    Objective:    Vital signs in last 24 hours:  Temp:  [97.9 °F (36.6 °C)-98.1 °F (36.7 °C)] 97.9 °F (36.6 °C)  Heart Rate:  [85-95] 95  Resp:  [14-16] 16  BP: ()/(58-72) 108/60    Intake/Output last 3 shifts:  I/O last 3 completed shifts:  In: 1080 [P.O.:1080]  Out: 7525 [Urine:7525]  Intake/Output this shift:  No intake/output data recorded.    Physical Exam:Abdomen  Sounds Normal Active Bowel Sounds   Distension Soft Obese   Tenderness Mildly Tender     Imaging Results (Last 72 Hours)     Procedure Component Value Units Date/Time    XR Chest PA & Lateral [[id number]] Collected:  04/10/20 1410     Updated:  04/10/20 1415    Narrative:       XR CHEST PA AND LATERAL-     HISTORY: Female who is 44 years-old,  cough, short of breath     TECHNIQUE: Frontal and lateral views of the chest     COMPARISON: 4/4/2020     FINDINGS: Heart, mediastinum and pulmonary vasculature are unremarkable.  Previous CT demonstrated upper lung groundglass densities are not  clearly visualized radiographically. No focal pulmonary consolidation is  identified  radiographically. No pleural effusion, or pneumothorax. No  acute osseous process.       Impression:       Previous CT demonstrated upper lung groundglass densities  are not clearly visualized radiographically. Follow-up as indications  persist.     This report was finalized on 4/10/2020 2:12 PM by Dr. Rogerio Campo M.D.       CT Head Without Contrast [[id number]] Collected:  04/10/20 1002     Updated:  04/10/20 1015    Narrative:       CT HEAD WITHOUT CONTRAST     HISTORY: Headache. Weakness, fatigue.     COMPARISON: Comparison is made to the CT angiogram of the head  03/27/2019.     FINDINGS: A vascular stent related to the proximal aspect of right  anterior cerebral artery is noted. There is no evidence of intracranial  hemorrhage, hydrocephalus or of abnormal extra-axial fluid. No focal  area of decreased attenuation to suggest acute infarction is identified.  Further evaluation could be performed with MRI examination of the brain  and CT angiogram/MRA of the head as indicated.        Radiation dose reduction techniques were utilized, including automated  exposure control and exposure modulation based on body size.     This report was finalized on 4/10/2020 10:12 AM by Dr. Henry Hdz M.D.        Pelvis Complete [[id number]] Collected:  04/09/20 1046     Updated:  04/09/20 1051    Narrative:       COMPLETE PELVIC SONOGRAM     HISTORY: 44-year-old female with a history of an abnormal CT of the  pelvis presenting for further evaluation.     FINDINGS: Transabdominal and transvaginal pelvic sonography was  performed. The uterus is anteflexed and measures 8.9 x 3.7 x 5.1 cm. The  endometrium measures 5 mm in thickness. No abnormality of the uterus is  appreciated.     The right ovary measures 2.1 x 1.3 x 1.6 cm and the left ovary measures  2.2 x 1.4 x 1.6 cm. There is a 4.0 x 2.6 x 3.4 cm anechoic cyst with a  thin internal septation seen in the right ovary. No nodularity is seen  within the cyst. No  abnormality of the left ovary is appreciated.       Impression:       1. There is a 4.0 cm right ovarian benign-appearing cyst which may  represent a paraovarian cyst or possibly a follicular cyst. Six-month  sonographic follow-up is recommended.  2. Normal sonographic appearance of the pelvis and left ovary.     This report was finalized on 4/9/2020 10:48 AM by Dr. Gregorio Price M.D.        Liver [[id number]] Collected:  04/09/20 0956     Updated:  04/09/20 1012    Narrative:       Examination: Liver sonogram     HISTORY: 44-year-old female with a history of elevated liver function  tests with abdominal pain, nausea, vomiting and diarrhea     FINDINGS: Sonographic evaluation of the liver and selected structures of  the right upper quadrant was performed. Comparison is made to a CT of  the abdomen dated 4/7/2020.  The right kidney has a normal appearance. A  coarsened echotexture with increased echogenicity is seen throughout the  liver without evidence for focal abnormality. The liver measures on the  order of 17.9 cm in anterior posterior dimension. The portal vein is  patent with appropriate directional flow. There is no intrahepatic bile  duct dilatation. Minimal sludge is seen within the gallbladder. There is  no gallbladder wall thickening or pericholecystic fluid. The common bile  duct measures 0.5 cm in diameter. The visualized portion of the pancreas  appears normal.       Impression:       1. There are sonographic features of the liver that are consistent with  hepatic steatosis. Chronic hepatitis, possibly due to chronic hepatic  steatosis is also suspected.  2. Gallbladder sludge.     This report was finalized on 4/9/2020 10:09 AM by Dr. Gregorio Price M.D.        Non-ob Transvaginal [[id number]] Resulted:  04/09/20 0947     Updated:  04/09/20 0948          WBC   Date Value Ref Range Status   04/11/2020 8.06 3.40 - 10.80 10*3/mm3 Final     RBC   Date Value Ref Range Status   04/11/2020 3.40 (L)  3.77 - 5.28 10*6/mm3 Final     Hemoglobin   Date Value Ref Range Status   04/11/2020 12.3 12.0 - 15.9 g/dL Final     Hematocrit   Date Value Ref Range Status   04/11/2020 35.0 34.0 - 46.6 % Final     MCV   Date Value Ref Range Status   04/11/2020 102.9 (H) 79.0 - 97.0 fL Final     MCH   Date Value Ref Range Status   04/11/2020 36.2 (H) 26.6 - 33.0 pg Final     MCHC   Date Value Ref Range Status   04/11/2020 35.1 31.5 - 35.7 g/dL Final     RDW   Date Value Ref Range Status   04/11/2020 14.2 12.3 - 15.4 % Final     RDW-SD   Date Value Ref Range Status   04/11/2020 53.6 37.0 - 54.0 fl Final     MPV   Date Value Ref Range Status   04/11/2020 9.6 6.0 - 12.0 fL Final     Platelets   Date Value Ref Range Status   04/11/2020 320 140 - 450 10*3/mm3 Final     Neutrophil %   Date Value Ref Range Status   04/11/2020 75.0 42.7 - 76.0 % Final     Lymphocyte %   Date Value Ref Range Status   04/11/2020 15.3 (L) 19.6 - 45.3 % Final     Monocyte %   Date Value Ref Range Status   04/11/2020 8.7 5.0 - 12.0 % Final     Eosinophil %   Date Value Ref Range Status   04/11/2020 0.1 (L) 0.3 - 6.2 % Final     Basophil %   Date Value Ref Range Status   04/11/2020 0.2 0.0 - 1.5 % Final     Immature Grans %   Date Value Ref Range Status   04/11/2020 0.7 (H) 0.0 - 0.5 % Final     Neutrophils, Absolute   Date Value Ref Range Status   04/11/2020 6.04 1.70 - 7.00 10*3/mm3 Final     Lymphocytes, Absolute   Date Value Ref Range Status   04/11/2020 1.23 0.70 - 3.10 10*3/mm3 Final     Monocytes, Absolute   Date Value Ref Range Status   04/11/2020 0.70 0.10 - 0.90 10*3/mm3 Final     Eosinophils, Absolute   Date Value Ref Range Status   04/11/2020 0.01 0.00 - 0.40 10*3/mm3 Final     Basophils, Absolute   Date Value Ref Range Status   04/11/2020 0.02 0.00 - 0.20 10*3/mm3 Final     Immature Grans, Absolute   Date Value Ref Range Status   04/11/2020 0.06 (H) 0.00 - 0.05 10*3/mm3 Final     nRBC   Date Value Ref Range Status   04/11/2020 0.6 (H) 0.0 - 0.2  /100 WBC Final       Glucose   Date Value Ref Range Status   04/10/2020 137 (H) 65 - 99 mg/dL Final     Sodium   Date Value Ref Range Status   04/10/2020 133 (L) 136 - 145 mmol/L Final     Potassium   Date Value Ref Range Status   04/10/2020 3.5 3.5 - 5.2 mmol/L Final     CO2   Date Value Ref Range Status   04/10/2020 27.5 22.0 - 29.0 mmol/L Final     Chloride   Date Value Ref Range Status   04/10/2020 90 (L) 98 - 107 mmol/L Final     Anion Gap   Date Value Ref Range Status   04/10/2020 15.5 (H) 5.0 - 15.0 mmol/L Final     Creatinine   Date Value Ref Range Status   04/10/2020 0.45 (L) 0.57 - 1.00 mg/dL Final     BUN   Date Value Ref Range Status   04/10/2020 8 6 - 20 mg/dL Final     BUN/Creatinine Ratio   Date Value Ref Range Status   04/10/2020 17.8 7.0 - 25.0 Final     Calcium   Date Value Ref Range Status   04/10/2020 8.6 8.6 - 10.5 mg/dL Final     eGFR Non  Amer   Date Value Ref Range Status   04/10/2020 150 >60 mL/min/1.73 Final     Alkaline Phosphatase   Date Value Ref Range Status   04/10/2020 144 (H) 39 - 117 U/L Final     Total Protein   Date Value Ref Range Status   04/10/2020 5.3 (L) 6.0 - 8.5 g/dL Final     ALT (SGPT)   Date Value Ref Range Status   04/10/2020 45 (H) 1 - 33 U/L Final     AST (SGOT)   Date Value Ref Range Status   04/10/2020 91 (H) 1 - 32 U/L Final     Total Bilirubin   Date Value Ref Range Status   04/10/2020 1.0 0.2 - 1.2 mg/dL Final     Albumin   Date Value Ref Range Status   04/10/2020 2.90 (L) 3.50 - 5.20 g/dL Final     Globulin   Date Value Ref Range Status   04/10/2020 2.4 gm/dL Final     1.  Abnormal CAT scan of the abdomen showing a possible infiltrative process involving the rectum.  The patient has never had a colonoscopy.  I feel that she is too ill (SOA)  to have a colonoscopy at this moment.  2.  This patient has some type of respiratory illness that does not appear to be coronavirus.  3.  She has elevated liver function tests with steatosis of the liver on CAT scan  of the abdomen.  Her last liver function tests were normal in 6 of 2019 and seem to be intermittently elevated.  4.  The patient is obese.  5. Dyspepsia    Agree with rehab and would continue Carafate and Daily PPI- Follow up with Dr Sharma for OP EGD/Colonsocpy- YOUNG

## 2020-04-11 NOTE — NURSING NOTE
"4/11/20  735    Pt states \"that she wants to go home but her legs won't work.\"  Night shift nurse states that she has seen no evidence that patient can't move her legs.  She was able to stand and up to commode and standing scale.  It seems like patient might not want to go home as stated.  Wondering if access might need to address some issues. Will continue to monitor.     Shanta Vila RN  "

## 2020-04-11 NOTE — PLAN OF CARE
Problem: Patient Care Overview  Goal: Plan of Care Review  Outcome: Ongoing (interventions implemented as appropriate)  Flowsheets (Taken 4/11/2020 9894)  Progress: improving  Plan of Care Reviewed With: patient  Note:   VSS, Pt is not requiring o2 and pulmonology has given ok to discharge from their stand point.  [facility] might not be able to take pt if diagnose is not clarified.  Pt c/o not being able to walk or stand however, pt was able to move and bear weight no impaired range of motion seen.  Pt is weak from illness and being in bed. Encouraged, to increase activity and to work with PT when they come.  Pt did agree to bed bath today but stated her knee was just to painful to get up to bath.  Will continue to monitor

## 2020-04-11 NOTE — PLAN OF CARE
Problem: Patient Care Overview  Goal: Plan of Care Review  Outcome: Ongoing (interventions implemented as appropriate)  Flowsheets (Taken 4/11/2020 1406)  Progress: improving  Plan of Care Reviewed With: patient  Outcome Summary: Monitor pain,labs,and vitals. Pain controlled with PRN medications per orders. No SOB reported-O2 2L with sleep. Will continue to monitor.  Goal: Individualization and Mutuality  Outcome: Ongoing (interventions implemented as appropriate)  Goal: Discharge Needs Assessment  Outcome: Ongoing (interventions implemented as appropriate)  Goal: Interprofessional Rounds/Family Conf  Outcome: Ongoing (interventions implemented as appropriate)

## 2020-04-11 NOTE — PROGRESS NOTES
"DAILY PROGRESS NOTE  [facility]    Patient Identification:  Name: Oralia Sánchez  Age: 44 y.o.  Sex: female  :  1975  MRN: [id number]         Primary Care Physician: Provider, No Known    Subjective:  Interval History:She has a bad cough and is short of air but better today.  requiring  O 2 at night. Extreme fatigue.  Abdominal pain and swelling.    Objective:    Scheduled Meds:    budesonide-formoterol 2 puff Inhalation BID - RT   insulin lispro 0-7 Units Subcutaneous 4x Daily With Meals & Nightly   levothyroxine 200 mcg Oral Daily   midodrine 10 mg Oral TID AC   pantoprazole 40 mg Oral BID AC   predniSONE 40 mg Oral Daily With Breakfast   sodium bicarbonate 650 mg Oral TID   sucralfate 1 g Oral 4x Daily AC & at Bedtime     Continuous Infusions:       Vital signs in last 24 hours:  Temp:  [97.9 °F (36.6 °C)-98.2 °F (36.8 °C)] 98.1 °F (36.7 °C)  Heart Rate:  [85-95] 89  Resp:  [14-16] 14  BP: ()/(58-72) 102/61    Intake/Output:    Intake/Output Summary (Last 24 hours) at 2020 1357  Last data filed at 2020 0602  Gross per 24 hour   Intake 600 ml   Output 3600 ml   Net -3000 ml       Exam:  /61 (BP Location: Left arm, Patient Position: Lying)   Pulse 89   Temp 98.1 °F (36.7 °C) (Oral)   Resp 14   Ht 154.9 cm (61\")   Wt 112 kg (246 lb 12.8 oz)   LMP 2020   SpO2 93%   BMI 46.63 kg/m²     General Appearance:    Alert, cooperative, no distress   Head:    Normocephalic, without obvious abnormality, atraumatic   Eyes:       Throat:   Lips, tongue, gums normal   Neck:   Supple, symmetrical, trachea midline, no JVD   Lungs:     Crackles and wheezes. bilaterally, respirations unlabored   Chest Wall:    No tenderness or deformity    Heart:    Regular rate and rhythm, S1 and S2 normal, no murmur,no  Rub or gallop   Abdomen:     Soft, non-tender, bowel sounds active, no masses, no organomegaly    Extremities:   Extremities normal, atraumatic, no cyanosis or edema "   Pulses:      Skin:   Skin is warm and dry,  Boil in pubic area   Neurologic:   no focal deficits noted      Lab Results (last 72 hours)     Procedure Component Value Units Date/Time    POC Glucose Once [[id number]]  (Abnormal) Collected:  04/02/20 1147    Specimen:  Blood Updated:  04/02/20 1148     Glucose 144 mg/dL     Comprehensive Metabolic Panel [[id number]]  (Abnormal) Collected:  04/02/20 0634    Specimen:  Blood Updated:  04/02/20 0736     Glucose 117 mg/dL      BUN 8 mg/dL      Creatinine 0.76 mg/dL      Sodium 136 mmol/L      Potassium 3.8 mmol/L      Chloride 90 mmol/L      CO2 20.2 mmol/L      Calcium 7.8 mg/dL      Total Protein 5.9 g/dL      Albumin 3.40 g/dL      ALT (SGPT) 49 U/L      AST (SGOT) 97 U/L      Alkaline Phosphatase 103 U/L      Total Bilirubin 1.3 mg/dL      eGFR Non African Amer 82 mL/min/1.73      Globulin 2.5 gm/dL      A/G Ratio 1.4 g/dL      BUN/Creatinine Ratio 10.5     Anion Gap 25.8 mmol/L     Narrative:       GFR Normal >60  Chronic Kidney Disease <60  Kidney Failure <15      Ferritin [[id number]]  (Abnormal) Collected:  04/02/20 0634    Specimen:  Blood Updated:  04/02/20 0726     Ferritin 803.00 ng/mL     Narrative:       Results may be falsely decreased if patient taking Biotin.      Lactate Dehydrogenase [[id number]]  (Abnormal) Collected:  04/02/20 0634    Specimen:  Blood Updated:  04/02/20 0722      U/L     CBC & Differential [[id number]] Collected:  04/02/20 0639    Specimen:  Blood Updated:  04/02/20 0652    Narrative:       The following orders were created for panel order CBC & Differential.  Procedure                               Abnormality         Status                     ---------                               -----------         ------                     CBC Auto Differential[[id number]]        Abnormal            Final result                 Please view results for these tests on the individual orders.    CBC Auto Differential [[id number]]  (Abnormal)  Collected:  04/02/20 0639    Specimen:  Blood Updated:  04/02/20 0652     WBC 7.38 10*3/mm3      RBC 3.95 10*6/mm3      Hemoglobin 14.2 g/dL      Hematocrit 40.1 %      .5 fL      MCH 35.9 pg      MCHC 35.4 g/dL      RDW 14.3 %      RDW-SD 53.6 fl      MPV 9.4 fL      Platelets 195 10*3/mm3      Neutrophil % 65.8 %      Lymphocyte % 24.5 %      Monocyte % 8.1 %      Eosinophil % 0.3 %      Basophil % 0.8 %      Immature Grans % 0.5 %      Neutrophils, Absolute 4.85 10*3/mm3      Lymphocytes, Absolute 1.81 10*3/mm3      Monocytes, Absolute 0.60 10*3/mm3      Eosinophils, Absolute 0.02 10*3/mm3      Basophils, Absolute 0.06 10*3/mm3      Immature Grans, Absolute 0.04 10*3/mm3      nRBC 0.4 /100 WBC     POC Glucose Once [[id number]]  (Normal) Collected:  04/02/20 0616    Specimen:  Blood Updated:  04/02/20 0618     Glucose 116 mg/dL     POC Glucose Once [[id number]]  (Normal) Collected:  04/01/20 2059    Specimen:  Blood Updated:  04/01/20 2059     Glucose 127 mg/dL     Blood Culture - Blood, Arm, Right [[id number]] Collected:  04/01/20 1654    Specimen:  Blood from Arm, Right Updated:  04/01/20 1654    Blood Culture - Blood, Arm, Left [[id number]] Collected:  04/01/20 1654    Specimen:  Blood from Arm, Left Updated:  04/01/20 1654    Respiratory Panel, PCR - Swab, Nasopharynx [[id number]]  (Normal) Collected:  04/01/20 1515    Specimen:  Swab from Nasopharynx Updated:  04/01/20 1653     ADENOVIRUS, PCR Not Detected     Coronavirus 229E Not Detected     Coronavirus HKU1 Not Detected     Coronavirus NL63 Not Detected     Coronavirus OC43 Not Detected     Human Metapneumovirus Not Detected     Human Rhinovirus/Enterovirus Not Detected     Influenza B PCR Not Detected     Parainfluenza Virus 1 Not Detected     Parainfluenza Virus 2 Not Detected     Parainfluenza Virus 3 Not Detected     Parainfluenza Virus 4 Not Detected     Bordetella pertussis pcr Not Detected     Influenza A H1 2009 PCR Not Detected     Chlamydophila  pneumoniae PCR Not Detected     Mycoplasma pneumo by PCR Not Detected     Influenza A PCR Not Detected     Influenza A H3 Not Detected     Influenza A H1 Not Detected     RSV, PCR Not Detected     Bordetella parapertussis PCR Not Detected    Narrative:       The coronavirus on the RVP is NOT COVID-19 and is NOT indicative of infection with COVID-19.     POC Glucose Once [[id number]]  (Abnormal) Collected:  04/01/20 1559    Specimen:  Blood Updated:  04/01/20 1601     Glucose 132 mg/dL     Hemoglobin A1c [[id number]]  (Abnormal) Collected:  04/01/20 0809    Specimen:  Blood Updated:  04/01/20 1417     Hemoglobin A1C 6.68 %     Narrative:       Hemoglobin A1C Ranges:    Increased Risk for Diabetes  5.7% to 6.4%  Diabetes                     >= 6.5%  Diabetic Goal                < 7.0%    Salicylate Level [[id number]]  (Normal) Collected:  04/01/20 0809    Specimen:  Blood Updated:  04/01/20 1129     Salicylate <0.3 mg/dL     Narrative:       Therapeutic range for Salicylates:  3.0 - 10.0 mg/dL for antipyretic/analgesic conditions  15.0 - 30.0 mg/dL for anti-inflammatory conditions    CORONAVIRUS(COVID-19),RT-PCR,UOFL LAB,NP/OP Swab in Transport Media - Swab, Nasopharynx [[id number]] Collected:  04/01/20 1049    Specimen:  Swab from Nasopharynx Updated:  04/01/20 1101    Blood Gas, Arterial [[id number]]  (Abnormal) Collected:  04/01/20 1051    Specimen:  Arterial Blood Updated:  04/01/20 1053     Site Arterial: left radial     Jovi's Test Positive     pH, Arterial 7.477 pH units      pCO2, Arterial 28.2 mm Hg      pO2, Arterial 99.2 mm Hg      HCO3, Arterial 20.9 mmol/L      Base Excess, Arterial -1.0 mmol/L      O2 Saturation Calculated 98.3 %      Barometric Pressure for Blood Gas 753.0 mmHg      Modality Room Air     Set Mech Resp Rate 28    Ethanol [[id number]] Collected:  04/01/20 0809    Specimen:  Blood Updated:  04/01/20 0951     Ethanol <10 mg/dL      Ethanol % <0.010 %     Ferritin [[id number]]  (Abnormal)  Collected:  04/01/20 0809    Specimen:  Blood Updated:  04/01/20 0946     Ferritin 821.00 ng/mL     Narrative:       Results may be falsely decreased if patient taking Biotin.      [place] Draw [[id number]] Collected:  04/01/20 0809    Specimen:  Blood Updated:  04/01/20 0915    Narrative:       The following orders were created for panel order [place] Draw.  Procedure                               Abnormality         Status                     ---------                               -----------         ------                     Light Blue Top[[id number]]                                   Final result               Green Top (Gel)[[id number]]                                  Final result               Lavender Top[[id number]]                                     Final result               Gold Top - SST[[id number]]                                   Final result                 Please view results for these tests on the individual orders.    Green Top (Gel) [[id number]] Collected:  04/01/20 0809    Specimen:  Blood Updated:  04/01/20 0915     Extra Tube Hold for add-ons.     Comment: Auto resulted.       Lavender Top [[id number]] Collected:  04/01/20 0809    Specimen:  Blood Updated:  04/01/20 0915     Extra Tube hold for add-on     Comment: Auto resulted       Light Blue Top [[id number]] Collected:  04/01/20 0809    Specimen:  Blood Updated:  04/01/20 0915     Extra Tube hold for add-on     Comment: Auto resulted       Gold Top - SST [[id number]] Collected:  04/01/20 0809    Specimen:  Blood Updated:  04/01/20 0915     Extra Tube Hold for add-ons.     Comment: Auto resulted.       Procalcitonin [[id number]]  (Normal) Collected:  04/01/20 0809    Specimen:  Blood Updated:  04/01/20 0846     Procalcitonin 0.16 ng/mL     Narrative:       As a Marker for Sepsis (Non-Neonates):   1. <0.5 ng/mL represents a low risk of severe sepsis and/or septic shock.  1. >2 ng/mL represents a high risk of severe sepsis and/or septic shock.    As  "a Marker for Lower Respiratory Tract Infections that require antibiotic therapy:  PCT on Admission     Antibiotic Therapy             6-12 Hrs later  > 0.5                Strongly Recommended            >0.25 - <0.5         Recommended  0.1 - 0.25           Discouraged                   Remeasure/reassess PCT  <0.1                 Strongly Discouraged          Remeasure/reassess PCT      As 28 day mortality risk marker: \"Change in Procalcitonin Result\" (> 80 % or <=80 %) if Day 0 (or Day 1) and Day 4 values are available. Refer to [email or web address]/   Change in PCT <=80 %   A decrease of PCT levels below or equal to 80 % defines a positive change in PCT test result representing a higher risk for 28-day all-cause mortality of patients diagnosed with severe sepsis or septic shock.  Change in PCT > 80 %   A decrease of PCT levels of more than 80 % defines a negative change in PCT result representing a lower risk for 28-day all-cause mortality of patients diagnosed with severe sepsis or septic shock.                Results may be falsely decreased if patient taking Biotin.     Comprehensive Metabolic Panel [[id number]]  (Abnormal) Collected:  04/01/20 0809    Specimen:  Blood Updated:  04/01/20 0841     Glucose 127 mg/dL      BUN 3 mg/dL      Creatinine 0.85 mg/dL      Sodium 135 mmol/L      Potassium 3.2 mmol/L      Chloride 84 mmol/L      CO2 18.7 mmol/L      Calcium 8.5 mg/dL      Total Protein 7.1 g/dL      Albumin 3.70 g/dL      ALT (SGPT) 65 U/L      AST (SGOT) 124 U/L      Alkaline Phosphatase 128 U/L      Total Bilirubin 1.4 mg/dL      eGFR Non African Amer 72 mL/min/1.73      Globulin 3.4 gm/dL      A/G Ratio 1.1 g/dL      BUN/Creatinine Ratio 3.5     Anion Gap 32.3 mmol/L     Narrative:       GFR Normal >60  Chronic Kidney Disease <60  Kidney Failure <15      C-reactive Protein [[id number]]  (Abnormal) Collected:  04/01/20 0809    Specimen:  Blood Updated:  04/01/20 0841     C-Reactive Protein " 0.63 mg/dL     Lactate Dehydrogenase [[id number]]  (Abnormal) Collected:  04/01/20 0809    Specimen:  Blood Updated:  04/01/20 0841      U/L     Magnesium [[id number]]  (Abnormal) Collected:  04/01/20 0809    Specimen:  Blood Updated:  04/01/20 0841     Magnesium 1.1 mg/dL     Troponin [[id number]]  (Normal) Collected:  04/01/20 0809    Specimen:  Blood Updated:  04/01/20 0841     Troponin T <0.010 ng/mL     Narrative:       Troponin T Reference Range:  <= 0.03 ng/mL-   Negative for AMI  >0.03 ng/mL-     Abnormal for myocardial necrosis.  Clinicians would have to utilize clinical acumen, EKG, Troponin and serial changes to determine if it is an Acute Myocardial Infarction or myocardial injury due to an underlying chronic condition.       Results may be falsely decreased if patient taking Biotin.      CBC Auto Differential [[id number]]  (Abnormal) Collected:  04/01/20 0809    Specimen:  Blood Updated:  04/01/20 0828     WBC 8.04 10*3/mm3      RBC 4.48 10*6/mm3      Hemoglobin 16.3 g/dL      Hematocrit 46.2 %      .1 fL      MCH 36.4 pg      MCHC 35.3 g/dL      RDW 15.4 %      RDW-SD 57.9 fl      MPV 9.5 fL      Platelets 244 10*3/mm3      Neutrophil % 68.8 %      Lymphocyte % 22.6 %      Monocyte % 7.2 %      Eosinophil % 0.4 %      Basophil % 0.6 %      Immature Grans % 0.4 %      Neutrophils, Absolute 5.53 10*3/mm3      Lymphocytes, Absolute 1.82 10*3/mm3      Monocytes, Absolute 0.58 10*3/mm3      Eosinophils, Absolute 0.03 10*3/mm3      Basophils, Absolute 0.05 10*3/mm3      Immature Grans, Absolute 0.03 10*3/mm3      nRBC 0.5 /100 WBC         Data Review:  Results from last 7 days   Lab Units 04/11/20  0707 04/10/20  0524 04/09/20  0532   SODIUM mmol/L 136 133* 135*   POTASSIUM mmol/L 4.0 3.5 3.5   CHLORIDE mmol/L 93* 90* 92*   CO2 mmol/L 27.1 27.5 29.4*   BUN mg/dL 9 8 7   CREATININE mg/dL 0.59 0.45* 0.47*   GLUCOSE mg/dL 148* 137* 115*   CALCIUM mg/dL 9.2 8.6 8.2*     Results from last 7 days   Lab  Units 04/11/20  0707 04/09/20  0532 04/08/20  0623   WBC 10*3/mm3 8.06 8.56 7.93   HEMOGLOBIN g/dL 12.3 12.6 13.2   HEMATOCRIT % 35.0 35.2 37.0   PLATELETS 10*3/mm3 320 235 215             Lab Results   Lab Value Date/Time    TROPONINT <0.010 04/01/2020 0809    TROPONINT <0.010 11/10/2019 1525         Results from last 7 days   Lab Units 04/11/20  0707 04/10/20  0524 04/09/20  0532   ALK PHOS U/L 147* 144* 145*   BILIRUBIN mg/dL 1.0 1.0 1.3*   ALT (SGPT) U/L 49* 45* 51*   AST (SGOT) U/L 85* 91* 98*             Glucose   Date/Time Value Ref Range Status   04/11/2020 1132 120 70 - 130 mg/dL Final   04/11/2020 0558 116 70 - 130 mg/dL Final   04/10/2020 2041 187 (H) 70 - 130 mg/dL Final   04/10/2020 1602 115 70 - 130 mg/dL Final   04/10/2020 1123 126 70 - 130 mg/dL Final   04/10/2020 0555 155 (H) 70 - 130 mg/dL Final   04/09/2020 2119 139 (H) 70 - 130 mg/dL Final   04/09/2020 1638 157 (H) 70 - 130 mg/dL Final           Past Medical History:   Diagnosis Date   • Aneurysm (CMS/HCC)    • Arthritis    • Carpal tunnel syndrome    • Chest pain    • Diabetes mellitus (CMS/HCC)    • Dysmetabolic syndrome X    • Gestational diabetes mellitus    • Hypothyroidism    • Metabolic disorder    • Nonalcoholic steatohepatitis    • Obesity    • Palpitations    • Sleep apnea    • Spinal headache    • Type 2 diabetes mellitus (CMS/HCC)    • Vitamin D deficiency        Assessment:  Active Hospital Problems    Diagnosis  POA   • **Dyspnea [R06.00]  Yes   • Cough [R05]  Unknown   • Hypomagnesemia [E83.42]  Unknown   • Metabolic acidosis [E87.2]  Unknown   • Fatigue [R53.83]  Unknown   • History of COPD [Z87.09]  Not Applicable   • Tobacco abuse [Z72.0]  Yes   • Rheumatoid arthritis (CMS/HCC) [M06.9]  Yes   • Type 2 diabetes mellitus (CMS/HCC) [E11.9]  Yes   • Morbid obesity (CMS/HCC) [E66.01]  Yes   • Elevated LFTs [R94.5]  Yes   • YOUNG (nonalcoholic steatohepatitis) [K75.81]  Yes   • Diabetes mellitus arising in pregnancy [O24.419]  Yes   •  Hypothyroid [E03.9]  Yes      Resolved Hospital Problems   No resolved problems to display.       Plan:  Continue OFF IV fluids and follow lab. ECHO report noted. Off IV fluids and increase proamatine for low BP. Got diuretics.  Elevated inflammatory markers. Will follow. CT abdomen, chest and GI consult noted.. Repeat COVID test was negative..  PT eval.  Colonoscopy later as outpatient.  Continue supportive care. Repeat CXR no change.   May need rehab.  Tylor Cruz MD  4/11/2020  13:57

## 2020-04-11 NOTE — NURSING NOTE
Acute Inpt Rehab Note     Received referral for acute inpatient rehab.  Attempted to meet with patient this morning but patient was sleeping.  RN states patient reported not sleeping well last night.  Chart reviewed and discussed with Dr. Campos.  Patient will need a definitive diagnosis.  Dr. Campos and rehab admission office will follow along daily for rehab needs and medical updates.  Dr. Campos states okay to complete occupational therapy evaluation on Monday.  Rehab admission nurse to try again and meet with patient in the morning to get more information regarding home set up, discharge plan, etc.  Will notify primary RN.  Thank you for the referral and again we will follow along each day to see how we can be of assistance.    Thank you,  Huong Welsh, RN  Rehab Admission RN

## 2020-04-11 NOTE — PROGRESS NOTES
Dr. POP Marcos    [address]    4/11/2020    Patient ID:  Name:  Oralia Sánchez  MRN:  [id number]  1975  44 y.o.  female            CC/Reason for visit: Severe cough, cigarette smoker,    Interval hx: Patient insists she wants more Lasix.  She says she feels that she has a lot of fluid on her body and needs to drop weight, she is not happy with her weight at this time and insists she needs more Lasix.  She did not cough one single time during my 8-minute interview with her.      ROS: Denies chest pain, denies abdominal pain    Vitals:  Vitals:    04/11/20 0811 04/11/20 1135 04/11/20 1210 04/11/20 1227   BP:  102/61     BP Location:  Left arm     Patient Position:  Lying     Pulse: 92 89     Resp:  14     Temp:  98.1 °F (36.7 °C)     TempSrc:  Oral     SpO2: 97% 97% 97% 93%   Weight:       Height:               Body mass index is 46.63 kg/m².    Intake/Output Summary (Last 24 hours) at 4/11/2020 1535  Last data filed at 4/11/2020 0602  Gross per 24 hour   Intake 600 ml   Output 2900 ml   Net -2300 ml       Exam:  GEN:  No distress  Alert, oriented x 3.   LUNGS: Clear breath sounds bilat, no use of accessory muscles  CV:  Normal S1S2, without murmur, no edema  ABD:  Non tender, no enlarged liver or masses      Scheduled meds:    budesonide-formoterol 2 puff Inhalation BID - RT   insulin lispro 0-7 Units Subcutaneous 4x Daily With Meals & Nightly   levothyroxine 200 mcg Oral Daily   midodrine 10 mg Oral TID AC   pantoprazole 40 mg Oral BID AC   predniSONE 40 mg Oral Daily With Breakfast   sodium bicarbonate 650 mg Oral TID   sucralfate 1 g Oral 4x Daily AC & at Bedtime     IV meds:                           Data Review:   I reviewed the patient's medications and new clinical results.    COVID19   Date Value Ref Range Status   04/07/2020 Not Detected Not Detected Final         Lab Results   Component Value Date    CALCIUM 9.2 04/11/2020    MG 1.7 04/04/2020    MG 1.1 (L) 04/01/2020     Results  from last 7 days   Lab Units 04/11/20  0707 04/10/20  0524 04/09/20  0532 04/08/20  0623   SODIUM mmol/L 136 133* 135* 131*   POTASSIUM mmol/L 4.0 3.5 3.5 3.6   CHLORIDE mmol/L 93* 90* 92* 93*   CO2 mmol/L 27.1 27.5 29.4* 23.3   BUN mg/dL 9 8 7 10   CREATININE mg/dL 0.59 0.45* 0.47* 0.56*   CALCIUM mg/dL 9.2 8.6 8.2* 8.0*   BILIRUBIN mg/dL 1.0 1.0 1.3* 0.9   ALK PHOS U/L 147* 144* 145* 177*   ALT (SGPT) U/L 49* 45* 51* 58*   AST (SGOT) U/L 85* 91* 98* 129*   GLUCOSE mg/dL 148* 137* 115* 178*   WBC 10*3/mm3 8.06  --  8.56 7.93   HEMOGLOBIN g/dL 12.3  --  12.6 13.2   PLATELETS 10*3/mm3 320  --  235 215   PROCALCITONIN ng/mL  --   --   --  0.29*                 Results from last 7 days   Lab Units 04/08/20  0830   MODALITY  Room Air   O2 SATURATION CALC % 83.7*       Estimated Creatinine Clearance: 138.2 mL/min (by C-G formula based on SCr of 0.59 mg/dL).      ASSESSMENT:   Chronic cough  Cigarette smoker  Abnormal CT chest      PLAN:  Patient is new to me during this admission.  From the pulmonary standpoint, this patient does not need any additional diuretics.  She is obsessed with diuretics and thinks that she can lose body fat with diuretics and is obsessed with the actual number of her weight on a scale.  I have tried explaining this to her but she did not receive my information very well.  No need for any additional pulmonary work-up at this time.  Repeat chest x-ray showed improvement in infiltrate compared to CT as per radiologist note.  Lactic acidosis can be explained due to cirrhosis.  No need for bronchoscopy at this time.  We can follow-up her autoimmune blood work as an outpatient.  She may be discharged anytime from my standpoint and will follow up with telehealth visit and consider whether she needs another CT scan of the chest in the future.  She was advised to quit smoking.  This patient has several chronic medical conditions, and now several acute medical illnesses.  Extensive amount of data was  reviewed.  Images were directly visualized by me.  This patient warrants high complexity medical decision-making.          Kennedy Marcos MD  4/11/2020

## 2020-04-12 LAB
ALBUMIN SERPL-MCNC: 3.2 G/DL (ref 3.5–5.2)
ALBUMIN/GLOB SERPL: 1.2 G/DL
ALP SERPL-CCNC: 153 U/L (ref 39–117)
ALT SERPL W P-5'-P-CCNC: 64 U/L (ref 1–33)
ANION GAP SERPL CALCULATED.3IONS-SCNC: 12.8 MMOL/L (ref 5–15)
AST SERPL-CCNC: 129 U/L (ref 1–32)
BASOPHILS # BLD AUTO: 0.04 10*3/MM3 (ref 0–0.2)
BASOPHILS NFR BLD AUTO: 0.5 % (ref 0–1.5)
BILIRUB SERPL-MCNC: 0.7 MG/DL (ref 0.2–1.2)
BUN BLD-MCNC: 13 MG/DL (ref 6–20)
BUN/CREAT SERPL: 22.8 (ref 7–25)
CALCIUM SPEC-SCNC: 9.3 MG/DL (ref 8.6–10.5)
CHLORIDE SERPL-SCNC: 95 MMOL/L (ref 98–107)
CO2 SERPL-SCNC: 28.2 MMOL/L (ref 22–29)
CREAT BLD-MCNC: 0.57 MG/DL (ref 0.57–1)
DEPRECATED RDW RBC AUTO: 53.8 FL (ref 37–54)
EOSINOPHIL # BLD AUTO: 0.03 10*3/MM3 (ref 0–0.4)
EOSINOPHIL NFR BLD AUTO: 0.4 % (ref 0.3–6.2)
ERYTHROCYTE [DISTWIDTH] IN BLOOD BY AUTOMATED COUNT: 14.5 % (ref 12.3–15.4)
FERRITIN SERPL-MCNC: 1484 NG/ML (ref 13–150)
GFR SERPL CREATININE-BSD FRML MDRD: 114 ML/MIN/1.73
GLOBULIN UR ELPH-MCNC: 2.7 GM/DL
GLUCOSE BLD-MCNC: 113 MG/DL (ref 65–99)
GLUCOSE BLDC GLUCOMTR-MCNC: 111 MG/DL (ref 70–130)
GLUCOSE BLDC GLUCOMTR-MCNC: 190 MG/DL (ref 70–130)
GLUCOSE BLDC GLUCOMTR-MCNC: 210 MG/DL (ref 70–130)
GLUCOSE BLDC GLUCOMTR-MCNC: 91 MG/DL (ref 70–130)
HCT VFR BLD AUTO: 33.9 % (ref 34–46.6)
HGB BLD-MCNC: 12 G/DL (ref 12–15.9)
IMM GRANULOCYTES # BLD AUTO: 0.12 10*3/MM3 (ref 0–0.05)
IMM GRANULOCYTES NFR BLD AUTO: 1.4 % (ref 0–0.5)
LDH SERPL-CCNC: 291 U/L (ref 135–214)
LYMPHOCYTES # BLD AUTO: 1.62 10*3/MM3 (ref 0.7–3.1)
LYMPHOCYTES NFR BLD AUTO: 18.9 % (ref 19.6–45.3)
MCH RBC QN AUTO: 36.5 PG (ref 26.6–33)
MCHC RBC AUTO-ENTMCNC: 35.4 G/DL (ref 31.5–35.7)
MCV RBC AUTO: 103 FL (ref 79–97)
MONOCYTES # BLD AUTO: 0.72 10*3/MM3 (ref 0.1–0.9)
MONOCYTES NFR BLD AUTO: 8.4 % (ref 5–12)
NEUTROPHILS # BLD AUTO: 6.02 10*3/MM3 (ref 1.7–7)
NEUTROPHILS NFR BLD AUTO: 70.4 % (ref 42.7–76)
NRBC BLD AUTO-RTO: 1.1 /100 WBC (ref 0–0.2)
PLATELET # BLD AUTO: 343 10*3/MM3 (ref 140–450)
PMV BLD AUTO: 9.6 FL (ref 6–12)
POTASSIUM BLD-SCNC: 3.9 MMOL/L (ref 3.5–5.2)
PROT SERPL-MCNC: 5.9 G/DL (ref 6–8.5)
RBC # BLD AUTO: 3.29 10*6/MM3 (ref 3.77–5.28)
SODIUM BLD-SCNC: 136 MMOL/L (ref 136–145)
WBC NRBC COR # BLD: 8.55 10*3/MM3 (ref 3.4–10.8)

## 2020-04-12 PROCEDURE — 63710000001 PREDNISONE PER 1 MG: Performed by: INTERNAL MEDICINE

## 2020-04-12 PROCEDURE — 82728 ASSAY OF FERRITIN: CPT | Performed by: HOSPITALIST

## 2020-04-12 PROCEDURE — 63710000001 INSULIN LISPRO (HUMAN) PER 5 UNITS: Performed by: HOSPITALIST

## 2020-04-12 PROCEDURE — 85025 COMPLETE CBC W/AUTO DIFF WBC: CPT | Performed by: HOSPITALIST

## 2020-04-12 PROCEDURE — 94799 UNLISTED PULMONARY SVC/PX: CPT

## 2020-04-12 PROCEDURE — 83615 LACTATE (LD) (LDH) ENZYME: CPT | Performed by: HOSPITALIST

## 2020-04-12 PROCEDURE — 82962 GLUCOSE BLOOD TEST: CPT

## 2020-04-12 PROCEDURE — 80053 COMPREHEN METABOLIC PANEL: CPT | Performed by: HOSPITALIST

## 2020-04-12 PROCEDURE — 97110 THERAPEUTIC EXERCISES: CPT | Performed by: PHYSICAL THERAPIST

## 2020-04-12 RX ADMIN — POLYETHYLENE GLYCOL 3350 17 G: 17 POWDER, FOR SOLUTION ORAL at 12:07

## 2020-04-12 RX ADMIN — PREDNISONE 40 MG: 20 TABLET ORAL at 08:50

## 2020-04-12 RX ADMIN — INSULIN LISPRO 2 UNITS: 100 INJECTION, SOLUTION INTRAVENOUS; SUBCUTANEOUS at 21:26

## 2020-04-12 RX ADMIN — HYDROCODONE BITARTRATE AND ACETAMINOPHEN 1 TABLET: 5; 325 TABLET ORAL at 13:05

## 2020-04-12 RX ADMIN — SUCRALFATE 1 G: 1 SUSPENSION ORAL at 06:51

## 2020-04-12 RX ADMIN — SUCRALFATE 1 G: 1 SUSPENSION ORAL at 21:26

## 2020-04-12 RX ADMIN — SUCRALFATE 1 G: 1 SUSPENSION ORAL at 12:07

## 2020-04-12 RX ADMIN — BUDESONIDE AND FORMOTEROL FUMARATE DIHYDRATE 2 PUFF: 160; 4.5 AEROSOL RESPIRATORY (INHALATION) at 19:00

## 2020-04-12 RX ADMIN — HYDROCODONE BITARTRATE AND ACETAMINOPHEN 1 TABLET: 5; 325 TABLET ORAL at 06:51

## 2020-04-12 RX ADMIN — SODIUM BICARBONATE 650 MG: 650 TABLET ORAL at 08:50

## 2020-04-12 RX ADMIN — LEVOTHYROXINE SODIUM 200 MCG: 100 TABLET ORAL at 06:51

## 2020-04-12 RX ADMIN — SODIUM BICARBONATE 650 MG: 650 TABLET ORAL at 21:26

## 2020-04-12 RX ADMIN — INSULIN LISPRO 3 UNITS: 100 INJECTION, SOLUTION INTRAVENOUS; SUBCUTANEOUS at 17:36

## 2020-04-12 RX ADMIN — PANTOPRAZOLE SODIUM 40 MG: 40 TABLET, DELAYED RELEASE ORAL at 17:36

## 2020-04-12 RX ADMIN — BUDESONIDE AND FORMOTEROL FUMARATE DIHYDRATE 2 PUFF: 160; 4.5 AEROSOL RESPIRATORY (INHALATION) at 07:13

## 2020-04-12 RX ADMIN — SUCRALFATE 1 G: 1 SUSPENSION ORAL at 17:36

## 2020-04-12 RX ADMIN — HYDROCODONE BITARTRATE AND ACETAMINOPHEN 1 TABLET: 5; 325 TABLET ORAL at 19:21

## 2020-04-12 RX ADMIN — SODIUM BICARBONATE 650 MG: 650 TABLET ORAL at 17:36

## 2020-04-12 RX ADMIN — PANTOPRAZOLE SODIUM 40 MG: 40 TABLET, DELAYED RELEASE ORAL at 06:51

## 2020-04-12 NOTE — PLAN OF CARE
Problem: Patient Care Overview  Goal: Plan of Care Review  Flowsheets (Taken 4/12/2020 0950)  Outcome Summary: Pt was agreeable to therapy, but c/o pain in BLE. She was able to walk short distances at a time with min A x 2. Pt experieince bilateral knee buckling and hyperextended her knees when ambualting to prevent this. Pt fatigued quickly with SOA and seated rest break to recover.

## 2020-04-12 NOTE — NURSING NOTE
"Acute Inpt Rehab Note     Met with patient this morning.  She complains of pain in bilateral hands and legs, specifically knees down to her feet.  She denies any burning, numbness or tingling at this time, \"just pain\".  She states she is taking pain medication every 6 hours.  She does acknowledge having some leg pain in the past but it always went away.  She also acknowledges some arthritis in her right wrist specifically.      She states prior to admission she was having severe fatigue for about a week.  She says she could easily sleep for 10 hours at a time.  She reports it was hard to walk to the bathroom due to her fatigue.  She did have a cough and some fevers during that week.  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  She denies having a hard physical job and that her legs did not hurt routinely after coming home from work.      She did appear to have some edema to bilateral calf and feet.  She states her hands are about her normal size.  She states just the blankets touching her legs is painful.    She states her goal is to return home and return to work.      Reviewed again with Dr. Campos, who still states we need a confirmed diagnosis, so we know what would be rehabbing.    We will continue to follow for medical updates and to be of assistance in the patient's rehabilitation needs.    Thank you,     Huong Welsh RN   Rehab Admission RN     "

## 2020-04-12 NOTE — THERAPY TREATMENT NOTE
Patient Name: Oralia Sánchez  : 1975    MRN: [id number]                              Today's Date: 2020       Admit Date: 2020    Visit Dx:     ICD-10-CM ICD-9-CM   1. Dyspnea, unspecified type R06.00 786.09   2. Metabolic acidosis E87.2 276.2   3. Cough R05 786.2   4. Fatigue, unspecified type R53.83 780.79   5. History of COPD Z87.09 V12.69   6. Hypomagnesemia E83.42 275.2   7. Cerebral aneurysm rupture (CMS/HCC) I60.7 430     Patient Active Problem List   Diagnosis   • Vitamin D deficiency   • Awareness of heartbeats   • Adiposity   • YOUNG (nonalcoholic steatohepatitis)   • Hypothyroid   • Diabetes mellitus arising in pregnancy   • Diabetes (CMS/HCC)   • Metabolic syndrome   • Chest pain   • Primary thunderclap headache   • Elevated LFTs   • Tobacco abuse   • Rheumatoid arthritis (CMS/HCC)   • Type 2 diabetes mellitus (CMS/HCC)   • Morbid obesity (CMS/HCC)   • UTI (urinary tract infection), bacterial   • Cerebral aneurysm   • Dyspnea   • Cough   • Hypomagnesemia   • Metabolic acidosis   • Fatigue   • History of COPD     Past Medical History:   Diagnosis Date   • Aneurysm (CMS/HCC)    • Arthritis    • Carpal tunnel syndrome    • Chest pain    • Diabetes mellitus (CMS/HCC)    • Dysmetabolic syndrome X    • Gestational diabetes mellitus    • Hypothyroidism    • Metabolic disorder    • Nonalcoholic steatohepatitis    • Obesity    • Palpitations    • Sleep apnea    • Spinal headache    • Type 2 diabetes mellitus (CMS/HCC)    • Vitamin D deficiency      Past Surgical History:   Procedure Laterality Date   • CARPAL TUNNEL RELEASE     • CEREBRAL ANGIOGRAM N/A 3/28/2019    Procedure: DIAGNOSTIC CEREBRAL ANGIOGRAM;  Surgeon: Alexx Barrow MD;  Location: [facility] OR ;  Service: Neurosurgery   • CEREBRAL ANGIOGRAM N/A 10/2/2019    Procedure: CEREBRAL ANGIOGRAM;  Surgeon: Santosh Garza MD;  Location: [facility] OR ;  Service: Interventional Radiology   •  SECTION      x6    • DILATATION AND CURETTAGE      x 2   • EMBOLIZATION CEREBRAL N/A 3/29/2019    Procedure: Cererbal angiogram and embolization of cerebral aneurysm;  Surgeon: Alexx Barrow MD;  Location: [facility] 18/19;  Service: Neurosurgery   • MYRINGOTOMY     • TONSILLECTOMY AND ADENOIDECTOMY       General Information     Row Name 04/12/20 1657          PT Evaluation Time/Intention    Document Type  therapy note (daily note)  -     Mode of Treatment  individual therapy;physical therapy  -       User Key  (r) = Recorded By, (t) = Taken By, (c) = Cosigned By    Initials Name Provider Type    Melissa Ruiz, PT Physical Therapist        Mobility     Row Name 04/12/20 1708          Bed Mobility Assessment/Treatment    Bed Mobility Assessment/Treatment  supine-sit  -     Supine-Sit Burnett (Bed Mobility)  contact guard  -     Assistive Device (Bed Mobility)  bed rails;head of bed elevated  -     Row Name 04/12/20 1701          Sit-Stand Transfer    Sit-Stand Burnett (Transfers)  minimum assist (75% patient effort);moderate assist (50% patient effort);2 person assist  -     Assistive Device (Sit-Stand Transfers)  walker, front-wheeled  -     Row Name 04/12/20 1701          Gait/Stairs Assessment/Training    Burnett Level (Gait)  minimum assist (75% patient effort);2 person assist  -     Assistive Device (Gait)  walker, 4-wheeled  -     Distance in Feet (Gait)  25 ft x 2 with seated rest  -     Deviations/Abnormal Patterns (Gait)  antalgic;base of support, wide;quang decreased;festinating/shuffling;stride length decreased  -     Bilateral Gait Deviations  forward flexed posture;weight shift ability decreased  -     Comment (Gait/Stairs)  pt hyperextending Bilateral knees to prevent buckling, very unsteady  -       User Key  (r) = Recorded By, (t) = Taken By, (c) = Cosigned By    Initials Name Provider Type    Melissa Ruiz, PT Physical Therapist         Obj/Interventions     Row Name 04/12/20 1707          Therapeutic Exercise    Comment (Therapeutic Exercise)  BLE LAQ, Ap x 10 reps  -       User Key  (r) = Recorded By, (t) = Taken By, (c) = Cosigned By    Initials Name Provider Type    Melissa Ruiz, PT Physical Therapist        Goals/Plan    No documentation.       Clinical Impression     Row Name 04/12/20 1707          Pain Assessment    Additional Documentation  Pain Scale: Numbers Pre/Post-Treatment (Group)  -KH     Row Name 04/12/20 1707          Pain Scale: Numbers Pre/Post-Treatment    Pain Scale: Numbers, Pretreatment  10/10  -     Pain Scale: Numbers, Post-Treatment  10/10  -KH     Pain Location - Side  Bilateral  -     Pain Location - Orientation  lower  -     Pain Location  extremity  -     Pain Intervention(s)  Repositioned;Ambulation/increased activity  -KH     Row Name 04/12/20 1707          Plan of Care Review    Outcome Summary  Pt was agreeable to therapy, but c/o pain in BLE. She was able to walk short distances at a time with min A x 2. Pt experieince bilateral knee buckling and hyperextended her knees when ambualting to prevent this. Pt fatigued quickly with SOA and seated rest break to recover.   -KH     Row Name 04/12/20 1707          Positioning and Restraints    Pre-Treatment Position  in bed  -     Post Treatment Position  bsc  -     On BS commode  sitting;call light within reach;encouraged to call for assist;[place] [facility] [place]  -       User Key  (r) = Recorded By, (t) = Taken By, (c) = Cosigned By    Initials Name Provider Type    Melissa Ruiz, PT Physical Therapist        Outcome Measures     Row Name 04/12/20 1709          How much help from another person do you currently need...    Turning from your back to your side while in flat bed without using bedrails?  4  -KH     Moving from lying on back to sitting on the side of a flat bed without bedrails?  4  -KH     Moving to and from a bed to a  chair (including a wheelchair)?  3  -KH     Standing up from a chair using your arms (e.g., wheelchair, bedside chair)?  3  -KH     Climbing 3-5 steps with a railing?  1  -KH     To walk in hospital room?  3  -KH     AM-PAC 6 Clicks Score (PT)  18  -GLORY     Row Name 04/12/20 1709          Functional Assessment    Outcome Measure Options  AM-PAC 6 Clicks Basic Mobility (PT)  -       User Key  (r) = Recorded By, (t) = Taken By, (c) = Cosigned By    Initials Name Provider Type    Melissa Ruiz PT Physical Therapist          PT Recommendation and Plan     Outcome Summary: Pt was agreeable to therapy, but c/o pain in BLE. She was able to walk short distances at a time with min A x 2. Pt experieince bilateral knee buckling and hyperextended her knees when ambualting to prevent this. Pt fatigued quickly with SOA and seated rest break to recover.      Time Calculation:   PT Charges     Row Name 04/12/20 1646             Time Calculation    Start Time  1620  -      Stop Time  1645  -      Time Calculation (min)  25 min  -      PT Received On  04/12/20  -GLORY      PT - Next Appointment  04/14/20  -         Time Calculation- PT    Total Timed Code Minutes- PT  25 minute(s)  -        User Key  (r) = Recorded By, (t) = Taken By, (c) = Cosigned By    Initials Name Provider Type    Melissa Ruiz PT Physical Therapist        Therapy Charges for Today     Code Description Service Date Service Provider Modifiers Qty    31691464541 HC PT THER SUPP EA 15 MIN 4/12/2020 Melissa Sheehan, PT GP 1    63807681065 HC PT THER PROC EA 15 MIN 4/12/2020 Melissa Sheehan, PT GP 2          PT G-Codes  Outcome Measure Options: AM-PAC 6 Clicks Basic Mobility (PT)  AM-PAC 6 Clicks Score (PT): 18    Melissa Sheehan, PT  4/12/2020

## 2020-04-12 NOTE — PROGRESS NOTES
"DAILY PROGRESS NOTE  [facility]    Patient Identification:  Name: Oralia Snáchez  Age: 44 y.o.  Sex: female  :  1975  MRN: [id number]         Primary Care Physician: Provider, No Known    Subjective:  Interval History:She has a bad cough and is short of air but better today.  requiring  O 2 at night. Extreme fatigue.  Abdominal pain and swelling.  Some pain.    Objective:    Scheduled Meds:    budesonide-formoterol 2 puff Inhalation BID - RT   insulin lispro 0-7 Units Subcutaneous 4x Daily With Meals & Nightly   levothyroxine 200 mcg Oral Daily   midodrine 10 mg Oral TID AC   pantoprazole 40 mg Oral BID AC   polyethylene glycol 17 g Oral Daily   predniSONE 40 mg Oral Daily With Breakfast   sodium bicarbonate 650 mg Oral TID   sucralfate 1 g Oral 4x Daily AC & at Bedtime     Continuous Infusions:       Vital signs in last 24 hours:  Temp:  [96.7 °F (35.9 °C)-98.7 °F (37.1 °C)] 98.7 °F (37.1 °C)  Heart Rate:  [82-96] 92  Resp:  [12-18] 16  BP: (114-137)/(65-86) 126/83    Intake/Output:    Intake/Output Summary (Last 24 hours) at 2020 1333  Last data filed at 2020 0849  Gross per 24 hour   Intake 1200 ml   Output 1000 ml   Net 200 ml       Exam:  /83 (BP Location: Left arm, Patient Position: Lying)   Pulse 92   Temp 98.7 °F (37.1 °C) (Oral)   Resp 16   Ht 154.9 cm (61\")   Wt 113 kg (248 lb 9.6 oz)   LMP 2020   SpO2 96%   BMI 46.97 kg/m²     General Appearance:    Alert, cooperative, no distress   Head:    Normocephalic, without obvious abnormality, atraumatic   Eyes:       Throat:   Lips, tongue, gums normal   Neck:   Supple, symmetrical, trachea midline, no JVD   Lungs:     Crackles and wheezes. bilaterally, respirations unlabored   Chest Wall:    No tenderness or deformity    Heart:    Regular rate and rhythm, S1 and S2 normal, no murmur,no  Rub or gallop   Abdomen:     Soft, non-tender, bowel sounds active, no masses, no organomegaly    Extremities:   " Extremities normal, atraumatic, no cyanosis or edema   Pulses:      Skin:   Skin is warm and dry,  Boil in pubic area   Neurologic:   no focal deficits noted      Lab Results (last 72 hours)     Procedure Component Value Units Date/Time    POC Glucose Once [[id number]]  (Abnormal) Collected:  04/02/20 1147    Specimen:  Blood Updated:  04/02/20 1148     Glucose 144 mg/dL     Comprehensive Metabolic Panel [[id number]]  (Abnormal) Collected:  04/02/20 0634    Specimen:  Blood Updated:  04/02/20 0736     Glucose 117 mg/dL      BUN 8 mg/dL      Creatinine 0.76 mg/dL      Sodium 136 mmol/L      Potassium 3.8 mmol/L      Chloride 90 mmol/L      CO2 20.2 mmol/L      Calcium 7.8 mg/dL      Total Protein 5.9 g/dL      Albumin 3.40 g/dL      ALT (SGPT) 49 U/L      AST (SGOT) 97 U/L      Alkaline Phosphatase 103 U/L      Total Bilirubin 1.3 mg/dL      eGFR Non African Amer 82 mL/min/1.73      Globulin 2.5 gm/dL      A/G Ratio 1.4 g/dL      BUN/Creatinine Ratio 10.5     Anion Gap 25.8 mmol/L     Narrative:       GFR Normal >60  Chronic Kidney Disease <60  Kidney Failure <15      Ferritin [[id number]]  (Abnormal) Collected:  04/02/20 0634    Specimen:  Blood Updated:  04/02/20 0726     Ferritin 803.00 ng/mL     Narrative:       Results may be falsely decreased if patient taking Biotin.      Lactate Dehydrogenase [[id number]]  (Abnormal) Collected:  04/02/20 0634    Specimen:  Blood Updated:  04/02/20 0722      U/L     CBC & Differential [[id number]] Collected:  04/02/20 0639    Specimen:  Blood Updated:  04/02/20 0652    Narrative:       The following orders were created for panel order CBC & Differential.  Procedure                               Abnormality         Status                     ---------                               -----------         ------                     CBC Auto Differential[[id number]]        Abnormal            Final result                 Please view results for these tests on the individual orders.     CBC Auto Differential [[id number]]  (Abnormal) Collected:  04/02/20 0639    Specimen:  Blood Updated:  04/02/20 0652     WBC 7.38 10*3/mm3      RBC 3.95 10*6/mm3      Hemoglobin 14.2 g/dL      Hematocrit 40.1 %      .5 fL      MCH 35.9 pg      MCHC 35.4 g/dL      RDW 14.3 %      RDW-SD 53.6 fl      MPV 9.4 fL      Platelets 195 10*3/mm3      Neutrophil % 65.8 %      Lymphocyte % 24.5 %      Monocyte % 8.1 %      Eosinophil % 0.3 %      Basophil % 0.8 %      Immature Grans % 0.5 %      Neutrophils, Absolute 4.85 10*3/mm3      Lymphocytes, Absolute 1.81 10*3/mm3      Monocytes, Absolute 0.60 10*3/mm3      Eosinophils, Absolute 0.02 10*3/mm3      Basophils, Absolute 0.06 10*3/mm3      Immature Grans, Absolute 0.04 10*3/mm3      nRBC 0.4 /100 WBC     POC Glucose Once [[id number]]  (Normal) Collected:  04/02/20 0616    Specimen:  Blood Updated:  04/02/20 0618     Glucose 116 mg/dL     POC Glucose Once [[id number]]  (Normal) Collected:  04/01/20 2059    Specimen:  Blood Updated:  04/01/20 2059     Glucose 127 mg/dL     Blood Culture - Blood, Arm, Right [[id number]] Collected:  04/01/20 1654    Specimen:  Blood from Arm, Right Updated:  04/01/20 1654    Blood Culture - Blood, Arm, Left [[id number]] Collected:  04/01/20 1654    Specimen:  Blood from Arm, Left Updated:  04/01/20 1654    Respiratory Panel, PCR - Swab, Nasopharynx [[id number]]  (Normal) Collected:  04/01/20 1515    Specimen:  Swab from Nasopharynx Updated:  04/01/20 1653     ADENOVIRUS, PCR Not Detected     Coronavirus 229E Not Detected     Coronavirus HKU1 Not Detected     Coronavirus NL63 Not Detected     Coronavirus OC43 Not Detected     Human Metapneumovirus Not Detected     Human Rhinovirus/Enterovirus Not Detected     Influenza B PCR Not Detected     Parainfluenza Virus 1 Not Detected     Parainfluenza Virus 2 Not Detected     Parainfluenza Virus 3 Not Detected     Parainfluenza Virus 4 Not Detected     Bordetella pertussis pcr Not Detected      Influenza A H1 2009 PCR Not Detected     Chlamydophila pneumoniae PCR Not Detected     Mycoplasma pneumo by PCR Not Detected     Influenza A PCR Not Detected     Influenza A H3 Not Detected     Influenza A H1 Not Detected     RSV, PCR Not Detected     Bordetella parapertussis PCR Not Detected    Narrative:       The coronavirus on the RVP is NOT COVID-19 and is NOT indicative of infection with COVID-19.     POC Glucose Once [[id number]]  (Abnormal) Collected:  04/01/20 1559    Specimen:  Blood Updated:  04/01/20 1601     Glucose 132 mg/dL     Hemoglobin A1c [[id number]]  (Abnormal) Collected:  04/01/20 0809    Specimen:  Blood Updated:  04/01/20 1417     Hemoglobin A1C 6.68 %     Narrative:       Hemoglobin A1C Ranges:    Increased Risk for Diabetes  5.7% to 6.4%  Diabetes                     >= 6.5%  Diabetic Goal                < 7.0%    Salicylate Level [[id number]]  (Normal) Collected:  04/01/20 0809    Specimen:  Blood Updated:  04/01/20 1129     Salicylate <0.3 mg/dL     Narrative:       Therapeutic range for Salicylates:  3.0 - 10.0 mg/dL for antipyretic/analgesic conditions  15.0 - 30.0 mg/dL for anti-inflammatory conditions    CORONAVIRUS(COVID-19),RT-PCR,UOFL LAB,NP/OP Swab in Transport Media - Swab, Nasopharynx [[id number]] Collected:  04/01/20 1049    Specimen:  Swab from Nasopharynx Updated:  04/01/20 1101    Blood Gas, Arterial [[id number]]  (Abnormal) Collected:  04/01/20 1051    Specimen:  Arterial Blood Updated:  04/01/20 1053     Site Arterial: left radial     Jovi's Test Positive     pH, Arterial 7.477 pH units      pCO2, Arterial 28.2 mm Hg      pO2, Arterial 99.2 mm Hg      HCO3, Arterial 20.9 mmol/L      Base Excess, Arterial -1.0 mmol/L      O2 Saturation Calculated 98.3 %      Barometric Pressure for Blood Gas 753.0 mmHg      Modality Room Air     Set Mech Resp Rate 28    Ethanol [[id number]] Collected:  04/01/20 0809    Specimen:  Blood Updated:  04/01/20 0951     Ethanol <10 mg/dL      Ethanol %  <0.010 %     Ferritin [[id number]]  (Abnormal) Collected:  04/01/20 0809    Specimen:  Blood Updated:  04/01/20 0946     Ferritin 821.00 ng/mL     Narrative:       Results may be falsely decreased if patient taking Biotin.      [place] Draw [[id number]] Collected:  04/01/20 0809    Specimen:  Blood Updated:  04/01/20 0915    Narrative:       The following orders were created for panel order [place] Draw.  Procedure                               Abnormality         Status                     ---------                               -----------         ------                     Light Blue Top[[id number]]                                   Final result               Green Top (Gel)[[id number]]                                  Final result               Lavender Top[[id number]]                                     Final result               Gold Top - SST[[id number]]                                   Final result                 Please view results for these tests on the individual orders.    Green Top (Gel) [[id number]] Collected:  04/01/20 0809    Specimen:  Blood Updated:  04/01/20 0915     Extra Tube Hold for add-ons.     Comment: Auto resulted.       Lavender Top [[id number]] Collected:  04/01/20 0809    Specimen:  Blood Updated:  04/01/20 0915     Extra Tube hold for add-on     Comment: Auto resulted       Light Blue Top [[id number]] Collected:  04/01/20 0809    Specimen:  Blood Updated:  04/01/20 0915     Extra Tube hold for add-on     Comment: Auto resulted       Gold Top - SST [[id number]] Collected:  04/01/20 0809    Specimen:  Blood Updated:  04/01/20 0915     Extra Tube Hold for add-ons.     Comment: Auto resulted.       Procalcitonin [[id number]]  (Normal) Collected:  04/01/20 0809    Specimen:  Blood Updated:  04/01/20 0846     Procalcitonin 0.16 ng/mL     Narrative:       As a Marker for Sepsis (Non-Neonates):   1. <0.5 ng/mL represents a low risk of severe sepsis and/or septic shock.  1. >2 ng/mL represents a high  "risk of severe sepsis and/or septic shock.    As a Marker for Lower Respiratory Tract Infections that require antibiotic therapy:  PCT on Admission     Antibiotic Therapy             6-12 Hrs later  > 0.5                Strongly Recommended            >0.25 - <0.5         Recommended  0.1 - 0.25           Discouraged                   Remeasure/reassess PCT  <0.1                 Strongly Discouraged          Remeasure/reassess PCT      As 28 day mortality risk marker: \"Change in Procalcitonin Result\" (> 80 % or <=80 %) if Day 0 (or Day 1) and Day 4 values are available. Refer to [email or web address]/   Change in PCT <=80 %   A decrease of PCT levels below or equal to 80 % defines a positive change in PCT test result representing a higher risk for 28-day all-cause mortality of patients diagnosed with severe sepsis or septic shock.  Change in PCT > 80 %   A decrease of PCT levels of more than 80 % defines a negative change in PCT result representing a lower risk for 28-day all-cause mortality of patients diagnosed with severe sepsis or septic shock.                Results may be falsely decreased if patient taking Biotin.     Comprehensive Metabolic Panel [[id number]]  (Abnormal) Collected:  04/01/20 0809    Specimen:  Blood Updated:  04/01/20 0841     Glucose 127 mg/dL      BUN 3 mg/dL      Creatinine 0.85 mg/dL      Sodium 135 mmol/L      Potassium 3.2 mmol/L      Chloride 84 mmol/L      CO2 18.7 mmol/L      Calcium 8.5 mg/dL      Total Protein 7.1 g/dL      Albumin 3.70 g/dL      ALT (SGPT) 65 U/L      AST (SGOT) 124 U/L      Alkaline Phosphatase 128 U/L      Total Bilirubin 1.4 mg/dL      eGFR Non African Amer 72 mL/min/1.73      Globulin 3.4 gm/dL      A/G Ratio 1.1 g/dL      BUN/Creatinine Ratio 3.5     Anion Gap 32.3 mmol/L     Narrative:       GFR Normal >60  Chronic Kidney Disease <60  Kidney Failure <15      C-reactive Protein [[id number]]  (Abnormal) Collected:  04/01/20 0809    Specimen:  " Blood Updated:  04/01/20 0841     C-Reactive Protein 0.63 mg/dL     Lactate Dehydrogenase [[id number]]  (Abnormal) Collected:  04/01/20 0809    Specimen:  Blood Updated:  04/01/20 0841      U/L     Magnesium [[id number]]  (Abnormal) Collected:  04/01/20 0809    Specimen:  Blood Updated:  04/01/20 0841     Magnesium 1.1 mg/dL     Troponin [[id number]]  (Normal) Collected:  04/01/20 0809    Specimen:  Blood Updated:  04/01/20 0841     Troponin T <0.010 ng/mL     Narrative:       Troponin T Reference Range:  <= 0.03 ng/mL-   Negative for AMI  >0.03 ng/mL-     Abnormal for myocardial necrosis.  Clinicians would have to utilize clinical acumen, EKG, Troponin and serial changes to determine if it is an Acute Myocardial Infarction or myocardial injury due to an underlying chronic condition.       Results may be falsely decreased if patient taking Biotin.      CBC Auto Differential [[id number]]  (Abnormal) Collected:  04/01/20 0809    Specimen:  Blood Updated:  04/01/20 0828     WBC 8.04 10*3/mm3      RBC 4.48 10*6/mm3      Hemoglobin 16.3 g/dL      Hematocrit 46.2 %      .1 fL      MCH 36.4 pg      MCHC 35.3 g/dL      RDW 15.4 %      RDW-SD 57.9 fl      MPV 9.5 fL      Platelets 244 10*3/mm3      Neutrophil % 68.8 %      Lymphocyte % 22.6 %      Monocyte % 7.2 %      Eosinophil % 0.4 %      Basophil % 0.6 %      Immature Grans % 0.4 %      Neutrophils, Absolute 5.53 10*3/mm3      Lymphocytes, Absolute 1.82 10*3/mm3      Monocytes, Absolute 0.58 10*3/mm3      Eosinophils, Absolute 0.03 10*3/mm3      Basophils, Absolute 0.05 10*3/mm3      Immature Grans, Absolute 0.03 10*3/mm3      nRBC 0.5 /100 WBC         Data Review:  Results from last 7 days   Lab Units 04/12/20  0450 04/11/20  0707 04/10/20  0524   SODIUM mmol/L 136 136 133*   POTASSIUM mmol/L 3.9 4.0 3.5   CHLORIDE mmol/L 95* 93* 90*   CO2 mmol/L 28.2 27.1 27.5   BUN mg/dL 13 9 8   CREATININE mg/dL 0.57 0.59 0.45*   GLUCOSE mg/dL 113* 148* 137*   CALCIUM  mg/dL 9.3 9.2 8.6     Results from last 7 days   Lab Units 04/12/20  0450 04/11/20  0707 04/09/20  0532   WBC 10*3/mm3 8.55 8.06 8.56   HEMOGLOBIN g/dL 12.0 12.3 12.6   HEMATOCRIT % 33.9* 35.0 35.2   PLATELETS 10*3/mm3 343 320 235             Lab Results   Lab Value Date/Time    TROPONINT <0.010 04/01/2020 0809    TROPONINT <0.010 11/10/2019 1525         Results from last 7 days   Lab Units 04/12/20  0450 04/11/20  0707 04/10/20  0524   ALK PHOS U/L 153* 147* 144*   BILIRUBIN mg/dL 0.7 1.0 1.0   ALT (SGPT) U/L 64* 49* 45*   AST (SGOT) U/L 129* 85* 91*             Glucose   Date/Time Value Ref Range Status   04/12/2020 1102 111 70 - 130 mg/dL Final   04/12/2020 0721 91 70 - 130 mg/dL Final   04/11/2020 2035 191 (H) 70 - 130 mg/dL Final   04/11/2020 1657 235 (H) 70 - 130 mg/dL Final   04/11/2020 1132 120 70 - 130 mg/dL Final   04/11/2020 0558 116 70 - 130 mg/dL Final   04/10/2020 2041 187 (H) 70 - 130 mg/dL Final   04/10/2020 1602 115 70 - 130 mg/dL Final           Past Medical History:   Diagnosis Date   • Aneurysm (CMS/HCC)    • Arthritis    • Carpal tunnel syndrome    • Chest pain    • Diabetes mellitus (CMS/HCC)    • Dysmetabolic syndrome X    • Gestational diabetes mellitus    • Hypothyroidism    • Metabolic disorder    • Nonalcoholic steatohepatitis    • Obesity    • Palpitations    • Sleep apnea    • Spinal headache    • Type 2 diabetes mellitus (CMS/HCC)    • Vitamin D deficiency        Assessment:  Active Hospital Problems    Diagnosis  POA   • **Dyspnea [R06.00]  Yes   • Cough [R05]  Unknown   • Hypomagnesemia [E83.42]  Unknown   • Metabolic acidosis [E87.2]  Unknown   • Fatigue [R53.83]  Unknown   • History of COPD [Z87.09]  Not Applicable   • Tobacco abuse [Z72.0]  Yes   • Rheumatoid arthritis (CMS/HCC) [M06.9]  Yes   • Type 2 diabetes mellitus (CMS/HCC) [E11.9]  Yes   • Morbid obesity (CMS/HCC) [E66.01]  Yes   • Elevated LFTs [R94.5]  Yes   • YOUNG (nonalcoholic steatohepatitis) [K75.81]  Yes   • Diabetes  mellitus arising in pregnancy [O24.419]  Yes   • Hypothyroid [E03.9]  Yes      Resolved Hospital Problems   No resolved problems to display.       Plan:  Continue OFF IV fluids and follow lab. ECHO report noted. Off IV fluids and increase proamatine for low BP. Got diuretics.  Elevated inflammatory markers. Will follow. CT abdomen, chest and GI consult noted.. Repeat COVID test was negative..  PT eval.  Colonoscopy later as outpatient.  Continue supportive care. Repeat CXR no change. Continue prednisone.  May need rehab.  Tylor Cruz MD  4/12/2020  13:33

## 2020-04-12 NOTE — PROGRESS NOTES
Dr. POP Marcos    [address]    4/12/2020    Patient ID:  Name:  Oralia Sánchez  MRN:  [id number]  1975  44 y.o.  female            CC/Reason for visit: Severe cough, cigarette smoker,     Interval hx: Patient still obsessing about her weight, thinking that she needs diuretics.  Her leg edema is much better but she still insists she wants diuretics        ROS: Denies chest pain, denies abdominal pain    Vitals:  Vitals:    04/12/20 0713 04/12/20 0716 04/12/20 0750 04/12/20 1100   BP:   135/80 126/83   BP Location:   Left arm Left arm   Patient Position:   Lying Lying   Pulse: 82 89 95 92   Resp: 12 13 14 16   Temp:   96.7 °F (35.9 °C) 98.7 °F (37.1 °C)   TempSrc:   Axillary Oral   SpO2:   95% 96%   Weight:       Height:               Body mass index is 46.97 kg/m².    Intake/Output Summary (Last 24 hours) at 4/12/2020 1532  Last data filed at 4/12/2020 0849  Gross per 24 hour   Intake 960 ml   Output 1000 ml   Net -40 ml       Exam:  GEN:  No distress  Alert, oriented x 3.   LUNGS: Clear breath sounds bilat, no use of accessory muscles  CV:  Normal S1S2, without murmur, only trace ankle edema  ABD:  Non tender, morbidly obese      Scheduled meds:    budesonide-formoterol 2 puff Inhalation BID - RT   insulin lispro 0-7 Units Subcutaneous 4x Daily With Meals & Nightly   levothyroxine 200 mcg Oral Daily   midodrine 10 mg Oral TID AC   pantoprazole 40 mg Oral BID AC   polyethylene glycol 17 g Oral Daily   predniSONE 40 mg Oral Daily With Breakfast   sodium bicarbonate 650 mg Oral TID   sucralfate 1 g Oral 4x Daily AC & at Bedtime     IV meds:                           Data Review:   I reviewed the patient's medications and new clinical results.    COVID19   Date Value Ref Range Status   04/07/2020 Not Detected Not Detected Final         Lab Results   Component Value Date    CALCIUM 9.3 04/12/2020    MG 1.7 04/04/2020    MG 1.1 (L) 04/01/2020     Results from last 7 days   Lab Units  04/12/20  0450 04/11/20  0707 04/10/20  0524 04/09/20  0532 04/08/20  0623   SODIUM mmol/L 136 136 133* 135* 131*   POTASSIUM mmol/L 3.9 4.0 3.5 3.5 3.6   CHLORIDE mmol/L 95* 93* 90* 92* 93*   CO2 mmol/L 28.2 27.1 27.5 29.4* 23.3   BUN mg/dL 13 9 8 7 10   CREATININE mg/dL 0.57 0.59 0.45* 0.47* 0.56*   CALCIUM mg/dL 9.3 9.2 8.6 8.2* 8.0*   BILIRUBIN mg/dL 0.7 1.0 1.0 1.3* 0.9   ALK PHOS U/L 153* 147* 144* 145* 177*   ALT (SGPT) U/L 64* 49* 45* 51* 58*   AST (SGOT) U/L 129* 85* 91* 98* 129*   GLUCOSE mg/dL 113* 148* 137* 115* 178*   WBC 10*3/mm3 8.55 8.06  --  8.56 7.93   HEMOGLOBIN g/dL 12.0 12.3  --  12.6 13.2   PLATELETS 10*3/mm3 343 320  --  235 215   PROCALCITONIN ng/mL  --   --   --   --  0.29*         Results from last 7 days   Lab Units 04/08/20  1116   ADENOVIRUS DETECTION BY PCR  Not Detected   CORONAVIRUS 229E  Not Detected   CORONAVIRUS HKU1  Not Detected   CORONAVIRUS NL63  Not Detected   CORONAVIRUS OC43  Not Detected   HUMAN METAPNEUMOVIRUS  Not Detected   HUMAN RHINOVIRUS/ENTEROVIRUS  Not Detected   INFLUENZA B PCR  Not Detected   PARAINFLUENZA 1  Not Detected   PARAINFLUENZA VIRUS 2  Not Detected   PARAINFLUENZA VIRUS 3  Not Detected   PARAINFLUENZA VIRUS 4  Not Detected   BORDETELLA PERTUSSIS PCR  Not Detected   CHLAMYDOPHILA PNEUMONIAE PCR  Not Detected   MYCOPLAMA PNEUMO PCR  Not Detected   INFLUENZA A PCR  Not Detected   INFLUENZA A H3  Not Detected   INFLUENZA A H1  Not Detected   [id number]  Not Detected   RSV, PCR  Not Detected               ASSESSMENT:   Chronic cough  Cigarette smoker  Abnormal CT chest    PLAN:  No issues ongoing from the respiratory standpoint.  She may be discharged anytime from our standpoint.  She will follow-up via telehealth visit with us.  Depending on her clinical course we will consider CT scan of the chest in the future.  She seems to be obsessing about diuretics.  She does not have edema on exam today.  I would not continue diuretics.  She was advised to start  exercising and lose weight and consult nutrition for a low carbohydrate and healthy diet.  Avoid refined flour and sugar, avoid highly processed foods.        Kennedy Marcos MD  4/12/2020

## 2020-04-12 NOTE — PLAN OF CARE
Problem: Patient Care Overview  Goal: Plan of Care Review  Outcome: Ongoing (interventions implemented as appropriate)  Flowsheets (Taken 4/12/2020 3645)  Progress: improving  Plan of Care Reviewed With: patient  Outcome Summary: Monitor pain,labs,and vitals. Pain controlled with PRN medications per orders. PO intake encouraged. Pulmonary toilet encouraged. Activity encouraged. No O2 required on current shift. Will continue to monitor.  Goal: Individualization and Mutuality  Outcome: Ongoing (interventions implemented as appropriate)  Goal: Discharge Needs Assessment  Outcome: Ongoing (interventions implemented as appropriate)  Goal: Interprofessional Rounds/Family Conf  Outcome: Ongoing (interventions implemented as appropriate)

## 2020-04-12 NOTE — PLAN OF CARE
Problem: Patient Care Overview  Goal: Plan of Care Review  Outcome: Ongoing (interventions implemented as appropriate)  Flowsheets (Taken 4/12/2020 6878)  Plan of Care Reviewed With: patient  Note:   VSS, labs scheduled for am.  Norco given for pain.  Pulmonology signed off.  No c/o sob.  Pt had large bm today.  Activity encouraged,  at this point [facility] will need a more acute dx for in patient care.  Patient states she can't go home as she has steps and won't be able to manage that.  Pt has an unsteady gate and is up with asst x1 with walker.  Pt has not tried to ambulate outside of room at this time.  Will continue to monitor.   Goal: Individualization and Mutuality  Outcome: Ongoing (interventions implemented as appropriate)  Goal: Discharge Needs Assessment  Outcome: Ongoing (interventions implemented as appropriate)  Goal: Interprofessional Rounds/Family Conf  Outcome: Ongoing (interventions implemented as appropriate)

## 2020-04-13 ENCOUNTER — PREP FOR SURGERY (OUTPATIENT)
Dept: OTHER | Facility: HOSPITAL | Age: 45
End: 2020-04-13

## 2020-04-13 DIAGNOSIS — R93.3 ABNORMAL CT SCAN, COLON: Primary | ICD-10-CM

## 2020-04-13 LAB
ALBUMIN SERPL-MCNC: 3 G/DL (ref 3.5–5.2)
ALBUMIN/GLOB SERPL: 1 G/DL
ALP SERPL-CCNC: 162 U/L (ref 39–117)
ALT SERPL W P-5'-P-CCNC: 111 U/L (ref 1–33)
ANION GAP SERPL CALCULATED.3IONS-SCNC: 17.4 MMOL/L (ref 5–15)
AST SERPL-CCNC: 211 U/L (ref 1–32)
BASOPHILS # BLD AUTO: 0.05 10*3/MM3 (ref 0–0.2)
BASOPHILS NFR BLD AUTO: 0.5 % (ref 0–1.5)
BILIRUB SERPL-MCNC: 0.7 MG/DL (ref 0.2–1.2)
BUN BLD-MCNC: 17 MG/DL (ref 6–20)
BUN/CREAT SERPL: 23.6 (ref 7–25)
C-ANCA TITR SER IF: NORMAL TITER
CALCIUM SPEC-SCNC: 9 MG/DL (ref 8.6–10.5)
CENTROMERE B AB SER-ACNC: <0.2 AI (ref 0–0.9)
CHLORIDE SERPL-SCNC: 95 MMOL/L (ref 98–107)
CHROMATIN AB SERPL-ACNC: <0.2 AI (ref 0–0.9)
CO2 SERPL-SCNC: 26.6 MMOL/L (ref 22–29)
CREAT BLD-MCNC: 0.72 MG/DL (ref 0.57–1)
CRP SERPL-MCNC: 3.04 MG/DL (ref 0–0.5)
DEPRECATED RDW RBC AUTO: 55.3 FL (ref 37–54)
DSDNA AB SER-ACNC: <1 IU/ML (ref 0–9)
ENA JO1 AB SER-ACNC: <0.2 AI (ref 0–0.9)
ENA RNP AB SER-ACNC: <0.2 AI (ref 0–0.9)
ENA SCL70 AB SER-ACNC: <0.2 AI (ref 0–0.9)
ENA SM AB SER-ACNC: <0.2 AI (ref 0–0.9)
ENA SS-A AB SER-ACNC: <0.2 AI (ref 0–0.9)
ENA SS-B AB SER-ACNC: <0.2 AI (ref 0–0.9)
EOSINOPHIL # BLD AUTO: 0.01 10*3/MM3 (ref 0–0.4)
EOSINOPHIL NFR BLD AUTO: 0.1 % (ref 0.3–6.2)
ERYTHROCYTE [DISTWIDTH] IN BLOOD BY AUTOMATED COUNT: 14.7 % (ref 12.3–15.4)
FERRITIN SERPL-MCNC: 2477 NG/ML (ref 13–150)
GFR SERPL CREATININE-BSD FRML MDRD: 87 ML/MIN/1.73
GLOBULIN UR ELPH-MCNC: 3 GM/DL
GLUCOSE BLD-MCNC: 143 MG/DL (ref 65–99)
GLUCOSE BLDC GLUCOMTR-MCNC: 140 MG/DL (ref 70–130)
GLUCOSE BLDC GLUCOMTR-MCNC: 180 MG/DL (ref 70–130)
GLUCOSE BLDC GLUCOMTR-MCNC: 228 MG/DL (ref 70–130)
GLUCOSE BLDC GLUCOMTR-MCNC: 252 MG/DL (ref 70–130)
HCT VFR BLD AUTO: 35.2 % (ref 34–46.6)
HGB BLD-MCNC: 12.7 G/DL (ref 12–15.9)
IMM GRANULOCYTES # BLD AUTO: 0.14 10*3/MM3 (ref 0–0.05)
IMM GRANULOCYTES NFR BLD AUTO: 1.4 % (ref 0–0.5)
LDH SERPL-CCNC: 332 U/L (ref 135–214)
LYMPHOCYTES # BLD AUTO: 1.3 10*3/MM3 (ref 0.7–3.1)
LYMPHOCYTES NFR BLD AUTO: 13.3 % (ref 19.6–45.3)
Lab: NORMAL
MCH RBC QN AUTO: 37.4 PG (ref 26.6–33)
MCHC RBC AUTO-ENTMCNC: 36.1 G/DL (ref 31.5–35.7)
MCV RBC AUTO: 103.5 FL (ref 79–97)
MONOCYTES # BLD AUTO: 0.7 10*3/MM3 (ref 0.1–0.9)
MONOCYTES NFR BLD AUTO: 7.2 % (ref 5–12)
MYELOPEROXIDASE AB SER-ACNC: <9 U/ML (ref 0–9)
NEUTROPHILS # BLD AUTO: 7.59 10*3/MM3 (ref 1.7–7)
NEUTROPHILS NFR BLD AUTO: 77.5 % (ref 42.7–76)
NRBC BLD AUTO-RTO: 1.1 /100 WBC (ref 0–0.2)
P-ANCA ATYPICAL TITR SER IF: NORMAL TITER
P-ANCA TITR SER IF: NORMAL TITER
PLATELET # BLD AUTO: 365 10*3/MM3 (ref 140–450)
PMV BLD AUTO: 9.6 FL (ref 6–12)
POTASSIUM BLD-SCNC: 4.4 MMOL/L (ref 3.5–5.2)
PROT SERPL-MCNC: 6 G/DL (ref 6–8.5)
PROTEINASE3 AB SER IA-ACNC: <3.5 U/ML (ref 0–3.5)
RBC # BLD AUTO: 3.4 10*6/MM3 (ref 3.77–5.28)
SODIUM BLD-SCNC: 139 MMOL/L (ref 136–145)
WBC NRBC COR # BLD: 9.79 10*3/MM3 (ref 3.4–10.8)

## 2020-04-13 PROCEDURE — 82728 ASSAY OF FERRITIN: CPT | Performed by: HOSPITALIST

## 2020-04-13 PROCEDURE — 97535 SELF CARE MNGMENT TRAINING: CPT

## 2020-04-13 PROCEDURE — 63710000001 DIPHENHYDRAMINE PER 50 MG: Performed by: HOSPITALIST

## 2020-04-13 PROCEDURE — 94799 UNLISTED PULMONARY SVC/PX: CPT

## 2020-04-13 PROCEDURE — 63710000001 PREDNISONE PER 1 MG: Performed by: INTERNAL MEDICINE

## 2020-04-13 PROCEDURE — 80053 COMPREHEN METABOLIC PANEL: CPT | Performed by: HOSPITALIST

## 2020-04-13 PROCEDURE — 97166 OT EVAL MOD COMPLEX 45 MIN: CPT

## 2020-04-13 PROCEDURE — 99232 SBSQ HOSP IP/OBS MODERATE 35: CPT | Performed by: INTERNAL MEDICINE

## 2020-04-13 PROCEDURE — 63710000001 INSULIN LISPRO (HUMAN) PER 5 UNITS: Performed by: HOSPITALIST

## 2020-04-13 PROCEDURE — 83615 LACTATE (LD) (LDH) ENZYME: CPT | Performed by: HOSPITALIST

## 2020-04-13 PROCEDURE — 85025 COMPLETE CBC W/AUTO DIFF WBC: CPT | Performed by: HOSPITALIST

## 2020-04-13 PROCEDURE — 86140 C-REACTIVE PROTEIN: CPT | Performed by: HOSPITALIST

## 2020-04-13 PROCEDURE — 82962 GLUCOSE BLOOD TEST: CPT

## 2020-04-13 RX ADMIN — PANTOPRAZOLE SODIUM 40 MG: 40 TABLET, DELAYED RELEASE ORAL at 07:34

## 2020-04-13 RX ADMIN — DIPHENHYDRAMINE HYDROCHLORIDE 25 MG: 25 CAPSULE ORAL at 19:54

## 2020-04-13 RX ADMIN — PREDNISONE 40 MG: 20 TABLET ORAL at 08:01

## 2020-04-13 RX ADMIN — HYDROCODONE BITARTRATE AND ACETAMINOPHEN 1 TABLET: 5; 325 TABLET ORAL at 15:30

## 2020-04-13 RX ADMIN — INSULIN LISPRO 2 UNITS: 100 INJECTION, SOLUTION INTRAVENOUS; SUBCUTANEOUS at 08:00

## 2020-04-13 RX ADMIN — HYDROCODONE BITARTRATE AND ACETAMINOPHEN 1 TABLET: 5; 325 TABLET ORAL at 07:34

## 2020-04-13 RX ADMIN — DIPHENHYDRAMINE HYDROCHLORIDE 25 MG: 25 CAPSULE ORAL at 15:43

## 2020-04-13 RX ADMIN — SODIUM BICARBONATE 650 MG: 650 TABLET ORAL at 20:14

## 2020-04-13 RX ADMIN — SUCRALFATE 1 G: 1 SUSPENSION ORAL at 20:14

## 2020-04-13 RX ADMIN — INSULIN LISPRO 6 UNITS: 100 INJECTION, SOLUTION INTRAVENOUS; SUBCUTANEOUS at 16:56

## 2020-04-13 RX ADMIN — LEVOTHYROXINE SODIUM 200 MCG: 100 TABLET ORAL at 06:33

## 2020-04-13 RX ADMIN — HYDROCODONE BITARTRATE AND ACETAMINOPHEN 1 TABLET: 5; 325 TABLET ORAL at 22:02

## 2020-04-13 RX ADMIN — SUCRALFATE 1 G: 1 SUSPENSION ORAL at 07:34

## 2020-04-13 RX ADMIN — SUCRALFATE 1 G: 1 SUSPENSION ORAL at 16:57

## 2020-04-13 RX ADMIN — INSULIN LISPRO 3 UNITS: 100 INJECTION, SOLUTION INTRAVENOUS; SUBCUTANEOUS at 11:13

## 2020-04-13 RX ADMIN — POLYETHYLENE GLYCOL 3350 17 G: 17 POWDER, FOR SOLUTION ORAL at 08:00

## 2020-04-13 RX ADMIN — SODIUM BICARBONATE 650 MG: 650 TABLET ORAL at 08:01

## 2020-04-13 RX ADMIN — BUDESONIDE AND FORMOTEROL FUMARATE DIHYDRATE 2 PUFF: 160; 4.5 AEROSOL RESPIRATORY (INHALATION) at 09:31

## 2020-04-13 RX ADMIN — SODIUM BICARBONATE 650 MG: 650 TABLET ORAL at 16:56

## 2020-04-13 RX ADMIN — BUDESONIDE AND FORMOTEROL FUMARATE DIHYDRATE 2 PUFF: 160; 4.5 AEROSOL RESPIRATORY (INHALATION) at 19:46

## 2020-04-13 RX ADMIN — HYDROCODONE BITARTRATE AND ACETAMINOPHEN 1 TABLET: 5; 325 TABLET ORAL at 01:22

## 2020-04-13 RX ADMIN — PANTOPRAZOLE SODIUM 40 MG: 40 TABLET, DELAYED RELEASE ORAL at 16:57

## 2020-04-13 RX ADMIN — DIPHENHYDRAMINE HYDROCHLORIDE 25 MG: 25 CAPSULE ORAL at 00:25

## 2020-04-13 RX ADMIN — SUCRALFATE 1 G: 1 SUSPENSION ORAL at 11:13

## 2020-04-13 NOTE — PLAN OF CARE
Problem: Patient Care Overview  Goal: Plan of Care Review  Outcome: Ongoing (interventions implemented as appropriate)  Flowsheets  Taken 4/13/2020 1035  Plan of Care Reviewed With: patient  Taken 4/13/2020 9089  Outcome Summary: Pt admit with SOA from home. Pt was independent and working prior to admit.  Pt now with weakness, impaired UE function, increase completion time, effort with all movement.  Pt up to max A for lower body ADL tasks.  Pt may benefit from skilled OT to increase safety and independence.

## 2020-04-13 NOTE — THERAPY EVALUATION
Acute Care - Occupational Therapy Initial Evaluation  [facility]     Patient Name: Oralia Sánchez  : 1975  MRN: [id number]  Today's Date: 2020             Admit Date: 2020       ICD-10-CM ICD-9-CM   1. Dyspnea, unspecified type R06.00 786.09   2. Metabolic acidosis E87.2 276.2   3. Cough R05 786.2   4. Fatigue, unspecified type R53.83 780.79   5. History of COPD Z87.09 V12.69   6. Hypomagnesemia E83.42 275.2   7. Cerebral aneurysm rupture (CMS/HCC) I60.7 430     Patient Active Problem List   Diagnosis   • Vitamin D deficiency   • Awareness of heartbeats   • Adiposity   • YOUNG (nonalcoholic steatohepatitis)   • Hypothyroid   • Diabetes mellitus arising in pregnancy   • Diabetes (CMS/HCC)   • Metabolic syndrome   • Chest pain   • Primary thunderclap headache   • Elevated LFTs   • Tobacco abuse   • Rheumatoid arthritis (CMS/HCC)   • Type 2 diabetes mellitus (CMS/HCC)   • Morbid obesity (CMS/HCC)   • UTI (urinary tract infection), bacterial   • Cerebral aneurysm   • Dyspnea   • Cough   • Hypomagnesemia   • Metabolic acidosis   • Fatigue   • History of COPD     Past Medical History:   Diagnosis Date   • Aneurysm (CMS/HCC)    • Arthritis    • Carpal tunnel syndrome    • Chest pain    • Diabetes mellitus (CMS/HCC)    • Dysmetabolic syndrome X    • Gestational diabetes mellitus    • Hypothyroidism    • Metabolic disorder    • Nonalcoholic steatohepatitis    • Obesity    • Palpitations    • Sleep apnea    • Spinal headache    • Type 2 diabetes mellitus (CMS/HCC)    • Vitamin D deficiency      Past Surgical History:   Procedure Laterality Date   • CARPAL TUNNEL RELEASE     • CEREBRAL ANGIOGRAM N/A 3/28/2019    Procedure: DIAGNOSTIC CEREBRAL ANGIOGRAM;  Surgeon: Alexx Barrow MD;  Location: [facility] ;  Service: Neurosurgery   • CEREBRAL ANGIOGRAM N/A 10/2/2019    Procedure: CEREBRAL ANGIOGRAM;  Surgeon: Santosh Garza MD;  Location: [facility] OR ;  Service: Interventional  Radiology   •  SECTION      x6   • DILATATION AND CURETTAGE      x 2   • EMBOLIZATION CEREBRAL N/A 3/29/2019    Procedure: Cererbal angiogram and embolization of cerebral aneurysm;  Surgeon: Alexx Barrow MD;  Location: [facility] ;  Service: Neurosurgery   • MYRINGOTOMY     • TONSILLECTOMY AND ADENOIDECTOMY            OT ASSESSMENT FLOWSHEET (last 12 hours)      Occupational Therapy Evaluation     Row Name 20 0933                   OT Evaluation Time/Intention    Subjective Information  complains of;weakness;fatigue  -LE        Document Type  evaluation;therapy note (daily note)  -LE        Mode of Treatment  individual therapy;occupational therapy  -LE        Patient Effort  good  -LE           General Information    Patient Profile Reviewed?  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  states completely independent prior to admit  -LE        Pertinent History of Current Functional Problem  from home with SOA, negative work up for COVID.  new onset weakness  -LE        Existing Precautions/Restrictions  fall  -LE        Equipment Issued to Patient  gait belt  -LE        Barriers to Rehab  medically complex  -LE           Relationship/Environment    Lives With  alone  -LE           Cognitive Assessment/Intervention- PT/OT    Orientation Status (Cognition)  oriented x 3  -LE        Safety Deficit (Cognitive)  mild deficit;judgment;problem solving;insight into deficits/self awareness  -LE        Cognitive Assessment/Intervention Comment  -- increase processing  -LE           Safety Issues, Functional Mobility    Impairments Affecting Function (Mobility)  balance;strength  -LE           Bed Mobility Assessment/Treatment    Bed Mobility Assessment/Treatment  scooting/bridging  -LE        Scooting/Bridging [place] (Bed Mobility)  minimum assist (75% patient effort)   -LE        Supine-Sit [place] (Bed Mobility)  contact guard  -LE        Sit-Supine [place] (Bed Mobility)  minimum assist (75% patient effort)  -LE        Bed Mobility, Safety Issues  impaired trunk control for bed mobility;decreased use of legs for bridging/pushing  -LE        Assistive Device (Bed Mobility)  bed rails;draw sheet;head of bed elevated  -LE        Comment (Bed Mobility)  generalized weakness, increase completion time as pt wants to complete on own as much as possible  -LE           Functional Mobility    Functional Mobility- Ind. Level  minimum assist (75% patient effort)  -LE        Functional Mobility- Device  rolling walker  -LE        Functional Mobility-Distance (Feet)  4 to BSC.  -LE        Functional Mobility- Safety Issues  balance decreased during turns;sequencing ability decreased;step length decreased  -LE        Functional Mobility- Comment  fatigue with getting OOB, xfer to BSC limits attempt to then walk to BR  -LE           Transfer Assessment/Treatment    Transfer Assessment/Treatment  sit-stand transfer;stand-sit transfer;toilet transfer  -LE        Comment (Transfers)  -- VC and asisst xfer tech, position of walker  -LE           Sit-Stand Transfer    Sit-Stand [place] (Transfers)  minimum assist (75% patient effort)  -LE        Assistive Device (Sit-Stand Transfers)  walker, front-wheeled  -LE           Stand-Sit Transfer    Stand-Sit [place] (Transfers)  minimum assist (75% patient effort)  -LE        Assistive Device (Stand-Sit Transfers)  walker, front-wheeled  -LE           Toilet Transfer    Type (Toilet Transfer)  stand pivot/stand step  -LE        [place] Level (Toilet Transfer)  minimum assist (75% patient effort)  -LE        Assistive Device (Toilet Transfer)  commode, bedside without drop arms;walker, front-wheeled  -LE           ADL Assessment/Intervention    BADL Assessment/Intervention  lower body dressing;bathing;upper body  dressing;grooming;feeding;toileting  -LE           Bathing Assessment/Intervention    Bathing [place] Level  lower body;upper body;maximum assist (25% patient effort)  -LE        Bathing Position  edge of bed sitting  -LE        Comment (Bathing)  -- full assist estefanía/buttocks, feet, back.    -LE           Upper Body Dressing Assessment/Training    Upper Body Dressing [place] Level  -- declines to put a gown on.  wants to stay naked  -LE           Lower Body Dressing Assessment/Training    Lower Body Dressing [place] Level  doff;don;socks;dependent (less than 25% patient effort)  -LE        Lower Body Dressing Position  edge of bed sitting  -LE           Grooming Assessment/Training    [place] Level (Grooming)  wash face, hands;set up  -LE           Self-Feeding Assessment/Training    [place] Level (Feeding)  -- declines assist  -LE           Toileting Assessment/Training    [place] Level (Toileting)  maximum assist (25% patient effort)  -LE        Assistive Devices (Toileting)  commode, 3-in-1  -LE        Comment (Toileting)  -- pt uses wipes for hygiene after urinating. @home uses bottle  -LE           BADL Safety/Performance    Impairments, BADL Safety/Performance  balance;endurance/activity tolerance;strength  -LE           General ROM    GENERAL ROM COMMENTS  -- observed 2/3 AROM. difficulty B grasp and manipulate.  -LE           Motor Assessment/Interventions    Additional Documentation  Balance (Group);Balance Interventions (Group)  -LE           Balance    Balance  static sitting balance;dynamic sitting balance  -LE           Static Sitting Balance    Level of [place] (Unsupported Sitting, Static Balance)  supervision  -LE        Time Able to Maintain Position (Supported Sitting, Static Balance)  more than 5 minutes 20 minutes  -LE           Dynamic Sitting Balance    Comment, Resists Mild Perturbations (Sitting, Dynamic Balance)  -- body habitus limits reaching forward or  to feet  -LE           Static Standing Balance    Level of [place] (Supported Standing, Static Balance)  minimal assist, 75% patient effort  -LE        Assistive Device Utilized (Supported Standing, Static Balance)  walker, rolling cues for posture  -LE           Positioning and Restraints    Pre-Treatment Position  in bed  -LE        Post Treatment Position  bed  -LE        In Bed  notified nsg;fowlers;call light within reach;encouraged to call for assist;SCD pump applied  -LE           Pain Scale: Numbers Pre/Post-Treatment    Pain Scale: Numbers, Pretreatment  0/10 - no pain not right now.    -LE           Plan of Care Review    Plan of Care Reviewed With  patient  -LE        Outcome Summary  Pt admit with SOA from home. Pt was independent and working prior to admit.  Pt now with weakness, impaired UE function, increase completion time, effort with all movement.  Pt up to max A for lower body ADL tasks.  Pt may benefit from skilled OT to increase safety and independence.    -LE           Clinical Impression (OT)    OT Diagnosis  need for assist personal care, mobility, strengthening, coordination.   -LE        Patient/Family Goals Statement (OT Eval)  return home after rehab  -LE        Criteria for Skilled Therapeutic Interventions Met (OT Eval)  yes;treatment indicated  -LE        Rehab Potential (OT Eval)  good, to achieve stated therapy goals  -LE        Therapy Frequency (OT Eval)  3 times/wk  -LE        Care Plan Review (OT)  evaluation/treatment results reviewed;care plan/treatment goals reviewed  -LE        Anticipated Equipment Needs at Discharge (OT)  bedside commode;shower chair;front wheeled walker  -LE        Anticipated Discharge Disposition (OT)  inpatient rehabilitation facility  -LE           Vital Signs    O2 Delivery Pre Treatment  room air  -LE        O2 Delivery Intra Treatment  room air  -LE        O2 Delivery Post Treatment  room air  -LE        Pre Patient Position  Supine  -LE         Intra Patient Position  Standing  -LE        Post Patient Position  Supine  -LE        Activity Duration  -- increase completion time and effort with tasks  -LE           Planned OT Interventions    Planned Therapy Interventions (OT Eval)  adaptive equipment training;activity tolerance training;BADL retraining;functional balance retraining;occupation/activity based interventions;patient/caregiver education/training;strengthening exercise;transfer/mobility retraining  -LE           OT Goals    Transfer Goal Selection (OT)  transfer, OT goal 1  -LE        Dressing Goal Selection (OT)  dressing, OT goal 1  -LE        Toileting Goal Selection (OT)  toileting, OT goal 1  -LE        Functional Mobility Goal Selection (OT)  functional mobility, OT goal 1  -LE        Additional Documentation  Functional Mobility Selection (OT) (Row)  -LE           Transfer Goal 1 (OT)    Activity/Assistive Device (Transfer Goal 1, OT)  sit-to-stand/stand-to-sit;bed-to-chair/chair-to-bed;toilet;walker, rolling  -LE        [place] Level/Cues Needed (Transfer Goal 1, OT)  contact guard assist  -LE        Time Frame (Transfer Goal 1, OT)  2 weeks  -LE        Progress/Outcome (Transfer Goal 1, OT)  goal ongoing  -LE           Dressing Goal 1 (OT)    Activity/Assistive Device (Dressing Goal 1, OT)  upper body dressing;lower body dressing;reacher;long handled shoe horn;sock-aid  -LE        [place]/Cues Needed (Dressing Goal 1, OT)  minimum assist (75% or more patient effort)  -LE        Time Frame (Dressing Goal 1, OT)  2 weeks  -LE        Progress/Outcome (Dressing Goal 1, OT)  goal ongoing  -LE           Toileting Goal 1 (OT)    Activity/Device (Toileting Goal 1, OT)  perform perineal hygiene;adjust/manage clothing;grab bar/safety frame;commode, 3-in-1  -LE        [place] Level/Cues Needed (Toileting Goal 1, OT)  minimum assist (75% or more patient effort)  -LE        Time Frame (Toileting Goal 1, OT)  2 weeks  -LE         Progress/Outcome (Toileting Goal 1, OT)  goal ongoing  -LE           Functional Mobility Goal 1 (OT)    Activity/Assistive Device (Functional Mobility Goal 1, OT)  walker, rolling  -LE        [place] Level/Cues Needed (Functional Mobility Goal 1, OT)  moderate assist (50-74% patient effort)  -LE        Distance Goal 1 (Functional Mobility, OT)  -- to/from bathroom  -LE        Time Frame (Functional Mobility Goal 1, OT)  2 weeks  -LE        Progress/Outcome (Functional Mobility Goal 1, OT)  goal ongoing  -LE          User Key  (r) = Recorded By, (t) = Taken By, (c) = Cosigned By    Initials Name Effective Dates    LE Preeti Lopez, OTR 06/08/18 -                OT Recommendation and Plan  Outcome Summary/Treatment Plan (OT)  Anticipated Equipment Needs at Discharge (OT): bedside commode, shower chair, front wheeled walker  Anticipated Discharge Disposition (OT): inpatient rehabilitation facility  Planned Therapy Interventions (OT Eval): adaptive equipment training, activity tolerance training, BADL retraining, functional balance retraining, occupation/activity based interventions, patient/caregiver education/training, strengthening exercise, transfer/mobility retraining  Therapy Frequency (OT Eval): 3 times/wk  Plan of Care Review  Plan of Care Reviewed With: patient  Plan of Care Reviewed With: patient  Outcome Summary: Pt admit with SOA from home. Pt was independent and working prior to admit.  Pt now with weakness, impaired UE function, increase completion time, effort with all movement.  Pt up to max A for lower body ADL tasks.  Pt may benefit from skilled OT to increase safety and independence.      Outcome Measures     Row Name 04/13/20 1000 04/10/20 1400          How much help from another person do you currently need...    Turning from your back to your side while in flat bed without using bedrails?  --  3  -JM     Moving from lying on back to sitting on the side of a flat bed without bedrails?  --  2   -JM     Moving to and from a bed to a chair (including a wheelchair)?  --  3  -JM     Standing up from a chair using your arms (e.g., wheelchair, bedside chair)?  --  3  -JM     Climbing 3-5 steps with a railing?  --  1  -JM     To walk in hospital room?  --  2  -JM     AM-PAC 6 Clicks Score (PT)  --  14  -JM        How much help from another is currently needed...    Putting on and taking off regular lower body clothing?  2  -LE  --     Bathing (including washing, rinsing, and drying)  2  -LE  --     Toileting (which includes using toilet bed pan or urinal)  2  -LE  --     Putting on and taking off regular upper body clothing  1  -LE  --     Taking care of personal grooming (such as brushing teeth)  3  -LE  --     Eating meals  4  -LE  --     AM-PAC 6 Clicks Score (OT)  14  -LE  --        Functional Assessment    Outcome Measure Options  AM-PAC 6 Clicks Daily Activity (OT)  -LE  --       User Key  (r) = Recorded By, (t) = Taken By, (c) = Cosigned By    Initials Name Provider Type    Preeti Sandy OTR Occupational Therapist    Sheree Morton PTA Physical Therapy Assistant          Time Calculation:   Time Calculation- OT     Row Name 04/13/20 1036             Time Calculation- OT    OT Start Time  0853  -LE      OT Stop Time  0933  -LE      OT Time Calculation (min)  40 min  -LE      Total Timed Code Minutes- OT  34 minute(s)  -LE      OT Received On  04/13/20  -LE      OT - Next Appointment  04/15/20  -LE      OT Goal Re-Cert Due Date  04/27/20  -LE        User Key  (r) = Recorded By, (t) = Taken By, (c) = Cosigned By    Initials Name Provider Type    Preeti Sandy OTCHAGO Occupational Therapist        Therapy Charges for Today     Code Description Service Date Service Provider Modifiers Qty    [id number]  OT EVAL MOD COMPLEXITY 2 4/13/2020 Preeti Lopez OTR GO 1    [id number]  OT SELF CARE/MGMT/TRAIN EA 15 MIN 4/13/2020 Preeti Lopez OTR GO 2               SANDRA Vázquez  4/13/2020

## 2020-04-13 NOTE — PROGRESS NOTES
Patient has not required supplemental oxygen for several days.  She does not have any shortness of breath at rest.  She has low risk for pulmonary complications from sedation, acceptable for undergoing conscious sedation for colonoscopy or flexible sigmoidoscopy.  We will be available as needed

## 2020-04-13 NOTE — PROGRESS NOTES
"DAILY PROGRESS NOTE  [facility]    Patient Identification:  Name: Oralia Sánchez  Age: 44 y.o.  Sex: female  :  1975  MRN: [id number]         Primary Care Physician: Provider, No Known    Subjective:  Interval History:She has a bad cough and is short of air but better today.  requiring  O 2 at night. Extreme fatigue.  Abdominal pain and swelling.  Some pain.    Objective:    Scheduled Meds:    budesonide-formoterol 2 puff Inhalation BID - RT   insulin lispro 0-7 Units Subcutaneous 4x Daily With Meals & Nightly   levothyroxine 200 mcg Oral Daily   midodrine 10 mg Oral TID AC   pantoprazole 40 mg Oral BID AC   polyethylene glycol 17 g Oral Daily   predniSONE 40 mg Oral Daily With Breakfast   sodium bicarbonate 650 mg Oral TID   sucralfate 1 g Oral 4x Daily AC & at Bedtime     Continuous Infusions:       Vital signs in last 24 hours:  Temp:  [97.8 °F (36.6 °C)-98.4 °F (36.9 °C)] 97.8 °F (36.6 °C)  Heart Rate:  [] 89  Resp:  [14-20] 20  BP: (120-141)/(70-96) 131/90    Intake/Output:    Intake/Output Summary (Last 24 hours) at 2020 1415  Last data filed at 2020 1300  Gross per 24 hour   Intake 720 ml   Output --   Net 720 ml       Exam:  /90 (BP Location: Right arm, Patient Position: Sitting)   Pulse 89   Temp 97.8 °F (36.6 °C) (Oral)   Resp 20   Ht 154.9 cm (61\")   Wt 113 kg (249 lb 14.4 oz)   LMP 2020   SpO2 96%   BMI 47.22 kg/m²     General Appearance:    Alert, cooperative, no distress   Head:    Normocephalic, without obvious abnormality, atraumatic   Eyes:       Throat:   Lips, tongue, gums normal   Neck:   Supple, symmetrical, trachea midline, no JVD   Lungs:     Crackles and wheezes. bilaterally, respirations unlabored   Chest Wall:    No tenderness or deformity    Heart:    Regular rate and rhythm, S1 and S2 normal, no murmur,no  Rub or gallop   Abdomen:     Soft, non-tender, bowel sounds active, no masses, no organomegaly    Extremities:   " Extremities normal, atraumatic, no cyanosis or edema   Pulses:      Skin:   Skin is warm and dry,  Boil in pubic area   Neurologic:   no focal deficits noted      Lab Results (last 72 hours)     Procedure Component Value Units Date/Time    POC Glucose Once [[id number]]  (Abnormal) Collected:  04/02/20 1147    Specimen:  Blood Updated:  04/02/20 1148     Glucose 144 mg/dL     Comprehensive Metabolic Panel [[id number]]  (Abnormal) Collected:  04/02/20 0634    Specimen:  Blood Updated:  04/02/20 0736     Glucose 117 mg/dL      BUN 8 mg/dL      Creatinine 0.76 mg/dL      Sodium 136 mmol/L      Potassium 3.8 mmol/L      Chloride 90 mmol/L      CO2 20.2 mmol/L      Calcium 7.8 mg/dL      Total Protein 5.9 g/dL      Albumin 3.40 g/dL      ALT (SGPT) 49 U/L      AST (SGOT) 97 U/L      Alkaline Phosphatase 103 U/L      Total Bilirubin 1.3 mg/dL      eGFR Non African Amer 82 mL/min/1.73      Globulin 2.5 gm/dL      A/G Ratio 1.4 g/dL      BUN/Creatinine Ratio 10.5     Anion Gap 25.8 mmol/L     Narrative:       GFR Normal >60  Chronic Kidney Disease <60  Kidney Failure <15      Ferritin [[id number]]  (Abnormal) Collected:  04/02/20 0634    Specimen:  Blood Updated:  04/02/20 0726     Ferritin 803.00 ng/mL     Narrative:       Results may be falsely decreased if patient taking Biotin.      Lactate Dehydrogenase [[id number]]  (Abnormal) Collected:  04/02/20 0634    Specimen:  Blood Updated:  04/02/20 0722      U/L     CBC & Differential [[id number]] Collected:  04/02/20 0639    Specimen:  Blood Updated:  04/02/20 0652    Narrative:       The following orders were created for panel order CBC & Differential.  Procedure                               Abnormality         Status                     ---------                               -----------         ------                     CBC Auto Differential[[id number]]        Abnormal            Final result                 Please view results for these tests on the individual orders.     CBC Auto Differential [[id number]]  (Abnormal) Collected:  04/02/20 0639    Specimen:  Blood Updated:  04/02/20 0652     WBC 7.38 10*3/mm3      RBC 3.95 10*6/mm3      Hemoglobin 14.2 g/dL      Hematocrit 40.1 %      .5 fL      MCH 35.9 pg      MCHC 35.4 g/dL      RDW 14.3 %      RDW-SD 53.6 fl      MPV 9.4 fL      Platelets 195 10*3/mm3      Neutrophil % 65.8 %      Lymphocyte % 24.5 %      Monocyte % 8.1 %      Eosinophil % 0.3 %      Basophil % 0.8 %      Immature Grans % 0.5 %      Neutrophils, Absolute 4.85 10*3/mm3      Lymphocytes, Absolute 1.81 10*3/mm3      Monocytes, Absolute 0.60 10*3/mm3      Eosinophils, Absolute 0.02 10*3/mm3      Basophils, Absolute 0.06 10*3/mm3      Immature Grans, Absolute 0.04 10*3/mm3      nRBC 0.4 /100 WBC     POC Glucose Once [[id number]]  (Normal) Collected:  04/02/20 0616    Specimen:  Blood Updated:  04/02/20 0618     Glucose 116 mg/dL     POC Glucose Once [[id number]]  (Normal) Collected:  04/01/20 2059    Specimen:  Blood Updated:  04/01/20 2059     Glucose 127 mg/dL     Blood Culture - Blood, Arm, Right [[id number]] Collected:  04/01/20 1654    Specimen:  Blood from Arm, Right Updated:  04/01/20 1654    Blood Culture - Blood, Arm, Left [[id number]] Collected:  04/01/20 1654    Specimen:  Blood from Arm, Left Updated:  04/01/20 1654    Respiratory Panel, PCR - Swab, Nasopharynx [[id number]]  (Normal) Collected:  04/01/20 1515    Specimen:  Swab from Nasopharynx Updated:  04/01/20 1653     ADENOVIRUS, PCR Not Detected     Coronavirus 229E Not Detected     Coronavirus HKU1 Not Detected     Coronavirus NL63 Not Detected     Coronavirus OC43 Not Detected     Human Metapneumovirus Not Detected     Human Rhinovirus/Enterovirus Not Detected     Influenza B PCR Not Detected     Parainfluenza Virus 1 Not Detected     Parainfluenza Virus 2 Not Detected     Parainfluenza Virus 3 Not Detected     Parainfluenza Virus 4 Not Detected     Bordetella pertussis pcr Not Detected      Influenza A H1 2009 PCR Not Detected     Chlamydophila pneumoniae PCR Not Detected     Mycoplasma pneumo by PCR Not Detected     Influenza A PCR Not Detected     Influenza A H3 Not Detected     Influenza A H1 Not Detected     RSV, PCR Not Detected     Bordetella parapertussis PCR Not Detected    Narrative:       The coronavirus on the RVP is NOT COVID-19 and is NOT indicative of infection with COVID-19.     POC Glucose Once [[id number]]  (Abnormal) Collected:  04/01/20 1559    Specimen:  Blood Updated:  04/01/20 1601     Glucose 132 mg/dL     Hemoglobin A1c [[id number]]  (Abnormal) Collected:  04/01/20 0809    Specimen:  Blood Updated:  04/01/20 1417     Hemoglobin A1C 6.68 %     Narrative:       Hemoglobin A1C Ranges:    Increased Risk for Diabetes  5.7% to 6.4%  Diabetes                     >= 6.5%  Diabetic Goal                < 7.0%    Salicylate Level [[id number]]  (Normal) Collected:  04/01/20 0809    Specimen:  Blood Updated:  04/01/20 1129     Salicylate <0.3 mg/dL     Narrative:       Therapeutic range for Salicylates:  3.0 - 10.0 mg/dL for antipyretic/analgesic conditions  15.0 - 30.0 mg/dL for anti-inflammatory conditions    CORONAVIRUS(COVID-19),RT-PCR,UOFL LAB,NP/OP Swab in Transport Media - Swab, Nasopharynx [[id number]] Collected:  04/01/20 1049    Specimen:  Swab from Nasopharynx Updated:  04/01/20 1101    Blood Gas, Arterial [[id number]]  (Abnormal) Collected:  04/01/20 1051    Specimen:  Arterial Blood Updated:  04/01/20 1053     Site Arterial: left radial     Jovi's Test Positive     pH, Arterial 7.477 pH units      pCO2, Arterial 28.2 mm Hg      pO2, Arterial 99.2 mm Hg      HCO3, Arterial 20.9 mmol/L      Base Excess, Arterial -1.0 mmol/L      O2 Saturation Calculated 98.3 %      Barometric Pressure for Blood Gas 753.0 mmHg      Modality Room Air     Set Mech Resp Rate 28    Ethanol [[id number]] Collected:  04/01/20 0809    Specimen:  Blood Updated:  04/01/20 0951     Ethanol <10 mg/dL      Ethanol %  <0.010 %     Ferritin [[id number]]  (Abnormal) Collected:  04/01/20 0809    Specimen:  Blood Updated:  04/01/20 0946     Ferritin 821.00 ng/mL     Narrative:       Results may be falsely decreased if patient taking Biotin.      [place] Draw [[id number]] Collected:  04/01/20 0809    Specimen:  Blood Updated:  04/01/20 0915    Narrative:       The following orders were created for panel order [place] Draw.  Procedure                               Abnormality         Status                     ---------                               -----------         ------                     Light Blue Top[[id number]]                                   Final result               Green Top (Gel)[[id number]]                                  Final result               Lavender Top[[id number]]                                     Final result               Gold Top - SST[[id number]]                                   Final result                 Please view results for these tests on the individual orders.    Green Top (Gel) [[id number]] Collected:  04/01/20 0809    Specimen:  Blood Updated:  04/01/20 0915     Extra Tube Hold for add-ons.     Comment: Auto resulted.       Lavender Top [[id number]] Collected:  04/01/20 0809    Specimen:  Blood Updated:  04/01/20 0915     Extra Tube hold for add-on     Comment: Auto resulted       Light Blue Top [[id number]] Collected:  04/01/20 0809    Specimen:  Blood Updated:  04/01/20 0915     Extra Tube hold for add-on     Comment: Auto resulted       Gold Top - SST [[id number]] Collected:  04/01/20 0809    Specimen:  Blood Updated:  04/01/20 0915     Extra Tube Hold for add-ons.     Comment: Auto resulted.       Procalcitonin [[id number]]  (Normal) Collected:  04/01/20 0809    Specimen:  Blood Updated:  04/01/20 0846     Procalcitonin 0.16 ng/mL     Narrative:       As a Marker for Sepsis (Non-Neonates):   1. <0.5 ng/mL represents a low risk of severe sepsis and/or septic shock.  1. >2 ng/mL represents a high  "risk of severe sepsis and/or septic shock.    As a Marker for Lower Respiratory Tract Infections that require antibiotic therapy:  PCT on Admission     Antibiotic Therapy             6-12 Hrs later  > 0.5                Strongly Recommended            >0.25 - <0.5         Recommended  0.1 - 0.25           Discouraged                   Remeasure/reassess PCT  <0.1                 Strongly Discouraged          Remeasure/reassess PCT      As 28 day mortality risk marker: \"Change in Procalcitonin Result\" (> 80 % or <=80 %) if Day 0 (or Day 1) and Day 4 values are available. Refer to [email or web address]   Change in PCT <=80 %   A decrease of PCT levels below or equal to 80 % defines a positive change in PCT test result representing a higher risk for 28-day all-cause mortality of patients diagnosed with severe sepsis or septic shock.  Change in PCT > 80 %   A decrease of PCT levels of more than 80 % defines a negative change in PCT result representing a lower risk for 28-day all-cause mortality of patients diagnosed with severe sepsis or septic shock.                Results may be falsely decreased if patient taking Biotin.     Comprehensive Metabolic Panel [[id number]]  (Abnormal) Collected:  04/01/20 0809    Specimen:  Blood Updated:  04/01/20 0841     Glucose 127 mg/dL      BUN 3 mg/dL      Creatinine 0.85 mg/dL      Sodium 135 mmol/L      Potassium 3.2 mmol/L      Chloride 84 mmol/L      CO2 18.7 mmol/L      Calcium 8.5 mg/dL      Total Protein 7.1 g/dL      Albumin 3.70 g/dL      ALT (SGPT) 65 U/L      AST (SGOT) 124 U/L      Alkaline Phosphatase 128 U/L      Total Bilirubin 1.4 mg/dL      eGFR Non African Amer 72 mL/min/1.73      Globulin 3.4 gm/dL      A/G Ratio 1.1 g/dL      BUN/Creatinine Ratio 3.5     Anion Gap 32.3 mmol/L     Narrative:       GFR Normal >60  Chronic Kidney Disease <60  Kidney Failure <15      C-reactive Protein [[id number]]  (Abnormal) Collected:  04/01/20 0809    Specimen:  " Blood Updated:  04/01/20 0841     C-Reactive Protein 0.63 mg/dL     Lactate Dehydrogenase [[id number]]  (Abnormal) Collected:  04/01/20 0809    Specimen:  Blood Updated:  04/01/20 0841      U/L     Magnesium [[id number]]  (Abnormal) Collected:  04/01/20 0809    Specimen:  Blood Updated:  04/01/20 0841     Magnesium 1.1 mg/dL     Troponin [[id number]]  (Normal) Collected:  04/01/20 0809    Specimen:  Blood Updated:  04/01/20 0841     Troponin T <0.010 ng/mL     Narrative:       Troponin T Reference Range:  <= 0.03 ng/mL-   Negative for AMI  >0.03 ng/mL-     Abnormal for myocardial necrosis.  Clinicians would have to utilize clinical acumen, EKG, Troponin and serial changes to determine if it is an Acute Myocardial Infarction or myocardial injury due to an underlying chronic condition.       Results may be falsely decreased if patient taking Biotin.      CBC Auto Differential [[id number]]  (Abnormal) Collected:  04/01/20 0809    Specimen:  Blood Updated:  04/01/20 0828     WBC 8.04 10*3/mm3      RBC 4.48 10*6/mm3      Hemoglobin 16.3 g/dL      Hematocrit 46.2 %      .1 fL      MCH 36.4 pg      MCHC 35.3 g/dL      RDW 15.4 %      RDW-SD 57.9 fl      MPV 9.5 fL      Platelets 244 10*3/mm3      Neutrophil % 68.8 %      Lymphocyte % 22.6 %      Monocyte % 7.2 %      Eosinophil % 0.4 %      Basophil % 0.6 %      Immature Grans % 0.4 %      Neutrophils, Absolute 5.53 10*3/mm3      Lymphocytes, Absolute 1.82 10*3/mm3      Monocytes, Absolute 0.58 10*3/mm3      Eosinophils, Absolute 0.03 10*3/mm3      Basophils, Absolute 0.05 10*3/mm3      Immature Grans, Absolute 0.03 10*3/mm3      nRBC 0.5 /100 WBC         Data Review:  Results from last 7 days   Lab Units 04/13/20  0442 04/12/20  0450 04/11/20  0707   SODIUM mmol/L 139 136 136   POTASSIUM mmol/L 4.4 3.9 4.0   CHLORIDE mmol/L 95* 95* 93*   CO2 mmol/L 26.6 28.2 27.1   BUN mg/dL 17 13 9   CREATININE mg/dL 0.72 0.57 0.59   GLUCOSE mg/dL 143* 113* 148*   CALCIUM mg/dL  9.0 9.3 9.2     Results from last 7 days   Lab Units 04/13/20  0442 04/12/20  0450 04/11/20  0707   WBC 10*3/mm3 9.79 8.55 8.06   HEMOGLOBIN g/dL 12.7 12.0 12.3   HEMATOCRIT % 35.2 33.9* 35.0   PLATELETS 10*3/mm3 365 343 320             Lab Results   Lab Value Date/Time    TROPONINT <0.010 04/01/2020 0809    TROPONINT <0.010 11/10/2019 1525         Results from last 7 days   Lab Units 04/13/20  0442 04/12/20  0450 04/11/20  0707   ALK PHOS U/L 162* 153* 147*   BILIRUBIN mg/dL 0.7 0.7 1.0   ALT (SGPT) U/L 111* 64* 49*   AST (SGOT) U/L 211* 129* 85*             Glucose   Date/Time Value Ref Range Status   04/13/2020 1100 228 (H) 70 - 130 mg/dL Final   04/13/2020 0638 180 (H) 70 - 130 mg/dL Final   04/12/2020 2043 190 (H) 70 - 130 mg/dL Final   04/12/2020 1655 210 (H) 70 - 130 mg/dL Final   04/12/2020 1102 111 70 - 130 mg/dL Final   04/12/2020 0721 91 70 - 130 mg/dL Final   04/11/2020 2035 191 (H) 70 - 130 mg/dL Final   04/11/2020 1657 235 (H) 70 - 130 mg/dL Final           Past Medical History:   Diagnosis Date   • Aneurysm (CMS/HCC)    • Arthritis    • Carpal tunnel syndrome    • Chest pain    • Diabetes mellitus (CMS/HCC)    • Dysmetabolic syndrome X    • Gestational diabetes mellitus    • Hypothyroidism    • Metabolic disorder    • Nonalcoholic steatohepatitis    • Obesity    • Palpitations    • Sleep apnea    • Spinal headache    • Type 2 diabetes mellitus (CMS/HCC)    • Vitamin D deficiency        Assessment:  Active Hospital Problems    Diagnosis  POA   • **Dyspnea [R06.00]  Yes   • Cough [R05]  Unknown   • Hypomagnesemia [E83.42]  Unknown   • Metabolic acidosis [E87.2]  Unknown   • Fatigue [R53.83]  Unknown   • History of COPD [Z87.09]  Not Applicable   • Abnormal CT scan, colon [R93.3]  Unknown   • Tobacco abuse [Z72.0]  Yes   • Rheumatoid arthritis (CMS/HCC) [M06.9]  Yes   • Type 2 diabetes mellitus (CMS/HCC) [E11.9]  Yes   • Morbid obesity (CMS/HCC) [E66.01]  Yes   • Elevated LFTs [R94.5]  Yes   • YOUNG  (nonalcoholic steatohepatitis) [K75.81]  Yes   • Diabetes mellitus arising in pregnancy [O24.419]  Yes   • Hypothyroid [E03.9]  Yes      Resolved Hospital Problems   No resolved problems to display.       Plan:  Continue OFF IV fluids and follow lab. ECHO report noted. Off IV fluids and increase proamatine for low BP. Got diuretics.  Elevated inflammatory markers. Will follow. CT abdomen, chest and GI consult noted.. Repeat COVID test was negative..  PT eval.  Colonoscopy later as outpatient.  Continue supportive care. Repeat CXR no change. Continue prednisone.  May need rehab.  Flex Sig tomorrow.  Tylor Cruz MD  4/13/2020  14:15     stated

## 2020-04-13 NOTE — PLAN OF CARE
Problem: Patient Care Overview  Goal: Plan of Care Review  Outcome: Ongoing (interventions implemented as appropriate)  Flowsheets (Taken 4/13/2020 1808)  Progress: no change  Plan of Care Reviewed With: patient  Outcome Summary: AAO x all.  VSS.  NSR on cardiac monitor.  Taking PO food and fluids well.  Profound weakness that is exihibited by inability to properly  hands and flex feet.  Unable to lift legs up off bed.  Has pain in feet including soles that is much worse with weight bearing--it's painful for her skin to be touched on her feet.  Accuchecks are trending upward--is on prednisone-SSI increased this deena.  BPs trending up and MD aware and monitoring.  Neurology consulted for the weakness which is a big difference since her admission.  Voiding well-will ask for assistance to BSC.  Had BM yesterday.  NPO after MN for scope in AM--will need am enemas.  Will continue to monitor.     Problem: Fall Risk (Adult)  Goal: Absence of Fall  Outcome: Ongoing (interventions implemented as appropriate)     Problem: Skin Injury Risk (Adult)  Goal: Skin Health and Integrity  Outcome: Ongoing (interventions implemented as appropriate)     Problem: Nausea/Vomiting (Adult)  Goal: Symptom Relief  Outcome: Ongoing (interventions implemented as appropriate)     Problem: Infection, Risk/Actual (Adult)  Goal: Infection Prevention/Resolution  Outcome: Ongoing (interventions implemented as appropriate)     Problem: Diabetes, Type 1 (Adult)  Goal: Signs and Symptoms of Listed Potential Problems Will be Absent, Minimized or Managed (Diabetes, Type 1)  Outcome: Ongoing (interventions implemented as appropriate)

## 2020-04-13 NOTE — PLAN OF CARE
VSS, pt c/o pain BLE, PO pain meds given as ordered, pt does not show any interest in ambulating, only wants to lay in bed in the dark, up to BSC w/assist of 1. Pulmonlogy signed off, Rehab needs definitive dx to assess, GI will see as out patient, pending AM labs and d/c soon. Will continue to monitor.

## 2020-04-13 NOTE — PROGRESS NOTES
[facility], operated by [facility]  Inpatient Progress Note    Reason for Follow Up:  Abnormal imaging    Subjective     Interval History:   No new Gi complaints.  No significant soa.  Eating without difficulty    Current Facility-Administered Medications:   •  acetaminophen (TYLENOL) tablet 650 mg, 650 mg, Oral, Q4H PRN, 650 mg at 04/03/20 0913 **OR** acetaminophen (TYLENOL) 160 MG/5ML solution 650 mg, 650 mg, Oral, Q4H PRN **OR** acetaminophen (TYLENOL) suppository 650 mg, 650 mg, Rectal, Q4H PRN, Tylor Cruz MD  •  Benzocaine 20 % ointment 1 application, 1 application, Apply externally, BID PRN, Tylor Cruz MD, 1 application at 04/06/20 1022  •  benzonatate (TESSALON) capsule 200 mg, 200 mg, Oral, TID PRN, Everton Mcrae MD, 200 mg at 04/04/20 2037  •  budesonide-formoterol (SYMBICORT) 160-4.5 MCG/ACT inhaler 2 puff, 2 puff, Inhalation, BID - RT, Shawn Lopez MD, 2 puff at 04/13/20 0931  •  calcium carbonate (TUMS) chewable tablet 500 mg (200 mg elemental), 2 tablet, Oral, 4x Daily PRN, Tylor Cruz MD, 2 tablet at 04/10/20 1832  •  dextrose (D50W) 25 g/ 50mL Intravenous Solution 25 g, 25 g, Intravenous, Q15 Min PRN, Tylor Cruz MD  •  dextrose (GLUTOSE) oral gel 15 g, 15 g, Oral, Q15 Min PRN, Tylor Cruz MD  •  diphenhydrAMINE (BENADRYL) capsule 25 mg, 25 mg, Oral, Q4H PRN, Tylor Cruz MD, 25 mg at 04/13/20 0025  •  glucagon (human recombinant) (GLUCAGEN DIAGNOSTIC) injection 1 mg, 1 mg, Subcutaneous, Q15 Min PRN, Tylor Cruz MD  •  guaiFENesin-dextromethorphan (ROBITUSSIN DM) 100-10 MG/5ML syrup 5 mL, 5 mL, Oral, Q4H PRN, Tylor Cruz MD, 5 mL at 04/04/20 1153  •  HYDROcodone-acetaminophen (NORCO) 5-325 MG per tablet 1 tablet, 1 tablet, Oral, Q6H PRN, Tylor Cruz MD, 1 tablet at 04/13/20 0734  •  insulin lispro (humaLOG) injection 0-7 Units, 0-7 Units, Subcutaneous, 4x Daily With Meals & Nightly, Tylor Cruz MD, 2 Units at 04/13/20 0800  •  levothyroxine (SYNTHROID,  LEVOTHROID) tablet 200 mcg, 200 mcg, Oral, Daily, Tylor Cruz MD, 200 mcg at 04/13/20 0633  •  midodrine (PROAMATINE) tablet 10 mg, 10 mg, Oral, TID Nancy MARRERO Lyle E, MD, 10 mg at 04/11/20 1212  •  pantoprazole (PROTONIX) EC tablet 40 mg, 40 mg, Oral, BID AC, Jayshree Payne APRN, 40 mg at 04/13/20 0734  •  polyethylene glycol 3350 powder (packet), 17 g, Oral, Daily, Tylor Cruz MD, 17 g at 04/13/20 0800  •  potassium chloride (MICRO-K) CR capsule 40 mEq, 40 mEq, Oral, PRN, 40 mEq at 04/05/20 1723 **OR** potassium chloride (KLOR-CON) packet 40 mEq, 40 mEq, Oral, PRN, 40 mEq at 04/01/20 2052 **OR** potassium chloride 10 mEq in 100 mL IVPB, 10 mEq, Intravenous, Q1H PRN, Tlyor Cruz MD  •  predniSONE (DELTASONE) tablet 40 mg, 40 mg, Oral, Daily With Breakfast, Shawn Lopez MD, 40 mg at 04/13/20 0801  •  promethazine (PHENERGAN) tablet 12.5 mg, 12.5 mg, Oral, Q6H PRN, 12.5 mg at 04/04/20 0009 **OR** promethazine (PHENERGAN) injection 12.5 mg, 12.5 mg, Intramuscular, Q6H PRN **OR** promethazine (PHENERGAN) injection 12.5 mg, 12.5 mg, Intravenous, Q6H PRN, 12.5 mg at 04/04/20 2345 **OR** promethazine (PHENERGAN) suppository 12.5 mg, 12.5 mg, Rectal, Q6H PRN, Tylor Cruz MD  •  sodium bicarbonate tablet 650 mg, 650 mg, Oral, TID, Tylor Cruz MD, 650 mg at 04/13/20 0801  •  sodium chloride 0.9 % flush 10 mL, 10 mL, Intravenous, PRN, Tylor Cruz MD  •  sucralfate (CARAFATE) 1 GM/10ML suspension 1 g, 1 g, Oral, 4x Daily AC & at Bedtime, Moe Lozano MD, 1 g at 04/13/20 0734  Review of Systems:    All systems were reviewed and negative except for:  Respiratory: positive for  shortness of air occ soa    Objective     Vital Signs  Temp:  [98 °F (36.7 °C)-98.7 °F (37.1 °C)] 98.4 °F (36.9 °C)  Heart Rate:  [] 90  Resp:  [14-20] 20  BP: (120-141)/(70-96) 128/89  Body mass index is 47.22 kg/m².    Intake/Output Summary (Last 24 hours) at 4/13/2020 1022  Last data filed at 4/13/2020 0900  Gross per  24 hour   Intake 720 ml   Output --   Net 720 ml     I/O this shift:  In: 240 [P.O.:240]  Out: -      Physical Exam:   General: patient awake, alert and cooperative   Eyes: Normal lids and lashes, no scleral icterus   Neck: supple, normal ROM   Skin: warm and dry, not jaundiced   Abdomen: soft, nontender, nondistended; normal bowel sounds   Psychiatric: Normal mood and behavior; memory intact     Results Review:     I reviewed the patient's new clinical results.    Results from last 7 days   Lab Units 04/13/20 0442 04/12/20 0450 04/11/20  0707   WBC 10*3/mm3 9.79 8.55 8.06   HEMOGLOBIN g/dL 12.7 12.0 12.3   HEMATOCRIT % 35.2 33.9* 35.0   PLATELETS 10*3/mm3 365 343 320     Results from last 7 days   Lab Units 04/13/20 0442 04/12/20 0450 04/11/20  0707   SODIUM mmol/L 139 136 136   POTASSIUM mmol/L 4.4 3.9 4.0   CHLORIDE mmol/L 95* 95* 93*   CO2 mmol/L 26.6 28.2 27.1   BUN mg/dL 17 13 9   CREATININE mg/dL 0.72 0.57 0.59   CALCIUM mg/dL 9.0 9.3 9.2   BILIRUBIN mg/dL 0.7 0.7 1.0   ALK PHOS U/L 162* 153* 147*   ALT (SGPT) U/L 111* 64* 49*   AST (SGOT) U/L 211* 129* 85*   GLUCOSE mg/dL 143* 113* 148*         No results found for: LIPASE    Radiology:  XR Chest PA & Lateral   Final Result   Previous CT demonstrated upper lung groundglass densities   are not clearly visualized radiographically. Follow-up as indications   persist.       This report was finalized on 4/10/2020 2:12 PM by Dr. Rogerio Campo M.D.          CT Head Without Contrast   Final Result      US Pelvis Complete   Final Result   1. There is a 4.0 cm right ovarian benign-appearing cyst which may   represent a paraovarian cyst or possibly a follicular cyst. Six-month   sonographic follow-up is recommended.   2. Normal sonographic appearance of the pelvis and left ovary.       This report was finalized on 4/9/2020 10:48 AM by Dr. Gregorio Price M.D.          US Non-ob Transvaginal   Final Result   1. There is a 4.0 cm right ovarian  benign-appearing cyst which may   represent a paraovarian cyst or possibly a follicular cyst. Six-month   sonographic follow-up is recommended.   2. Normal sonographic appearance of the pelvis and left ovary.       This report was finalized on 4/9/2020 10:48 AM by Dr. Gregorio Price M.D.          US Liver   Final Result   1. There are sonographic features of the liver that are consistent with   hepatic steatosis. Chronic hepatitis, possibly due to chronic hepatic   steatosis is also suspected.   2. Gallbladder sludge.       This report was finalized on 4/9/2020 10:09 AM by Dr. Gregorio Price M.D.          CT Abdomen Pelvis Stone Protocol   Final Result           1. Mild groundglass opacities in the right more than left upper lungs   show interval worsening, clinical correlation and continued follow-up   recommended.   2. Presacral edema/stranding and fluid may represent inflammatory   process or potentially infiltrative process involving the rectum,   suggest clinical correlation and direct visualization as indicated.   3. Stranding around the right adnexa may be evidence of inflammatory   process involving the right adnexa, and an indeterminate rounded density   is apparent at the right adnexa. Follow-up evaluation with ultrasound is   advised. No hydronephrosis. Suspected cholelithiasis. Hepatic steatosis,   hepatomegaly.   4. Skin thickening at the anterior lower abdomen with subcutaneous   stranding density may reflect cellulitis, correlate with clinical exam.        This report was finalized on 4/7/2020 4:14 PM by Dr. Rogerio Campo M.D.          CT Chest Without Contrast   Final Result           1. Mild groundglass opacities in the right more than left upper lungs   show interval worsening, clinical correlation and continued follow-up   recommended.   2. Presacral edema/stranding and fluid may represent inflammatory   process or potentially infiltrative process involving the rectum,   suggest  clinical correlation and direct visualization as indicated.   3. Stranding around the right adnexa may be evidence of inflammatory   process involving the right adnexa, and an indeterminate rounded density   is apparent at the right adnexa. Follow-up evaluation with ultrasound is   advised. No hydronephrosis. Suspected cholelithiasis. Hepatic steatosis,   hepatomegaly.   4. Skin thickening at the anterior lower abdomen with subcutaneous   stranding density may reflect cellulitis, correlate with clinical exam.        This report was finalized on 4/7/2020 4:14 PM by Dr. Rogerio Campo M.D.          CT Chest Without Contrast   Final Result       There is no evidence for an acute infiltrate within the left lung base.   However, there are very subtle small patchy areas of groundglass opacity   within the right lung apex as best seen on images #15 and 19 that were   not evident on the prior chest CT of 11/10/2019. These findings are   entirely nonspecific in nature and could represent an age-indeterminate   pneumonitis. If the patient's symptoms do not improve or worsen, I would   recommend a follow-up CT scan of the chest without use of IV contrast   during this admission. However, if the patient's symptoms do improve, I   would still recommend a follow-up CT scan of the chest but, in this   particular case, at interval of approximately 2-3 weeks. These findings   and recommendations were directly discussed with Dr. Johana Gamez on the   morning of 04/06/2020 at approximately 7:45 AM.       Radiation dose reduction techniques were utilized, including automated   exposure control and exposure modulation based on body size.       This report was finalized on 4/6/2020 7:56 AM by Dr. Joe Mcnamara M.D.          XR Chest 1 View   Final Result      XR Chest AP   Final Result      XR Chest 1 View    (Results Pending)       Assessment/Plan     Patient Active Problem List   Diagnosis   • Vitamin D deficiency   • Awareness of  heartbeats   • Adiposity   • YOUNG (nonalcoholic steatohepatitis)   • Hypothyroid   • Diabetes mellitus arising in pregnancy   • Diabetes (CMS/HCC)   • Metabolic syndrome   • Chest pain   • Primary thunderclap headache   • Elevated LFTs   • Tobacco abuse   • Rheumatoid arthritis (CMS/HCC)   • Type 2 diabetes mellitus (CMS/HCC)   • Morbid obesity (CMS/HCC)   • UTI (urinary tract infection), bacterial   • Cerebral aneurysm   • Dyspnea   • Cough   • Hypomagnesemia   • Metabolic acidosis   • Fatigue   • History of COPD       Assessment:  1. Discussed findings of abnormal CT and explained to her the findings of the rectum.  She does not feel that she could do a full bowel preparation and go through the EGD and colonoscopy right now however after discussing that the findings could be in the rectum we would feel comfortable doing a flexible sigmoidoscopy with her to allow her to go to rehab at least knowing what might be involving the rectum.  This can even be done without sedation if she is felt to be at increased risk.  Will ask pulmonary to comment and plan for flexible sigmoidoscopy in the a.m.  Offered today but patient deferred      Plan:  · Flex sig with or without sedation prior to dc to rehab  I discussed the patients findings and my recommendations with patient.    Myron Reyna MD

## 2020-04-14 ENCOUNTER — ANESTHESIA (OUTPATIENT)
Dept: GASTROENTEROLOGY | Facility: HOSPITAL | Age: 45
End: 2020-04-14

## 2020-04-14 ENCOUNTER — ANESTHESIA EVENT (OUTPATIENT)
Dept: GASTROENTEROLOGY | Facility: HOSPITAL | Age: 45
End: 2020-04-14

## 2020-04-14 LAB
ALBUMIN SERPL-MCNC: 3 G/DL (ref 3.5–5.2)
ALBUMIN/GLOB SERPL: 1.3 G/DL
ALP SERPL-CCNC: 157 U/L (ref 39–117)
ALT SERPL W P-5'-P-CCNC: 138 U/L (ref 1–33)
ANION GAP SERPL CALCULATED.3IONS-SCNC: 13.9 MMOL/L (ref 5–15)
AST SERPL-CCNC: 290 U/L (ref 1–32)
BASOPHILS # BLD AUTO: 0.04 10*3/MM3 (ref 0–0.2)
BASOPHILS NFR BLD AUTO: 0.4 % (ref 0–1.5)
BILIRUB SERPL-MCNC: 0.6 MG/DL (ref 0.2–1.2)
BUN BLD-MCNC: 17 MG/DL (ref 6–20)
BUN/CREAT SERPL: 29.8 (ref 7–25)
CALCIUM SPEC-SCNC: 9 MG/DL (ref 8.6–10.5)
CHLORIDE SERPL-SCNC: 97 MMOL/L (ref 98–107)
CK SERPL-CCNC: 22 U/L (ref 20–180)
CO2 SERPL-SCNC: 28.1 MMOL/L (ref 22–29)
CREAT BLD-MCNC: 0.57 MG/DL (ref 0.57–1)
DEPRECATED RDW RBC AUTO: 51.9 FL (ref 37–54)
EBV DNA # BLD NAA+PROBE: NORMAL COPIES/ML
EOSINOPHIL # BLD AUTO: 0.07 10*3/MM3 (ref 0–0.4)
EOSINOPHIL NFR BLD AUTO: 0.7 % (ref 0.3–6.2)
ERYTHROCYTE [DISTWIDTH] IN BLOOD BY AUTOMATED COUNT: 14 % (ref 12.3–15.4)
FERRITIN SERPL-MCNC: 3216 NG/ML (ref 13–150)
GFR SERPL CREATININE-BSD FRML MDRD: 114 ML/MIN/1.73
GLOBULIN UR ELPH-MCNC: 2.4 GM/DL
GLUCOSE BLD-MCNC: 145 MG/DL (ref 65–99)
GLUCOSE BLDC GLUCOMTR-MCNC: 120 MG/DL (ref 70–130)
GLUCOSE BLDC GLUCOMTR-MCNC: 150 MG/DL (ref 70–130)
GLUCOSE BLDC GLUCOMTR-MCNC: 159 MG/DL (ref 70–130)
GLUCOSE BLDC GLUCOMTR-MCNC: 173 MG/DL (ref 70–130)
GLUCOSE BLDC GLUCOMTR-MCNC: 249 MG/DL (ref 70–130)
HCT VFR BLD AUTO: 34.2 % (ref 34–46.6)
HGB BLD-MCNC: 11.8 G/DL (ref 12–15.9)
IMM GRANULOCYTES # BLD AUTO: 0.15 10*3/MM3 (ref 0–0.05)
IMM GRANULOCYTES NFR BLD AUTO: 1.5 % (ref 0–0.5)
LDH SERPL-CCNC: 377 U/L (ref 135–214)
LOG 10 EBV DNA QN PCR: 4.22 LOG10COPY/ML
LYMPHOCYTES # BLD AUTO: 1.98 10*3/MM3 (ref 0.7–3.1)
LYMPHOCYTES NFR BLD AUTO: 19.5 % (ref 19.6–45.3)
MCH RBC QN AUTO: 35.3 PG (ref 26.6–33)
MCHC RBC AUTO-ENTMCNC: 34.5 G/DL (ref 31.5–35.7)
MCV RBC AUTO: 102.4 FL (ref 79–97)
MONOCYTES # BLD AUTO: 0.75 10*3/MM3 (ref 0.1–0.9)
MONOCYTES NFR BLD AUTO: 7.4 % (ref 5–12)
NEUTROPHILS # BLD AUTO: 7.16 10*3/MM3 (ref 1.7–7)
NEUTROPHILS NFR BLD AUTO: 70.5 % (ref 42.7–76)
NRBC BLD AUTO-RTO: 0.5 /100 WBC (ref 0–0.2)
PLATELET # BLD AUTO: 347 10*3/MM3 (ref 140–450)
PMV BLD AUTO: 9.5 FL (ref 6–12)
POTASSIUM BLD-SCNC: 4.3 MMOL/L (ref 3.5–5.2)
PROT SERPL-MCNC: 5.4 G/DL (ref 6–8.5)
RBC # BLD AUTO: 3.34 10*6/MM3 (ref 3.77–5.28)
SODIUM BLD-SCNC: 139 MMOL/L (ref 136–145)
WBC NRBC COR # BLD: 10.15 10*3/MM3 (ref 3.4–10.8)

## 2020-04-14 PROCEDURE — 63710000001 INSULIN LISPRO (HUMAN) PER 5 UNITS: Performed by: HOSPITALIST

## 2020-04-14 PROCEDURE — 25010000002 PROPOFOL 10 MG/ML EMULSION: Performed by: ANESTHESIOLOGY

## 2020-04-14 PROCEDURE — 83615 LACTATE (LD) (LDH) ENZYME: CPT | Performed by: HOSPITALIST

## 2020-04-14 PROCEDURE — 82962 GLUCOSE BLOOD TEST: CPT

## 2020-04-14 PROCEDURE — 88305 TISSUE EXAM BY PATHOLOGIST: CPT | Performed by: INTERNAL MEDICINE

## 2020-04-14 PROCEDURE — 99232 SBSQ HOSP IP/OBS MODERATE 35: CPT | Performed by: INTERNAL MEDICINE

## 2020-04-14 PROCEDURE — 0DBP8ZX EXCISION OF RECTUM, VIA NATURAL OR ARTIFICIAL OPENING ENDOSCOPIC, DIAGNOSTIC: ICD-10-PCS | Performed by: INTERNAL MEDICINE

## 2020-04-14 PROCEDURE — 94799 UNLISTED PULMONARY SVC/PX: CPT

## 2020-04-14 PROCEDURE — 63710000001 DIPHENHYDRAMINE PER 50 MG: Performed by: HOSPITALIST

## 2020-04-14 PROCEDURE — 82728 ASSAY OF FERRITIN: CPT | Performed by: HOSPITALIST

## 2020-04-14 PROCEDURE — 63710000001 PREDNISONE PER 1 MG: Performed by: INTERNAL MEDICINE

## 2020-04-14 PROCEDURE — 82550 ASSAY OF CK (CPK): CPT | Performed by: PSYCHIATRY & NEUROLOGY

## 2020-04-14 PROCEDURE — 97110 THERAPEUTIC EXERCISES: CPT

## 2020-04-14 PROCEDURE — 45331 SIGMOIDOSCOPY AND BIOPSY: CPT | Performed by: INTERNAL MEDICINE

## 2020-04-14 PROCEDURE — 80053 COMPREHEN METABOLIC PANEL: CPT | Performed by: HOSPITALIST

## 2020-04-14 PROCEDURE — 85025 COMPLETE CBC W/AUTO DIFF WBC: CPT | Performed by: HOSPITALIST

## 2020-04-14 RX ORDER — SODIUM CHLORIDE, SODIUM LACTATE, POTASSIUM CHLORIDE, CALCIUM CHLORIDE 600; 310; 30; 20 MG/100ML; MG/100ML; MG/100ML; MG/100ML
INJECTION, SOLUTION INTRAVENOUS CONTINUOUS PRN
Status: DISCONTINUED | OUTPATIENT
Start: 2020-04-14 | End: 2020-04-14 | Stop reason: SURG

## 2020-04-14 RX ORDER — SODIUM CHLORIDE 9 MG/ML
30 INJECTION, SOLUTION INTRAVENOUS CONTINUOUS PRN
Status: DISCONTINUED | OUTPATIENT
Start: 2020-04-14 | End: 2020-04-20 | Stop reason: HOSPADM

## 2020-04-14 RX ORDER — PREGABALIN 75 MG/1
75 CAPSULE ORAL EVERY 12 HOURS SCHEDULED
Status: DISCONTINUED | OUTPATIENT
Start: 2020-04-14 | End: 2020-04-20 | Stop reason: HOSPADM

## 2020-04-14 RX ORDER — LIDOCAINE HYDROCHLORIDE 20 MG/ML
INJECTION, SOLUTION INFILTRATION; PERINEURAL AS NEEDED
Status: DISCONTINUED | OUTPATIENT
Start: 2020-04-14 | End: 2020-04-14 | Stop reason: SURG

## 2020-04-14 RX ORDER — PROPOFOL 10 MG/ML
VIAL (ML) INTRAVENOUS CONTINUOUS PRN
Status: DISCONTINUED | OUTPATIENT
Start: 2020-04-14 | End: 2020-04-14 | Stop reason: SURG

## 2020-04-14 RX ORDER — PROPOFOL 10 MG/ML
VIAL (ML) INTRAVENOUS AS NEEDED
Status: DISCONTINUED | OUTPATIENT
Start: 2020-04-14 | End: 2020-04-14 | Stop reason: SURG

## 2020-04-14 RX ADMIN — SODIUM CHLORIDE 30 ML/HR: 9 INJECTION, SOLUTION INTRAVENOUS at 08:33

## 2020-04-14 RX ADMIN — SODIUM BICARBONATE 650 MG: 650 TABLET ORAL at 20:16

## 2020-04-14 RX ADMIN — LEVOTHYROXINE SODIUM 200 MCG: 100 TABLET ORAL at 05:18

## 2020-04-14 RX ADMIN — PROPOFOL 140 MCG/KG/MIN: 10 INJECTION, EMULSION INTRAVENOUS at 08:43

## 2020-04-14 RX ADMIN — DIPHENHYDRAMINE HYDROCHLORIDE 25 MG: 25 CAPSULE ORAL at 19:28

## 2020-04-14 RX ADMIN — SODIUM BICARBONATE 650 MG: 650 TABLET ORAL at 17:18

## 2020-04-14 RX ADMIN — SUCRALFATE 1 G: 1 SUSPENSION ORAL at 09:57

## 2020-04-14 RX ADMIN — HYDROCODONE BITARTRATE AND ACETAMINOPHEN 1 TABLET: 5; 325 TABLET ORAL at 05:18

## 2020-04-14 RX ADMIN — PROPOFOL 100 MG: 10 INJECTION, EMULSION INTRAVENOUS at 08:39

## 2020-04-14 RX ADMIN — PROPOFOL 100 MG: 10 INJECTION, EMULSION INTRAVENOUS at 08:41

## 2020-04-14 RX ADMIN — LIDOCAINE HYDROCHLORIDE 60 MG: 20 INJECTION, SOLUTION INFILTRATION; PERINEURAL at 08:37

## 2020-04-14 RX ADMIN — INSULIN LISPRO 2 UNITS: 100 INJECTION, SOLUTION INTRAVENOUS; SUBCUTANEOUS at 12:25

## 2020-04-14 RX ADMIN — PREDNISONE 40 MG: 20 TABLET ORAL at 09:57

## 2020-04-14 RX ADMIN — SODIUM BICARBONATE 650 MG: 650 TABLET ORAL at 09:57

## 2020-04-14 RX ADMIN — DIPHENHYDRAMINE HYDROCHLORIDE 25 MG: 25 CAPSULE ORAL at 00:02

## 2020-04-14 RX ADMIN — PANTOPRAZOLE SODIUM 40 MG: 40 TABLET, DELAYED RELEASE ORAL at 17:18

## 2020-04-14 RX ADMIN — HYDROCODONE BITARTRATE AND ACETAMINOPHEN 1 TABLET: 5; 325 TABLET ORAL at 13:27

## 2020-04-14 RX ADMIN — SODIUM CHLORIDE, POTASSIUM CHLORIDE, SODIUM LACTATE AND CALCIUM CHLORIDE: 600; 310; 30; 20 INJECTION, SOLUTION INTRAVENOUS at 08:35

## 2020-04-14 RX ADMIN — PROPOFOL 50 MG: 10 INJECTION, EMULSION INTRAVENOUS at 08:42

## 2020-04-14 RX ADMIN — INSULIN LISPRO 2 UNITS: 100 INJECTION, SOLUTION INTRAVENOUS; SUBCUTANEOUS at 21:35

## 2020-04-14 RX ADMIN — BUDESONIDE AND FORMOTEROL FUMARATE DIHYDRATE 2 PUFF: 160; 4.5 AEROSOL RESPIRATORY (INHALATION) at 13:15

## 2020-04-14 RX ADMIN — PANTOPRAZOLE SODIUM 40 MG: 40 TABLET, DELAYED RELEASE ORAL at 09:57

## 2020-04-14 RX ADMIN — INSULIN LISPRO 4 UNITS: 100 INJECTION, SOLUTION INTRAVENOUS; SUBCUTANEOUS at 17:18

## 2020-04-14 RX ADMIN — SUCRALFATE 1 G: 1 SUSPENSION ORAL at 20:16

## 2020-04-14 RX ADMIN — HYDROCODONE BITARTRATE AND ACETAMINOPHEN 1 TABLET: 5; 325 TABLET ORAL at 19:28

## 2020-04-14 RX ADMIN — BUDESONIDE AND FORMOTEROL FUMARATE DIHYDRATE 2 PUFF: 160; 4.5 AEROSOL RESPIRATORY (INHALATION) at 20:51

## 2020-04-14 RX ADMIN — SUCRALFATE 1 G: 1 SUSPENSION ORAL at 17:18

## 2020-04-14 RX ADMIN — PREGABALIN 75 MG: 75 CAPSULE ORAL at 20:16

## 2020-04-14 RX ADMIN — SUCRALFATE 1 G: 1 SUSPENSION ORAL at 12:25

## 2020-04-14 NOTE — ANESTHESIA PREPROCEDURE EVALUATION
Anesthesia Evaluation     Patient summary reviewed and Nursing notes reviewed   history of anesthetic complications (pdph):  NPO Solid Status: > 8 hours  NPO Liquid Status: > 8 hours           Airway   Mallampati: II  TM distance: >3 FB  Neck ROM: full  No difficulty expected  Dental      Pulmonary    (+) a smoker Current Abstained day of surgery, shortness of breath, sleep apnea,   (-) rhonchi, decreased breath sounds, wheezes  Cardiovascular   Exercise tolerance: good (4-7 METS)    Rhythm: regular  Rate: normal    (-) hypertension, CAD, angina, KOVACS, murmur    ROS comment: Ef 69 5 trace mr    Neuro/Psych  (+) headaches, numbness,     (-) CVA    ROS Comment: Cerebral aneurysm  GI/Hepatic/Renal/Endo    (+) morbid obesity,  hepatitis, liver disease, diabetes mellitus, thyroid problem hypothyroidism  (-) no renal disease    Musculoskeletal     Abdominal     Abdomen: soft.   Substance History      OB/GYN          Other   arthritis,                    Anesthesia Plan    ASA 3     MAC   total IV anesthesia  intravenous induction     Anesthetic plan, all risks, benefits, and alternatives have been provided, discussed and informed consent has been obtained with: patient.

## 2020-04-14 NOTE — ANESTHESIA POSTPROCEDURE EVALUATION
"Patient: Oralia Sánchez    Procedure Summary     Date:  04/14/20 Room / Location:  [facility] ENDOSCOPY 4 /  [facility]    Anesthesia Start:  0835 Anesthesia Stop:  0853    Procedure:  Flexible sigmoidoscopy WITH POLYPECTOMY COLD BIOPSY (N/A ) Diagnosis:       Abnormal CT scan, colon      (Abnormal CT scan, colon [R93.3])    Surgeon:  Kendall Villafana MD Provider:  Phill Vargas MD    Anesthesia Type:  MAC ASA Status:  3          Anesthesia Type: MAC    Vitals  Vitals Value Taken Time   /92 4/14/2020  8:56 AM   Temp     Pulse 85 4/14/2020  9:04 AM   Resp 12 4/14/2020  9:04 AM   SpO2 96 % 4/14/2020  9:04 AM           Post Anesthesia Care and Evaluation    Patient location during evaluation: bedside  Patient participation: complete - patient participated  Level of consciousness: awake and alert  Pain management: adequate  Airway patency: patent  Anesthetic complications: No anesthetic complications  PONV Status: none  Cardiovascular status: acceptable  Respiratory status: acceptable  Hydration status: acceptable    Comments: /92   Pulse 85   Temp 36.8 °C (98.3 °F) (Oral)   Resp 12   Ht 154.9 cm (61\")   Wt 113 kg (248 lb 12.8 oz)   LMP 03/28/2020   SpO2 96%   BMI 47.01 kg/m²         "

## 2020-04-14 NOTE — PLAN OF CARE
Problem: Patient Care Overview  Goal: Plan of Care Review  Outcome: Ongoing (interventions implemented as appropriate)  Note:   AO x 4. VSS. NSR. Very weak c/o of pain in her feet and generalized joint pain. NPO after midnight for scope in AM. Enemas given for prep. WCM  Goal: Individualization and Mutuality  Outcome: Ongoing (interventions implemented as appropriate)  Goal: Discharge Needs Assessment  Outcome: Ongoing (interventions implemented as appropriate)  Goal: Interprofessional Rounds/Family Conf  Outcome: Ongoing (interventions implemented as appropriate)     Problem: Fall Risk (Adult)  Goal: Absence of Fall  Outcome: Ongoing (interventions implemented as appropriate)     Problem: Skin Injury Risk (Adult)  Goal: Skin Health and Integrity  Outcome: Ongoing (interventions implemented as appropriate)     Problem: Nausea/Vomiting (Adult)  Goal: Symptom Relief  Outcome: Ongoing (interventions implemented as appropriate)  Goal: Adequate Hydration  Outcome: Ongoing (interventions implemented as appropriate)     Problem: Infection, Risk/Actual (Adult)  Goal: Infection Prevention/Resolution  Outcome: Ongoing (interventions implemented as appropriate)     Problem: Diabetes, Type 1 (Adult)  Goal: Signs and Symptoms of Listed Potential Problems Will be Absent, Minimized or Managed (Diabetes, Type 1)  Outcome: Ongoing (interventions implemented as appropriate)

## 2020-04-14 NOTE — PLAN OF CARE
VSS, drowsy following sigmoid scope in the morning but oriented; more alert this evening. Pt c/o burning pain in feet & legs, controlled with PRN norco. PT has moments of intermittent weakness throughout shift, one hour has difficulty sitting up and the next hour not having much trouble. Pt complains that she is unable to open and close her hands but is observed eating meals with silverware well, using phone. Prefers to be naked in room. Up with assist & walker to bedside commode. Plan for spinal tap tomorrow, to go to rehab facility upon discharge CCP following.

## 2020-04-14 NOTE — NURSING NOTE
Spoke with pt and RN. Plans LP tomorrow. Will continue to follow progress and for determination of diagnosis.     Kerrie Alfonso RN  Acute Rehab Admission Nurse

## 2020-04-14 NOTE — THERAPY TREATMENT NOTE
Acute Care - Physical Therapy Treatment Note  [facility]     Patient Name: Oralia Sánchez  : 1975  MRN: [id number]  Today's Date: 2020             Admit Date: 2020    Visit Dx:    ICD-10-CM ICD-9-CM   1. Dyspnea, unspecified type R06.00 786.09   2. Metabolic acidosis E87.2 276.2   3. Cough R05 786.2   4. Fatigue, unspecified type R53.83 780.79   5. History of COPD Z87.09 V12.69   6. Hypomagnesemia E83.42 275.2   7. Cerebral aneurysm rupture (CMS/HCC) I60.7 430   8. Abnormal CT scan, colon R93.3 793.4     Patient Active Problem List   Diagnosis   • Vitamin D deficiency   • Awareness of heartbeats   • Adiposity   • YOUNG (nonalcoholic steatohepatitis)   • Hypothyroid   • Diabetes mellitus arising in pregnancy   • Diabetes (CMS/HCC)   • Metabolic syndrome   • Chest pain   • Primary thunderclap headache   • Elevated LFTs   • Tobacco abuse   • Rheumatoid arthritis (CMS/HCC)   • Type 2 diabetes mellitus (CMS/HCC)   • Morbid obesity (CMS/HCC)   • UTI (urinary tract infection), bacterial   • Cerebral aneurysm   • Dyspnea   • Cough   • Hypomagnesemia   • Metabolic acidosis   • Fatigue   • History of COPD   • Abnormal CT scan, colon       Therapy Treatment    Rehabilitation Treatment Summary     Row Name 20 1348             Treatment Time/Intention    Discipline  physical therapist  -      Document Type  therapy note (daily note)  -      Subjective Information  complains of;dyspnea;pain  -      Mode of Treatment  individual therapy;physical therapy  -      Patient Effort  adequate  -      Existing Precautions/Restrictions  fall  -LH      Recorded by [] Preeti Lowery, PT 20 1350      Row Name 20 1347             Cognitive Assessment/Intervention- PT/OT    Orientation Status (Cognition)  oriented x 3  -      Safety Deficit (Cognitive)  mild deficit;judgment  -LH      Recorded by [] Preeti Lowery, PT 20 1350      Row Name 20 1348             Bed Mobility  Assessment/Treatment    Supine-Sit [place] (Bed Mobility)  supervision  -      Sit-Supine [place] (Bed Mobility)  not tested  -      Assistive Device (Bed Mobility)  bed rails;head of bed elevated  -      Recorded by [] Preeti Lowery, PT 04/14/20 1350      Row Name 04/14/20 1348             Sit-Stand Transfer    Sit-Stand [place] (Transfers)  moderate assist (50% patient effort);verbal cues;nonverbal cues (demo/gesture)  -      Assistive Device (Sit-Stand Transfers)  walker, front-wheeled  -      Recorded by [] Preeti Lowery, PT 04/14/20 1350      Row Name 04/14/20 1349             Stand-Sit Transfer    Stand-Sit [place] (Transfers)  minimum assist (75% patient effort);verbal cues;nonverbal cues (demo/gesture)  -      Assistive Device (Stand-Sit Transfers)  walker, front-wheeled  -      Recorded by [] Preeti Lowery, PT 04/14/20 1350      Row Name 04/14/20 1341             Toilet Transfer    Type (Toilet Transfer)  sit-stand;stand-sit  -      [place] Level (Toilet Transfer)  minimum assist (75% patient effort);moderate assist (50% patient effort);verbal cues  -      Assistive Device (Toilet Transfer)  commode, bedside without drop arms  -      Recorded by [] Preeti Lowery, PT 04/14/20 1350      Row Name 04/14/20 1341             Gait/Stairs Assessment/Training    [place] Level (Gait)  minimum assist (75% patient effort);moderate assist (50% patient effort);verbal cues  -      Assistive Device (Gait)  walker, front-wheeled  -      Distance in Feet (Gait)  15  -      Pattern (Gait)  step-to  -      Deviations/Abnormal Patterns (Gait)  antalgic;base of support, wide;quang decreased;festinating/shuffling;stride length decreased  -      Bilateral Gait Deviations  forward flexed posture;heel strike decreased  -      Comment (Gait/Stairs)  unsteadiness  -      Recorded by [] Preeti Lowery, PT 04/14/20 1350      Row Name 04/14/20 8154              Positioning and Restraints    Pre-Treatment Position  in bed  -LH      Post Treatment Position  bs  -      On BS commode  sitting;call light within reach;encouraged to call for assist;with nsg aide bedside w pt  -LH      Recorded by [] Preeti Lowery, PT 04/14/20 1350      Row Name 04/14/20 1348             Pain Scale: Numbers Pre/Post-Treatment    Pain Scale: Numbers, Pretreatment  3/10  -LH      Pain Scale: Numbers, Post-Treatment  6/10  -      Pain Location - Orientation  generalized  -      Pain Intervention(s)  Medication (See MAR);Repositioned nsg medicated prior to PT tx  -LH      Recorded by [] Preeti Lowery, PT 04/14/20 1350        User Key  (r) = Recorded By, (t) = Taken By, (c) = Cosigned By    Initials Name Effective Dates Discipline     Preeti Lowery, PT 04/03/18 -  PT                       PT Recommendation and Plan     Outcome Summary: pt agreeable to PT, s/p sigmoidoscopy w bx this AM. pt stood bedside, ambulated short distance, assisted pt to [facility], nsg assist bedside. pt w generalized gross weakness and dyspnea w exertion. will cont to progress and mobility progress as enrique.  Outcome Measures     Row Name 04/14/20 1300 04/13/20 1000          How much help from another person do you currently need...    Turning from your back to your side while in flat bed without using bedrails?  4  -LH  --     Moving from lying on back to sitting on the side of a flat bed without bedrails?  4  -LH  --     Moving to and from a bed to a chair (including a wheelchair)?  3  -LH  --     Standing up from a chair using your arms (e.g., wheelchair, bedside chair)?  2  -LH  --     Climbing 3-5 steps with a railing?  1  -LH  --     To walk in hospital room?  2  -LH  --     AM-PAC 6 Clicks Score (PT)  16  -LH  --        How much help from another is currently needed...    Putting on and taking off regular lower body clothing?  --  2  -LE     Bathing (including washing, rinsing, and drying)  --  2  -LE     Toileting  (which includes using toilet bed pan or urinal)  --  2  -LE     Putting on and taking off regular upper body clothing  --  1  -LE     Taking care of personal grooming (such as brushing teeth)  --  3  -LE     Eating meals  --  4  -LE     AM-PAC 6 Clicks Score (OT)  --  14  -LE        Functional Assessment    Outcome Measure Options  AM-PAC 6 Clicks Basic Mobility (PT)  -  AM-PAC 6 Clicks Daily Activity (OT)  -       User Key  (r) = Recorded By, (t) = Taken By, (c) = Cosigned By    Initials Name Provider Type    Preeti Sandy, OTR Occupational Therapist     Preeti Lowery, PT Physical Therapist         Time Calculation:   PT Charges     Row Name 04/14/20 1351             Time Calculation    Start Time  1330  -      Stop Time  1348  -      Time Calculation (min)  18 min  -      PT Received On  04/14/20  -      PT - Next Appointment  04/16/20  -        User Key  (r) = Recorded By, (t) = Taken By, (c) = Cosigned By    Initials Name Provider Type     Preeti Lowery, PT Physical Therapist        Therapy Charges for Today     Code Description Service Date Service Provider Modifiers Qty    80943874448 HC PT THER PROC EA 15 MIN 4/14/2020 Preeti Lowery, PT GP 1          PT G-Codes  Outcome Measure Options: AM-PAC 6 Clicks Basic Mobility (PT)  AM-PAC 6 Clicks Score (PT): 16  AM-PAC 6 Clicks Score (OT): 14    Preeti Lowery PT  4/14/2020

## 2020-04-14 NOTE — PAYOR COMM NOTE
"P: [phone]  F: [phone]    ATTENTION:  GUNJAN WALLER FOR RECONSIDERATION FOR 4/6/2020 THROUGH 4/10/2020    REF #[id number]            Oralia Smith (44 y.o. Female)     Date of Birth Social Security Number Address Home Phone MRN    1975  [id number] [facility] [place] [address] [phone] [id number]    Rastafari Marital Status          Shinto        Admission Date Admission Type Admitting Provider Attending Provider Department, Room/Bed    4/1/20 Emergency Tylor Cruz MD Hinsberg, Francis J, DO [address], [address]/1    Discharge Date Discharge Disposition Discharge Destination                       Attending Provider:  Terrell Tinajero DO    Allergies:  No Known Drug Allergy    Isolation:  None   Infection:  None   Code Status:  CPR    Ht:  154.9 cm (61\")   Wt:  113 kg (248 lb 12.8 oz)    Admission Cmt:  None   Principal Problem:  Dyspnea [R06.00]                 Active Insurance as of 4/1/2020     Primary Coverage     Payor Plan Insurance Group Employer/Plan Group    [employer]HE[employer] BLUE CROSS AN[employer]M BLUE CROSS BLUE SHIELD PPO [id number]     Payor Plan Address Payor Plan Phone Number Payor Plan Fax Number Effective Dates    [address] [phone]  1/1/2018 - None Entered    [place] [address]       Subscriber Name Subscriber Birth Date Member ID       ORALIA SMITH 1975 [id number]                 Emergency Contacts      (Rel.) Home Phone Work Phone Mobile Phone    LUCRECIA SMITH (Son) -- -- [phone]    scott miller -- -- [phone]            Lines, Drains & Airways    Active LDAs     Name:   Placement date:   Placement time:   Site:   Days:    Peripheral IV 04/07/20 1809 Right Forearm   04/07/20    1809    Forearm   6    External Urinary Catheter   04/14/20    0100    --   less than 1                       Physician Progress Notes         Tylor Cruz MD at 04/10/20 1310          DAILY PROGRESS NOTE  [facility]  " "[place]    Patient Identification:  Name: Oralia Sánchez  Age: 44 y.o.  Sex: female  :  1975  MRN: [id number]         Primary Care Physician: Provider, No Known    Subjective:  Interval History:She has a bad cough and is short of air but worse today.  requiring  O 2 at night. Extreme fatigue.  Abdominal pain and swelling.    Objective:    Scheduled Meds:    budesonide-formoterol 2 puff Inhalation BID - RT   furosemide 40 mg Intravenous Q6H   insulin lispro 0-7 Units Subcutaneous 4x Daily With Meals & Nightly   levothyroxine 200 mcg Oral Daily   midodrine 10 mg Oral TID AC   pantoprazole 40 mg Oral BID AC   sodium bicarbonate 650 mg Oral TID   sucralfate 1 g Oral 4x Daily AC & at Bedtime     Continuous Infusions:       Vital signs in last 24 hours:  Temp:  [97.7 °F (36.5 °C)-98.8 °F (37.1 °C)] 98.1 °F (36.7 °C)  Heart Rate:  [93-99] 93  Resp:  [11-20] 11  BP: ()/(51-70) 112/64    Intake/Output:    Intake/Output Summary (Last 24 hours) at 4/10/2020 1310  Last data filed at 4/10/2020 0601  Gross per 24 hour   Intake 240 ml   Output 3525 ml   Net -3285 ml       Exam:  /64   Pulse 93   Temp 98.1 °F (36.7 °C) (Oral)   Resp 11   Ht 154.9 cm (61\")   Wt 115 kg (253 lb 3.2 oz)   LMP 2020   SpO2 98%   BMI 47.84 kg/m²      General Appearance:    Alert, cooperative, no distress   Head:    Normocephalic, without obvious abnormality, atraumatic   Eyes:       Throat:   Lips, tongue, gums normal   Neck:   Supple, symmetrical, trachea midline, no JVD   Lungs:     Crackles and wheezes. bilaterally, respirations unlabored   Chest Wall:    No tenderness or deformity    Heart:    Regular rate and rhythm, S1 and S2 normal, no murmur,no  Rub or gallop   Abdomen:     Soft, non-tender, bowel sounds active, no masses, no organomegaly    Extremities:   Extremities normal, atraumatic, no cyanosis or edema   Pulses:      Skin:   Skin is warm and dry,  Boil in pubic area   Neurologic:   no focal deficits " noted        Data Review:  Results from last 7 days   Lab Units 04/10/20  0524 04/09/20  0532 04/08/20  0623   SODIUM mmol/L 133* 135* 131*   POTASSIUM mmol/L 3.5 3.5 3.6   CHLORIDE mmol/L 90* 92* 93*   CO2 mmol/L 27.5 29.4* 23.3   BUN mg/dL 8 7 10   CREATININE mg/dL 0.45* 0.47* 0.56*   GLUCOSE mg/dL 137* 115* 178*   CALCIUM mg/dL 8.6 8.2* 8.0*     Results from last 7 days   Lab Units 04/09/20  0532 04/08/20  0623 04/07/20  0545   WBC 10*3/mm3 8.56 7.93 7.51   HEMOGLOBIN g/dL 12.6 13.2 13.4   HEMATOCRIT % 35.2 37.0 37.8   PLATELETS 10*3/mm3 235 215 202             Lab Results   Lab Value Date/Time    TROPONINT <0.010 04/01/2020 0809    TROPONINT <0.010 11/10/2019 1525         Results from last 7 days   Lab Units 04/10/20  0524 04/09/20  0532 04/08/20  0623   ALK PHOS U/L 144* 145* 177*   BILIRUBIN mg/dL 1.0 1.3* 0.9   ALT (SGPT) U/L 45* 51* 58*   AST (SGOT) U/L 91* 98* 129*             Glucose   Date/Time Value Ref Range Status   04/10/2020 1123 126 70 - 130 mg/dL Final   04/10/2020 0555 155 (H) 70 - 130 mg/dL Final   04/09/2020 2119 139 (H) 70 - 130 mg/dL Final   04/09/2020 1638 157 (H) 70 - 130 mg/dL Final   04/09/2020 1150 124 70 - 130 mg/dL Final   04/09/2020 0618 130 70 - 130 mg/dL Final   04/08/2020 2025 129 70 - 130 mg/dL Final   04/08/2020 1637 137 (H) 70 - 130 mg/dL Final             Assessment:  Active Hospital Problems    Diagnosis  POA   • **Dyspnea [R06.00]  Yes   • Cough [R05]  Unknown   • Hypomagnesemia [E83.42]  Unknown   • Metabolic acidosis [E87.2]  Unknown   • Fatigue [R53.83]  Unknown   • History of COPD [Z87.09]  Not Applicable   • Tobacco abuse [Z72.0]  Yes   • Rheumatoid arthritis (CMS/HCC) [M06.9]  Yes   • Type 2 diabetes mellitus (CMS/HCC) [E11.9]  Yes   • Morbid obesity (CMS/HCC) [E66.01]  Yes   • Elevated LFTs [R94.5]  Yes   • YOUNG (nonalcoholic steatohepatitis) [K75.81]  Yes   • Diabetes mellitus arising in pregnancy [O24.419]  Yes   • Hypothyroid [E03.9]  Yes      Resolved Hospital  Problems   No resolved problems to display.       Plan:  Continue OFF IV fluids and follow lab. ECHO report noted. Off IV fluids and increase proamatine for low BP. Got diuretics.  Elevated inflammatory markers. Will follow. CT abdomen, chest and GI consult noted.. Repeat COVID test was negative..  PT eval.  Colonoscopy later as outpatient.  Continue supportive care. Repeat CXR.  Tylor Cruz MD  4/10/2020  13:10      Electronically signed by Tylor Cruz MD at 04/10/20 1312     hCano Monique MD at 04/10/20 1216          [facility]  Inpatient Progress Note    Reason for Follow Up: Abnormal imaging elevated liver test steatosis    Subjective     Interval History:   Still short of air, awaiting pulmonary critical care doctor to see her this morning as she has questions    Current Facility-Administered Medications:   •  acetaminophen (TYLENOL) tablet 650 mg, 650 mg, Oral, Q4H PRN, 650 mg at 04/03/20 0913 **OR** acetaminophen (TYLENOL) 160 MG/5ML solution 650 mg, 650 mg, Oral, Q4H PRN **OR** acetaminophen (TYLENOL) suppository 650 mg, 650 mg, Rectal, Q4H PRN, Tylor Cruz MD  •  Benzocaine 20 % ointment 1 application, 1 application, Apply externally, BID PRN, Tylor Cruz MD, 1 application at 04/06/20 1022  •  benzonatate (TESSALON) capsule 200 mg, 200 mg, Oral, TID PRN, Everton Mcrae MD, 200 mg at 04/04/20 2037  •  budesonide-formoterol (SYMBICORT) 160-4.5 MCG/ACT inhaler 2 puff, 2 puff, Inhalation, BID - RT, Shawn Lopez MD, 2 puff at 04/10/20 0737  •  calcium carbonate (TUMS) chewable tablet 500 mg (200 mg elemental), 2 tablet, Oral, 4x Daily PRN, Tylor Cruz MD  •  dextrose (D50W) 25 g/ 50mL Intravenous Solution 25 g, 25 g, Intravenous, Q15 Min PRN, Tylor Cruz MD  •  dextrose (GLUTOSE) oral gel 15 g, 15 g, Oral, Q15 Min PRN, Tylor Cruz MD  •  diphenhydrAMINE (BENADRYL) capsule 25 mg, 25 mg, Oral, Q4H PRN, Tylor Cruz MD, 25 mg at 04/10/20 0839  •   furosemide (LASIX) injection 40 mg, 40 mg, Intravenous, Q6H, Shawn Lopez MD, 40 mg at 04/10/20 1145  •  glucagon (human recombinant) (GLUCAGEN DIAGNOSTIC) injection 1 mg, 1 mg, Subcutaneous, Q15 Min PRN, Tylor Cruz MD  •  guaiFENesin-dextromethorphan (ROBITUSSIN DM) 100-10 MG/5ML syrup 5 mL, 5 mL, Oral, Q4H PRN, Tylor Cruz MD, 5 mL at 04/04/20 1153  •  insulin lispro (humaLOG) injection 0-7 Units, 0-7 Units, Subcutaneous, 4x Daily With Meals & Nightly, Tylor Cruz MD, 2 Units at 04/10/20 0834  •  levothyroxine (SYNTHROID, LEVOTHROID) tablet 200 mcg, 200 mcg, Oral, Daily, Tylor Cruz MD, 200 mcg at 04/10/20 0835  •  midodrine (PROAMATINE) tablet 10 mg, 10 mg, Oral, TID AC, Tylor Cruz MD, 10 mg at 04/10/20 1111  •  pantoprazole (PROTONIX) EC tablet 40 mg, 40 mg, Oral, BID AC, Jayshree Payne APRN, 40 mg at 04/10/20 0835  •  potassium chloride (MICRO-K) CR capsule 40 mEq, 40 mEq, Oral, PRN, 40 mEq at 04/05/20 1723 **OR** potassium chloride (KLOR-CON) packet 40 mEq, 40 mEq, Oral, PRN, 40 mEq at 04/01/20 2052 **OR** potassium chloride 10 mEq in 100 mL IVPB, 10 mEq, Intravenous, Q1H PRN, Tylor Cruz MD  •  promethazine (PHENERGAN) tablet 12.5 mg, 12.5 mg, Oral, Q6H PRN, 12.5 mg at 04/04/20 0009 **OR** promethazine (PHENERGAN) injection 12.5 mg, 12.5 mg, Intramuscular, Q6H PRN **OR** promethazine (PHENERGAN) injection 12.5 mg, 12.5 mg, Intravenous, Q6H PRN, 12.5 mg at 04/04/20 2345 **OR** promethazine (PHENERGAN) suppository 12.5 mg, 12.5 mg, Rectal, Q6H PRN, Tylor Cruz MD  •  sodium bicarbonate tablet 650 mg, 650 mg, Oral, TID, Tylor Cruz MD, 650 mg at 04/10/20 0835  •  sodium chloride 0.9 % flush 10 mL, 10 mL, Intravenous, PRN, Tylor Cruz MD  •  sucralfate (CARAFATE) 1 GM/10ML suspension 1 g, 1 g, Oral, 4x Daily AC & at Bedtime, Moe Lozano MD, 1 g at 04/10/20 1111  Review of Systems:    She has weakness fatigue all other systems reviewed and negative    Objective     Vital  Signs  Temp:  [97.7 °F (36.5 °C)-98.8 °F (37.1 °C)] 98.1 °F (36.7 °C)  Heart Rate:  [93-99] 93  Resp:  [11-20] 11  BP: ()/(51-70) 112/64  Body mass index is 47.84 kg/m².    Intake/Output Summary (Last 24 hours) at 4/10/2020 1216  Last data filed at 4/10/2020 0601  Gross per 24 hour   Intake 540 ml   Output 3525 ml   Net -2985 ml     No intake/output data recorded.     Physical Exam:   General: patient awake, alert and cooperative   Eyes: Normal lids and lashes, no scleral icterus   Neck: supple, normal ROM   Skin: warm and dry, not jaundiced   Cardiovascular: regular rhythm and rate, no murmurs auscultated   Pulm: clear to auscultation bilaterally, regular and unlabored   Abdomen: soft, nontender, nondistended; normal bowel sounds   Extremities: no rash or edema   Psychiatric: Normal mood and behavior; memory intact     Results Review:     I reviewed the patient's new clinical results.    Results from last 7 days   Lab Units 04/09/20  0532 04/08/20  0623 04/07/20  0545   WBC 10*3/mm3 8.56 7.93 7.51   HEMOGLOBIN g/dL 12.6 13.2 13.4   HEMATOCRIT % 35.2 37.0 37.8   PLATELETS 10*3/mm3 235 215 202     Results from last 7 days   Lab Units 04/10/20  0524 04/09/20  0532 04/08/20  0623   SODIUM mmol/L 133* 135* 131*   POTASSIUM mmol/L 3.5 3.5 3.6   CHLORIDE mmol/L 90* 92* 93*   CO2 mmol/L 27.5 29.4* 23.3   BUN mg/dL 8 7 10   CREATININE mg/dL 0.45* 0.47* 0.56*   CALCIUM mg/dL 8.6 8.2* 8.0*   BILIRUBIN mg/dL 1.0 1.3* 0.9   ALK PHOS U/L 144* 145* 177*   ALT (SGPT) U/L 45* 51* 58*   AST (SGOT) U/L 91* 98* 129*   GLUCOSE mg/dL 137* 115* 178*         No results found for: LIPASE    Radiology:  CT Head Without Contrast   Final Result      US Pelvis Complete   Final Result   1. There is a 4.0 cm right ovarian benign-appearing cyst which may   represent a paraovarian cyst or possibly a follicular cyst. Six-month   sonographic follow-up is recommended.   2. Normal sonographic appearance of the pelvis and left ovary.       This  report was finalized on 4/9/2020 10:48 AM by Dr. Gregorio Price M.D.          US Liver   Final Result   1. There are sonographic features of the liver that are consistent with   hepatic steatosis. Chronic hepatitis, possibly due to chronic hepatic   steatosis is also suspected.   2. Gallbladder sludge.       This report was finalized on 4/9/2020 10:09 AM by Dr. Gregorio Price M.D.          CT Abdomen Pelvis Stone Protocol   Final Result           1. Mild groundglass opacities in the right more than left upper lungs   show interval worsening, clinical correlation and continued follow-up   recommended.   2. Presacral edema/stranding and fluid may represent inflammatory   process or potentially infiltrative process involving the rectum,   suggest clinical correlation and direct visualization as indicated.   3. Stranding around the right adnexa may be evidence of inflammatory   process involving the right adnexa, and an indeterminate rounded density   is apparent at the right adnexa. Follow-up evaluation with ultrasound is   advised. No hydronephrosis. Suspected cholelithiasis. Hepatic steatosis,   hepatomegaly.   4. Skin thickening at the anterior lower abdomen with subcutaneous   stranding density may reflect cellulitis, correlate with clinical exam.        This report was finalized on 4/7/2020 4:14 PM by Dr. Rogerio Campo M.D.          CT Chest Without Contrast   Final Result           1. Mild groundglass opacities in the right more than left upper lungs   show interval worsening, clinical correlation and continued follow-up   recommended.   2. Presacral edema/stranding and fluid may represent inflammatory   process or potentially infiltrative process involving the rectum,   suggest clinical correlation and direct visualization as indicated.   3. Stranding around the right adnexa may be evidence of inflammatory   process involving the right adnexa, and an indeterminate rounded density   is apparent at  the right adnexa. Follow-up evaluation with ultrasound is   advised. No hydronephrosis. Suspected cholelithiasis. Hepatic steatosis,   hepatomegaly.   4. Skin thickening at the anterior lower abdomen with subcutaneous   stranding density may reflect cellulitis, correlate with clinical exam.        This report was finalized on 4/7/2020 4:14 PM by Dr. Rogerio Campo M.D.          CT Chest Without Contrast   Final Result       There is no evidence for an acute infiltrate within the left lung base.   However, there are very subtle small patchy areas of groundglass opacity   within the right lung apex as best seen on images #15 and 19 that were   not evident on the prior chest CT of 11/10/2019. These findings are   entirely nonspecific in nature and could represent an age-indeterminate   pneumonitis. If the patient's symptoms do not improve or worsen, I would   recommend a follow-up CT scan of the chest without use of IV contrast   during this admission. However, if the patient's symptoms do improve, I   would still recommend a follow-up CT scan of the chest but, in this   particular case, at interval of approximately 2-3 weeks. These findings   and recommendations were directly discussed with Dr. Johana Gamez on the   morning of 04/06/2020 at approximately 7:45 AM.       Radiation dose reduction techniques were utilized, including automated   exposure control and exposure modulation based on body size.       This report was finalized on 4/6/2020 7:56 AM by Dr. Joe Mcnamara M.D.          XR Chest 1 View   Final Result      XR Chest AP   Final Result      XR Chest 1 View    (Results Pending)   US Non-ob Transvaginal    (Results Pending)       Assessment/Plan     Patient Active Problem List   Diagnosis   • Vitamin D deficiency   • Awareness of heartbeats   • Adiposity   • YOUNG (nonalcoholic steatohepatitis)   • Hypothyroid   • Diabetes mellitus arising in pregnancy   • Diabetes (CMS/HCC)   • Metabolic syndrome   •  Chest pain   • Primary thunderclap headache   • Elevated LFTs   • Tobacco abuse   • Rheumatoid arthritis (CMS/HCC)   • Type 2 diabetes mellitus (CMS/HCC)   • Morbid obesity (CMS/HCC)   • UTI (urinary tract infection), bacterial   • Cerebral aneurysm   • Dyspnea   • Cough   • Hypomagnesemia   • Metabolic acidosis   • Fatigue   • History of COPD       Assessment:   1.  Abnormal CAT scan of the abdomen showing a possible infiltrative process involving the rectum.  The patient has never had a colonoscopy.  I feel that she is too ill (SOA)  to have a colonoscopy at this moment.  2.  This patient has some type of respiratory illness that does not appear to be coronavirus.  3.  She has elevated liver function tests with steatosis of the liver on CAT scan of the abdomen.  Her last liver function tests were normal in 6 of 2019 and seem to be intermittently elevated.  4.  The patient is obese.     Plan:   1.  Gallbladder sludge, steatosis on ultrasound.  Steatosis likely source of elevated liver tests, asymptomatic from the sludge in the gallbladder absolutely does not need a surgical evaluation  2.  I would recommend that she stop smoking.  3.  She needs to lose weight.  4.  She should eventually have a colonoscopy but I feel that this pulmonary illness needs to be resolved before a colonoscopy is done.  The colonoscopy could be done as an outpatient.     I discussed the patients findings and my recommendations with patient and nursing staff.    Chano Monique MD                Electronically signed by Chano Monique MD at 04/10/20 1217     Shawn Lopez MD at 04/10/20 6711          [facility]      Mar/chart reviewed  Follow up cough  Still with cough, dry  Says she feels a lot better after lasix    Vital Sign Min/Max for last 24 hours  Temp  Min: 97.5 °F (36.4 °C)  Max: 98.8 °F (37.1 °C)   BP  Min: 92/51  Max: 123/74   Pulse  Min: 85  Max: 99   Resp  Min: 11  Max: 20   SpO2  Min: 91 %  Max: 98 %   Flow  (L/min)  Min: 1  Max: 1   Weight  Min: 115 kg (253 lb 3.2 oz)  Max: 115 kg (253 lb 3.2 oz)   540/4425  Nad, axox3,   perrl, eomi, no icterus,  mmm, no jvd, trachea midline, neck supple,  chest cta bilaterally, no crackles, no wheezes,   rrr,   soft, nt, nd +bs,  no c/c/ 1+edema  Skin warm, dry no rashes    Labs: 4/10:  Na 133  Bicarb 27  Alt 45  ast 91  Alk phos 144  Ferritin 1348  ldh 314  crp 5  Esr 27    A/P:  1. Abnormal ct chest --   Dr. Sharma thinks her primary problem is respiratory,  I will discussed bronch with patient --defer for now.   2. Lactic acidosis -- unusual as her bicarb is 23.   Her lactic has been much higher and there does not appear to be any clear reason for it.  I would consider stopping checking the lactic acid.  3. Elevated liver enzymes -- she does have some cholecylithiasis on ct --ultrasound has been ordered  4. Hyponatremia  5. Cough -- continue symbicort and ppi   6. Abnormal ct abd/pelvis -- ultrasound ordered, consider GyN consult  7. Anasarca -- diuresis     Could she have dermatomyositis?  Other autoimmune disorder?  Will check autoimmune work up  Continue diuresis  Consider emperic steroids vs. Bronch with biopsy.    Discussed with patient, will trial emperic steroids          Electronically signed by Shawn Lopez MD at 04/10/20 1529     Tylor Cruz MD at 20 1343          DAILY PROGRESS NOTE  [facility]    Patient Identification:  Name: Oralia Sánchez  Age: 44 y.o.  Sex: female  :  1975  MRN: [id number]         Primary Care Physician: Provider, No Known    Subjective:  Interval History:She has a bad cough and is short of air but not much better.  requiring  O 2 at night. Extreme fatigue.  Abdominal pain and swelling.    Objective:    Scheduled Meds:    budesonide-formoterol 2 puff Inhalation BID - RT   fluconazole 100 mg Oral Q24H   furosemide 40 mg Intravenous Q6H   insulin lispro 0-7 Units Subcutaneous 4x Daily With Meals & Nightly  "  levothyroxine 200 mcg Oral Daily   midodrine 10 mg Oral TID AC   pantoprazole 40 mg Oral BID AC   sodium bicarbonate 650 mg Oral TID   sucralfate 1 g Oral 4x Daily AC & at Bedtime     Continuous Infusions:       Vital signs in last 24 hours:  Temp:  [98 °F (36.7 °C)-98.6 °F (37 °C)] 98 °F (36.7 °C)  Heart Rate:  [85-98] 89  Resp:  [16-18] 18  BP: ()/(48-74) 123/74    Intake/Output:    Intake/Output Summary (Last 24 hours) at 4/9/2020 1343  Last data filed at 4/9/2020 1100  Gross per 24 hour   Intake --   Output 4000 ml   Net -4000 ml       Exam:  /74 (BP Location: Left arm, Patient Position: Lying)   Pulse 89   Temp 98 °F (36.7 °C) (Oral)   Resp 18   Ht 154.9 cm (61\")   Wt 119 kg (262 lb 11.2 oz)   LMP 03/28/2020   SpO2 92%   BMI 49.64 kg/m²      General Appearance:    Alert, cooperative, no distress   Head:    Normocephalic, without obvious abnormality, atraumatic   Eyes:       Throat:   Lips, tongue, gums normal   Neck:   Supple, symmetrical, trachea midline, no JVD   Lungs:     Crackles and wheezes. bilaterally, respirations unlabored   Chest Wall:    No tenderness or deformity    Heart:    Regular rate and rhythm, S1 and S2 normal, no murmur,no  Rub or gallop   Abdomen:     Soft, non-tender, bowel sounds active, no masses, no organomegaly    Extremities:   Extremities normal, atraumatic, no cyanosis or edema   Pulses:      Skin:   Skin is warm and dry,  Boil in pubic area   Neurologic:   no focal deficits noted        Data Review:  Results from last 7 days   Lab Units 04/09/20  0532 04/08/20  0623 04/07/20  0545   SODIUM mmol/L 135* 131* 130*   POTASSIUM mmol/L 3.5 3.6 3.9   CHLORIDE mmol/L 92* 93* 94*   CO2 mmol/L 29.4* 23.3 24.6   BUN mg/dL 7 10 12   CREATININE mg/dL 0.47* 0.56* 0.54*   GLUCOSE mg/dL 115* 178* 172*   CALCIUM mg/dL 8.2* 8.0* 7.6*     Results from last 7 days   Lab Units 04/09/20  0532 04/08/20  0623 04/07/20  0545   WBC 10*3/mm3 8.56 7.93 7.51   HEMOGLOBIN g/dL 12.6 13.2 " 13.4   HEMATOCRIT % 35.2 37.0 37.8   PLATELETS 10*3/mm3 235 215 202               Results from last 7 days   Lab Units 04/09/20  0532 04/08/20  0623 04/07/20  0545   ALK PHOS U/L 145* 177* 169*   BILIRUBIN mg/dL 1.3* 0.9 1.1   ALT (SGPT) U/L 51* 58* 64*   AST (SGOT) U/L 98* 129* 161*             Glucose   Date/Time Value Ref Range Status   04/09/2020 1150 124 70 - 130 mg/dL Final   04/09/2020 0618 130 70 - 130 mg/dL Final   04/08/2020 2025 129 70 - 130 mg/dL Final   04/08/2020 1637 137 (H) 70 - 130 mg/dL Final   04/08/2020 1056 200 (H) 70 - 130 mg/dL Final   04/08/2020 0621 204 (H) 70 - 130 mg/dL Final   04/07/2020 2028 152 (H) 70 - 130 mg/dL Final   04/07/2020 1733 155 (H) 70 - 130 mg/dL Final         Assessment:  Active Hospital Problems    Diagnosis  POA   • **Dyspnea [R06.00]  Yes   • Cough [R05]  Unknown   • Hypomagnesemia [E83.42]  Unknown   • Metabolic acidosis [E87.2]  Unknown   • Fatigue [R53.83]  Unknown   • History of COPD [Z87.09]  Not Applicable   • Tobacco abuse [Z72.0]  Yes   • Rheumatoid arthritis (CMS/HCC) [M06.9]  Yes   • Type 2 diabetes mellitus (CMS/HCC) [E11.9]  Yes   • Morbid obesity (CMS/HCC) [E66.01]  Yes   • Elevated LFTs [R94.5]  Yes   • YOUNG (nonalcoholic steatohepatitis) [K75.81]  Yes   • Diabetes mellitus arising in pregnancy [O24.419]  Yes   • Hypothyroid [E03.9]  Yes      Resolved Hospital Problems   No resolved problems to display.       Plan:  Continue with IV fluids and follow lab. ECHO report noted. Off IV fluids and increase proamatine for low BP. Got diuretics.  Elevated inflammatory markers. Will follow. CT abdomen, chest and GI consult noted.. Repeat COVID test was negative..  PT eval.  Colonoscopy later as outpatient.  Continue supportive care.  Tylor Cruz MD  4/9/2020  13:43      Electronically signed by Tylor Cruz MD at 04/09/20 1345     Chano Monique MD at 04/09/20 1325          [facility]  Inpatient Progress Note    Reason for Follow Up:  Abnormal imaging, rectum    Subjective     Interval History:   Shortness of breath this morning, no rectal pain rectal bleeding    Current Facility-Administered Medications:   •  acetaminophen (TYLENOL) tablet 650 mg, 650 mg, Oral, Q4H PRN, 650 mg at 04/03/20 0913 **OR** acetaminophen (TYLENOL) 160 MG/5ML solution 650 mg, 650 mg, Oral, Q4H PRN **OR** acetaminophen (TYLENOL) suppository 650 mg, 650 mg, Rectal, Q4H PRN, Tylor Cruz MD  •  Benzocaine 20 % ointment 1 application, 1 application, Apply externally, BID PRN, Tylor Cruz MD, 1 application at 04/06/20 1022  •  benzonatate (TESSALON) capsule 200 mg, 200 mg, Oral, TID PRN, Everton Mcrae MD, 200 mg at 04/04/20 2037  •  budesonide-formoterol (SYMBICORT) 160-4.5 MCG/ACT inhaler 2 puff, 2 puff, Inhalation, BID - RT, Shawn Lopez MD, 2 puff at 04/09/20 0814  •  calcium carbonate (TUMS) chewable tablet 500 mg (200 mg elemental), 2 tablet, Oral, 4x Daily PRN, Tylor Cruz MD  •  dextrose (D50W) 25 g/ 50mL Intravenous Solution 25 g, 25 g, Intravenous, Q15 Min PRN, Tylor Cruz MD  •  dextrose (GLUTOSE) oral gel 15 g, 15 g, Oral, Q15 Min PRN, Tylor Cruz MD  •  diphenhydrAMINE (BENADRYL) capsule 25 mg, 25 mg, Oral, Q4H PRN, Tylor Cruz MD, 25 mg at 04/08/20 1316  •  fluconazole (DIFLUCAN) tablet 100 mg, 100 mg, Oral, Q24H, Everton Mcrae MD, 100 mg at 04/09/20 1120  •  furosemide (LASIX) injection 40 mg, 40 mg, Intravenous, Q6H, Shawn Lopez MD, 40 mg at 04/09/20 1120  •  glucagon (human recombinant) (GLUCAGEN DIAGNOSTIC) injection 1 mg, 1 mg, Subcutaneous, Q15 Min PRN, Tylor Cruz MD  •  guaiFENesin-dextromethorphan (ROBITUSSIN DM) 100-10 MG/5ML syrup 5 mL, 5 mL, Oral, Q4H PRN, Tylor Cruz MD, 5 mL at 04/04/20 1153  •  insulin lispro (humaLOG) injection 0-7 Units, 0-7 Units, Subcutaneous, 4x Daily With Meals & Nightly, Tylor Cruz MD, 3 Units at 04/08/20 1108  •  levothyroxine (SYNTHROID, LEVOTHROID) tablet 200 mcg, 200  mcg, Oral, Daily, Tylor Cruz MD, 200 mcg at 04/09/20 1120  •  midodrine (PROAMATINE) tablet 10 mg, 10 mg, Oral, TID AC, Tylor Cruz MD, 10 mg at 04/09/20 1119  •  pantoprazole (PROTONIX) EC tablet 40 mg, 40 mg, Oral, BID JANES, Jayshree Payne, APRN, 40 mg at 04/08/20 1632  •  potassium chloride (MICRO-K) CR capsule 40 mEq, 40 mEq, Oral, PRN, 40 mEq at 04/05/20 1723 **OR** potassium chloride (KLOR-CON) packet 40 mEq, 40 mEq, Oral, PRN, 40 mEq at 04/01/20 2052 **OR** potassium chloride 10 mEq in 100 mL IVPB, 10 mEq, Intravenous, Q1H PRN, Tylor Cruz MD  •  promethazine (PHENERGAN) tablet 12.5 mg, 12.5 mg, Oral, Q6H PRN, 12.5 mg at 04/04/20 0009 **OR** promethazine (PHENERGAN) injection 12.5 mg, 12.5 mg, Intramuscular, Q6H PRN **OR** promethazine (PHENERGAN) injection 12.5 mg, 12.5 mg, Intravenous, Q6H PRN, 12.5 mg at 04/04/20 2345 **OR** promethazine (PHENERGAN) suppository 12.5 mg, 12.5 mg, Rectal, Q6H PRN, Tylor Cruz MD  •  sodium bicarbonate tablet 650 mg, 650 mg, Oral, TID, Tylor Cruz MD, 650 mg at 04/08/20 2056  •  sodium chloride 0.9 % flush 10 mL, 10 mL, Intravenous, PRN, Tylor Cruz MD  •  sucralfate (CARAFATE) 1 GM/10ML suspension 1 g, 1 g, Oral, 4x Daily AC & at Bedtime, Moe Lozano MD, 1 g at 04/09/20 1119  Review of Systems:    She has weakness and fatigue all other systems reviewed and negative    Objective     Vital Signs  Temp:  [98 °F (36.7 °C)-98.6 °F (37 °C)] 98 °F (36.7 °C)  Heart Rate:  [85-98] 89  Resp:  [16-18] 18  BP: ()/(48-74) 123/74  Body mass index is 49.64 kg/m².    Intake/Output Summary (Last 24 hours) at 4/9/2020 1325  Last data filed at 4/9/2020 1100  Gross per 24 hour   Intake --   Output 4000 ml   Net -4000 ml     I/O this shift:  In: -   Out: 900 [Urine:900]     Physical Exam:   General: patient awake, alert and cooperative   Eyes: Normal lids and lashes, no scleral icterus   Neck: supple, normal ROM   Skin: warm and dry, not jaundiced   Cardiovascular:  regular rhythm and rate, no murmurs auscultated   Pulm: clear to auscultation bilaterally, regular and unlabored   Abdomen: soft, nontender, nondistended; normal bowel sounds   Extremities: no rash or edema   Psychiatric: Normal mood and behavior; memory intact     Results Review:     I reviewed the patient's new clinical results.    Results from last 7 days   Lab Units 04/09/20  0532 04/08/20  0623 04/07/20  0545   WBC 10*3/mm3 8.56 7.93 7.51   HEMOGLOBIN g/dL 12.6 13.2 13.4   HEMATOCRIT % 35.2 37.0 37.8   PLATELETS 10*3/mm3 235 215 202     Results from last 7 days   Lab Units 04/09/20  0532 04/08/20  0623 04/07/20  0545   SODIUM mmol/L 135* 131* 130*   POTASSIUM mmol/L 3.5 3.6 3.9   CHLORIDE mmol/L 92* 93* 94*   CO2 mmol/L 29.4* 23.3 24.6   BUN mg/dL 7 10 12   CREATININE mg/dL 0.47* 0.56* 0.54*   CALCIUM mg/dL 8.2* 8.0* 7.6*   BILIRUBIN mg/dL 1.3* 0.9 1.1   ALK PHOS U/L 145* 177* 169*   ALT (SGPT) U/L 51* 58* 64*   AST (SGOT) U/L 98* 129* 161*   GLUCOSE mg/dL 115* 178* 172*         No results found for: LIPASE    Radiology:  US Pelvis Complete   Final Result   1. There is a 4.0 cm right ovarian benign-appearing cyst which may   represent a paraovarian cyst or possibly a follicular cyst. Six-month   sonographic follow-up is recommended.   2. Normal sonographic appearance of the pelvis and left ovary.       This report was finalized on 4/9/2020 10:48 AM by Dr. Gregorio Price M.D.          US Liver   Final Result   1. There are sonographic features of the liver that are consistent with   hepatic steatosis. Chronic hepatitis, possibly due to chronic hepatic   steatosis is also suspected.   2. Gallbladder sludge.       This report was finalized on 4/9/2020 10:09 AM by Dr. Gregorio Price M.D.          CT Abdomen Pelvis Stone Protocol   Final Result           1. Mild groundglass opacities in the right more than left upper lungs   show interval worsening, clinical correlation and continued follow-up   recommended.    2. Presacral edema/stranding and fluid may represent inflammatory   process or potentially infiltrative process involving the rectum,   suggest clinical correlation and direct visualization as indicated.   3. Stranding around the right adnexa may be evidence of inflammatory   process involving the right adnexa, and an indeterminate rounded density   is apparent at the right adnexa. Follow-up evaluation with ultrasound is   advised. No hydronephrosis. Suspected cholelithiasis. Hepatic steatosis,   hepatomegaly.   4. Skin thickening at the anterior lower abdomen with subcutaneous   stranding density may reflect cellulitis, correlate with clinical exam.        This report was finalized on 4/7/2020 4:14 PM by Dr. Rogerio Campo M.D.          CT Chest Without Contrast   Final Result           1. Mild groundglass opacities in the right more than left upper lungs   show interval worsening, clinical correlation and continued follow-up   recommended.   2. Presacral edema/stranding and fluid may represent inflammatory   process or potentially infiltrative process involving the rectum,   suggest clinical correlation and direct visualization as indicated.   3. Stranding around the right adnexa may be evidence of inflammatory   process involving the right adnexa, and an indeterminate rounded density   is apparent at the right adnexa. Follow-up evaluation with ultrasound is   advised. No hydronephrosis. Suspected cholelithiasis. Hepatic steatosis,   hepatomegaly.   4. Skin thickening at the anterior lower abdomen with subcutaneous   stranding density may reflect cellulitis, correlate with clinical exam.        This report was finalized on 4/7/2020 4:14 PM by Dr. Rogerio Campo M.D.          CT Chest Without Contrast   Final Result       There is no evidence for an acute infiltrate within the left lung base.   However, there are very subtle small patchy areas of groundglass opacity   within the right lung apex as  best seen on images #15 and 19 that were   not evident on the prior chest CT of 11/10/2019. These findings are   entirely nonspecific in nature and could represent an age-indeterminate   pneumonitis. If the patient's symptoms do not improve or worsen, I would   recommend a follow-up CT scan of the chest without use of IV contrast   during this admission. However, if the patient's symptoms do improve, I   would still recommend a follow-up CT scan of the chest but, in this   particular case, at interval of approximately 2-3 weeks. These findings   and recommendations were directly discussed with Dr. Johana Gamez on the   morning of 04/06/2020 at approximately 7:45 AM.       Radiation dose reduction techniques were utilized, including automated   exposure control and exposure modulation based on body size.       This report was finalized on 4/6/2020 7:56 AM by Dr. Joe Mcnamara M.D.          XR Chest 1 View   Final Result      XR Chest AP   Final Result      XR Chest 1 View    (Results Pending)   US Non-ob Transvaginal    (Results Pending)   CT Head Without Contrast    (Results Pending)       Assessment/Plan     Patient Active Problem List   Diagnosis   • Vitamin D deficiency   • Awareness of heartbeats   • Adiposity   • YOUNG (nonalcoholic steatohepatitis)   • Hypothyroid   • Diabetes mellitus arising in pregnancy   • Diabetes (CMS/HCC)   • Metabolic syndrome   • Chest pain   • Primary thunderclap headache   • Elevated LFTs   • Tobacco abuse   • Rheumatoid arthritis (CMS/HCC)   • Type 2 diabetes mellitus (CMS/HCC)   • Morbid obesity (CMS/HCC)   • UTI (urinary tract infection), bacterial   • Cerebral aneurysm   • Dyspnea   • Cough   • Hypomagnesemia   • Metabolic acidosis   • Fatigue   • History of COPD   FINDINGS: Sonographic evaluation of the liver and selected structures of  the right upper quadrant was performed. Comparison is made to a CT of  the abdomen dated 4/7/2020.  The right kidney has a normal appearance.  A  coarsened echotexture with increased echogenicity is seen throughout the  liver without evidence for focal abnormality. The liver measures on the  order of 17.9 cm in anterior posterior dimension. The portal vein is  patent with appropriate directional flow. There is no intrahepatic bile  duct dilatation. Minimal sludge is seen within the gallbladder. There is  no gallbladder wall thickening or pericholecystic fluid. The common bile  duct measures 0.5 cm in diameter. The visualized portion of the pancreas  appears normal.     IMPRESSION:  1. There are sonographic features of the liver that are consistent with  hepatic steatosis. Chronic hepatitis, possibly due to chronic hepatic  steatosis is also suspected.  2. Gallbladder sludge     Assessment:   1.  Abnormal CAT scan of the abdomen showing a possible infiltrative process involving the rectum.  The patient has never had a colonoscopy.  I feel that she is too ill (SOA)  to have a colonoscopy at this moment.  2.  This patient has some type of respiratory illness that does not appear to be coronavirus.  3.  She has elevated liver function tests with steatosis of the liver on CAT scan of the abdomen.  Her last liver function tests were normal in 6 of 2019 and seem to be intermittently elevated.  4.  The patient is obese.     Plan:   1.   Gallbladder sludge, steatosis on ultrasound.  Steatosis likely source of elevated liver tests, asymptomatic from the sludge in the gallbladder absolutely does not need a surgical evaluation  2.  I would recommend that she stop smoking.  3.  She needs to lose weight.  4.  She should eventually have a colonoscopy but I feel that this pulmonary illness needs to be resolved before a colonoscopy is done.  The colonoscopy could be done as an outpatient.     I discussed the patients findings and my recommendations with patient and nursing staff.    Chano Monique MD                Electronically signed by Chano Monique MD at 04/09/20  1328     Shawn Lopez MD at 20 0732          [facility]      Mar/chart reviewed  Follow up cough  Still with cough, dry  Says she feels a lot better after lasix  She doesn't cough during our interview    Vital Sign Min/Max for last 24 hours  Temp  Min: 98.3 °F (36.8 °C)  Max: 98.6 °F (37 °C)   BP  Min: 94/48  Max: 121/63   Pulse  Min: 95  Max: 102   Resp  Min: 18  Max: 18   SpO2  Min: 93 %  Max: 97 %   Flow (L/min)  Min: 2  Max: 2   Weight  Min: 119 kg (262 lb 11.2 oz)  Max: 122 kg (269 lb 4.8 oz)   I?/3100    Nad, axox3,   perrl, eomi, no icterus,  mmm, no jvd, trachea midline, neck supple,  chest cta bilaterally, no crackles, no wheezes,   rrr,   soft, nt, nd +bs,  no c/c/ 1+edema  Skin warm, dry no rashes    Labs: : reviewed:  Na 135  Bicarb 29  Alt 51  ast 98  Alk phos 145  tbili 1.3  Ferritin 1509  ldh 284  Wbc 8.5  hgb 12.6  plts 235  Cardiac echo: unremarkable    A/P:  1. Abnormal ct chest --    Dr. Sharma thinks her primary problem is respiratory,  I will discussed bronch with patient --defer for now.   2. Lactic acidosis -- unusual as her bicarb is 23.   Her lactic has been much higher and there does not appear to be any clear reason for it.  I would consider stopping checking the lactic acid.  3. Elevated liver enzymes -- she does have some cholecylithiasis on ct --ultrasound has been ordered  4. Hyponatremia  5. Cough -- continue symbicort and ppi   6. Abnormal ct abd/pelvis -- ultrasound ordered, consider GyN consult  7. Anasarca -- diuresis    Could she have dermatomyositis?  Other autoimmune disorder?  Will check autoimmune work up  Consider emperic steroids vs. Bronch with biopsy      Electronically signed by Shawn Lopez MD at 20 2957     Tylor Cruz MD at 20 1144          DAILY PROGRESS NOTE  [facility]    Patient Identification:  Name: Oralia Sánchez  Age: 44 y.o.  Sex: female  :  1975  MRN: [id number]         Primary Care  "Physician: Provider, No Known    Subjective:  Interval History:She has a bad cough and is short of air but better. Not requiring any O 2. Extreme fatigue.  Abdominal pain and swelling.    Objective:    Scheduled Meds:    budesonide-formoterol 2 puff Inhalation BID - RT   [START ON 4/9/2020] fluconazole 100 mg Oral Q24H   furosemide 40 mg Intravenous Q6H   insulin lispro 0-7 Units Subcutaneous 4x Daily With Meals & Nightly   levothyroxine 200 mcg Oral Daily   midodrine 10 mg Oral TID AC   pantoprazole 40 mg Oral BID AC   sodium bicarbonate 650 mg Oral TID   sodium chloride 10 mL Intravenous Q12H   sucralfate 1 g Oral 4x Daily AC & at Bedtime     Continuous Infusions:       Vital signs in last 24 hours:  Temp:  [97.4 °F (36.3 °C)-98.9 °F (37.2 °C)] 98.5 °F (36.9 °C)  Heart Rate:  [] 102  Resp:  [18] 18  BP: ()/(42-85) 94/56    Intake/Output:    Intake/Output Summary (Last 24 hours) at 4/8/2020 1144  Last data filed at 4/8/2020 0636  Gross per 24 hour   Intake 720 ml   Output 400 ml   Net 320 ml       Exam:  BP 94/56 (BP Location: Left arm, Patient Position: Sitting)   Pulse 102   Temp 98.5 °F (36.9 °C) (Oral)   Resp 18   Ht 154.9 cm (61\")   Wt 128 kg (282 lb)   LMP 03/28/2020   SpO2 97%   BMI 53.28 kg/m²      General Appearance:    Alert, cooperative, no distress   Head:    Normocephalic, without obvious abnormality, atraumatic   Eyes:       Throat:   Lips, tongue, gums normal   Neck:   Supple, symmetrical, trachea midline, no JVD   Lungs:     Crackles and wheezes. bilaterally, respirations unlabored   Chest Wall:    No tenderness or deformity    Heart:    Regular rate and rhythm, S1 and S2 normal, no murmur,no  Rub or gallop   Abdomen:     Soft, non-tender, bowel sounds active, no masses, no organomegaly    Extremities:   Extremities normal, atraumatic, no cyanosis or edema   Pulses:      Skin:   Skin is warm and dry,  Boil in pubic area   Neurologic:   no focal deficits noted    Data " Review:  Results from last 7 days   Lab Units 04/08/20 0623 04/07/20  0545 04/06/20  0551   SODIUM mmol/L 131* 130* 131*   POTASSIUM mmol/L 3.6 3.9 3.9   CHLORIDE mmol/L 93* 94* 94*   CO2 mmol/L 23.3 24.6 23.4   BUN mg/dL 10 12 14   CREATININE mg/dL 0.56* 0.54* 0.55*   GLUCOSE mg/dL 178* 172* 147*   CALCIUM mg/dL 8.0* 7.6* 8.1*     Results from last 7 days   Lab Units 04/08/20  0623 04/07/20  0545 04/06/20  0551   WBC 10*3/mm3 7.93 7.51 7.82   HEMOGLOBIN g/dL 13.2 13.4 14.4   HEMATOCRIT % 37.0 37.8 41.1   PLATELETS 10*3/mm3 215 202 195               Results from last 7 days   Lab Units 04/08/20 0623 04/07/20  0545 04/04/20  0549   ALK PHOS U/L 177* 169* 132*   BILIRUBIN mg/dL 0.9 1.1 1.6*   ALT (SGPT) U/L 58* 64* 72*   AST (SGOT) U/L 129* 161* 193*             Glucose   Date/Time Value Ref Range Status   04/08/2020 1056 200 (H) 70 - 130 mg/dL Final   04/08/2020 0621 204 (H) 70 - 130 mg/dL Final   04/07/2020 2028 152 (H) 70 - 130 mg/dL Final   04/07/2020 1733 155 (H) 70 - 130 mg/dL Final   04/07/2020 1106 188 (H) 70 - 130 mg/dL Final   04/07/2020 0623 188 (H) 70 - 130 mg/dL Final   04/06/2020 2122 140 (H) 70 - 130 mg/dL Final   04/06/2020 1636 151 (H) 70 - 130 mg/dL Final             Assessment:  Active Hospital Problems    Diagnosis  POA   • **Dyspnea [R06.00]  Yes   • Cough [R05]  Unknown   • Hypomagnesemia [E83.42]  Unknown   • Metabolic acidosis [E87.2]  Unknown   • Fatigue [R53.83]  Unknown   • History of COPD [Z87.09]  Not Applicable   • Tobacco abuse [Z72.0]  Yes   • Rheumatoid arthritis (CMS/HCC) [M06.9]  Yes   • Type 2 diabetes mellitus (CMS/HCC) [E11.9]  Yes   • Morbid obesity (CMS/HCC) [E66.01]  Yes   • Elevated LFTs [R94.5]  Yes   • YOUNG (nonalcoholic steatohepatitis) [K75.81]  Yes   • Diabetes mellitus arising in pregnancy [O24.419]  Yes   • Hypothyroid [E03.9]  Yes      Resolved Hospital Problems   No resolved problems to display.       Plan:  Continue with IV fluids and follow lab. ECHO report noted.  Off IV fluids and increase proamatine for low BP   Elevated inflammatory markers. Will follow. CT abdomen, chest and GI consult noted.. Repeat COVID test was negative..  PT eval.  Tylor Cruz MD  4/8/2020  11:44      Electronically signed by Tylor Cruz MD at 04/08/20 1146     Johana Gamez MD at 04/08/20 0922         4/7 COVID-19 (in house) is negative    I discussed the case with Dr. Shawn Lopez.  Both of us agree that the patient's clinical presentation is not consistent with COVID-19 infection.     Given the abnormalities see on the CAT scan of the abdomen pelvis I am concerned about a possible malignancy.  Agree with GI consultation.    Okay to discontinue COVID isolation    Electronically signed by Johana Gamez MD at 04/08/20 0926     Shawn Lopez MD at 04/08/20 0756          [facility]     Mar/chart reviewed  Follow up cough  Still with cough, dry  Has had lactic acid continued to be checked in and remains elevated  She says her legs are weak.    Vital Sign Min/Max for last 24 hours  Temp  Min: 97.4 °F (36.3 °C)  Max: 98.9 °F (37.2 °C)   BP  Min: 89/42  Max: 109/55   Pulse  Min: 91  Max: 105   Resp  Min: 18  Max: 18   SpO2  Min: 93 %  Max: 99 %   No data recorded   Weight  Min: 128 kg (282 lb)  Max: 128 kg (282 lb)     Nad, axox3,   perrl, eomi, no icterus,  mmm, no jvd, trachea midline, neck supple,  chest cta bilaterally, no crackles, no wheezes,   rrr,   soft, nt, nd +bs,  no c/c/ 1+edema  Skin warm, dry no rashes    Labs: 4/8: reviewed:  Lactate 6.2  Na 131  Bicarb 23  Alt 58  ast 129  Alk phos 177  Ferritin 1790  ldh 311  Albumin 2.3    Ct chest/abd/pelvis: there are some very faint bilateral upper lobe infiltrates  3.3cm density in the right adnexa -- ultrasound advised; associated stranding  Presacral edema/stranding and fluid -- infiltrative process involving the rectum?      A/P:  1. Abnormal ct chest --   Repeat procalcitonin.  Doubtful significance.  I don't think it is  "the cause of her problems  2. Lactic acidosis -- unusual as her bicarb is 23.  Will check gas.  Her lactic has been much higher and there does not appear to be any clear reason for it.  I would consider stopping checking the lactic acid.  3. Elevated liver enzymes -- she does have some cholecylithiasis on ct  4. Hyponatremia  5. Cough -- continue symbicort and ppi   6. Abnormal ct abd/pelvis -- check ultrasound and ask GI to see  7. Anasarca -- diuresis    Discussed with Dr. Gamez            Electronically signed by Shawn Lopez MD at 20 0932     Tylor Cruz MD at 20 1339          DAILY PROGRESS NOTE  [facility]    Patient Identification:  Name: Oralia Sánchez  Age: 44 y.o.  Sex: female  :  1975  MRN: [id number]         Primary Care Physician: Provider, No Known    Subjective:  Interval History:She has a bad cough and is short of air but better. Not requiring any O 2. Extreme fatigue.  Abdominal pain and swelling.    Objective:    Scheduled Meds:    budesonide-formoterol 2 puff Inhalation BID - RT   fluconazole 100 mg Intravenous Q24H   insulin lispro 0-7 Units Subcutaneous 4x Daily With Meals & Nightly   levothyroxine 200 mcg Oral Daily   midodrine 5 mg Oral TID AC   pantoprazole 40 mg Oral BID AC   sodium bicarbonate 650 mg Oral TID   sodium chloride 10 mL Intravenous Q12H   sucralfate 1 g Oral 4x Daily AC & at Bedtime     Continuous Infusions:       Vital signs in last 24 hours:  Temp:  [97.5 °F (36.4 °C)-98.3 °F (36.8 °C)] 98.3 °F (36.8 °C)  Heart Rate:  [] 98  Resp:  [16-18] 18  BP: ()/(50-60) 94/50    Intake/Output:    Intake/Output Summary (Last 24 hours) at 2020 1339  Last data filed at 2020 1300  Gross per 24 hour   Intake 1938 ml   Output 1000 ml   Net 938 ml       Exam:  BP 94/50 (BP Location: Right arm, Patient Position: Lying)   Pulse 98   Temp 98.3 °F (36.8 °C) (Oral)   Resp 18   Ht 154.9 cm (61\")   Wt 123 kg (271 lb 8 oz)   LMP " 03/28/2020   SpO2 97%   BMI 51.30 kg/m²      General Appearance:    Alert, cooperative, no distress   Head:    Normocephalic, without obvious abnormality, atraumatic   Eyes:       Throat:   Lips, tongue, gums normal   Neck:   Supple, symmetrical, trachea midline, no JVD   Lungs:     Crackles and wheezes. bilaterally, respirations unlabored   Chest Wall:    No tenderness or deformity    Heart:    Regular rate and rhythm, S1 and S2 normal, no murmur,no  Rub or gallop   Abdomen:     Soft, non-tender, bowel sounds active, no masses, no organomegaly    Extremities:   Extremities normal, atraumatic, no cyanosis or edema   Pulses:      Skin:   Skin is warm and dry,  Boil in pubic area   Neurologic:   no focal deficits noted    Data Review:  Results from last 7 days   Lab Units 04/07/20  0545 04/06/20  0551 04/05/20  0601   SODIUM mmol/L 130* 131* 132*   POTASSIUM mmol/L 3.9 3.9 3.4*   CHLORIDE mmol/L 94* 94* 89*   CO2 mmol/L 24.6 23.4 19.9*   BUN mg/dL 12 14 14   CREATININE mg/dL 0.54* 0.55* 0.72   GLUCOSE mg/dL 172* 147* 154*   CALCIUM mg/dL 7.6* 8.1* 8.9     Results from last 7 days   Lab Units 04/07/20  0545 04/06/20  0551 04/05/20  0601   WBC 10*3/mm3 7.51 7.82 9.39   HEMOGLOBIN g/dL 13.4 14.4 15.4   HEMATOCRIT % 37.8 41.1 43.7   PLATELETS 10*3/mm3 202 195 184           Results from last 7 days   Lab Units 04/07/20  0545 04/04/20  0549 04/03/20  0620   ALK PHOS U/L 169* 132* 132*   BILIRUBIN mg/dL 1.1 1.6* 1.6*   ALT (SGPT) U/L 64* 72* 64*   AST (SGOT) U/L 161* 193* 177*         Glucose   Date/Time Value Ref Range Status   04/07/2020 1106 188 (H) 70 - 130 mg/dL Final   04/07/2020 0623 188 (H) 70 - 130 mg/dL Final   04/06/2020 2122 140 (H) 70 - 130 mg/dL Final   04/06/2020 1636 151 (H) 70 - 130 mg/dL Final   04/06/2020 1159 186 (H) 70 - 130 mg/dL Final   04/06/2020 0640 150 (H) 70 - 130 mg/dL Final   04/05/2020 2114 169 (H) 70 - 130 mg/dL Final   04/05/2020 1716 188 (H) 70 - 130 mg/dL Final              Assessment:  Active Hospital Problems    Diagnosis  POA   • **Dyspnea [R06.00]  Yes   • Cough [R05]  Unknown   • Hypomagnesemia [E83.42]  Unknown   • Metabolic acidosis [E87.2]  Unknown   • Fatigue [R53.83]  Unknown   • History of COPD [Z87.09]  Not Applicable   • Tobacco abuse [Z72.0]  Yes   • Rheumatoid arthritis (CMS/HCC) [M06.9]  Yes   • Type 2 diabetes mellitus (CMS/HCC) [E11.9]  Yes   • Morbid obesity (CMS/HCC) [E66.01]  Yes   • Elevated LFTs [R94.5]  Yes   • YOUNG (nonalcoholic steatohepatitis) [K75.81]  Yes   • Diabetes mellitus arising in pregnancy [O24.419]  Yes   • Hypothyroid [E03.9]  Yes      Resolved Hospital Problems   No resolved problems to display.       Plan:  Continue with IV fluids and follow lab. ECHO report noted. Will DC IV fluids and add proamatine for low BP   Elevated inflammatory markers. Will follow. Check CY abdomen. Repeat COVID test.  PT eval.  Tylor Cruz MD  4/7/2020  13:39      Electronically signed by Tylor Cruz MD at 04/07/20 1343     Shawn Lopez MD at 04/07/20 0949        Nothing to add from pulmonary standpoint  Continue symbicort and PPI  Follow up outpatient in 4-6 weeks    Will see prn while here.     Electronically signed by Shawn Lopez MD at 04/07/20 0950     Shawn Lopez MD at 04/06/20 1306          [facility]     Mar/chart reviewed  Follow up cough  Says her cough is improved  Says she feels weak, tired and dizzy when she stands    Vital Sign Min/Max for last 24 hours  Temp  Min: 98.1 °F (36.7 °C)  Max: 98.3 °F (36.8 °C)   BP  Min: 98/61  Max: 113/70   Pulse  Min: 95  Max: 101   Resp  Min: 18  Max: 20   SpO2  Min: 96 %  Max: 99 %   No data recorded   Weight  Min: 119 kg (261 lb 12.8 oz)  Max: 119 kg (261 lb 12.8 oz)     Nad, axox3,   perrl, eomi, no icterus,  mmm, no jvd, trachea midline, neck supple, no palpable thyroid nodules  chest cta bilaterally, no crackles, no wheezes,   rrr,   soft, nt, nd +bs,  no c/c/ e  Skin warm dry  "no rashes    A/P:  1. Cough -- will start symbicort bid continue treatment for gerd, ok to d/c from pulmonary standpoint  2. Tobacco abuse -- needs outpatient pft's  3. Probable COPD -- \"  \"  4. GERD  5. ERLIN    Ok to d/c from  My standpoint follow up in office in 4-6 weeks    Patient is new to me today    Electronically signed by Shawn Lopez MD at 20 1308     Tylor Cruz MD at 20 1115          DAILY PROGRESS NOTE  [facility]    Patient Identification:  Name: Oralia Sánchez  Age: 44 y.o.  Sex: female  :  1975  MRN: [id number]         Primary Care Physician: Provider, No Known    Subjective:  Interval History:She has a bad cough and is short of air but better. Not requiring any O 2. Extreme fatigue.    Objective:    Scheduled Meds:    fluconazole 100 mg Intravenous Q24H   insulin lispro 0-7 Units Subcutaneous 4x Daily With Meals & Nightly   levothyroxine 200 mcg Oral Daily   pantoprazole 40 mg Intravenous Q12H   sodium bicarbonate 650 mg Oral TID   sodium chloride 10 mL Intravenous Q12H   sucralfate 1 g Oral 4x Daily AC & at Bedtime     Continuous Infusions:    sodium chloride 100 mL/hr Last Rate: 100 mL/hr (20 0219)       Vital signs in last 24 hours:  Temp:  [98.1 °F (36.7 °C)-98.3 °F (36.8 °C)] 98.3 °F (36.8 °C)  Heart Rate:  [] 96  Resp:  [18] 18  BP: ()/(57-70) 100/57    Intake/Output:    Intake/Output Summary (Last 24 hours) at 2020 1115  Last data filed at 2020 0918  Gross per 24 hour   Intake 2466.7 ml   Output --   Net 2466.7 ml       Exam:  /57 (BP Location: Right arm, Patient Position: Lying)   Pulse 96   Temp 98.3 °F (36.8 °C) (Oral)   Resp 18   Ht 154.9 cm (61\")   Wt 119 kg (261 lb 12.8 oz)   LMP 2020   SpO2 99%   BMI 49.47 kg/m²      General Appearance:    Alert, cooperative, no distress   Head:    Normocephalic, without obvious abnormality, atraumatic   Eyes:       Throat:   Lips, tongue, gums normal   Neck:   " Supple, symmetrical, trachea midline, no JVD   Lungs:     Crackles and wheezes. bilaterally, respirations unlabored   Chest Wall:    No tenderness or deformity    Heart:    Regular rate and rhythm, S1 and S2 normal, no murmur,no  Rub or gallop   Abdomen:     Soft, non-tender, bowel sounds active, no masses, no organomegaly    Extremities:   Extremities normal, atraumatic, no cyanosis or edema   Pulses:      Skin:   Skin is warm and dry,  Boil in pubic area   Neurologic:   no focal deficits noted    Data Review:  Results from last 7 days   Lab Units 04/06/20  0551 04/05/20  0601 04/04/20  0549   SODIUM mmol/L 131* 132* 133*   POTASSIUM mmol/L 3.9 3.4* 3.3*   CHLORIDE mmol/L 94* 89* 90*   CO2 mmol/L 23.4 19.9* 18.8*   BUN mg/dL 14 14 14   CREATININE mg/dL 0.55* 0.72 0.72   GLUCOSE mg/dL 147* 154* 151*   CALCIUM mg/dL 8.1* 8.9 8.3*     Results from last 7 days   Lab Units 04/06/20  0551 04/05/20  0601 04/04/20  0549   WBC 10*3/mm3 7.82 9.39 7.95   HEMOGLOBIN g/dL 14.4 15.4 14.1   HEMATOCRIT % 41.1 43.7 39.8   PLATELETS 10*3/mm3 195 184 209           Assessment:  Active Hospital Problems    Diagnosis  POA   • **Dyspnea [R06.00]  Yes   • Cough [R05]  Unknown   • Hypomagnesemia [E83.42]  Unknown   • Metabolic acidosis [E87.2]  Unknown   • Fatigue [R53.83]  Unknown   • History of COPD [Z87.09]  Not Applicable   • Tobacco abuse [Z72.0]  Yes   • Rheumatoid arthritis (CMS/HCC) [M06.9]  Yes   • Type 2 diabetes mellitus (CMS/HCC) [E11.9]  Yes   • Morbid obesity (CMS/HCC) [E66.01]  Yes   • Elevated LFTs [R94.5]  Yes   • YOUNG (nonalcoholic steatohepatitis) [K75.81]  Yes   • Diabetes mellitus arising in pregnancy [O24.419]  Yes   • Hypothyroid [E03.9]  Yes      Resolved Hospital Problems   No resolved problems to display.       Plan:  Continue with IV fluids and follow lab. Check ECHO.  Elevated inflammatory markers. Will follow  Tylor Cruz MD  4/6/2020  11:15      Electronically signed by Tylor Cruz MD at 04/06/20 6764    "         Consult Notes       Edson Sharma MD at 04/08/20 1145      Consult Orders    1. Inpatient Gastroenterology Consult [[id number]] ordered by Shawn Lopez MD at 04/08/20 0907                [facility]  Initial Inpatient Consult Note    Referring Provider: Dr. FAMILIA Cruz    Reason for Consultation: Abnormal CT abd showing \"infiltrative process involving the rectum\"    Subjective     History of present illness:    44 y.o. female [employer] employee and smoker who was admitted 4/1/2020 with cough and shortness of breath, exhaustion and lightheadedness.  The patient has ruled out for Covid 19 with 2 different tests that have been negative.  Patient does feel better.  She does smoke 1 and half packs per day.  She also drinks 2 to 3 glasses of alcohol per week.  She has 6 children although she lives alone.  3 of her children live with her ex-.  Patient states she is better though she does walk with a walker.  She has no vomiting now.  She does complain of some mid abdominal discomfort.  This is worse when she is gagging and decreased when she sleeps.  Her weight is increasing.  She has no diarrhea.  She is constipated.  No rectal bleeding or melena.  Patient is never had a colonoscopy and never had an EGD.  She does have heartburn.  No dysphasia.  She did have a CT of the abdomen and pelvis on 4/7/2020 that showed:    IMPRESSION:     1. Mild groundglass opacities in the right more than left upper lungs  show interval worsening, clinical correlation and continued follow-up  recommended.  2. Presacral edema/stranding and fluid may represent inflammatory  process or potentially infiltrative process involving the rectum,  suggest clinical correlation and direct visualization as indicated.  3. Stranding around the right adnexa may be evidence of inflammatory  process involving the right adnexa, and an indeterminate rounded density  is apparent at the right adnexa. Follow-up evaluation with " ultrasound is  advised. No hydronephrosis. Suspected cholelithiasis. Hepatic steatosis,  hepatomegaly.  4. Skin thickening at the anterior lower abdomen with subcutaneous  stranding density may reflect cellulitis, correlate with clinical exam.      This report was finalized on 2020 4:14 PM by Dr. Rogerio Campo M.D.     Past Medical History:  Past Medical History:   Diagnosis Date   • Aneurysm (CMS/HCC)    • Arthritis    • Carpal tunnel syndrome    • Chest pain    • Diabetes mellitus (CMS/HCC)    • Dysmetabolic syndrome X    • Gestational diabetes mellitus    • Hypothyroidism    • Metabolic disorder    • Nonalcoholic steatohepatitis    • Obesity    • Palpitations    • Sleep apnea    • Spinal headache    • Type 2 diabetes mellitus (CMS/HCC)    • Vitamin D deficiency      Past Surgical History:  Past Surgical History:   Procedure Laterality Date   • CARPAL TUNNEL RELEASE     • CEREBRAL ANGIOGRAM N/A 3/28/2019    Procedure: DIAGNOSTIC CEREBRAL ANGIOGRAM;  Surgeon: Alexx Barrow MD;  Location: [facility] ;  Service: Neurosurgery   • CEREBRAL ANGIOGRAM N/A 10/2/2019    Procedure: CEREBRAL ANGIOGRAM;  Surgeon: Santosh Garza MD;  Location: [facility] ;  Service: Interventional Radiology   •  SECTION      x6   • DILATATION AND CURETTAGE      x 2   • EMBOLIZATION CEREBRAL N/A 3/29/2019    Procedure: Cererbal angiogram and embolization of cerebral aneurysm;  Surgeon: Alexx Barrow MD;  Location: [facility] ;  Service: Neurosurgery   • MYRINGOTOMY     • TONSILLECTOMY AND ADENOIDECTOMY        Social History:   Social History     Tobacco Use   • Smoking status: Current Every Day Smoker     Packs/day: 1.50     Types: Cigarettes   • Smokeless tobacco: Never Used   • Tobacco comment: x 10 yrs   Substance Use Topics   • Alcohol use: Yes     Alcohol/week: 1.0 - 2.0 standard drinks     Types: 1 - 2 Shots of liquor per week     Comment: 1-2/week      Family  History:  Family History   Problem Relation Age of Onset   • Hypertension Mother    • Alcohol abuse Father    • Heart attack Father         acute MI   • Stroke Father         CVA   • Diabetes Maternal Grandmother    • Diabetes Paternal Grandmother        Home Meds:  Medications Prior to Admission   Medication Sig Dispense Refill Last Dose   • albuterol sulfate  (90 Base) MCG/ACT inhaler Inhale 2 puffs Every 4 (Four) Hours As Needed for Wheezing. 1 inhaler 0 3/31/2020 at Unknown time   • ibuprofen (ADVIL,MOTRIN) 400 MG tablet Take 400 mg by mouth Every 6 (Six) Hours As Needed for Mild Pain .   4/1/2020 at Unknown time   • SYNTHROID 200 MCG tablet Take 1 tablet by mouth Daily. 30 tablet 11 3/31/2020 at Unknown time     Current Meds:     budesonide-formoterol 2 puff Inhalation BID - RT   [START ON 4/9/2020] fluconazole 100 mg Oral Q24H   furosemide 40 mg Intravenous Q6H   insulin lispro 0-7 Units Subcutaneous 4x Daily With Meals & Nightly   levothyroxine 200 mcg Oral Daily   midodrine 10 mg Oral TID AC   pantoprazole 40 mg Oral BID AC   sodium bicarbonate 650 mg Oral TID   sodium chloride 10 mL Intravenous Q12H   sucralfate 1 g Oral 4x Daily AC & at Bedtime     Allergies:  Allergies   Allergen Reactions   • No Known Drug Allergy      Review of Systems  The following systems were reviewed and negative;  constitution, ENT, genitourinary, breast, hematologic / lymphatic, musculoskeletal and neurological     Objective     Vital Signs  Temp:  [97.4 °F (36.3 °C)-98.9 °F (37.2 °C)] 98.5 °F (36.9 °C)  Heart Rate:  [] 102  Resp:  [18] 18  BP: ()/(42-85) 94/56  Physical Exam:  General Appearance:    Alert, cooperative, in no acute distress   Head:    Normocephalic, without obvious abnormality, atraumatic   Eyes:            Lids and lashes normal, conjunctivae and sclerae normal, no   icterus   Throat:   No oral lesions, no thrush, oral mucosa moist   Neck:   No adenopathy, supple, trachea midline, no  thyromegaly, no   carotid bruit, no JVD   Lungs:     Clear to auscultation,respirations regular, even and                   unlabored    Heart:    Regular rhythm and normal rate, normal S1 and S2, no            murmur, no gallop, no rub, no click   Chest Wall:    No abnormalities observed   Abdomen:     Normal bowel sounds, no masses, no organomegaly, soft        non-tender, non-distended, no guarding, no rebound                 tenderness   Rectal:     Deferred   Extremities:   no edema, no cyanosis, no redness   Skin:   No bleeding, bruising or rash   Lymph nodes:   No palpable adenopathy   Psychiatric:  Judgement and insight: normal   Orientation to person place and time: normal   Mood and affect: normal   Results Review:   I reviewed the patient's new clinical results.    Results from last 7 days   Lab Units 04/08/20 0623 04/07/20  0545 04/06/20  0551   WBC 10*3/mm3 7.93 7.51 7.82   HEMOGLOBIN g/dL 13.2 13.4 14.4   HEMATOCRIT % 37.0 37.8 41.1   PLATELETS 10*3/mm3 215 202 195     Results from last 7 days   Lab Units 04/08/20  0623 04/07/20  0545 04/06/20  0551  04/04/20  0549   SODIUM mmol/L 131* 130* 131*   < > 133*   POTASSIUM mmol/L 3.6 3.9 3.9   < > 3.3*   CHLORIDE mmol/L 93* 94* 94*   < > 90*   CO2 mmol/L 23.3 24.6 23.4   < > 18.8*   BUN mg/dL 10 12 14   < > 14   CREATININE mg/dL 0.56* 0.54* 0.55*   < > 0.72   CALCIUM mg/dL 8.0* 7.6* 8.1*   < > 8.3*   BILIRUBIN mg/dL 0.9 1.1  --   --  1.6*   ALK PHOS U/L 177* 169*  --   --  132*   ALT (SGPT) U/L 58* 64*  --   --  72*   AST (SGOT) U/L 129* 161*  --   --  193*   GLUCOSE mg/dL 178* 172* 147*   < > 151*    < > = values in this interval not displayed.         No results found for: LIPASE    Radiology:  CT Abdomen Pelvis Stone Protocol   Final Result           1. Mild groundglass opacities in the right more than left upper lungs   show interval worsening, clinical correlation and continued follow-up   recommended.   2. Presacral edema/stranding and fluid may  represent inflammatory   process or potentially infiltrative process involving the rectum,   suggest clinical correlation and direct visualization as indicated.   3. Stranding around the right adnexa may be evidence of inflammatory   process involving the right adnexa, and an indeterminate rounded density   is apparent at the right adnexa. Follow-up evaluation with ultrasound is   advised. No hydronephrosis. Suspected cholelithiasis. Hepatic steatosis,   hepatomegaly.   4. Skin thickening at the anterior lower abdomen with subcutaneous   stranding density may reflect cellulitis, correlate with clinical exam.        This report was finalized on 4/7/2020 4:14 PM by Dr. Rogerio Campo M.D.          CT Chest Without Contrast   Final Result           1. Mild groundglass opacities in the right more than left upper lungs   show interval worsening, clinical correlation and continued follow-up   recommended.   2. Presacral edema/stranding and fluid may represent inflammatory   process or potentially infiltrative process involving the rectum,   suggest clinical correlation and direct visualization as indicated.   3. Stranding around the right adnexa may be evidence of inflammatory   process involving the right adnexa, and an indeterminate rounded density   is apparent at the right adnexa. Follow-up evaluation with ultrasound is   advised. No hydronephrosis. Suspected cholelithiasis. Hepatic steatosis,   hepatomegaly.   4. Skin thickening at the anterior lower abdomen with subcutaneous   stranding density may reflect cellulitis, correlate with clinical exam.        This report was finalized on 4/7/2020 4:14 PM by Dr. Rogerio Campo M.D.          CT Chest Without Contrast   Final Result       There is no evidence for an acute infiltrate within the left lung base.   However, there are very subtle small patchy areas of groundglass opacity   within the right lung apex as best seen on images #15 and 19 that were   not  evident on the prior chest CT of 11/10/2019. These findings are   entirely nonspecific in nature and could represent an age-indeterminate   pneumonitis. If the patient's symptoms do not improve or worsen, I would   recommend a follow-up CT scan of the chest without use of IV contrast   during this admission. However, if the patient's symptoms do improve, I   would still recommend a follow-up CT scan of the chest but, in this   particular case, at interval of approximately 2-3 weeks. These findings   and recommendations were directly discussed with Dr. Johana Gamez on the   morning of 04/06/2020 at approximately 7:45 AM.       Radiation dose reduction techniques were utilized, including automated   exposure control and exposure modulation based on body size.       This report was finalized on 4/6/2020 7:56 AM by Dr. Joe Mcnamara M.D.          XR Chest 1 View   Final Result      XR Chest AP   Final Result      XR Chest 1 View    (Results Pending)   US Pelvis Complete    (Results Pending)       Assessment/Plan   Assessment:   1.  Abnormal CAT scan of the abdomen showing a possible infiltrative process involving the rectum?  The patient has never had a colonoscopy.  I feel that she is too ill to have a colonoscopy at this moment.  2.  This patient has some type of respiratory illness that does not appear to be coronavirus.  3.  She has elevated liver function tests with steatosis of the liver on CAT scan of the abdomen.  Her last liver function tests were normal in 6 of 2019 and seem to be intermittently elevated.  4.  The patient is obese.    Plan:   1.  I will check a hepatitis profile as well as an ultrasound of the gallbladder.  2.  I would recommend that she stop smoking.  3.  She needs to lose weight.  4.  She should eventually have a colonoscopy but I feel that this pulmonary illness needs to be resolved before a colonoscopy is done.  The colonoscopy could be done as an outpatient.    I discussed the patients  findings and my recommendations with patient and primary care team.         Edson Sharma M.D.  [facility]  [address] [place] [address]  Office: [phone]      Electronically signed by Edson Sharma MD at 04/08/20 9206

## 2020-04-14 NOTE — PLAN OF CARE
Problem: Patient Care Overview  Goal: Plan of Care Review  Flowsheets  Taken 4/14/2020 0856 by Preeti Sterling, RN  Plan of Care Reviewed With: patient  Taken 4/14/2020 1346 by Preeti Lowery, PT  Outcome Summary: pt agreeable to PT, s/p sigmoidoscopy w bx this AM. pt stood bedside, ambulated short distance, assisted pt to [facility], [facility] assist bedside. pt w generalized gross weakness and dyspnea w exertion. will cont to progress and mobility progress as enrique.

## 2020-04-14 NOTE — PROGRESS NOTES
[facility] HOSPITALIST    PROGRESS NOTE    Name:  Oralia Sánchez   Age:  44 y.o.  Sex:  female  :  1975  MRN:  [id number]   Visit Number:  [id number]  Admission Date:  2020  Date Of Service:  20  Primary Care Physician:  Esmer, No Known     LOS: 11 days :  Patient Care Team:  Provider, No Known as PCP - General:    History taken from:     patient chart    Chief Complaint:      Weakness    Subjective     Interval History:     Patient seen and examined today.  Reviewed history and physical, lab work, x-rays, chart.    Patient is a 44-year-old female with arteritis, diabetes, hypothyroidism, Mendez, sleep apnea, morbid obesity who had been admitted on 2020 with cough and shortness of breath as well as some fevers.  She was admitted for further evaluation and treatment. COVID-19 testing was done and was negative x2.    Infectious disease was consulted and noted that the patient had an elevated ferritin which is a surrogate marker for coronavirus infection.  Repeat testing was negative.    Pulmonology was consulted due to metabolic acidosis, elevated lactate.  Patient was requiring oxygen as well.  They did not feel that the patient had exacerbation of COPD.  Advised the patient to quit smoking.    CT abdomen pelvis was done as well which showed possible malignancy in the sigmoid colon.  Flex sig fluoroscopy was done on 2020 which revealed a colon polyp which is removed and sent to pathology.  Gastroenterology has recommended EGD and colonoscopy as an outpatient in a few weeks.    PT and OT have been working with the patient as she is diffusely weak now.  She agrees to go to acute rehab.  Patient claims she has numbness and tingling as well as weakness in her lower extremities.  She also has numbness in both hands.  Neurology had been consulted late yesterday.  Await their input on this patient.    Review of Systems:     All systems were reviewed and negative except  for:  Musculoskeletal: positive for  muscle weakness    Objective     Vital Signs:    Temp:  [97.5 °F (36.4 °C)-98.3 °F (36.8 °C)] 98.3 °F (36.8 °C)  Heart Rate:  [84-96] 90  Resp:  [12-20] 16  BP: (134-146)/() 134/92    Physical Exam:    General: Alert and oriented x4, morbidly obese  Heart: Regular rate and rhythm without murmur rub or thrill  Lungs: Clear to auscultation bilaterally without use of accessory muscles respiration  Abdomen: Soft/nontender/nondistended.  No hepatosplenomegaly is noted.  MSK: 4/5 strength in upper/lower extremities bilaterally     Results Review:      I reviewed the patient's new clinical results.    Labs:    Lab Results (last 24 hours)     Procedure Component Value Units Date/Time    POC Glucose Once [[id number]]  (Abnormal) Collected:  04/14/20 1051    Specimen:  Blood Updated:  04/14/20 1053     Glucose 150 mg/dL     Tissue Pathology Exam [[id number]] Collected:  04/14/20 0847    Specimen:  Polyp from Large Intestine, Rectum Updated:  04/14/20 1027    CK [[id number]]  (Normal) Collected:  04/14/20 0347    Specimen:  Blood Updated:  04/14/20 0753     Creatine Kinase 22 U/L     POC Glucose Once [[id number]]  (Normal) Collected:  04/14/20 0614    Specimen:  Blood Updated:  04/14/20 0615     Glucose 120 mg/dL     Ferritin [[id number]]  (Abnormal) Collected:  04/14/20 0347    Specimen:  Blood Updated:  04/14/20 0520     Ferritin 3,216.00 ng/mL     Narrative:       Results may be falsely decreased if patient taking Biotin.      Lactate Dehydrogenase [[id number]]  (Abnormal) Collected:  04/14/20 0347    Specimen:  Blood Updated:  04/14/20 0456      U/L      Comment: Specimen hemolyzed.  Results may be affected.       Comprehensive Metabolic Panel [[id number]]  (Abnormal) Collected:  04/14/20 0347    Specimen:  Blood Updated:  04/14/20 0453     Glucose 145 mg/dL      BUN 17 mg/dL      Creatinine 0.57 mg/dL      Sodium 139 mmol/L      Potassium 4.3 mmol/L      Chloride 97 mmol/L       CO2 28.1 mmol/L      Calcium 9.0 mg/dL      Total Protein 5.4 g/dL      Albumin 3.00 g/dL      ALT (SGPT) 138 U/L      AST (SGOT) 290 U/L      Alkaline Phosphatase 157 U/L      Total Bilirubin 0.6 mg/dL      eGFR Non African Amer 114 mL/min/1.73      Globulin 2.4 gm/dL      A/G Ratio 1.3 g/dL      BUN/Creatinine Ratio 29.8     Anion Gap 13.9 mmol/L     Narrative:       GFR Normal >60  Chronic Kidney Disease <60  Kidney Failure <15      CBC & Differential [[id number]] Collected:  04/14/20 0347    Specimen:  Blood Updated:  04/14/20 0427    Narrative:       The following orders were created for panel order CBC & Differential.  Procedure                               Abnormality         Status                     ---------                               -----------         ------                     CBC Auto Differential[[id number]]        Abnormal            Final result                 Please view results for these tests on the individual orders.    CBC Auto Differential [[id number]]  (Abnormal) Collected:  04/14/20 0347    Specimen:  Blood Updated:  04/14/20 0427     WBC 10.15 10*3/mm3      RBC 3.34 10*6/mm3      Hemoglobin 11.8 g/dL      Hematocrit 34.2 %      .4 fL      MCH 35.3 pg      MCHC 34.5 g/dL      RDW 14.0 %      RDW-SD 51.9 fl      MPV 9.5 fL      Platelets 347 10*3/mm3      Neutrophil % 70.5 %      Lymphocyte % 19.5 %      Monocyte % 7.4 %      Eosinophil % 0.7 %      Basophil % 0.4 %      Immature Grans % 1.5 %      Neutrophils, Absolute 7.16 10*3/mm3      Lymphocytes, Absolute 1.98 10*3/mm3      Monocytes, Absolute 0.75 10*3/mm3      Eosinophils, Absolute 0.07 10*3/mm3      Basophils, Absolute 0.04 10*3/mm3      Immature Grans, Absolute 0.15 10*3/mm3      nRBC 0.5 /100 WBC     POC Glucose Once [[id number]]  (Abnormal) Collected:  04/14/20 0016    Specimen:  Blood Updated:  04/14/20 0017     Glucose 173 mg/dL     POC Glucose Once [[id number]]  (Abnormal) Collected:  04/13/20 2051    Specimen:  Blood  Updated:  04/13/20 2053     Glucose 140 mg/dL     POC Glucose Once [[id number]]  (Abnormal) Collected:  04/13/20 1609    Specimen:  Blood Updated:  04/13/20 1610     Glucose 252 mg/dL     ANCA Panel [[id number]] Collected:  04/09/20 1912    Specimen:  Blood Updated:  04/13/20 1507     Myeloperoxidase Ab <9.0 U/mL      Antiproteinase 3 (UT-3) <3.5 U/mL      C-ANCA <1:20 titer      Comment: Serum is slightly lipemic.        P-ANCA <1:20 titer      Comment: Serum is slightly lipemic.  The presence of positive fluorescence exhibiting P-ANCA or C-ANCA  patterns alone is not specific for the diagnosis of Wegener's  Granulomatosis (WG) or microscopic polyangiitis. Decisions about  treatment should not be based solely on ANCA IFA results.  The  International ANCA Group Consensus recommends follow up testing of  positive sera with both UT-3 and MPO-ANCA enzyme immunoassays. As  many as 5% serum samples are positive only by EIA.  Ref. AM J Clin Pathol 1999;111:507-513.        Atypical pANCA <1:20 titer      Comment: Serum is slightly lipemic.  The atypical pANCA pattern has been observed in a significant  percentage of patients with ulcerative colitis, primary sclerosing  cholangitis and autoimmune hepatitis.       Narrative:       Performed at:  01 - [address]  [id number]  : Jorge Wang MD, Phone:  [phone]  Performed at:  02 - [facility]  [id number]  : Jayjay Gaspar PhD, Phone:  [phone]           Radiology:    Imaging Results (Last 24 Hours)     ** No results found for the last 24 hours. **          Medication Review:       budesonide-formoterol 2 puff Inhalation BID - RT   insulin lispro 0-9 Units Subcutaneous 4x Daily With Meals & Nightly   levothyroxine 200 mcg Oral Daily   pantoprazole 40 mg Oral BID AC   polyethylene glycol 17 g Oral Daily   predniSONE 40 mg Oral Daily With Breakfast   sodium bicarbonate 650 mg  Oral TID   sucralfate 1 g Oral 4x Daily AC & at Bedtime         sodium chloride 30 mL/hr Last Rate: 30 mL/hr (04/14/20 6715)       Assessment/Plan     Problem List Items Addressed This Visit        Respiratory    * (Principal) Dyspnea - Primary    Cough       Digestive    Abnormal CT scan, colon    Overview     Added automatically from request for surgery [id number]         Relevant Orders    Tissue Pathology Exam       Other    Hypomagnesemia    Metabolic acidosis    Fatigue    History of COPD      Other Visit Diagnoses     Cerebral aneurysm rupture (CMS/HCC)        Relevant Orders    XR Chest 1 View          1.  COPD without exacerbation  2.  Intractable cough  3.  Lactic acidosis, improved  4.   fatty liver disease with elevated liver enzymes  5.  Sigmoid Colon Polyp noted, recommend EGD and colonoscopy as an outpatient.  6.  Hypotension, on ProAmatine  7.  Right ovarian cyst, recommend follow-up ultrasound in 6 months  8.  Diffuse weakness, along with paresthesias of the lower extremities.  Neurology consulted.  9.  Elevated inflammatory markers including ferritin, CRP, lactate.    Plan:    Reviewed CT of the chest, abdomen and pelvis.  There were groundglass opacities on the lungs, however COVID-19 testing has been negative x2.  Await neurology input.   Will need follow-up with gastroenterology as well as pulmonology..  Monitor lab work in the a.m.  Further recommendations will depend on clinical course.    Terrell Tinajero DO  04/14/20  13:32

## 2020-04-14 NOTE — CONSULTS
DOS: 2020  NAME: Oralia Sánchez   : 1975  PCP: Provider, No Known  CC: Generalized weakness   Referring MD: Tylor Cruz MD    Neurological Problem and Interval History:  44 y.o. right-handed female with a Hx of Obesity (BMI 47.01), diabetes mellitus, hypothyroidism, obstructive sleep apnea, tobacco abuse, and nonalcoholic steatohepatitis presented to [facility] on  with dry cough, shortness of air, fever and fatigue.  According to the chart patient was self collating at home due to concern for COVID.  She has been tested twice since admission both of which were negative for COVID virus.  She will CT the head was negative for acute findings.  During her admission she has been followed by GI, ID and pulmonary.  Our service was consulted today due to concern for generalized weakness; lower extremities greater than upper extremities.  Patient reports on admission she had generalized weakness but was able to ambulate without any difficulty.  Over the past 1 week she feels as if her weakness in her lower extremities has worsened to the point that she is unable to ambulate to the bathroom and requires bedside commode and lieu of ambulating to the bathroom.  She reports significant pain in bilateral lower extremities soles of feet greater than other portion of lower extremities.  She is very tender to touch.  Reflexes are absent throughout.  Neurologically she is alert oriented and intact.        Past Medical/Surgical Hx:  Past Medical History:   Diagnosis Date   • Aneurysm (CMS/HCC)    • Arthritis    • Carpal tunnel syndrome    • Chest pain    • Diabetes mellitus (CMS/HCC)    • Dysmetabolic syndrome X    • Gestational diabetes mellitus    • Hypothyroidism    • Metabolic disorder    • Nonalcoholic steatohepatitis    • Obesity    • Palpitations    • Sleep apnea    • Spinal headache    • Type 2 diabetes mellitus (CMS/HCC)    • Vitamin D deficiency      Past Surgical History:   Procedure  Laterality Date   • CARPAL TUNNEL RELEASE     • CEREBRAL ANGIOGRAM N/A 3/28/2019    Procedure: DIAGNOSTIC CEREBRAL ANGIOGRAM;  Surgeon: Alexx Barrow MD;  Location: [facility] OR ;  Service: Neurosurgery   • CEREBRAL ANGIOGRAM N/A 10/2/2019    Procedure: CEREBRAL ANGIOGRAM;  Surgeon: Santosh Garza MD;  Location: [facility] OR ;  Service: Interventional Radiology   •  SECTION      x6   • DILATATION AND CURETTAGE      x 2   • EMBOLIZATION CEREBRAL N/A 3/29/2019    Procedure: Cererbal angiogram and embolization of cerebral aneurysm;  Surgeon: Alexx Barrow MD;  Location: [facility] OR ;  Service: Neurosurgery   • MYRINGOTOMY     • TONSILLECTOMY AND ADENOIDECTOMY         Review of Systems:        A complete review of all systems is negative except as described above.    Medications On Admission  Medications Prior to Admission   Medication Sig Dispense Refill Last Dose   • albuterol sulfate  (90 Base) MCG/ACT inhaler Inhale 2 puffs Every 4 (Four) Hours As Needed for Wheezing. 1 inhaler 0 3/31/2020 at Unknown time   • ibuprofen (ADVIL,MOTRIN) 400 MG tablet Take 400 mg by mouth Every 6 (Six) Hours As Needed for Mild Pain .   2020 at Unknown time   • SYNTHROID 200 MCG tablet Take 1 tablet by mouth Daily. 30 tablet 11 3/31/2020 at Unknown time       Allergies:  Allergies   Allergen Reactions   • No Known Drug Allergy        Social Hx:  Social History     Socioeconomic History   • Marital status:      Spouse name: Not on file   • Number of children: Not on file   • Years of education: Not on file   • Highest education level: Not on file   Occupational History   • Occupation: admin   Tobacco Use   • Smoking status: Current Every Day Smoker     Packs/day: 1.50     Types: Cigarettes   • Smokeless tobacco: Never Used   • Tobacco comment: x 10 yrs   Substance and Sexual Activity   • Alcohol use: Yes     Alcohol/week: 1.0 - 2.0 standard drinks     Types: 1 - 2 Shots  "of liquor per week     Comment: 1-2/week   • Drug use: No   • Sexual activity: Defer       Family Hx:  Family History   Problem Relation Age of Onset   • Hypertension Mother    • Alcohol abuse Father    • Heart attack Father         acute MI   • Stroke Father         CVA   • Diabetes Maternal Grandmother    • Diabetes Paternal Grandmother        Review of Imaging (Interpretation of images not reports):  Reviewed chest x-ray, CT of the head and CT of the chest    Laboratory Results:   Lab Results   Component Value Date    GLUCOSE 145 (H) 04/14/2020    CALCIUM 9.0 04/14/2020     04/14/2020    K 4.3 04/14/2020    CO2 28.1 04/14/2020    CL 97 (L) 04/14/2020    BUN 17 04/14/2020    CREATININE 0.57 04/14/2020    EGFRIFAFRI 122 06/11/2019    EGFRIFNONA 114 04/14/2020    BCR 29.8 (H) 04/14/2020    ANIONGAP 13.9 04/14/2020     Lab Results   Component Value Date    WBC 10.15 04/14/2020    HGB 11.8 (L) 04/14/2020    HCT 34.2 04/14/2020    .4 (H) 04/14/2020     04/14/2020     No results found for: CHOL  Lab Results   Component Value Date    HDL 61 (H) 06/11/2019    HDL 59 10/31/2018    HDL 44 02/19/2015     Lab Results   Component Value Date    LDL 88 06/11/2019    LDL 75 10/31/2018    LDL 74 02/19/2015     Lab Results   Component Value Date    TRIG 102 06/11/2019    TRIG 96 10/31/2018    TRIG 112 02/19/2015     Lab Results   Component Value Date    HGBA1C 6.68 (H) 04/01/2020     Lab Results   Component Value Date    INR 0.91 03/27/2019    PROTIME 12.1 03/27/2019         Physical Examination:  /92   Pulse 90   Temp 98.3 °F (36.8 °C) (Oral)   Resp 16   Ht 154.9 cm (61\")   Wt 113 kg (248 lb 12.8 oz)   LMP 03/28/2020   SpO2 96%   BMI 47.01 kg/m²   General Appearance:   Well developed, well nourished, well groomed, alert, and cooperative.  Obese (BMI of 47.01)  HEENT: Normocephalic.    Neck and Spine: Normal range of motion.  Normal alignment. No mass or tenderness. No bruits.  Cardiac: Regular " rate and rhythm. No murmurs.  Peripheral Vasculature: Radial and pedal pulses are equal and symmetric. Extremities:    No edema or deformities. Normal joint ROM.   Skin:    No rashes or birth marks.    Neurological examination:  Higher Integrative  Function: Oriented to time, place and person. Normal registration, recall, attention span and concentration. Normal language including comprehension, spontaneous speech, repetition, reading, writing, naming and vocabulary. No neglect with normal visual-spatial function and construction. Normal fund of knowledge and higher integrative function.  CN II: Pupils are equal, round, and reactive to light. Normal visual acuity and visual fields.    CN III IV VI: Extraocular movements are full without nystagmus.   CN V: Normal facial sensation and strength of muscles of mastication.  CN VII: Facial movements are symmetric. No weakness.  CN VIII:   Auditory acuity is normal.  CN IX & X:   Symmetric palatal movement.  CN XI: Sternocleidomastoid and trapezius are normal.  No weakness.  CN XII:   The tongue is midline.  No atrophy or fasciculations.  Motor: Normal muscle strength, bulk and tone in upper and lower extremities.  No fasciculations, rigidity, spasticity, or abnormal movements.  Reflexes: Absent throughout  Sensation: Normal to light touch, pinprick, vibration, temperature, and proprioception in arms and legs.  Decreased sensation to light touch and temperature in torso.   Station and Gait: Declined by patient  Coordination: Finger to nose test shows no dysmetria.  Rapid alternating movements are normal.  Heel to shin normal.  NIHSS: 0    Diagnoses / Discussion:  44 y.o. female with history of Obesity (BMI 47.01), diabetes mellitus, hypothyroidism, obstructive sleep apnea, tobacco abuse, and nonalcoholic steatohepatitis presented to [facility] on 4/1 with dry cough, shortness of air, fever and fatigue. Our service was consulted d/t generalized weakness LEs >  UEs. Exam reveals absence of reflexes throughout therefore [place] Barré should be excluded.  Other differential possible neuropathy versus myopathy in the setting of illness with high-dose steroids.  Will obtain lumbar puncture to further evaluate.  In the interim, we will plan to add Lyrica 75 mg twice daily to see if this assist with neuropathic appearing pain syndrome.  Given degree of weakness would recommend inpatient rehab to help with muscle strengthening and lieu of discharge to home.  PT/OT/ST. CCP to assist with discharge planning. Call RRT for any acute neurological changes and/or concerns. We will continue to follow and advise.       Plan:  · Lyrica 75 mg twice daily  · Lumbar puncture to further evaluate etiology of lower extremity weakness and absence of reflexes.  · Recommend outpatient EMG in the future    Patient seen in conjunction with Dr. Vinay De Los Santos and he agrees with plan      ADOLPH Fletcher

## 2020-04-14 NOTE — PROGRESS NOTES
Patient's flexible sigmoidoscopy revealed evidence of a 4 mm polyp in the rectum which was removed as well as small internal hemorrhoids.  There was no infiltrative process in the rectum or mass present.  Recommend the patient undergo her EGD and colonoscopy as planned as an outpatient in a few weeks when she is more stable for bowel prep etc.  Please feel free to call us back for any further questions.  We will follow-up on the results of the polyp when pathology results available but this was certainly a benign polyp.

## 2020-04-15 ENCOUNTER — APPOINTMENT (OUTPATIENT)
Dept: GENERAL RADIOLOGY | Facility: HOSPITAL | Age: 45
End: 2020-04-15

## 2020-04-15 ENCOUNTER — TELEPHONE (OUTPATIENT)
Dept: GASTROENTEROLOGY | Facility: CLINIC | Age: 45
End: 2020-04-15

## 2020-04-15 LAB
ALBUMIN SERPL-MCNC: 2.8 G/DL (ref 3.5–5.2)
ANION GAP SERPL CALCULATED.3IONS-SCNC: 14.9 MMOL/L (ref 5–15)
APPEARANCE CSF: CLEAR
APPEARANCE CSF: CLEAR
BASOPHILS # BLD AUTO: 0.04 10*3/MM3 (ref 0–0.2)
BASOPHILS NFR BLD AUTO: 0.4 % (ref 0–1.5)
BUN BLD-MCNC: 16 MG/DL (ref 6–20)
BUN/CREAT SERPL: 30.2 (ref 7–25)
CALCIUM SPEC-SCNC: 9.1 MG/DL (ref 8.6–10.5)
CHLORIDE SERPL-SCNC: 94 MMOL/L (ref 98–107)
CHOLEST SERPL-MCNC: 218 MG/DL (ref 0–200)
CO2 SERPL-SCNC: 27.1 MMOL/L (ref 22–29)
COLOR CSF: COLORLESS
COLOR CSF: COLORLESS
CREAT BLD-MCNC: 0.53 MG/DL (ref 0.57–1)
DEPRECATED RDW RBC AUTO: 55.4 FL (ref 37–54)
EOSINOPHIL # BLD AUTO: 0.08 10*3/MM3 (ref 0–0.4)
EOSINOPHIL NFR BLD AUTO: 0.8 % (ref 0.3–6.2)
ERYTHROCYTE [DISTWIDTH] IN BLOOD BY AUTOMATED COUNT: 14.7 % (ref 12.3–15.4)
GFR SERPL CREATININE-BSD FRML MDRD: 124 ML/MIN/1.73
GLUCOSE BLD-MCNC: 124 MG/DL (ref 65–99)
GLUCOSE BLDC GLUCOMTR-MCNC: 123 MG/DL (ref 70–130)
GLUCOSE BLDC GLUCOMTR-MCNC: 145 MG/DL (ref 70–130)
GLUCOSE BLDC GLUCOMTR-MCNC: 164 MG/DL (ref 70–130)
GLUCOSE BLDC GLUCOMTR-MCNC: 258 MG/DL (ref 70–130)
GLUCOSE BLDC GLUCOMTR-MCNC: 289 MG/DL (ref 70–130)
GLUCOSE BLDC GLUCOMTR-MCNC: 83 MG/DL (ref 70–130)
GLUCOSE CSF-MCNC: 79 MG/DL (ref 40–70)
HCT VFR BLD AUTO: 36.2 % (ref 34–46.6)
HDLC SERPL-MCNC: 44 MG/DL (ref 40–60)
HGB BLD-MCNC: 12.8 G/DL (ref 12–15.9)
HOLD SPECIMEN: NORMAL
IMM GRANULOCYTES # BLD AUTO: 0.15 10*3/MM3 (ref 0–0.05)
IMM GRANULOCYTES NFR BLD AUTO: 1.4 % (ref 0–0.5)
LAB AP CASE REPORT: NORMAL
LDLC SERPL CALC-MCNC: 119 MG/DL (ref 0–100)
LDLC/HDLC SERPL: 2.7 {RATIO}
LYMPHOCYTES # BLD AUTO: 1.8 10*3/MM3 (ref 0.7–3.1)
LYMPHOCYTES NFR BLD AUTO: 17 % (ref 19.6–45.3)
MAGNESIUM SERPL-MCNC: 2 MG/DL (ref 1.6–2.6)
MCH RBC QN AUTO: 36.7 PG (ref 26.6–33)
MCHC RBC AUTO-ENTMCNC: 35.4 G/DL (ref 31.5–35.7)
MCV RBC AUTO: 103.7 FL (ref 79–97)
METHOD: ABNORMAL
METHOD: NORMAL
MONOCYTES # BLD AUTO: 0.57 10*3/MM3 (ref 0.1–0.9)
MONOCYTES NFR BLD AUTO: 5.4 % (ref 5–12)
NEUTROPHILS # BLD AUTO: 7.94 10*3/MM3 (ref 1.7–7)
NEUTROPHILS NFR BLD AUTO: 75 % (ref 42.7–76)
NRBC BLD AUTO-RTO: 0.3 /100 WBC (ref 0–0.2)
NUC CELL # CSF MANUAL: 1 /MM3 (ref 0–5)
NUC CELL # CSF MANUAL: 2 /MM3 (ref 0–5)
PATH REPORT.FINAL DX SPEC: NORMAL
PATH REPORT.GROSS SPEC: NORMAL
PHOSPHATE SERPL-MCNC: 4.1 MG/DL (ref 2.5–4.5)
PLATELET # BLD AUTO: 319 10*3/MM3 (ref 140–450)
PMV BLD AUTO: 9.3 FL (ref 6–12)
POTASSIUM BLD-SCNC: 4.3 MMOL/L (ref 3.5–5.2)
PROT CSF-MCNC: 83 MG/DL (ref 15–45)
RBC # BLD AUTO: 3.49 10*6/MM3 (ref 3.77–5.28)
RBC # CSF MANUAL: 0 /MM3 (ref 0–0)
RBC # CSF MANUAL: 7 /MM3 (ref 0–0)
RPR SER QL: NORMAL
SODIUM BLD-SCNC: 136 MMOL/L (ref 136–145)
T4 FREE SERPL-MCNC: 1.09 NG/DL (ref 0.93–1.7)
TRIGL SERPL-MCNC: 277 MG/DL (ref 0–150)
TSH SERPL DL<=0.05 MIU/L-ACNC: 18.1 UIU/ML (ref 0.27–4.2)
TUBE # CSF: 1
TUBE # CSF: 4
VIT B12 BLD-MCNC: 651 PG/ML (ref 211–946)
VLDLC SERPL-MCNC: 55.4 MG/DL (ref 5–40)
WBC NRBC COR # BLD: 10.58 10*3/MM3 (ref 3.4–10.8)

## 2020-04-15 PROCEDURE — 94799 UNLISTED PULMONARY SVC/PX: CPT

## 2020-04-15 PROCEDURE — 63710000001 INSULIN LISPRO (HUMAN) PER 5 UNITS: Performed by: HOSPITALIST

## 2020-04-15 PROCEDURE — 85025 COMPLETE CBC W/AUTO DIFF WBC: CPT | Performed by: INTERNAL MEDICINE

## 2020-04-15 PROCEDURE — B01B1ZZ FLUOROSCOPY OF SPINAL CORD USING LOW OSMOLAR CONTRAST: ICD-10-PCS | Performed by: RADIOLOGY

## 2020-04-15 PROCEDURE — 99232 SBSQ HOSP IP/OBS MODERATE 35: CPT | Performed by: NURSE PRACTITIONER

## 2020-04-15 PROCEDURE — 25010000003 LIDOCAINE 1 % SOLUTION: Performed by: INTERNAL MEDICINE

## 2020-04-15 PROCEDURE — 84443 ASSAY THYROID STIM HORMONE: CPT | Performed by: NURSE PRACTITIONER

## 2020-04-15 PROCEDURE — 83825 ASSAY OF MERCURY: CPT | Performed by: NURSE PRACTITIONER

## 2020-04-15 PROCEDURE — 99232 SBSQ HOSP IP/OBS MODERATE 35: CPT | Performed by: INTERNAL MEDICINE

## 2020-04-15 PROCEDURE — 80061 LIPID PANEL: CPT | Performed by: NURSE PRACTITIONER

## 2020-04-15 PROCEDURE — 87015 SPECIMEN INFECT AGNT CONCNTJ: CPT | Performed by: NURSE PRACTITIONER

## 2020-04-15 PROCEDURE — 86592 SYPHILIS TEST NON-TREP QUAL: CPT | Performed by: NURSE PRACTITIONER

## 2020-04-15 PROCEDURE — 84155 ASSAY OF PROTEIN SERUM: CPT | Performed by: NURSE PRACTITIONER

## 2020-04-15 PROCEDURE — 83916 OLIGOCLONAL BANDS: CPT | Performed by: NURSE PRACTITIONER

## 2020-04-15 PROCEDURE — 89050 BODY FLUID CELL COUNT: CPT | Performed by: NURSE PRACTITIONER

## 2020-04-15 PROCEDURE — 82607 VITAMIN B-12: CPT | Performed by: NURSE PRACTITIONER

## 2020-04-15 PROCEDURE — 82595 ASSAY OF CRYOGLOBULIN: CPT | Performed by: NURSE PRACTITIONER

## 2020-04-15 PROCEDURE — 87205 SMEAR GRAM STAIN: CPT | Performed by: NURSE PRACTITIONER

## 2020-04-15 PROCEDURE — 009U3ZX DRAINAGE OF SPINAL CANAL, PERCUTANEOUS APPROACH, DIAGNOSTIC: ICD-10-PCS | Performed by: RADIOLOGY

## 2020-04-15 PROCEDURE — 82945 GLUCOSE OTHER FLUID: CPT | Performed by: NURSE PRACTITIONER

## 2020-04-15 PROCEDURE — 84165 PROTEIN E-PHORESIS SERUM: CPT | Performed by: NURSE PRACTITIONER

## 2020-04-15 PROCEDURE — 97535 SELF CARE MNGMENT TRAINING: CPT | Performed by: OCCUPATIONAL THERAPIST

## 2020-04-15 PROCEDURE — 82175 ASSAY OF ARSENIC: CPT | Performed by: NURSE PRACTITIONER

## 2020-04-15 PROCEDURE — 84439 ASSAY OF FREE THYROXINE: CPT | Performed by: NURSE PRACTITIONER

## 2020-04-15 PROCEDURE — 80069 RENAL FUNCTION PANEL: CPT | Performed by: INTERNAL MEDICINE

## 2020-04-15 PROCEDURE — 63710000001 PREDNISONE PER 1 MG: Performed by: INTERNAL MEDICINE

## 2020-04-15 PROCEDURE — 83655 ASSAY OF LEAD: CPT | Performed by: NURSE PRACTITIONER

## 2020-04-15 PROCEDURE — 84157 ASSAY OF PROTEIN OTHER: CPT | Performed by: NURSE PRACTITIONER

## 2020-04-15 PROCEDURE — 87070 CULTURE OTHR SPECIMN AEROBIC: CPT | Performed by: NURSE PRACTITIONER

## 2020-04-15 PROCEDURE — 82525 ASSAY OF COPPER: CPT | Performed by: NURSE PRACTITIONER

## 2020-04-15 PROCEDURE — 82962 GLUCOSE BLOOD TEST: CPT

## 2020-04-15 PROCEDURE — 83735 ASSAY OF MAGNESIUM: CPT | Performed by: INTERNAL MEDICINE

## 2020-04-15 RX ORDER — LIDOCAINE HYDROCHLORIDE 10 MG/ML
10 INJECTION, SOLUTION INFILTRATION; PERINEURAL ONCE
Status: COMPLETED | OUTPATIENT
Start: 2020-04-15 | End: 2020-04-15

## 2020-04-15 RX ADMIN — INSULIN LISPRO 2 UNITS: 100 INJECTION, SOLUTION INTRAVENOUS; SUBCUTANEOUS at 12:10

## 2020-04-15 RX ADMIN — SODIUM BICARBONATE 650 MG: 650 TABLET ORAL at 17:41

## 2020-04-15 RX ADMIN — INSULIN LISPRO 6 UNITS: 100 INJECTION, SOLUTION INTRAVENOUS; SUBCUTANEOUS at 17:59

## 2020-04-15 RX ADMIN — HYDROCODONE BITARTRATE AND ACETAMINOPHEN 1 TABLET: 5; 325 TABLET ORAL at 17:51

## 2020-04-15 RX ADMIN — PREDNISONE 40 MG: 20 TABLET ORAL at 10:10

## 2020-04-15 RX ADMIN — SODIUM BICARBONATE 650 MG: 650 TABLET ORAL at 20:19

## 2020-04-15 RX ADMIN — LIDOCAINE HYDROCHLORIDE 3 ML: 10 INJECTION, SOLUTION INFILTRATION; PERINEURAL at 15:00

## 2020-04-15 RX ADMIN — BUDESONIDE AND FORMOTEROL FUMARATE DIHYDRATE 2 PUFF: 160; 4.5 AEROSOL RESPIRATORY (INHALATION) at 08:20

## 2020-04-15 RX ADMIN — SODIUM BICARBONATE 650 MG: 650 TABLET ORAL at 10:10

## 2020-04-15 RX ADMIN — HYDROCODONE BITARTRATE AND ACETAMINOPHEN 1 TABLET: 5; 325 TABLET ORAL at 10:10

## 2020-04-15 RX ADMIN — SUCRALFATE 1 G: 1 SUSPENSION ORAL at 17:41

## 2020-04-15 RX ADMIN — PANTOPRAZOLE SODIUM 40 MG: 40 TABLET, DELAYED RELEASE ORAL at 17:42

## 2020-04-15 RX ADMIN — PANTOPRAZOLE SODIUM 40 MG: 40 TABLET, DELAYED RELEASE ORAL at 05:46

## 2020-04-15 RX ADMIN — SUCRALFATE 1 G: 1 SUSPENSION ORAL at 05:44

## 2020-04-15 RX ADMIN — LEVOTHYROXINE SODIUM 200 MCG: 100 TABLET ORAL at 05:45

## 2020-04-15 RX ADMIN — BUDESONIDE AND FORMOTEROL FUMARATE DIHYDRATE 2 PUFF: 160; 4.5 AEROSOL RESPIRATORY (INHALATION) at 20:51

## 2020-04-15 RX ADMIN — SUCRALFATE 1 G: 1 SUSPENSION ORAL at 12:10

## 2020-04-15 RX ADMIN — PREGABALIN 75 MG: 75 CAPSULE ORAL at 20:20

## 2020-04-15 RX ADMIN — SUCRALFATE 1 G: 1 SUSPENSION ORAL at 20:19

## 2020-04-15 RX ADMIN — PREGABALIN 75 MG: 75 CAPSULE ORAL at 10:10

## 2020-04-15 NOTE — PROGRESS NOTES
"DOS: 4/15/2020  NAME: Oralia Sánchez   : 1975  PCP: Provider, No Known  Chief Complaint   Patient presents with   • Shortness of Breath         Subjective: No events overnight. Patient report continued decrease core  Sensation. ROM of LEs improved w/ the initiation of Lyrica.    Objective:  Vital signs: /100 (BP Location: Left arm, Patient Position: Lying)   Pulse 83   Temp 98.1 °F (36.7 °C) (Oral)   Resp 16   Ht 154.9 cm (61\")   Wt 112 kg (247 lb 3.2 oz)   LMP 2020   SpO2 96%   BMI 46.71 kg/m²       Neurological examination:  Higher Integrative  Function:        Oriented to time, place and person. Normal registration, recall, attention span and concentration. Normal language including comprehension, spontaneous speech, repetition, reading, writing, naming and vocabulary. No neglect with normal visual-spatial function and construction. Normal fund of knowledge and higher integrative function.  CN II:    Pupils are equal, round, and reactive to light. Normal visual acuity and visual fields.    CN III IV VI:      Extraocular movements are full without nystagmus.   CN V:   Normal facial sensation and strength of muscles of mastication.  CN VII:             Facial movements are symmetric. No weakness.  CN VIII:                                   Auditory acuity is normal.  CN IX & X:                              Symmetric palatal movement.  CN XI:  Sternocleidomastoid and trapezius are normal.  No weakness.  CN XII:                                    The tongue is midline.  No atrophy or fasciculations.  Motor:  Normal muscle strength, bulk and tone in upper and lower extremities.  No fasciculations, rigidity, spasticity, or abnormal movements.  Reflexes:         Absent throughout  Sensation:       Normal to light touch, pinprick, vibration, temperature, and proprioception in arms and legs.  Decreased sensation to light touch and temperature in torso.   Station and Gait:        Declined by " patient  Coordination:             Finger to nose test shows no dysmetria.  Rapid alternating movements are normal.  Heel to shin normal.  NIHSS: 0    Laboratory results:  Lab Results   Component Value Date    GLUCOSE 124 (H) 04/15/2020    CALCIUM 9.1 04/15/2020     04/15/2020    K 4.3 04/15/2020    CO2 27.1 04/15/2020    CL 94 (L) 04/15/2020    BUN 16 04/15/2020    CREATININE 0.53 (L) 04/15/2020    EGFRIFAFRI 122 06/11/2019    EGFRIFNONA 124 04/15/2020    BCR 30.2 (H) 04/15/2020    ANIONGAP 14.9 04/15/2020     Lab Results   Component Value Date    WBC 10.58 04/15/2020    HGB 12.8 04/15/2020    HCT 36.2 04/15/2020    .7 (H) 04/15/2020     04/15/2020     Lab Results   Component Value Date    CHOL 218 (H) 04/15/2020     Lab Results   Component Value Date    HDL 44 04/15/2020    HDL 61 (H) 06/11/2019    HDL 59 10/31/2018     Lab Results   Component Value Date     (H) 04/15/2020    LDL 88 06/11/2019    LDL 75 10/31/2018     Lab Results   Component Value Date    TRIG 277 (H) 04/15/2020    TRIG 102 06/11/2019    TRIG 96 10/31/2018     Lab Results   Component Value Date    TSH 18.100 (H) 04/15/2020     Lab Results   Component Value Date    EQVETOOM41 651 04/15/2020     Lab Results   Component Value Date    HGBA1C 6.68 (H) 04/01/2020     Lab Results   Component Value Date    CHOL 218 (H) 04/15/2020    CHLPL 169 06/11/2019    CHLPL 153 10/31/2018    CHLPL 141 02/19/2015     Lab Results   Component Value Date    TRIG 277 (H) 04/15/2020    TRIG 102 06/11/2019    TRIG 96 10/31/2018     Lab Results   Component Value Date    HDL 44 04/15/2020    HDL 61 (H) 06/11/2019    HDL 59 10/31/2018     Lab Results   Component Value Date     (H) 04/15/2020    LDL 88 06/11/2019    LDL 75 10/31/2018       Review and interpretation of imaging:  No new imaging reviewed today.    Impression:44 y.o. female with history of Obesity (BMI 47.01), diabetes mellitus, hypothyroidism, obstructive sleep apnea, tobacco  abuse, and nonalcoholic steatohepatitis presented to [facility] on 4/1 with dry cough, shortness of air, fever and fatigue. Our service was consulted d/t generalized weakness LEs > UEs. Exam reveals absence of reflexes throughout therefore [place] Barré should be excluded.  Other differential possible neuropathy versus myopathy in the setting of illness with high-dose steroids.     Neurologically unchanged. LP pending at time of exam. Patient reported mild improvement in LE discomfort w/ more ROM of LEs since starting Lyrica. Treatable cause peripheral neuropathy labs ordered. iven degree of weakness would recommend inpatient rehab to help with muscle strengthening and lieu of discharge to home.  PT/OT/ST. CCP to assist with discharge planning. Call RRT for any acute neurological changes and/or concerns. We will continue to follow and advise.           Plan:  · Lyrica 75 mg twice daily  · Lumbar puncture to further evaluate etiology of lower extremity weakness and absence of reflexes.  · Treatable cause peripheral neuropathy labs (pending)   · Recommend outpatient EMG in the future     Patient seen in conjunction with Dr. Vinay Bolanos 04/15 and he agrees with plan above.         ADOLPH Fletcher

## 2020-04-15 NOTE — THERAPY TREATMENT NOTE
Acute Care - Occupational Therapy Treatment Note  [facility]     Patient Name: Oralia Sánchez  : 1975  MRN: [id number]  Today's Date: 4/15/2020             Admit Date: 2020       ICD-10-CM ICD-9-CM   1. Dyspnea, unspecified type R06.00 786.09   2. Metabolic acidosis E87.2 276.2   3. Cough R05 786.2   4. Fatigue, unspecified type R53.83 780.79   5. History of COPD Z87.09 V12.69   6. Hypomagnesemia E83.42 275.2   7. Cerebral aneurysm rupture (CMS/HCC) I60.7 430   8. Abnormal CT scan, colon R93.3 793.4     Patient Active Problem List   Diagnosis   • Vitamin D deficiency   • Awareness of heartbeats   • Adiposity   • YOUNG (nonalcoholic steatohepatitis)   • Hypothyroid   • Diabetes mellitus arising in pregnancy   • Diabetes (CMS/HCC)   • Metabolic syndrome   • Chest pain   • Primary thunderclap headache   • Elevated LFTs   • Tobacco abuse   • Rheumatoid arthritis (CMS/HCC)   • Type 2 diabetes mellitus (CMS/HCC)   • Morbid obesity (CMS/HCC)   • UTI (urinary tract infection), bacterial   • Cerebral aneurysm   • Dyspnea   • Cough   • Hypomagnesemia   • Metabolic acidosis   • Fatigue   • History of COPD   • Abnormal CT scan, colon     Past Medical History:   Diagnosis Date   • Aneurysm (CMS/HCC)    • Arthritis    • Carpal tunnel syndrome    • Chest pain    • Diabetes mellitus (CMS/HCC)    • Dysmetabolic syndrome X    • Gestational diabetes mellitus    • Hypothyroidism    • Metabolic disorder    • Nonalcoholic steatohepatitis    • Obesity    • Palpitations    • Sleep apnea    • Spinal headache    • Type 2 diabetes mellitus (CMS/HCC)    • Vitamin D deficiency      Past Surgical History:   Procedure Laterality Date   • CARPAL TUNNEL RELEASE     • CEREBRAL ANGIOGRAM N/A 3/28/2019    Procedure: DIAGNOSTIC CEREBRAL ANGIOGRAM;  Surgeon: Alexx Barrow MD;  Location: [facility] OR ;  Service: Neurosurgery   • CEREBRAL ANGIOGRAM N/A 10/2/2019    Procedure: CEREBRAL ANGIOGRAM;  Surgeon: Santosh Garza  MD;  Location: [facility] ;  Service: Interventional Radiology   •  SECTION      x6   • DILATATION AND CURETTAGE      x 2   • EMBOLIZATION CEREBRAL N/A 3/29/2019    Procedure: Cererbal angiogram and embolization of cerebral aneurysm;  Surgeon: Alexx Barrow MD;  Location: [facility] ;  Service: Neurosurgery   • MYRINGOTOMY     • TONSILLECTOMY AND ADENOIDECTOMY         Therapy Treatment    Rehabilitation Treatment Summary     Row Name 04/15/20 1152             Treatment Time/Intention    Discipline  occupational therapist  -SG      Document Type  therapy note (daily note)  -SG      Subjective Information  no complaints  -SG      Mode of Treatment  individual therapy;occupational therapy  -SG      Patient/Family Observations  Pt attempting to get OOB when OT enters room  -SG      Patient Effort  good  -SG      Existing Precautions/Restrictions  fall  -SG      Recorded by [SG] Peggy Burch OT 04/15/20 1158      Row Name 04/15/20 1152             Vital Signs    O2 Delivery Pre Treatment  room air  -SG      Recorded by [SG] Peggy Burch OT 04/15/20 1158      Row Name 04/15/20 1152             Cognitive Assessment/Intervention- PT/OT    Orientation Status (Cognition)  oriented x 3  -SG      Safety Deficit (Cognitive)  mild deficit  -SG      Cognitive Assessment/Intervention Comment  Slightly impulsive  -SG      Recorded by [SG] Peggy Burch OTR 04/15/20 1158      Row Name 04/15/20 1152             Bed Mobility Assessment/Treatment    Bed Mobility Assessment/Treatment  supine-sit;sit-supine  -SG      Supine-Sit [place] (Bed Mobility)  supervision  -SG      Sit-Supine [place] (Bed Mobility)  supervision;contact guard  -SG      Recorded by [SG] Peggy Burch OTR 04/15/20 1158      Row Name 04/15/20 1152             Functional Mobility    Functional Mobility- Ind. Level  minimum assist (75% patient effort);2 person assist required  -SG      Functional Mobility-  Device  rolling walker  -SG      Functional Mobility- Comment  Walks to sink, back to bed  -SG      Recorded by [SG] Peggy Burch OTR 04/15/20 1158      Row Name 04/15/20 1152             Transfer Assessment/Treatment    Transfer Assessment/Treatment  sit-stand transfer;stand-sit transfer;toilet transfer  -SG      Recorded by [SG] Peggy Burch OTR 04/15/20 1158      Row Name 04/15/20 1152             Sit-Stand Transfer    Sit-Stand [place] (Transfers)  minimum assist (75% patient effort);2 person assist  -SG      Assistive Device (Sit-Stand Transfers)  walker, front-wheeled  -SG      Recorded by [SG] Peggy Burch OTR 04/15/20 1158      Row Name 04/15/20 1152             Stand-Sit Transfer    Stand-Sit [place] (Transfers)  minimum assist (75% patient effort);2 person assist;verbal cues  -SG      Assistive Device (Stand-Sit Transfers)  walker, front-wheeled  -SG      Recorded by [SG] Peggy Burch OTR 04/15/20 1158      Row Name 04/15/20 1152             Toilet Transfer    Type (Toilet Transfer)  stand pivot/stand step  -SG      [place] Level (Toilet Transfer)  minimum assist (75% patient effort);verbal cues;2 person assist  -SG      Assistive Device (Toilet Transfer)  commode, bedside without drop arms;walker, front-wheeled  -SG      Recorded by [SG] Peggy Burch OTR 04/15/20 1158      Row Name 04/15/20 1152             ADL Assessment/Intervention    BADL Assessment/Intervention  grooming;toileting  -SG      Recorded by [SG] Peggy Burch OTR 04/15/20 1158      Row Name 04/15/20 1152             Grooming Assessment/Training    [place] Level (Grooming)  grooming skills;oral care regimen;wash face, hands;contact guard assist;verbal cues  -SG      Grooming Position  sink side;supported standing  -SG      Comment (Grooming)  Pt leans over onto sink throughout ADL  -SG      Recorded by [SG] Peggy Burch OTR 04/15/20 1158      Row Name 04/15/20 1152             Toileting  Assessment/Training    [place] Level (Toileting)  minimum assist (75% patient effort);verbal cues;nonverbal cues (demo/gesture)  -      Assistive Devices (Toileting)  commode, bedside without drop arms  -      Toileting Position  supported sitting;supported standing  -      Recorded by [] Peggy Burch, OTR 04/15/20 1158      Row Name 04/15/20 1152             Positioning and Restraints    Pre-Treatment Position  in bed  -SG      Post Treatment Position  bed  -SG      In Bed  supine;call light within reach;encouraged to call for assist;exit alarm on;with nsg  -SG      Recorded by [] Peggy Burch, OTR 04/15/20 1158      Row Name 04/15/20 1152             Pain Scale: Numbers Pre/Post-Treatment    Pain Scale: Numbers, Pretreatment  0/10 - no pain  -      Recorded by [] Peggy Burch, OTR 04/15/20 1158        User Key  (r) = Recorded By, (t) = Taken By, (c) = Cosigned By    Initials Name Effective Dates Discipline     Peggy Burch, OTR 12/26/18 -  OT                 OT Recommendation and Plan     Outcome Summary: Pt improving with OT, able to pivot to [facility] min Ax2, also able to walk to sink and perform grooming tasks in standing position. She leans through forearms throughout ADL at sink due to weakness.  Outcome Measures     Row Name 04/15/20 1100 04/14/20 1300 04/13/20 1000       How much help from another person do you currently need...    Turning from your back to your side while in flat bed without using bedrails?  --  4  -LH  --    Moving from lying on back to sitting on the side of a flat bed without bedrails?  --  4  -LH  --    Moving to and from a bed to a chair (including a wheelchair)?  --  3  -LH  --    Standing up from a chair using your arms (e.g., wheelchair, bedside chair)?  --  2  -LH  --    Climbing 3-5 steps with a railing?  --  1  -LH  --    To walk in hospital room?  --  2  -LH  --    AM-PAC 6 Clicks Score (PT)  --  16  -LH  --       How much help from another is  currently needed...    Putting on and taking off regular lower body clothing?  2  -SG  --  2  -LE    Bathing (including washing, rinsing, and drying)  2  -SG  --  2  -LE    Toileting (which includes using toilet bed pan or urinal)  2  -SG  --  2  -LE    Putting on and taking off regular upper body clothing  3  -SG  --  1  -LE    Taking care of personal grooming (such as brushing teeth)  3  -SG  --  3  -LE    Eating meals  3  -SG  --  4  -LE    AM-PAC 6 Clicks Score (OT)  15  -SG  --  14  -LE       Functional Assessment    Outcome Measure Options  AM-PAC 6 Clicks Daily Activity (OT)  -SG  AM-PAC 6 Clicks Basic Mobility (PT)  -  AM-PAC 6 Clicks Daily Activity (OT)  -LE      User Key  (r) = Recorded By, (t) = Taken By, (c) = Cosigned By    Initials Name Provider Type    Preeti Sandy, OTR Occupational Therapist    Peggy Shannon, OTR Occupational Therapist     Preeti Lowery, PT Physical Therapist           Time Calculation:   Time Calculation- OT     Row Name 04/15/20 1159             Time Calculation- OT    OT Start Time  0945  -      OT Stop Time  1008  -      OT Time Calculation (min)  23 min  -      Total Timed Code Minutes- OT  23 minute(s)  -      OT Received On  04/15/20  -      OT - Next Appointment  04/17/20  -        User Key  (r) = Recorded By, (t) = Taken By, (c) = Cosigned By    Initials Name Provider Type     Peggy Burch OTR Occupational Therapist        Therapy Charges for Today     Code Description Service Date Service Provider Modifiers Qty    [id number]  OT SELF CARE/MGMT/TRAIN EA 15 MIN 4/15/2020 Peggy Burch OTR GO 2               SANDRA Valencia  4/15/2020

## 2020-04-15 NOTE — DISCHARGE PLACEMENT REQUEST
"Oralia Smith (44 y.o. Female)     Date of Birth Social Security Number Address Home Phone MRN    1975  [id number] [facility] [address] [phone] [id number]    Latter-day Marital Status          Sabianist        Admission Date Admission Type Admitting Provider Attending Provider Department, Room/Bed    4/1/20 Emergency Tylor Cruz MD Hinsberg, Francis J, DO [address], [address]/1    Discharge Date Discharge Disposition Discharge Destination                       Attending Provider:  Terrell Tinajero DO    Allergies:  No Known Drug Allergy    Isolation:  None   Infection:  None   Code Status:  CPR    Ht:  154.9 cm (61\")   Wt:  112 kg (247 lb 3.2 oz)    Admission Cmt:  None   Principal Problem:  Dyspnea [R06.00]                 Active Insurance as of 4/1/2020     Primary Coverage     Payor Plan Insurance Group Employer/Plan Group    ANTHEM BLUE CROSS ANTHEM BLUE CROSS BLUE SHIELD PPO [id number]     Payor Plan Address Payor Plan Phone Number Payor Plan Fax Number Effective Dates    [address] [phone]  1/1/2018 - None Entered    [facility] [id number]       Subscriber Name Subscriber Birth Date Member ID       ORALIA SMITH 1975 [id number]                 Emergency Contacts      (Rel.) Home Phone Work Phone Mobile Phone    LUCRECIA SMITH (Son) -- -- [phone]    scott miller -- -- [phone]              "

## 2020-04-15 NOTE — PLAN OF CARE
Problem: Patient Care Overview  Goal: Plan of Care Review  Outcome: Ongoing (interventions implemented as appropriate)  Flowsheets (Taken 4/15/2020 0404)  Progress: no change  Plan of Care Reviewed With: patient  Note:   Pt to have LP today per neurology order; 2L N/C placed on pt while sleeping; Will continue to monitor     Problem: Fall Risk (Adult)  Goal: Absence of Fall  Outcome: Ongoing (interventions implemented as appropriate)     Problem: Skin Injury Risk (Adult)  Goal: Skin Health and Integrity  Outcome: Ongoing (interventions implemented as appropriate)     Problem: Nausea/Vomiting (Adult)  Goal: Symptom Relief  Outcome: Ongoing (interventions implemented as appropriate)     Problem: Infection, Risk/Actual (Adult)  Goal: Infection Prevention/Resolution  Outcome: Ongoing (interventions implemented as appropriate)     Problem: Diabetes, Type 1 (Adult)  Goal: Signs and Symptoms of Listed Potential Problems Will be Absent, Minimized or Managed (Diabetes, Type 1)  Outcome: Ongoing (interventions implemented as appropriate)

## 2020-04-15 NOTE — PLAN OF CARE
Problem: Patient Care Overview  Goal: Plan of Care Review  Flowsheets (Taken 4/15/2020 5625)  Progress: improving  Outcome Summary: Pt improving with OT, able to pivot to BSC min Ax2, also able to walk to sink and perform grooming tasks in standing position. She leans through forearms throughout ADL at sink due to weakness.

## 2020-04-15 NOTE — PROGRESS NOTES
Continued Stay Note  [facility]     Patient Name: Oralia Sánchez  MRN: [id number]  Today's Date: 4/15/2020    Admit Date: 4/1/2020    Discharge Plan     Row Name 04/15/20 1129       Plan    Plan  Acute rehab vs subacute    Patient/Family in Agreement with Plan  yes    Plan Comments  Pt will need rehab at [facility].  [facility] following, await determination.  Didscussed with pt who states that if she is not accepted to [facility] would want to go to [facility] for rehab.  Referral placed in [facility] and notified Tsering/Nicola.  WILLY Thurston RN        Discharge Codes    No documentation.             Cherry Thurston, RN

## 2020-04-15 NOTE — H&P (VIEW-ONLY)
[facility] HOSPITALIST    PROGRESS NOTE    Name:  Oralia Sánchez   Age:  44 y.o.  Sex:  female  :  1975  MRN:  [id number]   Visit Number:  [id number]  Admission Date:  2020  Date Of Service:  04/15/20  Primary Care Physician:  Esmer, No Known     LOS: 12 days :  Patient Care Team:  Provider, No Known as PCP - General:    History taken from:     patient chart    Chief Complaint:      Weakness    Subjective     Interval History:     Patient seen and examined again today.    Patient is a 44-year-old female with arteritis, diabetes, hypothyroidism, Mendez, sleep apnea, morbid obesity who had been admitted on 2020 with cough and shortness of breath as well as some fevers.  She was admitted for further evaluation and treatment. COVID-19 testing was done and was negative x2.    Infectious disease was consulted and noted that the patient had an elevated ferritin which is a surrogate marker for coronavirus infection.  Repeat testing was negative.    Pulmonology was consulted due to metabolic acidosis, elevated lactate.  Patient was requiring oxygen as well.  They did not feel that the patient had exacerbation of COPD.  Advised the patient to quit smoking.  Her breathing appears stable at this point.    CT abdomen pelvis was done as well which showed possible malignancy in the sigmoid colon.  Flex sig fluoroscopy was done on 2020 which revealed a colon polyp which was removed and sent to pathology.  Gastroenterology has recommended EGD and colonoscopy as an outpatient in a few weeks.    Patient was diffusely weak in her lower extremities with numbness and tingling noted.  Due to this neurology was consulted.  Discussed with nurse practitioner.  They recommended lumbar puncture to evaluate the etiology of this as well as possible imaging.  They felt Guillian  Barré syndrome should be excluded.  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    PT and OT have been working with the patient as she is diffusely weak now.  She agrees to go to acute rehab.  Await further neurology work-up before she is stable to be transferred.    Review of Systems:     All systems were reviewed and negative except for:  Musculoskeletal: positive for  muscle weakness    Objective     Vital Signs:    Temp:  [97.9 °F (36.6 °C)-98.3 °F (36.8 °C)] 98.1 °F (36.7 °C)  Heart Rate:  [77-96] 83  Resp:  [16] 16  BP: (129-162)/() 162/100    Physical Exam:    General: Alert and oriented x4, morbidly obese  Heart: Regular rate and rhythm without murmur rub or thrill  Lungs: Clear to auscultation bilaterally without use of accessory muscles respiration  Abdomen: Soft/nontender/nondistended.  No hepatosplenomegaly is noted.  MSK: 4/5 strength in upper/lower extremities bilaterally     Results Review:      I reviewed the patient's new clinical results.    Labs:    Lab Results (last 24 hours)     Procedure Component Value Units Date/Time    POC Glucose Once [[id number]]  (Abnormal) Collected:  04/15/20 1157    Specimen:  Blood Updated:  04/15/20 1158     Glucose 164 mg/dL     Vitamin B12 [[id number]]  (Normal) Collected:  04/15/20 0909    Specimen:  Blood Updated:  04/15/20 1007     Vitamin B-12 651 pg/mL     Narrative:       Results may be falsely increased if patient taking Biotin.      Lipid Panel [[id number]]  (Abnormal) Collected:  04/15/20 0525    Specimen:  Blood from Arm, Right Updated:  04/15/20 0929     Total Cholesterol 218 mg/dL      Triglycerides 277 mg/dL      HDL Cholesterol 44 mg/dL      LDL Cholesterol  119 mg/dL      VLDL Cholesterol 55.4 mg/dL      LDL/HDL Ratio 2.70    Narrative:       Cholesterol Reference Ranges  (U.S. Department of Health and Human Services ATP III Classifications)    Desirable          <200 mg/dL  Borderline High    200-239 mg/dL  High Risk          >240 mg/dL      Triglyceride Reference Ranges  (U.S. Department of  Health and Human Services ATP III Classifications)    Normal           <150 mg/dL  Borderline High  150-199 mg/dL  High             200-499 mg/dL  Very High        >500 mg/dL    HDL Reference Ranges  (U.S. Department of Health and Human Services ATP III Classifcations)    Low     <40 mg/dl (major risk factor for CHD)  High    >60 mg/dl ('negative' risk factor for CHD)        LDL Reference Ranges  (U.S. Department of Health and Human Services ATP III Classifcations)    Optimal          <100 mg/dL  Near Optimal     100-129 mg/dL  Borderline High  130-159 mg/dL  High             160-189 mg/dL  Very High        >189 mg/dL    RPR [[id number]] Collected:  04/15/20 0909    Specimen:  Blood Updated:  04/15/20 0921    Heavy Metals, Blood [[id number]] Collected:  04/15/20 0909    Specimen:  Blood Updated:  04/15/20 0915    Copper, Serum [[id number]] Collected:  04/15/20 0909    Specimen:  Blood Updated:  04/15/20 0915    Protein Elec + Interp, Serum [[id number]] Collected:  04/15/20 0909    Specimen:  Blood Updated:  04/15/20 0915    Cryoglobulin [[id number]] Collected:  04/15/20 0909    Specimen:  Blood Updated:  04/15/20 0915    T4, Free [[id number]]  (Normal) Collected:  04/15/20 0525    Specimen:  Blood from Arm, Right Updated:  04/15/20 0903     Free T4 1.09 ng/dL     Narrative:       Results may be falsely increased if patient taking Biotin.      TSH [[id number]]  (Abnormal) Collected:  04/15/20 0525    Specimen:  Blood from Arm, Right Updated:  04/15/20 0903     TSH 18.100 uIU/mL     Tissue Pathology Exam [[id number]] Collected:  04/14/20 0847    Specimen:  Polyp from Large Intestine, Rectum Updated:  04/15/20 0819     Case Report --     Surgical Pathology Report                         Case: [id number]                                  Authorizing Provider:  Kendall Villafana MD    Collected:           04/14/2020 08:47 AM          Ordering Location:     [facility]  Received:            04/14/2020 10:27 AM                                  ENDO SUITES                                                                  Pathologist:           Lashon Pitts MD                                                          Specimen:    Large Intestine, Rectum                                                                     Final Diagnosis --     1. Rectum, Polyp, Polypectomy:   A. Fragments of tubular adenoma.        Gross Description --     1. The specimen is received in formalin labeled with the patient's name and further designated 'rectum biopsy' are multiple small fragments of gray-tan tissue. The specimen is submitted for embedding as received.        POC Glucose Once [[id number]]  (Normal) Collected:  04/15/20 0640    Specimen:  Blood Updated:  04/15/20 0641     Glucose 123 mg/dL     Renal Function Panel [[id number]]  (Abnormal) Collected:  04/15/20 0525    Specimen:  Blood from Arm, Right Updated:  04/15/20 0621     Glucose 124 mg/dL      BUN 16 mg/dL      Creatinine 0.53 mg/dL      Sodium 136 mmol/L      Potassium 4.3 mmol/L      Chloride 94 mmol/L      CO2 27.1 mmol/L      Calcium 9.1 mg/dL      Albumin 2.80 g/dL      Phosphorus 4.1 mg/dL      Anion Gap 14.9 mmol/L      BUN/Creatinine Ratio 30.2     eGFR Non African Amer 124 mL/min/1.73     Narrative:       GFR Normal >60  Chronic Kidney Disease <60  Kidney Failure <15      Magnesium [[id number]]  (Normal) Collected:  04/15/20 0525    Specimen:  Blood from Arm, Right Updated:  04/15/20 0621     Magnesium 2.0 mg/dL     CBC & Differential [[id number]] Collected:  04/15/20 0525    Specimen:  Blood from Arm, Right Updated:  04/15/20 0552    Narrative:       The following orders were created for panel order CBC & Differential.  Procedure                               Abnormality         Status                     ---------                               -----------         ------                     CBC Auto Differential[[id number]]        Abnormal            Final result                  Please view results for these tests on the individual orders.    CBC Auto Differential [[id number]]  (Abnormal) Collected:  04/15/20 0525    Specimen:  Blood from Arm, Right Updated:  04/15/20 0552     WBC 10.58 10*3/mm3      RBC 3.49 10*6/mm3      Hemoglobin 12.8 g/dL      Hematocrit 36.2 %      .7 fL      MCH 36.7 pg      MCHC 35.4 g/dL      RDW 14.7 %      RDW-SD 55.4 fl      MPV 9.3 fL      Platelets 319 10*3/mm3      Neutrophil % 75.0 %      Lymphocyte % 17.0 %      Monocyte % 5.4 %      Eosinophil % 0.8 %      Basophil % 0.4 %      Immature Grans % 1.4 %      Neutrophils, Absolute 7.94 10*3/mm3      Lymphocytes, Absolute 1.80 10*3/mm3      Monocytes, Absolute 0.57 10*3/mm3      Eosinophils, Absolute 0.08 10*3/mm3      Basophils, Absolute 0.04 10*3/mm3      Immature Grans, Absolute 0.15 10*3/mm3      nRBC 0.3 /100 WBC     POC Glucose Once [[id number]]  (Abnormal) Collected:  04/14/20 2033    Specimen:  Blood Updated:  04/14/20 2034     Glucose 159 mg/dL     POC Glucose Once [[id number]]  (Abnormal) Collected:  04/14/20 1613    Specimen:  Blood Updated:  04/14/20 1616     Glucose 249 mg/dL            Radiology:    Imaging Results (Last 24 Hours)     ** No results found for the last 24 hours. **          Medication Review:       budesonide-formoterol 2 puff Inhalation BID - RT   insulin lispro 0-9 Units Subcutaneous 4x Daily With Meals & Nightly   levothyroxine 200 mcg Oral Daily   pantoprazole 40 mg Oral BID AC   polyethylene glycol 17 g Oral Daily   predniSONE 40 mg Oral Daily With Breakfast   pregabalin 75 mg Oral Q12H   sodium bicarbonate 650 mg Oral TID   sucralfate 1 g Oral 4x Daily AC & at Bedtime         hold 1 each    sodium chloride 30 mL/hr Last Rate: 30 mL/hr (04/14/20 0833)       Assessment/Plan     Problem List Items Addressed This Visit        Respiratory    * (Principal) Dyspnea - Primary    Cough       Digestive    Abnormal CT scan, colon    Overview     Added automatically from request  for surgery [id number]         Relevant Orders    Tissue Pathology Exam (Completed)       Other    Hypomagnesemia    Metabolic acidosis    Fatigue    History of COPD      Other Visit Diagnoses     Cerebral aneurysm rupture (CMS/HCC)        Relevant Orders    XR Chest 1 View          1.  COPD without exacerbation  2.  Intractable cough  3.  Lactic acidosis, improved  4.   fatty liver disease with elevated liver enzymes  5.  Sigmoid Colon Polyp noted, recommend EGD and colonoscopy as an outpatient.  6.  Hypotension, on ProAmatine  7.  Right ovarian cyst, recommend follow-up ultrasound in 6 months  8.  Diffuse weakness, along with paresthesias of the lower extremities.  Work-up in progress.  9.  Elevated inflammatory markers including ferritin, CRP, lactate.    Plan:    LP to be done today, await results and further neurology input.   Will need follow-up with gastroenterology as well as pulmonology as an outpatient..  Monitor lab work in the a.m. 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  She will need extensive rehab for this.  Further recommendations will depend on clinical course.    Terrell Tinajero DO  04/15/20  14:08

## 2020-04-15 NOTE — NURSING NOTE
Returned from X-ray.Puncture site to low back dry and intact. No complaints of headache. No distress. Fluids encouraged.VSS. Awaiting transport to room.

## 2020-04-15 NOTE — PROGRESS NOTES
[facility] HOSPITALIST    PROGRESS NOTE    Name:  Oralia Sánchez   Age:  44 y.o.  Sex:  female  :  1975  MRN:  [id number]   Visit Number:  [id number]  Admission Date:  2020  Date Of Service:  04/15/20  Primary Care Physician:  Esmer, No Known     LOS: 12 days :  Patient Care Team:  Provider, No Known as PCP - General:    History taken from:     patient chart    Chief Complaint:      Weakness    Subjective     Interval History:     Patient seen and examined again today.    Patient is a 44-year-old female with arteritis, diabetes, hypothyroidism, Mendez, sleep apnea, morbid obesity who had been admitted on 2020 with cough and shortness of breath as well as some fevers.  She was admitted for further evaluation and treatment. COVID-19 testing was done and was negative x2.    Infectious disease was consulted and noted that the patient had an elevated ferritin which is a surrogate marker for coronavirus infection.  Repeat testing was negative.    Pulmonology was consulted due to metabolic acidosis, elevated lactate.  Patient was requiring oxygen as well.  They did not feel that the patient had exacerbation of COPD.  Advised the patient to quit smoking.  Her breathing appears stable at this point.    CT abdomen pelvis was done as well which showed possible malignancy in the sigmoid colon.  Flex sig fluoroscopy was done on 2020 which revealed a colon polyp which was removed and sent to pathology.  Gastroenterology has recommended EGD and colonoscopy as an outpatient in a few weeks.    Patient was diffusely weak in her lower extremities with numbness and tingling noted.  Due to this neurology was consulted.  Discussed with nurse practitioner.  They recommended lumbar puncture to evaluate the etiology of this as well as possible imaging.  They felt Guillian  Barré syndrome should be excluded.  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    PT and OT have been working with the patient as she is diffusely weak now.  She agrees to go to acute rehab.  Await further neurology work-up before she is stable to be transferred.    Review of Systems:     All systems were reviewed and negative except for:  Musculoskeletal: positive for  muscle weakness    Objective     Vital Signs:    Temp:  [97.9 °F (36.6 °C)-98.3 °F (36.8 °C)] 98.1 °F (36.7 °C)  Heart Rate:  [77-96] 83  Resp:  [16] 16  BP: (129-162)/() 162/100    Physical Exam:    General: Alert and oriented x4, morbidly obese  Heart: Regular rate and rhythm without murmur rub or thrill  Lungs: Clear to auscultation bilaterally without use of accessory muscles respiration  Abdomen: Soft/nontender/nondistended.  No hepatosplenomegaly is noted.  MSK: 4/5 strength in upper/lower extremities bilaterally     Results Review:      I reviewed the patient's new clinical results.    Labs:    Lab Results (last 24 hours)     Procedure Component Value Units Date/Time    POC Glucose Once [[id number]]  (Abnormal) Collected:  04/15/20 1157    Specimen:  Blood Updated:  04/15/20 1158     Glucose 164 mg/dL     Vitamin B12 [[id number]]  (Normal) Collected:  04/15/20 0909    Specimen:  Blood Updated:  04/15/20 1007     Vitamin B-12 651 pg/mL     Narrative:       Results may be falsely increased if patient taking Biotin.      Lipid Panel [[id number]]  (Abnormal) Collected:  04/15/20 0525    Specimen:  Blood from Arm, Right Updated:  04/15/20 0929     Total Cholesterol 218 mg/dL      Triglycerides 277 mg/dL      HDL Cholesterol 44 mg/dL      LDL Cholesterol  119 mg/dL      VLDL Cholesterol 55.4 mg/dL      LDL/HDL Ratio 2.70    Narrative:       Cholesterol Reference Ranges  (U.S. Department of Health and Human Services ATP III Classifications)    Desirable          <200 mg/dL  Borderline High    200-239 mg/dL  High Risk          >240 mg/dL      Triglyceride Reference Ranges  (U.S. Department of  Health and Human Services ATP III Classifications)    Normal           <150 mg/dL  Borderline High  150-199 mg/dL  High             200-499 mg/dL  Very High        >500 mg/dL    HDL Reference Ranges  (U.S. Department of Health and Human Services ATP III Classifcations)    Low     <40 mg/dl (major risk factor for CHD)  High    >60 mg/dl ('negative' risk factor for CHD)        LDL Reference Ranges  (U.S. Department of Health and Human Services ATP III Classifcations)    Optimal          <100 mg/dL  Near Optimal     100-129 mg/dL  Borderline High  130-159 mg/dL  High             160-189 mg/dL  Very High        >189 mg/dL    RPR [[id number]] Collected:  04/15/20 0909    Specimen:  Blood Updated:  04/15/20 0921    Heavy Metals, Blood [[id number]] Collected:  04/15/20 0909    Specimen:  Blood Updated:  04/15/20 0915    Copper, Serum [[id number]] Collected:  04/15/20 0909    Specimen:  Blood Updated:  04/15/20 0915    Protein Elec + Interp, Serum [[id number]] Collected:  04/15/20 0909    Specimen:  Blood Updated:  04/15/20 0915    Cryoglobulin [[id number]] Collected:  04/15/20 0909    Specimen:  Blood Updated:  04/15/20 0915    T4, Free [[id number]]  (Normal) Collected:  04/15/20 0525    Specimen:  Blood from Arm, Right Updated:  04/15/20 0903     Free T4 1.09 ng/dL     Narrative:       Results may be falsely increased if patient taking Biotin.      TSH [[id number]]  (Abnormal) Collected:  04/15/20 0525    Specimen:  Blood from Arm, Right Updated:  04/15/20 0903     TSH 18.100 uIU/mL     Tissue Pathology Exam [[id number]] Collected:  04/14/20 0847    Specimen:  Polyp from Large Intestine, Rectum Updated:  04/15/20 0819     Case Report --     Surgical Pathology Report                         Case: [id number]                                  Authorizing Provider:  Kendall Villafana MD    Collected:           04/14/2020 08:47 AM          Ordering Location:     [facility]  Received:            04/14/2020 10:27 AM                                  ENDO SUITES                                                                  Pathologist:           Lashon Pitts MD                                                          Specimen:    Large Intestine, Rectum                                                                     Final Diagnosis --     1. Rectum, Polyp, Polypectomy:   A. Fragments of tubular adenoma.        Gross Description --     1. The specimen is received in formalin labeled with the patient's name and further designated 'rectum biopsy' are multiple small fragments of gray-tan tissue. The specimen is submitted for embedding as received.        POC Glucose Once [[id number]]  (Normal) Collected:  04/15/20 0640    Specimen:  Blood Updated:  04/15/20 0641     Glucose 123 mg/dL     Renal Function Panel [[id number]]  (Abnormal) Collected:  04/15/20 0525    Specimen:  Blood from Arm, Right Updated:  04/15/20 0621     Glucose 124 mg/dL      BUN 16 mg/dL      Creatinine 0.53 mg/dL      Sodium 136 mmol/L      Potassium 4.3 mmol/L      Chloride 94 mmol/L      CO2 27.1 mmol/L      Calcium 9.1 mg/dL      Albumin 2.80 g/dL      Phosphorus 4.1 mg/dL      Anion Gap 14.9 mmol/L      BUN/Creatinine Ratio 30.2     eGFR Non African Amer 124 mL/min/1.73     Narrative:       GFR Normal >60  Chronic Kidney Disease <60  Kidney Failure <15      Magnesium [[id number]]  (Normal) Collected:  04/15/20 0525    Specimen:  Blood from Arm, Right Updated:  04/15/20 0621     Magnesium 2.0 mg/dL     CBC & Differential [[id number]] Collected:  04/15/20 0525    Specimen:  Blood from Arm, Right Updated:  04/15/20 0552    Narrative:       The following orders were created for panel order CBC & Differential.  Procedure                               Abnormality         Status                     ---------                               -----------         ------                     CBC Auto Differential[[id number]]        Abnormal            Final result                  Please view results for these tests on the individual orders.    CBC Auto Differential [[id number]]  (Abnormal) Collected:  04/15/20 0525    Specimen:  Blood from Arm, Right Updated:  04/15/20 0552     WBC 10.58 10*3/mm3      RBC 3.49 10*6/mm3      Hemoglobin 12.8 g/dL      Hematocrit 36.2 %      .7 fL      MCH 36.7 pg      MCHC 35.4 g/dL      RDW 14.7 %      RDW-SD 55.4 fl      MPV 9.3 fL      Platelets 319 10*3/mm3      Neutrophil % 75.0 %      Lymphocyte % 17.0 %      Monocyte % 5.4 %      Eosinophil % 0.8 %      Basophil % 0.4 %      Immature Grans % 1.4 %      Neutrophils, Absolute 7.94 10*3/mm3      Lymphocytes, Absolute 1.80 10*3/mm3      Monocytes, Absolute 0.57 10*3/mm3      Eosinophils, Absolute 0.08 10*3/mm3      Basophils, Absolute 0.04 10*3/mm3      Immature Grans, Absolute 0.15 10*3/mm3      nRBC 0.3 /100 WBC     POC Glucose Once [[id number]]  (Abnormal) Collected:  04/14/20 2033    Specimen:  Blood Updated:  04/14/20 2034     Glucose 159 mg/dL     POC Glucose Once [[id number]]  (Abnormal) Collected:  04/14/20 1613    Specimen:  Blood Updated:  04/14/20 1616     Glucose 249 mg/dL            Radiology:    Imaging Results (Last 24 Hours)     ** No results found for the last 24 hours. **          Medication Review:       budesonide-formoterol 2 puff Inhalation BID - RT   insulin lispro 0-9 Units Subcutaneous 4x Daily With Meals & Nightly   levothyroxine 200 mcg Oral Daily   pantoprazole 40 mg Oral BID AC   polyethylene glycol 17 g Oral Daily   predniSONE 40 mg Oral Daily With Breakfast   pregabalin 75 mg Oral Q12H   sodium bicarbonate 650 mg Oral TID   sucralfate 1 g Oral 4x Daily AC & at Bedtime         hold 1 each    sodium chloride 30 mL/hr Last Rate: 30 mL/hr (04/14/20 0833)       Assessment/Plan     Problem List Items Addressed This Visit        Respiratory    * (Principal) Dyspnea - Primary    Cough       Digestive    Abnormal CT scan, colon    Overview     Added automatically from request  for surgery [id number]         Relevant Orders    Tissue Pathology Exam (Completed)       Other    Hypomagnesemia    Metabolic acidosis    Fatigue    History of COPD      Other Visit Diagnoses     Cerebral aneurysm rupture (CMS/HCC)        Relevant Orders    XR Chest 1 View          1.  COPD without exacerbation  2.  Intractable cough  3.  Lactic acidosis, improved  4.   fatty liver disease with elevated liver enzymes  5.  Sigmoid Colon Polyp noted, recommend EGD and colonoscopy as an outpatient.  6.  Hypotension, on ProAmatine  7.  Right ovarian cyst, recommend follow-up ultrasound in 6 months  8.  Diffuse weakness, along with paresthesias of the lower extremities.  Work-up in progress.  9.  Elevated inflammatory markers including ferritin, CRP, lactate.    Plan:    LP to be done today, await results and further neurology input.   Will need follow-up with gastroenterology as well as pulmonology as an outpatient..  Monitor lab work in the a.m. 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  She will need extensive rehab for this.  Further recommendations will depend on clinical course.    Terrell Tinajero DO  04/15/20  14:08

## 2020-04-15 NOTE — TELEPHONE ENCOUNTER
4/15/20 - She is still in the hospital ([facility]). Please call her next week (hopefully at home) and schedule her to see me in the office in 3 mos (after her pulmonary infection has resolved) to discuss possibly having an EGD and colonoscopy. Scarlet bradford

## 2020-04-16 ENCOUNTER — TELEPHONE (OUTPATIENT)
Dept: NEUROLOGY | Facility: CLINIC | Age: 45
End: 2020-04-16

## 2020-04-16 DIAGNOSIS — R83.9 CSF ABNORMAL: ICD-10-CM

## 2020-04-16 DIAGNOSIS — M62.81 GENERALIZED MUSCLE WEAKNESS: Primary | ICD-10-CM

## 2020-04-16 PROBLEM — G61.0 GUILLAIN BARRÉ SYNDROME (HCC): Status: ACTIVE | Noted: 2020-04-16

## 2020-04-16 LAB
ALBUMIN SERPL-MCNC: 2.4 G/DL (ref 2.9–4.4)
ALBUMIN SERPL-MCNC: 2.8 G/DL (ref 3.5–5.2)
ALBUMIN/GLOB SERPL: 0.8 {RATIO} (ref 0.7–1.7)
ALPHA1 GLOB FLD ELPH-MCNC: 0.3 G/DL (ref 0–0.4)
ALPHA2 GLOB SERPL ELPH-MCNC: 0.8 G/DL (ref 0.4–1)
ANION GAP SERPL CALCULATED.3IONS-SCNC: 12.4 MMOL/L (ref 5–15)
B-GLOBULIN SERPL ELPH-MCNC: 1.4 G/DL (ref 0.7–1.3)
BASOPHILS # BLD AUTO: 0.03 10*3/MM3 (ref 0–0.2)
BASOPHILS # BLD AUTO: 0.03 10*3/MM3 (ref 0–0.2)
BASOPHILS NFR BLD AUTO: 0.3 % (ref 0–1.5)
BASOPHILS NFR BLD AUTO: 0.3 % (ref 0–1.5)
BUN BLD-MCNC: 14 MG/DL (ref 6–20)
BUN/CREAT SERPL: 27.5 (ref 7–25)
CALCIUM SPEC-SCNC: 8.8 MG/DL (ref 8.6–10.5)
CHLORIDE SERPL-SCNC: 99 MMOL/L (ref 98–107)
CO2 SERPL-SCNC: 26.6 MMOL/L (ref 22–29)
CREAT BLD-MCNC: 0.51 MG/DL (ref 0.57–1)
DEPRECATED RDW RBC AUTO: 51.3 FL (ref 37–54)
DEPRECATED RDW RBC AUTO: 51.6 FL (ref 37–54)
EOSINOPHIL # BLD AUTO: 0.02 10*3/MM3 (ref 0–0.4)
EOSINOPHIL # BLD AUTO: 0.08 10*3/MM3 (ref 0–0.4)
EOSINOPHIL NFR BLD AUTO: 0.2 % (ref 0.3–6.2)
EOSINOPHIL NFR BLD AUTO: 0.8 % (ref 0.3–6.2)
ERYTHROCYTE [DISTWIDTH] IN BLOOD BY AUTOMATED COUNT: 14.1 % (ref 12.3–15.4)
ERYTHROCYTE [DISTWIDTH] IN BLOOD BY AUTOMATED COUNT: 14.1 % (ref 12.3–15.4)
FOLATE SERPL-MCNC: 2.52 NG/ML (ref 4.78–24.2)
GAMMA GLOB SERPL ELPH-MCNC: 0.5 G/DL (ref 0.4–1.8)
GFR SERPL CREATININE-BSD FRML MDRD: 130 ML/MIN/1.73
GLOBULIN SER CALC-MCNC: 3.1 G/DL (ref 2.2–3.9)
GLUCOSE BLD-MCNC: 143 MG/DL (ref 65–99)
GLUCOSE BLDC GLUCOMTR-MCNC: 158 MG/DL (ref 70–130)
GLUCOSE BLDC GLUCOMTR-MCNC: 191 MG/DL (ref 70–130)
GLUCOSE BLDC GLUCOMTR-MCNC: 222 MG/DL (ref 70–130)
GLUCOSE BLDC GLUCOMTR-MCNC: 254 MG/DL (ref 70–130)
HCT VFR BLD AUTO: 32.4 % (ref 34–46.6)
HCT VFR BLD AUTO: 36 % (ref 34–46.6)
HGB BLD-MCNC: 11.3 G/DL (ref 12–15.9)
HGB BLD-MCNC: 13 G/DL (ref 12–15.9)
IGA1 MFR SER: 321 MG/DL (ref 70–400)
IGG1 SER-MCNC: 580 MG/DL (ref 700–1600)
IGM SERPL-MCNC: 27 MG/DL (ref 40–230)
IMM GRANULOCYTES # BLD AUTO: 0.11 10*3/MM3 (ref 0–0.05)
IMM GRANULOCYTES # BLD AUTO: 0.13 10*3/MM3 (ref 0–0.05)
IMM GRANULOCYTES NFR BLD AUTO: 0.9 % (ref 0–0.5)
IMM GRANULOCYTES NFR BLD AUTO: 1.3 % (ref 0–0.5)
LYMPHOCYTES # BLD AUTO: 0.55 10*3/MM3 (ref 0.7–3.1)
LYMPHOCYTES # BLD AUTO: 1.58 10*3/MM3 (ref 0.7–3.1)
LYMPHOCYTES NFR BLD AUTO: 15.5 % (ref 19.6–45.3)
LYMPHOCYTES NFR BLD AUTO: 4.6 % (ref 19.6–45.3)
Lab: ABNORMAL
M-SPIKE: 0.3 G/DL
MAGNESIUM SERPL-MCNC: 2 MG/DL (ref 1.6–2.6)
MCH RBC QN AUTO: 35.4 PG (ref 26.6–33)
MCH RBC QN AUTO: 36.5 PG (ref 26.6–33)
MCHC RBC AUTO-ENTMCNC: 34.9 G/DL (ref 31.5–35.7)
MCHC RBC AUTO-ENTMCNC: 36.1 G/DL (ref 31.5–35.7)
MCV RBC AUTO: 101.1 FL (ref 79–97)
MCV RBC AUTO: 101.6 FL (ref 79–97)
MONOCYTES # BLD AUTO: 0.36 10*3/MM3 (ref 0.1–0.9)
MONOCYTES # BLD AUTO: 0.52 10*3/MM3 (ref 0.1–0.9)
MONOCYTES NFR BLD AUTO: 3 % (ref 5–12)
MONOCYTES NFR BLD AUTO: 5.1 % (ref 5–12)
NEUTROPHILS # BLD AUTO: 10.89 10*3/MM3 (ref 1.7–7)
NEUTROPHILS # BLD AUTO: 7.84 10*3/MM3 (ref 1.7–7)
NEUTROPHILS NFR BLD AUTO: 77 % (ref 42.7–76)
NEUTROPHILS NFR BLD AUTO: 91 % (ref 42.7–76)
NRBC BLD AUTO-RTO: 0.1 /100 WBC (ref 0–0.2)
NRBC BLD AUTO-RTO: 0.1 /100 WBC (ref 0–0.2)
PHOSPHATE SERPL-MCNC: 3.8 MG/DL (ref 2.5–4.5)
PLATELET # BLD AUTO: 322 10*3/MM3 (ref 140–450)
PLATELET # BLD AUTO: 348 10*3/MM3 (ref 140–450)
PMV BLD AUTO: 9.7 FL (ref 6–12)
PMV BLD AUTO: 9.7 FL (ref 6–12)
POTASSIUM BLD-SCNC: 4.3 MMOL/L (ref 3.5–5.2)
PROT PATTERN SERPL ELPH-IMP: ABNORMAL
PROT SERPL-MCNC: 5.5 G/DL (ref 6–8.5)
RBC # BLD AUTO: 3.19 10*6/MM3 (ref 3.77–5.28)
RBC # BLD AUTO: 3.56 10*6/MM3 (ref 3.77–5.28)
SODIUM BLD-SCNC: 138 MMOL/L (ref 136–145)
WBC NRBC COR # BLD: 10.18 10*3/MM3 (ref 3.4–10.8)
WBC NRBC COR # BLD: 11.96 10*3/MM3 (ref 3.4–10.8)

## 2020-04-16 PROCEDURE — 25010000002 IMMUNE GLOBULIN (HUMAN) 20 GM/200ML SOLUTION: Performed by: NURSE PRACTITIONER

## 2020-04-16 PROCEDURE — 63710000001 DIPHENHYDRAMINE PER 50 MG: Performed by: HOSPITALIST

## 2020-04-16 PROCEDURE — 99232 SBSQ HOSP IP/OBS MODERATE 35: CPT | Performed by: NURSE PRACTITIONER

## 2020-04-16 PROCEDURE — 83735 ASSAY OF MAGNESIUM: CPT | Performed by: INTERNAL MEDICINE

## 2020-04-16 PROCEDURE — 97110 THERAPEUTIC EXERCISES: CPT

## 2020-04-16 PROCEDURE — 63710000001 INSULIN LISPRO (HUMAN) PER 5 UNITS: Performed by: HOSPITALIST

## 2020-04-16 PROCEDURE — 63710000001 PREDNISONE PER 1 MG: Performed by: INTERNAL MEDICINE

## 2020-04-16 PROCEDURE — 94799 UNLISTED PULMONARY SVC/PX: CPT

## 2020-04-16 PROCEDURE — 82784 ASSAY IGA/IGD/IGG/IGM EACH: CPT | Performed by: NURSE PRACTITIONER

## 2020-04-16 PROCEDURE — 85025 COMPLETE CBC W/AUTO DIFF WBC: CPT | Performed by: NURSE PRACTITIONER

## 2020-04-16 PROCEDURE — 82962 GLUCOSE BLOOD TEST: CPT

## 2020-04-16 PROCEDURE — 85025 COMPLETE CBC W/AUTO DIFF WBC: CPT | Performed by: INTERNAL MEDICINE

## 2020-04-16 PROCEDURE — 80069 RENAL FUNCTION PANEL: CPT | Performed by: INTERNAL MEDICINE

## 2020-04-16 PROCEDURE — 63710000001 DIPHENHYDRAMINE PER 50 MG: Performed by: NURSE PRACTITIONER

## 2020-04-16 PROCEDURE — 82746 ASSAY OF FOLIC ACID SERUM: CPT | Performed by: NURSE PRACTITIONER

## 2020-04-16 RX ORDER — DIPHENHYDRAMINE HYDROCHLORIDE 50 MG/ML
50 INJECTION INTRAMUSCULAR; INTRAVENOUS ONCE AS NEEDED
Status: DISCONTINUED | OUTPATIENT
Start: 2020-04-16 | End: 2020-04-20 | Stop reason: HOSPADM

## 2020-04-16 RX ORDER — ACETAMINOPHEN 325 MG/1
650 TABLET ORAL ONCE
Status: DISCONTINUED | OUTPATIENT
Start: 2020-04-16 | End: 2020-04-16

## 2020-04-16 RX ORDER — DIPHENHYDRAMINE HCL 50 MG
50 CAPSULE ORAL ONCE
Status: DISCONTINUED | OUTPATIENT
Start: 2020-04-16 | End: 2020-04-16

## 2020-04-16 RX ORDER — DIPHENHYDRAMINE HCL 50 MG
50 CAPSULE ORAL EVERY 24 HOURS
Status: DISCONTINUED | OUTPATIENT
Start: 2020-04-16 | End: 2020-04-16

## 2020-04-16 RX ORDER — FOLIC ACID 1 MG/1
1 TABLET ORAL DAILY
Status: DISCONTINUED | OUTPATIENT
Start: 2020-04-16 | End: 2020-04-20 | Stop reason: HOSPADM

## 2020-04-16 RX ORDER — ACETAMINOPHEN 325 MG/1
650 TABLET ORAL EVERY 24 HOURS
Status: DISCONTINUED | OUTPATIENT
Start: 2020-04-16 | End: 2020-04-20 | Stop reason: HOSPADM

## 2020-04-16 RX ORDER — ACETAMINOPHEN 325 MG/1
650 TABLET ORAL EVERY 24 HOURS
Status: DISCONTINUED | OUTPATIENT
Start: 2020-04-16 | End: 2020-04-16

## 2020-04-16 RX ORDER — FAMOTIDINE 10 MG/ML
20 INJECTION, SOLUTION INTRAVENOUS ONCE AS NEEDED
Status: DISCONTINUED | OUTPATIENT
Start: 2020-04-16 | End: 2020-04-20 | Stop reason: HOSPADM

## 2020-04-16 RX ORDER — DIPHENHYDRAMINE HCL 50 MG
50 CAPSULE ORAL EVERY 24 HOURS
Status: DISCONTINUED | OUTPATIENT
Start: 2020-04-16 | End: 2020-04-20 | Stop reason: HOSPADM

## 2020-04-16 RX ADMIN — SODIUM BICARBONATE 650 MG: 650 TABLET ORAL at 08:32

## 2020-04-16 RX ADMIN — SUCRALFATE 1 G: 1 SUSPENSION ORAL at 05:57

## 2020-04-16 RX ADMIN — DIPHENHYDRAMINE HYDROCHLORIDE 25 MG: 25 CAPSULE ORAL at 22:54

## 2020-04-16 RX ADMIN — SUCRALFATE 1 G: 1 SUSPENSION ORAL at 12:28

## 2020-04-16 RX ADMIN — INSULIN LISPRO 4 UNITS: 100 INJECTION, SOLUTION INTRAVENOUS; SUBCUTANEOUS at 12:28

## 2020-04-16 RX ADMIN — SUCRALFATE 1 G: 1 SUSPENSION ORAL at 17:10

## 2020-04-16 RX ADMIN — SODIUM BICARBONATE 650 MG: 650 TABLET ORAL at 17:10

## 2020-04-16 RX ADMIN — SODIUM BICARBONATE 650 MG: 650 TABLET ORAL at 19:35

## 2020-04-16 RX ADMIN — PREGABALIN 75 MG: 75 CAPSULE ORAL at 19:35

## 2020-04-16 RX ADMIN — PANTOPRAZOLE SODIUM 40 MG: 40 TABLET, DELAYED RELEASE ORAL at 17:10

## 2020-04-16 RX ADMIN — HYDROCODONE BITARTRATE AND ACETAMINOPHEN 1 TABLET: 5; 325 TABLET ORAL at 08:32

## 2020-04-16 RX ADMIN — ACETAMINOPHEN 650 MG: 325 TABLET, FILM COATED ORAL at 14:36

## 2020-04-16 RX ADMIN — INSULIN LISPRO 6 UNITS: 100 INJECTION, SOLUTION INTRAVENOUS; SUBCUTANEOUS at 17:10

## 2020-04-16 RX ADMIN — INSULIN LISPRO 2 UNITS: 100 INJECTION, SOLUTION INTRAVENOUS; SUBCUTANEOUS at 08:33

## 2020-04-16 RX ADMIN — PREGABALIN 75 MG: 75 CAPSULE ORAL at 08:33

## 2020-04-16 RX ADMIN — HYDROCODONE BITARTRATE AND ACETAMINOPHEN 1 TABLET: 5; 325 TABLET ORAL at 19:49

## 2020-04-16 RX ADMIN — PREDNISONE 40 MG: 20 TABLET ORAL at 08:32

## 2020-04-16 RX ADMIN — BUDESONIDE AND FORMOTEROL FUMARATE DIHYDRATE 2 PUFF: 160; 4.5 AEROSOL RESPIRATORY (INHALATION) at 21:11

## 2020-04-16 RX ADMIN — DIPHENHYDRAMINE HYDROCHLORIDE 50 MG: 50 CAPSULE ORAL at 14:36

## 2020-04-16 RX ADMIN — LEVOTHYROXINE SODIUM 200 MCG: 100 TABLET ORAL at 05:57

## 2020-04-16 RX ADMIN — FOLIC ACID 1 MG: 1 TABLET ORAL at 17:10

## 2020-04-16 RX ADMIN — PANTOPRAZOLE SODIUM 40 MG: 40 TABLET, DELAYED RELEASE ORAL at 05:58

## 2020-04-16 RX ADMIN — IMMUNE GLOBULIN (HUMAN) 20 G: 10 INJECTION INTRAVENOUS; SUBCUTANEOUS at 15:08

## 2020-04-16 RX ADMIN — SUCRALFATE 1 G: 1 SUSPENSION ORAL at 19:35

## 2020-04-16 RX ADMIN — BUDESONIDE AND FORMOTEROL FUMARATE DIHYDRATE 2 PUFF: 160; 4.5 AEROSOL RESPIRATORY (INHALATION) at 08:05

## 2020-04-16 RX ADMIN — INSULIN LISPRO 2 UNITS: 100 INJECTION, SOLUTION INTRAVENOUS; SUBCUTANEOUS at 21:51

## 2020-04-16 NOTE — NURSING NOTE
Inpatient Acute Rehab    Patient case discussed with Dr. Cm and rehab team. Patient has diagnosis of Guillain Weston Syndrome. IVIG being given and can be given on Rehab floor.  Patient appropriate for rehab.  Clinicals sent for precert.  Continue to follow.  Miguel Angel Galaviz RN

## 2020-04-16 NOTE — PROGRESS NOTES
Adult Nutrition  Assessment/PES    Patient Name:  Oralia Sánchez  YOB: 1975  MRN: [id number]  Admit Date:  4/1/2020    Assessment Date:  4/16/2020  Nutrition follow up. EMR reviewed. Po intake average % of meals. Will continue to monitor/follow. Possible [facility] acute rehab.  Reason for Assessment     Row Name 04/16/20 1526          Reason for Assessment    Reason For Assessment  follow-up protocol             Labs/Tests/Procedures/Meds     Row Name 04/16/20 1526          Labs/Procedures/Meds    Lab Results Reviewed  reviewed, pertinent     Lab Results Comments  Glu         Diagnostic Tests/Procedures    Diagnostic Test/Procedure Reviewed  reviewed, pertinent        Medications    Pertinent Medications Reviewed  reviewed, pertinent             Nutrition Prescription Ordered     Row Name 04/16/20 1527          Nutrition Prescription PO    Common Modifiers  Consistent Carbohydrate         Evaluation of Received Nutrient/Fluid Intake     Row Name 04/16/20 1527          PO Evaluation    % PO Intake  %       Problem/Interventions:    Intervention Goal     Row Name 04/16/20 1527          Intervention Goal    General  Maintain nutrition;Meet nutritional needs for age/condition     PO  Tolerate PO;Maintain intake     Weight  Appropriate weight loss         Nutrition Intervention     Row Name 04/16/20 1527          Nutrition Intervention    RD/Tech Action  Follow Tx progress;Care plan reviewd           Education/Evaluation     Row Name 04/16/20 1527          Education    Education  Will Instruct as appropriate        Monitor/Evaluation    Monitor  Per protocol;I&O;PO intake;Pertinent labs;Weight;Skin status;Symptoms           Electronically signed by:  Cristal Troncoso RD  04/16/20 15:28

## 2020-04-16 NOTE — PROGRESS NOTES
" LOS: 13 days     Name: Oralia Sánchez  Age: 44 y.o.  Sex: female  :  1975  MRN: [id number]         Primary Care Physician: Provider, No Known    Subjective   Subjective  States that she feels the same as yesterday.  Still with numbness in her legs as well as weakness.  No new complaints today.    Objective   Vital Signs  Temp:  [98.2 °F (36.8 °C)-99 °F (37.2 °C)] 98.2 °F (36.8 °C)  Heart Rate:  [83-94] 94  Resp:  [12-18] 18  BP: (138-162)/() 160/101  Body mass index is 45.95 kg/m².    Objective:  General Appearance:  Comfortable, in no acute distress and ill-appearing.    Vital signs: (most recent): Blood pressure (!) 160/101, pulse 94, temperature 98.2 °F (36.8 °C), temperature source Oral, resp. rate 18, height 154.9 cm (61\"), weight 110 kg (243 lb 3.2 oz), last menstrual period 2020, SpO2 95 %, not currently breastfeeding.    Lungs:  Normal effort and normal respiratory rate.    Heart: Normal rate.  Regular rhythm.    Abdomen: Abdomen is soft.  Bowel sounds are normal.   There is no abdominal tenderness.     Extremities: There is no dependent edema or local swelling.    Neurological: Patient is alert and oriented to person, place and time.  (She has weakness and numbness in the legs bilaterally).    Skin:  Warm and dry.              Results Review:       I reviewed the patient's new clinical results.    Results from last 7 days   Lab Units 20  0506 04/15/20  0520  0450 20  0707   WBC 10*3/mm3 10.18 10.58 10.15 9.79 8.55 8.06   HEMOGLOBIN g/dL 11.3* 12.8 11.8* 12.7 12.0 12.3   PLATELETS 10*3/mm3 322 319 347 365 343 320     Results from last 7 days   Lab Units 20  0506 04/15/20  0525 20  0442 20  0450 20  0707 04/10/20  0524   SODIUM mmol/L 138 136 139 139 136 136 133*   POTASSIUM mmol/L 4.3 4.3 4.3 4.4 3.9 4.0 3.5   CHLORIDE mmol/L 99 94* 97* 95* 95* 93* 90*   CO2 mmol/L 26.6 27.1 28.1 26.6 28.2 27.1 " 27.5   BUN mg/dL 14 16 17 17 13 9 8   CREATININE mg/dL 0.51* 0.53* 0.57 0.72 0.57 0.59 0.45*   CALCIUM mg/dL 8.8 9.1 9.0 9.0 9.3 9.2 8.6   GLUCOSE mg/dL 143* 124* 145* 143* 113* 148* 137*                 Scheduled Meds:     budesonide-formoterol 2 puff Inhalation BID - RT   insulin lispro 0-9 Units Subcutaneous 4x Daily With Meals & Nightly   levothyroxine 200 mcg Oral Daily   pantoprazole 40 mg Oral BID AC   polyethylene glycol 17 g Oral Daily   predniSONE 40 mg Oral Daily With Breakfast   pregabalin 75 mg Oral Q12H   sodium bicarbonate 650 mg Oral TID   sucralfate 1 g Oral 4x Daily AC & at Bedtime     PRN Meds:   •  acetaminophen **OR** acetaminophen **OR** acetaminophen  •  Benzocaine  •  benzonatate  •  calcium carbonate  •  dextrose  •  dextrose  •  diphenhydrAMINE  •  glucagon (human recombinant)  •  guaiFENesin-dextromethorphan  •  hold  •  HYDROcodone-acetaminophen  •  potassium chloride **OR** potassium chloride **OR** potassium chloride  •  promethazine **OR** promethazine **OR** promethazine **OR** promethazine  •  sodium chloride  •  sodium chloride  Continuous Infusions:    hold 1 each    sodium chloride 30 mL/hr Last Rate: 30 mL/hr (04/14/20 0833)       Assessment/Plan   Active Hospital Problems    Diagnosis  POA   • **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) [G61.0]  Unknown   • Dyspnea [R06.00]  Yes   • Cough [R05]  Unknown   • Hypomagnesemia [E83.42]  Unknown   • Metabolic acidosis [E87.2]  Unknown   • Fatigue [R53.83]  Unknown   • History of COPD [Z87.09]  Not Applicable   • Abnormal CT scan, colon [R93.3]  Unknown   • Tobacco abuse [Z72.0]  Yes   • Rheumatoid arthritis (CMS/HCC) [M06.9]  Yes   • Type 2 diabetes mellitus (CMS/HCC) [E11.9]  Yes   • Morbid obesity (CMS/HCC) [E66.01]  Yes   • Elevated LFTs [R94.5]  Yes   • YOUNG (nonalcoholic steatohepatitis) [K75.81]  Yes   • Diabetes mellitus arising in pregnancy [O24.419]  Yes   • Hypothyroid [E03.9]  Yes      Resolved Hospital Problems   No  resolved problems to display.       Assessment & Plan    -Discussed the case with neurology.  Given results of CSF studies yesterday there is suspicion for Guillain Barré versus transverse myelitis.  IVIG will be initiated  -Blood pressure is a little bit elevated today.  We will monitor over the next 24 hours.  -Blood sugars appear reasonably controlled and stable  -Therapy services      Gerber Aldana MD  [facility]  04/16/20  12:21 PM

## 2020-04-16 NOTE — PROGRESS NOTES
Continued Stay Note  [facility]     Patient Name: Oralia Sánchez  MRN: [id number]  Today's Date: 4/16/2020    Admit Date: 4/1/2020    Discharge Plan     Row Name 04/16/20 1356       Plan    Plan  Referral to [facility]- will need [place] pre-cert    Patient/Family in Agreement with Plan  other (see comments)    Plan Comments  Reviewed clinicals, spoke with Tiny/Pharmacist regarding IVIG. Spoke with Evelin/[facility] Acute [facility] admission nurse and she will check if they can give IVIG while pt is at acute rehab; if so she will begin [place] pre-cert. Await call back, message sent to Dr. Aldana. [email or web address]        Discharge Codes    No documentation.             Melissa Mejia, RN

## 2020-04-16 NOTE — PLAN OF CARE
Problem: Patient Care Overview  Goal: Plan of Care Review  Outcome: Ongoing (interventions implemented as appropriate)  Flowsheets (Taken 4/16/2020 7537)  Plan of Care Reviewed With: patient  Outcome Summary: pt very motivated, but depends on fatigue level how performance goes; pt extremely fatigued this visit, had to rest between exer and amb; knee buckling w/o warning at end of amb; plans acute RHB at DC, but agreeable to whatever will get her home; feel pt is appropriate for acute RHB to expedite her return of function

## 2020-04-16 NOTE — PLAN OF CARE
Vss, room air, normal sinus/sinus tach on monitor. Up with assist and walker, mobility seems improved compared to yesterday. Pt returned from lumbar puncture around 1530 today, orders to lay as flat as she can for 24 hours but pt can sit up to eat and can get up to use bedside commode. Continues to c/o burning pain in soles of feet radiating to her legs. Controlled with PRN norco. Hopefully to be d/c to rehab in the next few days with spinal fluid results.

## 2020-04-16 NOTE — THERAPY TREATMENT NOTE
Acute Care - Physical Therapy Treatment Note  [facility]     Patient Name: Oralia Sánchez  : 1975  MRN: [id number]  Today's Date: 2020             Admit Date: 2020    Visit Dx:    ICD-10-CM ICD-9-CM   1. Dyspnea, unspecified type R06.00 786.09   2. Metabolic acidosis E87.2 276.2   3. Cough R05 786.2   4. Fatigue, unspecified type R53.83 780.79   5. History of COPD Z87.09 V12.69   6. Hypomagnesemia E83.42 275.2   7. Cerebral aneurysm rupture (CMS/HCC) I60.7 430   8. Abnormal CT scan, colon R93.3 793.4     Patient Active Problem List   Diagnosis   • Vitamin D deficiency   • Awareness of heartbeats   • Adiposity   • YOUNG (nonalcoholic steatohepatitis)   • Hypothyroid   • Diabetes mellitus arising in pregnancy   • Diabetes (CMS/HCC)   • Metabolic syndrome   • Chest pain   • Primary thunderclap headache   • Elevated LFTs   • Tobacco abuse   • Rheumatoid arthritis (CMS/HCC)   • Type 2 diabetes mellitus (CMS/HCC)   • Morbid obesity (CMS/HCC)   • UTI (urinary tract infection), bacterial   • Cerebral aneurysm   • Dyspnea   • Cough   • Hypomagnesemia   • Metabolic acidosis   • Fatigue   • History of COPD   • Abnormal CT scan, colon   • 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)       Therapy Treatment    Rehabilitation Treatment Summary     Row Name 20 1500             Treatment Time/Intention    Discipline  physical therapy assistant  -      Document Type  therapy note (daily note)  -      Subjective Information  complains of;weakness;fatigue;pain  -      Mode of Treatment  individual therapy;physical therapy  -      Care Plan Review  patient/other agree to care plan  -      Comment  knees buckled at end of session as pt was turning for bed, used gt belt to maintain standing balance  -      Existing Precautions/Restrictions  fall  -      Treatment Considerations/Comments  LE/UE numbness and tingling; reports tender skin   -      Recorded by [CHANTAL] Sheree Welsh PTA 20 6928       Row Name 04/16/20 1500             Bed Mobility Assessment/Treatment    Supine-Sit Dickey (Bed Mobility)  contact guard  -      Assistive Device (Bed Mobility)  bed rails;head of bed elevated  -      Comment (Bed Mobility)  slight ataxia, but determined  -      Recorded by [] Sheree Welsh [facility] 04/16/20 1743      Row Name 04/16/20 1500             Sit-Stand Transfer    Sit-Stand Dickey (Transfers)  2 person assist;minimum assist (75% patient effort);moderate assist (50% patient effort)  -      Assistive Device (Sit-Stand Transfers)  walker, front-wheeled  -      Recorded by [] Sheree Welsh [facility] 04/16/20 1743      Row Name 04/16/20 1500             Stand-Sit Transfer    Stand-Sit Dickey (Transfers)  2 person assist;contact guard;verbal cues  -      Assistive Device (Stand-Sit Transfers)  walker, front-wheeled  -      Recorded by [] Sheree Welsh [facility] 04/16/20 1743      Row Name 04/16/20 1500             Gait/Stairs Assessment/Training    Dickey Level (Gait)  2 person assist;minimum assist (75% patient effort);moderate assist (50% patient effort)  -      Assistive Device (Gait)  walker, front-wheeled  -      Distance in Feet (Gait)  25  -      Deviations/Abnormal Patterns (Gait)  base of support, wide;quang decreased;stride length decreased  -      Bilateral Gait Deviations  knee buckling, bilateral;weight shift ability decreased;knee hyperextension  -      Comment (Gait/Stairs)  pt cued to lock knees and doing well until making turn for bed, knees buckled w/o warning and req MOD/MAX2 briefly to stand back to upright  -      Recorded by [] Sheree Welsh [facility] 04/16/20 1743      Row Name 04/16/20 1500             Therapeutic Exercise    Lower Extremity (Therapeutic Exercise)  heel slides, bilateral;quad sets, bilateral;gluteal sets  -      Lower Extremity Range of Motion (Therapeutic Exercise)  knee flexion/extension, bilateral;ankle  dorsiflexion/plantar flexion, bilateral;hip abduction/adduction, bilateral  -      Sets/Reps (Therapeutic Exercise)  10-15 reps  -      Comment (Therapeutic Exercise)  req multiple rests to complete, fatigued quickly, pain to the touch w/skin on sheets  -      Recorded by [] Sheree Welsh PTA 04/16/20 2533      Row Name 04/16/20 1500             Positioning and Restraints    Pre-Treatment Position  in bed  -      In Bed  fowlers;call light within reach;encouraged to call for assist;exit alarm on;with nsg RN in room upon PT exit  -      Recorded by [] Sheree Welsh PTA 04/16/20 3763      Row Name 04/16/20 1500             Pain Scale: Numbers Pre/Post-Treatment    Pain Scale: Numbers, Pretreatment  0/10 - no pain  -      Pain Scale: Numbers, Post-Treatment  5/10  -JM      Pain Location - Side  Bilateral  -      Pain Location - Orientation  generalized  -      Pre/Post Treatment Pain Comment  some pain in all extremities but > in LEs  -      Recorded by [CHANTAL] Sheree Welsh PTA 04/16/20 9163        User Key  (r) = Recorded By, (t) = Taken By, (c) = Cosigned By    Initials Name Effective Dates Discipline     Sheree Welsh, [facility] 03/07/18 -  PT                       PT Recommendation and Plan     Plan of Care Reviewed With: patient  Progress: improving  Outcome Summary: pt very motivated, but depends on fatigue level how performance goes; pt extremely fatigued this visit, had to rest between exer and amb; knee buckling w/o warning at end of amb; plans acute RHB at [place], but agreeable to whatever will get her home  Outcome Measures     Row Name 04/16/20 1700 04/15/20 1100 04/14/20 1300       How much help from another person do you currently need...    Turning from your back to your side while in flat bed without using bedrails?  4  -JM  --  4  -LH    Moving from lying on back to sitting on the side of a flat bed without bedrails?  3  -JM  --  4  -LH    Moving to and from a bed to a chair  (including a wheelchair)?  3  -JM  --  3  -LH    Standing up from a chair using your arms (e.g., wheelchair, bedside chair)?  2  -JM  --  2  -LH    Climbing 3-5 steps with a railing?  1  -JM  --  1  -LH    To walk in hospital room?  2  -JM  --  2  -LH    AM-PAC 6 Clicks Score (PT)  15  -JM  --  16  -LH       How much help from another is currently needed...    Putting on and taking off regular lower body clothing?  --  2  -SG  --    Bathing (including washing, rinsing, and drying)  --  2  -SG  --    Toileting (which includes using toilet bed pan or urinal)  --  2  -SG  --    Putting on and taking off regular upper body clothing  --  3  -SG  --    Taking care of personal grooming (such as brushing teeth)  --  3  -SG  --    Eating meals  --  3  -SG  --    AM-PAC 6 Clicks Score (OT)  --  15  -SG  --       Functional Assessment    Outcome Measure Options  --  AM-PAC 6 Clicks Daily Activity (OT)  -SG  AM-PAC 6 Clicks Basic Mobility (PT)  -      User Key  (r) = Recorded By, (t) = Taken By, (c) = Cosigned By    Initials Name Provider Type     Peggy Burch, OTR Occupational Therapist    Preeti Shannon, PT Physical Therapist    Sheree Morton PTA Physical Therapy Assistant         Time Calculation:   PT Charges     Row Name 04/16/20 1523             Time Calculation    Start Time  1418  -      Stop Time  1500  -      Time Calculation (min)  42 min  -      PT Received On  04/16/20  -      PT - Next Appointment  04/17/20  -        User Key  (r) = Recorded By, (t) = Taken By, (c) = Cosigned By    Initials Name Provider Type    Sheree Morton PTA Physical Therapy Assistant        Therapy Charges for Today     Code Description Service Date Service Provider Modifiers Qty    31170840717 HC PT THER PROC EA 15 MIN 4/16/2020 Sheree Welsh PTA GP 3    32433338067 HC PT THER SUPP EA 15 MIN 4/16/2020 Sheree Welsh PTA GP 1          PT G-Codes  Outcome Measure Options: AM-PAC 6 Clicks Daily Activity  (OT)  AM-PAC 6 Clicks Score (PT): 15  AM-PAC 6 Clicks Score (OT): 15    Sheree Welsh, PTA  4/16/2020

## 2020-04-16 NOTE — PLAN OF CARE
BP elevated today (150s-160s systolic). Dr Aldana wants notified of systolic above 180. 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 Continue to monitor respiratory status, worsening weakness. Room air, normal sinus on monitor. Alert and oriented. Up with physical therapy this afternoon. Plan for five days of inpatient IVIG infusions, CCP following for rehab at time of discharge.

## 2020-04-16 NOTE — PROGRESS NOTES
"DOS: 2020  NAME: Oralia Sánchez   : 1975  PCP: Provider, No Known  Chief Complaint   Patient presents with   • Shortness of Breath       Subjective: No events overnights. She continues to to report generalized weakness LE > UEs and LE pain.     Objective:  Vital signs: /100 (BP Location: Left arm, Patient Position: Lying)   Pulse 90   Temp 99 °F (37.2 °C) (Oral)   Resp 18   Ht 154.9 cm (61\")   Wt 110 kg (243 lb 3.2 oz)   LMP 2020   SpO2 95%   BMI 45.95 kg/m²       Neurological examination:  Higher Integrative  Function:         Oriented to time, place and person. Normal registration, recall, attention span and concentration. Normal language including comprehension, spontaneous speech, repetition, reading, writing, naming and vocabulary. No neglect with normal visual-spatial function and construction. Normal fund of knowledge and higher integrative function.  CN II:    Pupils are equal, round, and reactive to light. Normal visual acuity and visual fields.    CN III IV VI:      Extraocular movements are full without nystagmus.   CN V:   Normal facial sensation and strength of muscles of mastication.  CN VII:             Facial movements are symmetric. No weakness.  CN VIII:                                   Auditory acuity is normal.  CN IX & X:                              Symmetric palatal movement.  CN XI:  Sternocleidomastoid and trapezius are normal.  No weakness.  CN XII:                                    The tongue is midline.  No atrophy or fasciculations.  Motor:  Normal muscle strength, bulk and tone in upper and lower extremities.  No fasciculations, rigidity, spasticity, or abnormal movements.  Reflexes:         Absent throughout  Sensation:       Normal to light touch, pinprick, vibration, temperature, and proprioception in arms and legs.  Decreased sensation to light touch and temperature in torso.   Station and Gait:        Declined by patient  Coordination:           "   Finger to nose test shows no dysmetria.  Rapid alternating movements are normal.  Heel to shin normal.  NIHSS: 0  Laboratory results:  Lab Results   Component Value Date    GLUCOSE 143 (H) 04/16/2020    CALCIUM 8.8 04/16/2020     04/16/2020    K 4.3 04/16/2020    CO2 26.6 04/16/2020    CL 99 04/16/2020    BUN 14 04/16/2020    CREATININE 0.51 (L) 04/16/2020    EGFRIFAFRI 122 06/11/2019    EGFRIFNONA 130 04/16/2020    BCR 27.5 (H) 04/16/2020    ANIONGAP 12.4 04/16/2020     Lab Results   Component Value Date    WBC 10.18 04/16/2020    HGB 11.3 (L) 04/16/2020    HCT 32.4 (L) 04/16/2020    .6 (H) 04/16/2020     04/16/2020     Lab Results   Component Value Date    CHOL 218 (H) 04/15/2020     Lab Results   Component Value Date    HDL 44 04/15/2020    HDL 61 (H) 06/11/2019    HDL 59 10/31/2018     Lab Results   Component Value Date     (H) 04/15/2020    LDL 88 06/11/2019    LDL 75 10/31/2018     Lab Results   Component Value Date    TRIG 277 (H) 04/15/2020    TRIG 102 06/11/2019    TRIG 96 10/31/2018     Lab Results   Component Value Date    TSH 18.100 (H) 04/15/2020     Lab Results   Component Value Date    JUCQYKME36 651 04/15/2020     Lab Results   Component Value Date    HGBA1C 6.68 (H) 04/01/2020     Lab Results   Component Value Date    CHOL 218 (H) 04/15/2020    CHLPL 169 06/11/2019    CHLPL 153 10/31/2018    CHLPL 141 02/19/2015     Lab Results   Component Value Date    TRIG 277 (H) 04/15/2020    TRIG 102 06/11/2019    TRIG 96 10/31/2018     Lab Results   Component Value Date    HDL 44 04/15/2020    HDL 61 (H) 06/11/2019    HDL 59 10/31/2018     Lab Results   Component Value Date     (H) 04/15/2020    LDL 88 06/11/2019    LDL 75 10/31/2018       Review and interpretation of imaging:  No new imaging reviewed today.    Impression:44 y.o. female with history of Obesity (BMI 47.01), diabetes mellitus, hypothyroidism, obstructive sleep apnea, tobacco abuse, and nonalcoholic  steatohepatitis presented to [facility] on 4/1 with dry cough, shortness of air, fever and fatigue. Our service was consulted d/t generalized weakness LEs > UEs. Exam reveals absence of reflexes throughout therefore Will Barré should be excluded.  Other differential possible neuropathy versus myopathy in the setting of illness with high-dose steroids.     Neurologically unchanged. LP Abnormal; concerning for GBS vs. transverse myelitis versus CIDP.  Recommend IVIG 20 mg/kg x 3 to 5 days based on patient's response.  Other recommendations below.PT/OT/ST. CCP to assist with discharge planning. Call RRT for any acute neurological changes and/or concerns. We will continue to follow and advise.           Plan:  · IVIG 2g/kg; Ideal Weight   · Lyrica 75 mg twice daily  · Treatable cause peripheral neuropathy labs (pending)   · Recommend outpatient EMG in the future     Patient seen in conjunction with Dr. Vinay Bolanos 04/16 and he agrees with plan above.      Plan for IVIG discussed w/ ADOLPH Castellano

## 2020-04-17 LAB
ANION GAP SERPL CALCULATED.3IONS-SCNC: 12.2 MMOL/L (ref 5–15)
ARSENIC BLD-MCNC: 4 UG/L (ref 2–23)
BUN BLD-MCNC: 16 MG/DL (ref 6–20)
BUN/CREAT SERPL: 33.3 (ref 7–25)
CALCIUM SPEC-SCNC: 9.6 MG/DL (ref 8.6–10.5)
CHLORIDE SERPL-SCNC: 96 MMOL/L (ref 98–107)
CO2 SERPL-SCNC: 27.8 MMOL/L (ref 22–29)
COPPER SERPL-MCNC: 117 UG/DL (ref 72–166)
CREAT BLD-MCNC: 0.48 MG/DL (ref 0.57–1)
DEPRECATED RDW RBC AUTO: 55.2 FL (ref 37–54)
ERYTHROCYTE [DISTWIDTH] IN BLOOD BY AUTOMATED COUNT: 14.6 % (ref 12.3–15.4)
GFR SERPL CREATININE-BSD FRML MDRD: 139 ML/MIN/1.73
GLUCOSE BLD-MCNC: 132 MG/DL (ref 65–99)
GLUCOSE BLDC GLUCOMTR-MCNC: 119 MG/DL (ref 70–130)
GLUCOSE BLDC GLUCOMTR-MCNC: 144 MG/DL (ref 70–130)
GLUCOSE BLDC GLUCOMTR-MCNC: 195 MG/DL (ref 70–130)
GLUCOSE BLDC GLUCOMTR-MCNC: 217 MG/DL (ref 70–130)
GLUCOSE BLDC GLUCOMTR-MCNC: 295 MG/DL (ref 70–130)
HCT VFR BLD AUTO: 34.4 % (ref 34–46.6)
HGB BLD-MCNC: 12 G/DL (ref 12–15.9)
LEAD BLD-MCNC: NORMAL UG/DL (ref 0–4)
MCH RBC QN AUTO: 35.8 PG (ref 26.6–33)
MCHC RBC AUTO-ENTMCNC: 34.9 G/DL (ref 31.5–35.7)
MCV RBC AUTO: 102.7 FL (ref 79–97)
MERCURY BLD-MCNC: NORMAL UG/L (ref 0–14.9)
PLATELET # BLD AUTO: 325 10*3/MM3 (ref 140–450)
PMV BLD AUTO: 9.7 FL (ref 6–12)
POTASSIUM BLD-SCNC: 4.1 MMOL/L (ref 3.5–5.2)
RBC # BLD AUTO: 3.35 10*6/MM3 (ref 3.77–5.28)
SODIUM BLD-SCNC: 136 MMOL/L (ref 136–145)
WBC NRBC COR # BLD: 9.86 10*3/MM3 (ref 3.4–10.8)

## 2020-04-17 PROCEDURE — 99232 SBSQ HOSP IP/OBS MODERATE 35: CPT | Performed by: NURSE PRACTITIONER

## 2020-04-17 PROCEDURE — 85027 COMPLETE CBC AUTOMATED: CPT | Performed by: INTERNAL MEDICINE

## 2020-04-17 PROCEDURE — 80048 BASIC METABOLIC PNL TOTAL CA: CPT | Performed by: INTERNAL MEDICINE

## 2020-04-17 PROCEDURE — 63710000001 INSULIN LISPRO (HUMAN) PER 5 UNITS: Performed by: HOSPITALIST

## 2020-04-17 PROCEDURE — 97110 THERAPEUTIC EXERCISES: CPT

## 2020-04-17 PROCEDURE — 97535 SELF CARE MNGMENT TRAINING: CPT | Performed by: OCCUPATIONAL THERAPIST

## 2020-04-17 PROCEDURE — 94799 UNLISTED PULMONARY SVC/PX: CPT

## 2020-04-17 PROCEDURE — 63710000001 DIPHENHYDRAMINE PER 50 MG: Performed by: HOSPITALIST

## 2020-04-17 PROCEDURE — 25010000002 IMMUNE GLOBULIN (HUMAN) 20 GM/200ML SOLUTION: Performed by: NURSE PRACTITIONER

## 2020-04-17 PROCEDURE — 63710000001 PREDNISONE PER 1 MG: Performed by: INTERNAL MEDICINE

## 2020-04-17 PROCEDURE — 63710000001 DIPHENHYDRAMINE PER 50 MG: Performed by: NURSE PRACTITIONER

## 2020-04-17 PROCEDURE — 82962 GLUCOSE BLOOD TEST: CPT

## 2020-04-17 RX ADMIN — SUCRALFATE 1 G: 1 SUSPENSION ORAL at 21:36

## 2020-04-17 RX ADMIN — DIPHENHYDRAMINE HYDROCHLORIDE 50 MG: 50 CAPSULE ORAL at 14:48

## 2020-04-17 RX ADMIN — INSULIN LISPRO 4 UNITS: 100 INJECTION, SOLUTION INTRAVENOUS; SUBCUTANEOUS at 11:40

## 2020-04-17 RX ADMIN — SODIUM BICARBONATE 650 MG: 650 TABLET ORAL at 17:19

## 2020-04-17 RX ADMIN — PANTOPRAZOLE SODIUM 40 MG: 40 TABLET, DELAYED RELEASE ORAL at 17:19

## 2020-04-17 RX ADMIN — SODIUM CHLORIDE, PRESERVATIVE FREE 10 ML: 5 INJECTION INTRAVENOUS at 08:31

## 2020-04-17 RX ADMIN — LEVOTHYROXINE SODIUM 200 MCG: 100 TABLET ORAL at 06:48

## 2020-04-17 RX ADMIN — DIPHENHYDRAMINE HYDROCHLORIDE 25 MG: 25 CAPSULE ORAL at 21:43

## 2020-04-17 RX ADMIN — BUDESONIDE AND FORMOTEROL FUMARATE DIHYDRATE 2 PUFF: 160; 4.5 AEROSOL RESPIRATORY (INHALATION) at 07:44

## 2020-04-17 RX ADMIN — SODIUM BICARBONATE 650 MG: 650 TABLET ORAL at 21:37

## 2020-04-17 RX ADMIN — FOLIC ACID 1 MG: 1 TABLET ORAL at 08:30

## 2020-04-17 RX ADMIN — PREDNISONE 40 MG: 20 TABLET ORAL at 08:30

## 2020-04-17 RX ADMIN — INSULIN LISPRO 6 UNITS: 100 INJECTION, SOLUTION INTRAVENOUS; SUBCUTANEOUS at 17:19

## 2020-04-17 RX ADMIN — PREGABALIN 75 MG: 75 CAPSULE ORAL at 08:30

## 2020-04-17 RX ADMIN — HYDROCODONE BITARTRATE AND ACETAMINOPHEN 1 TABLET: 5; 325 TABLET ORAL at 14:49

## 2020-04-17 RX ADMIN — SUCRALFATE 1 G: 1 SUSPENSION ORAL at 17:19

## 2020-04-17 RX ADMIN — HYDROCODONE BITARTRATE AND ACETAMINOPHEN 1 TABLET: 5; 325 TABLET ORAL at 21:43

## 2020-04-17 RX ADMIN — SUCRALFATE 1 G: 1 SUSPENSION ORAL at 11:40

## 2020-04-17 RX ADMIN — SUCRALFATE 1 G: 1 SUSPENSION ORAL at 06:48

## 2020-04-17 RX ADMIN — PANTOPRAZOLE SODIUM 40 MG: 40 TABLET, DELAYED RELEASE ORAL at 06:48

## 2020-04-17 RX ADMIN — PREGABALIN 75 MG: 75 CAPSULE ORAL at 21:37

## 2020-04-17 RX ADMIN — HYDROCODONE BITARTRATE AND ACETAMINOPHEN 1 TABLET: 5; 325 TABLET ORAL at 08:30

## 2020-04-17 RX ADMIN — IMMUNE GLOBULIN (HUMAN) 20 G: 10 INJECTION INTRAVENOUS; SUBCUTANEOUS at 16:10

## 2020-04-17 RX ADMIN — DIPHENHYDRAMINE HYDROCHLORIDE 25 MG: 25 CAPSULE ORAL at 08:30

## 2020-04-17 RX ADMIN — SODIUM BICARBONATE 650 MG: 650 TABLET ORAL at 08:30

## 2020-04-17 NOTE — PROGRESS NOTES
"DOS: 2020  NAME: Oralia Sánchez   : 1975  PCP: Provider, No Known  Chief Complaint   Patient presents with   • Shortness of Breath     CC: Stroke    Subjective: No new events overnight per RN.  RN does state patient was able to get up and walk to the bathroom today without much difficulty.  Patient denies headache, continues to complain of significant lower extremity pain, difficulty walking.  No family at bedside currently..    Interval History  History taken from: patient chart RN    Objective:  Vital signs: /84 (BP Location: Left arm, Patient Position: Lying)   Pulse 84   Temp 98.3 °F (36.8 °C) (Oral)   Resp 18   Ht 154.9 cm (61\")   Wt 112 kg (246 lb 9.6 oz)   LMP 2020   SpO2 97%   BMI 46.59 kg/m²       Physical Exam:  GENERAL: NAD, obese  HEENT: Normocephalic, atraumatic   COR: RRR  Resp: Even and unlabored  Extremities: Equal pulses, no signs of distal embolization.     Neurological:   MS: Sleeping but easily arousable, oriented.  Language normal. No neglect. Higher integrative function normal  CN: II-XII grossly normal  Motor: Able to move all extremities purposefully, strength at least 4-/5, exaggerated give way on the left. Reflexes: Absent throughout  Sensory: Intact to light touch in legs, decreased sensation to light touch and temperature in torso per patient.  Station and Gait: Patient declined.  Coordination: Slow with coordination testing, finger-to-nose shows no dysmetria on the right, clumsy on the left.  Heel-to-shin essentially normal on the right, again clumsiness on the left.    Current Medications:  Scheduled Medications:    immune globulin (human) 20 g Intravenous Q24H   And      diphenhydrAMINE 50 mg Oral Q24H   And      acetaminophen 650 mg Oral Q24H   budesonide-formoterol 2 puff Inhalation BID - RT   folic acid 1 mg Oral Daily   insulin lispro 0-9 Units Subcutaneous 4x Daily With Meals & Nightly   levothyroxine 200 mcg Oral Daily   pantoprazole 40 mg Oral " BID AC   polyethylene glycol 17 g Oral Daily   predniSONE 40 mg Oral Daily With Breakfast   pregabalin 75 mg Oral Q12H   sodium bicarbonate 650 mg Oral TID   sucralfate 1 g Oral 4x Daily AC & at Bedtime     Infusions:     hold 1 each    sodium chloride 30 mL/hr Last Rate: 30 mL/hr (04/14/20 0833)     PRN Medications:  •  acetaminophen **OR** acetaminophen **OR** acetaminophen  •  Benzocaine  •  benzonatate  •  calcium carbonate  •  dextrose  •  dextrose  •  diphenhydrAMINE  •  diphenhydrAMINE  •  famotidine  •  glucagon (human recombinant)  •  guaiFENesin-dextromethorphan  •  hold  •  HYDROcodone-acetaminophen  •  hydrocortisone sodium succinate  •  potassium chloride **OR** potassium chloride **OR** potassium chloride  •  promethazine **OR** promethazine **OR** promethazine **OR** promethazine  •  sodium chloride  •  sodium chloride    Medications Reviewed:     Laboratory results:  Lab Results   Component Value Date    GLUCOSE 132 (H) 04/17/2020    CALCIUM 9.6 04/17/2020     04/17/2020    K 4.1 04/17/2020    CO2 27.8 04/17/2020    CL 96 (L) 04/17/2020    BUN 16 04/17/2020    CREATININE 0.48 (L) 04/17/2020    EGFRIFAFRI 122 06/11/2019    EGFRIFNONA 139 04/17/2020    BCR 33.3 (H) 04/17/2020    ANIONGAP 12.2 04/17/2020     Lab Results   Component Value Date    WBC 9.86 04/17/2020    HGB 12.0 04/17/2020    HCT 34.4 04/17/2020    .7 (H) 04/17/2020     04/17/2020      Results from last 7 days   Lab Units 04/15/20  0525   CHOLESTEROL mg/dL 218*     Lab Results   Component Value Date    INR 0.91 03/27/2019    PROTIME 12.1 03/27/2019     Lab Results   Component Value Date    TSH 18.100 (H) 04/15/2020     Lab Results   Component Value Date    HGBA1C 6.68 (H) 04/01/2020     Lab Results   Component Value Date    CHOL 218 (H) 04/15/2020    CHLPL 169 06/11/2019    TRIG 277 (H) 04/15/2020    HDL 44 04/15/2020     (H) 04/15/2020      Lab Results   Component Value Date    EGUWFXIX99 650 04/15/2020     Lab  Results   Component Value Date    FOLATE 2.52 (L) 04/16/2020     Lab Results   Component Value Date    RPR Non-Reactive 04/15/2020       LUMBAR PUNCTURE Results:   Lab Results   Component Value Date    COLORCSF Colorless 04/15/2020    COLORCSF Colorless 04/15/2020    APPEARCSF Clear 04/15/2020    APPEARCSF Clear 04/15/2020    RBCCSF 7 (H) 04/15/2020    RBCCSF 0 04/15/2020    TNUCCELLS 2 04/15/2020    TNUCCELLS 1 04/15/2020    PROTEINCSF 83.0 (H) 04/15/2020    GLUCCSF 79 (H) 04/15/2020     Lab Results   Component Value Date    CSFCX No growth at 2 days 04/15/2020     Results Review:     I reviewed the patient's new clinical results.  I reviewed the patient's new imaging results and agree with the interpretation.  CT chest/abd/pelvis 4/7/20: IMPRESSION:  1. Mild groundglass opacities in the right more than left upper lungs  show interval worsening, clinical correlation and continued follow-up  recommended.  2. Presacral edema/stranding and fluid may represent inflammatory  process or potentially infiltrative process involving the rectum,  suggest clinical correlation and direct visualization as indicated.  3. Stranding around the right adnexa may be evidence of inflammatory  process involving the right adnexa, and an indeterminate rounded density  is apparent at the right adnexa. Follow-up evaluation with ultrasound is  advised. No hydronephrosis. Suspected cholelithiasis. Hepatic steatosis,  hepatomegaly.  4. Skin thickening at the anterior lower abdomen with subcutaneous  stranding density may reflect cellulitis, correlate with clinical exam  TTE 4/7/20: Interpretation Summary   · Left ventricular systolic function is normal. Calculated EF = 69.0%. Estimated EF was in agreement with the calculated EF. Estimated EF = 69%. Normal left ventricular cavity size noted. All left ventricular wall segments contract normally. Left ventricular wall thickness is consistent with moderate concentric hypertrophy. Left ventricular  diastolic function is normal.  · Moderate MAC is present. Trace mitral valve regurgitation is present.  · Trace tricuspid valve regurgitation is present. Insufficient TR velocity profile to estimate the right ventricular systolic pressure.   CT head 4/9/20: FINDINGS: A vascular stent related to the proximal aspect of right  anterior cerebral artery is noted. There is no evidence of intracranial  hemorrhage, hydrocephalus or of abnormal extra-axial fluid. No focal  area of decreased attenuation to suggest acute infarction is identified.  Further evaluation could be performed with MRI examination of the brain  and CT angiogram/MRA of the head as indicated      Impression: Ms Sánchez is a 43 yo LHW with DM, hypothyroid, ERLNI, YOUNG, obesity, tobacco abuse and h/o SAH s/p stent-assisted embolism of right A1/A2 CATHERINE fusiform aneurysm (by Dr. Barrow in March 2019) who presented to [facility] on 4/1 with dry cough, SOA, fever and fatigue.  Neurology was consulted due to generalized weakness of LEs>UEs and neuro exam revealed absence of reflexes throughout, differential including GBS.  Other possibilities include neuropathy versus myopathy in the setting of illness with high-dose steroids.    Labs for treatable causes of neuropathy: B12 651, folate 2.52,(low on folate replacement), RPR negative, TSH high 18, free T4 1.09, ; Copper 117, The SPEP pattern demonstrates a single peak (M-spike) in the beta region which may represent monoclonal protein.    She has tested negative for Sars-COV-2 twice. LP done on 4/25 abnormal, elevated protein, concerning for GBS vs transverse myelitis versus CIDP.  She was started yesterday on started on IVIG for 3-5 days; Heavy metals, cryoglobulins pending.  Clinically, per RN her mobility has improved, per patient she continues with some significant pain and deficits.  Recommend continuing PT/OT/ST.  CCP to assist with discharge planning.  We will continue to follow.    Plan:  IVIG for 5  days  Lyrica 75 mg BID  Low folate being replaced  Hypothyroidism treatment per primary  Recommend f/u outpatient with Dr. Gaytan for EMG    I discussed plans with RN, patient and Dr. Bolanos who agrees with above plan.  ..  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

## 2020-04-17 NOTE — PROGRESS NOTES
" LOS: 14 days     Name: Oralia Sánchez  Age: 44 y.o.  Sex: female  :  1975  MRN: [id number]         Primary Care Physician: Provider, No Known    Subjective   Subjective  Patient says that she feels about the same as yesterday.  Able to ambulate to and from the bathroom with assistance.  Still with weakness and numbness in her lower extremities.  Main complaint is that she is not getting enough to eat.    Objective   Vital Signs  Temp:  [97.8 °F (36.6 °C)-98.4 °F (36.9 °C)] 98.4 °F (36.9 °C)  Heart Rate:  [82-98] 86  Resp:  [16-18] 16  BP: (146-160)/() 157/92  Body mass index is 46.59 kg/m².    Objective:  General Appearance:  Comfortable and in no acute distress.    Vital signs: (most recent): Blood pressure 157/92, pulse 86, temperature 98.4 °F (36.9 °C), temperature source Oral, resp. rate 16, height 154.9 cm (61\"), weight 112 kg (246 lb 9.6 oz), last menstrual period 2020, SpO2 95 %, not currently breastfeeding.    Lungs:  Normal effort and normal respiratory rate.  She is not in respiratory distress.  There are decreased breath sounds.    Heart: Normal rate.  Regular rhythm.    Abdomen: Abdomen is soft.  (Morbidly obese).  Bowel sounds are normal.   There is no abdominal tenderness.     Extremities: There is dependent edema.  There is no local swelling.  (Trace to 1+ pitting edema of the bilateral lower extremities)  Neurological: Patient is alert and oriented to person, place and time.    Skin:  Warm and dry.              Results Review:       I reviewed the patient's new clinical results.    Results from last 7 days   Lab Units 20  0450 20  1301 20  0506 04/15/20  0525 20  0347 20  0442 20  0450   WBC 10*3/mm3 9.86 11.96* 10.18 10.58 10.15 9.79 8.55   HEMOGLOBIN g/dL 12.0 13.0 11.3* 12.8 11.8* 12.7 12.0   PLATELETS 10*3/mm3 325 348 322 319 347 365 343     Results from last 7 days   Lab Units 20  0450 20  0506 04/15/20  0525 20  0347 " 04/13/20  0442 04/12/20  0450 04/11/20  0707   SODIUM mmol/L 136 138 136 139 139 136 136   POTASSIUM mmol/L 4.1 4.3 4.3 4.3 4.4 3.9 4.0   CHLORIDE mmol/L 96* 99 94* 97* 95* 95* 93*   CO2 mmol/L 27.8 26.6 27.1 28.1 26.6 28.2 27.1   BUN mg/dL 16 14 16 17 17 13 9   CREATININE mg/dL 0.48* 0.51* 0.53* 0.57 0.72 0.57 0.59   CALCIUM mg/dL 9.6 8.8 9.1 9.0 9.0 9.3 9.2   GLUCOSE mg/dL 132* 143* 124* 145* 143* 113* 148*           Scheduled Meds:     immune globulin (human) 20 g Intravenous Q24H   And      diphenhydrAMINE 50 mg Oral Q24H   And      acetaminophen 650 mg Oral Q24H   budesonide-formoterol 2 puff Inhalation BID - RT   folic acid 1 mg Oral Daily   insulin lispro 0-9 Units Subcutaneous 4x Daily With Meals & Nightly   levothyroxine 200 mcg Oral Daily   pantoprazole 40 mg Oral BID AC   polyethylene glycol 17 g Oral Daily   predniSONE 40 mg Oral Daily With Breakfast   pregabalin 75 mg Oral Q12H   sodium bicarbonate 650 mg Oral TID   sucralfate 1 g Oral 4x Daily AC & at Bedtime     PRN Meds:   •  acetaminophen **OR** acetaminophen **OR** acetaminophen  •  Benzocaine  •  benzonatate  •  calcium carbonate  •  dextrose  •  dextrose  •  diphenhydrAMINE  •  diphenhydrAMINE  •  famotidine  •  glucagon (human recombinant)  •  guaiFENesin-dextromethorphan  •  hold  •  HYDROcodone-acetaminophen  •  hydrocortisone sodium succinate  •  potassium chloride **OR** potassium chloride **OR** potassium chloride  •  promethazine **OR** promethazine **OR** promethazine **OR** promethazine  •  sodium chloride  •  sodium chloride  Continuous Infusions:    hold 1 each    sodium chloride 30 mL/hr Last Rate: 30 mL/hr (04/14/20 0833)       Assessment/Plan   Active Hospital Problems    Diagnosis  POA   • **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) [G61.0]  Unknown   • Dyspnea [R06.00]  Yes   • Cough [R05]  Unknown   • Hypomagnesemia [E83.42]  Unknown   • Metabolic acidosis [E87.2]  Unknown   • Fatigue [R53.83]  Unknown   • History of COPD  [Z87.09]  Not Applicable   • Abnormal CT scan, colon [R93.3]  Unknown   • Tobacco abuse [Z72.0]  Yes   • Rheumatoid arthritis (CMS/HCC) [M06.9]  Yes   • Type 2 diabetes mellitus (CMS/HCC) [E11.9]  Yes   • Morbid obesity (CMS/HCC) [E66.01]  Yes   • Elevated LFTs [R94.5]  Yes   • YOUNG (nonalcoholic steatohepatitis) [K75.81]  Yes   • Diabetes mellitus arising in pregnancy [O24.419]  Yes   • Hypothyroid [E03.9]  Yes      Resolved Hospital Problems   No resolved problems to display.       Assessment & Plan    -Given results of CSF studies there is suspicion for Guillain Barré versus transverse myelitis vs CIDP.  IVIG day 2.  Monitor for improvement of symptoms  -Blood pressure is a little bit elevated today.  We will monitor over the next 24 hours.  -Blood sugars appear reasonably controlled and stable  -Therapy services    Gerber Aldana MD  [facility]  04/17/20  9:48 AM

## 2020-04-17 NOTE — THERAPY TREATMENT NOTE
Patient Name: Oralai Sánchez  : 1975    MRN: [id number]                              Today's Date: 2020       Admit Date: 2020    Visit Dx:     ICD-10-CM ICD-9-CM   1. Dyspnea, unspecified type R06.00 786.09   2. Metabolic acidosis E87.2 276.2   3. Cough R05 786.2   4. Fatigue, unspecified type R53.83 780.79   5. History of COPD Z87.09 V12.69   6. Hypomagnesemia E83.42 275.2   7. Cerebral aneurysm rupture (CMS/HCC) I60.7 430   8. Abnormal CT scan, colon R93.3 793.4     Patient Active Problem List   Diagnosis   • Vitamin D deficiency   • Awareness of heartbeats   • Adiposity   • YOUNG (nonalcoholic steatohepatitis)   • Hypothyroid   • Diabetes mellitus arising in pregnancy   • Diabetes (CMS/HCC)   • Metabolic syndrome   • Chest pain   • Primary thunderclap headache   • Elevated LFTs   • Tobacco abuse   • Rheumatoid arthritis (CMS/HCC)   • Type 2 diabetes mellitus (CMS/HCC)   • Morbid obesity (CMS/HCC)   • UTI (urinary tract infection), bacterial   • Cerebral aneurysm   • Dyspnea   • Cough   • Hypomagnesemia   • Metabolic acidosis   • Fatigue   • History of COPD   • Abnormal CT scan, colon   • 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)     Past Medical History:   Diagnosis Date   • Aneurysm (CMS/HCC)    • Arthritis    • Carpal tunnel syndrome    • Chest pain    • Diabetes mellitus (CMS/HCC)    • Dysmetabolic syndrome X    • Gestational diabetes mellitus    • Hypothyroidism    • Metabolic disorder    • Nonalcoholic steatohepatitis    • Obesity    • Palpitations    • Sleep apnea    • Spinal headache    • Type 2 diabetes mellitus (CMS/HCC)    • Vitamin D deficiency      Past Surgical History:   Procedure Laterality Date   • CARPAL TUNNEL RELEASE     • CEREBRAL ANGIOGRAM N/A 3/28/2019    Procedure: DIAGNOSTIC CEREBRAL ANGIOGRAM;  Surgeon: Alexx Barrow MD;  Location: [facility] OR ;  Service: Neurosurgery   • CEREBRAL ANGIOGRAM N/A 10/2/2019    Procedure: CEREBRAL ANGIOGRAM;  Surgeon:  Santosh Garza MD;  Location: [facility] OR ;  Service: Interventional Radiology   •  SECTION      x6   • DILATATION AND CURETTAGE      x 2   • EMBOLIZATION CEREBRAL N/A 3/29/2019    Procedure: Cererbal angiogram and embolization of cerebral aneurysm;  Surgeon: Alexx Barrow MD;  Location: [facility] OR ;  Service: Neurosurgery   • MYRINGOTOMY     • TONSILLECTOMY AND ADENOIDECTOMY       General Information     Row Name 20 1048          PT Evaluation Time/Intention    Document Type  therapy note (daily note)  -PH     Mode of Treatment  physical therapy  -     Row Name 20 1048          Safety Issues, Functional Mobility    Safety Issues Affecting Function (Mobility)  impulsivity;insight into deficits/self awareness  -     Impairments Affecting Function (Mobility)  balance;strength;endurance/activity tolerance  -       User Key  (r) = Recorded By, (t) = Taken By, (c) = Cosigned By    Initials Name Provider Type    PH [place], Gretchen, PTA Physical Therapy Assistant        Mobility     Row Name 20 1048          Bed Mobility Assessment/Treatment    Bed Mobility Assessment/Treatment  supine-sit;sit-supine  -PH     Scooting/Bridging [place] (Bed Mobility)  supervision;verbal cues  -PH     Comment (Bed Mobility)  Pt req extra time to coordinate movement  -PH     Row Name 20 1048          Transfer Assessment/Treatment    Comment (Transfers)  STS x 3; pt req min a to stand from low toilet  -     Row Name 20 1048          Sit-Stand Transfer    Sit-Stand [place] (Transfers)  contact guard;minimum assist (75% patient effort);verbal cues  -PH     Assistive Device (Sit-Stand Transfers)  walker, front-wheeled  -PH     Row Name 20 1048          Gait/Stairs Assessment/Training    Gait/Stairs Assessment/Training  gait/ambulation independence  -     [place] Level (Gait)  minimum assist (75% patient effort);1 person assist;verbal  cues;nonverbal cues (demo/gesture)  -PH     Assistive Device (Gait)  walker, front-wheeled  -PH     Distance in Feet (Gait)  12' to BR toilet then back to EOB for 3 min rest then 25' to stand at bar in hallway for TE 25' to return to sit at EOB  -PH     Pattern (Gait)  step-to  -PH     Deviations/Abnormal Patterns (Gait)  stride length decreased;gait speed decreased;quang decreased  -PH     Bilateral Gait Deviations  knee buckling, bilateral;weight shift ability decreased  -PH     Comment (Gait/Stairs)  Pt following yesterday's cues to keep B knees and B arms locked. Pt attempted to flex x 1 w/ B knees beg to buckle and no LOB  -PH       User Key  (r) = Recorded By, (t) = Taken By, (c) = Cosigned By    Initials Name Provider Type     Gretchen Aragon PTA Physical Therapy Assistant        Obj/Interventions     Row Name 04/17/20 1052          Therapeutic Exercise    Position (Therapeutic Exercise)  standing  -PH     Sets/Reps (Therapeutic Exercise)  1/10  -PH     Comment (Therapeutic Exercise)  B hip ext, B heel raises attempted w/ pt unable to stand on toes.   -PH     Row Name 04/17/20 1052          Static Sitting Balance    Level of [place] (Unsupported Sitting, Static Balance)  supervision  -PH     Sitting Position (Unsupported Sitting, Static Balance)  sitting on edge of bed  -PH     Time Able to Maintain Position (Unsupported Sitting, Static Balance)  2 to 3 minutes;1 to 2 minutes  -PH     Comment (Unsupported Sitting, Static Balance)  x 2  -PH       User Key  (r) = Recorded By, (t) = Taken By, (c) = Cosigned By    Initials Name Provider Type     Gretchen Aragon PTA Physical Therapy Assistant        Goals/Plan    No documentation.       Clinical Impression     Row Name 04/17/20 1053          Pain Scale: Numbers Pre/Post-Treatment    Pain Scale: Numbers, Pretreatment  0/10 - no pain  -PH     Pre/Post Treatment Pain Comment  Pt reports she has pain in extremities w/ activity  -PH     Pain  Intervention(s)  -- Pt left sitting at EOB w/ OT  -PH     Row Name 04/17/20 1053          Plan of Care Review    Plan of Care Reviewed With  patient  -PH     Outcome Summary  Pt agreeable to PT and amb to BR toilet then back to EOB followed by 50' amb out to hallway. Pt assumes a locked B knee and elbow position during gait w/ B knee buckling if relaxed. PT would benefit from IP rehab or SNF to improve her strength, mobility, and endurance.  PT will continue to progress as PT tolerates.   -PH     Row Name 04/17/20 1053          Vital Signs    O2 Delivery Pre Treatment  room air  -PH     O2 Delivery Intra Treatment  room air  -PH     O2 Delivery Post Treatment  room air  -PH     Row Name 04/17/20 1053          Positioning and Restraints    Pre-Treatment Position  in bed  -PH     Post Treatment Position  bed  -PH     In Bed  with OT;sitting EOB  -PH       User Key  (r) = Recorded By, (t) = Taken By, (c) = Cosigned By    Initials Name Provider Type    Gretchen Ocampo PTA Physical Therapy Assistant        Outcome Measures     Row Name 04/17/20 1059          How much help from another person do you currently need...    Turning from your back to your side while in flat bed without using bedrails?  4  -PH     Moving from lying on back to sitting on the side of a flat bed without bedrails?  4  -PH     Moving to and from a bed to a chair (including a wheelchair)?  3  -PH     Standing up from a chair using your arms (e.g., wheelchair, bedside chair)?  3  -PH     Climbing 3-5 steps with a railing?  1  -PH     To walk in hospital room?  3  -PH     AM-PAC 6 Clicks Score (PT)  18  -PH     Row Name 04/17/20 1059          Functional Assessment    Outcome Measure Options  AM-PAC 6 Clicks Basic Mobility (PT)  -PH       User Key  (r) = Recorded By, (t) = Taken By, (c) = Cosigned By    Initials Name Provider Type    Gretchen Ocampo PTA Physical Therapy Assistant          PT Recommendation and Plan     Outcome  Summary/Treatment Plan (PT)  Anticipated Discharge Disposition (PT): inpatient rehabilitation facility, skilled nursing facility  Plan of Care Reviewed With: patient  Outcome Summary: Pt agreeable to PT and amb to BR toilet then back to EOB followed by 50' amb out to hallway. Pt assumes a locked B knee and elbow position during gait w/ B knee buckling if relaxed. PT would benefit from IP rehab or SNF to improve her strength, mobility, and endurance.  PT will continue to progress as PT tolerates.      Time Calculation:   PT Charges     Row Name 04/17/20 1101             Time Calculation    Start Time  0937  -PH      Stop Time  1004  -PH      Time Calculation (min)  27 min  -PH      PT Received On  04/17/20  -PH      PT - Next Appointment  04/18/20  -        User Key  (r) = Recorded By, (t) = Taken By, (c) = Cosigned By    Initials Name Provider Type    PH Gretchen Aragon PTA Physical Therapy Assistant        Therapy Charges for Today     Code Description Service Date Service Provider Modifiers Qty    58893005827 HC PT THER PROC EA 15 MIN 4/17/2020 Gretchen Aragon PTA GP 1          PT G-Codes  Outcome Measure Options: AM-PAC 6 Clicks Basic Mobility (PT)  AM-PAC 6 Clicks Score (PT): 18  AM-PAC 6 Clicks Score (OT): 15    Gretchen Aragon PTA  4/17/2020

## 2020-04-17 NOTE — PLAN OF CARE
Problem: Patient Care Overview  Goal: Plan of Care Review  Outcome: Ongoing (interventions implemented as appropriate)  Flowsheets  Taken 4/17/2020 0502  Progress: improving  Outcome Summary: pt is alert and oriented, 2L o2 nasal cannula, no falls, pt is weak, up with assist x1 with walker to BSC, bed alarm on, medicated PRN for BLE pain, continue to monitor  Taken 4/16/2020 1941  Plan of Care Reviewed With: patient  Goal: Individualization and Mutuality  Outcome: Ongoing (interventions implemented as appropriate)  Goal: Discharge Needs Assessment  Outcome: Ongoing (interventions implemented as appropriate)     Problem: Fall Risk (Adult)  Goal: Absence of Fall  Outcome: Ongoing (interventions implemented as appropriate)  Flowsheets (Taken 4/17/2020 0502)  Absence of Fall: achieves outcome     Problem: Infection, Risk/Actual (Adult)  Goal: Infection Prevention/Resolution  Outcome: Ongoing (interventions implemented as appropriate)  Flowsheets (Taken 4/17/2020 0502)  Infection Prevention/Resolution: making progress toward outcome     Problem: Diabetes, Type 1 (Adult)  Goal: Signs and Symptoms of Listed Potential Problems Will be Absent, Minimized or Managed (Diabetes, Type 1)  Outcome: Ongoing (interventions implemented as appropriate)  Flowsheets (Taken 4/6/2020 1721 by Silva Gao, RN)  Problems Assessed (Type 1 Diabetes): all  Problems Present (Type 1 Diabetes): hyperglycemia

## 2020-04-17 NOTE — THERAPY TREATMENT NOTE
Acute Care - Occupational Therapy Treatment Note  [facility]     Patient Name: Oralia Sánchez  : 1975  MRN: [id number]  Today's Date: 2020             Admit Date: 2020       ICD-10-CM ICD-9-CM   1. Dyspnea, unspecified type R06.00 786.09   2. Metabolic acidosis E87.2 276.2   3. Cough R05 786.2   4. Fatigue, unspecified type R53.83 780.79   5. History of COPD Z87.09 V12.69   6. Hypomagnesemia E83.42 275.2   7. Cerebral aneurysm rupture (CMS/HCC) I60.7 430   8. Abnormal CT scan, colon R93.3 793.4     Patient Active Problem List   Diagnosis   • Vitamin D deficiency   • Awareness of heartbeats   • Adiposity   • YOUNG (nonalcoholic steatohepatitis)   • Hypothyroid   • Diabetes mellitus arising in pregnancy   • Diabetes (CMS/HCC)   • Metabolic syndrome   • Chest pain   • Primary thunderclap headache   • Elevated LFTs   • Tobacco abuse   • Rheumatoid arthritis (CMS/HCC)   • Type 2 diabetes mellitus (CMS/HCC)   • Morbid obesity (CMS/HCC)   • UTI (urinary tract infection), bacterial   • Cerebral aneurysm   • Dyspnea   • Cough   • Hypomagnesemia   • Metabolic acidosis   • Fatigue   • History of COPD   • Abnormal CT scan, colon   • 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)     Past Medical History:   Diagnosis Date   • Aneurysm (CMS/HCC)    • Arthritis    • Carpal tunnel syndrome    • Chest pain    • Diabetes mellitus (CMS/HCC)    • Dysmetabolic syndrome X    • Gestational diabetes mellitus    • Hypothyroidism    • Metabolic disorder    • Nonalcoholic steatohepatitis    • Obesity    • Palpitations    • Sleep apnea    • Spinal headache    • Type 2 diabetes mellitus (CMS/HCC)    • Vitamin D deficiency      Past Surgical History:   Procedure Laterality Date   • CARPAL TUNNEL RELEASE     • CEREBRAL ANGIOGRAM N/A 3/28/2019    Procedure: DIAGNOSTIC CEREBRAL ANGIOGRAM;  Surgeon: Alexx Barrow MD;  Location: [facility] OR ;  Service: Neurosurgery   • CEREBRAL ANGIOGRAM N/A 10/2/2019    Procedure:  CEREBRAL ANGIOGRAM;  Surgeon: Santosh Garza MD;  Location: [facility] OR ;  Service: Interventional Radiology   •  SECTION      x6   • DILATATION AND CURETTAGE      x 2   • EMBOLIZATION CEREBRAL N/A 3/29/2019    Procedure: Cererbal angiogram and embolization of cerebral aneurysm;  Surgeon: Alexx Barrow MD;  Location: [facility] OR ;  Service: Neurosurgery   • MYRINGOTOMY     • TONSILLECTOMY AND ADENOIDECTOMY         Therapy Treatment    Rehabilitation Treatment Summary     Row Name 20 1225             Treatment Time/Intention    Discipline  occupational therapist  -SG      Document Type  therapy note (daily note)  -SG      Subjective Information  no complaints  -SG      Mode of Treatment  individual therapy;occupational therapy  -SG      Patient/Family Observations  Pt supine in bed  -SG      Patient Effort  good  -SG      Comment  Pt slipped off shower chair due to legs buckling, pt was assisted to floor with no injuries occuring and transfered to w/c and back to bed. Socks and belt used for transfer into shower.  -SG      Recorded by [SG] Peggy Burch OTR 20 1229      Row Name 20 1225             Vital Signs    O2 Delivery Pre Treatment  room air  -SG      Recorded by [SG] Peggy Burch OTR 20 1229      Row Name 20 1225             Cognitive Assessment/Intervention- PT/OT    Orientation Status (Cognition)  oriented x 3  -SG      Safety Deficit (Cognitive)  mild deficit;insight into deficits/self awareness;awareness of need for assistance;safety precautions awareness;safety precautions follow-through/compliance  -SG      Recorded by [SG] Peggy Burch OTR 20 1229      Row Name 20 1225             Bed Mobility Assessment/Treatment    Comment (Bed Mobility)  Sitting up EOB with PT  -SG      Recorded by [SG] Peggy Burch OTR 20 1229      Row Name 20 1225             Functional Mobility    Functional Mobility- Ind.  Level  contact guard assist;minimum assist (75% patient effort);verbal cues required  -SG      Functional Mobility- Device  rolling walker  -SG      Functional Mobility- Comment  Walks to bathroom, back to bed  -SG      Recorded by [SG] Peggy Burch OTR 04/17/20 1229      Row Name 04/17/20 1225             Transfer Assessment/Treatment    Transfer Assessment/Treatment  sit-stand transfer;stand-sit transfer;shower transfer  -SG      Recorded by [SG] Peggy Burch OTR 04/17/20 1229      Row Name 04/17/20 1225             Sit-Stand Transfer    Sit-Stand [place] (Transfers)  contact guard;minimum assist (75% patient effort);verbal cues  -SG      Assistive Device (Sit-Stand Transfers)  walker, front-wheeled  -SG      Recorded by [SG] Peggy Burch OTR 04/17/20 1229      Row Name 04/17/20 1225             Stand-Sit Transfer    Stand-Sit [place] (Transfers)  minimum assist (75% patient effort);contact guard;verbal cues;nonverbal cues (demo/gesture)  -SG      Assistive Device (Stand-Sit Transfers)  walker, front-wheeled  -SG      Recorded by [SG] Peggy Burch OTR 04/17/20 1229      Row Name 04/17/20 1225             Shower Transfer    Type (Shower Transfer)  sit-stand;stand-sit  -      [place] Level (Shower Transfer)  minimum assist (75% patient effort);2 person assist;verbal cues;nonverbal cues (demo/gesture)  -SG      Assistive Device (Shower Transfer)  walker, front-wheeled;grab bars/tub rail  -SG      Recorded by [SG] Peggy Burch OTR 04/17/20 1229      Row Name 04/17/20 1225             Bathing Assessment/Intervention    Bathing [place] Level  lower body;upper body;maximum assist (25% patient effort)  -SG      Bathing Position  supported sitting;supported standing  -SG      Comment (Bathing)  A to fully dry after being lowered to floor  -SG      Recorded by [SG] Peggy Burch OTR 04/17/20 1229      Row Name 04/17/20 1225             Upper Body Dressing  Assessment/Training    Upper Body Dressing [place] Level  doff;don;minimum assist (75% patient effort);verbal cues  -SG      Recorded by [SG] Peggy Burch OTR 04/17/20 1229      Row Name 04/17/20 1225             Positioning and Restraints    Pre-Treatment Position  in bed  -SG      Post Treatment Position  bed  -SG      In Bed  notified nsg;supine;call light within reach;encouraged to call for assist;exit alarm on;with nsg  -SG      Recorded by [SG] Peggy Burch OTR 04/17/20 1229      Row Name 04/17/20 1225             Pain Scale: Numbers Pre/Post-Treatment    Pain Scale: Numbers, Pretreatment  0/10 - no pain  -SG      Recorded by [] Peggy Burch OTR 04/17/20 1229        User Key  (r) = Recorded By, (t) = Taken By, (c) = Cosigned By    Initials Name Effective Dates Discipline    SG Peggy Burch OTR 12/26/18 -  OT                 OT Recommendation and Plan     Outcome Summary: Pt improving with OT, able to pivot to [facility] min Ax2, also able to walk to sink and perform grooming tasks in standing position. She leans through forearms throughout ADL at sink due to weakness.  Outcome Measures     Row Name 04/17/20 1229 04/16/20 1700 04/15/20 1100       How much help from another person do you currently need...    Turning from your back to your side while in flat bed without using bedrails?  --  4  -JM  --    Moving from lying on back to sitting on the side of a flat bed without bedrails?  --  3  -JM  --    Moving to and from a bed to a chair (including a wheelchair)?  --  3  -JM  --    Standing up from a chair using your arms (e.g., wheelchair, bedside chair)?  --  2  -JM  --    Climbing 3-5 steps with a railing?  --  1  -JM  --    To walk in hospital room?  --  2  -JM  --    AM-PAC 6 Clicks Score (PT)  --  15  -JM  --       How much help from another is currently needed...    Putting on and taking off regular lower body clothing?  2  -SG  --  2  -SG    Bathing (including washing, rinsing, and  drying)  2  -SG  --  2  -SG    Toileting (which includes using toilet bed pan or urinal)  2  -SG  --  2  -SG    Putting on and taking off regular upper body clothing  3  -SG  --  3  -SG    Taking care of personal grooming (such as brushing teeth)  3  -SG  --  3  -SG    Eating meals  3  -SG  --  3  -SG    AM-PAC 6 Clicks Score (OT)  15  -SG  --  15  -SG       Functional Assessment    Outcome Measure Options  AM-PAC 6 Clicks Daily Activity (OT)  -SG  --  AM-PAC 6 Clicks Daily Activity (OT)  -SG    Row Name 04/14/20 1300             How much help from another person do you currently need...    Turning from your back to your side while in flat bed without using bedrails?  4  -LH      Moving from lying on back to sitting on the side of a flat bed without bedrails?  4  -LH      Moving to and from a bed to a chair (including a wheelchair)?  3  -LH      Standing up from a chair using your arms (e.g., wheelchair, bedside chair)?  2  -LH      Climbing 3-5 steps with a railing?  1  -LH      To walk in hospital room?  2  -      AM-PAC 6 Clicks Score (PT)  16  -         Functional Assessment    Outcome Measure Options  AM-PAC 6 Clicks Basic Mobility (PT)  -        User Key  (r) = Recorded By, (t) = Taken By, (c) = Cosigned By    Initials Name Provider Type    Peggy Shannon, OTR Occupational Therapist    Preeti Shannon, PT Physical Therapist    Sheree Morton, PTA Physical Therapy Assistant           Time Calculation:   Time Calculation- OT     Row Name 04/17/20 1229             Time Calculation- OT    OT Start Time  1000  -SG      OT Stop Time  1035  -SG      OT Time Calculation (min)  35 min  -SG      Total Timed Code Minutes- OT  35 minute(s)  -SG      OT Non-Billable Time (min)  35 min  -SG      OT Received On  04/17/20  -      OT - Next Appointment  04/20/20  -        User Key  (r) = Recorded By, (t) = Taken By, (c) = Cosigned By    Initials Name Provider Type    Peggy Shannon OTR Occupational  Therapist        Therapy Charges for Today     Code Description Service Date Service Provider Modifiers Qty    99654393372  OT SELF CARE/MGMT/TRAIN EA 15 MIN 4/17/2020 Peggy Burch OTR GO 2    [id number]  OT THER SUPP EA 15 MIN 4/17/2020 Peggy Burch OTR GO 2               SANDRA Valencia  4/17/2020

## 2020-04-17 NOTE — NURSING NOTE
"Approximately 1018, staff heard pt yelling \"help\" multiple time from room. Upon entering, OT noted to be assisting pt in the shower. Pt stated that her legs were giving out. RN stated to OT to assist pt to sitting position on shower floor. Staff dried pt off, applied gait belt and socks, assisted pt into wheelchair and back into bed. Pt denied any pain or hitting anything. Dr. Aldana pagechelsi and MD bala stated no more showers. Will continue to monitor.   "

## 2020-04-17 NOTE — PROGRESS NOTES
Continued Stay Note  [facility]     Patient Name: Oralia Sánchez  MRN: [id number]  Today's Date: 4/17/2020    Admit Date: 4/1/2020    Discharge Plan     Row Name 04/17/20 1526       Plan    Plan  [facility] -pending insurance approval    Patient/Family in Agreement with Plan  yes    Plan Comments  Spoke with April/[facility] who states they are working insurance approval.  anticipate discharge to acute rehab tomorrow.  April state they can administer IVIG in rehab unit.  [facility] will let [facility] know when insurance approval obtained.  WILLY Thurston RN        Discharge Codes    No documentation.             Cherry Thurston, RN

## 2020-04-17 NOTE — PLAN OF CARE
Problem: Patient Care Overview  Goal: Plan of Care Review  Outcome: Ongoing (interventions implemented as appropriate)  Flowsheets (Taken 4/17/2020 1050)  Plan of Care Reviewed With: patient  Outcome Summary: Pt agreeable to PT and amb to BR toilet then back to EOB followed by 50' amb out to hallway. Pt assumes a locked B knee and elbow position during gait w/ B knee buckling if relaxed. PT would benefit from IP rehab or SNF to improve her strength, mobility, and endurance.  PT will continue to progress as PT tolerates.

## 2020-04-18 LAB
ANION GAP SERPL CALCULATED.3IONS-SCNC: 13.3 MMOL/L (ref 5–15)
BACTERIA SPEC AEROBE CULT: NORMAL
BUN BLD-MCNC: 18 MG/DL (ref 6–20)
BUN/CREAT SERPL: 33.3 (ref 7–25)
CALCIUM SPEC-SCNC: 9.1 MG/DL (ref 8.6–10.5)
CHLORIDE SERPL-SCNC: 98 MMOL/L (ref 98–107)
CO2 SERPL-SCNC: 25.7 MMOL/L (ref 22–29)
CREAT BLD-MCNC: 0.54 MG/DL (ref 0.57–1)
DEPRECATED RDW RBC AUTO: 53.9 FL (ref 37–54)
ERYTHROCYTE [DISTWIDTH] IN BLOOD BY AUTOMATED COUNT: 14.4 % (ref 12.3–15.4)
GFR SERPL CREATININE-BSD FRML MDRD: 122 ML/MIN/1.73
GLUCOSE BLD-MCNC: 138 MG/DL (ref 65–99)
GLUCOSE BLDC GLUCOMTR-MCNC: 151 MG/DL (ref 70–130)
GLUCOSE BLDC GLUCOMTR-MCNC: 186 MG/DL (ref 70–130)
GLUCOSE BLDC GLUCOMTR-MCNC: 218 MG/DL (ref 70–130)
GLUCOSE BLDC GLUCOMTR-MCNC: 234 MG/DL (ref 70–130)
GRAM STN SPEC: NORMAL
HCT VFR BLD AUTO: 33.4 % (ref 34–46.6)
HGB BLD-MCNC: 11.4 G/DL (ref 12–15.9)
MCH RBC QN AUTO: 35.3 PG (ref 26.6–33)
MCHC RBC AUTO-ENTMCNC: 34.1 G/DL (ref 31.5–35.7)
MCV RBC AUTO: 103.4 FL (ref 79–97)
PLATELET # BLD AUTO: 289 10*3/MM3 (ref 140–450)
PMV BLD AUTO: 10.1 FL (ref 6–12)
POTASSIUM BLD-SCNC: 3.7 MMOL/L (ref 3.5–5.2)
RBC # BLD AUTO: 3.23 10*6/MM3 (ref 3.77–5.28)
SODIUM BLD-SCNC: 137 MMOL/L (ref 136–145)
WBC NRBC COR # BLD: 10.73 10*3/MM3 (ref 3.4–10.8)

## 2020-04-18 PROCEDURE — 25010000002 IMMUNE GLOBULIN (HUMAN) 20 GM/200ML SOLUTION: Performed by: NURSE PRACTITIONER

## 2020-04-18 PROCEDURE — 80048 BASIC METABOLIC PNL TOTAL CA: CPT | Performed by: INTERNAL MEDICINE

## 2020-04-18 PROCEDURE — 63710000001 PREDNISONE PER 1 MG: Performed by: INTERNAL MEDICINE

## 2020-04-18 PROCEDURE — 63710000001 DIPHENHYDRAMINE PER 50 MG: Performed by: NURSE PRACTITIONER

## 2020-04-18 PROCEDURE — 94799 UNLISTED PULMONARY SVC/PX: CPT

## 2020-04-18 PROCEDURE — 82962 GLUCOSE BLOOD TEST: CPT

## 2020-04-18 PROCEDURE — 63710000001 INSULIN LISPRO (HUMAN) PER 5 UNITS: Performed by: HOSPITALIST

## 2020-04-18 PROCEDURE — 99232 SBSQ HOSP IP/OBS MODERATE 35: CPT | Performed by: NURSE PRACTITIONER

## 2020-04-18 PROCEDURE — 85027 COMPLETE CBC AUTOMATED: CPT | Performed by: INTERNAL MEDICINE

## 2020-04-18 PROCEDURE — 63710000001 DIPHENHYDRAMINE PER 50 MG: Performed by: HOSPITALIST

## 2020-04-18 RX ORDER — AMLODIPINE BESYLATE 5 MG/1
5 TABLET ORAL
Status: DISCONTINUED | OUTPATIENT
Start: 2020-04-18 | End: 2020-04-20 | Stop reason: HOSPADM

## 2020-04-18 RX ADMIN — DIPHENHYDRAMINE HYDROCHLORIDE 25 MG: 25 CAPSULE ORAL at 04:58

## 2020-04-18 RX ADMIN — HYDROCODONE BITARTRATE AND ACETAMINOPHEN 1 TABLET: 5; 325 TABLET ORAL at 04:58

## 2020-04-18 RX ADMIN — PANTOPRAZOLE SODIUM 40 MG: 40 TABLET, DELAYED RELEASE ORAL at 17:09

## 2020-04-18 RX ADMIN — LEVOTHYROXINE SODIUM 200 MCG: 100 TABLET ORAL at 07:23

## 2020-04-18 RX ADMIN — IMMUNE GLOBULIN (HUMAN) 20 G: 10 INJECTION INTRAVENOUS; SUBCUTANEOUS at 15:28

## 2020-04-18 RX ADMIN — HYDROCODONE BITARTRATE AND ACETAMINOPHEN 1 TABLET: 5; 325 TABLET ORAL at 15:28

## 2020-04-18 RX ADMIN — FOLIC ACID 1 MG: 1 TABLET ORAL at 08:05

## 2020-04-18 RX ADMIN — SUCRALFATE 1 G: 1 SUSPENSION ORAL at 21:04

## 2020-04-18 RX ADMIN — INSULIN LISPRO 2 UNITS: 100 INJECTION, SOLUTION INTRAVENOUS; SUBCUTANEOUS at 21:04

## 2020-04-18 RX ADMIN — INSULIN LISPRO 4 UNITS: 100 INJECTION, SOLUTION INTRAVENOUS; SUBCUTANEOUS at 12:47

## 2020-04-18 RX ADMIN — SODIUM BICARBONATE 650 MG: 650 TABLET ORAL at 08:05

## 2020-04-18 RX ADMIN — ACETAMINOPHEN 650 MG: 325 TABLET, FILM COATED ORAL at 15:28

## 2020-04-18 RX ADMIN — POLYETHYLENE GLYCOL 3350 17 G: 17 POWDER, FOR SOLUTION ORAL at 08:05

## 2020-04-18 RX ADMIN — DIPHENHYDRAMINE HYDROCHLORIDE 50 MG: 50 CAPSULE ORAL at 15:28

## 2020-04-18 RX ADMIN — SODIUM BICARBONATE 650 MG: 650 TABLET ORAL at 17:09

## 2020-04-18 RX ADMIN — AMLODIPINE BESYLATE 5 MG: 5 TABLET ORAL at 15:27

## 2020-04-18 RX ADMIN — INSULIN LISPRO 4 UNITS: 100 INJECTION, SOLUTION INTRAVENOUS; SUBCUTANEOUS at 17:15

## 2020-04-18 RX ADMIN — PANTOPRAZOLE SODIUM 40 MG: 40 TABLET, DELAYED RELEASE ORAL at 07:24

## 2020-04-18 RX ADMIN — SUCRALFATE 1 G: 1 SUSPENSION ORAL at 17:09

## 2020-04-18 RX ADMIN — HYDROCODONE BITARTRATE AND ACETAMINOPHEN 1 TABLET: 5; 325 TABLET ORAL at 21:34

## 2020-04-18 RX ADMIN — PREGABALIN 75 MG: 75 CAPSULE ORAL at 08:05

## 2020-04-18 RX ADMIN — SUCRALFATE 1 G: 1 SUSPENSION ORAL at 07:24

## 2020-04-18 RX ADMIN — SODIUM BICARBONATE 650 MG: 650 TABLET ORAL at 21:04

## 2020-04-18 RX ADMIN — INSULIN LISPRO 2 UNITS: 100 INJECTION, SOLUTION INTRAVENOUS; SUBCUTANEOUS at 08:04

## 2020-04-18 RX ADMIN — PREGABALIN 75 MG: 75 CAPSULE ORAL at 21:04

## 2020-04-18 RX ADMIN — DIPHENHYDRAMINE HYDROCHLORIDE 25 MG: 25 CAPSULE ORAL at 21:34

## 2020-04-18 RX ADMIN — BUDESONIDE AND FORMOTEROL FUMARATE DIHYDRATE 2 PUFF: 160; 4.5 AEROSOL RESPIRATORY (INHALATION) at 19:49

## 2020-04-18 RX ADMIN — PREDNISONE 40 MG: 20 TABLET ORAL at 08:05

## 2020-04-18 RX ADMIN — SUCRALFATE 1 G: 1 SUSPENSION ORAL at 15:27

## 2020-04-18 RX ADMIN — BUDESONIDE AND FORMOTEROL FUMARATE DIHYDRATE 2 PUFF: 160; 4.5 AEROSOL RESPIRATORY (INHALATION) at 08:10

## 2020-04-18 NOTE — PLAN OF CARE
Problem: Patient Care Overview  Goal: Plan of Care Review  Outcome: Ongoing (interventions implemented as appropriate)  Flowsheets (Taken 4/18/2020 9670)  Progress: improving  Plan of Care Reviewed With: patient  Outcome Summary: Patient doing well.  Up to bedside commode with assist x 1, gait belt and walker.  Large BM noted.  Immune globulin given 3rd dose of 5.  VSS.  No further change.  Will continue to assist with all ambulation, with bed alarm in place, bed in low position.  Call light in reach.

## 2020-04-18 NOTE — NURSING NOTE
Still awaiting response from [facility] re: acute rehab pre-cert. Due to it being a weekend it is now unlikely that a response will be received before Monday. Will continue to follow.    Thank you!

## 2020-04-18 NOTE — PROGRESS NOTES
" LOS: 15 days     Name: Oralia Sánchez  Age: 44 y.o.  Sex: female  :  1975  MRN: [id number]         Primary Care Physician: Provider, No Known    Subjective   Subjective  Says that she feels about the same.  Per review of notes it appears that she has been able to ambulate a greater distance.  Attempted to shower yesterday with occupational therapy but became weak and had to be lowered to the ground.  She did not sustain any injury with this.    Objective   Vital Signs  Temp:  [97.9 °F (36.6 °C)-98.5 °F (36.9 °C)] 97.9 °F (36.6 °C)  Heart Rate:  [84-94] 89  Resp:  [16-18] 16  BP: (149-177)/() 172/100  Body mass index is 42.76 kg/m².    Objective:  General Appearance:  Comfortable and in no acute distress (Appears older than stated age and chronically ill-appearing).    Vital signs: (most recent): Blood pressure 172/100, pulse 89, temperature 97.9 °F (36.6 °C), temperature source Oral, resp. rate 16, height 154.9 cm (61\"), weight 103 kg (226 lb 4.8 oz), last menstrual period 2020, SpO2 97 %, not currently breastfeeding.    Lungs:  Normal effort and normal respiratory rate.    Heart: Normal rate.  Regular rhythm.    Abdomen: Abdomen is soft.  (Morbidly obese).  Bowel sounds are normal.   There is no abdominal tenderness.     Extremities: There is no dependent edema or local swelling.    Neurological: Patient is alert and oriented to person, place and time.    Skin:  Warm and dry.              Results Review:       I reviewed the patient's new clinical results.    Results from last 7 days   Lab Units 20  0553 20  0450 20  1301 20  0506 04/15/20  0525 20  0347 20  0442   WBC 10*3/mm3 10.73 9.86 11.96* 10.18 10.58 10.15 9.79   HEMOGLOBIN g/dL 11.4* 12.0 13.0 11.3* 12.8 11.8* 12.7   PLATELETS 10*3/mm3 289 325 348 322 319 347 365     Results from last 7 days   Lab Units 20  0553 20  0450 20  0506 04/15/20  0525 20  0347 20  0442 " - Tested + in ED for Influenza A  - Start Tamiflu for 5 days total       04/12/20  0450   SODIUM mmol/L 137 136 138 136 139 139 136   POTASSIUM mmol/L 3.7 4.1 4.3 4.3 4.3 4.4 3.9   CHLORIDE mmol/L 98 96* 99 94* 97* 95* 95*   CO2 mmol/L 25.7 27.8 26.6 27.1 28.1 26.6 28.2   BUN mg/dL 18 16 14 16 17 17 13   CREATININE mg/dL 0.54* 0.48* 0.51* 0.53* 0.57 0.72 0.57   CALCIUM mg/dL 9.1 9.6 8.8 9.1 9.0 9.0 9.3   GLUCOSE mg/dL 138* 132* 143* 124* 145* 143* 113*                 Scheduled Meds:     immune globulin (human) 20 g Intravenous Q24H   And      diphenhydrAMINE 50 mg Oral Q24H   And      acetaminophen 650 mg Oral Q24H   amLODIPine 5 mg Oral Q24H   budesonide-formoterol 2 puff Inhalation BID - RT   folic acid 1 mg Oral Daily   insulin lispro 0-9 Units Subcutaneous 4x Daily With Meals & Nightly   levothyroxine 200 mcg Oral Daily   pantoprazole 40 mg Oral BID AC   polyethylene glycol 17 g Oral Daily   predniSONE 40 mg Oral Daily With Breakfast   pregabalin 75 mg Oral Q12H   sodium bicarbonate 650 mg Oral TID   sucralfate 1 g Oral 4x Daily AC & at Bedtime     PRN Meds:   •  acetaminophen **OR** acetaminophen **OR** acetaminophen  •  Benzocaine  •  benzonatate  •  calcium carbonate  •  dextrose  •  dextrose  •  diphenhydrAMINE  •  diphenhydrAMINE  •  famotidine  •  glucagon (human recombinant)  •  guaiFENesin-dextromethorphan  •  hold  •  HYDROcodone-acetaminophen  •  hydrocortisone sodium succinate  •  potassium chloride **OR** potassium chloride **OR** potassium chloride  •  promethazine **OR** promethazine **OR** promethazine **OR** promethazine  •  sodium chloride  •  sodium chloride  Continuous Infusions:    hold 1 each    sodium chloride 30 mL/hr Last Rate: 30 mL/hr (04/14/20 0833)       Assessment/Plan   Active Hospital Problems    Diagnosis  POA   • **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) [G61.0]  Unknown   • Dyspnea [R06.00]  Yes   • Cough [R05]  Unknown   • Hypomagnesemia [E83.42]  Unknown   • Metabolic acidosis [E87.2]  Unknown   • Fatigue [R53.83]  Unknown   • History of COPD  [Z87.09]  Not Applicable   • Abnormal CT scan, colon [R93.3]  Unknown   • Tobacco abuse [Z72.0]  Yes   • Rheumatoid arthritis (CMS/HCC) [M06.9]  Yes   • Type 2 diabetes mellitus (CMS/HCC) [E11.9]  Yes   • Morbid obesity (CMS/HCC) [E66.01]  Yes   • Elevated LFTs [R94.5]  Yes   • YOUNG (nonalcoholic steatohepatitis) [K75.81]  Yes   • Diabetes mellitus arising in pregnancy [O24.419]  Yes   • Hypothyroid [E03.9]  Yes      Resolved Hospital Problems   No resolved problems to display.       Assessment & Plan    -Given results of CSF studies there is suspicion for Guillain Barré versus transverse myelitis vs CIDP.  IVIG day 3.  Monitor for improvement of symptoms  -Blood pressure remains elevated and will add Norvasc today.  -Blood sugars appear reasonably controlled and stable  -Therapy services  -Acute rehab evaluating    Gerber Aldana MD  [facility]  04/18/20  11:25 AM

## 2020-04-18 NOTE — PROGRESS NOTES
"DOS: 2020  NAME: Oralia Sánchez   : 1975  PCP: Provider, No Known  Chief Complaint   Patient presents with   • Shortness of Breath     CC: Stroke    Subjective: Patient apparently suffered a fall yesterday while she was in the shower.  She states her legs \"gave out \".  She is currently sitting on bedside commode, still complains of numbness, weakness in bilateral lower extremities.  She denies any headaches or any other new neurologic symptoms. Day 3/5 of IVIG.     Interval History  History taken from: patient chart RN    Objective:  Vital signs: /100 (BP Location: Left arm, Patient Position: Lying) Comment: nurse notified  Pulse 89   Temp 98.5 °F (36.9 °C) (Oral)   Resp 16   Ht 154.9 cm (61\")   Wt 103 kg (226 lb 4.8 oz)   LMP 2020   SpO2 97%   BMI 42.76 kg/m²       Physical Exam:  GENERAL: NAD, obese  HEENT: Normocephalic, atraumatic   COR: RRR  Resp: Even and unlabored  Extremities: Equal pulses, no signs of distal embolization.      Neurological:   MS:  Alert, oriented.  Language normal. No neglect. Higher integrative function normal  CN: II-XII grossly normal  Motor: Able to move all extremities purposefully, strength at least 4-/5, exaggerated give way on the left. Reflexes: Absent throughout  Sensory: Intact to light touch in legs, intact to cold sensation in torso and extremities, although patient states reduced intensity.  Station and Gait: Patient declined.  Coordination: Slow with coordination testing, finger-to-nose shows no dysmetria on the right, clumsy on the left.  Heel-to-shin essentially normal on the right, again clumsiness on the left.  Patient examined today, changes noted.    Current Medications:  Scheduled Medications:  immune globulin (human) 20 g Intravenous Q24H   And      diphenhydrAMINE 50 mg Oral Q24H   And      acetaminophen 650 mg Oral Q24H   budesonide-formoterol 2 puff Inhalation BID - RT   folic acid 1 mg Oral Daily   insulin lispro 0-9 Units " Subcutaneous 4x Daily With Meals & Nightly   levothyroxine 200 mcg Oral Daily   pantoprazole 40 mg Oral BID AC   polyethylene glycol 17 g Oral Daily   predniSONE 40 mg Oral Daily With Breakfast   pregabalin 75 mg Oral Q12H   sodium bicarbonate 650 mg Oral TID   sucralfate 1 g Oral 4x Daily AC & at Bedtime     Infusions:   hold 1 each    sodium chloride 30 mL/hr Last Rate: 30 mL/hr (04/14/20 0833)     PRN Medications:  •  acetaminophen **OR** acetaminophen **OR** acetaminophen  •  Benzocaine  •  benzonatate  •  calcium carbonate  •  dextrose  •  dextrose  •  diphenhydrAMINE  •  diphenhydrAMINE  •  famotidine  •  glucagon (human recombinant)  •  guaiFENesin-dextromethorphan  •  hold  •  HYDROcodone-acetaminophen  •  hydrocortisone sodium succinate  •  potassium chloride **OR** potassium chloride **OR** potassium chloride  •  promethazine **OR** promethazine **OR** promethazine **OR** promethazine  •  sodium chloride  •  sodium chloride    Medications Reviewed:    Laboratory results:  Lab Results   Component Value Date    GLUCOSE 138 (H) 04/18/2020    CALCIUM 9.1 04/18/2020     04/18/2020    K 3.7 04/18/2020    CO2 25.7 04/18/2020    CL 98 04/18/2020    BUN 18 04/18/2020    CREATININE 0.54 (L) 04/18/2020    EGFRIFAFRI 122 06/11/2019    EGFRIFNONA 122 04/18/2020    BCR 33.3 (H) 04/18/2020    ANIONGAP 13.3 04/18/2020     Lab Results   Component Value Date    WBC 10.73 04/18/2020    HGB 11.4 (L) 04/18/2020    HCT 33.4 (L) 04/18/2020    .4 (H) 04/18/2020     04/18/2020      Results from last 7 days   Lab Units 04/15/20  0525   CHOLESTEROL mg/dL 218*     Lab Results   Component Value Date    INR 0.91 03/27/2019    PROTIME 12.1 03/27/2019     Lab Results   Component Value Date    TSH 18.100 (H) 04/15/2020     Lab Results   Component Value Date    HGBA1C 6.68 (H) 04/01/2020     Lab Results   Component Value Date    CHOL 218 (H) 04/15/2020    CHLPL 169 06/11/2019    TRIG 277 (H) 04/15/2020    HDL 44  04/15/2020     (H) 04/15/2020      Lab Results   Component Value Date    PADPTSHG56 651 04/15/2020     Lab Results   Component Value Date    FOLATE 2.52 (L) 04/16/2020     Lab Results   Component Value Date    RPR Non-Reactive 04/15/2020     LUMBAR PUNCTURE Results:   Lab Results   Component Value Date    COLORCSF Colorless 04/15/2020    COLORCSF Colorless 04/15/2020    APPEARCSF Clear 04/15/2020    APPEARCSF Clear 04/15/2020    RBCCSF 7 (H) 04/15/2020    RBCCSF 0 04/15/2020    TNUCCELLS 2 04/15/2020    TNUCCELLS 1 04/15/2020    PROTEINCSF 83.0 (H) 04/15/2020    GLUCCSF 79 (H) 04/15/2020     Lab Results   Component Value Date    CSFCX No growth at 3 days 04/15/2020       Impression: Ms Sánchez is a 45 yo LHW with DM, hypothyroid, ERLIN, YOUNG, obesity, tobacco abuse and h/o SAH s/p stent-assisted embolism of right A1/A2 CATHERINE fusiform aneurysm (by Dr. Barrow in March 2019) who [facility] on 4[facility] with dry cough, SOA, fever and fatigue.  Neurology was consulted due to generalized weakness of LEs>UEs and neuro exam revealed absence of reflexes throughout, differential including GBS.  Other possibilities include neuropathy versus myopathy in the setting of illness with high-dose steroids.    Labs for treatable causes of neuropathy: B12 651, folate 2.52,(low on folate replacement), RPR negative, TSH high 18, free T4 1.09, ; Copper 117, The SPEP pattern demonstrates a single peak (M-spike) in the beta region which may represent monoclonal protein. Heavy metals, cryoglobulins pending.  She has tested negative for Sars-COV-2 twice. LP done on 4/25 abnormal, elevated protein, concerning for GBS vs transverse myelitis versus CIDP.      She was started yesterday on started on IVIG 2 mg/kg for 5 days;  Clinically, per RN her mobility has improved, per patient she continues with some significant pain and deficits.  Recommend continuing PT/OT/ST.  CCP to assist with discharge planning.  We will continue to  follow.    Plan:  IVIG for 5 days, today is day 3/5  Lyrica 75 mg BID  Low folate being replaced  Hypothyroidism treatment per primary  Recommend f/u outpatient with Dr. Gaytan for EMG    I have discussed the above with the RN, patient, Dr. 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

## 2020-04-19 PROBLEM — I10 ESSENTIAL HYPERTENSION: Status: ACTIVE | Noted: 2020-04-19

## 2020-04-19 LAB
GLUCOSE BLDC GLUCOMTR-MCNC: 154 MG/DL (ref 70–130)
GLUCOSE BLDC GLUCOMTR-MCNC: 210 MG/DL (ref 70–130)
GLUCOSE BLDC GLUCOMTR-MCNC: 263 MG/DL (ref 70–130)

## 2020-04-19 PROCEDURE — 97110 THERAPEUTIC EXERCISES: CPT

## 2020-04-19 PROCEDURE — 82962 GLUCOSE BLOOD TEST: CPT

## 2020-04-19 PROCEDURE — 63710000001 PREDNISONE PER 1 MG: Performed by: INTERNAL MEDICINE

## 2020-04-19 PROCEDURE — 97530 THERAPEUTIC ACTIVITIES: CPT

## 2020-04-19 PROCEDURE — 25010000002 IMMUNE GLOBULIN (HUMAN) 20 GM/200ML SOLUTION: Performed by: NURSE PRACTITIONER

## 2020-04-19 PROCEDURE — 63710000001 INSULIN LISPRO (HUMAN) PER 5 UNITS: Performed by: NURSE PRACTITIONER

## 2020-04-19 PROCEDURE — 63710000001 INSULIN LISPRO (HUMAN) PER 5 UNITS: Performed by: HOSPITALIST

## 2020-04-19 PROCEDURE — 99232 SBSQ HOSP IP/OBS MODERATE 35: CPT | Performed by: NURSE PRACTITIONER

## 2020-04-19 PROCEDURE — 94799 UNLISTED PULMONARY SVC/PX: CPT

## 2020-04-19 PROCEDURE — 63710000001 DIPHENHYDRAMINE PER 50 MG: Performed by: NURSE PRACTITIONER

## 2020-04-19 RX ORDER — HYDROCHLOROTHIAZIDE 12.5 MG/1
12.5 CAPSULE, GELATIN COATED ORAL DAILY
Status: DISCONTINUED | OUTPATIENT
Start: 2020-04-19 | End: 2020-04-20 | Stop reason: HOSPADM

## 2020-04-19 RX ADMIN — PREGABALIN 75 MG: 75 CAPSULE ORAL at 11:04

## 2020-04-19 RX ADMIN — PREDNISONE 40 MG: 20 TABLET ORAL at 11:04

## 2020-04-19 RX ADMIN — PANTOPRAZOLE SODIUM 40 MG: 40 TABLET, DELAYED RELEASE ORAL at 17:53

## 2020-04-19 RX ADMIN — ACETAMINOPHEN 650 MG: 325 TABLET, FILM COATED ORAL at 15:30

## 2020-04-19 RX ADMIN — HYDROCODONE BITARTRATE AND ACETAMINOPHEN 1 TABLET: 5; 325 TABLET ORAL at 15:30

## 2020-04-19 RX ADMIN — LEVOTHYROXINE SODIUM 200 MCG: 100 TABLET ORAL at 06:37

## 2020-04-19 RX ADMIN — AMLODIPINE BESYLATE 5 MG: 5 TABLET ORAL at 11:04

## 2020-04-19 RX ADMIN — SUCRALFATE 1 G: 1 SUSPENSION ORAL at 06:37

## 2020-04-19 RX ADMIN — SODIUM BICARBONATE 650 MG: 650 TABLET ORAL at 11:04

## 2020-04-19 RX ADMIN — SODIUM BICARBONATE 650 MG: 650 TABLET ORAL at 17:53

## 2020-04-19 RX ADMIN — INSULIN LISPRO 8 UNITS: 100 INJECTION, SOLUTION INTRAVENOUS; SUBCUTANEOUS at 17:52

## 2020-04-19 RX ADMIN — HYDROCODONE BITARTRATE AND ACETAMINOPHEN 1 TABLET: 5; 325 TABLET ORAL at 21:18

## 2020-04-19 RX ADMIN — HYDROCODONE BITARTRATE AND ACETAMINOPHEN 1 TABLET: 5; 325 TABLET ORAL at 06:37

## 2020-04-19 RX ADMIN — SODIUM BICARBONATE 650 MG: 650 TABLET ORAL at 21:17

## 2020-04-19 RX ADMIN — FOLIC ACID 1 MG: 1 TABLET ORAL at 11:04

## 2020-04-19 RX ADMIN — PANTOPRAZOLE SODIUM 40 MG: 40 TABLET, DELAYED RELEASE ORAL at 06:37

## 2020-04-19 RX ADMIN — SUCRALFATE 1 G: 1 SUSPENSION ORAL at 21:17

## 2020-04-19 RX ADMIN — HYDROCHLOROTHIAZIDE 12.5 MG: 12.5 CAPSULE ORAL at 15:31

## 2020-04-19 RX ADMIN — IMMUNE GLOBULIN (HUMAN) 20 G: 10 INJECTION INTRAVENOUS; SUBCUTANEOUS at 15:31

## 2020-04-19 RX ADMIN — SUCRALFATE 1 G: 1 SUSPENSION ORAL at 17:53

## 2020-04-19 RX ADMIN — PREGABALIN 75 MG: 75 CAPSULE ORAL at 21:17

## 2020-04-19 RX ADMIN — INSULIN LISPRO 4 UNITS: 100 INJECTION, SOLUTION INTRAVENOUS; SUBCUTANEOUS at 12:21

## 2020-04-19 RX ADMIN — DIPHENHYDRAMINE HYDROCHLORIDE 50 MG: 50 CAPSULE ORAL at 15:30

## 2020-04-19 RX ADMIN — SUCRALFATE 1 G: 1 SUSPENSION ORAL at 11:05

## 2020-04-19 RX ADMIN — BUDESONIDE AND FORMOTEROL FUMARATE DIHYDRATE 2 PUFF: 160; 4.5 AEROSOL RESPIRATORY (INHALATION) at 07:22

## 2020-04-19 RX ADMIN — SODIUM CHLORIDE, PRESERVATIVE FREE 10 ML: 5 INJECTION INTRAVENOUS at 21:18

## 2020-04-19 NOTE — NURSING NOTE
4/19/20    Patient is refusing to ambulate with nursing staff.  Patient stays in bed and will only pivot to get up to commode.  Encouraged and educated pt on the  to continue to try and ambulate. Patient stated that she would only like to get up and walk with PT as she has fallen.      Shanta do RN

## 2020-04-19 NOTE — PLAN OF CARE
Problem: Patient Care Overview  Goal: Plan of Care Review  Outcome: Ongoing (interventions implemented as appropriate)  Flowsheets (Taken 4/19/2020 5242)  Plan of Care Reviewed With: patient  Note:   VSS.  Imuno globulin therapy continues.  Pt worked with pt.  Pt refused to ambulate with RN staff.  Waiting on precert for [facility].    Goal: Individualization and Mutuality  Outcome: Ongoing (interventions implemented as appropriate)  Goal: Discharge Needs Assessment  Outcome: Ongoing (interventions implemented as appropriate)  Goal: Interprofessional Rounds/Family Conf  Outcome: Ongoing (interventions implemented as appropriate)

## 2020-04-19 NOTE — PROGRESS NOTES
"    Name: Oralia Sánchez ADMIT: 2020   : 1975  PCP: Provider, No Known    MRN: [id number] LOS: 16 days   AGE/SEX: 44 y.o. female  ROOM: [facility]/     Subjective   Subjective   Patient appears generally weak, morbidly obese, and in no apparent distress.  Voices no concerns.  Patient reports overall improvement of initial symptoms -- bilateral lower extremity numbness and \"pain\" radiating throughout lower extremity to chest reportedly resolved.     Tolerating diet.  Patient denies fever, chills, history of recent illness, or previous similar circumstances.    Review of Systems   Constitutional: Negative for chills and fever.   Respiratory: Negative for cough and shortness of breath.    Cardiovascular: Negative for chest pain.   Gastrointestinal: Negative for nausea and vomiting.        Objective   Objective   Vital Signs  Temp:  [97.5 °F (36.4 °C)-98.5 °F (36.9 °C)] 98.2 °F (36.8 °C)  Heart Rate:  [75-91] 87  Resp:  [16-20] 16  BP: (157-173)/() 173/109  SpO2:  [93 %-98 %] 94 %  on  Flow (L/min):  [2] 2;   Device (Oxygen Therapy): room air  Body mass index is 43.82 kg/m².     Physical Exam   Constitutional: She is oriented to person, place, and time. No distress.   Morbidly obese, appears generally weak & in no apparent distress   HENT:   Head: Normocephalic and atraumatic.   Eyes: Pupils are equal, round, and reactive to light. EOM are normal.   Neck: Normal range of motion. Neck supple.   Cardiovascular: Normal rate and normal heart sounds.   Pulmonary/Chest: Effort normal. No respiratory distress.   Abdominal: Soft. Bowel sounds are normal. She exhibits no distension.   Musculoskeletal: She exhibits no edema or deformity.   Neurological: She is alert and oriented to person, place, and time. No cranial nerve deficit or sensory deficit.   Skin: Skin is warm and dry. She is not diaphoretic.   Psychiatric: She has a normal mood and affect. Her behavior is normal. Judgment and thought content normal. "       Results Review:       I reviewed the patient's new clinical results.  Results from last 7 days   Lab Units 04/18/20  0553 04/17/20  0450 04/16/20  1301 04/16/20  0506   WBC 10*3/mm3 10.73 9.86 11.96* 10.18   HEMOGLOBIN g/dL 11.4* 12.0 13.0 11.3*   PLATELETS 10*3/mm3 289 325 348 322     Results from last 7 days   Lab Units 04/18/20  0553 04/17/20  0450 04/16/20  0506 04/15/20  0525   SODIUM mmol/L 137 136 138 136   POTASSIUM mmol/L 3.7 4.1 4.3 4.3   CHLORIDE mmol/L 98 96* 99 94*   CO2 mmol/L 25.7 27.8 26.6 27.1   BUN mg/dL 18 16 14 16   CREATININE mg/dL 0.54* 0.48* 0.51* 0.53*   GLUCOSE mg/dL 138* 132* 143* 124*   Estimated Creatinine Clearance: 145.3 mL/min (A) (by C-G formula based on SCr of 0.54 mg/dL (L)).  Results from last 7 days   Lab Units 04/16/20  0506 04/15/20  0909 04/15/20  0525 04/14/20  0347 04/13/20  0442   ALBUMIN g/dL 2.80* 2.4* 2.80* 3.00* 3.00*   BILIRUBIN mg/dL  --   --   --  0.6 0.7   ALK PHOS U/L  --   --   --  157* 162*   AST (SGOT) U/L  --   --   --  290* 211*   ALT (SGPT) U/L  --   --   --  138* 111*     Results from last 7 days   Lab Units 04/18/20  0553 04/17/20  0450 04/16/20  0506 04/15/20  0909 04/15/20  0525 04/14/20  0347   CALCIUM mg/dL 9.1 9.6 8.8  --  9.1 9.0   ALBUMIN g/dL  --   --  2.80* 2.4* 2.80* 3.00*   MAGNESIUM mg/dL  --   --  2.0  --  2.0  --    PHOSPHORUS mg/dL  --   --  3.8  --  4.1  --        Glucose   Date/Time Value Ref Range Status   04/19/2020 1138 210 (H) 70 - 130 mg/dL Final   04/19/2020 0649 154 (H) 70 - 130 mg/dL Final   04/18/2020 2037 186 (H) 70 - 130 mg/dL Final   04/18/2020 1613 218 (H) 70 - 130 mg/dL Final   04/18/2020 1142 234 (H) 70 - 130 mg/dL Final   04/18/2020 0633 151 (H) 70 - 130 mg/dL Final   04/17/2020 2113 144 (H) 70 - 130 mg/dL Final         immune globulin (human) 20 g Intravenous Q24H   And      diphenhydrAMINE 50 mg Oral Q24H   And      acetaminophen 650 mg Oral Q24H   amLODIPine 5 mg Oral Q24H   budesonide-formoterol 2 puff Inhalation  BID - RT   folic acid 1 mg Oral Daily   insulin lispro 0-9 Units Subcutaneous 4x Daily With Meals & Nightly   levothyroxine 200 mcg Oral Daily   pantoprazole 40 mg Oral BID AC   polyethylene glycol 17 g Oral Daily   predniSONE 40 mg Oral Daily With Breakfast   pregabalin 75 mg Oral Q12H   sodium bicarbonate 650 mg Oral TID   sucralfate 1 g Oral 4x Daily AC & at Bedtime       hold 1 each    sodium chloride 30 mL/hr Last Rate: 30 mL/hr (04/14/20 0833)   Diet Regular; Consistent Carbohydrate       Assessment/Plan     Active Hospital Problems    Diagnosis  POA   • **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) [G61.0]  Unknown   • Dyspnea [R06.00]  Yes   • Cough [R05]  Unknown   • Hypomagnesemia [E83.42]  Unknown   • Metabolic acidosis [E87.2]  Unknown   • Fatigue [R53.83]  Unknown   • History of COPD [Z87.09]  Not Applicable   • Abnormal CT scan, colon [R93.3]  Unknown   • Tobacco abuse [Z72.0]  Yes   • Rheumatoid arthritis (CMS/HCC) [M06.9]  Yes   • Type 2 diabetes mellitus (CMS/HCC) [E11.9]  Yes   • Morbid obesity (CMS/HCC) [E66.01]  Yes   • Elevated LFTs [R94.5]  Yes   • YOUNG (nonalcoholic steatohepatitis) [K75.81]  Yes   • Diabetes mellitus arising in pregnancy [O24.419]  Yes   • Hypothyroid [E03.9]  Yes      Resolved Hospital Problems   No resolved problems to display.       44 y.o. female admitted with Guillain Barré syndrome (CMS/HCC).      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)   Neurology following   IVIG 2 mg/kilogram for 5 days.  Today is day 4 of 5   Prednisone 40 daily    ·   Hypertension   Amlodipine added yesterday   Noted persistently elevated BP -- adding HCTZ 12.5 mg PO QD      YOUNG (nonalcoholic steatohepatitis) /   Elevated LFTs   Avoid nephrotoxins   LFT worse since admission   A.m. Labs, monitor for changes      Hypothyroid   Synthroid      Tobacco abuse /  History of COPD   Recommend smoking cessation       Rheumatoid arthritis (CMS/HCC)      Type 2 diabetes mellitus (CMS/HCC)   Correctional  lispro sliding scale moderate dose   Continue monitor.  Noted glucose elevation occurring around lunch time through evening. Increase to moderate-high dose.      Morbid obesity (CMS/HCC)   PT/OT following      Dyspnea /   Cough   Stable on RA.       Hypomagnesemia   WNL      Metabolic acidosis    Anion gap 13.3 on 4/18/2020   Lactate 6.2 on 4/8/2020      Fatigue   Likely 2/2 metabolic syndrome vs suspected GB      Abnormal CT scan, colon   Unremarkable unenhanced appearance of the liver, spleen, adrenal glands, pancreas, kidneys, bladder.  No bowel obstruction or abnormal bowel thickening identified.  Mild to moderate colonic fecal retention.  Presacral edema/stranding and fluid may represent inflammatory process or potentially infiltrative process involving the rectum.  No free intraperitoneal gas.  Suspected cholelithiasis, hepatic steatosis, hepatomegaly.   Ultrasound liver 4/9/2020 chronic hepatitis, possibly due to chronic hepatic steatosis.    · SCD for DVT prophylaxis.  · CPR  · Discussed with Dr. Aldana  · Anticipate discharge TBD / No response as yet from [place] regarding acute rehab precert. Do not anticipate communication until tomorrow, 4/20, at earliest.     ADOLPH Faustin  [facility]  04/19/20  13:07

## 2020-04-19 NOTE — PLAN OF CARE
Problem: Patient Care Overview  Goal: Plan of Care Review  Flowsheets (Taken 4/19/2020 2351)  Plan of Care Reviewed With: patient  Outcome Summary: Progressing towards goals with PT. Two ambulation trials performed today with CGAx2- 40' with first trial and 40' with second trial. Mild increase with assist with turning. No new complaints. Will continue to advance as able. Encouraged patient to ambulate with nsg in addition to PT.

## 2020-04-19 NOTE — THERAPY TREATMENT NOTE
Patient Name: Oralia Sánchez  : 1975    MRN: [id number]                              Today's Date: 2020       Admit Date: 2020    Visit Dx:     ICD-10-CM ICD-9-CM   1. Dyspnea, unspecified type R06.00 786.09   2. Metabolic acidosis E87.2 276.2   3. Cough R05 786.2   4. Fatigue, unspecified type R53.83 780.79   5. History of COPD Z87.09 V12.69   6. Hypomagnesemia E83.42 275.2   7. Cerebral aneurysm rupture (CMS/HCC) I60.7 430   8. Abnormal CT scan, colon R93.3 793.4     Patient Active Problem List   Diagnosis   • Vitamin D deficiency   • Awareness of heartbeats   • Adiposity   • YOUNG (nonalcoholic steatohepatitis)   • Hypothyroid   • Diabetes mellitus arising in pregnancy   • Diabetes (CMS/HCC)   • Metabolic syndrome   • Chest pain   • Primary thunderclap headache   • Elevated LFTs   • Tobacco abuse   • Rheumatoid arthritis (CMS/HCC)   • Type 2 diabetes mellitus (CMS/HCC)   • Morbid obesity (CMS/HCC)   • UTI (urinary tract infection), bacterial   • Cerebral aneurysm   • Dyspnea   • Cough   • Hypomagnesemia   • Metabolic acidosis   • Fatigue   • History of COPD   • Abnormal CT scan, colon   • 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)   • Essential hypertension     Past Medical History:   Diagnosis Date   • Aneurysm (CMS/HCC)    • Arthritis    • Carpal tunnel syndrome    • Chest pain    • Diabetes mellitus (CMS/HCC)    • Dysmetabolic syndrome X    • Gestational diabetes mellitus    • Hypothyroidism    • Metabolic disorder    • Nonalcoholic steatohepatitis    • Obesity    • Palpitations    • Sleep apnea    • Spinal headache    • Type 2 diabetes mellitus (CMS/HCC)    • Vitamin D deficiency      Past Surgical History:   Procedure Laterality Date   • CARPAL TUNNEL RELEASE     • CEREBRAL ANGIOGRAM N/A 3/28/2019    Procedure: DIAGNOSTIC CEREBRAL ANGIOGRAM;  Surgeon: Alexx Barrow MD;  Location: [facility] ;  Service: Neurosurgery   • CEREBRAL ANGIOGRAM N/A 10/2/2019    Procedure: CEREBRAL  ANGIOGRAM;  Surgeon: Santosh Garza MD;  Location: [facility] OR ;  Service: Interventional Radiology   •  SECTION      x6   • DILATATION AND CURETTAGE      x 2   • EMBOLIZATION CEREBRAL N/A 3/29/2019    Procedure: Cererbal angiogram and embolization of cerebral aneurysm;  Surgeon: Alexx Barrow MD;  Location: [facility] OR ;  Service: Neurosurgery   • MYRINGOTOMY     • TONSILLECTOMY AND ADENOIDECTOMY       General Information     Row Name 20 1545          PT Evaluation Time/Intention    Document Type  progress note/recertification  -MS     Mode of Treatment  physical therapy  -MS     Row Name 20 1545          General Information    Existing Precautions/Restrictions  fall  -MS     Row Name 20 1545          Cognitive Assessment/Intervention- PT/OT    Orientation Status (Cognition)  oriented x 3  -MS     Cognitive Assessment/Intervention Comment  Slightly impulsive  -MS     Row Name 20 1545          Safety Issues, Functional Mobility    Impairments Affecting Function (Mobility)  balance;strength;endurance/activity tolerance  -MS     Comment, Safety Issues/Impairments (Mobility)  Rambling speech, reports she wants to do everything herself  -MS       User Key  (r) = Recorded By, (t) = Taken By, (c) = Cosigned By    Initials Name Provider Type    MS KwanPeggy, PT Physical Therapist        Mobility     Row Name 20 1545          Bed Mobility Assessment/Treatment    Supine-Sit [place] (Bed Mobility)  supervision;verbal cues  -MS     Assistive Device (Bed Mobility)  bed rails;head of bed elevated  -MS     Row Name 20 1545          Transfer Assessment/Treatment    Comment (Transfers)  Cues to keep one hand on the bed rail or bed prior to standing to promote safety awareness. Patient performed multiple standing trials for also toileting needs.  -MS     Row Name 20 1545          Sit-Stand Transfer    Sit-Stand [place] (Transfers)   contact guard;minimum assist (75% patient effort);verbal cues  -MS     Assistive Device (Sit-Stand Transfers)  walker, front-wheeled  -MS     Row Name 04/19/20 1545          Gait/Stairs Assessment/Training    Gait/Stairs Assessment/Training  gait/ambulation independence  -MS     [place] Level (Gait)  contact guard;verbal cues;nonverbal cues (demo/gesture);2 person assist  -MS     Assistive Device (Gait)  walker, front-wheeled  -MS     Distance in Feet (Gait)  40' x 2 trials  -MS     Pattern (Gait)  step-to  -MS     Deviations/Abnormal Patterns (Gait)  stride length decreased;gait speed decreased;quang decreased;ataxic  -MS     Bilateral Gait Deviations  knee buckling, bilateral;weight shift ability decreased  -MS     Comment (Gait/Stairs)  wide SARABJIT, no evidence of knee buckling, fatigue in UEs and LEs reported  -MS       User Key  (r) = Recorded By, (t) = Taken By, (c) = Cosigned By    Initials Name Provider Type    MS Peggy Bowens, PT Physical Therapist        Obj/Interventions     Row Name 04/19/20 1548          Therapeutic Exercise    Lower Extremity (Therapeutic Exercise)  LAQ (long arc quad), bilateral;marching while seated;quad sets, bilateral;gluteal sets  -MS     Lower Extremity Range of Motion (Therapeutic Exercise)  ankle dorsiflexion/plantar flexion, bilateral  -MS     Position (Therapeutic Exercise)  seated  -MS     Sets/Reps (Therapeutic Exercise)  2 sets x 10 reps  -MS     Comment (Therapeutic Exercise)  Exercises provided for patient to perform on her own- bridges, SLR  -MS     Row Name 04/19/20 1548          Static Sitting Balance    Level of [place] (Unsupported Sitting, Static Balance)  supervision  -MS     Sitting Position (Unsupported Sitting, Static Balance)  sitting on edge of bed  -MS     Row Name 04/19/20 1548          Static Standing Balance    Level of [place] (Supported Standing, Static Balance)  contact guard assist;minimal assist, 75% patient effort  -MS      Assistive Device Utilized (Supported Standing, Static Balance)  walker, rolling  -MS       User Key  (r) = Recorded By, (t) = Taken By, (c) = Cosigned By    Initials Name Provider Type    MS BowensPeggy, PT Physical Therapist        Goals/Plan     Row Name 04/19/20 1554          Bed Mobility Goal 1 (PT)    Activity/Assistive Device (Bed Mobility Goal 1, PT)  bed mobility activities, all  -MS     [place] Level/Cues Needed (Bed Mobility Goal 1, PT)  independent  -MS     Time Frame (Bed Mobility Goal 1, PT)  1 week  -MS     Row Name 04/19/20 1554          Transfer Goal 1 (PT)    Activity/Assistive Device (Transfer Goal 1, PT)  transfers, all;walker, rolling  -MS     [place] Level/Cues Needed (Transfer Goal 1, PT)  supervision required  -MS     Time Frame (Transfer Goal 1, PT)  1 week  -MS     Row Name 04/19/20 1554          Gait Training Goal 1 (PT)    Activity/Assistive Device (Gait Training Goal 1, PT)  gait (walking locomotion);walker, rolling  -MS     [place] Level (Gait Training Goal 1, PT)  supervision required  -MS     Distance (Gait Goal 1, PT)  100  -MS     Time Frame (Gait Training Goal 1, PT)  1 week  -MS       User Key  (r) = Recorded By, (t) = Taken By, (c) = Cosigned By    Initials Name Provider Type    MS BowensPeggy, PT Physical Therapist        Clinical Impression     Row Name 04/19/20 9201          Pain Scale: Numbers Pre/Post-Treatment    Pain Scale: Numbers, Pretreatment  0/10 - no pain  -MS     Row Name 04/19/20 0261          Plan of Care Review    Plan of Care Reviewed With  patient  -MS     Progress  improving  -MS     Outcome Summary  Progressing towards goals with PT. Two ambulation trials performed today with CGAx2- 40' with first trial and 40' with second trial. Mild increase with assist with turning. No new complaints. Will continue to advance as able.  -MS     Row Name 04/19/20 3481          Vital Signs    O2 Delivery Pre Treatment  room air  -MS     Row Name  04/19/20 1551          Positioning and Restraints    Pre-Treatment Position  in bed  -MS     Post Treatment Position  bed  -MS     In Bed  fowlers;call light within reach;encouraged to call for assist;exit alarm on  -MS       User Key  (r) = Recorded By, (t) = Taken By, (c) = Cosigned By    Initials Name Provider Type    Peggy Meyers, PT Physical Therapist        Outcome Measures     Row Name 04/19/20 1548          How much help from another person do you currently need...    Turning from your back to your side while in flat bed without using bedrails?  4  -MS     Moving from lying on back to sitting on the side of a flat bed without bedrails?  4  -MS     Moving to and from a bed to a chair (including a wheelchair)?  3  -MS     Standing up from a chair using your arms (e.g., wheelchair, bedside chair)?  3  -MS     Climbing 3-5 steps with a railing?  1  -MS     To walk in hospital room?  3  -MS     AM-PAC 6 Clicks Score (PT)  18  -MS       User Key  (r) = Recorded By, (t) = Taken By, (c) = Cosigned By    Initials Name Provider Type    MS EvanssPeggy, PT Physical Therapist          PT Recommendation and Plan     Outcome Summary/Treatment Plan (PT)  Anticipated Discharge Disposition (PT): inpatient rehabilitation facility, skilled nursing facility  Plan of Care Reviewed With: patient  Progress: improving  Outcome Summary: Progressing towards goals with PT. Two ambulation trials performed today with CGAx2- 40' with first trial and 40' with second trial. Mild increase with assist with turning. No new complaints. Will continue to advance as able.     Time Calculation:   PT Charges     Row Name 04/19/20 1544             Time Calculation    Start Time  1535  -MS      Stop Time  1559  -MS      Time Calculation (min)  24 min  -MS      PT Received On  04/19/20  -MS      PT - Next Appointment  04/21/20  -MS      PT Goal Re-Cert Due Date  04/26/20  -MS        User Key  (r) = Recorded By, (t) = Taken By, (c) =  Cosigned By    Initials Name Provider Type    MS BowensPeggy, PT Physical Therapist        Therapy Charges for Today     Code Description Service Date Service Provider Modifiers Qty    19259988564  PT THER PROC EA 15 MIN 4/19/2020 Peggy Bowens, PT GP 1    42639236839 HC PT THERAPEUTIC ACT EA 15 MIN 4/19/2020 Peggy Bowens, PT GP 1    [id number]  PT THER SUPP EA 15 MIN 4/19/2020 Peggy Bowens, PT GP 2          PT G-Codes  Outcome Measure Options: AM-PAC 6 Clicks Daily Activity (OT)  AM-PAC 6 Clicks Score (PT): 18  AM-PAC 6 Clicks Score (OT): 15    Peggy Bowens, PT  4/19/2020

## 2020-04-19 NOTE — PROGRESS NOTES
"DOS: 2020  NAME: Oralia Sánchez   : 1975  PCP: Provider, No Known  Chief Complaint   Patient presents with   • Shortness of Breath     CC: BLE weakness, neuropathy    Subjective: No new events overnight per RN. Patient currently on BSC, still with complaints of bilateral lower extremity weakness and neuropathy with some modest improvement.  She denies any headache or new neurologic symptoms.  She is day 4/5 of IVIG tolerating well.     Interval History  History taken from: patient chart RN    Objective:  Vital signs: BP (!) 158/111 (BP Location: Right arm, Patient Position: Lying)   Pulse 90   Temp 98.5 °F (36.9 °C) (Oral)   Resp 20   Ht 154.9 cm (61\")   Wt 105 kg (231 lb 14.4 oz) Comment: high fall risk- knees buckled yesterday AM during attempt  LMP 2020   SpO2 96%   BMI 43.82 kg/m²       Physical Exam:  GENERAL: NAD, obese  HEENT: Normocephalic, atraumatic   COR: RRR  Resp: Even and unlabored  Extremities: Equal pulses, no signs of distal embolization.      Neurological:   MS:  Alert, oriented.  Language normal. No neglect. Higher integrative function normal  CN: II-XII grossly normal  Motor: Able to move all extremities purposefully, strength at least 4-/5, exaggerated give way on the left.   Reflexes: Absent throughout  Sensory: Intact to light touch in legs, intact to cold sensation in torso and extremities, although patient states reduced intensity.  Station and Gait: Able to pivot from BSC to bed with some assistance.   Coordination: Slow with coordination testing, finger-to-nose shows no dysmetria on the right, clumsy on the left.  Heel-to-shin essentially normal on the right, again clumsiness on the left.  Patient examined today, changes noted.    Current Medications:  Scheduled Medications:    immune globulin (human) 20 g Intravenous Q24H   And      diphenhydrAMINE 50 mg Oral Q24H   And      acetaminophen 650 mg Oral Q24H   amLODIPine 5 mg Oral Q24H   budesonide-formoterol 2 " puff Inhalation BID - RT   folic acid 1 mg Oral Daily   insulin lispro 0-9 Units Subcutaneous 4x Daily With Meals & Nightly   levothyroxine 200 mcg Oral Daily   pantoprazole 40 mg Oral BID AC   polyethylene glycol 17 g Oral Daily   predniSONE 40 mg Oral Daily With Breakfast   pregabalin 75 mg Oral Q12H   sodium bicarbonate 650 mg Oral TID   sucralfate 1 g Oral 4x Daily AC & at Bedtime     Infusions:     hold 1 each    sodium chloride 30 mL/hr Last Rate: 30 mL/hr (04/14/20 0833)     PRN Medications:  •  acetaminophen **OR** acetaminophen **OR** acetaminophen  •  Benzocaine  •  benzonatate  •  calcium carbonate  •  dextrose  •  dextrose  •  diphenhydrAMINE  •  diphenhydrAMINE  •  famotidine  •  glucagon (human recombinant)  •  guaiFENesin-dextromethorphan  •  hold  •  HYDROcodone-acetaminophen  •  hydrocortisone sodium succinate  •  potassium chloride **OR** potassium chloride **OR** potassium chloride  •  promethazine **OR** promethazine **OR** promethazine **OR** promethazine  •  sodium chloride  •  sodium chloride    Medications Reviewed:     Laboratory results:  Lab Results   Component Value Date    GLUCOSE 138 (H) 04/18/2020    CALCIUM 9.1 04/18/2020     04/18/2020    K 3.7 04/18/2020    CO2 25.7 04/18/2020    CL 98 04/18/2020    BUN 18 04/18/2020    CREATININE 0.54 (L) 04/18/2020    EGFRIFAFRI 122 06/11/2019    EGFRIFNONA 122 04/18/2020    BCR 33.3 (H) 04/18/2020    ANIONGAP 13.3 04/18/2020     Lab Results   Component Value Date    WBC 10.73 04/18/2020    HGB 11.4 (L) 04/18/2020    HCT 33.4 (L) 04/18/2020    .4 (H) 04/18/2020     04/18/2020      Results from last 7 days   Lab Units 04/15/20  0525   CHOLESTEROL mg/dL 218*     Lab Results   Component Value Date    INR 0.91 03/27/2019    PROTIME 12.1 03/27/2019     Lab Results   Component Value Date    TSH 18.100 (H) 04/15/2020     Lab Results   Component Value Date    HGBA1C 6.68 (H) 04/01/2020     Lab Results   Component Value Date    CHOL 218  "(H) 04/15/2020    CHLPL 169 06/11/2019    TRIG 277 (H) 04/15/2020    HDL 44 04/15/2020     (H) 04/15/2020      Lab Results   Component Value Date    KQQMSGAP14 651 04/15/2020     LUMBAR PUNCTURE Results:   Lab Results   Component Value Date    COLORCSF Colorless 04/15/2020    COLORCSF Colorless 04/15/2020    APPEARCSF Clear 04/15/2020    APPEARCSF Clear 04/15/2020    RBCCSF 7 (H) 04/15/2020    RBCCSF 0 04/15/2020    TNUCCELLS 2 04/15/2020    TNUCCELLS 1 04/15/2020    PROTEINCSF 83.0 (H) 04/15/2020    GLUCCSF 79 (H) 04/15/2020     Lab Results   Component Value Date    CSFCX No growth at 3 days 04/15/2020           Results Review:     I reviewed the patient's new clinical results.      Impression: Ms Sánchez is a 45 yo LHW with DM, hypothyroid, ERLIN, YOUNG, obesity, tobacco abuse and h/o SAH s/p stent-assisted embolism of right A1/A2 CATHERINE fusiform aneurysm (by Dr. Barrow in March 2019) who presented to [facility] on 4/1 with dry cough, SOA, fever and fatigue.  Neurology was consulted due to generalized weakness of LEs>UEs and neuro exam revealed absence of reflexes throughout, differential including GBS.  Other possibilities include neuropathy versus myopathy in the setting of illness with high-dose steroids.    Labs for treatable causes of neuropathy: B12 651, folate 2.52,(low on folate replacement), RPR negative, TSH high 18, free T4 1.09, ; Copper 117, The SPEP pattern demonstrates a single peak (M-spike) in the beta region which may represent monoclonal protein, heavy metals negative, cryoglobulins pending.  She has tested negative for Sars-COV-2 twice. LP done on 4/25 abnormal, with  elevated protein, concerning for GBS vs transverse myelitis versus CIDP.  CSF oligoclonal banding pending.    She was started on IVIG 2 mg/kg for 5 days, getting dose 4/5 today;  Clinically, her mobility has improved, per patient she continues with some significant pain and \"legs giving out\".  Recommend continuing PT/OT/ST.  CCP to " assist with discharge planning.  Okay to go to acute rehab from neurology standpoint.  She will need to follow-up outpatient with Dr. Gaytan for EMG/NCS, will task office to arrange, if she is in our acute rehab, may possibly be able to do while there. Will see again as needed. Please call with questions or concerns.     Plan:  IVIG 2 mg/kg (ideal weight) for 5 days, today is day 4/5  Lyrica 75 mg BID  Low folate being replaced  Hypothyroidism treatment per primary  Recommend f/u outpatient with Dr. Gaytan for EMG/NCS -- office has been tasked to arrange    I have discussed the above with the RN, patient and Dr. Bolanos.  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

## 2020-04-19 NOTE — PLAN OF CARE
Problem: Patient Care Overview  Goal: Plan of Care Review  Outcome: Ongoing (interventions implemented as appropriate)  Flowsheets (Taken 4/19/2020 9466)  Progress: improving  Plan of Care Reviewed With: patient  Outcome Summary: VSS, am labs, pain control, asst x1, PT to see, IVIG infusions, encourage activity, poss d/c today to [facility]

## 2020-04-19 NOTE — NURSING NOTE
No response as yet from [facility] regarding acute rehab precert. Do not anticipate communication until tomorrow, 4/20, at earliest.     Tru Miranda RN  [phone]

## 2020-04-20 ENCOUNTER — TELEPHONE (OUTPATIENT)
Dept: GASTROENTEROLOGY | Facility: CLINIC | Age: 45
End: 2020-04-20

## 2020-04-20 ENCOUNTER — HOSPITAL ENCOUNTER (INPATIENT)
Facility: HOSPITAL | Age: 45
LOS: 22 days | Discharge: HOME | End: 2020-05-12
Attending: PHYSICAL MEDICINE & REHABILITATION | Admitting: PHYSICAL MEDICINE & REHABILITATION

## 2020-04-20 VITALS
SYSTOLIC BLOOD PRESSURE: 139 MMHG | HEIGHT: 61 IN | TEMPERATURE: 98.2 F | OXYGEN SATURATION: 94 % | WEIGHT: 235.2 LBS | HEART RATE: 92 BPM | DIASTOLIC BLOOD PRESSURE: 85 MMHG | BODY MASS INDEX: 44.4 KG/M2 | RESPIRATION RATE: 16 BRPM

## 2020-04-20 DIAGNOSIS — G61.0 GBS (GUILLAIN BARRE SYNDROME) (HCC): Primary | ICD-10-CM

## 2020-04-20 LAB
ALBUMIN SERPL-MCNC: 3.5 G/DL (ref 3.5–5.2)
ALBUMIN/GLOB SERPL: 0.8 G/DL
ALP SERPL-CCNC: 149 U/L (ref 39–117)
ALT SERPL W P-5'-P-CCNC: 143 U/L (ref 1–33)
ANION GAP SERPL CALCULATED.3IONS-SCNC: 14.2 MMOL/L (ref 5–15)
AST SERPL-CCNC: 168 U/L (ref 1–32)
BILIRUB SERPL-MCNC: 0.6 MG/DL (ref 0.2–1.2)
BUN BLD-MCNC: 24 MG/DL (ref 6–20)
BUN/CREAT SERPL: 40 (ref 7–25)
CALCIUM SPEC-SCNC: 10 MG/DL (ref 8.6–10.5)
CHLORIDE SERPL-SCNC: 94 MMOL/L (ref 98–107)
CO2 SERPL-SCNC: 26.8 MMOL/L (ref 22–29)
CREAT BLD-MCNC: 0.6 MG/DL (ref 0.57–1)
CRYOGLOB SER QL 1D COLD INC: NORMAL
DEPRECATED RDW RBC AUTO: 53.2 FL (ref 37–54)
ERYTHROCYTE [DISTWIDTH] IN BLOOD BY AUTOMATED COUNT: 14.4 % (ref 12.3–15.4)
GFR SERPL CREATININE-BSD FRML MDRD: 108 ML/MIN/1.73
GLOBULIN UR ELPH-MCNC: 4.6 GM/DL
GLUCOSE BLD-MCNC: 242 MG/DL (ref 65–99)
GLUCOSE BLDC GLUCOMTR-MCNC: 159 MG/DL (ref 70–130)
GLUCOSE BLDC GLUCOMTR-MCNC: 259 MG/DL (ref 70–130)
GLUCOSE BLDC GLUCOMTR-MCNC: 265 MG/DL (ref 70–130)
HCT VFR BLD AUTO: 37.6 % (ref 34–46.6)
HGB BLD-MCNC: 13.3 G/DL (ref 12–15.9)
MCH RBC QN AUTO: 36.1 PG (ref 26.6–33)
MCHC RBC AUTO-ENTMCNC: 35.4 G/DL (ref 31.5–35.7)
MCV RBC AUTO: 102.2 FL (ref 79–97)
OLIGOCLONAL BANDS.IT SER+CSF QL: NORMAL
PLATELET # BLD AUTO: 375 10*3/MM3 (ref 140–450)
PMV BLD AUTO: 10.4 FL (ref 6–12)
POTASSIUM BLD-SCNC: 3.3 MMOL/L (ref 3.5–5.2)
POTASSIUM BLD-SCNC: 4.3 MMOL/L (ref 3.5–5.2)
PROT SERPL-MCNC: 8.1 G/DL (ref 6–8.5)
RBC # BLD AUTO: 3.68 10*6/MM3 (ref 3.77–5.28)
SODIUM BLD-SCNC: 135 MMOL/L (ref 136–145)
WBC NRBC COR # BLD: 11.22 10*3/MM3 (ref 3.4–10.8)

## 2020-04-20 PROCEDURE — 94799 UNLISTED PULMONARY SVC/PX: CPT

## 2020-04-20 PROCEDURE — 25010000002 IMMUNE GLOBULIN (HUMAN) 20 GM/200ML SOLUTION: Performed by: INTERNAL MEDICINE

## 2020-04-20 PROCEDURE — 84132 ASSAY OF SERUM POTASSIUM: CPT | Performed by: PHYSICAL MEDICINE & REHABILITATION

## 2020-04-20 PROCEDURE — 80053 COMPREHEN METABOLIC PANEL: CPT | Performed by: NURSE PRACTITIONER

## 2020-04-20 PROCEDURE — 63710000001 PREDNISONE PER 1 MG: Performed by: INTERNAL MEDICINE

## 2020-04-20 PROCEDURE — 97535 SELF CARE MNGMENT TRAINING: CPT

## 2020-04-20 PROCEDURE — 94640 AIRWAY INHALATION TREATMENT: CPT

## 2020-04-20 PROCEDURE — 85027 COMPLETE CBC AUTOMATED: CPT | Performed by: NURSE PRACTITIONER

## 2020-04-20 PROCEDURE — 82962 GLUCOSE BLOOD TEST: CPT

## 2020-04-20 PROCEDURE — 63710000001 INSULIN LISPRO (HUMAN) PER 5 UNITS: Performed by: NURSE PRACTITIONER

## 2020-04-20 PROCEDURE — 63710000001 DIPHENHYDRAMINE PER 50 MG: Performed by: INTERNAL MEDICINE

## 2020-04-20 PROCEDURE — 63710000001 INSULIN LISPRO (HUMAN) PER 5 UNITS: Performed by: INTERNAL MEDICINE

## 2020-04-20 RX ORDER — PREDNISONE 20 MG/1
40 TABLET ORAL
Status: DISCONTINUED | OUTPATIENT
Start: 2020-04-21 | End: 2020-04-27

## 2020-04-20 RX ORDER — POTASSIUM CHLORIDE 1.5 G/1.77G
40 POWDER, FOR SOLUTION ORAL AS NEEDED
Status: DISCONTINUED | OUTPATIENT
Start: 2020-04-20 | End: 2020-05-12

## 2020-04-20 RX ORDER — PREDNISONE 20 MG/1
40 TABLET ORAL
Status: CANCELLED | OUTPATIENT
Start: 2020-04-21

## 2020-04-20 RX ORDER — UREA 10 %
3 LOTION (ML) TOPICAL NIGHTLY PRN
Status: DISCONTINUED | OUTPATIENT
Start: 2020-04-20 | End: 2020-05-12 | Stop reason: HOSPADM

## 2020-04-20 RX ORDER — ACETAMINOPHEN 325 MG/1
650 TABLET ORAL EVERY 4 HOURS PRN
Status: CANCELLED | OUTPATIENT
Start: 2020-04-20

## 2020-04-20 RX ORDER — SUCRALFATE ORAL 1 G/10ML
1 SUSPENSION ORAL
Status: CANCELLED | OUTPATIENT
Start: 2020-04-20

## 2020-04-20 RX ORDER — PREGABALIN 75 MG/1
75 CAPSULE ORAL EVERY 12 HOURS SCHEDULED
Status: CANCELLED | OUTPATIENT
Start: 2020-04-20

## 2020-04-20 RX ORDER — DIPHENHYDRAMINE HCL 50 MG
50 CAPSULE ORAL EVERY 24 HOURS
Status: COMPLETED | OUTPATIENT
Start: 2020-04-20 | End: 2020-04-20

## 2020-04-20 RX ORDER — HYDROCHLOROTHIAZIDE 12.5 MG/1
12.5 CAPSULE, GELATIN COATED ORAL DAILY
Status: CANCELLED | OUTPATIENT
Start: 2020-04-21

## 2020-04-20 RX ORDER — CALCIUM CARBONATE 200(500)MG
2 TABLET,CHEWABLE ORAL 4 TIMES DAILY PRN
Status: DISCONTINUED | OUTPATIENT
Start: 2020-04-20 | End: 2020-05-12

## 2020-04-20 RX ORDER — PANTOPRAZOLE SODIUM 40 MG/1
40 TABLET, DELAYED RELEASE ORAL
Status: DISCONTINUED | OUTPATIENT
Start: 2020-04-20 | End: 2020-05-12 | Stop reason: HOSPADM

## 2020-04-20 RX ORDER — POTASSIUM CHLORIDE 7.45 MG/ML
10 INJECTION INTRAVENOUS
Status: CANCELLED | OUTPATIENT
Start: 2020-04-20

## 2020-04-20 RX ORDER — DIPHENHYDRAMINE HCL 25 MG
25 CAPSULE ORAL EVERY 4 HOURS PRN
Status: DISCONTINUED | OUTPATIENT
Start: 2020-04-20 | End: 2020-05-12 | Stop reason: HOSPADM

## 2020-04-20 RX ORDER — NICOTINE POLACRILEX 4 MG
15 LOZENGE BUCCAL
Status: CANCELLED | OUTPATIENT
Start: 2020-04-20

## 2020-04-20 RX ORDER — HYDROCODONE BITARTRATE AND ACETAMINOPHEN 5; 325 MG/1; MG/1
1 TABLET ORAL EVERY 6 HOURS PRN
Status: CANCELLED | OUTPATIENT
Start: 2020-04-20 | End: 2020-04-20

## 2020-04-20 RX ORDER — HYDROCHLOROTHIAZIDE 12.5 MG/1
12.5 CAPSULE, GELATIN COATED ORAL DAILY
Status: DISCONTINUED | OUTPATIENT
Start: 2020-04-21 | End: 2020-04-23

## 2020-04-20 RX ORDER — BUDESONIDE AND FORMOTEROL FUMARATE DIHYDRATE 160; 4.5 UG/1; UG/1
2 AEROSOL RESPIRATORY (INHALATION)
Status: DISCONTINUED | OUTPATIENT
Start: 2020-04-20 | End: 2020-05-12 | Stop reason: HOSPADM

## 2020-04-20 RX ORDER — POTASSIUM CHLORIDE 750 MG/1
40 CAPSULE, EXTENDED RELEASE ORAL AS NEEDED
Status: CANCELLED | OUTPATIENT
Start: 2020-04-20

## 2020-04-20 RX ORDER — ACETAMINOPHEN 325 MG/1
650 TABLET ORAL EVERY 24 HOURS
Status: CANCELLED | OUTPATIENT
Start: 2020-04-20 | End: 2020-04-21

## 2020-04-20 RX ORDER — ACETAMINOPHEN 160 MG/5ML
650 SOLUTION ORAL EVERY 4 HOURS PRN
Status: DISCONTINUED | OUTPATIENT
Start: 2020-04-20 | End: 2020-04-23

## 2020-04-20 RX ORDER — DEXTROSE MONOHYDRATE 25 G/50ML
25 INJECTION, SOLUTION INTRAVENOUS
Status: CANCELLED | OUTPATIENT
Start: 2020-04-20

## 2020-04-20 RX ORDER — NICOTINE POLACRILEX 4 MG
15 LOZENGE BUCCAL
Status: DISCONTINUED | OUTPATIENT
Start: 2020-04-20 | End: 2020-05-12 | Stop reason: HOSPADM

## 2020-04-20 RX ORDER — PREGABALIN 25 MG/1
75 CAPSULE ORAL EVERY 12 HOURS SCHEDULED
Status: DISCONTINUED | OUTPATIENT
Start: 2020-04-20 | End: 2020-04-27

## 2020-04-20 RX ORDER — GUAIFENESIN/DEXTROMETHORPHAN 100-10MG/5
5 SYRUP ORAL EVERY 4 HOURS PRN
Status: CANCELLED | OUTPATIENT
Start: 2020-04-20

## 2020-04-20 RX ORDER — DIPHENHYDRAMINE HYDROCHLORIDE 50 MG/ML
50 INJECTION INTRAMUSCULAR; INTRAVENOUS ONCE AS NEEDED
Status: CANCELLED | OUTPATIENT
Start: 2020-04-20

## 2020-04-20 RX ORDER — LEVOTHYROXINE SODIUM 0.2 MG/1
200 TABLET ORAL DAILY
Status: CANCELLED | OUTPATIENT
Start: 2020-04-21 | End: 2020-07-22

## 2020-04-20 RX ORDER — ACETAMINOPHEN 160 MG/5ML
650 SOLUTION ORAL EVERY 4 HOURS PRN
Status: CANCELLED | OUTPATIENT
Start: 2020-04-20

## 2020-04-20 RX ORDER — SODIUM CHLORIDE 0.9 % (FLUSH) 0.9 %
10 SYRINGE (ML) INJECTION AS NEEDED
Status: CANCELLED | OUTPATIENT
Start: 2020-04-20

## 2020-04-20 RX ORDER — DIPHENHYDRAMINE HCL 25 MG
25 CAPSULE ORAL EVERY 4 HOURS PRN
Status: CANCELLED | OUTPATIENT
Start: 2020-04-20

## 2020-04-20 RX ORDER — ACETAMINOPHEN 650 MG/1
650 SUPPOSITORY RECTAL EVERY 4 HOURS PRN
Status: DISCONTINUED | OUTPATIENT
Start: 2020-04-20 | End: 2020-04-23

## 2020-04-20 RX ORDER — BUDESONIDE AND FORMOTEROL FUMARATE DIHYDRATE 160; 4.5 UG/1; UG/1
2 AEROSOL RESPIRATORY (INHALATION)
Status: CANCELLED | OUTPATIENT
Start: 2020-04-20

## 2020-04-20 RX ORDER — PANTOPRAZOLE SODIUM 40 MG/1
40 TABLET, DELAYED RELEASE ORAL
Status: CANCELLED | OUTPATIENT
Start: 2020-04-20

## 2020-04-20 RX ORDER — CALCIUM CARBONATE 200(500)MG
2 TABLET,CHEWABLE ORAL 4 TIMES DAILY PRN
Status: CANCELLED | OUTPATIENT
Start: 2020-04-20

## 2020-04-20 RX ORDER — DEXTROSE MONOHYDRATE 25 G/50ML
25 INJECTION, SOLUTION INTRAVENOUS
Status: DISCONTINUED | OUTPATIENT
Start: 2020-04-20 | End: 2020-05-12 | Stop reason: HOSPADM

## 2020-04-20 RX ORDER — ACETAMINOPHEN 325 MG/1
650 TABLET ORAL EVERY 4 HOURS PRN
Status: DISCONTINUED | OUTPATIENT
Start: 2020-04-20 | End: 2020-04-23

## 2020-04-20 RX ORDER — ONDANSETRON 4 MG/1
4 TABLET, FILM COATED ORAL EVERY 4 HOURS PRN
Status: DISCONTINUED | OUTPATIENT
Start: 2020-04-20 | End: 2020-05-12

## 2020-04-20 RX ORDER — FOLIC ACID 1 MG/1
1 TABLET ORAL DAILY
Status: DISCONTINUED | OUTPATIENT
Start: 2020-04-21 | End: 2020-05-12 | Stop reason: HOSPADM

## 2020-04-20 RX ORDER — LEVOTHYROXINE SODIUM 0.1 MG/1
200 TABLET ORAL
Status: DISCONTINUED | OUTPATIENT
Start: 2020-04-21 | End: 2020-05-12 | Stop reason: HOSPADM

## 2020-04-20 RX ORDER — SODIUM CHLORIDE 0.9 % (FLUSH) 0.9 %
10 SYRINGE (ML) INJECTION AS NEEDED
Status: DISCONTINUED | OUTPATIENT
Start: 2020-04-20 | End: 2020-05-12

## 2020-04-20 RX ORDER — DIPHENHYDRAMINE HCL 50 MG
50 CAPSULE ORAL EVERY 24 HOURS
Status: CANCELLED | OUTPATIENT
Start: 2020-04-20 | End: 2020-04-21

## 2020-04-20 RX ORDER — POTASSIUM CHLORIDE 1.5 G/1.77G
40 POWDER, FOR SOLUTION ORAL AS NEEDED
Status: CANCELLED | OUTPATIENT
Start: 2020-04-20

## 2020-04-20 RX ORDER — AMLODIPINE BESYLATE 5 MG/1
5 TABLET ORAL
Status: CANCELLED | OUTPATIENT
Start: 2020-04-21

## 2020-04-20 RX ORDER — DIPHENHYDRAMINE HYDROCHLORIDE 50 MG/ML
50 INJECTION INTRAMUSCULAR; INTRAVENOUS ONCE AS NEEDED
Status: DISCONTINUED | OUTPATIENT
Start: 2020-04-20 | End: 2020-05-12

## 2020-04-20 RX ORDER — ACETAMINOPHEN 650 MG/1
650 SUPPOSITORY RECTAL EVERY 4 HOURS PRN
Status: CANCELLED | OUTPATIENT
Start: 2020-04-20

## 2020-04-20 RX ORDER — SUCRALFATE ORAL 1 G/10ML
1 SUSPENSION ORAL
Status: DISCONTINUED | OUTPATIENT
Start: 2020-04-20 | End: 2020-05-12 | Stop reason: HOSPADM

## 2020-04-20 RX ORDER — HYDROCODONE BITARTRATE AND ACETAMINOPHEN 5; 325 MG/1; MG/1
1 TABLET ORAL EVERY 6 HOURS PRN
Status: DISPENSED | OUTPATIENT
Start: 2020-04-20 | End: 2020-04-22

## 2020-04-20 RX ORDER — AMLODIPINE BESYLATE 5 MG/1
5 TABLET ORAL
Status: DISCONTINUED | OUTPATIENT
Start: 2020-04-21 | End: 2020-04-21

## 2020-04-20 RX ORDER — GUAIFENESIN/DEXTROMETHORPHAN 100-10MG/5
5 SYRUP ORAL EVERY 4 HOURS PRN
Status: DISCONTINUED | OUTPATIENT
Start: 2020-04-20 | End: 2020-05-12

## 2020-04-20 RX ORDER — SODIUM CHLORIDE 9 MG/ML
30 INJECTION, SOLUTION INTRAVENOUS CONTINUOUS PRN
Status: CANCELLED | OUTPATIENT
Start: 2020-04-20

## 2020-04-20 RX ORDER — FOLIC ACID 1 MG/1
1 TABLET ORAL DAILY
Status: CANCELLED | OUTPATIENT
Start: 2020-04-21

## 2020-04-20 RX ORDER — POTASSIUM CHLORIDE 7.45 MG/ML
10 INJECTION INTRAVENOUS
Status: DISCONTINUED | OUTPATIENT
Start: 2020-04-20 | End: 2020-05-12

## 2020-04-20 RX ORDER — BENZONATATE 100 MG/1
200 CAPSULE ORAL 3 TIMES DAILY PRN
Status: CANCELLED | OUTPATIENT
Start: 2020-04-20

## 2020-04-20 RX ORDER — BENZONATATE 100 MG/1
200 CAPSULE ORAL 3 TIMES DAILY PRN
Status: DISCONTINUED | OUTPATIENT
Start: 2020-04-20 | End: 2020-05-12

## 2020-04-20 RX ORDER — POTASSIUM CHLORIDE 750 MG/1
40 CAPSULE, EXTENDED RELEASE ORAL AS NEEDED
Status: DISCONTINUED | OUTPATIENT
Start: 2020-04-20 | End: 2020-05-12

## 2020-04-20 RX ORDER — ACETAMINOPHEN 325 MG/1
650 TABLET ORAL EVERY 24 HOURS
Status: COMPLETED | OUTPATIENT
Start: 2020-04-20 | End: 2020-04-20

## 2020-04-20 RX ORDER — HYDROCODONE BITARTRATE AND ACETAMINOPHEN 5; 325 MG/1; MG/1
1 TABLET ORAL EVERY 6 HOURS PRN
Status: ACTIVE | OUTPATIENT
Start: 2020-04-20 | End: 2020-04-20

## 2020-04-20 RX ADMIN — PANTOPRAZOLE SODIUM 40 MG: 40 TABLET, DELAYED RELEASE ORAL at 16:29

## 2020-04-20 RX ADMIN — PREGABALIN 75 MG: 75 CAPSULE ORAL at 08:33

## 2020-04-20 RX ADMIN — AMLODIPINE BESYLATE 5 MG: 5 TABLET ORAL at 08:33

## 2020-04-20 RX ADMIN — FOLIC ACID 1 MG: 1 TABLET ORAL at 08:33

## 2020-04-20 RX ADMIN — SUCRALFATE 1 G: 1 SUSPENSION ORAL at 06:38

## 2020-04-20 RX ADMIN — PANTOPRAZOLE SODIUM 40 MG: 40 TABLET, DELAYED RELEASE ORAL at 06:38

## 2020-04-20 RX ADMIN — PREGABALIN 75 MG: 25 CAPSULE ORAL at 20:59

## 2020-04-20 RX ADMIN — SUCRALFATE 1 G: 1 SUSPENSION ORAL at 11:33

## 2020-04-20 RX ADMIN — DIPHENHYDRAMINE HYDROCHLORIDE 25 MG: 25 CAPSULE ORAL at 20:59

## 2020-04-20 RX ADMIN — BUDESONIDE AND FORMOTEROL FUMARATE DIHYDRATE 2 PUFF: 160; 4.5 AEROSOL RESPIRATORY (INHALATION) at 07:37

## 2020-04-20 RX ADMIN — INSULIN LISPRO 3 UNITS: 100 INJECTION, SOLUTION INTRAVENOUS; SUBCUTANEOUS at 08:33

## 2020-04-20 RX ADMIN — LEVOTHYROXINE SODIUM 200 MCG: 100 TABLET ORAL at 06:38

## 2020-04-20 RX ADMIN — POTASSIUM CHLORIDE 40 MEQ: 10 CAPSULE, COATED, EXTENDED RELEASE ORAL at 11:33

## 2020-04-20 RX ADMIN — IMMUNE GLOBULIN (HUMAN) 20 G: 10 INJECTION INTRAVENOUS; SUBCUTANEOUS at 17:08

## 2020-04-20 RX ADMIN — INSULIN LISPRO 8 UNITS: 100 INJECTION, SOLUTION INTRAVENOUS; SUBCUTANEOUS at 17:08

## 2020-04-20 RX ADMIN — INSULIN LISPRO 8 UNITS: 100 INJECTION, SOLUTION INTRAVENOUS; SUBCUTANEOUS at 11:33

## 2020-04-20 RX ADMIN — POTASSIUM CHLORIDE 40 MEQ: 10 CAPSULE, COATED, EXTENDED RELEASE ORAL at 15:42

## 2020-04-20 RX ADMIN — HYDROCODONE BITARTRATE AND ACETAMINOPHEN 1 TABLET: 5; 325 TABLET ORAL at 22:11

## 2020-04-20 RX ADMIN — ACETAMINOPHEN 650 MG: 325 TABLET, FILM COATED ORAL at 16:28

## 2020-04-20 RX ADMIN — HYDROCODONE BITARTRATE AND ACETAMINOPHEN 1 TABLET: 5; 325 TABLET ORAL at 16:28

## 2020-04-20 RX ADMIN — SUCRALFATE 1 G: 1 SUSPENSION ORAL at 20:59

## 2020-04-20 RX ADMIN — DIPHENHYDRAMINE HYDROCHLORIDE 50 MG: 50 CAPSULE ORAL at 16:28

## 2020-04-20 RX ADMIN — BUDESONIDE AND FORMOTEROL FUMARATE DIHYDRATE 2 PUFF: 160; 4.5 AEROSOL RESPIRATORY (INHALATION) at 21:04

## 2020-04-20 RX ADMIN — PREDNISONE 40 MG: 20 TABLET ORAL at 08:33

## 2020-04-20 RX ADMIN — HYDROCHLOROTHIAZIDE 12.5 MG: 12.5 CAPSULE ORAL at 08:33

## 2020-04-20 RX ADMIN — SUCRALFATE 1 G: 1 SUSPENSION ORAL at 16:29

## 2020-04-20 NOTE — DISCHARGE SUMMARY
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) [G61.0]  Unknown   • Essential hypertension [I10]  Unknown   • Dyspnea [R06.00]  Yes   • Cough [R05]  Unknown   • Hypomagnesemia [E83.42]  Unknown   • Metabolic acidosis [E87.2]  Unknown   • Fatigue [R53.83]  Unknown   • History of COPD [Z87.09]  Not Applicable   • Abnormal CT scan, colon [R93.3]  Unknown   • Tobacco abuse [Z72.0]  Yes   • Rheumatoid arthritis (CMS/HCC) [M06.9]  Yes   • Type 2 diabetes mellitus (CMS/HCC) [E11.9]  Yes   • Morbid obesity (CMS/HCC) [E66.01]  Yes   • Elevated LFTs [R94.5]  Yes   • YOUNG (nonalcoholic steatohepatitis) [K75.81]  Yes   • Diabetes mellitus arising in pregnancy [O24.419]  Yes   • Hypothyroid [E03.9]  Yes      Resolved Hospital Problems   No resolved problems to display.       DETAILS OF HOSPITAL STAY     Pertinent Test Results and Procedures Performed    Chest x-ray:  The exam is limited by the patient's marked obesity. The lungs  remain well-expanded with some minimal vague haziness in the lower left  chest predominantly related to overlying soft tissues. I cannot  completely exclude some minimal underlying pneumonia in this region. The  heart size is normal.    CT scan of the chest on 4/4/20:  There is no evidence for an acute infiltrate within the left lung base.  However, there are very subtle small patchy areas of groundglass opacity  within the right lung apex as best seen on images #15 and 19 that were  not evident on the prior chest CT of 11/10/2019. These findings are  entirely nonspecific in nature and could represent an age-indeterminate  pneumonitis. If the patient's symptoms do not improve or worsen, I would  recommend a follow-up CT scan of the chest without use of IV contrast  during this admission. However, if the patient's symptoms do  improve, I  would still recommend a follow-up CT scan of the chest but, in this  particular case, at interval of approximately 2-3 weeks.     CT scan of the chest, abdomen, and pelvis on 4/7/2020:  1. Mild groundglass opacities in the right more than left upper lungs  show interval worsening, clinical correlation and continued follow-up  recommended.  2. Presacral edema/stranding and fluid may represent inflammatory  process or potentially infiltrative process involving the rectum,  suggest clinical correlation and direct visualization as indicated.  3. Stranding around the right adnexa may be evidence of inflammatory  process involving the right adnexa, and an indeterminate rounded density  is apparent at the right adnexa. Follow-up evaluation with ultrasound is  advised. No hydronephrosis. Suspected cholelithiasis. Hepatic steatosis,  hepatomegaly.  4. Skin thickening at the anterior lower abdomen with subcutaneous  stranding density may reflect cellulitis, correlate with clinical exam.     Liver ultrasound on 4/9/2020:  1. There are sonographic features of the liver that are consistent with  hepatic steatosis. Chronic hepatitis, possibly due to chronic hepatic  steatosis is also suspected.  2. Gallbladder sludge.    Transvaginal ultrasound on 4/9/2020:  1. There is a 4.0 cm right ovarian benign-appearing cyst which may  represent a paraovarian cyst or possibly a follicular cyst. Six-month  sonographic follow-up is recommended.  2. Normal sonographic appearance of the pelvis and left ovary.    Head CT on 4/10/2020:  A vascular stent related to the proximal aspect of right  anterior cerebral artery is noted. There is no evidence of intracranial  hemorrhage, hydrocephalus or of abnormal extra-axial fluid. No focal  area of decreased attenuation to suggest acute infarction is identified.  Further evaluation could be performed with MRI examination of the brain  and CT angiogram/MRA of the head as indicated.    Patient  underwent lumbar puncture on 4/15/2020    Flexible sigmoidoscopy on 4/14/2020:  - Preparation of the colon was fair.  - One 4 mm polyp in the rectum, removed with a cold biopsy forceps. Resected and retrieved.  - Non-bleeding internal hemorrhoids.  - The examination was otherwise normal.    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 Barrow in March 2019) who presented to [facility] on 4/1 with dry cough, SOA, fever and fatigue.  Initially there was concern for COVID 19 and she has tested negative for coronavirus.  Infectious disease and pulmonology followed her but they did not feel she had a active infectious issue.  She was diuresed initially and her respiratory status is stable.  It appears that back around 4/10/2020 she was started on an empiric trial of steroids for treatment of possible dermatomyositis or other autoimmune disorder.  Despite this still was having inflammatory markers and generalized weakness.  She also had abnormal CT of abdomen with questionable abnormality of the rectum and GI performed a flexible sigmoidoscopy as described above and recommended full prep and colonoscopy in the outpatient setting.  Neurology was consulted due to generalized weakness of LEs>UEs and neuro exam revealed absence of reflexes throughout, differential including GBS.  Other possibilities include neuropathy versus myopathy in the setting of illness with high-dose steroids or CIDP.  The steroids were continued from 4/10/2020 onward and neurology started IVIG.  Today will be her fifth and final day of IVIG.  She has shown improvement with this regimen with increased strength and sensation of her feet.  She also has peripheral neuropathy and neurology started Lyrica.  At this point it does not appear to me that she needs ongoing steroids but since she has been on them for 10 days she will  need a slower taper which can begin when she goes to acute rehab.  Additionally, her blood pressure has been significantly elevated at least for the last several days.  Pressures are now looking better with the addition of Norvasc and hydrochlorothiazide.  May not need as intense of an antihypertensive regiment once she is more fully recovered and off of the steroids but would recommend continuation of these for now.  At this point she is medically stable and has obtained precertification for acute rehab where she will transition today.    Physical Exam at Discharge:  General: No acute distress, AAOx3, chronically ill-appearing  HEENT: EOMI, PERRL  Cardiovascular: +s1 and s2, RRR  Lungs: No rhonchi or wheezing  Abdomen: soft, nontender, morbidly obese    Consults:   Consults     Date and Time Order Name Status Description    4/13/2020 1636 Inpatient Neurology Consult General Completed     4/8/2020 0933 Inpatient Gastroenterology Consult Completed     4/3/2020 1524 Inpatient Infectious Diseases Consult Completed     4/3/2020 1523 Inpatient Pulmonology Consult Completed     4/1/2020 1039 [facility] (on-call MD unless specified) Details Completed             Condition on Discharge: Stable, improving    Discharge Disposition  Rehab Facility or Unit ([facility])    Discharge Medications  See medication reconciliation    Discharge Diet:  Regular, consistent carbohydrate    Activity at Discharge: As tolerated    Follow-up Appointments  No future appointments.      Test Results Pending at Discharge   Order Current Status    Anti-Cherise 1 Antibody, IgG In process    Cryoglobulin In process    Oligoclonal Banding In process    Oligoclonal Banding - Cerebrospinal Fluid, Lumbar Puncture In process          I have examined and discussed discharge planning with the patient today.     Gerber Aldana MD  04/20/20  11:56 AM    Time: Discharge greater than 30 min

## 2020-04-20 NOTE — PROGRESS NOTES
" LOS: 17 days     Name: Oralia Sánchez  Age: 44 y.o.  Sex: female  :  1975  MRN: [id number]         Primary Care Physician: Provider, No Known    Subjective   Subjective  Continues to feel stronger each day.  No new complaints.    Objective   Vital Signs  Temp:  [98 °F (36.7 °C)-98.6 °F (37 °C)] 98.6 °F (37 °C)  Heart Rate:  [77-87] 83  Resp:  [16] 16  BP: (122-173)/() 149/91  Body mass index is 44.44 kg/m².    Objective:  General Appearance:  Comfortable and in no acute distress.    Vital signs: (most recent): Blood pressure 149/91, pulse 83, temperature 98.6 °F (37 °C), temperature source Oral, resp. rate 16, height 154.9 cm (61\"), weight 107 kg (235 lb 3.2 oz), last menstrual period 2020, SpO2 96 %, not currently breastfeeding.    Lungs:  Normal effort and normal respiratory rate.    Heart: Normal rate.  Regular rhythm.    Abdomen: Abdomen is soft.  (Obese).  Bowel sounds are normal.   There is no abdominal tenderness.     Extremities: There is no dependent edema or local swelling.    Neurological: Patient is alert and oriented to person, place and time.    Skin:  Warm and dry.              Results Review:       I reviewed the patient's new clinical results.    Results from last 7 days   Lab Units 20  0445 20  0553 20  0450 20  1301 20  0506 04/15/20  0525 20  0347   WBC 10*3/mm3 11.22* 10.73 9.86 11.96* 10.18 10.58 10.15   HEMOGLOBIN g/dL 13.3 11.4* 12.0 13.0 11.3* 12.8 11.8*   PLATELETS 10*3/mm3 375 289 325 348 322 319 347     Results from last 7 days   Lab Units 20  0446 20  0553 20  0450 20  0506 04/15/20  0525 20  0347   SODIUM mmol/L 135* 137 136 138 136 139   POTASSIUM mmol/L 3.3* 3.7 4.1 4.3 4.3 4.3   CHLORIDE mmol/L 94* 98 96* 99 94* 97*   CO2 mmol/L 26.8 25.7 27.8 26.6 27.1 28.1   BUN mg/dL 24* 18 16 14 16 17   CREATININE mg/dL 0.60 0.54* 0.48* 0.51* 0.53* 0.57   CALCIUM mg/dL 10.0 9.1 9.6 8.8 9.1 9.0   GLUCOSE mg/dL " 242* 138* 132* 143* 124* 145*                 Scheduled Meds:     immune globulin (human) 20 g Intravenous Q24H   And      diphenhydrAMINE 50 mg Oral Q24H   And      acetaminophen 650 mg Oral Q24H   amLODIPine 5 mg Oral Q24H   budesonide-formoterol 2 puff Inhalation BID - RT   folic acid 1 mg Oral Daily   hydroCHLOROthiazide Oral 12.5 mg Oral Daily   insulin lispro 0-14 Units Subcutaneous TID AC   levothyroxine 200 mcg Oral Daily   pantoprazole 40 mg Oral BID AC   polyethylene glycol 17 g Oral Daily   predniSONE 40 mg Oral Daily With Breakfast   pregabalin 75 mg Oral Q12H   sucralfate 1 g Oral 4x Daily AC & at Bedtime     PRN Meds:   •  acetaminophen **OR** acetaminophen **OR** acetaminophen  •  Benzocaine  •  benzonatate  •  calcium carbonate  •  dextrose  •  dextrose  •  diphenhydrAMINE  •  diphenhydrAMINE  •  famotidine  •  glucagon (human recombinant)  •  guaiFENesin-dextromethorphan  •  hold  •  HYDROcodone-acetaminophen  •  hydrocortisone sodium succinate  •  potassium chloride **OR** potassium chloride **OR** potassium chloride  •  promethazine **OR** promethazine **OR** promethazine **OR** promethazine  •  sodium chloride  •  sodium chloride  Continuous Infusions:    hold 1 each    sodium chloride 30 mL/hr Last Rate: 30 mL/hr (04/14/20 0833)       Assessment/Plan   Active Hospital Problems    Diagnosis  POA   • **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) [G61.0]  Unknown   • Essential hypertension [I10]  Unknown   • Dyspnea [R06.00]  Yes   • Cough [R05]  Unknown   • Hypomagnesemia [E83.42]  Unknown   • Metabolic acidosis [E87.2]  Unknown   • Fatigue [R53.83]  Unknown   • History of COPD [Z87.09]  Not Applicable   • Abnormal CT scan, colon [R93.3]  Unknown   • Tobacco abuse [Z72.0]  Yes   • Rheumatoid arthritis (CMS/HCC) [M06.9]  Yes   • Type 2 diabetes mellitus (CMS/HCC) [E11.9]  Yes   • Morbid obesity (CMS/HCC) [E66.01]  Yes   • Elevated LFTs [R94.5]  Yes   • YOUNG (nonalcoholic steatohepatitis) [K75.81]   Yes   • Diabetes mellitus arising in pregnancy [O24.419]  Yes   • Hypothyroid [E03.9]  Yes      Resolved Hospital Problems   No resolved problems to display.       Assessment & Plan    -Final day of IVIG today.  She has shown improvement with this regimen.  -Awaiting finalization of rehab plans.  She can be discharged once this is obtained.  -Blood pressure looks better today and will continue Norvasc and HCTZ.  -Replace potassium  -She will follow-up with Dr. Gaytan of neurology for EMG/NCS.  Their office to arrange an appointment.    Dispo  Acute rehab, pre-CERT pending    Gerber Aldana MD  [facility]  04/20/20  9:46 AM

## 2020-04-20 NOTE — NURSING NOTE
Acute inpatient rehab    [facility] is in network with this patient's insurance. Please put in discharge readmit order and transfer to rehab today. MARCO Galaviz RN

## 2020-04-20 NOTE — PLAN OF CARE
Problem: Patient Care Overview  Goal: Plan of Care Review  Outcome: Ongoing (interventions implemented as appropriate)  Flowsheets (Taken 4/20/2020 0226)  Progress: improving  Plan of Care Reviewed With: patient  Outcome Summary: VSS, pain controlled w/PO Norco, overall seems to be up, moving, alert and more involved in care than last week, rec'd IVIG dose today, tolerated well, appetite improved, pending insurance auth for rehab for d/c plan, will continue to monitor.

## 2020-04-20 NOTE — TELEPHONE ENCOUNTER
Called patient, left voicemail to call back to make a 3 month follow up with  to discuss possibly having an EGD and colonoscopy.

## 2020-04-20 NOTE — PAYOR COMM NOTE
"DISCHARGED    REF #[id number]        Oralia Smith (44 y.o. Female)     Date of Birth Social Security Number Address Home Phone MRN    1975  [id number] [address] [address] [facility] [id number] [phone] [id number]    Zoroastrianism Marital Status          Pent[employer]ostalism        Admission Date Admission Type Admitting Provider Attending Provider Department, Room/Bed    4/1/20 Emergency Tylor Cruz MD  [address], E564/1    Discharge Date Discharge Disposition Discharge Destination        4/20/2020 Rehab Facility or Unit ([facility])              Attending Provider:  (none)   Allergies:  No Known Drug Allergy    Isolation:  None   Infection:  None   Code Status:  CPR    Ht:  154.9 cm (61\")   Wt:  107 kg (235 lb 3.2 oz)    Admission Cmt:  None   Principal Problem:  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            Discharge Order (From admission, onward)     Start     Ordered    04/20/20 1153  Discharge readmit patient  Once     Expected Discharge Date:  04/20/20    Discharge Disposition:  Rehab Facility or Unit ([facility])    Physician of Record for Attribution - Please select from Treatment Team:  SALLIE SANCHEZ [[id number]]    Review needed by CMO to determine Physician of Record:  No       Question Answer Comment   Physician of " Record for Attribution - Please select from Treatment Team SALLIE SANCHEZ    Review needed by CMO to determine Physician of Record No        04/20/20 7506

## 2020-04-20 NOTE — PLAN OF CARE
Problem: Patient Care Overview  Goal: Plan of Care Review  Outcome: Ongoing (interventions implemented as appropriate)  Flowsheets  Taken 4/20/2020 1400  Plan of Care Reviewed With: patient  Taken 4/20/2020 8882  Outcome Summary: Pt seen for grooming and toileting tasks.  B LE weakness improving but still limits ability to complete ADL tasks.

## 2020-04-21 PROBLEM — G61.0 GBS (GUILLAIN BARRE SYNDROME) (HCC): Status: ACTIVE | Noted: 2020-04-21

## 2020-04-21 LAB
GLUCOSE BLDC GLUCOMTR-MCNC: 100 MG/DL (ref 70–130)
GLUCOSE BLDC GLUCOMTR-MCNC: 271 MG/DL (ref 70–130)
GLUCOSE BLDC GLUCOMTR-MCNC: 330 MG/DL (ref 70–130)

## 2020-04-21 PROCEDURE — 63710000001 INSULIN LISPRO (HUMAN) PER 5 UNITS: Performed by: INTERNAL MEDICINE

## 2020-04-21 PROCEDURE — 94799 UNLISTED PULMONARY SVC/PX: CPT

## 2020-04-21 PROCEDURE — 82962 GLUCOSE BLOOD TEST: CPT

## 2020-04-21 PROCEDURE — 63710000001 PREDNISONE PER 1 MG: Performed by: INTERNAL MEDICINE

## 2020-04-21 PROCEDURE — 63710000001 DIPHENHYDRAMINE PER 50 MG: Performed by: INTERNAL MEDICINE

## 2020-04-21 PROCEDURE — 96125 COGNITIVE TEST BY HC PRO: CPT

## 2020-04-21 PROCEDURE — 97161 PT EVAL LOW COMPLEX 20 MIN: CPT

## 2020-04-21 PROCEDURE — 97110 THERAPEUTIC EXERCISES: CPT

## 2020-04-21 PROCEDURE — 97166 OT EVAL MOD COMPLEX 45 MIN: CPT

## 2020-04-21 PROCEDURE — 97535 SELF CARE MNGMENT TRAINING: CPT

## 2020-04-21 RX ORDER — INSULIN GLARGINE 100 [IU]/ML
20 INJECTION, SOLUTION SUBCUTANEOUS EVERY MORNING
Status: DISCONTINUED | OUTPATIENT
Start: 2020-04-22 | End: 2020-05-12 | Stop reason: HOSPADM

## 2020-04-21 RX ORDER — ACETAMINOPHEN 325 MG/1
650 TABLET ORAL EVERY 24 HOURS
Status: DISCONTINUED | OUTPATIENT
Start: 2020-04-21 | End: 2020-04-21

## 2020-04-21 RX ORDER — DIPHENHYDRAMINE HCL 25 MG
50 CAPSULE ORAL EVERY 4 HOURS PRN
Status: DISCONTINUED | OUTPATIENT
Start: 2020-04-21 | End: 2020-04-21

## 2020-04-21 RX ORDER — AMLODIPINE BESYLATE 10 MG/1
10 TABLET ORAL
Status: DISCONTINUED | OUTPATIENT
Start: 2020-04-21 | End: 2020-04-25

## 2020-04-21 RX ADMIN — PREGABALIN 75 MG: 25 CAPSULE ORAL at 21:42

## 2020-04-21 RX ADMIN — HYDROCHLOROTHIAZIDE 12.5 MG: 12.5 CAPSULE ORAL at 07:39

## 2020-04-21 RX ADMIN — PANTOPRAZOLE SODIUM 40 MG: 40 TABLET, DELAYED RELEASE ORAL at 05:43

## 2020-04-21 RX ADMIN — SUCRALFATE 1 G: 1 SUSPENSION ORAL at 05:43

## 2020-04-21 RX ADMIN — PREDNISONE 40 MG: 20 TABLET ORAL at 07:55

## 2020-04-21 RX ADMIN — SUCRALFATE 1 G: 1 SUSPENSION ORAL at 16:31

## 2020-04-21 RX ADMIN — HYDROCODONE BITARTRATE AND ACETAMINOPHEN 1 TABLET: 5; 325 TABLET ORAL at 05:43

## 2020-04-21 RX ADMIN — DIPHENHYDRAMINE HYDROCHLORIDE 25 MG: 25 CAPSULE ORAL at 17:57

## 2020-04-21 RX ADMIN — DIPHENHYDRAMINE HYDROCHLORIDE 25 MG: 25 CAPSULE ORAL at 01:55

## 2020-04-21 RX ADMIN — LEVOTHYROXINE SODIUM 200 MCG: 100 TABLET ORAL at 05:43

## 2020-04-21 RX ADMIN — BUDESONIDE AND FORMOTEROL FUMARATE DIHYDRATE 2 PUFF: 160; 4.5 AEROSOL RESPIRATORY (INHALATION) at 21:29

## 2020-04-21 RX ADMIN — SUCRALFATE 1 G: 1 SUSPENSION ORAL at 10:45

## 2020-04-21 RX ADMIN — PREGABALIN 75 MG: 25 CAPSULE ORAL at 07:55

## 2020-04-21 RX ADMIN — HYDROCODONE BITARTRATE AND ACETAMINOPHEN 1 TABLET: 5; 325 TABLET ORAL at 11:41

## 2020-04-21 RX ADMIN — HYDROCODONE BITARTRATE AND ACETAMINOPHEN 1 TABLET: 5; 325 TABLET ORAL at 17:54

## 2020-04-21 RX ADMIN — BUDESONIDE AND FORMOTEROL FUMARATE DIHYDRATE 2 PUFF: 160; 4.5 AEROSOL RESPIRATORY (INHALATION) at 06:54

## 2020-04-21 RX ADMIN — SUCRALFATE 1 G: 1 SUSPENSION ORAL at 21:42

## 2020-04-21 RX ADMIN — POLYETHYLENE GLYCOL 3350 17 G: 17 POWDER, FOR SOLUTION ORAL at 13:33

## 2020-04-21 RX ADMIN — INSULIN LISPRO 10 UNITS: 100 INJECTION, SOLUTION INTRAVENOUS; SUBCUTANEOUS at 16:31

## 2020-04-21 RX ADMIN — FOLIC ACID 1 MG: 1 TABLET ORAL at 07:56

## 2020-04-21 RX ADMIN — DIPHENHYDRAMINE HYDROCHLORIDE 25 MG: 25 CAPSULE ORAL at 23:33

## 2020-04-21 RX ADMIN — INSULIN LISPRO 8 UNITS: 100 INJECTION, SOLUTION INTRAVENOUS; SUBCUTANEOUS at 11:45

## 2020-04-21 RX ADMIN — PANTOPRAZOLE SODIUM 40 MG: 40 TABLET, DELAYED RELEASE ORAL at 16:31

## 2020-04-21 RX ADMIN — AMLODIPINE BESYLATE 10 MG: 10 TABLET ORAL at 10:44

## 2020-04-22 LAB
GLUCOSE BLDC GLUCOMTR-MCNC: 113 MG/DL (ref 70–130)
GLUCOSE BLDC GLUCOMTR-MCNC: 276 MG/DL (ref 70–130)
GLUCOSE BLDC GLUCOMTR-MCNC: 296 MG/DL (ref 70–130)

## 2020-04-22 PROCEDURE — 97110 THERAPEUTIC EXERCISES: CPT

## 2020-04-22 PROCEDURE — 97130 THER IVNTJ EA ADDL 15 MIN: CPT

## 2020-04-22 PROCEDURE — 97129 THER IVNTJ 1ST 15 MIN: CPT

## 2020-04-22 PROCEDURE — 63710000001 INSULIN GLARGINE PER 5 UNITS: Performed by: INTERNAL MEDICINE

## 2020-04-22 PROCEDURE — 63710000001 PREDNISONE PER 1 MG: Performed by: INTERNAL MEDICINE

## 2020-04-22 PROCEDURE — 63710000001 INSULIN LISPRO (HUMAN) PER 5 UNITS: Performed by: INTERNAL MEDICINE

## 2020-04-22 PROCEDURE — 94799 UNLISTED PULMONARY SVC/PX: CPT

## 2020-04-22 PROCEDURE — 82962 GLUCOSE BLOOD TEST: CPT

## 2020-04-22 PROCEDURE — 63710000001 DIPHENHYDRAMINE PER 50 MG: Performed by: INTERNAL MEDICINE

## 2020-04-22 RX ORDER — HYDROCODONE BITARTRATE AND ACETAMINOPHEN 5; 325 MG/1; MG/1
1 TABLET ORAL EVERY 6 HOURS PRN
Status: DISCONTINUED | OUTPATIENT
Start: 2020-04-22 | End: 2020-04-23

## 2020-04-22 RX ADMIN — INSULIN LISPRO 7 UNITS: 100 INJECTION, SOLUTION INTRAVENOUS; SUBCUTANEOUS at 16:41

## 2020-04-22 RX ADMIN — DIPHENHYDRAMINE HYDROCHLORIDE 25 MG: 25 CAPSULE ORAL at 21:08

## 2020-04-22 RX ADMIN — INSULIN LISPRO 8 UNITS: 100 INJECTION, SOLUTION INTRAVENOUS; SUBCUTANEOUS at 11:53

## 2020-04-22 RX ADMIN — PANTOPRAZOLE SODIUM 40 MG: 40 TABLET, DELAYED RELEASE ORAL at 16:34

## 2020-04-22 RX ADMIN — HYDROCODONE BITARTRATE AND ACETAMINOPHEN 1 TABLET: 5; 325 TABLET ORAL at 00:02

## 2020-04-22 RX ADMIN — PREDNISONE 40 MG: 20 TABLET ORAL at 07:43

## 2020-04-22 RX ADMIN — BUDESONIDE AND FORMOTEROL FUMARATE DIHYDRATE 2 PUFF: 160; 4.5 AEROSOL RESPIRATORY (INHALATION) at 06:55

## 2020-04-22 RX ADMIN — FOLIC ACID 1 MG: 1 TABLET ORAL at 07:44

## 2020-04-22 RX ADMIN — SUCRALFATE 1 G: 1 SUSPENSION ORAL at 16:34

## 2020-04-22 RX ADMIN — PREGABALIN 75 MG: 25 CAPSULE ORAL at 20:52

## 2020-04-22 RX ADMIN — INSULIN LISPRO 8 UNITS: 100 INJECTION, SOLUTION INTRAVENOUS; SUBCUTANEOUS at 16:40

## 2020-04-22 RX ADMIN — LEVOTHYROXINE SODIUM 200 MCG: 100 TABLET ORAL at 05:15

## 2020-04-22 RX ADMIN — HYDROCODONE BITARTRATE AND ACETAMINOPHEN 1 TABLET: 5; 325 TABLET ORAL at 11:54

## 2020-04-22 RX ADMIN — PANTOPRAZOLE SODIUM 40 MG: 40 TABLET, DELAYED RELEASE ORAL at 06:50

## 2020-04-22 RX ADMIN — HYDROCODONE BITARTRATE AND ACETAMINOPHEN 1 TABLET: 5; 325 TABLET ORAL at 05:43

## 2020-04-22 RX ADMIN — POLYETHYLENE GLYCOL 3350 17 G: 17 POWDER, FOR SOLUTION ORAL at 07:44

## 2020-04-22 RX ADMIN — SUCRALFATE 1 G: 1 SUSPENSION ORAL at 11:54

## 2020-04-22 RX ADMIN — SUCRALFATE 1 G: 1 SUSPENSION ORAL at 06:50

## 2020-04-22 RX ADMIN — INSULIN GLARGINE 20 UNITS: 100 INJECTION, SOLUTION SUBCUTANEOUS at 07:39

## 2020-04-22 RX ADMIN — INSULIN LISPRO 5 UNITS: 100 INJECTION, SOLUTION INTRAVENOUS; SUBCUTANEOUS at 07:40

## 2020-04-22 RX ADMIN — HYDROCHLOROTHIAZIDE 12.5 MG: 12.5 CAPSULE ORAL at 07:43

## 2020-04-22 RX ADMIN — SUCRALFATE 1 G: 1 SUSPENSION ORAL at 22:55

## 2020-04-22 RX ADMIN — BUDESONIDE AND FORMOTEROL FUMARATE DIHYDRATE 2 PUFF: 160; 4.5 AEROSOL RESPIRATORY (INHALATION) at 21:24

## 2020-04-22 RX ADMIN — INSULIN LISPRO 5 UNITS: 100 INJECTION, SOLUTION INTRAVENOUS; SUBCUTANEOUS at 11:53

## 2020-04-22 RX ADMIN — DIPHENHYDRAMINE HYDROCHLORIDE 25 MG: 25 CAPSULE ORAL at 16:40

## 2020-04-22 RX ADMIN — HYDROCODONE BITARTRATE AND ACETAMINOPHEN 1 TABLET: 5; 325 TABLET ORAL at 17:44

## 2020-04-22 RX ADMIN — AMLODIPINE BESYLATE 10 MG: 10 TABLET ORAL at 07:43

## 2020-04-22 RX ADMIN — PREGABALIN 75 MG: 25 CAPSULE ORAL at 07:43

## 2020-04-23 LAB
ALBUMIN SERPL-MCNC: 3.1 G/DL (ref 3.5–5.2)
ALBUMIN/GLOB SERPL: 0.7 G/DL
ALP SERPL-CCNC: 127 U/L (ref 39–117)
ALT SERPL W P-5'-P-CCNC: 318 U/L (ref 1–33)
ANION GAP SERPL CALCULATED.3IONS-SCNC: 15.6 MMOL/L (ref 5–15)
AST SERPL-CCNC: 550 U/L (ref 1–32)
BILIRUB SERPL-MCNC: 0.9 MG/DL (ref 0.2–1.2)
BUN BLD-MCNC: 16 MG/DL (ref 6–20)
BUN/CREAT SERPL: 37.2 (ref 7–25)
CALCIUM SPEC-SCNC: 9.2 MG/DL (ref 8.6–10.5)
CHLORIDE SERPL-SCNC: 94 MMOL/L (ref 98–107)
CO2 SERPL-SCNC: 25.4 MMOL/L (ref 22–29)
CREAT BLD-MCNC: 0.43 MG/DL (ref 0.57–1)
GFR SERPL CREATININE-BSD FRML MDRD: >150 ML/MIN/1.73
GLOBULIN UR ELPH-MCNC: 4.7 GM/DL
GLUCOSE BLD-MCNC: 108 MG/DL (ref 65–99)
GLUCOSE BLDC GLUCOMTR-MCNC: 112 MG/DL (ref 70–130)
GLUCOSE BLDC GLUCOMTR-MCNC: 283 MG/DL (ref 70–130)
GLUCOSE BLDC GLUCOMTR-MCNC: 299 MG/DL (ref 70–130)
POTASSIUM BLD-SCNC: 3.9 MMOL/L (ref 3.5–5.2)
PROT SERPL-MCNC: 7.8 G/DL (ref 6–8.5)
SODIUM BLD-SCNC: 135 MMOL/L (ref 136–145)

## 2020-04-23 PROCEDURE — 82962 GLUCOSE BLOOD TEST: CPT

## 2020-04-23 PROCEDURE — 63710000001 PREDNISONE PER 1 MG: Performed by: INTERNAL MEDICINE

## 2020-04-23 PROCEDURE — 94799 UNLISTED PULMONARY SVC/PX: CPT

## 2020-04-23 PROCEDURE — 97129 THER IVNTJ 1ST 15 MIN: CPT

## 2020-04-23 PROCEDURE — 97130 THER IVNTJ EA ADDL 15 MIN: CPT

## 2020-04-23 PROCEDURE — 63710000001 INSULIN LISPRO (HUMAN) PER 5 UNITS: Performed by: INTERNAL MEDICINE

## 2020-04-23 PROCEDURE — 97112 NEUROMUSCULAR REEDUCATION: CPT | Performed by: OCCUPATIONAL THERAPIST

## 2020-04-23 PROCEDURE — 80053 COMPREHEN METABOLIC PANEL: CPT | Performed by: PHYSICAL MEDICINE & REHABILITATION

## 2020-04-23 PROCEDURE — 63710000001 DIPHENHYDRAMINE PER 50 MG: Performed by: INTERNAL MEDICINE

## 2020-04-23 PROCEDURE — 99254 IP/OBS CNSLTJ NEW/EST MOD 60: CPT | Performed by: INTERNAL MEDICINE

## 2020-04-23 PROCEDURE — 63710000001 INSULIN GLARGINE PER 5 UNITS: Performed by: INTERNAL MEDICINE

## 2020-04-23 PROCEDURE — 97110 THERAPEUTIC EXERCISES: CPT

## 2020-04-23 PROCEDURE — 97535 SELF CARE MNGMENT TRAINING: CPT | Performed by: OCCUPATIONAL THERAPIST

## 2020-04-23 RX ORDER — HYDROCODONE BITARTRATE AND ACETAMINOPHEN 5; 325 MG/1; MG/1
1 TABLET ORAL EVERY 4 HOURS PRN
Status: DISCONTINUED | OUTPATIENT
Start: 2020-04-23 | End: 2020-04-23 | Stop reason: DRUGHIGH

## 2020-04-23 RX ORDER — HYDROCHLOROTHIAZIDE 25 MG/1
25 TABLET ORAL DAILY
Status: DISCONTINUED | OUTPATIENT
Start: 2020-04-24 | End: 2020-05-12 | Stop reason: HOSPADM

## 2020-04-23 RX ORDER — OXYCODONE HYDROCHLORIDE 5 MG/1
5 TABLET ORAL EVERY 4 HOURS PRN
Status: DISCONTINUED | OUTPATIENT
Start: 2020-04-23 | End: 2020-05-01

## 2020-04-23 RX ADMIN — PANTOPRAZOLE SODIUM 40 MG: 40 TABLET, DELAYED RELEASE ORAL at 05:35

## 2020-04-23 RX ADMIN — INSULIN LISPRO 7 UNITS: 100 INJECTION, SOLUTION INTRAVENOUS; SUBCUTANEOUS at 07:51

## 2020-04-23 RX ADMIN — DIPHENHYDRAMINE HYDROCHLORIDE 25 MG: 25 CAPSULE ORAL at 14:47

## 2020-04-23 RX ADMIN — OXYCODONE HYDROCHLORIDE 5 MG: 5 TABLET ORAL at 14:17

## 2020-04-23 RX ADMIN — SUCRALFATE 1 G: 1 SUSPENSION ORAL at 16:48

## 2020-04-23 RX ADMIN — OXYCODONE HYDROCHLORIDE 5 MG: 5 TABLET ORAL at 18:51

## 2020-04-23 RX ADMIN — FOLIC ACID 1 MG: 1 TABLET ORAL at 07:52

## 2020-04-23 RX ADMIN — INSULIN GLARGINE 20 UNITS: 100 INJECTION, SOLUTION SUBCUTANEOUS at 07:51

## 2020-04-23 RX ADMIN — PREGABALIN 75 MG: 25 CAPSULE ORAL at 20:09

## 2020-04-23 RX ADMIN — DIPHENHYDRAMINE HYDROCHLORIDE 25 MG: 25 CAPSULE ORAL at 23:07

## 2020-04-23 RX ADMIN — PANTOPRAZOLE SODIUM 40 MG: 40 TABLET, DELAYED RELEASE ORAL at 16:49

## 2020-04-23 RX ADMIN — HYDROCHLOROTHIAZIDE 12.5 MG: 12.5 CAPSULE ORAL at 07:52

## 2020-04-23 RX ADMIN — INSULIN LISPRO 7 UNITS: 100 INJECTION, SOLUTION INTRAVENOUS; SUBCUTANEOUS at 11:45

## 2020-04-23 RX ADMIN — INSULIN LISPRO 8 UNITS: 100 INJECTION, SOLUTION INTRAVENOUS; SUBCUTANEOUS at 16:49

## 2020-04-23 RX ADMIN — INSULIN LISPRO 8 UNITS: 100 INJECTION, SOLUTION INTRAVENOUS; SUBCUTANEOUS at 11:45

## 2020-04-23 RX ADMIN — INSULIN LISPRO 10 UNITS: 100 INJECTION, SOLUTION INTRAVENOUS; SUBCUTANEOUS at 16:49

## 2020-04-23 RX ADMIN — HYDROCODONE BITARTRATE AND ACETAMINOPHEN 1 TABLET: 5; 325 TABLET ORAL at 05:35

## 2020-04-23 RX ADMIN — SUCRALFATE 1 G: 1 SUSPENSION ORAL at 05:35

## 2020-04-23 RX ADMIN — OXYCODONE HYDROCHLORIDE 5 MG: 5 TABLET ORAL at 23:07

## 2020-04-23 RX ADMIN — BUDESONIDE AND FORMOTEROL FUMARATE DIHYDRATE 2 PUFF: 160; 4.5 AEROSOL RESPIRATORY (INHALATION) at 12:17

## 2020-04-23 RX ADMIN — BUDESONIDE AND FORMOTEROL FUMARATE DIHYDRATE 2 PUFF: 160; 4.5 AEROSOL RESPIRATORY (INHALATION) at 20:28

## 2020-04-23 RX ADMIN — DIPHENHYDRAMINE HYDROCHLORIDE 25 MG: 25 CAPSULE ORAL at 03:18

## 2020-04-23 RX ADMIN — PREGABALIN 75 MG: 25 CAPSULE ORAL at 07:52

## 2020-04-23 RX ADMIN — HYDROCODONE BITARTRATE AND ACETAMINOPHEN 1 TABLET: 5; 325 TABLET ORAL at 00:00

## 2020-04-23 RX ADMIN — LEVOTHYROXINE SODIUM 200 MCG: 100 TABLET ORAL at 05:35

## 2020-04-23 RX ADMIN — SUCRALFATE 1 G: 1 SUSPENSION ORAL at 20:09

## 2020-04-23 RX ADMIN — POLYETHYLENE GLYCOL 3350 17 G: 17 POWDER, FOR SOLUTION ORAL at 07:51

## 2020-04-23 RX ADMIN — DIPHENHYDRAMINE HYDROCHLORIDE 25 MG: 25 CAPSULE ORAL at 18:51

## 2020-04-23 RX ADMIN — AMLODIPINE BESYLATE 10 MG: 10 TABLET ORAL at 07:51

## 2020-04-23 RX ADMIN — SUCRALFATE 1 G: 1 SUSPENSION ORAL at 11:45

## 2020-04-23 RX ADMIN — PREDNISONE 40 MG: 20 TABLET ORAL at 07:52

## 2020-04-24 PROBLEM — R73.9 STEROID-INDUCED HYPERGLYCEMIA: Status: ACTIVE | Noted: 2020-04-24

## 2020-04-24 PROBLEM — T38.0X5A STEROID-INDUCED HYPERGLYCEMIA: Status: ACTIVE | Noted: 2020-04-24

## 2020-04-24 PROBLEM — R74.01 ELEVATED TRANSAMINASE LEVEL: Status: ACTIVE | Noted: 2020-04-24

## 2020-04-24 PROBLEM — Z86.32 HISTORY OF GESTATIONAL DIABETES: Status: ACTIVE | Noted: 2020-04-24

## 2020-04-24 LAB
ALBUMIN SERPL-MCNC: 3.3 G/DL (ref 3.5–5.2)
ALBUMIN UR-MCNC: <1.2 MG/DL
ALBUMIN/GLOB SERPL: 0.8 G/DL
ALP SERPL-CCNC: 130 U/L (ref 39–117)
ALT SERPL W P-5'-P-CCNC: 338 U/L (ref 1–33)
ANION GAP SERPL CALCULATED.3IONS-SCNC: 16.7 MMOL/L (ref 5–15)
AST SERPL-CCNC: 514 U/L (ref 1–32)
BILIRUB SERPL-MCNC: 0.9 MG/DL (ref 0.2–1.2)
BUN BLD-MCNC: 14 MG/DL (ref 6–20)
BUN/CREAT SERPL: 34.1 (ref 7–25)
CALCIUM SPEC-SCNC: 9.4 MG/DL (ref 8.6–10.5)
CHLORIDE SERPL-SCNC: 93 MMOL/L (ref 98–107)
CO2 SERPL-SCNC: 26.3 MMOL/L (ref 22–29)
CREAT BLD-MCNC: 0.41 MG/DL (ref 0.57–1)
CREAT UR-MCNC: 18.2 MG/DL
GFR SERPL CREATININE-BSD FRML MDRD: >150 ML/MIN/1.73
GLOBULIN UR ELPH-MCNC: 4.3 GM/DL
GLUCOSE BLD-MCNC: 106 MG/DL (ref 65–99)
GLUCOSE BLDC GLUCOMTR-MCNC: 105 MG/DL (ref 70–130)
GLUCOSE BLDC GLUCOMTR-MCNC: 195 MG/DL (ref 70–130)
GLUCOSE BLDC GLUCOMTR-MCNC: 304 MG/DL (ref 70–130)
MICROALBUMIN/CREAT UR: NORMAL MG/G{CREAT}
POTASSIUM BLD-SCNC: 3.8 MMOL/L (ref 3.5–5.2)
PROT SERPL-MCNC: 7.6 G/DL (ref 6–8.5)
SODIUM BLD-SCNC: 136 MMOL/L (ref 136–145)
T4 FREE SERPL-MCNC: 1.42 NG/DL (ref 0.93–1.7)
TSH SERPL DL<=0.05 MIU/L-ACNC: 16.9 UIU/ML (ref 0.27–4.2)

## 2020-04-24 PROCEDURE — 63710000001 DIPHENHYDRAMINE PER 50 MG: Performed by: INTERNAL MEDICINE

## 2020-04-24 PROCEDURE — 94799 UNLISTED PULMONARY SVC/PX: CPT

## 2020-04-24 PROCEDURE — 63710000001 INSULIN GLARGINE PER 5 UNITS: Performed by: INTERNAL MEDICINE

## 2020-04-24 PROCEDURE — 82043 UR ALBUMIN QUANTITATIVE: CPT | Performed by: INTERNAL MEDICINE

## 2020-04-24 PROCEDURE — 82570 ASSAY OF URINE CREATININE: CPT | Performed by: INTERNAL MEDICINE

## 2020-04-24 PROCEDURE — 84439 ASSAY OF FREE THYROXINE: CPT | Performed by: INTERNAL MEDICINE

## 2020-04-24 PROCEDURE — 63710000001 INSULIN LISPRO (HUMAN) PER 5 UNITS: Performed by: INTERNAL MEDICINE

## 2020-04-24 PROCEDURE — 97535 SELF CARE MNGMENT TRAINING: CPT

## 2020-04-24 PROCEDURE — 80053 COMPREHEN METABOLIC PANEL: CPT | Performed by: PHYSICAL MEDICINE & REHABILITATION

## 2020-04-24 PROCEDURE — 97110 THERAPEUTIC EXERCISES: CPT

## 2020-04-24 PROCEDURE — 82962 GLUCOSE BLOOD TEST: CPT

## 2020-04-24 PROCEDURE — 84443 ASSAY THYROID STIM HORMONE: CPT | Performed by: INTERNAL MEDICINE

## 2020-04-24 PROCEDURE — 63710000001 PREDNISONE PER 1 MG: Performed by: INTERNAL MEDICINE

## 2020-04-24 PROCEDURE — 99232 SBSQ HOSP IP/OBS MODERATE 35: CPT | Performed by: INTERNAL MEDICINE

## 2020-04-24 RX ADMIN — AMLODIPINE BESYLATE 10 MG: 10 TABLET ORAL at 08:35

## 2020-04-24 RX ADMIN — POLYETHYLENE GLYCOL 3350 17 G: 17 POWDER, FOR SOLUTION ORAL at 08:26

## 2020-04-24 RX ADMIN — BUDESONIDE AND FORMOTEROL FUMARATE DIHYDRATE 2 PUFF: 160; 4.5 AEROSOL RESPIRATORY (INHALATION) at 07:21

## 2020-04-24 RX ADMIN — INSULIN GLARGINE 20 UNITS: 100 INJECTION, SOLUTION SUBCUTANEOUS at 08:42

## 2020-04-24 RX ADMIN — DIPHENHYDRAMINE HYDROCHLORIDE 25 MG: 25 CAPSULE ORAL at 08:26

## 2020-04-24 RX ADMIN — SUCRALFATE 1 G: 1 SUSPENSION ORAL at 12:05

## 2020-04-24 RX ADMIN — DIPHENHYDRAMINE HYDROCHLORIDE 25 MG: 25 CAPSULE ORAL at 12:27

## 2020-04-24 RX ADMIN — PREDNISONE 40 MG: 20 TABLET ORAL at 08:27

## 2020-04-24 RX ADMIN — INSULIN LISPRO 3 UNITS: 100 INJECTION, SOLUTION INTRAVENOUS; SUBCUTANEOUS at 12:05

## 2020-04-24 RX ADMIN — OXYCODONE HYDROCHLORIDE 5 MG: 5 TABLET ORAL at 08:26

## 2020-04-24 RX ADMIN — OXYCODONE HYDROCHLORIDE 5 MG: 5 TABLET ORAL at 12:27

## 2020-04-24 RX ADMIN — SUCRALFATE 1 G: 1 SUSPENSION ORAL at 16:50

## 2020-04-24 RX ADMIN — PREGABALIN 75 MG: 25 CAPSULE ORAL at 08:43

## 2020-04-24 RX ADMIN — FOLIC ACID 1 MG: 1 TABLET ORAL at 08:27

## 2020-04-24 RX ADMIN — DIPHENHYDRAMINE HYDROCHLORIDE 25 MG: 25 CAPSULE ORAL at 21:20

## 2020-04-24 RX ADMIN — HYDROCHLOROTHIAZIDE 25 MG: 25 TABLET ORAL at 08:35

## 2020-04-24 RX ADMIN — OXYCODONE HYDROCHLORIDE 5 MG: 5 TABLET ORAL at 03:20

## 2020-04-24 RX ADMIN — LEVOTHYROXINE SODIUM 200 MCG: 100 TABLET ORAL at 05:27

## 2020-04-24 RX ADMIN — INSULIN LISPRO 10 UNITS: 100 INJECTION, SOLUTION INTRAVENOUS; SUBCUTANEOUS at 16:57

## 2020-04-24 RX ADMIN — INSULIN LISPRO 10 UNITS: 100 INJECTION, SOLUTION INTRAVENOUS; SUBCUTANEOUS at 12:06

## 2020-04-24 RX ADMIN — DIPHENHYDRAMINE HYDROCHLORIDE 25 MG: 25 CAPSULE ORAL at 03:21

## 2020-04-24 RX ADMIN — SUCRALFATE 1 G: 1 SUSPENSION ORAL at 05:28

## 2020-04-24 RX ADMIN — PANTOPRAZOLE SODIUM 40 MG: 40 TABLET, DELAYED RELEASE ORAL at 16:50

## 2020-04-24 RX ADMIN — PANTOPRAZOLE SODIUM 40 MG: 40 TABLET, DELAYED RELEASE ORAL at 05:28

## 2020-04-24 RX ADMIN — OXYCODONE HYDROCHLORIDE 5 MG: 5 TABLET ORAL at 21:20

## 2020-04-24 RX ADMIN — DIPHENHYDRAMINE HYDROCHLORIDE 25 MG: 25 CAPSULE ORAL at 16:50

## 2020-04-24 RX ADMIN — OXYCODONE HYDROCHLORIDE 5 MG: 5 TABLET ORAL at 16:50

## 2020-04-24 RX ADMIN — BUDESONIDE AND FORMOTEROL FUMARATE DIHYDRATE 2 PUFF: 160; 4.5 AEROSOL RESPIRATORY (INHALATION) at 21:11

## 2020-04-24 RX ADMIN — INSULIN LISPRO 10 UNITS: 100 INJECTION, SOLUTION INTRAVENOUS; SUBCUTANEOUS at 16:56

## 2020-04-24 RX ADMIN — PREGABALIN 75 MG: 25 CAPSULE ORAL at 21:20

## 2020-04-24 RX ADMIN — SUCRALFATE 1 G: 1 SUSPENSION ORAL at 21:20

## 2020-04-24 RX ADMIN — INSULIN LISPRO 10 UNITS: 100 INJECTION, SOLUTION INTRAVENOUS; SUBCUTANEOUS at 08:26

## 2020-04-25 ENCOUNTER — APPOINTMENT (OUTPATIENT)
Dept: ULTRASOUND IMAGING | Facility: HOSPITAL | Age: 45
End: 2020-04-25

## 2020-04-25 LAB
ALBUMIN SERPL-MCNC: 3.2 G/DL (ref 3.5–5.2)
ALBUMIN/GLOB SERPL: 0.7 G/DL
ALP SERPL-CCNC: 123 U/L (ref 39–117)
ALT SERPL W P-5'-P-CCNC: 315 U/L (ref 1–33)
ANION GAP SERPL CALCULATED.3IONS-SCNC: 14.6 MMOL/L (ref 5–15)
AST SERPL-CCNC: 433 U/L (ref 1–32)
BILIRUB SERPL-MCNC: 0.9 MG/DL (ref 0.2–1.2)
BUN BLD-MCNC: 13 MG/DL (ref 6–20)
BUN/CREAT SERPL: 29.5 (ref 7–25)
CALCIUM SPEC-SCNC: 9.6 MG/DL (ref 8.6–10.5)
CHLORIDE SERPL-SCNC: 92 MMOL/L (ref 98–107)
CK SERPL-CCNC: 17 U/L (ref 20–180)
CO2 SERPL-SCNC: 28.4 MMOL/L (ref 22–29)
CREAT BLD-MCNC: 0.44 MG/DL (ref 0.57–1)
GFR SERPL CREATININE-BSD FRML MDRD: >150 ML/MIN/1.73
GGT SERPL-CCNC: 984 U/L (ref 5–36)
GLOBULIN UR ELPH-MCNC: 4.3 GM/DL
GLUCOSE BLD-MCNC: 100 MG/DL (ref 65–99)
GLUCOSE BLDC GLUCOMTR-MCNC: 106 MG/DL (ref 70–130)
GLUCOSE BLDC GLUCOMTR-MCNC: 119 MG/DL (ref 70–130)
GLUCOSE BLDC GLUCOMTR-MCNC: 172 MG/DL (ref 70–130)
GLUCOSE BLDC GLUCOMTR-MCNC: 231 MG/DL (ref 70–130)
GLUCOSE BLDC GLUCOMTR-MCNC: 262 MG/DL (ref 70–130)
IRON 24H UR-MRATE: 149 MCG/DL (ref 37–145)
IRON SATN MFR SERPL: 47 % (ref 20–50)
POTASSIUM BLD-SCNC: 3.6 MMOL/L (ref 3.5–5.2)
PROT SERPL-MCNC: 7.5 G/DL (ref 6–8.5)
SODIUM BLD-SCNC: 135 MMOL/L (ref 136–145)
TIBC SERPL-MCNC: 319 MCG/DL (ref 298–536)
TRANSFERRIN SERPL-MCNC: 214 MG/DL (ref 200–360)

## 2020-04-25 PROCEDURE — 97535 SELF CARE MNGMENT TRAINING: CPT

## 2020-04-25 PROCEDURE — 63710000001 DIPHENHYDRAMINE PER 50 MG: Performed by: INTERNAL MEDICINE

## 2020-04-25 PROCEDURE — 84466 ASSAY OF TRANSFERRIN: CPT | Performed by: INTERNAL MEDICINE

## 2020-04-25 PROCEDURE — 83516 IMMUNOASSAY NONANTIBODY: CPT | Performed by: INTERNAL MEDICINE

## 2020-04-25 PROCEDURE — 99232 SBSQ HOSP IP/OBS MODERATE 35: CPT | Performed by: INTERNAL MEDICINE

## 2020-04-25 PROCEDURE — 63710000001 INSULIN LISPRO (HUMAN) PER 5 UNITS: Performed by: INTERNAL MEDICINE

## 2020-04-25 PROCEDURE — 94799 UNLISTED PULMONARY SVC/PX: CPT

## 2020-04-25 PROCEDURE — 82085 ASSAY OF ALDOLASE: CPT | Performed by: INTERNAL MEDICINE

## 2020-04-25 PROCEDURE — 82977 ASSAY OF GGT: CPT | Performed by: INTERNAL MEDICINE

## 2020-04-25 PROCEDURE — 80053 COMPREHEN METABOLIC PANEL: CPT | Performed by: PHYSICAL MEDICINE & REHABILITATION

## 2020-04-25 PROCEDURE — 82550 ASSAY OF CK (CPK): CPT | Performed by: INTERNAL MEDICINE

## 2020-04-25 PROCEDURE — 97110 THERAPEUTIC EXERCISES: CPT

## 2020-04-25 PROCEDURE — 83540 ASSAY OF IRON: CPT | Performed by: INTERNAL MEDICINE

## 2020-04-25 PROCEDURE — 99254 IP/OBS CNSLTJ NEW/EST MOD 60: CPT | Performed by: INTERNAL MEDICINE

## 2020-04-25 PROCEDURE — 87799 DETECT AGENT NOS DNA QUANT: CPT | Performed by: INTERNAL MEDICINE

## 2020-04-25 PROCEDURE — 63710000001 INSULIN GLARGINE PER 5 UNITS: Performed by: INTERNAL MEDICINE

## 2020-04-25 PROCEDURE — 76705 ECHO EXAM OF ABDOMEN: CPT

## 2020-04-25 PROCEDURE — 82962 GLUCOSE BLOOD TEST: CPT

## 2020-04-25 PROCEDURE — 63710000001 PREDNISONE PER 1 MG: Performed by: INTERNAL MEDICINE

## 2020-04-25 RX ORDER — AMLODIPINE BESYLATE 10 MG/1
10 TABLET ORAL NIGHTLY
Status: DISCONTINUED | OUTPATIENT
Start: 2020-04-25 | End: 2020-05-12 | Stop reason: HOSPADM

## 2020-04-25 RX ADMIN — BUDESONIDE AND FORMOTEROL FUMARATE DIHYDRATE 2 PUFF: 160; 4.5 AEROSOL RESPIRATORY (INHALATION) at 06:41

## 2020-04-25 RX ADMIN — SUCRALFATE 1 G: 1 SUSPENSION ORAL at 18:30

## 2020-04-25 RX ADMIN — PANTOPRAZOLE SODIUM 40 MG: 40 TABLET, DELAYED RELEASE ORAL at 05:50

## 2020-04-25 RX ADMIN — OXYCODONE HYDROCHLORIDE 5 MG: 5 TABLET ORAL at 10:43

## 2020-04-25 RX ADMIN — OXYCODONE HYDROCHLORIDE 5 MG: 5 TABLET ORAL at 18:45

## 2020-04-25 RX ADMIN — INSULIN LISPRO 8 UNITS: 100 INJECTION, SOLUTION INTRAVENOUS; SUBCUTANEOUS at 11:29

## 2020-04-25 RX ADMIN — DIPHENHYDRAMINE HYDROCHLORIDE 25 MG: 25 CAPSULE ORAL at 18:45

## 2020-04-25 RX ADMIN — INSULIN LISPRO 12 UNITS: 100 INJECTION, SOLUTION INTRAVENOUS; SUBCUTANEOUS at 18:32

## 2020-04-25 RX ADMIN — DIPHENHYDRAMINE HYDROCHLORIDE 25 MG: 25 CAPSULE ORAL at 14:41

## 2020-04-25 RX ADMIN — INSULIN LISPRO 12 UNITS: 100 INJECTION, SOLUTION INTRAVENOUS; SUBCUTANEOUS at 08:03

## 2020-04-25 RX ADMIN — INSULIN LISPRO 12 UNITS: 100 INJECTION, SOLUTION INTRAVENOUS; SUBCUTANEOUS at 11:30

## 2020-04-25 RX ADMIN — INSULIN GLARGINE 20 UNITS: 100 INJECTION, SOLUTION SUBCUTANEOUS at 08:02

## 2020-04-25 RX ADMIN — Medication 3 MG: at 23:32

## 2020-04-25 RX ADMIN — DIPHENHYDRAMINE HYDROCHLORIDE 25 MG: 25 CAPSULE ORAL at 05:49

## 2020-04-25 RX ADMIN — PREDNISONE 40 MG: 20 TABLET ORAL at 08:04

## 2020-04-25 RX ADMIN — FOLIC ACID 1 MG: 1 TABLET ORAL at 08:03

## 2020-04-25 RX ADMIN — SUCRALFATE 1 G: 1 SUSPENSION ORAL at 20:31

## 2020-04-25 RX ADMIN — DIPHENHYDRAMINE HYDROCHLORIDE 25 MG: 25 CAPSULE ORAL at 01:39

## 2020-04-25 RX ADMIN — SUCRALFATE 1 G: 1 SUSPENSION ORAL at 05:50

## 2020-04-25 RX ADMIN — OXYCODONE HYDROCHLORIDE 5 MG: 5 TABLET ORAL at 14:41

## 2020-04-25 RX ADMIN — PREGABALIN 75 MG: 25 CAPSULE ORAL at 08:03

## 2020-04-25 RX ADMIN — DIPHENHYDRAMINE HYDROCHLORIDE 25 MG: 25 CAPSULE ORAL at 10:43

## 2020-04-25 RX ADMIN — LEVOTHYROXINE SODIUM 200 MCG: 100 TABLET ORAL at 05:48

## 2020-04-25 RX ADMIN — OXYCODONE HYDROCHLORIDE 5 MG: 5 TABLET ORAL at 01:39

## 2020-04-25 RX ADMIN — PANTOPRAZOLE SODIUM 40 MG: 40 TABLET, DELAYED RELEASE ORAL at 18:30

## 2020-04-25 RX ADMIN — OXYCODONE HYDROCHLORIDE 5 MG: 5 TABLET ORAL at 22:42

## 2020-04-25 RX ADMIN — SUCRALFATE 1 G: 1 SUSPENSION ORAL at 11:30

## 2020-04-25 RX ADMIN — BUDESONIDE AND FORMOTEROL FUMARATE DIHYDRATE 2 PUFF: 160; 4.5 AEROSOL RESPIRATORY (INHALATION) at 20:17

## 2020-04-25 RX ADMIN — POLYETHYLENE GLYCOL 3350 17 G: 17 POWDER, FOR SOLUTION ORAL at 08:04

## 2020-04-25 RX ADMIN — INSULIN LISPRO 3 UNITS: 100 INJECTION, SOLUTION INTRAVENOUS; SUBCUTANEOUS at 18:33

## 2020-04-25 RX ADMIN — DIPHENHYDRAMINE HYDROCHLORIDE 25 MG: 25 CAPSULE ORAL at 22:42

## 2020-04-25 RX ADMIN — AMLODIPINE BESYLATE 10 MG: 10 TABLET ORAL at 20:31

## 2020-04-25 RX ADMIN — OXYCODONE HYDROCHLORIDE 5 MG: 5 TABLET ORAL at 05:49

## 2020-04-25 RX ADMIN — HYDROCHLOROTHIAZIDE 25 MG: 25 TABLET ORAL at 08:03

## 2020-04-25 RX ADMIN — PREGABALIN 75 MG: 25 CAPSULE ORAL at 20:31

## 2020-04-26 LAB
ALBUMIN SERPL-MCNC: 3.6 G/DL (ref 3.5–5.2)
ALBUMIN/GLOB SERPL: 0.8 G/DL
ALP SERPL-CCNC: 140 U/L (ref 39–117)
ALT SERPL W P-5'-P-CCNC: 352 U/L (ref 1–33)
ANION GAP SERPL CALCULATED.3IONS-SCNC: 16.1 MMOL/L (ref 5–15)
AST SERPL-CCNC: 527 U/L (ref 1–32)
BILIRUB SERPL-MCNC: 0.9 MG/DL (ref 0.2–1.2)
BUN BLD-MCNC: 14 MG/DL (ref 6–20)
BUN/CREAT SERPL: 25 (ref 7–25)
CALCIUM SPEC-SCNC: 9.9 MG/DL (ref 8.6–10.5)
CHLORIDE SERPL-SCNC: 88 MMOL/L (ref 98–107)
CO2 SERPL-SCNC: 26.9 MMOL/L (ref 22–29)
CREAT BLD-MCNC: 0.56 MG/DL (ref 0.57–1)
GFR SERPL CREATININE-BSD FRML MDRD: 117 ML/MIN/1.73
GLOBULIN UR ELPH-MCNC: 4.4 GM/DL
GLUCOSE BLD-MCNC: 284 MG/DL (ref 65–99)
GLUCOSE BLDC GLUCOMTR-MCNC: 101 MG/DL (ref 70–130)
GLUCOSE BLDC GLUCOMTR-MCNC: 155 MG/DL (ref 70–130)
GLUCOSE BLDC GLUCOMTR-MCNC: 277 MG/DL (ref 70–130)
POTASSIUM BLD-SCNC: 4.2 MMOL/L (ref 3.5–5.2)
PROT SERPL-MCNC: 8 G/DL (ref 6–8.5)
SODIUM BLD-SCNC: 131 MMOL/L (ref 136–145)

## 2020-04-26 PROCEDURE — 99232 SBSQ HOSP IP/OBS MODERATE 35: CPT | Performed by: INTERNAL MEDICINE

## 2020-04-26 PROCEDURE — 94799 UNLISTED PULMONARY SVC/PX: CPT

## 2020-04-26 PROCEDURE — 63710000001 DIPHENHYDRAMINE PER 50 MG: Performed by: INTERNAL MEDICINE

## 2020-04-26 PROCEDURE — 80053 COMPREHEN METABOLIC PANEL: CPT | Performed by: INTERNAL MEDICINE

## 2020-04-26 PROCEDURE — 63710000001 INSULIN LISPRO (HUMAN) PER 5 UNITS: Performed by: INTERNAL MEDICINE

## 2020-04-26 PROCEDURE — 82962 GLUCOSE BLOOD TEST: CPT

## 2020-04-26 PROCEDURE — 63710000001 PREDNISONE PER 1 MG: Performed by: INTERNAL MEDICINE

## 2020-04-26 PROCEDURE — 63710000001 INSULIN GLARGINE PER 5 UNITS: Performed by: INTERNAL MEDICINE

## 2020-04-26 RX ADMIN — PANTOPRAZOLE SODIUM 40 MG: 40 TABLET, DELAYED RELEASE ORAL at 06:12

## 2020-04-26 RX ADMIN — DIPHENHYDRAMINE HYDROCHLORIDE 25 MG: 25 CAPSULE ORAL at 11:38

## 2020-04-26 RX ADMIN — PREGABALIN 75 MG: 25 CAPSULE ORAL at 07:59

## 2020-04-26 RX ADMIN — INSULIN LISPRO 12 UNITS: 100 INJECTION, SOLUTION INTRAVENOUS; SUBCUTANEOUS at 11:36

## 2020-04-26 RX ADMIN — DIPHENHYDRAMINE HYDROCHLORIDE 25 MG: 25 CAPSULE ORAL at 07:06

## 2020-04-26 RX ADMIN — PREGABALIN 75 MG: 25 CAPSULE ORAL at 20:39

## 2020-04-26 RX ADMIN — OXYCODONE HYDROCHLORIDE 5 MG: 5 TABLET ORAL at 07:06

## 2020-04-26 RX ADMIN — Medication 3 MG: at 21:34

## 2020-04-26 RX ADMIN — OXYCODONE HYDROCHLORIDE 5 MG: 5 TABLET ORAL at 11:38

## 2020-04-26 RX ADMIN — AMLODIPINE BESYLATE 10 MG: 10 TABLET ORAL at 20:38

## 2020-04-26 RX ADMIN — INSULIN GLARGINE 20 UNITS: 100 INJECTION, SOLUTION SUBCUTANEOUS at 07:56

## 2020-04-26 RX ADMIN — DIPHENHYDRAMINE HYDROCHLORIDE 25 MG: 25 CAPSULE ORAL at 15:49

## 2020-04-26 RX ADMIN — POLYETHYLENE GLYCOL 3350 17 G: 17 POWDER, FOR SOLUTION ORAL at 07:59

## 2020-04-26 RX ADMIN — DIPHENHYDRAMINE HYDROCHLORIDE 25 MG: 25 CAPSULE ORAL at 20:39

## 2020-04-26 RX ADMIN — INSULIN LISPRO 8 UNITS: 100 INJECTION, SOLUTION INTRAVENOUS; SUBCUTANEOUS at 11:36

## 2020-04-26 RX ADMIN — LEVOTHYROXINE SODIUM 200 MCG: 100 TABLET ORAL at 06:12

## 2020-04-26 RX ADMIN — PREDNISONE 40 MG: 20 TABLET ORAL at 07:59

## 2020-04-26 RX ADMIN — OXYCODONE HYDROCHLORIDE 5 MG: 5 TABLET ORAL at 20:38

## 2020-04-26 RX ADMIN — OXYCODONE HYDROCHLORIDE 5 MG: 5 TABLET ORAL at 15:49

## 2020-04-26 RX ADMIN — BUDESONIDE AND FORMOTEROL FUMARATE DIHYDRATE 2 PUFF: 160; 4.5 AEROSOL RESPIRATORY (INHALATION) at 07:25

## 2020-04-26 RX ADMIN — FOLIC ACID 1 MG: 1 TABLET ORAL at 07:59

## 2020-04-26 RX ADMIN — INSULIN LISPRO 3 UNITS: 100 INJECTION, SOLUTION INTRAVENOUS; SUBCUTANEOUS at 16:37

## 2020-04-26 RX ADMIN — SUCRALFATE 1 G: 1 SUSPENSION ORAL at 11:35

## 2020-04-26 RX ADMIN — OXYCODONE HYDROCHLORIDE 5 MG: 5 TABLET ORAL at 02:49

## 2020-04-26 RX ADMIN — DIPHENHYDRAMINE HYDROCHLORIDE 25 MG: 25 CAPSULE ORAL at 02:49

## 2020-04-26 RX ADMIN — INSULIN LISPRO 12 UNITS: 100 INJECTION, SOLUTION INTRAVENOUS; SUBCUTANEOUS at 07:56

## 2020-04-26 RX ADMIN — HYDROCHLOROTHIAZIDE 25 MG: 25 TABLET ORAL at 07:59

## 2020-04-26 RX ADMIN — SUCRALFATE 1 G: 1 SUSPENSION ORAL at 16:37

## 2020-04-26 RX ADMIN — BUDESONIDE AND FORMOTEROL FUMARATE DIHYDRATE 2 PUFF: 160; 4.5 AEROSOL RESPIRATORY (INHALATION) at 19:17

## 2020-04-26 RX ADMIN — PANTOPRAZOLE SODIUM 40 MG: 40 TABLET, DELAYED RELEASE ORAL at 16:37

## 2020-04-26 RX ADMIN — SUCRALFATE 1 G: 1 SUSPENSION ORAL at 20:39

## 2020-04-26 RX ADMIN — INSULIN LISPRO 12 UNITS: 100 INJECTION, SOLUTION INTRAVENOUS; SUBCUTANEOUS at 16:38

## 2020-04-26 RX ADMIN — SUCRALFATE 1 G: 1 SUSPENSION ORAL at 06:13

## 2020-04-27 LAB
ALBUMIN SERPL-MCNC: 3.3 G/DL (ref 3.5–5.2)
ALBUMIN/GLOB SERPL: 0.8 G/DL
ALDOLASE SERPL-CCNC: 40.8 U/L (ref 3.3–10.3)
ALP SERPL-CCNC: 127 U/L (ref 39–117)
ALT SERPL W P-5'-P-CCNC: 265 U/L (ref 1–33)
ANION GAP SERPL CALCULATED.3IONS-SCNC: 14.2 MMOL/L (ref 5–15)
AST SERPL-CCNC: 272 U/L (ref 1–32)
BILIRUB SERPL-MCNC: 0.7 MG/DL (ref 0.2–1.2)
BUN BLD-MCNC: 17 MG/DL (ref 6–20)
BUN/CREAT SERPL: 29.3 (ref 7–25)
CALCIUM SPEC-SCNC: 9.5 MG/DL (ref 8.6–10.5)
CHLORIDE SERPL-SCNC: 93 MMOL/L (ref 98–107)
CO2 SERPL-SCNC: 29.8 MMOL/L (ref 22–29)
CREAT BLD-MCNC: 0.58 MG/DL (ref 0.57–1)
DEPRECATED MITOCHONDRIA M2 IGG SER-ACNC: <20 UNITS (ref 0–20)
FERRITIN SERPL-MCNC: 1933 NG/ML (ref 13–150)
GFR SERPL CREATININE-BSD FRML MDRD: 112 ML/MIN/1.73
GLOBULIN UR ELPH-MCNC: 3.9 GM/DL
GLUCOSE BLD-MCNC: 136 MG/DL (ref 65–99)
GLUCOSE BLDC GLUCOMTR-MCNC: 109 MG/DL (ref 70–130)
GLUCOSE BLDC GLUCOMTR-MCNC: 218 MG/DL (ref 70–130)
GLUCOSE BLDC GLUCOMTR-MCNC: 222 MG/DL (ref 70–130)
POTASSIUM BLD-SCNC: 3.8 MMOL/L (ref 3.5–5.2)
PROT SERPL-MCNC: 7.2 G/DL (ref 6–8.5)
SODIUM BLD-SCNC: 137 MMOL/L (ref 136–145)

## 2020-04-27 PROCEDURE — 80053 COMPREHEN METABOLIC PANEL: CPT | Performed by: INTERNAL MEDICINE

## 2020-04-27 PROCEDURE — 63710000001 INSULIN LISPRO (HUMAN) PER 5 UNITS: Performed by: INTERNAL MEDICINE

## 2020-04-27 PROCEDURE — 94799 UNLISTED PULMONARY SVC/PX: CPT

## 2020-04-27 PROCEDURE — 97110 THERAPEUTIC EXERCISES: CPT

## 2020-04-27 PROCEDURE — 99232 SBSQ HOSP IP/OBS MODERATE 35: CPT | Performed by: INTERNAL MEDICINE

## 2020-04-27 PROCEDURE — 63710000001 DIPHENHYDRAMINE PER 50 MG: Performed by: INTERNAL MEDICINE

## 2020-04-27 PROCEDURE — 97535 SELF CARE MNGMENT TRAINING: CPT

## 2020-04-27 PROCEDURE — 99232 SBSQ HOSP IP/OBS MODERATE 35: CPT | Performed by: NURSE PRACTITIONER

## 2020-04-27 PROCEDURE — 63710000001 INSULIN GLARGINE PER 5 UNITS: Performed by: INTERNAL MEDICINE

## 2020-04-27 PROCEDURE — 99231 SBSQ HOSP IP/OBS SF/LOW 25: CPT | Performed by: INTERNAL MEDICINE

## 2020-04-27 PROCEDURE — 63710000001 PREDNISONE PER 1 MG: Performed by: INTERNAL MEDICINE

## 2020-04-27 PROCEDURE — 82728 ASSAY OF FERRITIN: CPT | Performed by: INTERNAL MEDICINE

## 2020-04-27 PROCEDURE — 82962 GLUCOSE BLOOD TEST: CPT

## 2020-04-27 RX ORDER — PREGABALIN 100 MG/1
100 CAPSULE ORAL EVERY 12 HOURS SCHEDULED
Status: DISCONTINUED | OUTPATIENT
Start: 2020-04-27 | End: 2020-05-09

## 2020-04-27 RX ORDER — PREDNISONE 10 MG/1
10 TABLET ORAL DAILY
Status: COMPLETED | OUTPATIENT
Start: 2020-05-04 | End: 2020-05-06

## 2020-04-27 RX ORDER — PREDNISONE 20 MG/1
20 TABLET ORAL DAILY
Status: COMPLETED | OUTPATIENT
Start: 2020-05-01 | End: 2020-05-03

## 2020-04-27 RX ORDER — PREDNISONE 1 MG/1
5 TABLET ORAL DAILY
Status: COMPLETED | OUTPATIENT
Start: 2020-05-07 | End: 2020-05-09

## 2020-04-27 RX ADMIN — BUDESONIDE AND FORMOTEROL FUMARATE DIHYDRATE 2 PUFF: 160; 4.5 AEROSOL RESPIRATORY (INHALATION) at 20:24

## 2020-04-27 RX ADMIN — INSULIN LISPRO 12 UNITS: 100 INJECTION, SOLUTION INTRAVENOUS; SUBCUTANEOUS at 07:47

## 2020-04-27 RX ADMIN — DIPHENHYDRAMINE HYDROCHLORIDE 25 MG: 25 CAPSULE ORAL at 01:13

## 2020-04-27 RX ADMIN — INSULIN LISPRO 12 UNITS: 100 INJECTION, SOLUTION INTRAVENOUS; SUBCUTANEOUS at 11:38

## 2020-04-27 RX ADMIN — SUCRALFATE 1 G: 1 SUSPENSION ORAL at 17:16

## 2020-04-27 RX ADMIN — DIPHENHYDRAMINE HYDROCHLORIDE 25 MG: 25 CAPSULE ORAL at 21:33

## 2020-04-27 RX ADMIN — PREDNISONE 40 MG: 20 TABLET ORAL at 07:47

## 2020-04-27 RX ADMIN — OXYCODONE HYDROCHLORIDE 5 MG: 5 TABLET ORAL at 05:40

## 2020-04-27 RX ADMIN — OXYCODONE HYDROCHLORIDE 5 MG: 5 TABLET ORAL at 11:01

## 2020-04-27 RX ADMIN — OXYCODONE HYDROCHLORIDE 5 MG: 5 TABLET ORAL at 01:13

## 2020-04-27 RX ADMIN — HYDROCHLOROTHIAZIDE 25 MG: 25 TABLET ORAL at 07:47

## 2020-04-27 RX ADMIN — Medication 3 MG: at 23:12

## 2020-04-27 RX ADMIN — BUDESONIDE AND FORMOTEROL FUMARATE DIHYDRATE 2 PUFF: 160; 4.5 AEROSOL RESPIRATORY (INHALATION) at 08:25

## 2020-04-27 RX ADMIN — INSULIN LISPRO 5 UNITS: 100 INJECTION, SOLUTION INTRAVENOUS; SUBCUTANEOUS at 11:38

## 2020-04-27 RX ADMIN — FOLIC ACID 1 MG: 1 TABLET ORAL at 07:47

## 2020-04-27 RX ADMIN — POLYETHYLENE GLYCOL 3350 17 G: 17 POWDER, FOR SOLUTION ORAL at 07:47

## 2020-04-27 RX ADMIN — LEVOTHYROXINE SODIUM 200 MCG: 100 TABLET ORAL at 05:40

## 2020-04-27 RX ADMIN — DIPHENHYDRAMINE HYDROCHLORIDE 25 MG: 25 CAPSULE ORAL at 17:27

## 2020-04-27 RX ADMIN — INSULIN LISPRO 5 UNITS: 100 INJECTION, SOLUTION INTRAVENOUS; SUBCUTANEOUS at 17:16

## 2020-04-27 RX ADMIN — PANTOPRAZOLE SODIUM 40 MG: 40 TABLET, DELAYED RELEASE ORAL at 17:15

## 2020-04-27 RX ADMIN — AMLODIPINE BESYLATE 10 MG: 10 TABLET ORAL at 20:49

## 2020-04-27 RX ADMIN — SUCRALFATE 1 G: 1 SUSPENSION ORAL at 05:40

## 2020-04-27 RX ADMIN — PREGABALIN 100 MG: 100 CAPSULE ORAL at 20:49

## 2020-04-27 RX ADMIN — OXYCODONE HYDROCHLORIDE 5 MG: 5 TABLET ORAL at 21:33

## 2020-04-27 RX ADMIN — SUCRALFATE 1 G: 1 SUSPENSION ORAL at 20:49

## 2020-04-27 RX ADMIN — OXYCODONE HYDROCHLORIDE 5 MG: 5 TABLET ORAL at 17:27

## 2020-04-27 RX ADMIN — DIPHENHYDRAMINE HYDROCHLORIDE 25 MG: 25 CAPSULE ORAL at 11:01

## 2020-04-27 RX ADMIN — SUCRALFATE 1 G: 1 SUSPENSION ORAL at 11:38

## 2020-04-27 RX ADMIN — DIPHENHYDRAMINE HYDROCHLORIDE 25 MG: 25 CAPSULE ORAL at 05:40

## 2020-04-27 RX ADMIN — INSULIN GLARGINE 20 UNITS: 100 INJECTION, SOLUTION SUBCUTANEOUS at 07:46

## 2020-04-27 RX ADMIN — PREGABALIN 75 MG: 25 CAPSULE ORAL at 07:47

## 2020-04-27 RX ADMIN — PANTOPRAZOLE SODIUM 40 MG: 40 TABLET, DELAYED RELEASE ORAL at 05:40

## 2020-04-27 RX ADMIN — INSULIN LISPRO 12 UNITS: 100 INJECTION, SOLUTION INTRAVENOUS; SUBCUTANEOUS at 17:16

## 2020-04-28 PROBLEM — R74.8 ELEVATED ALDOLASE LEVEL: Status: ACTIVE | Noted: 2020-04-28

## 2020-04-28 LAB
ALBUMIN SERPL-MCNC: 3.2 G/DL (ref 3.5–5.2)
ALBUMIN/GLOB SERPL: 0.8 G/DL
ALP SERPL-CCNC: 117 U/L (ref 39–117)
ALT SERPL W P-5'-P-CCNC: 243 U/L (ref 1–33)
ANION GAP SERPL CALCULATED.3IONS-SCNC: 12.9 MMOL/L (ref 5–15)
AST SERPL-CCNC: 288 U/L (ref 1–32)
BILIRUB SERPL-MCNC: 0.7 MG/DL (ref 0.2–1.2)
BUN BLD-MCNC: 17 MG/DL (ref 6–20)
BUN/CREAT SERPL: 36.2 (ref 7–25)
CALCIUM SPEC-SCNC: 9.5 MG/DL (ref 8.6–10.5)
CHLORIDE SERPL-SCNC: 90 MMOL/L (ref 98–107)
CO2 SERPL-SCNC: 29.1 MMOL/L (ref 22–29)
CREAT BLD-MCNC: 0.47 MG/DL (ref 0.57–1)
GFR SERPL CREATININE-BSD FRML MDRD: 143 ML/MIN/1.73
GLOBULIN UR ELPH-MCNC: 4 GM/DL
GLUCOSE BLD-MCNC: 102 MG/DL (ref 65–99)
GLUCOSE BLDC GLUCOMTR-MCNC: 109 MG/DL (ref 70–130)
GLUCOSE BLDC GLUCOMTR-MCNC: 233 MG/DL (ref 70–130)
GLUCOSE BLDC GLUCOMTR-MCNC: 235 MG/DL (ref 70–130)
POTASSIUM BLD-SCNC: 3.8 MMOL/L (ref 3.5–5.2)
PROT SERPL-MCNC: 7.2 G/DL (ref 6–8.5)
SODIUM BLD-SCNC: 132 MMOL/L (ref 136–145)

## 2020-04-28 PROCEDURE — 63710000001 PREDNISONE PER 1 MG: Performed by: PHYSICAL MEDICINE & REHABILITATION

## 2020-04-28 PROCEDURE — 97535 SELF CARE MNGMENT TRAINING: CPT

## 2020-04-28 PROCEDURE — 94799 UNLISTED PULMONARY SVC/PX: CPT

## 2020-04-28 PROCEDURE — 63710000001 INSULIN GLARGINE PER 5 UNITS: Performed by: INTERNAL MEDICINE

## 2020-04-28 PROCEDURE — 80053 COMPREHEN METABOLIC PANEL: CPT | Performed by: INTERNAL MEDICINE

## 2020-04-28 PROCEDURE — 82962 GLUCOSE BLOOD TEST: CPT

## 2020-04-28 PROCEDURE — 63710000001 INSULIN LISPRO (HUMAN) PER 5 UNITS: Performed by: INTERNAL MEDICINE

## 2020-04-28 PROCEDURE — 97110 THERAPEUTIC EXERCISES: CPT

## 2020-04-28 PROCEDURE — 99231 SBSQ HOSP IP/OBS SF/LOW 25: CPT | Performed by: INTERNAL MEDICINE

## 2020-04-28 PROCEDURE — 63710000001 PREDNISONE PER 5 MG: Performed by: PHYSICAL MEDICINE & REHABILITATION

## 2020-04-28 PROCEDURE — 63710000001 DIPHENHYDRAMINE PER 50 MG: Performed by: INTERNAL MEDICINE

## 2020-04-28 PROCEDURE — 99232 SBSQ HOSP IP/OBS MODERATE 35: CPT | Performed by: INTERNAL MEDICINE

## 2020-04-28 RX ADMIN — INSULIN LISPRO 5 UNITS: 100 INJECTION, SOLUTION INTRAVENOUS; SUBCUTANEOUS at 16:55

## 2020-04-28 RX ADMIN — INSULIN LISPRO 12 UNITS: 100 INJECTION, SOLUTION INTRAVENOUS; SUBCUTANEOUS at 11:38

## 2020-04-28 RX ADMIN — OXYCODONE HYDROCHLORIDE 5 MG: 5 TABLET ORAL at 05:29

## 2020-04-28 RX ADMIN — DIPHENHYDRAMINE HYDROCHLORIDE 25 MG: 25 CAPSULE ORAL at 01:31

## 2020-04-28 RX ADMIN — SUCRALFATE 1 G: 1 SUSPENSION ORAL at 11:38

## 2020-04-28 RX ADMIN — SUCRALFATE 1 G: 1 SUSPENSION ORAL at 15:17

## 2020-04-28 RX ADMIN — INSULIN LISPRO 5 UNITS: 100 INJECTION, SOLUTION INTRAVENOUS; SUBCUTANEOUS at 11:39

## 2020-04-28 RX ADMIN — PREGABALIN 100 MG: 100 CAPSULE ORAL at 19:40

## 2020-04-28 RX ADMIN — SUCRALFATE 1 G: 1 SUSPENSION ORAL at 19:41

## 2020-04-28 RX ADMIN — BUDESONIDE AND FORMOTEROL FUMARATE DIHYDRATE 2 PUFF: 160; 4.5 AEROSOL RESPIRATORY (INHALATION) at 06:54

## 2020-04-28 RX ADMIN — FOLIC ACID 1 MG: 1 TABLET ORAL at 07:32

## 2020-04-28 RX ADMIN — INSULIN LISPRO 12 UNITS: 100 INJECTION, SOLUTION INTRAVENOUS; SUBCUTANEOUS at 07:34

## 2020-04-28 RX ADMIN — INSULIN LISPRO 14 UNITS: 100 INJECTION, SOLUTION INTRAVENOUS; SUBCUTANEOUS at 16:54

## 2020-04-28 RX ADMIN — HYDROCHLOROTHIAZIDE 25 MG: 25 TABLET ORAL at 07:32

## 2020-04-28 RX ADMIN — PREGABALIN 100 MG: 100 CAPSULE ORAL at 08:57

## 2020-04-28 RX ADMIN — OXYCODONE HYDROCHLORIDE 5 MG: 5 TABLET ORAL at 15:14

## 2020-04-28 RX ADMIN — POLYETHYLENE GLYCOL 3350 17 G: 17 POWDER, FOR SOLUTION ORAL at 07:32

## 2020-04-28 RX ADMIN — DIPHENHYDRAMINE HYDROCHLORIDE 25 MG: 25 CAPSULE ORAL at 11:11

## 2020-04-28 RX ADMIN — DIPHENHYDRAMINE HYDROCHLORIDE 25 MG: 25 CAPSULE ORAL at 19:35

## 2020-04-28 RX ADMIN — AMLODIPINE BESYLATE 10 MG: 10 TABLET ORAL at 19:40

## 2020-04-28 RX ADMIN — Medication 3 MG: at 21:45

## 2020-04-28 RX ADMIN — OXYCODONE HYDROCHLORIDE 5 MG: 5 TABLET ORAL at 01:31

## 2020-04-28 RX ADMIN — DIPHENHYDRAMINE HYDROCHLORIDE 25 MG: 25 CAPSULE ORAL at 05:29

## 2020-04-28 RX ADMIN — SUCRALFATE 1 G: 1 SUSPENSION ORAL at 05:29

## 2020-04-28 RX ADMIN — INSULIN GLARGINE 20 UNITS: 100 INJECTION, SOLUTION SUBCUTANEOUS at 07:33

## 2020-04-28 RX ADMIN — PREDNISONE 30 MG: 20 TABLET ORAL at 07:33

## 2020-04-28 RX ADMIN — DIPHENHYDRAMINE HYDROCHLORIDE 25 MG: 25 CAPSULE ORAL at 15:18

## 2020-04-28 RX ADMIN — PANTOPRAZOLE SODIUM 40 MG: 40 TABLET, DELAYED RELEASE ORAL at 15:17

## 2020-04-28 RX ADMIN — BUDESONIDE AND FORMOTEROL FUMARATE DIHYDRATE 2 PUFF: 160; 4.5 AEROSOL RESPIRATORY (INHALATION) at 21:40

## 2020-04-28 RX ADMIN — LEVOTHYROXINE SODIUM 200 MCG: 100 TABLET ORAL at 05:29

## 2020-04-28 RX ADMIN — PANTOPRAZOLE SODIUM 40 MG: 40 TABLET, DELAYED RELEASE ORAL at 05:29

## 2020-04-28 RX ADMIN — OXYCODONE HYDROCHLORIDE 5 MG: 5 TABLET ORAL at 11:11

## 2020-04-28 RX ADMIN — OXYCODONE HYDROCHLORIDE 5 MG: 5 TABLET ORAL at 19:35

## 2020-04-29 LAB
ALBUMIN SERPL-MCNC: 3.2 G/DL (ref 3.5–5.2)
ALBUMIN/GLOB SERPL: 0.8 G/DL
ALP SERPL-CCNC: 109 U/L (ref 39–117)
ALT SERPL W P-5'-P-CCNC: 233 U/L (ref 1–33)
ANION GAP SERPL CALCULATED.3IONS-SCNC: 12.1 MMOL/L (ref 5–15)
AST SERPL-CCNC: 278 U/L (ref 1–32)
BILIRUB SERPL-MCNC: 0.6 MG/DL (ref 0.2–1.2)
BUN BLD-MCNC: 15 MG/DL (ref 6–20)
BUN/CREAT SERPL: 29.4 (ref 7–25)
CALCIUM SPEC-SCNC: 9.6 MG/DL (ref 8.6–10.5)
CHLORIDE SERPL-SCNC: 95 MMOL/L (ref 98–107)
CO2 SERPL-SCNC: 31.9 MMOL/L (ref 22–29)
CREAT BLD-MCNC: 0.51 MG/DL (ref 0.57–1)
GFR SERPL CREATININE-BSD FRML MDRD: 130 ML/MIN/1.73
GLOBULIN UR ELPH-MCNC: 3.9 GM/DL
GLUCOSE BLD-MCNC: 106 MG/DL (ref 65–99)
GLUCOSE BLDC GLUCOMTR-MCNC: 197 MG/DL (ref 70–130)
GLUCOSE BLDC GLUCOMTR-MCNC: 207 MG/DL (ref 70–130)
GLUCOSE BLDC GLUCOMTR-MCNC: 99 MG/DL (ref 70–130)
POTASSIUM BLD-SCNC: 4.1 MMOL/L (ref 3.5–5.2)
PROT SERPL-MCNC: 7.1 G/DL (ref 6–8.5)
SODIUM BLD-SCNC: 139 MMOL/L (ref 136–145)

## 2020-04-29 PROCEDURE — 95886 MUSC TEST DONE W/N TEST COMP: CPT | Performed by: PSYCHIATRY & NEUROLOGY

## 2020-04-29 PROCEDURE — 81256 HFE GENE: CPT | Performed by: INTERNAL MEDICINE

## 2020-04-29 PROCEDURE — 63710000001 DIPHENHYDRAMINE PER 50 MG: Performed by: INTERNAL MEDICINE

## 2020-04-29 PROCEDURE — 97535 SELF CARE MNGMENT TRAINING: CPT

## 2020-04-29 PROCEDURE — 63710000001 INSULIN LISPRO (HUMAN) PER 5 UNITS: Performed by: INTERNAL MEDICINE

## 2020-04-29 PROCEDURE — 63710000001 PREDNISONE PER 1 MG: Performed by: PHYSICAL MEDICINE & REHABILITATION

## 2020-04-29 PROCEDURE — 97110 THERAPEUTIC EXERCISES: CPT

## 2020-04-29 PROCEDURE — 80053 COMPREHEN METABOLIC PANEL: CPT | Performed by: INTERNAL MEDICINE

## 2020-04-29 PROCEDURE — 63710000001 INSULIN GLARGINE PER 5 UNITS: Performed by: INTERNAL MEDICINE

## 2020-04-29 PROCEDURE — 63710000001 PREDNISONE PER 5 MG: Performed by: PHYSICAL MEDICINE & REHABILITATION

## 2020-04-29 PROCEDURE — 82962 GLUCOSE BLOOD TEST: CPT

## 2020-04-29 PROCEDURE — 99232 SBSQ HOSP IP/OBS MODERATE 35: CPT | Performed by: INTERNAL MEDICINE

## 2020-04-29 PROCEDURE — 94799 UNLISTED PULMONARY SVC/PX: CPT

## 2020-04-29 PROCEDURE — 95911 NRV CNDJ TEST 9-10 STUDIES: CPT | Performed by: PSYCHIATRY & NEUROLOGY

## 2020-04-29 RX ADMIN — PREGABALIN 100 MG: 100 CAPSULE ORAL at 21:56

## 2020-04-29 RX ADMIN — INSULIN LISPRO 14 UNITS: 100 INJECTION, SOLUTION INTRAVENOUS; SUBCUTANEOUS at 16:25

## 2020-04-29 RX ADMIN — OXYCODONE HYDROCHLORIDE 5 MG: 5 TABLET ORAL at 09:23

## 2020-04-29 RX ADMIN — DIPHENHYDRAMINE HYDROCHLORIDE 25 MG: 25 CAPSULE ORAL at 09:23

## 2020-04-29 RX ADMIN — BUDESONIDE AND FORMOTEROL FUMARATE DIHYDRATE 2 PUFF: 160; 4.5 AEROSOL RESPIRATORY (INHALATION) at 20:06

## 2020-04-29 RX ADMIN — Medication 3 MG: at 21:56

## 2020-04-29 RX ADMIN — FOLIC ACID 1 MG: 1 TABLET ORAL at 08:11

## 2020-04-29 RX ADMIN — PREDNISONE 30 MG: 20 TABLET ORAL at 08:11

## 2020-04-29 RX ADMIN — SUCRALFATE 1 G: 1 SUSPENSION ORAL at 21:56

## 2020-04-29 RX ADMIN — INSULIN GLARGINE 20 UNITS: 100 INJECTION, SOLUTION SUBCUTANEOUS at 08:11

## 2020-04-29 RX ADMIN — PREGABALIN 100 MG: 100 CAPSULE ORAL at 08:11

## 2020-04-29 RX ADMIN — PANTOPRAZOLE SODIUM 40 MG: 40 TABLET, DELAYED RELEASE ORAL at 16:24

## 2020-04-29 RX ADMIN — POLYETHYLENE GLYCOL 3350 17 G: 17 POWDER, FOR SOLUTION ORAL at 08:12

## 2020-04-29 RX ADMIN — DIPHENHYDRAMINE HYDROCHLORIDE 25 MG: 25 CAPSULE ORAL at 04:59

## 2020-04-29 RX ADMIN — SUCRALFATE 1 G: 1 SUSPENSION ORAL at 12:05

## 2020-04-29 RX ADMIN — OXYCODONE HYDROCHLORIDE 5 MG: 5 TABLET ORAL at 17:54

## 2020-04-29 RX ADMIN — DIPHENHYDRAMINE HYDROCHLORIDE 25 MG: 25 CAPSULE ORAL at 00:51

## 2020-04-29 RX ADMIN — OXYCODONE HYDROCHLORIDE 5 MG: 5 TABLET ORAL at 00:51

## 2020-04-29 RX ADMIN — INSULIN LISPRO 14 UNITS: 100 INJECTION, SOLUTION INTRAVENOUS; SUBCUTANEOUS at 12:06

## 2020-04-29 RX ADMIN — HYDROCHLOROTHIAZIDE 25 MG: 25 TABLET ORAL at 08:11

## 2020-04-29 RX ADMIN — LEVOTHYROXINE SODIUM 200 MCG: 100 TABLET ORAL at 05:01

## 2020-04-29 RX ADMIN — PANTOPRAZOLE SODIUM 40 MG: 40 TABLET, DELAYED RELEASE ORAL at 05:01

## 2020-04-29 RX ADMIN — DIPHENHYDRAMINE HYDROCHLORIDE 25 MG: 25 CAPSULE ORAL at 17:54

## 2020-04-29 RX ADMIN — DIPHENHYDRAMINE HYDROCHLORIDE 25 MG: 25 CAPSULE ORAL at 13:53

## 2020-04-29 RX ADMIN — OXYCODONE HYDROCHLORIDE 5 MG: 5 TABLET ORAL at 21:59

## 2020-04-29 RX ADMIN — INSULIN LISPRO 3 UNITS: 100 INJECTION, SOLUTION INTRAVENOUS; SUBCUTANEOUS at 12:06

## 2020-04-29 RX ADMIN — INSULIN LISPRO 5 UNITS: 100 INJECTION, SOLUTION INTRAVENOUS; SUBCUTANEOUS at 16:24

## 2020-04-29 RX ADMIN — OXYCODONE HYDROCHLORIDE 5 MG: 5 TABLET ORAL at 13:53

## 2020-04-29 RX ADMIN — DIPHENHYDRAMINE HYDROCHLORIDE 25 MG: 25 CAPSULE ORAL at 21:59

## 2020-04-29 RX ADMIN — OXYCODONE HYDROCHLORIDE 5 MG: 5 TABLET ORAL at 04:59

## 2020-04-29 RX ADMIN — AMLODIPINE BESYLATE 10 MG: 10 TABLET ORAL at 21:56

## 2020-04-29 RX ADMIN — BUDESONIDE AND FORMOTEROL FUMARATE DIHYDRATE 2 PUFF: 160; 4.5 AEROSOL RESPIRATORY (INHALATION) at 07:00

## 2020-04-29 RX ADMIN — SUCRALFATE 1 G: 1 SUSPENSION ORAL at 05:01

## 2020-04-29 RX ADMIN — SUCRALFATE 1 G: 1 SUSPENSION ORAL at 16:23

## 2020-04-30 LAB
EBV DNA # BLD NAA+PROBE: 5218 COPIES/ML
GLUCOSE BLDC GLUCOMTR-MCNC: 112 MG/DL (ref 70–130)
GLUCOSE BLDC GLUCOMTR-MCNC: 147 MG/DL (ref 70–130)
GLUCOSE BLDC GLUCOMTR-MCNC: 257 MG/DL (ref 70–130)
LOG 10 EBV DNA QN PCR: 3.72 LOG10COPY/ML

## 2020-04-30 PROCEDURE — 99232 SBSQ HOSP IP/OBS MODERATE 35: CPT | Performed by: INTERNAL MEDICINE

## 2020-04-30 PROCEDURE — 63710000001 PREDNISONE PER 1 MG: Performed by: PHYSICAL MEDICINE & REHABILITATION

## 2020-04-30 PROCEDURE — 63710000001 DIPHENHYDRAMINE PER 50 MG: Performed by: INTERNAL MEDICINE

## 2020-04-30 PROCEDURE — 63710000001 PREDNISONE PER 5 MG: Performed by: PHYSICAL MEDICINE & REHABILITATION

## 2020-04-30 PROCEDURE — 63710000001 INSULIN LISPRO (HUMAN) PER 5 UNITS: Performed by: INTERNAL MEDICINE

## 2020-04-30 PROCEDURE — 97110 THERAPEUTIC EXERCISES: CPT

## 2020-04-30 PROCEDURE — 94799 UNLISTED PULMONARY SVC/PX: CPT

## 2020-04-30 PROCEDURE — 63710000001 INSULIN GLARGINE PER 5 UNITS: Performed by: INTERNAL MEDICINE

## 2020-04-30 PROCEDURE — 82962 GLUCOSE BLOOD TEST: CPT

## 2020-04-30 PROCEDURE — 97535 SELF CARE MNGMENT TRAINING: CPT

## 2020-04-30 RX ADMIN — HYDROCHLOROTHIAZIDE 25 MG: 25 TABLET ORAL at 08:35

## 2020-04-30 RX ADMIN — INSULIN LISPRO 14 UNITS: 100 INJECTION, SOLUTION INTRAVENOUS; SUBCUTANEOUS at 11:41

## 2020-04-30 RX ADMIN — INSULIN GLARGINE 20 UNITS: 100 INJECTION, SOLUTION SUBCUTANEOUS at 08:35

## 2020-04-30 RX ADMIN — INSULIN LISPRO 14 UNITS: 100 INJECTION, SOLUTION INTRAVENOUS; SUBCUTANEOUS at 08:35

## 2020-04-30 RX ADMIN — SUCRALFATE 1 G: 1 SUSPENSION ORAL at 21:17

## 2020-04-30 RX ADMIN — PREGABALIN 100 MG: 100 CAPSULE ORAL at 21:17

## 2020-04-30 RX ADMIN — LEVOTHYROXINE SODIUM 200 MCG: 100 TABLET ORAL at 05:18

## 2020-04-30 RX ADMIN — SUCRALFATE 1 G: 1 SUSPENSION ORAL at 17:37

## 2020-04-30 RX ADMIN — PANTOPRAZOLE SODIUM 40 MG: 40 TABLET, DELAYED RELEASE ORAL at 05:18

## 2020-04-30 RX ADMIN — OXYCODONE HYDROCHLORIDE 5 MG: 5 TABLET ORAL at 06:00

## 2020-04-30 RX ADMIN — BUDESONIDE AND FORMOTEROL FUMARATE DIHYDRATE 2 PUFF: 160; 4.5 AEROSOL RESPIRATORY (INHALATION) at 21:50

## 2020-04-30 RX ADMIN — INSULIN LISPRO 8 UNITS: 100 INJECTION, SOLUTION INTRAVENOUS; SUBCUTANEOUS at 17:37

## 2020-04-30 RX ADMIN — PREDNISONE 30 MG: 20 TABLET ORAL at 08:35

## 2020-04-30 RX ADMIN — OXYCODONE HYDROCHLORIDE 5 MG: 5 TABLET ORAL at 01:59

## 2020-04-30 RX ADMIN — OXYCODONE HYDROCHLORIDE 5 MG: 5 TABLET ORAL at 15:15

## 2020-04-30 RX ADMIN — AMLODIPINE BESYLATE 10 MG: 10 TABLET ORAL at 21:17

## 2020-04-30 RX ADMIN — SUCRALFATE 1 G: 1 SUSPENSION ORAL at 11:41

## 2020-04-30 RX ADMIN — POLYETHYLENE GLYCOL 3350 17 G: 17 POWDER, FOR SOLUTION ORAL at 08:35

## 2020-04-30 RX ADMIN — PANTOPRAZOLE SODIUM 40 MG: 40 TABLET, DELAYED RELEASE ORAL at 17:37

## 2020-04-30 RX ADMIN — DIPHENHYDRAMINE HYDROCHLORIDE 25 MG: 25 CAPSULE ORAL at 20:05

## 2020-04-30 RX ADMIN — PREGABALIN 100 MG: 100 CAPSULE ORAL at 08:35

## 2020-04-30 RX ADMIN — OXYCODONE HYDROCHLORIDE 5 MG: 5 TABLET ORAL at 20:05

## 2020-04-30 RX ADMIN — DIPHENHYDRAMINE HYDROCHLORIDE 25 MG: 25 CAPSULE ORAL at 15:15

## 2020-04-30 RX ADMIN — INSULIN LISPRO 14 UNITS: 100 INJECTION, SOLUTION INTRAVENOUS; SUBCUTANEOUS at 17:37

## 2020-04-30 RX ADMIN — FOLIC ACID 1 MG: 1 TABLET ORAL at 08:35

## 2020-04-30 RX ADMIN — DIPHENHYDRAMINE HYDROCHLORIDE 25 MG: 25 CAPSULE ORAL at 05:59

## 2020-04-30 RX ADMIN — SUCRALFATE 1 G: 1 SUSPENSION ORAL at 05:18

## 2020-04-30 RX ADMIN — BUDESONIDE AND FORMOTEROL FUMARATE DIHYDRATE 2 PUFF: 160; 4.5 AEROSOL RESPIRATORY (INHALATION) at 06:33

## 2020-04-30 RX ADMIN — Medication 3 MG: at 21:17

## 2020-04-30 RX ADMIN — DIPHENHYDRAMINE HYDROCHLORIDE 25 MG: 25 CAPSULE ORAL at 01:59

## 2020-05-01 LAB
GLUCOSE BLDC GLUCOMTR-MCNC: 105 MG/DL (ref 70–130)
GLUCOSE BLDC GLUCOMTR-MCNC: 143 MG/DL (ref 70–130)
GLUCOSE BLDC GLUCOMTR-MCNC: 167 MG/DL (ref 70–130)

## 2020-05-01 PROCEDURE — 97535 SELF CARE MNGMENT TRAINING: CPT

## 2020-05-01 PROCEDURE — 63710000001 DIPHENHYDRAMINE PER 50 MG: Performed by: INTERNAL MEDICINE

## 2020-05-01 PROCEDURE — 63710000001 INSULIN LISPRO (HUMAN) PER 5 UNITS: Performed by: INTERNAL MEDICINE

## 2020-05-01 PROCEDURE — 97110 THERAPEUTIC EXERCISES: CPT

## 2020-05-01 PROCEDURE — 94799 UNLISTED PULMONARY SVC/PX: CPT

## 2020-05-01 PROCEDURE — 82962 GLUCOSE BLOOD TEST: CPT

## 2020-05-01 PROCEDURE — 97112 NEUROMUSCULAR REEDUCATION: CPT

## 2020-05-01 PROCEDURE — 97116 GAIT TRAINING THERAPY: CPT

## 2020-05-01 PROCEDURE — 63710000001 PREDNISONE PER 1 MG: Performed by: PHYSICAL MEDICINE & REHABILITATION

## 2020-05-01 PROCEDURE — 63710000001 INSULIN GLARGINE PER 5 UNITS: Performed by: INTERNAL MEDICINE

## 2020-05-01 RX ORDER — OXYCODONE HYDROCHLORIDE 5 MG/1
5 TABLET ORAL EVERY 8 HOURS PRN
Status: DISCONTINUED | OUTPATIENT
Start: 2020-05-04 | End: 2020-05-06

## 2020-05-01 RX ORDER — OXYCODONE HYDROCHLORIDE 5 MG/1
5 TABLET ORAL EVERY 6 HOURS PRN
Status: DISCONTINUED | OUTPATIENT
Start: 2020-05-01 | End: 2020-05-04

## 2020-05-01 RX ORDER — OXYCODONE HYDROCHLORIDE 5 MG/1
5 TABLET ORAL EVERY 12 HOURS PRN
Status: DISCONTINUED | OUTPATIENT
Start: 2020-05-07 | End: 2020-05-11

## 2020-05-01 RX ADMIN — OXYCODONE HYDROCHLORIDE 5 MG: 5 TABLET ORAL at 08:09

## 2020-05-01 RX ADMIN — INSULIN GLARGINE 20 UNITS: 100 INJECTION, SOLUTION SUBCUTANEOUS at 08:11

## 2020-05-01 RX ADMIN — OXYCODONE HYDROCHLORIDE 5 MG: 5 TABLET ORAL at 21:07

## 2020-05-01 RX ADMIN — INSULIN LISPRO 14 UNITS: 100 INJECTION, SOLUTION INTRAVENOUS; SUBCUTANEOUS at 08:09

## 2020-05-01 RX ADMIN — DIPHENHYDRAMINE HYDROCHLORIDE 25 MG: 25 CAPSULE ORAL at 14:53

## 2020-05-01 RX ADMIN — FOLIC ACID 1 MG: 1 TABLET ORAL at 08:07

## 2020-05-01 RX ADMIN — HYDROCHLOROTHIAZIDE 25 MG: 25 TABLET ORAL at 08:07

## 2020-05-01 RX ADMIN — INSULIN LISPRO 14 UNITS: 100 INJECTION, SOLUTION INTRAVENOUS; SUBCUTANEOUS at 11:32

## 2020-05-01 RX ADMIN — LEVOTHYROXINE SODIUM 200 MCG: 100 TABLET ORAL at 08:08

## 2020-05-01 RX ADMIN — SUCRALFATE 1 G: 1 SUSPENSION ORAL at 11:17

## 2020-05-01 RX ADMIN — DIPHENHYDRAMINE HYDROCHLORIDE 25 MG: 25 CAPSULE ORAL at 04:18

## 2020-05-01 RX ADMIN — Medication 3 MG: at 22:27

## 2020-05-01 RX ADMIN — DIPHENHYDRAMINE HYDROCHLORIDE 25 MG: 25 CAPSULE ORAL at 08:09

## 2020-05-01 RX ADMIN — PREDNISONE 20 MG: 20 TABLET ORAL at 08:08

## 2020-05-01 RX ADMIN — DIPHENHYDRAMINE HYDROCHLORIDE 25 MG: 25 CAPSULE ORAL at 21:07

## 2020-05-01 RX ADMIN — BUDESONIDE AND FORMOTEROL FUMARATE DIHYDRATE 2 PUFF: 160; 4.5 AEROSOL RESPIRATORY (INHALATION) at 21:33

## 2020-05-01 RX ADMIN — INSULIN LISPRO 3 UNITS: 100 INJECTION, SOLUTION INTRAVENOUS; SUBCUTANEOUS at 16:56

## 2020-05-01 RX ADMIN — AMLODIPINE BESYLATE 10 MG: 10 TABLET ORAL at 08:08

## 2020-05-01 RX ADMIN — PANTOPRAZOLE SODIUM 40 MG: 40 TABLET, DELAYED RELEASE ORAL at 16:56

## 2020-05-01 RX ADMIN — OXYCODONE HYDROCHLORIDE 5 MG: 5 TABLET ORAL at 00:08

## 2020-05-01 RX ADMIN — PANTOPRAZOLE SODIUM 40 MG: 40 TABLET, DELAYED RELEASE ORAL at 06:22

## 2020-05-01 RX ADMIN — SUCRALFATE 1 G: 1 SUSPENSION ORAL at 08:13

## 2020-05-01 RX ADMIN — AMLODIPINE BESYLATE 10 MG: 10 TABLET ORAL at 20:26

## 2020-05-01 RX ADMIN — SUCRALFATE 1 G: 1 SUSPENSION ORAL at 16:56

## 2020-05-01 RX ADMIN — OXYCODONE HYDROCHLORIDE 5 MG: 5 TABLET ORAL at 14:51

## 2020-05-01 RX ADMIN — PREGABALIN 100 MG: 100 CAPSULE ORAL at 20:26

## 2020-05-01 RX ADMIN — OXYCODONE HYDROCHLORIDE 5 MG: 5 TABLET ORAL at 04:18

## 2020-05-01 RX ADMIN — PREGABALIN 100 MG: 100 CAPSULE ORAL at 08:09

## 2020-05-01 RX ADMIN — SUCRALFATE 1 G: 1 SUSPENSION ORAL at 20:27

## 2020-05-01 RX ADMIN — INSULIN LISPRO 14 UNITS: 100 INJECTION, SOLUTION INTRAVENOUS; SUBCUTANEOUS at 16:57

## 2020-05-01 RX ADMIN — DIPHENHYDRAMINE HYDROCHLORIDE 25 MG: 25 CAPSULE ORAL at 00:08

## 2020-05-01 NOTE — THERAPY TREATMENT NOTE
Inpatient Rehabilitation - Physical Therapy Treatment Note  [facility]     Patient Name: Oralia Sánchez  : 1975  MRN: [id number]    Today's Date: 2020                 Admit Date: 2020      Visit Dx:    No diagnosis found.    Patient Active Problem List   Diagnosis   • Vitamin D deficiency   • Awareness of heartbeats   • Adiposity   • YOUNG (nonalcoholic steatohepatitis)   • Hypothyroid   • Diabetes mellitus arising in pregnancy   • Diabetes (CMS/HCC)   • Metabolic syndrome   • Chest pain   • Primary thunderclap headache   • Elevated LFTs   • Tobacco abuse   • Rheumatoid arthritis (CMS/HCC)   • Type 2 diabetes mellitus (CMS/HCC)   • Morbid obesity (CMS/HCC)   • UTI (urinary tract infection), bacterial   • Cerebral aneurysm   • Dyspnea   • Cough   • Hypomagnesemia   • Metabolic acidosis   • Fatigue   • History of COPD   • Abnormal CT scan, colon   • 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)   • Essential hypertension   • GBS (Guillain Panora syndrome) (CMS/HCC)   • Elevated transaminase level   • History of gestational diabetes   • Steroid-induced hyperglycemia   • Elevated aldolase level       Therapy Treatment    IRF Treatment Summary     Row Name 20 1005 20 0944          Evaluation/Treatment Time and Intent    Subjective Information  no complaints  -DN  no complaints  -EE     Existing Precautions/Restrictions  fall  -DN  fall  -EE     Document Type  therapy note (daily note)  -DN  therapy note (daily note)  -EE     Mode of Treatment  occupational therapy  -DN  physical therapy  -EE     Patient/Family Observations  pt sitting in bed  -DN  Pt sitting up in WC in no acute distress.   -EE     Recorded by [DN] Prince Obrien, OT [EE] Irlanda Wallis, PT     Row Name 20 1005 20 0944          Cognition/Psychosocial- PT/OT    Affect/Mental Status (Cognitive)  WNL  -DN  WNL  -EE     Orientation Status (Cognition)  oriented x 4  -DN  oriented x 4  -EE     Follows Commands  (Cognition)  WNL  -DN  WNL  -EE     Personal Safety Interventions  fall prevention program maintained;gait belt  -DN  fall prevention program maintained;muscle strengthening facilitated;gait belt;supervised activity;nonskid shoes/slippers when out of bed  -EE     Cognitive Function (Cognitive)  memory deficit  -DN  --     Memory Deficit (Cognitive)  mild deficit  -DN  --     Recorded by [DN] Prince Obrien, OT [EE] Irlanda Wallis, PT     Row Name 05/01/20 0944             Bed Mobility Assessment/Treatment    Comment (Bed Mobility)  up in chair  -EE      Recorded by [EE] Irlanda Wallis, PT      Row Name 05/01/20 1005             Bed-Chair Transfer    Bed-Chair [place] (Transfers)  supervision  -DN      Assistive Device (Bed-Chair Transfers)  wheelchair  -DN      Recorded by [DN] Prince Obrien, OT      Row Name 05/01/20 0944             Sit-Stand Transfer    Sit-Stand [place] (Transfers)  stand by assist;contact guard;verbal cues  -EE      Assistive Device (Sit-Stand Transfers)  walker, front-wheeled  -EE      Recorded by [EE] Irlanda Wallis, PT      Row Name 05/01/20 0944             Stand-Sit Transfer    Stand-Sit [place] (Transfers)  stand by assist;contact guard;verbal cues  -EE      Assistive Device (Stand-Sit Transfers)  walker, front-wheeled;wheelchair  -EE      Recorded by [EE] Irlanda Wallis, PT      Row Name 05/01/20 1005             Toilet Transfer    Type (Toilet Transfer)  stand pivot/stand step  -DN      [place] Level (Toilet Transfer)  supervision  -DN      Assistive Device (Toilet Transfer)  wheelchair;grab bars/safety frame;raised toilet seat  -DN      Recorded by [DN] Prince Obrien, OT      Row Name 05/01/20 1005             Shower Transfer    Type (Shower Transfer)  stand pivot/stand step  -DN      [place] Level (Shower Transfer)  contact guard;verbal cues  -DN      Assistive Device (Shower Transfer)  walker, front-wheeled;tub bench;grab bars/tub rail  -DN      Recorded by [DN]  Prince Obrien, OT      Row Name 05/01/20 0944             Gait/Stairs Assessment/Training    [place] Level (Gait)  contact guard;verbal cues  -EE      Assistive Device (Gait)  walker, front-wheeled  -EE      Distance in Feet (Gait)  160', 80'  -EE      Pattern (Gait)  step-through  -EE      Deviations/Abnormal Patterns (Gait)  stride length decreased  -EE      Bilateral Gait Deviations  weight shift ability decreased  -EE      Left Sided Gait Deviations  heel strike decreased  -EE      Recorded by [EE] Irlanda Wallis, PT      Row Name 05/01/20 0944             Safety Issues, Functional Mobility    Impairments Affecting Function (Mobility)  balance;strength;endurance/activity tolerance  -EE      Recorded by [EE] Irlanda Wallis, PT      Row Name 05/01/20 1005             Bathing Assessment/Treatment    Bathing [place] Level  bathing skills;supervision  -DN      Assistive Device (Bathing)  hand held shower spray hose;grab bar/tub rail;tub bench  -DN      Bathing Position  supported sitting;supported standing  -DN      Recorded by [DN] Prince Obrien OT      Row Name 05/01/20 1005             Upper Body Dressing Assessment/Treatment    Upper Body Dressing Task  upper body dressing skills;doff;don;bra/undergarment;pull over garment;supervision;set up assistance  -DN      Upper Body Dressing Position  supported sitting  -DN      Set-up Assistance (Upper Body Dressing)  obtain clothing  -DN      Recorded by [DN] Prince Obrien, OT      Row Name 05/01/20 1005             Lower Body Dressing Assessment/Treatment    Lower Body Dressing [place] Level  don;doff;pants/bottoms;socks;underwear;supervision;verbal cues;nonverbal cues (demo/gesture)  -DN      Lower Body Dressing Position  supported sitting;supported standing  -DN      Lower Body Dressing Setup Assistance  obtain clothing  -DN      Recorded by [DN] Prince Obrien, OT      Row Name 05/01/20 1005             Grooming Assessment/Treatment    Grooming  [place] Level  grooming skills;deodorant application;hair care, combing/brushing;oral care regimen;set up  -DN      Grooming Position  supported sitting  -DN      Recorded by [DN] Prince Obrien, HARMEET      Row Name 05/01/20 1005             Toileting Assessment/Treatment    Toileting [place] Level  toileting skills;adjust/manage clothing;perform perineal hygiene;supervision;verbal cues  -DN      Assistive Device Use (Toileting)  grab bar/safety frame;raised toilet seat  -DN      Toileting Position  supported sitting;supported standing  -DN      Comment (Toileting)  grab bars to hold on to during clothing mgmt, will try with onllam[place] RWX for support in future  -DN      Recorded by [DN] Prince Obrien OT      Row Name 05/01/20 1005 05/01/20 0944          Pain Scale: Numbers Pre/Post-Treatment    Pain Scale: Numbers, Pretreatment  0/10 - no pain  -DN  4/10  -EE     Pain Scale: Numbers, Post-Treatment  0/10 - no pain  -DN  4/10  -EE     Pain Location - Side  --  Bilateral  -EE     Pain Location - Orientation  --  distal  -EE     Pain Location  --  hand  -EE     Pain Intervention(s)  --  Repositioned  -EE     Recorded by [DN] Prince Obrien OT [EE] Irlanda Wallis PT     Row Name 05/01/20 1005             Upper Extremity Seated Therapeutic Exercise    Performed, Seated Upper Extremity (Therapeutic Exercise)  shoulder flexion/extension;shoulder abduction/adduction  -DN      Device, Seated Upper Extremity (Therapeutic Exercise)  free weights, barbell  -DN      Exercise Type, Seated Upper Extremity (Therapeutic Exercise)  AROM (active range of motion)  -DN      Expected Outcomes, Seated Upper Extremity (Therapeutic Exercise)  improve functional tolerance, self-care activity;improve performance, transfer skills  -DN      Sets/Reps Detail, Seated Upper Extremity (Therapeutic Exercise)  2# dowel at 3 sets of 10 reps  -DN      Transfers Skills, Training to Functional Activity, Seated Upper Extremity (Therapeutic Exercise)   transfers skills to functional activity most of the time  -DN      Recorded by [DN] Prince Obrien OT      Row Name 05/01/20 0944             Lower Extremity Seated Therapeutic Exercise    Performed, Seated Lower Extremity (Therapeutic Exercise)  hip flexion/extension;LAQ (long arc quad), knee extension;knee flexion/extension;hip abduction/adduction  -EE      Exercise Type, Seated Lower Extremity (Therapeutic Exercise)  resistive exercise red tband and 1.5# ankle weights  -EE      Sets/Reps Detail, Seated Lower Extremity (Therapeutic Exercise)  1/20  -EE      Recorded by [EE] Irlanda Wallis, PT      Row Name 05/01/20 1005 05/01/20 0944          Positioning and Restraints    Pre-Treatment Position  in bed  -DN  sitting in chair/recliner  -EE     Post Treatment Position  wheelchair  -DN  wheelchair  -EE     In Bed  sitting;call light within reach;encouraged to call for assist;exit alarm on  -DN  --     In Wheelchair  --  sitting;encouraged to call for assist;call light within reach;exit alarm on  -EE     Recorded by [DN] Prince Obrien OT [EE] Irlanda Wallis, AVINASH       User Key  (r) = Recorded By, (t) = Taken By, (c) = Cosigned By    Initials Name Effective Dates    DN Prince Obrien OT 06/08/18 -     EE Irlanda Wallis PT 04/03/18 -                  PT Recommendation and Plan                        Time Calculation:     PT Charges     Row Name 05/01/20 1126             Time Calculation    Start Time  0930  -EE      Stop Time  1000  -EE      Time Calculation (min)  30 min  -EE      PT Received On  05/01/20  -EE      PT - Next Appointment  05/01/20  -EE         Time Calculation- PT    Total Timed Code Minutes- PT  30 minute(s)  -EE        User Key  (r) = Recorded By, (t) = Taken By, (c) = Cosigned By    Initials Name Provider Type    EE Irlanda Wallis PT Physical Therapist          Therapy Charges for Today     Code Description Service Date Service Provider Modifiers Qty    [id number]  PT THER PROC EA 15 MIN 5/1/2020  Irlanda Wallis, PT GP 1    [id number]  GAIT TRAINING EA 15 MIN 5/1/2020 Irlanda Wallis, PT GP 1                   Irlanda Wallis, PT  5/1/2020         Patient was wearing a face mask during this therapy encounter. Therapist used appropriate personal protective equipment including mask and gloves.  Mask used was standard procedure mask. Appropriate PPE was worn during the entire therapy session. Hand hygiene was completed before and after therapy session. Patient is not in enhanced droplet precautions.

## 2020-05-01 NOTE — PROGRESS NOTES
Inpatient Rehabilitation Plan of Care Note    Plan of Care  Care Plan Reviewed - No updates at this time.    Safety    Performed Intervention(s)  Bed alarm and /or chair alarm  Safety rounds and items within reach  [place] precautions/protocol  Uses call light appropriately  Environmental set-up to reduce risk      Psychosocial    Performed Intervention(s)  Allow the opportunity to verbalize needs and concerns  Medication as ordered  Therapuetic environmental set up      Body Systems    Performed Intervention(s)  Monitor labs and medication as ordered  Blood sugar testing- TID      Pain    Performed Intervention(s)  Relaxation or distraction  Medication as ordered      Sphincter Control    Performed Intervention(s)  Monitor intake and output  Assist pt to bathroom in a timely manner  Encourage proper diet and fluid intake  Medication as ordered    Signed by: Rosalinda Downs RN

## 2020-05-01 NOTE — THERAPY TREATMENT NOTE
Inpatient Rehabilitation - Occupational Therapy Treatment Note    [facility]     Patient Name: Oralia Sánchez  : 1975  MRN: [id number]    Today's Date: 2020                 Admit Date: 2020      Visit Dx:  No diagnosis found.    Patient Active Problem List   Diagnosis   • Vitamin D deficiency   • Awareness of heartbeats   • Adiposity   • YOUNG (nonalcoholic steatohepatitis)   • Hypothyroid   • Diabetes mellitus arising in pregnancy   • Diabetes (CMS/HCC)   • Metabolic syndrome   • Chest pain   • Primary thunderclap headache   • Elevated LFTs   • Tobacco abuse   • Rheumatoid arthritis (CMS/HCC)   • Type 2 diabetes mellitus (CMS/HCC)   • Morbid obesity (CMS/HCC)   • UTI (urinary tract infection), bacterial   • Cerebral aneurysm   • Dyspnea   • Cough   • Hypomagnesemia   • Metabolic acidosis   • Fatigue   • History of COPD   • Abnormal CT scan, colon   • 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)   • Essential hypertension   • GBS (Guillain Roscoe syndrome) (CMS/HCC)   • Elevated transaminase level   • History of gestational diabetes   • Steroid-induced hyperglycemia   • Elevated aldolase level         Therapy Treatment    IRF Treatment Summary     Row Name 20 1005 20 0944          Evaluation/Treatment Time and Intent    Subjective Information  no complaints  -DN  no complaints  -EE     Existing Precautions/Restrictions  fall  -DN  fall  -EE     Document Type  therapy note (daily note)  -DN  therapy note (daily note)  -EE     Mode of Treatment  occupational therapy  -DN  physical therapy  -EE     Patient/Family Observations  pt sitting in bed  -DN  Pt sitting up in WC in no acute distress.   -EE     Recorded by [DN] Prince Obrien, OT [EE] Irlanda Wallis, PT     Row Name 20 1005 20 0944          Cognition/Psychosocial- PT/OT    Affect/Mental Status (Cognitive)  WNL  -DN  --     Orientation Status (Cognition)  oriented x 4  -DN  oriented x 4  -EE     Follows Commands  (Cognition)  WNL  -DN  WNL  -EE     Personal Safety Interventions  fall prevention program maintained;gait belt  -DN  fall prevention program maintained;muscle strengthening facilitated;gait belt;supervised activity;nonskid shoes/slippers when out of bed  -EE     Cognitive Function (Cognitive)  memory deficit  -DN  --     Memory Deficit (Cognitive)  mild deficit  -DN  --     Recorded by [DN] Prince Obrien, OT [EE] Irlanda Wallis, PT     Row Name 05/01/20 1005             Bed-Chair Transfer    Bed-Chair [place] (Transfers)  supervision  -DN      Assistive Device (Bed-Chair Transfers)  wheelchair  -DN      Recorded by [KAYLEN] Prince Obrien OT      Row Name 05/01/20 0944             Sit-Stand Transfer    Sit-Stand [place] (Transfers)  stand by assist;contact guard;verbal cues  -EE      Assistive Device (Sit-Stand Transfers)  walker, front-wheeled  -EE      Recorded by [EE] Irlanda Wallis, PT      Row Name 05/01/20 0944             Stand-Sit Transfer    Stand-Sit [place] (Transfers)  stand by assist;contact guard;verbal cues  -EE      Assistive Device (Stand-Sit Transfers)  walker, front-wheeled;wheelchair  -EE      Recorded by [EE] Irlanda Wallis, PT      Row Name 05/01/20 1005             Toilet Transfer    Type (Toilet Transfer)  stand pivot/stand step  -DN      [place] Level (Toilet Transfer)  supervision  -DN      Assistive Device (Toilet Transfer)  wheelchair;grab bars/safety frame;raised toilet seat  -DN      Recorded by [DN] Prince Obrien, OT      Row Name 05/01/20 1005             Shower Transfer    Type (Shower Transfer)  stand pivot/stand step  -DN      [place] Level (Shower Transfer)  contact guard;verbal cues  -DN      Assistive Device (Shower Transfer)  walker, front-wheeled;tub bench;grab bars/tub rail  -DN      Recorded by [KAYLEN] Prince Obrien, OT      Row Name 05/01/20 1005             Bathing Assessment/Treatment    Bathing [place] Level  bathing skills;supervision  -DN       Assistive Device (Bathing)  hand held shower spray hose;grab bar/tub rail;tub bench  -DN      Bathing Position  supported sitting;supported standing  -DN      Recorded by [DN] Prince Obrien, OT      Row Name 05/01/20 1005             Upper Body Dressing Assessment/Treatment    Upper Body Dressing Task  upper body dressing skills;doff;don;bra/undergarment;pull over garment;supervision;set up assistance  -DN      Upper Body Dressing Position  supported sitting  -DN      Set-up Assistance (Upper Body Dressing)  obtain clothing  -DN      Recorded by [DN] Prince Obrien, OT      Row Name 05/01/20 1005             Lower Body Dressing Assessment/Treatment    Lower Body Dressing [place] Level  don;doff;pants/bottoms;socks;underwear;supervision;verbal cues;nonverbal cues (demo/gesture)  -DN      Lower Body Dressing Position  supported sitting;supported standing  -DN      Lower Body Dressing Setup Assistance  obtain clothing  -DN      Recorded by [DN] Prince Obrien, OT      Row Name 05/01/20 1005             Grooming Assessment/Treatment    Grooming [place] Level  grooming skills;deodorant application;hair care, combing/brushing;oral care regimen;set up  -DN      Grooming Position  supported sitting  -DN      Recorded by [DN] Prince Obrien, OT      Row Name 05/01/20 1005             Toileting Assessment/Treatment    Toileting [place] Level  toileting skills;adjust/manage clothing;perform perineal hygiene;supervision;verbal cues  -DN      Assistive Device Use (Toileting)  grab bar/safety frame;raised toilet seat  -DN      Toileting Position  supported sitting;supported standing  -DN      Comment (Toileting)  grab bars to hold on to during clothing mgmt, will try with radha CALHOUN for support in future  -DN      Recorded by [DN] Prince Obrien, OT      Row Name 05/01/20 1005             Pain Scale: Numbers Pre/Post-Treatment    Pain Scale: Numbers, Pretreatment  0/10 - no pain  -DN      Pain Scale: Numbers,  Post-Treatment  0/10 - no pain  -DN      Recorded by [DN] Prince Obrien OT      Row Name 05/01/20 1005             Upper Extremity Seated Therapeutic Exercise    Performed, Seated Upper Extremity (Therapeutic Exercise)  shoulder flexion/extension;shoulder abduction/adduction  -DN      Device, Seated Upper Extremity (Therapeutic Exercise)  free weights, barbell  -DN      Exercise Type, Seated Upper Extremity (Therapeutic Exercise)  AROM (active range of motion)  -DN      Expected Outcomes, Seated Upper Extremity (Therapeutic Exercise)  improve functional tolerance, self-care activity;improve performance, transfer skills  -DN      Sets/Reps Detail, Seated Upper Extremity (Therapeutic Exercise)  2# dowel at 3 sets of 10 reps  -DN      Transfers Skills, Training to Functional Activity, Seated Upper Extremity (Therapeutic Exercise)  transfers skills to functional activity most of the time  -DN      Recorded by [KAYLEN] Prince Obrien OT      Row Name 05/01/20 1005             Positioning and Restraints    Pre-Treatment Position  in bed  -DN      Post Treatment Position  wheelchair  -DN      In Bed  sitting;call light within reach;encouraged to call for assist;exit alarm on  -DN      Recorded by [KAYLEN] Prince Obrien OT        User Key  (r) = Recorded By, (t) = Taken By, (c) = Cosigned By    Initials Name Effective Dates    DN Prince Obrien OT 06/08/18 -     Irlanda Duarte, AVINASH 04/03/18 -                  OT Recommendation and Plan    Anticipated Equipment Needs At Discharge (OT Eval): commode, bedside without drop arms, shower chair  Planned Therapy Interventions (OT Eval): activity tolerance training, BADL retraining, functional balance retraining, neuromuscular control/coordination retraining, strengthening exercise, transfer/mobility retraining            OT IRF GOALS     Row Name 04/28/20 1200 04/21/20 1417          Transfer Goal 1 (OT-IRF)    Activity/Assistive Device (Transfer Goal 1, OT-IRF)  toilet;tub  -DN   toilet;tub  -DN     [place] Level (Transfer Goal 1, OT-IRF)  contact guard assist with RWX  -DN  contact guard assist  -DN     Time Frame (Transfer Goal 1, OT-IRF)  short term goal (STG)  -DN  short term goal (STG)  -DN     Progress/Outcomes (Transfer Goal 1, OT-IRF)  goal met;goal revised this date  -DN  continuing progress toward goal  -DN        Transfer Goal 2 (OT-IRF)    Activity/Assistive Device (Transfer Goal 2, OT-IRF)  toilet;tub;walker, rolling  -DN  toilet;tub;walker, rolling  -DN     [place] Level (Transfer Goal 2, OT-IRF)  conditional independence  -DN  supervision required  -DN     Time Frame (Transfer Goal 2, OT-IRF)  long term goal (LTG)  -DN  long term goal (LTG)  -DN     Progress/Outcomes (Transfer Goal 2, OT-IRF)  goal revised this date  -DN  continuing progress toward goal  -DN        Bathing Goal 1 (OT-IRF)    Activity/Device (Bathing Goal 1, OT-IRF)  bathing skills, all  -DN  bathing skills, all  -DN     [place] Level (Bathing Goal 1, OT-IRF)  supervision required  -DN  contact guard assist;verbal cues required  -DN     Time Frame (Bathing Goal 1, OT-IRF)  short term goal (STG)  -DN  short term goal (STG)  -DN     Progress/Outcomes (Bathing Goal 1, OT-IRF)  goal met;goal revised this date  -DN  continuing progress toward goal  -DN        Bathing Goal 2 (OT-IRF)    Activity/Device (Bathing Goal 2, OT-IRF)  bathing skills, all  -DN  bathing skills, all  -DN     [place] Level (Bathing Goal 2, OT-IRF)  conditional independence  -DN  conditional independence  -DN     Time Frame (Bathing Goal 2, OT-IRF)  long term goal (LTG)  -DN  long term goal (LTG)  -DN     Progress/Outcomes (Bathing Goal 2, OT-IRF)  goal ongoing  -DN  continuing progress toward goal  -DN        UB Dressing Goal 1 (OT-IRF)    Activity/Device (UB Dressing Goal 1, OT-IRF)  upper body dressing  -DN  upper body dressing  -DN     [place] (UB Dress Goal 1, OT-IRF)  set-up required  -DN  set-up required  -DN      Time Frame (UB Dressing Goal 1, OT-IRF)  short term goal (STG)  -DN  short term goal (STG)  -DN     Progress/Outcomes (UB Dressing Goal 1, OT-IRF)  goal ongoing;goal not met  -DN  continuing progress toward goal  -DN        UB Dressing Goal 2 (OT-IRF)    Activity/Device (UB Dressing Goal 2, OT-IRF)  upper body dressing  -DN  upper body dressing  -DN     [place] (UB Dress Goal 2, OT-IRF)  conditional independence  -DN  set-up required  -DN     Time Frame (UB Dressing Goal 2, OT-IRF)  long term goal (LTG)  -DN  long term goal (LTG)  -DN     Progress/Outcomes (UB Dressing Goal 2, OT-IRF)  goal ongoing  -DN  continuing progress toward goal  -DN        LB Dressing Goal 1 (OT-IRF)    Activity/Device (LB Dressing Goal 1, OT-IRF)  lower body dressing  -DN  lower body dressing  -DN     [place] (LB Dressing Goal 1, OT-IRF)  supervision required  -DN  contact guard assist  -DN     Time Frame (LB Dressing Goal 1, OT-IRF)  short term goal (STG)  -DN  short term goal (STG)  -DN     Progress/Outcomes (LB Dressing Goal 1, OT-IRF)  goal revised this date  -DN  continuing progress toward goal  -DN        LB Dressing Goal 2 (OT-IRF)    Activity/Device (LB Dressing Goal 2, OT-IRF)  lower body dressing  -DN  lower body dressing  -DN     [place] (LB Dressing Goal 2, OT-IRF)  conditional independence  -DN  set-up required  -DN     Time Frame (LB Dressing Goal 2, OT-IRF)  long term goal (LTG)  -DN  long term goal (LTG)  -DN     Progress/Outcomes (LB Dressing Goal 2, OT-IRF)  goal revised this date  -DN  continuing progress toward goal  -DN        Grooming Goal 1 (OT-IRF)    Activity/Device (Grooming Goal 1, OT-IRF)  grooming skills, all seated at sink  -DN  grooming skills, all  -DN     [place] (Grooming Goal 1, OT-IRF)  conditional independence  -DN  set-up required  -DN     Time Frame (Grooming Goal 1, OT-IRF)  short term goal (STG)  -DN  short term goal (STG)  -DN     Progress/Outcomes (Grooming Goal 1, OT-IRF)  goal  revised this date  -DN  continuing progress toward goal  -DN        Grooming Goal 2 (OT-IRF)    Activity/Device (Grooming Goal 2, OT-IRF)  grooming skills, all standing at sink  -DN  grooming skills, all  -DN     [place] (Grooming Goal 2, OT-IRF)  conditional independence  -DN  conditional independence  -DN     Time Frame (Grooming Goal 2, OT-IRF)  long term goal (LTG)  -DN  long term goal (LTG)  -DN     Progress/Outcomes (Grooming Goal 2, OT-IRF)  goal ongoing  -DN  continuing progress toward goal  -DN        Toileting Goal 1 (OT-IRF)    Activity/Device (Toileting Goal 1, OT-IRF)  toileting skills, all  -DN  toileting skills, all  -DN     [place] Level (Toileting Goal 1, OT-IRF)  verbal cues required;supervision required  -DN  minimum assist (75% or more patient effort);verbal cues required  -DN     Time Frame (Toileting Goal 1, OT-IRF)  short term goal (STG)  -DN  short term goal (STG)  -DN     Progress/Outcomes (Toileting Goal 1, OT-IRF)  goal met;goal revised this date  -DN  continuing progress toward goal  -DN        Toileting Goal 2 (OT-IRF)    Activity/Device (Toileting Goal 2, OT-IRF)  toileting skills, all;commode chair  -DN  toileting skills, all;commode chair  -DN     [place] Level (Toileting Goal 2, OT-IRF)  conditional independence;tactile cues required;verbal cues required  -DN  conditional independence;tactile cues required;verbal cues required  -DN     Time Frame (Toileting Goal 2, OT-IRF)  long term goal (LTG)  -DN  long term goal (LTG)  -DN     Progress/Outcomes (Toileting Goal 2, OT-IRF)  goal ongoing  -DN  continuing progress toward goal  -DN        Strength Goal 1 (OT-IRF)    Strength Goal 1 (OT-IRF)  pt to be setup with BUE HEP  -DN  pt to be SBA with BUE HEP  -DN     Time Frame (Strength Goal 1, OT-IRF)  short term goal (STG)  -DN  short term goal (STG)  -DN     Progress/Outcomes (Strength Goal 1, OT-IRF)  goal revised this date  -DN  continuing progress toward goal  -DN         Strength Goal 2 (OT-IRF)    Strength Goal 2 (OT-IRF)  pt to be independent with HEP for BUEs  -DN  pt to be independent with HEP for BUEs  -DN     Time Frame (Strength Goal 2, OT-IRF)  long term goal (LTG)  -DN  long term goal (LTG)  -DN     Progress/Outcomes (Strength Goal 2, OT-IRF)  goal ongoing  -DN  continuing progress toward goal  -DN        Balance Goal 1 (OT)    Activity/Assistive Device (Balance Goal 1, OT)  assistive device use  -DN  assistive device use  -DN     [place] Level/Cues Needed (Balance Goal 1, OT)  contact guard assist during adls  -DN  contact guard assist during adls  -DN     Time Frame (Balance Goal 1, OT)  short term goal (STG)  -DN  short term goal (STG)  -DN     Progress/Outcomes (Balance Goal 1, OT)  goal ongoing  -DN  continuing progress toward goal  -DN        Balance Goal 2 (OT)    Activity/Assistive Device (Balance Goal 2, OT)  assistive device use;standing, dynamic;walker, rolling  -DN  assistive device use;standing, dynamic;walker, rolling  -DN     [place] Level (Balance Goal 2, OT)  supervision required during adls and home mgmt  -DN  supervision required during adls and home mgmt  -DN     Time Frame (Balance Goal 2, OT)  long term goal (LTG)  -DN  long term goal (LTG)  -DN     Progress/Outcome (Balance Goal 2, OT)  goal ongoing  -DN  continuing progress toward goal  -DN        Caregiver Training Goal 2 (OT-IRF)    Caregiver Training Goal 2 (OT-IRF)  pt family or available caregivers to be independent with assist needed with adls, transfers etc  -DN  pt family or available caregivers to be independent with assist needed with adls, transfers etc  -DN     Time Frame (Caregiver Training Goal 2, OT-IRF)  long term goal (LTG)  -DN  long term goal (LTG)  -DN     Progress/Outcomes (Caregiver Training Goal 2, OT-IRF)  goal ongoing  -DN  continuing progress toward goal  -DN       User Key  (r) = Recorded By, (t) = Taken By, (c) = Cosigned By    Initials Name Provider Type    DN  Prince Obrien OT Occupational Therapist                     Time Calculation:     Time Calculation- OT     Row Name 05/01/20 1013             Time Calculation- OT    OT Start Time  0800  -DN      OT Stop Time  0900  -DN      OT Time Calculation (min)  60 min  -DN      OT Received On  05/01/20  -DN        User Key  (r) = Recorded By, (t) = Taken By, (c) = Cosigned By    Initials Name Provider Type    Prince Morfin OT Occupational Therapist          Therapy Charges for Today     Code Description Service Date Service Provider Modifiers Qty    87449043585 HC OT SELF CARE/MGMT/TRAIN EA 15 MIN 4/30/2020 Prince Obrien OT GO 3    96460412833 HC OT THER PROC EA 15 MIN 4/30/2020 Prince Obrien OT GO 1    55560674526 HC OT THER PROC EA 15 MIN 4/30/2020 Prince Obrien OT GO 2    30286736824 HC OT SELF CARE/MGMT/TRAIN EA 15 MIN 5/1/2020 Prince Obrien OT GO 3    14225287672 HC OT THER PROC EA 15 MIN 5/1/2020 Prince Obrien OT GO 1                   Prince Obrien OT  5/1/2020

## 2020-05-01 NOTE — PROGRESS NOTES
"  ENDOCRINE    Subjective   and PLANS  Oralia Sánchez is a 44 y.o. female.     Follow-up diabetes, thyroid and related disorders    Fasting glucose 112.  Random glucose 147-257  Patient ate sweet potato earlier and blood sugar biju.  Patient wants to stay on unrestricted carbohydrate diet  Prednisone will be decreased to 20 mg every morning starting tomorrow.  Continue Lantus 20 units every morning.  Decrease Humalog to 12 units with each meal  Continue Humalog correction scale.    Patient was not taking levothyroxine 200 mcg regularly as an outpatient.  Continue levothyroxine 200 mcg daily  Patient advised to have repeat thyroid function tests in 4 to 6 weeks.  She will follow-up with Dr. Choudhury     Objective   /93 (BP Location: Left arm, Patient Position: Sitting)   Pulse 93   Temp 97.4 °F (36.3 °C) (Oral)   Resp 16   Ht 154.9 cm (61\")   Wt 107 kg (235 lb)   SpO2 98%   BMI 44.40 kg/m²   Physical Exam    Awake, alert, not in distress.  No rales or wheezes.  Regular heart rate and rhythm.  Abdomen soft, nontender.  Remedies warm.  No cyanosis or clubbing.    Lab Results (last 24 hours)     Procedure Component Value Units Date/Time    POC Glucose Once [[id number]]  (Abnormal) Collected:  04/30/20 1603    Specimen:  Blood Updated:  04/30/20 1606     Glucose 257 mg/dL     POC Glucose Once [[id number]]  (Abnormal) Collected:  04/30/20 1106    Specimen:  Blood Updated:  04/30/20 1107     Glucose 147 mg/dL     EBV PCR Qn, WB [[id number]] Collected:  04/25/20 1451    Specimen:  Blood Updated:  04/30/20 0908     EBV PCR Quant WB [id number] copies/mL      Comment: The quantitative range of this assay is 100 to 1 million copies/mL.  This test was developed and its performance characteristics determined  by [employer].  It has not been cleared or approved by the Food and Drug  Administration.  The FDA has determined that such clearance or  approval is not necessary.        log 10 EBV DNA Qn PCR 3.718 rtb21nzek/mL     " Narrative:       Performed at:  01 - [facility]  [id number]  : Jorge Wang MD, Phone:  [phone]    POC Glucose Once [[id number]]  (Normal) Collected:  04/30/20 0702    Specimen:  Blood Updated:  04/30/20 0708     Glucose 112 mg/dL               YOUNG (nonalcoholic steatohepatitis)    Hypothyroid    Type 2 diabetes mellitus (CMS/HCC)    GBS (Guillain [place] syndrome) (CMS/HCC)    Elevated transaminase level    History of gestational diabetes    Steroid-induced hyperglycemia    Elevated aldolase level    Insulin therapy as discussed above.  Continue levothyroxine.  Follow-up with Dr. Sharma as outpatient  Have discussed my recommendations with the patient

## 2020-05-01 NOTE — PROGRESS NOTES
Inpatient Rehabilitation Plan of Care Note    Plan of Care  Care Plan Reviewed - No updates at this time.    Safety    Performed Intervention(s)  Bed alarm and /or chair alarm  Falls precautions/protocol      Psychosocial    Performed Intervention(s)  Allow the opportunity to verbalize needs and concerns      Body Systems    Performed Intervention(s)  Blood sugar testing- TID      Pain    Performed Intervention(s)  Medication as ordered    Signed by: Luis Alford RN

## 2020-05-01 NOTE — PLAN OF CARE
Problem: Patient Care Overview  Goal: Plan of Care Review  Outcome: Ongoing (interventions implemented as appropriate)  Flowsheets (Taken 5/1/2020 6711)  Outcome Summary: AAO x $. Quite needy. hands and feet burn. Pain meds help. Glucose inproved. Still gets sliding scale and schedule.  Progress, Functional Goals: demonstrating adequate progress  Plan of Care Reviewed With: patient  IRF Plan of Care Review: progress ongoing, continue

## 2020-05-01 NOTE — THERAPY TREATMENT NOTE
Inpatient Rehabilitation - Physical Therapy Treatment Note  [facility]     Patient Name: Oralia Sánchez  : 1975  MRN: [id number]    Today's Date: 2020                 Admit Date: 2020      Visit Dx:    No diagnosis found.    Patient Active Problem List   Diagnosis   • Vitamin D deficiency   • Awareness of heartbeats   • Adiposity   • YOUNG (nonalcoholic steatohepatitis)   • Hypothyroid   • Diabetes mellitus arising in pregnancy   • Diabetes (CMS/HCC)   • Metabolic syndrome   • Chest pain   • Primary thunderclap headache   • Elevated LFTs   • Tobacco abuse   • Rheumatoid arthritis (CMS/HCC)   • Type 2 diabetes mellitus (CMS/HCC)   • Morbid obesity (CMS/HCC)   • UTI (urinary tract infection), bacterial   • Cerebral aneurysm   • Dyspnea   • Cough   • Hypomagnesemia   • Metabolic acidosis   • Fatigue   • History of COPD   • Abnormal CT scan, colon   • 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)   • Essential hypertension   • GBS (Guillain [place] syndrome) (CMS/HCC)   • Elevated transaminase level   • History of gestational diabetes   • Steroid-induced hyperglycemia   • Elevated aldolase level       Therapy Treatment    IRF Treatment Summary     Row Name 20 1450 20 1215 20 1005       Evaluation/Treatment Time and Intent    Subjective Information  no complaints  -DN  no complaints  -MD  no complaints  -DN    Existing Precautions/Restrictions  fall  -DN  fall  -MD  fall  -DN    Document Type  therapy note (daily note)  -DN  therapy note (daily note)  -MD  therapy note (daily note)  -DN    Mode of Treatment  occupational therapy  -DN  physical therapy  -MD  occupational therapy  -DN    Patient/Family Observations  supine in bed  -DN  --  pt sitting in bed  -DN    Recorded by [DN] Prince Obrien OT [MD] Serena Philip PT [DN] Prince Obrien OT    Row Name 20 0916             Evaluation/Treatment Time and Intent    Subjective Information  no complaints  -EE      Existing  Precautions/Restrictions  fall  -EE      Document Type  therapy note (daily note)  -EE      Mode of Treatment  physical therapy  -EE      Patient/Family Observations  Pt sitting up in WC in no acute distress.   -EE      Recorded by [EE] Irlanda Wallis PT      Row Name 05/01/20 1215 05/01/20 1005 05/01/20 0944       Cognition/Psychosocial- PT/OT    Affect/Mental Status (Cognitive)  --  WNL  -DN  WNL  -EE    Orientation Status (Cognition)  oriented x 4  -MD  oriented x 4  -DN  oriented x 4  -EE    Follows Commands (Cognition)  WNL  -MD  WNL  -DN  WNL  -EE    Personal Safety Interventions  fall prevention program maintained;gait belt  -MD  fall prevention program maintained;gait belt  -DN  fall prevention program maintained;muscle strengthening facilitated;gait belt;supervised activity;nonskid shoes/slippers when out of bed  -EE    Cognitive Function (Cognitive)  --  memory deficit  -DN  --    Memory Deficit (Cognitive)  --  mild deficit  -DN  --    Recorded by [MD] Serena Philip, PT [DN] Prince Obrien, OT [EE] Irlanda Wallis, AVINASH    Row Name 05/01/20 1215 05/01/20 0944          Bed Mobility Assessment/Treatment    Sit-Supine [place] (Bed Mobility)  conditional independence  -MD  --     Assistive Device (Bed Mobility)  bed rails;head of bed elevated  -MD  --     Comment (Bed Mobility)  --  up in chair  -EE     Recorded by [MD] Serena Philip, PT [EE] Irlanda Wallis, PT     Row Name 05/01/20 1450 05/01/20 1215 05/01/20 1005       Bed-Chair Transfer    Bed-Chair [place] (Transfers)  contact guard  -DN  verbal cues;contact guard  -MD  supervision  -DN    Assistive Device (Bed-Chair Transfers)  wheelchair pt just woke up from nap  -DN  wheelchair  -MD  wheelchair  -DN    Recorded by [DN] Prince Obrien, OT [MD] Serena Philip, PT [DN] Prince Obrien, OT    Row Name 05/01/20 1215 05/01/20 0944          Sit-Stand Transfer    Sit-Stand [place] (Transfers)  stand by assist;contact guard;verbal cues  -MD  stand by assist;contact  guard;verbal cues  -EE     Assistive Device (Sit-Stand Transfers)  walker, front-wheeled  -MD  walker, front-wheeled  -EE     Recorded by [MD] Serena Philip, PT [EE] Irlanda Wallis, AVINASH     Row Name 05/01/20 1215 05/01/20 0944          Stand-Sit Transfer    Stand-Sit [place] (Transfers)  stand by assist;contact guard;verbal cues  -MD  stand by assist;contact guard;verbal cues  -EE     Assistive Device (Stand-Sit Transfers)  walker, front-wheeled;wheelchair  -MD  walker, front-wheeled;wheelchair  -EE     Recorded by [MD] Serena Philip, PT [EE] Irlanda Wallis, AVINASH     Row Name 05/01/20 1005             Toilet Transfer    Type (Toilet Transfer)  stand pivot/stand step  -DN      [place] Level (Toilet Transfer)  supervision  -DN      Assistive Device (Toilet Transfer)  wheelchair;grab bars/safety frame;raised toilet seat  -DN      Recorded by [DN] Prince Obrien OT      Row Name 05/01/20 1005             Shower Transfer    Type (Shower Transfer)  stand pivot/stand step  -DN      [place] Level (Shower Transfer)  contact guard;verbal cues  -DN      Assistive Device (Shower Transfer)  walker, front-wheeled;tub bench;grab bars/tub rail  -DN      Recorded by [DN] Prince Obrien OT      Row Name 05/01/20 1215 05/01/20 0944          Gait/Stairs Assessment/Training    [place] Level (Gait)  contact guard;verbal cues  -MD  contact guard;verbal cues  -EE     Assistive Device (Gait)  walker, front-wheeled  -MD  walker, front-wheeled  -EE     Distance in Feet (Gait)  160` x2  -MD  160', 80'  -EE     Pattern (Gait)  step-through  -MD  step-through  -EE     Deviations/Abnormal Patterns (Gait)  stride length decreased  -MD  stride length decreased  -EE     Bilateral Gait Deviations  weight shift ability decreased  -MD  weight shift ability decreased  -EE     Left Sided Gait Deviations  heel strike decreased  -MD  heel strike decreased  -EE     [place] Level (Stairs)  verbal cues;minimum assist (75% patient effort);2  person assist  -MD  --     Handrail Location (Stairs)  both sides  -MD  --     Number of Steps (Stairs)  4  -MD  --     Ascending Technique (Stairs)  step-to-step  -MD  --     Descending Technique (Stairs)  step-to-step  -MD  --     Recorded by [MD] Serena Philip PT [EE] Irlanda Wallis PT     Row Name 05/01/20 0944             Safety Issues, Functional Mobility    Impairments Affecting Function (Mobility)  balance;strength;endurance/activity tolerance  -EE      Recorded by [EE] Irlanda Wallis, PT      Row Name 05/01/20 1005             Bathing Assessment/Treatment    Bathing [place] Level  bathing skills;supervision  -DN      Assistive Device (Bathing)  hand held shower spray hose;grab bar/tub rail;tub bench  -DN      Bathing Position  supported sitting;supported standing  -DN      Recorded by [DN] Prince Obrien, OT      Row Name 05/01/20 1005             Upper Body Dressing Assessment/Treatment    Upper Body Dressing Task  upper body dressing skills;doff;don;bra/undergarment;pull over garment;supervision;set up assistance  -DN      Upper Body Dressing Position  supported sitting  -DN      Set-up Assistance (Upper Body Dressing)  obtain clothing  -DN      Recorded by [DN] Prince Obrien, OT      Row Name 05/01/20 1005             Lower Body Dressing Assessment/Treatment    Lower Body Dressing [place] Level  don;doff;pants/bottoms;socks;underwear;supervision;verbal cues;nonverbal cues (demo/gesture)  -DN      Lower Body Dressing Position  supported sitting;supported standing  -DN      Lower Body Dressing Setup Assistance  obtain clothing  -DN      Recorded by [KAYLEN] Prince Obrien, OT      Row Name 05/01/20 1005             Grooming Assessment/Treatment    Grooming [place] Level  grooming skills;deodorant application;hair care, combing/brushing;oral care regimen;set up  -DN      Grooming Position  supported sitting  -DN      Recorded by [KAYLEN] Prince Obrien OT      Row Name 05/01/20 1005              Toileting Assessment/Treatment    Toileting [place] Level  toileting skills;adjust/manage clothing;perform perineal hygiene;supervision;verbal cues  -DN      Assistive Device Use (Toileting)  grab bar/safety frame;raised toilet seat  -DN      Toileting Position  supported sitting;supported standing  -DN      Comment (Toileting)  grab bars to hold on to during clothing mgmt, will try with onlly RWX for support in future  -DN      Recorded by [DN] Prince Obrien, OT      Row Name 05/01/20 1450             Fine Motor Testing & Training    Comment, Fine Motor Coordination  small pegs BUEs   -DN      Recorded by [DN] Prince Obrien, OT      Row Name 05/01/20 1450 05/01/20 1215 05/01/20 1005       Pain Scale: Numbers Pre/Post-Treatment    Pain Scale: Numbers, Pretreatment  4/10  -DN  --  0/10 - no pain  -DN    Pain Scale: Numbers, Post-Treatment  4/10  -DN  --  0/10 - no pain  -DN    Pain Location - Side  Bilateral  -DN  Bilateral  -MD  --    Pain Location - Orientation  distal  -DN  distal  -MD  --    Pain Location  hand  -DN  hand  -MD  --    Pain Intervention(s)  Repositioned  -DN  Repositioned;Ambulation/increased activity  -MD  --    Recorded by [DN] Prince Obrien, OT [MD] Serena Philip, PT [DN] Prince Obrien, OT    Row Name 05/01/20 0944             Pain Scale: Numbers Pre/Post-Treatment    Pain Scale: Numbers, Pretreatment  4/10  -EE      Pain Scale: Numbers, Post-Treatment  4/10  -EE      Pain Location - Side  Bilateral  -EE      Pain Location - Orientation  distal  -EE      Pain Location  hand  -EE      Pain Intervention(s)  Repositioned  -EE      Recorded by [EE] Irlanda Wallis, PT      Row Name 05/01/20 1215             Pain Scale: FACES Pre/Post-Treatment    Pain: FACES Scale, Pretreatment  4-->hurts little more  -MD      Recorded by [MD] Serena Philip, PT      Row Name 05/01/20 1215             Standing Balance Activity    Activities Performed (Standing, Balance Training)  standing reaching outside base of  support  -MD      Support Needed for Balance (Standing, Balance Training)  CGA  -MD      Comment (Standing, Balance Training)  Pt reaching across midline to place rings on poles while in standing.  -MD      Recorded by [MD] Serena Philip, PT      Row Name 05/01/20 1450             Dynamic Balance Activity    Comment (Dynamic Balance Training)  standing at countertop, UE FMC activity with side stepping L and R  for 2 minutes.  -DN      Recorded by [DN] Prince Obrien OT      Row Name 05/01/20 1005             Upper Extremity Seated Therapeutic Exercise    Performed, Seated Upper Extremity (Therapeutic Exercise)  shoulder flexion/extension;shoulder abduction/adduction  -DN      Device, Seated Upper Extremity (Therapeutic Exercise)  free weights, barbell  -DN      Exercise Type, Seated Upper Extremity (Therapeutic Exercise)  AROM (active range of motion)  -DN      Expected Outcomes, Seated Upper Extremity (Therapeutic Exercise)  improve functional tolerance, self-care activity;improve performance, transfer skills  -DN      Sets/Reps Detail, Seated Upper Extremity (Therapeutic Exercise)  2# dowel at 3 sets of 10 reps  -DN      Transfers Skills, Training to Functional Activity, Seated Upper Extremity (Therapeutic Exercise)  transfers skills to functional activity most of the time  -DN      Recorded by [DN] Prince Obrien, OT      Row Name 05/01/20 1215 05/01/20 0944          Lower Extremity Seated Therapeutic Exercise    Performed, Seated Lower Extremity (Therapeutic Exercise)  hip flexion/extension;knee flexion/extension  -MD  hip flexion/extension;LAQ (long arc quad), knee extension;knee flexion/extension;hip abduction/adduction  -EE     Exercise Type, Seated Lower Extremity (Therapeutic Exercise)  resistive exercise red t-band  -MD  resistive exercise red tband and 1.5# ankle weights  -EE     Sets/Reps Detail, Seated Lower Extremity (Therapeutic Exercise)  1/20  -MD  1/20  -EE     Recorded by [MD] Serena Philip, PT [EE]  Irlanda Wallis, PT     Row Name 05/01/20 1450 05/01/20 1215 05/01/20 1005       Positioning and Restraints    Pre-Treatment Position  in bed  -DN  sitting in chair/recliner  -MD  in bed  -DN    Post Treatment Position  wheelchair  -DN  bed  -MD  wheelchair  -DN    In Bed  sitting;call light within reach;encouraged to call for assist;exit alarm on  -DN  supine;call light within reach;exit alarm on  -MD  sitting;call light within reach;encouraged to call for assist;exit alarm on  -DN    Recorded by [DN] Prince Obrien, OT [MD] Serena Philip, PT [DN] Prince Obrien, OT    Row Name 05/01/20 0944             Positioning and Restraints    Pre-Treatment Position  sitting in chair/recliner  -EE      Post Treatment Position  wheelchair  -EE      In Wheelchair  sitting;encouraged to call for assist;call light within reach;exit alarm on  -EE      Recorded by [EE] Irlanda Wallis, PT        User Key  (r) = Recorded By, (t) = Taken By, (c) = Cosigned By    Initials Name Effective Dates    DN rPince Obrien, OT 06/08/18 -     EE Irlanda Wallis, PT 04/03/18 -     Serena Alvarez, PT 04/03/18 -                  PT Recommendation and Plan  Anticipated Equipment Needs at Discharge (PT Eval): gait belt, front wheeled walker  Frequency of Treatment (PT Eval): 5 times per week, 60 minutes per session                     Time Calculation:     PT Charges     Row Name 05/01/20 1523 05/01/20 1517 05/01/20 1126       Time Calculation    Start Time  1400  -MD  1030  -MD  0930  -EE    Stop Time  1430  -MD  1100  -MD  1000  -EE    Time Calculation (min)  30 min  -MD  30 min  -MD  30 min  -EE    PT Received On  --  --  05/01/20  -EE    PT - Next Appointment  --  --  05/01/20  -EE       Time Calculation- PT    Total Timed Code Minutes- PT  --  --  30 minute(s)  -EE      User Key  (r) = Recorded By, (t) = Taken By, (c) = Cosigned By    Initials Name Provider Type    EE Irlanda Wallis, PT Physical Therapist    Serena Alvarez, PT Physical Therapist           Therapy Charges for Today     Code Description Service Date Service Provider Modifiers Qty    87843279969 HC PT THER PROC EA 15 MIN 5/1/2020 Serena Philip, PT GP 4              Patient was wearing a face mask during this therapy encounter. Therapist used appropriate personal protective equipment including mask and gloves.  Mask used was standard procedure mask. Appropriate PPE was worn during the entire therapy session. Hand hygiene was completed before and after therapy session. Patient is not in enhanced droplet precautions.       Serena Philip, PT  5/1/2020

## 2020-05-01 NOTE — THERAPY TREATMENT NOTE
Inpatient Rehabilitation - Occupational Therapy Treatment Note    [facility]     Patient Name: Oralia Sánchez  : 1975  MRN: [id number]    Today's Date: 2020                 Admit Date: 2020      Visit Dx:  No diagnosis found.    Patient Active Problem List   Diagnosis   • Vitamin D deficiency   • Awareness of heartbeats   • Adiposity   • YOUNG (nonalcoholic steatohepatitis)   • Hypothyroid   • Diabetes mellitus arising in pregnancy   • Diabetes (CMS/HCC)   • Metabolic syndrome   • Chest pain   • Primary thunderclap headache   • Elevated LFTs   • Tobacco abuse   • Rheumatoid arthritis (CMS/HCC)   • Type 2 diabetes mellitus (CMS/HCC)   • Morbid obesity (CMS/HCC)   • UTI (urinary tract infection), bacterial   • Cerebral aneurysm   • Dyspnea   • Cough   • Hypomagnesemia   • Metabolic acidosis   • Fatigue   • History of COPD   • Abnormal CT scan, colon   • 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)   • Essential hypertension   • GBS (Guillain [place] syndrome) (CMS/HCC)   • Elevated transaminase level   • History of gestational diabetes   • Steroid-induced hyperglycemia   • Elevated aldolase level         Therapy Treatment    IRF Treatment Summary     Row Name 20 1450 20 1215 20 1005       Evaluation/Treatment Time and Intent    Subjective Information  no complaints  -DN  no complaints  -MD  no complaints  -DN    Existing Precautions/Restrictions  fall  -DN  fall  -MD  fall  -DN    Document Type  therapy note (daily note)  -DN  therapy note (daily note)  -MD  therapy note (daily note)  -DN    Mode of Treatment  occupational therapy  -DN  physical therapy  -MD  occupational therapy  -DN    Patient/Family Observations  supine in bed  -DN  --  pt sitting in bed  -DN    Recorded by [DN] Prince Obrien OT [MD] Serena Philip PT [DN] Prince Obrien OT    Row Name 20 0991             Evaluation/Treatment Time and Intent    Subjective Information  no complaints  -EE      Existing  Precautions/Restrictions  fall  -EE      Document Type  therapy note (daily note)  -EE      Mode of Treatment  physical therapy  -EE      Patient/Family Observations  Pt sitting up in WC in no acute distress.   -EE      Recorded by [EE] Irlanda Wallis, AVINASH      Row Name 05/01/20 1215 05/01/20 1005 05/01/20 0944       Cognition/Psychosocial- PT/OT    Affect/Mental Status (Cognitive)  --  WNL  -DN  WNL  -EE    Orientation Status (Cognition)  oriented x 4  -MD  oriented x 4  -DN  oriented x 4  -EE    Follows Commands (Cognition)  WNL  -MD  WNL  -DN  WNL  -EE    Personal Safety Interventions  fall prevention program maintained;gait belt  -MD  fall prevention program maintained;gait belt  -DN  fall prevention program maintained;muscle strengthening facilitated;gait belt;supervised activity;nonskid shoes/slippers when out of bed  -EE    Cognitive Function (Cognitive)  --  memory deficit  -DN  --    Memory Deficit (Cognitive)  --  mild deficit  -DN  --    Recorded by [MD] Serena Philip, PT [DN] Prince Obrien, OT [EE] Irlanda Wallis, PT    Row Name 05/01/20 0944             Bed Mobility Assessment/Treatment    Comment (Bed Mobility)  up in chair  -EE      Recorded by [EE] Irlanda Wallis, AVINASH      Row Name 05/01/20 1450 05/01/20 1005          Bed-Chair Transfer    Bed-Chair [place] (Transfers)  contact guard  -DN  supervision  -DN     Assistive Device (Bed-Chair Transfers)  wheelchair pt just woke up from nap  -DN  wheelchair  -DN     Recorded by [DN] Prince Obrien, OT [DN] Prince Obrien, OT     Row Name 05/01/20 1215 05/01/20 0944          Sit-Stand Transfer    Sit-Stand [place] (Transfers)  stand by assist;contact guard;verbal cues  -MD  stand by assist;contact guard;verbal cues  -EE     Assistive Device (Sit-Stand Transfers)  walker, front-wheeled  -MD  walker, front-wheeled  -EE     Recorded by [MD] Serena Philip, PT [EE] Irlanda Wallis, PT     Row Name 05/01/20 1215 05/01/20 0944          Stand-Sit Transfer    Stand-Sit  [place] (Transfers)  stand by assist;contact guard;verbal cues  -MD  stand by assist;contact guard;verbal cues  -EE     Assistive Device (Stand-Sit Transfers)  walker, front-wheeled;wheelchair  -MD  walker, front-wheeled;wheelchair  -EE     Recorded by [MD] Serena Philip PT [EE] Irlanda Wallis, AVINASH     Row Name 05/01/20 1005             Toilet Transfer    Type (Toilet Transfer)  stand pivot/stand step  -DN      [place] Level (Toilet Transfer)  supervision  -DN      Assistive Device (Toilet Transfer)  wheelchair;grab bars/safety frame;raised toilet seat  -DN      Recorded by [DN] Prince Obrien OT      Row Name 05/01/20 1005             Shower Transfer    Type (Shower Transfer)  stand pivot/stand step  -DN      [place] Level (Shower Transfer)  contact guard;verbal cues  -DN      Assistive Device (Shower Transfer)  walker, front-wheeled;tub bench;grab bars/tub rail  -DN      Recorded by [DN] Prince Obrien, OT      Row Name 05/01/20 0987             Gait/Stairs Assessment/Training    [place] Level (Gait)  contact guard;verbal cues  -EE      Assistive Device (Gait)  walker, front-wheeled  -EE      Distance in Feet (Gait)  160', 80'  -EE      Pattern (Gait)  step-through  -EE      Deviations/Abnormal Patterns (Gait)  stride length decreased  -EE      Bilateral Gait Deviations  weight shift ability decreased  -EE      Left Sided Gait Deviations  heel strike decreased  -EE      Recorded by [EE] Irlanda Wallis, AVINASH      Row Name 05/01/20 0913             Safety Issues, Functional Mobility    Impairments Affecting Function (Mobility)  balance;strength;endurance/activity tolerance  -EE      Recorded by [EE] Irlanda Wallis, PT      Row Name 05/01/20 1005             Bathing Assessment/Treatment    Bathing [place] Level  bathing skills;supervision  -DN      Assistive Device (Bathing)  hand held shower spray hose;grab bar/tub rail;tub bench  -DN      Bathing Position  supported sitting;supported standing  -DN       Recorded by [DN] Prince Obrien OT      Row Name 05/01/20 1005             Upper Body Dressing Assessment/Treatment    Upper Body Dressing Task  upper body dressing skills;doff;don;bra/undergarment;pull over garment;supervision;set up assistance  -DN      Upper Body Dressing Position  supported sitting  -DN      Set-up Assistance (Upper Body Dressing)  obtain clothing  -DN      Recorded by [DN] Prince Obrien OT      Row Name 05/01/20 1005             Lower Body Dressing Assessment/Treatment    Lower Body Dressing [place] Level  don;doff;pants/bottoms;socks;underwear;supervision;verbal cues;nonverbal cues (demo/gesture)  -DN      Lower Body Dressing Position  supported sitting;supported standing  -DN      Lower Body Dressing Setup Assistance  obtain clothing  -DN      Recorded by [KAYLEN] Prince Obrien OT      Row Name 05/01/20 1005             Grooming Assessment/Treatment    Grooming [place] Level  grooming skills;deodorant application;hair care, combing/brushing;oral care regimen;set up  -DN      Grooming Position  supported sitting  -DN      Recorded by [DN] Prince Obrien, OT      Row Name 05/01/20 1005             Toileting Assessment/Treatment    Toileting [place] Level  toileting skills;adjust/manage clothing;perform perineal hygiene;supervision;verbal cues  -DN      Assistive Device Use (Toileting)  grab bar/safety frame;raised toilet seat  -DN      Toileting Position  supported sitting;supported standing  -DN      Comment (Toileting)  grab bars to hold on to during clothing mgmt, will try with onmikaela RWX for support in future  -DN      Recorded by [DN] Prince Obrien OT      Row Name 05/01/20 1450             Fine Motor Testing & Training    Comment, Fine Motor Coordination  small pegs BUEs   -DN      Recorded by [DN] Prince Obrien OT      Row Name 05/01/20 1450 05/01/20 1005 05/01/20 0944       Pain Scale: Numbers Pre/Post-Treatment    Pain Scale: Numbers, Pretreatment  4/10  -DN  0/10  - no pain  -DN  4/10  -EE    Pain Scale: Numbers, Post-Treatment  4/10  -DN  0/10 - no pain  -DN  4/10  -EE    Pain Location - Side  Bilateral  -DN  --  Bilateral  -EE    Pain Location - Orientation  distal  -DN  --  distal  -EE    Pain Location  hand  -DN  --  hand  -EE    Pain Intervention(s)  Repositioned  -DN  --  Repositioned  -EE    Recorded by [DN] Prince Obrien OT [DN] Prince Obrien OT [EE] Irlanda Wallis, PT    Row Name 05/01/20 1215             Standing Balance Activity    Activities Performed (Standing, Balance Training)  standing reaching outside base of support  -MD      Support Needed for Balance (Standing, Balance Training)  CGA  -MD      Recorded by [MD] Serena Philip, PT      Row Name 05/01/20 1450             Dynamic Balance Activity    Comment (Dynamic Balance Training)  standing at countertop, UE FMC activity with side stepping L and R  for 2 minutes.  -DN      Recorded by [DN] Prince Obrien OT      Row Name 05/01/20 1005             Upper Extremity Seated Therapeutic Exercise    Performed, Seated Upper Extremity (Therapeutic Exercise)  shoulder flexion/extension;shoulder abduction/adduction  -DN      Device, Seated Upper Extremity (Therapeutic Exercise)  free weights, barbell  -DN      Exercise Type, Seated Upper Extremity (Therapeutic Exercise)  AROM (active range of motion)  -DN      Expected Outcomes, Seated Upper Extremity (Therapeutic Exercise)  improve functional tolerance, self-care activity;improve performance, transfer skills  -DN      Sets/Reps Detail, Seated Upper Extremity (Therapeutic Exercise)  2# dowel at 3 sets of 10 reps  -DN      Transfers Skills, Training to Functional Activity, Seated Upper Extremity (Therapeutic Exercise)  transfers skills to functional activity most of the time  -DN      Recorded by [DN] Prince Obrien, OT      Row Name 05/01/20 0944             Lower Extremity Seated Therapeutic Exercise    Performed, Seated Lower Extremity (Therapeutic Exercise)  hip  flexion/extension;LAQ (long arc quad), knee extension;knee flexion/extension;hip abduction/adduction  -EE      Exercise Type, Seated Lower Extremity (Therapeutic Exercise)  resistive exercise red tband and 1.5# ankle weights  -EE      Sets/Reps Detail, Seated Lower Extremity (Therapeutic Exercise)  1/20  -EE      Recorded by [EE] Irlanda Wallis, PT      Row Name 05/01/20 1450 05/01/20 1215 05/01/20 1005       Positioning and Restraints    Pre-Treatment Position  in bed  -DN  sitting in chair/recliner  -MD  in bed  -DN    Post Treatment Position  wheelchair  -DN  --  wheelchair  -DN    In Bed  sitting;call light within reach;encouraged to call for assist;exit alarm on  -DN  --  sitting;call light within reach;encouraged to call for assist;exit alarm on  -DN    Recorded by [DN] Prince Obrien, OT [MD] Serena Philip, PT [DN] Prince Obrien, OT    Row Name 05/01/20 0944             Positioning and Restraints    Pre-Treatment Position  sitting in chair/recliner  -EE      Post Treatment Position  wheelchair  -EE      In Wheelchair  sitting;encouraged to call for assist;call light within reach;exit alarm on  -EE      Recorded by [EE] Irlanda Wallis, AVINASH        User Key  (r) = Recorded By, (t) = Taken By, (c) = Cosigned By    Initials Name Effective Dates    Prince Morfin, OT 06/08/18 -     EE Irlanda Wallis, PT 04/03/18 -     Serena Alvarez, PT 04/03/18 -                  OT Recommendation and Plan    Anticipated Equipment Needs At Discharge (OT Eval): commode, bedside without drop arms, shower chair  Planned Therapy Interventions (OT Eval): activity tolerance training, BADL retraining, functional balance retraining, neuromuscular control/coordination retraining, strengthening exercise, transfer/mobility retraining            OT IRF GOALS     Row Name 04/28/20 1200 04/21/20 1417          Transfer Goal 1 (OT-IRF)    Activity/Assistive Device (Transfer Goal 1, OT-IRF)  toilet;tub  -DN  toilet;tub  -DN     [place] Level  (Transfer Goal 1, OT-IRF)  contact guard assist with RWX  -DN  contact guard assist  -DN     Time Frame (Transfer Goal 1, OT-IRF)  short term goal (STG)  -DN  short term goal (STG)  -DN     Progress/Outcomes (Transfer Goal 1, OT-IRF)  goal met;goal revised this date  -DN  continuing progress toward goal  -DN        Transfer Goal 2 (OT-IRF)    Activity/Assistive Device (Transfer Goal 2, OT-IRF)  toilet;tub;walker, rolling  -DN  toilet;tub;walker, rolling  -DN     [place] Level (Transfer Goal 2, OT-IRF)  conditional independence  -DN  supervision required  -DN     Time Frame (Transfer Goal 2, OT-IRF)  long term goal (LTG)  -DN  long term goal (LTG)  -DN     Progress/Outcomes (Transfer Goal 2, OT-IRF)  goal revised this date  -DN  continuing progress toward goal  -DN        Bathing Goal 1 (OT-IRF)    Activity/Device (Bathing Goal 1, OT-IRF)  bathing skills, all  -DN  bathing skills, all  -DN     [place] Level (Bathing Goal 1, OT-IRF)  supervision required  -DN  contact guard assist;verbal cues required  -DN     Time Frame (Bathing Goal 1, OT-IRF)  short term goal (STG)  -DN  short term goal (STG)  -DN     Progress/Outcomes (Bathing Goal 1, OT-IRF)  goal met;goal revised this date  -DN  continuing progress toward goal  -DN        Bathing Goal 2 (OT-IRF)    Activity/Device (Bathing Goal 2, OT-IRF)  bathing skills, all  -DN  bathing skills, all  -DN     [place] Level (Bathing Goal 2, OT-IRF)  conditional independence  -DN  conditional independence  -DN     Time Frame (Bathing Goal 2, OT-IRF)  long term goal (LTG)  -DN  long term goal (LTG)  -DN     Progress/Outcomes (Bathing Goal 2, OT-IRF)  goal ongoing  -DN  continuing progress toward goal  -DN        UB Dressing Goal 1 (OT-IRF)    Activity/Device (UB Dressing Goal 1, OT-IRF)  upper body dressing  -DN  upper body dressing  -DN     [place] (UB Dress Goal 1, OT-IRF)  set-up required  -DN  set-up required  -DN     Time Frame (UB Dressing Goal 1, OT-IRF)   short term goal (STG)  -DN  short term goal (STG)  -DN     Progress/Outcomes (UB Dressing Goal 1, OT-IRF)  goal ongoing;goal not met  -DN  continuing progress toward goal  -DN        UB Dressing Goal 2 (OT-IRF)    Activity/Device (UB Dressing Goal 2, OT-IRF)  upper body dressing  -DN  upper body dressing  -DN     [place] (UB Dress Goal 2, OT-IRF)  conditional independence  -DN  set-up required  -DN     Time Frame (UB Dressing Goal 2, OT-IRF)  long term goal (LTG)  -DN  long term goal (LTG)  -DN     Progress/Outcomes (UB Dressing Goal 2, OT-IRF)  goal ongoing  -DN  continuing progress toward goal  -DN        LB Dressing Goal 1 (OT-IRF)    Activity/Device (LB Dressing Goal 1, OT-IRF)  lower body dressing  -DN  lower body dressing  -DN     [place] (LB Dressing Goal 1, OT-IRF)  supervision required  -DN  contact guard assist  -DN     Time Frame (LB Dressing Goal 1, OT-IRF)  short term goal (STG)  -DN  short term goal (STG)  -DN     Progress/Outcomes (LB Dressing Goal 1, OT-IRF)  goal revised this date  -DN  continuing progress toward goal  -DN        LB Dressing Goal 2 (OT-IRF)    Activity/Device (LB Dressing Goal 2, OT-IRF)  lower body dressing  -DN  lower body dressing  -DN     [place] (LB Dressing Goal 2, OT-IRF)  conditional independence  -DN  set-up required  -DN     Time Frame (LB Dressing Goal 2, OT-IRF)  long term goal (LTG)  -DN  long term goal (LTG)  -DN     Progress/Outcomes (LB Dressing Goal 2, OT-IRF)  goal revised this date  -DN  continuing progress toward goal  -DN        Grooming Goal 1 (OT-IRF)    Activity/Device (Grooming Goal 1, OT-IRF)  grooming skills, all seated at sink  -DN  grooming skills, all  -DN     [place] (Grooming Goal 1, OT-IRF)  conditional independence  -DN  set-up required  -DN     Time Frame (Grooming Goal 1, OT-IRF)  short term goal (STG)  -DN  short term goal (STG)  -DN     Progress/Outcomes (Grooming Goal 1, OT-IRF)  goal revised this date  -DN  continuing  progress toward goal  -DN        Grooming Goal 2 (OT-IRF)    Activity/Device (Grooming Goal 2, OT-IRF)  grooming skills, all standing at sink  -DN  grooming skills, all  -DN     [place] (Grooming Goal 2, OT-IRF)  conditional independence  -DN  conditional independence  -DN     Time Frame (Grooming Goal 2, OT-IRF)  long term goal (LTG)  -DN  long term goal (LTG)  -DN     Progress/Outcomes (Grooming Goal 2, OT-IRF)  goal ongoing  -DN  continuing progress toward goal  -DN        Toileting Goal 1 (OT-IRF)    Activity/Device (Toileting Goal 1, OT-IRF)  toileting skills, all  -DN  toileting skills, all  -DN     [place] Level (Toileting Goal 1, OT-IRF)  verbal cues required;supervision required  -DN  minimum assist (75% or more patient effort);verbal cues required  -DN     Time Frame (Toileting Goal 1, OT-IRF)  short term goal (STG)  -DN  short term goal (STG)  -DN     Progress/Outcomes (Toileting Goal 1, OT-IRF)  goal met;goal revised this date  -DN  continuing progress toward goal  -DN        Toileting Goal 2 (OT-IRF)    Activity/Device (Toileting Goal 2, OT-IRF)  toileting skills, all;commode chair  -DN  toileting skills, all;commode chair  -DN     [place] Level (Toileting Goal 2, OT-IRF)  conditional independence;tactile cues required;verbal cues required  -DN  conditional independence;tactile cues required;verbal cues required  -DN     Time Frame (Toileting Goal 2, OT-IRF)  long term goal (LTG)  -DN  long term goal (LTG)  -DN     Progress/Outcomes (Toileting Goal 2, OT-IRF)  goal ongoing  -DN  continuing progress toward goal  -DN        Strength Goal 1 (OT-IRF)    Strength Goal 1 (OT-IRF)  pt to be setup with BUE HEP  -DN  pt to be SBA with BUE HEP  -DN     Time Frame (Strength Goal 1, OT-IRF)  short term goal (STG)  -DN  short term goal (STG)  -DN     Progress/Outcomes (Strength Goal 1, OT-IRF)  goal revised this date  -DN  continuing progress toward goal  -DN        Strength Goal 2 (OT-IRF)     Strength Goal 2 (OT-IRF)  pt to be independent with HEP for BUEs  -DN  pt to be independent with HEP for BUEs  -DN     Time Frame (Strength Goal 2, OT-IRF)  long term goal (LTG)  -DN  long term goal (LTG)  -DN     Progress/Outcomes (Strength Goal 2, OT-IRF)  goal ongoing  -DN  continuing progress toward goal  -DN        Balance Goal 1 (OT)    Activity/Assistive Device (Balance Goal 1, OT)  assistive device use  -DN  assistive device use  -DN     [place] Level/Cues Needed (Balance Goal 1, OT)  contact guard assist during adls  -DN  contact guard assist during adls  -DN     Time Frame (Balance Goal 1, OT)  short term goal (STG)  -DN  short term goal (STG)  -DN     Progress/Outcomes (Balance Goal 1, OT)  goal ongoing  -DN  continuing progress toward goal  -DN        Balance Goal 2 (OT)    Activity/Assistive Device (Balance Goal 2, OT)  assistive device use;standing, dynamic;walker, rolling  -DN  assistive device use;standing, dynamic;walker, rolling  -DN     [place] Level (Balance Goal 2, OT)  supervision required during adls and home mgmt  -DN  supervision required during adls and home mgmt  -DN     Time Frame (Balance Goal 2, OT)  long term goal (LTG)  -DN  long term goal (LTG)  -DN     Progress/Outcome (Balance Goal 2, OT)  goal ongoing  -DN  continuing progress toward goal  -DN        Caregiver Training Goal 2 (OT-IRF)    Caregiver Training Goal 2 (OT-IRF)  pt family or available caregivers to be independent with assist needed with adls, transfers etc  -DN  pt family or available caregivers to be independent with assist needed with adls, transfers etc  -DN     Time Frame (Caregiver Training Goal 2, OT-IRF)  long term goal (LTG)  -DN  long term goal (LTG)  -DN     Progress/Outcomes (Caregiver Training Goal 2, OT-IRF)  goal ongoing  -DN  continuing progress toward goal  -DN       User Key  (r) = Recorded By, (t) = Taken By, (c) = Cosigned By    Initials Name Provider Type    Prince Morfin, OT  Occupational Therapist                     Time Calculation:     Time Calculation- OT     Row Name 05/01/20 1454 05/01/20 1013          Time Calculation- OT    OT Start Time  1300  -DN  0800  -DN     OT Stop Time  1330  -DN  0900  -DN     OT Time Calculation (min)  30 min  -DN  60 min  -DN     OT Received On  05/01/20  -DN  05/01/20  -DN       User Key  (r) = Recorded By, (t) = Taken By, (c) = Cosigned By    Initials Name Provider Type    Prince Morfin OT Occupational Therapist          Therapy Charges for Today     Code Description Service Date Service Provider Modifiers Qty    24206913871 HC OT SELF CARE/MGMT/TRAIN EA 15 MIN 4/30/2020 Prince Obrien OT GO 3    89812530098 HC OT THER PROC EA 15 MIN 4/30/2020 Prince Obrien OT GO 1    97947967612 HC OT THER PROC EA 15 MIN 4/30/2020 Prince Obrien OT GO 2    77948104742 HC OT SELF CARE/MGMT/TRAIN EA 15 MIN 5/1/2020 Prince Obrien OT GO 3    67504579430 HC OT THER PROC EA 15 MIN 5/1/2020 Prince Obrien OT GO 1    51878858558 HC OT NEUROMUSC RE EDUCATION EA 15 MIN 5/1/2020 Prince Obrien OT GO 1    70750618286 HC OT THER PROC EA 15 MIN 5/1/2020 Prince Obrien OT GO 1                   Prince Obrien OT  5/1/2020

## 2020-05-01 NOTE — PROGRESS NOTES
Occupational Therapy: Branch    Physical Therapy: Individual: 90 minutes.    Speech Language Pathology:  Branch    Signed by: Serena Philip PT

## 2020-05-01 NOTE — PROGRESS NOTES
Occupational Therapy: Individual: 90 minutes.    Physical Therapy: Branch    Speech Language Pathology:  [place]    Signed by: SANDRA Marion/JOSÉ MIGUEL

## 2020-05-01 NOTE — PLAN OF CARE
Patient is cooperative. Alert and oriented x 4. Complaining of pain at bilateral hands and feet. Both hands are numb with a burning sensation. PRN pain medication given with positive result. Using the call light for assistance. 1 person assist with transfer. Independent with bed mobility. Wearing oxygen at 2 l/min via nasal cannula. Will continue to monitor.

## 2020-05-02 LAB
GLUCOSE BLDC GLUCOMTR-MCNC: 122 MG/DL (ref 70–130)
GLUCOSE BLDC GLUCOMTR-MCNC: 130 MG/DL (ref 70–130)
GLUCOSE BLDC GLUCOMTR-MCNC: 222 MG/DL (ref 70–130)

## 2020-05-02 PROCEDURE — 63710000001 INSULIN LISPRO (HUMAN) PER 5 UNITS: Performed by: INTERNAL MEDICINE

## 2020-05-02 PROCEDURE — 97110 THERAPEUTIC EXERCISES: CPT

## 2020-05-02 PROCEDURE — 82962 GLUCOSE BLOOD TEST: CPT

## 2020-05-02 PROCEDURE — 63710000001 DIPHENHYDRAMINE PER 50 MG: Performed by: INTERNAL MEDICINE

## 2020-05-02 PROCEDURE — 94799 UNLISTED PULMONARY SVC/PX: CPT

## 2020-05-02 PROCEDURE — 63710000001 PREDNISONE PER 1 MG: Performed by: PHYSICAL MEDICINE & REHABILITATION

## 2020-05-02 PROCEDURE — 63710000001 INSULIN GLARGINE PER 5 UNITS: Performed by: INTERNAL MEDICINE

## 2020-05-02 RX ORDER — DULOXETIN HYDROCHLORIDE 60 MG/1
60 CAPSULE, DELAYED RELEASE ORAL DAILY
Status: DISCONTINUED | OUTPATIENT
Start: 2020-05-09 | End: 2020-05-12 | Stop reason: HOSPADM

## 2020-05-02 RX ORDER — DULOXETIN HYDROCHLORIDE 30 MG/1
30 CAPSULE, DELAYED RELEASE ORAL DAILY
Status: COMPLETED | OUTPATIENT
Start: 2020-05-02 | End: 2020-05-08

## 2020-05-02 RX ADMIN — PREGABALIN 100 MG: 100 CAPSULE ORAL at 09:09

## 2020-05-02 RX ADMIN — PREDNISONE 20 MG: 20 TABLET ORAL at 09:09

## 2020-05-02 RX ADMIN — SUCRALFATE 1 G: 1 SUSPENSION ORAL at 05:39

## 2020-05-02 RX ADMIN — PANTOPRAZOLE SODIUM 40 MG: 40 TABLET, DELAYED RELEASE ORAL at 05:39

## 2020-05-02 RX ADMIN — BUDESONIDE AND FORMOTEROL FUMARATE DIHYDRATE 2 PUFF: 160; 4.5 AEROSOL RESPIRATORY (INHALATION) at 21:29

## 2020-05-02 RX ADMIN — DULOXETINE HYDROCHLORIDE 30 MG: 30 CAPSULE, DELAYED RELEASE ORAL at 13:02

## 2020-05-02 RX ADMIN — DIPHENHYDRAMINE HYDROCHLORIDE 25 MG: 25 CAPSULE ORAL at 15:35

## 2020-05-02 RX ADMIN — DIPHENHYDRAMINE HYDROCHLORIDE 25 MG: 25 CAPSULE ORAL at 09:09

## 2020-05-02 RX ADMIN — AMLODIPINE BESYLATE 10 MG: 10 TABLET ORAL at 21:18

## 2020-05-02 RX ADMIN — INSULIN LISPRO 14 UNITS: 100 INJECTION, SOLUTION INTRAVENOUS; SUBCUTANEOUS at 11:54

## 2020-05-02 RX ADMIN — PREGABALIN 100 MG: 100 CAPSULE ORAL at 21:18

## 2020-05-02 RX ADMIN — INSULIN LISPRO 5 UNITS: 100 INJECTION, SOLUTION INTRAVENOUS; SUBCUTANEOUS at 17:23

## 2020-05-02 RX ADMIN — OXYCODONE HYDROCHLORIDE 5 MG: 5 TABLET ORAL at 03:05

## 2020-05-02 RX ADMIN — LEVOTHYROXINE SODIUM 200 MCG: 100 TABLET ORAL at 05:39

## 2020-05-02 RX ADMIN — INSULIN LISPRO 14 UNITS: 100 INJECTION, SOLUTION INTRAVENOUS; SUBCUTANEOUS at 09:09

## 2020-05-02 RX ADMIN — OXYCODONE HYDROCHLORIDE 5 MG: 5 TABLET ORAL at 21:17

## 2020-05-02 RX ADMIN — DIPHENHYDRAMINE HYDROCHLORIDE 25 MG: 25 CAPSULE ORAL at 03:05

## 2020-05-02 RX ADMIN — OXYCODONE HYDROCHLORIDE 5 MG: 5 TABLET ORAL at 15:35

## 2020-05-02 RX ADMIN — BUDESONIDE AND FORMOTEROL FUMARATE DIHYDRATE 2 PUFF: 160; 4.5 AEROSOL RESPIRATORY (INHALATION) at 07:13

## 2020-05-02 RX ADMIN — SUCRALFATE 1 G: 1 SUSPENSION ORAL at 21:18

## 2020-05-02 RX ADMIN — DIPHENHYDRAMINE HYDROCHLORIDE 25 MG: 25 CAPSULE ORAL at 21:17

## 2020-05-02 RX ADMIN — SUCRALFATE 1 G: 1 SUSPENSION ORAL at 11:55

## 2020-05-02 RX ADMIN — HYDROCHLOROTHIAZIDE 25 MG: 25 TABLET ORAL at 09:09

## 2020-05-02 RX ADMIN — INSULIN GLARGINE 20 UNITS: 100 INJECTION, SOLUTION SUBCUTANEOUS at 09:04

## 2020-05-02 RX ADMIN — PANTOPRAZOLE SODIUM 40 MG: 40 TABLET, DELAYED RELEASE ORAL at 17:23

## 2020-05-02 RX ADMIN — SUCRALFATE 1 G: 1 SUSPENSION ORAL at 17:23

## 2020-05-02 RX ADMIN — FOLIC ACID 1 MG: 1 TABLET ORAL at 09:09

## 2020-05-02 RX ADMIN — Medication 3 MG: at 22:22

## 2020-05-02 RX ADMIN — INSULIN LISPRO 14 UNITS: 100 INJECTION, SOLUTION INTRAVENOUS; SUBCUTANEOUS at 17:23

## 2020-05-02 RX ADMIN — OXYCODONE HYDROCHLORIDE 5 MG: 5 TABLET ORAL at 09:08

## 2020-05-02 NOTE — THERAPY TREATMENT NOTE
Inpatient Rehabilitation - Physical Therapy Treatment Note  [facility]     Patient Name: Oralia Sánchez  : 1975  MRN: [id number]    Today's Date: 2020                 Admit Date: 2020      Visit Dx:    No diagnosis found.    Patient Active Problem List   Diagnosis   • Vitamin D deficiency   • Awareness of heartbeats   • Adiposity   • YOUNG (nonalcoholic steatohepatitis)   • Hypothyroid   • Diabetes mellitus arising in pregnancy   • Diabetes (CMS/HCC)   • Metabolic syndrome   • Chest pain   • Primary thunderclap headache   • Elevated LFTs   • Tobacco abuse   • Rheumatoid arthritis (CMS/HCC)   • Type 2 diabetes mellitus (CMS/HCC)   • Morbid obesity (CMS/HCC)   • UTI (urinary tract infection), bacterial   • Cerebral aneurysm   • Dyspnea   • Cough   • Hypomagnesemia   • Metabolic acidosis   • Fatigue   • History of COPD   • Abnormal CT scan, colon   • 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)   • Essential hypertension   • GBS (Guillain [place] syndrome) (CMS/HCC)   • Elevated transaminase level   • History of gestational diabetes   • Steroid-induced hyperglycemia   • Elevated aldolase level       Therapy Treatment    IRF Treatment Summary     Row Name 20 1201             Evaluation/Treatment Time and Intent    Subjective Information  complains of;pain  -MD      Existing Precautions/Restrictions  fall  -MD      Document Type  therapy note (daily note)  -MD      Mode of Treatment  physical therapy  -MD      Patient/Family Observations  Pt sitting in WC showing no signs of acute distress.  -MD      Recorded by [MD] Serena Philip, PT      Row Name 20 1201             Cognition/Psychosocial- PT/OT    Orientation Status (Cognition)  oriented x 4  -MD      Follows Commands (Cognition)  WNL  -MD      Personal Safety Interventions  fall prevention program maintained;gait belt  -MD      Recorded by [MD] Serena Philip, PT      Row Name 20 1207             Sit-Stand Transfer    Sit-Stand  [place]ming (Transfers)  stand by assist;contact guard;verbal cues  -MD      Assistive Device (Sit-Stand Transfers)  walker, front-wheeled  -MD      Recorded by [MD] Serena Philip, PT      Row Name 05/02/20 1208             Stand-Sit Transfer    Stand-Sit Fleming (Transfers)  stand by assist;contact guard;verbal cues  -MD      Assistive Device (Stand-Sit Transfers)  walker, front-wheeled;wheelchair  -MD      Recorded by [MD] Serena Philip, PT      Row Name 05/02/20 1208             Gait/Stairs Assessment/Training    Fleming Level (Gait)  contact guard;verbal cues  -MD      Assistive Device (Gait)  walker, front-wheeled  -MD      Distance in Feet (Gait)  160`x3  -MD      Pattern (Gait)  step-through  -MD      Deviations/Abnormal Patterns (Gait)  stride length decreased  -MD      Bilateral Gait Deviations  weight shift ability decreased  -MD      Left Sided Gait Deviations  heel strike decreased  -MD      Recorded by [MD] Serena Philip, PT      Row Name 05/02/20 1208             Dynamic Balance Activity    Therapeutic Training Performed (Dynamic Balance)  backward walking;side stepping  -MD      Support Needed for Balance (Dynamic Balance Training)  uses at least one upper extremity for support;CGA  -MD      Comment (Dynamic Balance Training)  in // bars  -MD      Recorded by [MD] Serena Philip, PT      Row Name 05/02/20 1208             Lower Extremity Seated Therapeutic Exercise    Performed, Seated Lower Extremity (Therapeutic Exercise)  hip flexion/extension;hip abduction/adduction;knee flexion/extension;ankle dorsiflexion/plantarflexion;LAQ (long arc quad), knee extension  -MD      Exercise Type, Seated Lower Extremity (Therapeutic Exercise)  resistive exercise red t-band  -MD      Sets/Reps Detail, Seated Lower Extremity (Therapeutic Exercise)  1/20  -MD      Recorded by [MD] Serena Philip, PT      Row Name 05/02/20 1208             Positioning and Restraints    Pre-Treatment Position  sitting in chair/recliner  -MD       Post Treatment Position  wheelchair  -MD      In Wheelchair  sitting;call light within reach;exit alarm on  -MD      Recorded by [MD] Serena Philip PT        User Key  (r) = Recorded By, (t) = Taken By, (c) = Cosigned By    Initials Name Effective Dates    Serena Alvarez, PT 04/03/18 -                  PT Recommendation and Plan  Anticipated Equipment Needs at Discharge (PT Eval): gait belt, front wheeled walker  Frequency of Treatment (PT Eval): 5 times per week, 60 minutes per session                     Time Calculation:     PT Charges     Row Name 05/02/20 1207             Time Calculation    Start Time  0935  -MD      Stop Time  1015  -MD      Time Calculation (min)  40 min  -MD      PT Received On  05/02/20  -MD      PT - Next Appointment  05/04/20  -MD        User Key  (r) = Recorded By, (t) = Taken By, (c) = Cosigned By    Initials Name Provider Type    Serena Alvarez, PT Physical Therapist          Therapy Charges for Today     Code Description Service Date Service Provider Modifiers Qty    95473314870 HC PT THER PROC EA 15 MIN 5/1/2020 Serena Philip, PT GP 4    13629112338 HC PT THER PROC EA 15 MIN 5/2/2020 Serena Philip, PT GP 3              Patient was wearing a face mask during this therapy encounter. Therapist used appropriate personal protective equipment including mask and gloves.  Mask used was standard procedure mask. Appropriate PPE was worn during the entire therapy session. Hand hygiene was completed before and after therapy session. Patient is not in enhanced droplet precautions.         Serena Philip PT  5/2/2020

## 2020-05-02 NOTE — PROGRESS NOTES
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  Aching pain.  Does not go away.  Does not have any significant pain in the legs.  Reviewed she has been on a fairly good dose of prednisone and would not add NSAID at this time.  Discussed options of titrating up on Lyrica, switching to gabapentin, or adding another agent such as Cymbalta.  She does not describe any change in her strength.        History taken from: patient    Objective     Vital Signs  Temp:  [97.8 °F (36.6 °C)-98.1 °F (36.7 °C)] 98.1 °F (36.7 °C)  Heart Rate:  [88-91] 88  Resp:  [18] 18  BP: (119-132)/(70-84) 128/70    Objective:  Vital signs: (most recent): Blood pressure 128/70, pulse 88, temperature 98.1 °F (36.7 °C), temperature source Oral, resp. rate 18, height 154.9 cm (61\"), weight 107 kg (236 lb 8.9 oz), SpO2 96 %, not currently breastfeeding.            MENTAL STATUS -  AWAKE / ALERT  HEENT- NCAT, PUPILS EQUALLY ROUND, SCLERAE ANICTERIC, CONJUNCTIVAE PINK, OP MOIST, NO JVD, EARS UNREMARKABLE EXTERNALLY  LUNGS -normal respirations.    HEART- RRR,    ABD -   SOFT, NT.    EXT - NO EDEMA OR CYANOSIS   No warmth or erythema at the bilateral wrist.  NEURO -   MOTOR EXAM -she has some pain inhibition with  in her hands.  " Takes resistance with elbow flexion, shoulder abduction, and extension, ankle dorsiflexion.    Results Review:     I reviewed the patient's new clinical results.  Glucose   Date/Time Value Ref Range Status   05/02/2020 0722 122 70 - 130 mg/dL Final   05/01/2020 1614 167 (H) 70 - 130 mg/dL Final   05/01/2020 1104 143 (H) 70 - 130 mg/dL Final   05/01/2020 0714 105 70 - 130 mg/dL Final   04/30/2020 1603 257 (H) 70 - 130 mg/dL Final   04/30/2020 1106 147 (H) 70 - 130 mg/dL Final   04/30/2020 0702 112 70 - 130 mg/dL Final   04/29/2020 1604 207 (H) 70 - 130 mg/dL Final           Results from last 7 days   Lab Units 04/29/20  0400 04/28/20  0534 04/27/20  0425   SODIUM mmol/L 139 132* 137   POTASSIUM mmol/L 4.1 3.8 3.8   CHLORIDE mmol/L 95* 90* 93*   CO2 mmol/L 31.9* 29.1* 29.8*   BUN mg/dL 15 17 17   CREATININE mg/dL 0.51* 0.47* 0.58   CALCIUM mg/dL 9.6 9.5 9.5   BILIRUBIN mg/dL 0.6 0.7 0.7   ALK PHOS U/L 109 117 127*   ALT (SGPT) U/L 233* 243* 265*   AST (SGOT) U/L 278* 288* 272*   GLUCOSE mg/dL 106* 102* 136*       NCS/EMG April 29, 2020-  Technical summary:  Nerve conduction studies were obtained in the right arm and right leg.  Skin temperature was at least 32 °C measured on the right palm but only 30 °C at the ankle.  Temperature correction was used where indicated.  Needle examination was obtained on selected muscles of the right arm and right leg     Results:  1.  Prolonged right median sensory latency at 5.1 ms with low amplitude of 4.3 µV.  2.  Prolonged right ulnar sensory latency at 3.9 ms with low amplitude of 4.7 µV.  3.  Prolonged right radial sensory latency at 2.9 ms with low amplitude of 11.7 µV.  4.  Slow right median motor conduction velocity in the forearm at 34.4 m/s with prolonged distal latency of 4.8 ms.  The amplitude was low at 4.3 mV from wrist stimulation.  Normal F-wave.  5.  Borderline right ulnar motor conduction velocity in the below elbow segment at 48.9 m/s with normal velocity in the  short segment across the elbow and with normal distal latency, amplitudes and F-wave.  6.  Normal right sural sensory latency with temperature correction and low amplitude of 3 µV.  7.  Normal right superficial peroneal sensory latency with temperature correction with normal amplitude.  8.  Normal right peroneal motor study including F-wave.  9.  Normal right tibial motor study including F-wave.  10.  Needle examination of selected muscles in the right arm and right leg showed multiple abnormalities.  In the right arm there were fibrillations and positive sharp waves in the flexor pollicis longus, extensor digitorum communis, triceps, biceps and deltoid with normal-appearing motor units and recruitment.  The right abductor pollicis brevis showed normal insertional activities with a moderate increased number of large motor units increased firing rate and reduced interference pattern.  Remaining muscles tested showed normal insertional activities, motor units and recruitment.  Cervical paraspinals at C6 showed no abnormality.     In the right leg there were fibrillations and positive sharp waves in the vastus lateralis, tibialis anterior, peroneus longus and medial gastrocnemius.  These all had normal motor units and recruitment.  The extensor digitorum brevis showed normal insertional activities motor units and recruitment.  Lumbar paraspinals at L5 showed no abnormalities.     Impression:  Abnormal study.  There is evidence of peripheral neuropathy but in addition there are needle exam changes in multiple muscles in the right arm and leg consistent with acute denervation.  This is far greater than expected based on the nerve conduction findings.  This suggests either motor neuron disease or a primarily axonal polyneuropathy such as axonal Guillain Barré syndrome based on the patient's symptom history.  Clinical correlation is suggested.         Medication Review: done  Scheduled Meds:    amLODIPine 10 mg Oral  Nightly   budesonide-formoterol 2 puff Inhalation BID - RT   DULoxetine 30 mg Oral Daily   [START ON 5/9/2020] DULoxetine 60 mg Oral Daily   folic acid 1 mg Oral Daily   hydroCHLOROthiazide Oral 25 mg Oral Daily   insulin glargine 20 Units Subcutaneous QAM   insulin lispro 0-14 Units Subcutaneous TID AC   insulin lispro 14 Units Subcutaneous TID With Meals   levothyroxine 200 mcg Oral Q AM   pantoprazole 40 mg Oral BID AC   predniSONE 20 mg Oral Daily   Followed by      [START ON 5/4/2020] predniSONE 10 mg Oral Daily   Followed by      [START ON 5/7/2020] predniSONE 5 mg Oral Daily   pregabalin 100 mg Oral Q12H   sucralfate 1 g Oral 4x Daily AC & at Bedtime     Continuous Infusions:   PRN Meds:.Benzocaine  •  benzonatate  •  calcium carbonate  •  dextrose  •  dextrose  •  diphenhydrAMINE  •  diphenhydrAMINE  •  glucagon (human recombinant)  •  guaiFENesin-dextromethorphan  •  melatonin  •  ondansetron  •  oxyCODONE  •  [START ON 5/4/2020] oxyCODONE  •  [START ON 5/7/2020] oxyCODONE  •  polyethylene glycol  •  potassium chloride **OR** potassium chloride **OR** potassium chloride  •  sodium chloride      Assessment/Plan       YOUNG (nonalcoholic steatohepatitis)    Hypothyroid    Type 2 diabetes mellitus (CMS/HCC)    GBS (Guillain [place] syndrome) (CMS/HCC)    Elevated transaminase level    History of gestational diabetes    Steroid-induced hyperglycemia    Elevated aldolase level      Assessment & Plan  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  This is far greater than expected based on the nerve conduction findings.  This suggests either motor neuron disease or a primarily axonal polyneuropathy such as axonal Guillain Barré syndrome based on the patient's symptom history. F/U Dr Juventino Gaytan  in 2-3 months.    Previous question of dermatomyositis or autoimmune process-trial of steroids-tapering off  April 27-has been on prednisone 40 mg daily since April 10 empiric trial for previous question of dermatomyositis but now not felt clinically present-taper off of prednisone 30 mg daily x3 days, 20 mg daily x3 days, 10 mg daily x3 days, 5 mg daily x3 days, then stop.    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 Also on oxycodone.  April 27-has some pain in the upper extremities.  Doubt that it is bilateral joint pain as she has been on a reasonable dose of prednisone for the last 16 days.  May be a variant for the neuropathic pain.  Will titrate up on Lyrica 200 mg every 12 hours, watch for any myoclonic jerks.  May 1- taking oxycodone 5 mg about 5 times a day, need to taper off over next week, changed to Oxycodone 5 mg q 6 hours prn x 3 days, then q 8 hours prn x 3 days, then q 12 hours prn x three days, then stop. SVETLANA reviewed.   May 2-add on Cymbalta 30 mg daily for 7 days and increase to 60 mg daily.  Discussed other options of titrating up on Lyrica or switching to gabapentin.    DVT prophylaxis - SCDs      Hepatic steatosis-elevated LFTs  April 27-gastroenterology following  April 29 - reviewed with gastroenterology - a combination of fatty liver (the treatment would be weight loss) and Ebstein Horan viral hepatitis (the only treatment would be supportive care)- recommend against a liver biopsy. Follow up in three months with repeat labs and full colonscopy.    April 27-gait 80 feet CTG-min assist.  Team  conference in the a.m.    TEAM CONF - April 28- BED CTG. 4 STAIRS MIN. GAIT 100 FEET CTG-MIN RW. TOILET TRANSFERS CTG. SHOWER TRANSFERS CTG.  UBD MIN ASSIST FOR BRA. LBD CTG. BATH CTG. GROOMING SBA.  TOILETING MOD INDEPENDENT. CONTINENT BOWEL AND BLADDER.   DIETICIAN EDUCATED ON DIABETIC DIET ON STEROIDS BUT PATIENT RESISTANT TO INSTRUCTION.   NEEDS TO BE MODIFIED INDEPENDENT FOR HOME  ELOS- TWO WEEKS    Phill Campos MD  05/02/20  10:12    Time:        During rounds, used appropriate personal protective equipment including mask and gloves.  Mask used was standard procedure mask. Appropriate PPE was worn during the entire visit.  Hand hygiene was completed before and after.

## 2020-05-02 NOTE — PROGRESS NOTES
Inpatient Rehabilitation Plan of Care Note    Plan of Care  Care Plan Reviewed - Updates as Follows    Safety    Performed Intervention(s)  Bed alarm and /or chair alarm  Safety rounds and items within reach  Falls precautions/protocol  Uses call light appropriately  Environmental set-up to reduce risk  safety strategies and techniques      Psychosocial    Performed Intervention(s)  Allow the opportunity to verbalize needs and concerns  Medication as ordered  Therapuetic environmental set up      Body Systems    Performed Intervention(s)  Monitor labs and medication as ordered  Blood sugar testing- TID      Pain    Performed Intervention(s)  Relaxation or distraction  Medication as ordered      Sphincter Control    Performed Intervention(s)  Monitor intake and output  Assist pt to bathroom in a timely manner  Encourage proper diet and fluid intake  Medication as ordered    Signed by: Dedrick Pichardo, RN

## 2020-05-02 NOTE — PLAN OF CARE
Problem: Patient Care Overview  Goal: Plan of Care Review  Outcome: Ongoing (interventions implemented as appropriate)  Flowsheets (Taken 5/2/2020 4575)  Outcome Summary: Patient cooperative, alert and assist x1. She is continent of b/b, takes medication with water, and is an accucheck AC/HS- no coverage needed so far. Patient takes pain medication and benadryl q6, melatonin at night, and started on Cymbalta today. Complained of numbness to hands this morning, VS stable and no safety issues at this time.  Progress, Functional Goals: demonstrating adequate progress  IRF Plan of Care Review: progress ongoing, continue

## 2020-05-02 NOTE — PROGRESS NOTES
Inpatient Rehabilitation Plan of Care Note    Plan of Care  Care Plan Reviewed - No updates at this time.    Safety    Performed Intervention(s)  Bed alarm and /or chair alarm  Safety rounds and items within reach  Falls precautions/protocol  Uses call light appropriately  Environmental set-up to reduce risk  safety strategies and techniques      Psychosocial    Performed Intervention(s)  Allow the opportunity to verbalize needs and concerns  Medication as ordered  Therapuetic environmental set up      Body Systems    Performed Intervention(s)  Monitor labs and medication as ordered  Blood sugar testing- TID      Pain    Performed Intervention(s)  Relaxation or distraction  Medication as ordered      Sphincter Control    Performed Intervention(s)  Monitor intake and output  Assist pt to bathroom in a timely manner  Encourage proper diet and fluid intake  Medication as ordered    Signed by: Mine Saldivar, RN

## 2020-05-02 NOTE — PLAN OF CARE
Problem: Patient Care Overview  Goal: Plan of Care Review  Outcome: Ongoing (interventions implemented as appropriate)  Flowsheets (Taken 5/2/2020 0219)  Outcome Summary: Pt. is pleasant and cooperative with staff. A&OX4. Takes Meds whole with water. Complained of any pain to left hand and bilateral arms. Oxycodone 5 mg and Benadryl 25 mg PO PRN given at 2107, and then relieved good. BG checks TID. Melatonin 3 mg PO PRN given at 2227 per Pt. required. O2 applied by nasal cannula at 2 L. Daily weights. Continent of B&B. Assist x 1 with walker to BR. Snack and ice cream 1 cup applied at bedtime. Uses call light for assistance. Continent of B&B. No further issues to note at this time. Will continue to monitor.  Progress, Functional Goals: demonstrating adequate progress  Plan of Care Reviewed With: patient  IRF Plan of Care Review: progress ongoing, continue

## 2020-05-02 NOTE — PROGRESS NOTES
Occupational Therapy: Branch    Physical Therapy: Individual: 40 minutes.    Speech Language Pathology:  Branch    Signed by: Serena Philip PT

## 2020-05-03 LAB
GLUCOSE BLDC GLUCOMTR-MCNC: 118 MG/DL (ref 70–130)
GLUCOSE BLDC GLUCOMTR-MCNC: 165 MG/DL (ref 70–130)
GLUCOSE BLDC GLUCOMTR-MCNC: 181 MG/DL (ref 70–130)

## 2020-05-03 PROCEDURE — 94799 UNLISTED PULMONARY SVC/PX: CPT

## 2020-05-03 PROCEDURE — 82962 GLUCOSE BLOOD TEST: CPT

## 2020-05-03 PROCEDURE — 63710000001 PREDNISONE PER 1 MG: Performed by: PHYSICAL MEDICINE & REHABILITATION

## 2020-05-03 PROCEDURE — 63710000001 INSULIN GLARGINE PER 5 UNITS: Performed by: INTERNAL MEDICINE

## 2020-05-03 PROCEDURE — 63710000001 INSULIN LISPRO (HUMAN) PER 5 UNITS: Performed by: INTERNAL MEDICINE

## 2020-05-03 PROCEDURE — 63710000001 DIPHENHYDRAMINE PER 50 MG: Performed by: INTERNAL MEDICINE

## 2020-05-03 RX ADMIN — DIPHENHYDRAMINE HYDROCHLORIDE 25 MG: 25 CAPSULE ORAL at 18:12

## 2020-05-03 RX ADMIN — INSULIN LISPRO 14 UNITS: 100 INJECTION, SOLUTION INTRAVENOUS; SUBCUTANEOUS at 08:51

## 2020-05-03 RX ADMIN — OXYCODONE HYDROCHLORIDE 5 MG: 5 TABLET ORAL at 05:22

## 2020-05-03 RX ADMIN — INSULIN LISPRO 3 UNITS: 100 INJECTION, SOLUTION INTRAVENOUS; SUBCUTANEOUS at 18:13

## 2020-05-03 RX ADMIN — BUDESONIDE AND FORMOTEROL FUMARATE DIHYDRATE 2 PUFF: 160; 4.5 AEROSOL RESPIRATORY (INHALATION) at 21:02

## 2020-05-03 RX ADMIN — INSULIN LISPRO 14 UNITS: 100 INJECTION, SOLUTION INTRAVENOUS; SUBCUTANEOUS at 11:58

## 2020-05-03 RX ADMIN — PREGABALIN 100 MG: 100 CAPSULE ORAL at 09:44

## 2020-05-03 RX ADMIN — HYDROCHLOROTHIAZIDE 25 MG: 25 TABLET ORAL at 08:51

## 2020-05-03 RX ADMIN — INSULIN LISPRO 3 UNITS: 100 INJECTION, SOLUTION INTRAVENOUS; SUBCUTANEOUS at 11:59

## 2020-05-03 RX ADMIN — BUDESONIDE AND FORMOTEROL FUMARATE DIHYDRATE 2 PUFF: 160; 4.5 AEROSOL RESPIRATORY (INHALATION) at 08:19

## 2020-05-03 RX ADMIN — PANTOPRAZOLE SODIUM 40 MG: 40 TABLET, DELAYED RELEASE ORAL at 18:12

## 2020-05-03 RX ADMIN — Medication 3 MG: at 22:01

## 2020-05-03 RX ADMIN — OXYCODONE HYDROCHLORIDE 5 MG: 5 TABLET ORAL at 11:58

## 2020-05-03 RX ADMIN — PREGABALIN 100 MG: 100 CAPSULE ORAL at 21:21

## 2020-05-03 RX ADMIN — SUCRALFATE 1 G: 1 SUSPENSION ORAL at 08:53

## 2020-05-03 RX ADMIN — SUCRALFATE 1 G: 1 SUSPENSION ORAL at 21:21

## 2020-05-03 RX ADMIN — PREDNISONE 20 MG: 20 TABLET ORAL at 09:39

## 2020-05-03 RX ADMIN — DIPHENHYDRAMINE HYDROCHLORIDE 25 MG: 25 CAPSULE ORAL at 05:22

## 2020-05-03 RX ADMIN — INSULIN GLARGINE 20 UNITS: 100 INJECTION, SOLUTION SUBCUTANEOUS at 08:52

## 2020-05-03 RX ADMIN — DIPHENHYDRAMINE HYDROCHLORIDE 25 MG: 25 CAPSULE ORAL at 11:58

## 2020-05-03 RX ADMIN — DULOXETINE HYDROCHLORIDE 30 MG: 30 CAPSULE, DELAYED RELEASE ORAL at 08:52

## 2020-05-03 RX ADMIN — SUCRALFATE 1 G: 1 SUSPENSION ORAL at 18:12

## 2020-05-03 RX ADMIN — OXYCODONE HYDROCHLORIDE 5 MG: 5 TABLET ORAL at 18:12

## 2020-05-03 RX ADMIN — AMLODIPINE BESYLATE 10 MG: 10 TABLET ORAL at 21:21

## 2020-05-03 RX ADMIN — SUCRALFATE 1 G: 1 SUSPENSION ORAL at 12:05

## 2020-05-03 RX ADMIN — FOLIC ACID 1 MG: 1 TABLET ORAL at 08:52

## 2020-05-03 RX ADMIN — LEVOTHYROXINE SODIUM 200 MCG: 100 TABLET ORAL at 05:23

## 2020-05-03 RX ADMIN — PANTOPRAZOLE SODIUM 40 MG: 40 TABLET, DELAYED RELEASE ORAL at 05:23

## 2020-05-03 RX ADMIN — INSULIN LISPRO 14 UNITS: 100 INJECTION, SOLUTION INTRAVENOUS; SUBCUTANEOUS at 18:12

## 2020-05-03 RX ADMIN — POLYETHYLENE GLYCOL 3350 17 G: 17 POWDER, FOR SOLUTION ORAL at 11:58

## 2020-05-03 NOTE — PROGRESS NOTES
Inpatient Rehabilitation Plan of Care Note    Plan of Care  Care Plan Reviewed - No updates at this time.    Safety    Performed Intervention(s)  Bed alarm and /or chair alarm  Safety rounds and items within reach  Falls precautions/protocol  Uses call light appropriately  Environmental set-up to reduce risk  safety strategies and techniques      Psychosocial    Performed Intervention(s)  Allow the opportunity to verbalize needs and concerns  Medication as ordered  Therapuetic environmental set up      Body Systems    Performed Intervention(s)  Monitor labs and medication as ordered  Blood sugar testing- TID      Pain    Performed Intervention(s)  Relaxation or distraction  Medication as ordered      Sphincter Control    Performed Intervention(s)  Monitor intake and output  Assist pt to bathroom in a timely manner  Encourage proper diet and fluid intake  Medication as ordered    Signed by: Colby Cox RN

## 2020-05-03 NOTE — PLAN OF CARE
Problem: Patient Care Overview  Goal: Plan of Care Review  Outcome: Ongoing (interventions implemented as appropriate)  Flowsheets (Taken 5/3/2020 0259)  Outcome Summary: Patient pleasant and cooperative. She takes pain medication PRN q6 hrs. Melatonin given at HS. Ambulatory with RW. Wears o2 at 2L at HS.  Progress, Functional Goals: demonstrating adequate progress  Plan of Care Reviewed With: patient  IRF Plan of Care Review: progress ongoing, continue     Problem: Fall Risk (Adult)  Goal: Identify Related Risk Factors and Signs and Symptoms  Description  Related risk factors and signs and symptoms are identified upon initiation of Human Response Clinical Practice Guideline (CPG).  Outcome: Outcome(s) achieved  Flowsheets  Taken 4/29/2020 0014 by Dedrick Pichardo RN  Related Risk Factors (Fall Risk): history of falls;environment unfamiliar;sleep pattern alteration;fatigue/slow reaction;gait/mobility problems  Taken 4/26/2020 0034 by Dedrick Pichardo RN  Signs and Symptoms (Fall Risk): presence of risk factors     Problem: Fall Risk (Adult)  Goal: Absence of Fall  Description  Patient will demonstrate the desired outcomes by discharge/transition of care.  Outcome: Ongoing (interventions implemented as appropriate)  Flowsheets (Taken 5/3/2020 0259)  Absence of Fall: making progress toward outcome     Problem: Skin Injury Risk (Adult)  Goal: Identify Related Risk Factors and Signs and Symptoms  Description  Related risk factors and signs and symptoms are identified upon initiation of Human Response Clinical Practice Guideline (CPG).  Outcome: Ongoing (interventions implemented as appropriate)  Flowsheets (Taken 5/2/2020 0219 by Dedrick Pichardo RN)  Related Risk Factors (Skin Injury Risk): mobility impaired

## 2020-05-03 NOTE — PROGRESS NOTES
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  Still has weakness in the arms compared to the legs.  She does note that Cymbalta has been helpful with less of the persistent throbbing pain at rest in the arms.          History taken from: patient    Objective     Vital Signs  Temp:  [96.1 °F (35.6 °C)-98.5 °F (36.9 °C)] 96.8 °F (36 °C)  Heart Rate:  [88-98] 88  Resp:  [18] 18  BP: (136-140)/(81-86) 136/86    Objective:  Vital signs: (most recent): Blood pressure 136/86, pulse 88, temperature 96.8 °F (36 °C), temperature source Oral, resp. rate 18, height 154.9 cm (61\"), weight 107 kg (235 lb 3.7 oz), SpO2 95 %, not currently breastfeeding.            MENTAL STATUS -  AWAKE / ALERT  HEENT- NCAT, PUPILS EQUALLY ROUND, SCLERAE ANICTERIC, CONJUNCTIVAE PINK, OP MOIST, NO JVD, EARS UNREMARKABLE EXTERNALLY  LUNGS -normal respirations.    HEART- RRR,    ABD -   SOFT, NT.    EXT - NO EDEMA OR CYANOSIS   No warmth or erythema at the bilateral wrist.  NEURO -   MOTOR EXAM -she has some pain inhibition with  in her hands.  Takes resistance with elbow flexion, shoulder abduction, and extension, ankle dorsiflexion.    Results Review:     I reviewed the patient's new clinical results.  Glucose   Date/Time Value Ref Range Status   05/03/2020 0742 118 70 - 130 " mg/dL Final   05/02/2020 1618 222 (H) 70 - 130 mg/dL Final   05/02/2020 1055 130 70 - 130 mg/dL Final   05/02/2020 0722 122 70 - 130 mg/dL Final   05/01/2020 1614 167 (H) 70 - 130 mg/dL Final   05/01/2020 1104 143 (H) 70 - 130 mg/dL Final   05/01/2020 0714 105 70 - 130 mg/dL Final   04/30/2020 1603 257 (H) 70 - 130 mg/dL Final           Results from last 7 days   Lab Units 04/29/20  0400 04/28/20  0534 04/27/20  0425   SODIUM mmol/L 139 132* 137   POTASSIUM mmol/L 4.1 3.8 3.8   CHLORIDE mmol/L 95* 90* 93*   CO2 mmol/L 31.9* 29.1* 29.8*   BUN mg/dL 15 17 17   CREATININE mg/dL 0.51* 0.47* 0.58   CALCIUM mg/dL 9.6 9.5 9.5   BILIRUBIN mg/dL 0.6 0.7 0.7   ALK PHOS U/L 109 117 127*   ALT (SGPT) U/L 233* 243* 265*   AST (SGOT) U/L 278* 288* 272*   GLUCOSE mg/dL 106* 102* 136*       NCS/EMG April 29, 2020-  Technical summary:  Nerve conduction studies were obtained in the right arm and right leg.  Skin temperature was at least 32 °C measured on the right palm but only 30 °C at the ankle.  Temperature correction was used where indicated.  Needle examination was obtained on selected muscles of the right arm and right leg     Results:  1.  Prolonged right median sensory latency at 5.1 ms with low amplitude of 4.3 µV.  2.  Prolonged right ulnar sensory latency at 3.9 ms with low amplitude of 4.7 µV.  3.  Prolonged right radial sensory latency at 2.9 ms with low amplitude of 11.7 µV.  4.  Slow right median motor conduction velocity in the forearm at 34.4 m/s with prolonged distal latency of 4.8 ms.  The amplitude was low at 4.3 mV from wrist stimulation.  Normal F-wave.  5.  Borderline right ulnar motor conduction velocity in the below elbow segment at 48.9 m/s with normal velocity in the short segment across the elbow and with normal distal latency, amplitudes and F-wave.  6.  Normal right sural sensory latency with temperature correction and low amplitude of 3 µV.  7.  Normal right superficial peroneal sensory latency with  temperature correction with normal amplitude.  8.  Normal right peroneal motor study including F-wave.  9.  Normal right tibial motor study including F-wave.  10.  Needle examination of selected muscles in the right arm and right leg showed multiple abnormalities.  In the right arm there were fibrillations and positive sharp waves in the flexor pollicis longus, extensor digitorum communis, triceps, biceps and deltoid with normal-appearing motor units and recruitment.  The right abductor pollicis brevis showed normal insertional activities with a moderate increased number of large motor units increased firing rate and reduced interference pattern.  Remaining muscles tested showed normal insertional activities, motor units and recruitment.  Cervical paraspinals at C6 showed no abnormality.     In the right leg there were fibrillations and positive sharp waves in the vastus lateralis, tibialis anterior, peroneus longus and medial gastrocnemius.  These all had normal motor units and recruitment.  The extensor digitorum brevis showed normal insertional activities motor units and recruitment.  Lumbar paraspinals at L5 showed no abnormalities.     Impression:  Abnormal study.  There is evidence of peripheral neuropathy but in addition there are needle exam changes in multiple muscles in the right arm and leg consistent with acute denervation.  This is far greater than expected based on the nerve conduction findings.  This suggests either motor neuron disease or a primarily axonal polyneuropathy such as axonal Guillain Barré syndrome based on the patient's symptom history.  Clinical correlation is suggested.         Medication Review: done  Scheduled Meds:    amLODIPine 10 mg Oral Nightly   budesonide-formoterol 2 puff Inhalation BID - RT   DULoxetine 30 mg Oral Daily   [START ON 5/9/2020] DULoxetine 60 mg Oral Daily   folic acid 1 mg Oral Daily   hydroCHLOROthiazide Oral 25 mg Oral Daily   insulin glargine 20 Units  Subcutaneous QAM   insulin lispro 0-14 Units Subcutaneous TID AC   insulin lispro 14 Units Subcutaneous TID With Meals   levothyroxine 200 mcg Oral Q AM   pantoprazole 40 mg Oral BID AC   [START ON 5/4/2020] predniSONE 10 mg Oral Daily   Followed by      [START ON 5/7/2020] predniSONE 5 mg Oral Daily   pregabalin 100 mg Oral Q12H   sucralfate 1 g Oral 4x Daily AC & at Bedtime     Continuous Infusions:   PRN Meds:.Benzocaine  •  benzonatate  •  calcium carbonate  •  dextrose  •  dextrose  •  diphenhydrAMINE  •  diphenhydrAMINE  •  glucagon (human recombinant)  •  guaiFENesin-dextromethorphan  •  melatonin  •  ondansetron  •  oxyCODONE  •  [START ON 5/4/2020] oxyCODONE  •  [START ON 5/7/2020] oxyCODONE  •  polyethylene glycol  •  potassium chloride **OR** potassium chloride **OR** potassium chloride  •  sodium chloride      Assessment/Plan       YOUNG (nonalcoholic steatohepatitis)    Hypothyroid    Type 2 diabetes mellitus (CMS/HCC)    GBS (Guillain Dover syndrome) (CMS/HCC)    Elevated transaminase level    History of gestational diabetes    Steroid-induced hyperglycemia    Elevated aldolase level      Assessment & Plan  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  This is far greater than expected based on the nerve conduction findings.  This suggests either motor neuron disease or a primarily axonal polyneuropathy such as axonal Guillain Barré syndrome based on the patient's symptom history. F/U Dr Juventino Gaytan in 2-3 months.    Previous question of dermatomyositis or autoimmune process-trial of steroids-tapering off  April 27-has been on prednisone 40 mg daily since April 10 empiric trial for previous question of dermatomyositis but now not felt clinically present-taper off of prednisone 30 mg  daily x3 days, 20 mg daily x3 days, 10 mg daily x3 days, 5 mg daily x3 days, then stop.    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 Also on oxycodone.  April 27-has some pain in the upper extremities.  Doubt that it is bilateral joint pain as she has been on a reasonable dose of prednisone for the last 16 days.  May be a variant for the neuropathic pain.  Will titrate up on Lyrica 200 mg every 12 hours, watch for any myoclonic jerks.  May 1- taking oxycodone 5 mg about 5 times a day, need to taper off over next week, changed to Oxycodone 5 mg q 6 hours prn x 3 days, then q 8 hours prn x 3 days, then q 12 hours prn x three days, then stop. SVETLANA reviewed.   May 2-add on Cymbalta 30 mg daily for 7 days and increase to 60 mg daily.  Discussed other options of titrating up on Lyrica or switching to gabapentin.    DVT prophylaxis - SCDs      Hepatic steatosis-elevated LFTs  April 27-gastroenterology following  April 29 - reviewed with gastroenterology - a combination of fatty liver (the treatment would be weight loss) and Ebstein Horan viral hepatitis (the only treatment would be supportive care)- recommend against a liver biopsy. Follow up in three months with repeat labs and full colonscopy.    April 27-gait 80 feet CTG-min assist.  Team conference in the a.m.    TEAM CONF - April 28- BED CTG. 4 STAIRS MIN. GAIT 100 FEET CTG-MIN RW. TOILET TRANSFERS CTG. SHOWER TRANSFERS CTG.  UBD MIN ASSIST FOR BRA. LBD CTG. BATH CTG. GROOMING SBA.  TOILETING MOD INDEPENDENT. CONTINENT BOWEL AND BLADDER.   DIETICIAN EDUCATED ON DIABETIC DIET  ON STEROIDS BUT PATIENT RESISTANT TO INSTRUCTION.   NEEDS TO BE MODIFIED INDEPENDENT FOR HOME  ELOS- TWO WEEKS    Phill Campos MD  05/03/20  10:46    Time:        During rounds, used appropriate personal protective equipment including mask and gloves.  Mask used was standard procedure mask. Appropriate PPE was worn during the entire visit.  Hand hygiene was completed before and after.

## 2020-05-04 LAB
GLUCOSE BLDC GLUCOMTR-MCNC: 128 MG/DL (ref 70–130)
GLUCOSE BLDC GLUCOMTR-MCNC: 145 MG/DL (ref 70–130)
GLUCOSE BLDC GLUCOMTR-MCNC: 166 MG/DL (ref 70–130)

## 2020-05-04 PROCEDURE — 63710000001 INSULIN LISPRO (HUMAN) PER 5 UNITS: Performed by: INTERNAL MEDICINE

## 2020-05-04 PROCEDURE — 97110 THERAPEUTIC EXERCISES: CPT

## 2020-05-04 PROCEDURE — 82962 GLUCOSE BLOOD TEST: CPT

## 2020-05-04 PROCEDURE — 97535 SELF CARE MNGMENT TRAINING: CPT

## 2020-05-04 PROCEDURE — 63710000001 PREDNISONE PER 5 MG: Performed by: PHYSICAL MEDICINE & REHABILITATION

## 2020-05-04 PROCEDURE — 94799 UNLISTED PULMONARY SVC/PX: CPT

## 2020-05-04 PROCEDURE — 63710000001 INSULIN GLARGINE PER 5 UNITS: Performed by: INTERNAL MEDICINE

## 2020-05-04 PROCEDURE — 63710000001 DIPHENHYDRAMINE PER 50 MG: Performed by: INTERNAL MEDICINE

## 2020-05-04 RX ORDER — ROPINIROLE 0.25 MG/1
0.25 TABLET, FILM COATED ORAL NIGHTLY
Status: DISCONTINUED | OUTPATIENT
Start: 2020-05-04 | End: 2020-05-06

## 2020-05-04 RX ADMIN — SUCRALFATE 1 G: 1 SUSPENSION ORAL at 16:27

## 2020-05-04 RX ADMIN — DIPHENHYDRAMINE HYDROCHLORIDE 25 MG: 25 CAPSULE ORAL at 22:15

## 2020-05-04 RX ADMIN — AMLODIPINE BESYLATE 10 MG: 10 TABLET ORAL at 21:06

## 2020-05-04 RX ADMIN — INSULIN LISPRO 14 UNITS: 100 INJECTION, SOLUTION INTRAVENOUS; SUBCUTANEOUS at 11:38

## 2020-05-04 RX ADMIN — INSULIN GLARGINE 20 UNITS: 100 INJECTION, SOLUTION SUBCUTANEOUS at 07:50

## 2020-05-04 RX ADMIN — Medication 3 MG: at 21:06

## 2020-05-04 RX ADMIN — OXYCODONE HYDROCHLORIDE 5 MG: 5 TABLET ORAL at 22:15

## 2020-05-04 RX ADMIN — HYDROCHLOROTHIAZIDE 25 MG: 25 TABLET ORAL at 07:51

## 2020-05-04 RX ADMIN — PREDNISONE 10 MG: 10 TABLET ORAL at 07:50

## 2020-05-04 RX ADMIN — OXYCODONE HYDROCHLORIDE 5 MG: 5 TABLET ORAL at 00:10

## 2020-05-04 RX ADMIN — DIPHENHYDRAMINE HYDROCHLORIDE 25 MG: 25 CAPSULE ORAL at 06:09

## 2020-05-04 RX ADMIN — BUDESONIDE AND FORMOTEROL FUMARATE DIHYDRATE 2 PUFF: 160; 4.5 AEROSOL RESPIRATORY (INHALATION) at 09:06

## 2020-05-04 RX ADMIN — PREGABALIN 100 MG: 100 CAPSULE ORAL at 07:50

## 2020-05-04 RX ADMIN — INSULIN LISPRO 3 UNITS: 100 INJECTION, SOLUTION INTRAVENOUS; SUBCUTANEOUS at 11:38

## 2020-05-04 RX ADMIN — PANTOPRAZOLE SODIUM 40 MG: 40 TABLET, DELAYED RELEASE ORAL at 06:10

## 2020-05-04 RX ADMIN — PANTOPRAZOLE SODIUM 40 MG: 40 TABLET, DELAYED RELEASE ORAL at 16:27

## 2020-05-04 RX ADMIN — DULOXETINE HYDROCHLORIDE 30 MG: 30 CAPSULE, DELAYED RELEASE ORAL at 07:51

## 2020-05-04 RX ADMIN — BUDESONIDE AND FORMOTEROL FUMARATE DIHYDRATE 2 PUFF: 160; 4.5 AEROSOL RESPIRATORY (INHALATION) at 21:15

## 2020-05-04 RX ADMIN — ROPINIROLE HYDROCHLORIDE 0.25 MG: 0.25 TABLET, FILM COATED ORAL at 21:06

## 2020-05-04 RX ADMIN — FOLIC ACID 1 MG: 1 TABLET ORAL at 07:51

## 2020-05-04 RX ADMIN — DIPHENHYDRAMINE HYDROCHLORIDE 25 MG: 25 CAPSULE ORAL at 14:14

## 2020-05-04 RX ADMIN — SUCRALFATE 1 G: 1 SUSPENSION ORAL at 11:38

## 2020-05-04 RX ADMIN — INSULIN LISPRO 14 UNITS: 100 INJECTION, SOLUTION INTRAVENOUS; SUBCUTANEOUS at 07:51

## 2020-05-04 RX ADMIN — DIPHENHYDRAMINE HYDROCHLORIDE 25 MG: 25 CAPSULE ORAL at 00:10

## 2020-05-04 RX ADMIN — INSULIN LISPRO 14 UNITS: 100 INJECTION, SOLUTION INTRAVENOUS; SUBCUTANEOUS at 16:27

## 2020-05-04 RX ADMIN — SUCRALFATE 1 G: 1 SUSPENSION ORAL at 06:14

## 2020-05-04 RX ADMIN — OXYCODONE HYDROCHLORIDE 5 MG: 5 TABLET ORAL at 06:09

## 2020-05-04 RX ADMIN — SUCRALFATE 1 G: 1 SUSPENSION ORAL at 21:06

## 2020-05-04 RX ADMIN — OXYCODONE HYDROCHLORIDE 5 MG: 5 TABLET ORAL at 14:14

## 2020-05-04 RX ADMIN — LEVOTHYROXINE SODIUM 200 MCG: 100 TABLET ORAL at 06:09

## 2020-05-04 RX ADMIN — PREGABALIN 100 MG: 100 CAPSULE ORAL at 21:06

## 2020-05-04 NOTE — PLAN OF CARE
Problem: Patient Care Overview  Goal: Plan of Care Review  Outcome: Ongoing (interventions implemented as appropriate)  Flowsheets (Taken 5/4/2020 9164)  Outcome Summary: pt doing well in therapy. c/o L hand pain, burning. pain pills every 8 hours now, tolerating ok. continent bowel and bladder. general numbness unchanged. will continue to monitor.  Progress, Functional Goals: demonstrating adequate progress  Plan of Care Reviewed With: patient  IRF Plan of Care Review: progress ongoing, continue

## 2020-05-04 NOTE — PROGRESS NOTES
Case Management  Inpatient Rehabilitation Plan of Care and Discharge Plan Note    Rehabilitation Diagnosis:  Branch  Date of Onset:  Branch    Medical Summary:  Branch  Past Medical History: Branch    Plan of Care  Updated Problems/Interventions  Field    Expected Intensity:  Branch  Interdisciplinary Team:  Tanisha  Estimated Length of Stay/Anticipated Discharge Date: Branch  Anticipated Discharge Destination:  Anticipated discharge destination from inpatient rehabilitation is community  discharge with assistance. Pt lives alone in a 2 story home, 3 steps to enter,  bed and bath on the first floor. Pt plans to discharge to her home with  intermittent assistance.      Based on the patient's medical and functional status, their prognosis and  expected level of functional improvement is:  Branch    Signed by: JARED Cox

## 2020-05-04 NOTE — THERAPY TREATMENT NOTE
"Inpatient Rehabilitation - Occupational Therapy Treatment Note    [facility]     Patient Name: Oralia Sánchez  : 1975  MRN: [id number]    Today's Date: 2020                 Admit Date: 2020      Visit Dx:  No diagnosis found.    Patient Active Problem List   Diagnosis   • Vitamin D deficiency   • Awareness of heartbeats   • Adiposity   • YOUNG (nonalcoholic steatohepatitis)   • Hypothyroid   • Diabetes mellitus arising in pregnancy   • Diabetes (CMS/HCC)   • Metabolic syndrome   • Chest pain   • Primary thunderclap headache   • Elevated LFTs   • Tobacco abuse   • Rheumatoid arthritis (CMS/HCC)   • Type 2 diabetes mellitus (CMS/HCC)   • Morbid obesity (CMS/HCC)   • UTI (urinary tract infection), bacterial   • Cerebral aneurysm   • Dyspnea   • Cough   • Hypomagnesemia   • Metabolic acidosis   • Fatigue   • History of COPD   • Abnormal CT scan, colon   • 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)   • Essential hypertension   • 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)   • Elevated transaminase level   • History of gestational diabetes   • Steroid-induced hyperglycemia   • Elevated aldolase level         Therapy Treatment    IRF Treatment Summary     Row Name 20 1500 20          Evaluation/Treatment Time and Intent    Subjective Information  complains of;numbness left hand  -DN  complains of;numbness \"It feels like my left leg is weaker today.\"   -LB     Existing Precautions/Restrictions  fall  -DN  fall  -LB     Document Type  therapy note (daily note)  -DN  therapy note (daily note)  -LB     Mode of Treatment  occupational therapy  -DN  physical therapy  -LB     Patient/Family Observations  supine in bed  -DN  up in w/c in no acute distress  -LB     Recorded by [DN] Prince Obrien, OT [LB] Preeti Reynaga, PT     Row Name 20 09             Home Main Entrance    Number of Stairs, Main Entrance  six per pt has to step onto 2 cyclinder blocks & then 1st step   -LB      Stair " Railings, Main Entrance  railing on left side (ascending)  -LB      Recorded by [LB] Preeti Reynaga, PT      Row Name 05/04/20 1500 05/04/20 0930          Cognition/Psychosocial- PT/OT    Affect/Mental Status (Cognitive)  WNL  -DN  WNL  -LB     Orientation Status (Cognition)  --  oriented x 4  -LB     Follows Commands (Cognition)  --  WNL  -LB     Personal Safety Interventions  fall prevention program maintained;gait belt  -DN  fall prevention program maintained  -LB     Cognitive Function (Cognitive)  memory deficit  -DN  memory deficit  -LB     Memory Deficit (Cognitive)  mild deficit  -DN  mild deficit  -LB     Recorded by [DN] Prince Obrien OT [LB] Preeti Reynaga, PT     Row Name 05/04/20 0930             Bed Mobility Assessment/Treatment    Rolling Left [place] (Bed Mobility)  independent  -LB      Rolling Right [place] (Bed Mobility)  independent  -LB      Scooting/Bridging [place] (Bed Mobility)  independent  -LB      Supine-Sit [place] (Bed Mobility)  independent  -LB      Recorded by [LB] Preeti Reynaga, PT      Row Name 05/04/20 1500             Bed-Chair Transfer    Bed-Chair [place] (Transfers)  supervision;verbal cues  -DN      Assistive Device (Bed-Chair Transfers)  wheelchair am and pm  -DN      Recorded by [DN] Prince Obrien, OT      Row Name 05/04/20 0930             Sit-Stand Transfer    Sit-Stand [place] (Transfers)  stand by assist;contact guard;verbal cues  -LB      Assistive Device (Sit-Stand Transfers)  walker, front-wheeled quad tipped cane  no device  -LB      Recorded by [LB] Preeti Reynaga, PT      Row Name 05/04/20 0930             Stand-Sit Transfer    Stand-Sit [place] (Transfers)  stand by assist;verbal cues  -LB      Assistive Device (Stand-Sit Transfers)  walker, front-wheeled quad tipped cane, no device  -LB      Recorded by [LB] Preeti Reynaga, PT      Row Name 05/04/20 1500             Toilet Transfer    Type (Toilet Transfer)  stand  pivot/stand step  -DN      [place] Level (Toilet Transfer)  supervision  -DN      Assistive Device (Toilet Transfer)  wheelchair;walker, front-wheeled;commode chair  -DN      Recorded by [DN] Prince Obrien, OT      Row Name 05/04/20 1500             Shower Transfer    Type (Shower Transfer)  stand pivot/stand step  -DN      [place] Level (Shower Transfer)  contact guard;verbal cues  -DN      Assistive Device (Shower Transfer)  wheelchair;walker, front-wheeled;transfer board;grab bars/tub rail  -DN      Recorded by [DN] Prince Obrien, OT      Row Name 05/04/20 0930             Gait/Stairs Assessment/Training    [place] Level (Gait)  (S) contact guard;minimum assist (75% patient effort) CGA with R wx, minimal HHA with quad tipped cane  -LB      Assistive Device (Gait)  -- quad tipped cane   -LB      Distance in Feet (Gait)  90' x 3 with quad tipped cane, 160' x 2 w/ R Wx with SBA   -LB      Pattern (Gait)  step-through  -LB      Deviations/Abnormal Patterns (Gait)  stride length decreased  -LB      Bilateral Gait Deviations  weight shift ability decreased  -LB      Left Sided Gait Deviations  heel strike decreased  -LB      [place] Level (Stairs)  minimum assist (75% patient effort);contact guard;2 person assist 2nd person for safety   -LB      Assistive Device (Stairs)  other (see comments) quad tipped cane   -LB      Handrail Location (Stairs)  left side (ascending)  -LB      Number of Steps (Stairs)  4  -LB      Ascending Technique (Stairs)  step-to-step  -LB      Descending Technique (Stairs)  step-to-step  -LB      Stairs, Safety Issues  sequencing ability decreased  -LB      Stairs, Impairments  strength decreased  -LB      Comment (Gait/Stairs)  Pt reporting she felt like her Left leg was not going to lift her up the last step.   -LB      Recorded by [LB] Preeti Reynaga, PT      Row Name 05/04/20 1500             Bathing Assessment/Treatment    Bathing [place] Level  bathing  skills;supervision;verbal cues  -DN      Assistive Device (Bathing)  hand held shower spray hose;grab bar/tub rail;tub bench  -DN      Bathing Position  supported sitting;supported standing  -DN      Recorded by [KAYLEN] Prince Obrien OT      Row Name 05/04/20 1500             Upper Body Dressing Assessment/Treatment    Upper Body Dressing Task  upper body dressing skills;doff;don;bra/undergarment;pull over garment;minimum assist (75% or more patient effort);verbal cues  -DN      Upper Body Dressing Position  supported sitting  -DN      Set-up Assistance (Upper Body Dressing)  obtain clothing  -DN      Recorded by [KAYLEN] Prince Obrien, OT      Row Name 05/04/20 1500             Lower Body Dressing Assessment/Treatment    Lower Body Dressing [place] Level  doff;don;pants/bottoms;underwear;socks;supervision;verbal cues  -DN      Lower Body Dressing Position  supported sitting;supported standing  -DN      Lower Body Dressing Setup Assistance  obtain clothing  -DN      Recorded by [KAYLEN] Prince Obrien OT      Row Name 05/04/20 1500             Grooming Assessment/Treatment    Grooming [place] Level  grooming skills;deodorant application;oral care regimen;set up  -DN      Grooming Position  supported sitting  -DN      Recorded by [KAYLEN] Prince Obrien OT      Row Name 05/04/20 1500             Toileting Assessment/Treatment    Toileting [place] Level  toileting skills;adjust/manage clothing;supervision;verbal cues  -DN      Assistive Device Use (Toileting)  grab bar/safety frame;raised toilet seat  -DN      Toileting Position  supported sitting;supported standing  -DN      Recorded by [KAYLEN] Prince Obrien OT      Row Name 05/04/20 1500             Fine Motor Testing & Training    Comment, Fine Motor Coordination  [facility] [place] BUE small pegs, washers, and spacers on board, with SBA  -DN      Recorded by [KAYLEN] Prince Obrien, OT      Row Name 05/04/20 1500 05/04/20 0930          Pain Scale: Numbers Pre/Post-Treatment     Pain Scale: Numbers, Pretreatment  0/10 - no pain  -DN  2/10  -LB     Pain Scale: Numbers, Post-Treatment  0/10 - no pain  -DN  2/10  -LB     Pain Location - Side  --  Bilateral  -LB     Pain Location - Orientation  --  distal  -LB     Pain Location  --  hand  -LB     Recorded by [DN] Prince Obrien, OT [LB] Preeti Reynaga, PT     Row Name 05/04/20 0930             Standing Balance Activity    Activities Performed (Standing, Balance Training)  other (see comments) standing on foam ma, ffet even & then tandem   -LB      Support Needed for Balance (Standing, Balance Training)  CGA  -LB      Recorded by [LB] Preeti Reynaga, PT      Row Name 05/04/20 0930             Dynamic Balance Activity    Therapeutic Training Performed (Dynamic Balance)  backward walking  -LB      Support Needed for Balance (Dynamic Balance Training)  uses both upper extremities for support lightly   -LB      Comment (Dynamic Balance Training)  in // bars   -LB      Recorded by [LB] Preeti Reynaga, PT      Row Name 05/04/20 1500             Upper Extremity Seated Therapeutic Exercise    Performed, Seated Upper Extremity (Therapeutic Exercise)  shoulder flexion/extension;shoulder abduction/adduction;digit flexion/extension;wrist flexion/extension;forearm supination/pronation;elbow flexion/extension  -DN      Device, Seated Upper Extremity (Therapeutic Exercise)  free weights, barbell  -DN      Exercise Type, Seated Upper Extremity (Therapeutic Exercise)  AROM (active range of motion)  -DN      Expected Outcomes, Seated Upper Extremity (Therapeutic Exercise)  improve functional tolerance, self-care activity;improve performance, transfer skills  -DN      Sets/Reps Detail, Seated Upper Extremity (Therapeutic Exercise)  1 and 2# dumbells, 2# dowel  -DN      Transfers Skills, Training to Functional Activity, Seated Upper Extremity (Therapeutic Exercise)  transfers skills to functional activity most of the time  -DN      Recorded by [DN] Prince Obrien,  OT      Row Name 05/04/20 0930             Lower Extremity Standing Therapeutic Exercise    Performed, Standing Lower Extremity (Therapeutic Exercise)  heel raises  -LB      Device, Standing Lower Extremity (Therapeutic Exercise)  parallel bars  -LB      Exercise Type, Standing Lower Extremity (Therapeutic Exercise)  AROM (active range of motion)  -LB      Sets/Reps Detail, Standing Lower Extremity (Therapeutic Exercise)  2/10  -LB      Comment, Standing Lower Extremity (Therapeutic Exercise)  step ups in // bars 1/10 each LE with UE support    -LB      Recorded by [LB] Preeti Reynaga, PT      Row Name 05/04/20 0930             Vital Signs    O2 Delivery Pre Treatment  room air  -LB      O2 Delivery Intra Treatment  room air  -LB      O2 Delivery Post Treatment  room air  -LB      Recorded by [LB] Preeti Reynaga, PT      Row Name 05/04/20 1500             Positioning and Restraints    Pre-Treatment Position  in bed  -DN      Post Treatment Position  wheelchair  -DN      In Bed  call light within reach;with PT  -DN      Recorded by [DN] Prince Obrien OT      Row Name 05/04/20 0930             Weekly Summary of Progress (PT)    Weekly Outcome Summary: Physical Therapy  Pt progressing toward all PT goals. Pt advancing to stairs with one rail and a quad tipped cane. Pt reporting she didn't feel her L LE was going to lift her up the last step.   -LB      Recorded by [CALI] Preeti Reynaga, PT        User Key  (r) = Recorded By, (t) = Taken By, (c) = Cosigned By    Initials Name Effective Dates    Preeti Combs, PT 06/08/18 -     Prince Morfin OT 06/08/18 -                  OT Recommendation and Plan    Anticipated Equipment Needs At Discharge (OT Eval): commode, bedside without drop arms, shower chair  Planned Therapy Interventions (OT Eval): activity tolerance training, BADL retraining, functional balance retraining, neuromuscular control/coordination retraining, strengthening exercise, transfer/mobility  retraining            OT IRF GOALS     Row Name 05/04/20 1500 04/28/20 1200 04/21/20 1410       Transfer Goal 1 (OT-IRF)    Activity/Assistive Device (Transfer Goal 1, OT-IRF)  toilet;tub  -DN  toilet;tub  -DN  toilet;tub  -DN    [place] Level (Transfer Goal 1, OT-IRF)  supervision required;contact guard assist  -DN  contact guard assist with RWX  -DN  contact guard assist  -DN    Time Frame (Transfer Goal 1, OT-IRF)  short term goal (STG)  -DN  short term goal (STG)  -DN  short term goal (STG)  -DN    Progress/Outcomes (Transfer Goal 1, OT-IRF)  goal revised this date;goal partially met  -DN  goal met;goal revised this date  -DN  continuing progress toward goal  -DN       Transfer Goal 2 (OT-IRF)    Activity/Assistive Device (Transfer Goal 2, OT-IRF)  toilet;tub;walker, rolling  -DN  toilet;tub;walker, rolling  -DN  toilet;tub;walker, rolling  -DN    [place] Level (Transfer Goal 2, OT-IRF)  conditional independence  -DN  conditional independence  -DN  supervision required  -DN    Time Frame (Transfer Goal 2, OT-IRF)  long term goal (LTG)  -DN  long term goal (LTG)  -DN  long term goal (LTG)  -DN    Progress/Outcomes (Transfer Goal 2, OT-IRF)  goal ongoing  -DN  goal revised this date  -DN  continuing progress toward goal  -DN       Bathing Goal 1 (OT-IRF)    Activity/Device (Bathing Goal 1, OT-IRF)  bathing skills, all  -DN  bathing skills, all  -DN  bathing skills, all  -DN    [place] Level (Bathing Goal 1, OT-IRF)  conditional independence  -DN  supervision required  -DN  contact guard assist;verbal cues required  -DN    Time Frame (Bathing Goal 1, OT-IRF)  short term goal (STG)  -DN  short term goal (STG)  -DN  short term goal (STG)  -DN    Progress/Outcomes (Bathing Goal 1, OT-IRF)  goal met;goal revised this date  -DN  goal met;goal revised this date  -DN  continuing progress toward goal  -DN       Bathing Goal 2 (OT-IRF)    Activity/Device (Bathing Goal 2, OT-IRF)  bathing skills, all  -DN   bathing skills, all  -DN  bathing skills, all  -DN    [place] Level (Bathing Goal 2, OT-IRF)  conditional independence  -DN  conditional independence  -DN  conditional independence  -DN    Time Frame (Bathing Goal 2, OT-IRF)  long term goal (LTG)  -DN  long term goal (LTG)  -DN  long term goal (LTG)  -DN    Progress/Outcomes (Bathing Goal 2, OT-IRF)  goal ongoing;goal partially met  -DN  goal ongoing  -DN  continuing progress toward goal  -DN       UB Dressing Goal 1 (OT-IRF)    Activity/Device (UB Dressing Goal 1, OT-IRF)  upper body dressing  -DN  upper body dressing  -DN  upper body dressing  -DN    [place] (UB Dress Goal 1, OT-IRF)  set-up required  -DN  set-up required  -DN  set-up required  -DN    Time Frame (UB Dressing Goal 1, OT-IRF)  short term goal (STG)  -DN  short term goal (STG)  -DN  short term goal (STG)  -DN    Progress/Outcomes (UB Dressing Goal 1, OT-IRF)  goal met  -DN  goal ongoing;goal not met  -DN  continuing progress toward goal  -DN       UB Dressing Goal 2 (OT-IRF)    Activity/Device (UB Dressing Goal 2, OT-IRF)  upper body dressing  -DN  upper body dressing  -DN  upper body dressing  -DN    [place] (UB Dress Goal 2, OT-IRF)  conditional independence  -DN  conditional independence  -DN  set-up required  -DN    Time Frame (UB Dressing Goal 2, OT-IRF)  long term goal (LTG)  -DN  long term goal (LTG)  -DN  long term goal (LTG)  -DN    Progress/Outcomes (UB Dressing Goal 2, OT-IRF)  goal ongoing  -DN  goal ongoing  -DN  continuing progress toward goal  -DN       LB Dressing Goal 1 (OT-IRF)    Activity/Device (LB Dressing Goal 1, OT-IRF)  lower body dressing  -DN  lower body dressing  -DN  lower body dressing  -DN    [place] (LB Dressing Goal 1, OT-IRF)  supervision required  -DN  supervision required  -DN  contact guard assist  -DN    Time Frame (LB Dressing Goal 1, OT-IRF)  short term goal (STG)  -DN  short term goal (STG)  -DN  short term goal (STG)  -DN     Progress/Outcomes (LB Dressing Goal 1, OT-IRF)  goal met;goal revised this date  -DN  goal revised this date  -DN  continuing progress toward goal  -DN       LB Dressing Goal 2 (OT-IRF)    Activity/Device (LB Dressing Goal 2, OT-IRF)  lower body dressing  -DN  lower body dressing  -DN  lower body dressing  -DN    [place] (LB Dressing Goal 2, OT-IRF)  conditional independence  -DN  conditional independence  -DN  set-up required  -DN    Time Frame (LB Dressing Goal 2, OT-IRF)  long term goal (LTG)  -DN  long term goal (LTG)  -DN  long term goal (LTG)  -DN    Progress/Outcomes (LB Dressing Goal 2, OT-IRF)  goal ongoing  -DN  goal revised this date  -DN  continuing progress toward goal  -DN       Grooming Goal 1 (OT-IRF)    Activity/Device (Grooming Goal 1, OT-IRF)  grooming skills, all seated at sink  -DN  grooming skills, all seated at sink  -DN  grooming skills, all  -DN    [place] (Grooming Goal 1, OT-IRF)  conditional independence  -DN  conditional independence  -DN  set-up required  -DN    Time Frame (Grooming Goal 1, OT-IRF)  short term goal (STG)  -DN  short term goal (STG)  -DN  short term goal (STG)  -DN    Progress/Outcomes (Grooming Goal 1, OT-IRF)  goal ongoing  -DN  goal revised this date  -DN  continuing progress toward goal  -DN       Grooming Goal 2 (OT-IRF)    Activity/Device (Grooming Goal 2, OT-IRF)  grooming skills, all standing at sink  -DN  grooming skills, all standing at sink  -DN  grooming skills, all  -DN    [place] (Grooming Goal 2, OT-IRF)  conditional independence  -DN  conditional independence  -DN  conditional independence  -DN    Time Frame (Grooming Goal 2, OT-IRF)  long term goal (LTG)  -DN  long term goal (LTG)  -DN  long term goal (LTG)  -DN    Progress/Outcomes (Grooming Goal 2, OT-IRF)  goal ongoing  -DN  goal ongoing  -DN  continuing progress toward goal  -DN       Toileting Goal 1 (OT-IRF)    Activity/Device (Toileting Goal 1, OT-IRF)  toileting skills, all  -DN   toileting skills, all  -DN  toileting skills, all  -DN    [place] Level (Toileting Goal 1, OT-IRF)  verbal cues required;supervision required  -DN  verbal cues required;supervision required  -DN  minimum assist (75% or more patient effort);verbal cues required  -DN    Time Frame (Toileting Goal 1, OT-IRF)  short term goal (STG)  -DN  short term goal (STG)  -DN  short term goal (STG)  -DN    Progress/Outcomes (Toileting Goal 1, OT-IRF)  goal met;goal revised this date  -DN  goal met;goal revised this date  -DN  continuing progress toward goal  -DN       Toileting Goal 2 (OT-IRF)    Activity/Device (Toileting Goal 2, OT-IRF)  toileting skills, all;commode chair  -DN  toileting skills, all;commode chair  -DN  toileting skills, all;commode chair  -DN    [place] Level (Toileting Goal 2, OT-IRF)  conditional independence;tactile cues required;verbal cues required  -DN  conditional independence;tactile cues required;verbal cues required  -DN  conditional independence;tactile cues required;verbal cues required  -DN    Time Frame (Toileting Goal 2, OT-IRF)  long term goal (LTG)  -DN  long term goal (LTG)  -DN  long term goal (LTG)  -DN    Progress/Outcomes (Toileting Goal 2, OT-IRF)  goal ongoing  -DN  goal ongoing  -DN  continuing progress toward goal  -DN       Strength Goal 1 (OT-IRF)    Strength Goal 1 (OT-IRF)  pt to be setup with BUE HEP  -DN  pt to be setup with BUE HEP  -DN  pt to be SBA with BUE HEP  -DN    Time Frame (Strength Goal 1, OT-IRF)  short term goal (STG)  -DN  short term goal (STG)  -DN  short term goal (STG)  -DN    Progress/Outcomes (Strength Goal 1, OT-IRF)  goal met  -DN  goal revised this date  -DN  continuing progress toward goal  -DN       Strength Goal 2 (OT-IRF)    Strength Goal 2 (OT-IRF)  pt to be independent with HEP for BUEs  -DN  pt to be independent with HEP for BUEs  -DN  pt to be independent with HEP for BUEs  -DN    Time Frame (Strength Goal 2, OT-IRF)  long term goal (LTG)   -DN  long term goal (LTG)  -DN  long term goal (LTG)  -DN    Progress/Outcomes (Strength Goal 2, OT-IRF)  goal ongoing  -DN  goal ongoing  -DN  continuing progress toward goal  -DN       Balance Goal 1 (OT)    Activity/Assistive Device (Balance Goal 1, OT)  assistive device use  -DN  assistive device use  -DN  assistive device use  -DN    [place] Level/Cues Needed (Balance Goal 1, OT)  contact guard assist during adls  -DN  contact guard assist during adls  -DN  contact guard assist during adls  -DN    Time Frame (Balance Goal 1, OT)  short term goal (STG)  -DN  short term goal (STG)  -DN  short term goal (STG)  -DN    Progress/Outcomes (Balance Goal 1, OT)  goal met;goal revised this date  -DN  goal ongoing  -DN  continuing progress toward goal  -DN       Balance Goal 2 (OT)    Activity/Assistive Device (Balance Goal 2, OT)  assistive device use;standing, dynamic;walker, rolling  -DN  assistive device use;standing, dynamic;walker, rolling  -DN  assistive device use;standing, dynamic;walker, rolling  -DN    [place] Level (Balance Goal 2, OT)  conditional independence during adls and home mgmt  -DN  supervision required during adls and home mgmt  -DN  supervision required during adls and home mgmt  -DN    Time Frame (Balance Goal 2, OT)  long term goal (LTG)  -DN  long term goal (LTG)  -DN  long term goal (LTG)  -DN    Progress/Outcome (Balance Goal 2, OT)  goal ongoing  -DN  goal ongoing  -DN  continuing progress toward goal  -DN       Caregiver Training Goal 2 (OT-IRF)    Caregiver Training Goal 2 (OT-IRF)  pt family or available caregivers to be independent with assist needed with adls, transfers etc  -DN  pt family or available caregivers to be independent with assist needed with adls, transfers etc  -DN  pt family or available caregivers to be independent with assist needed with adls, transfers etc  -DN    Time Frame (Caregiver Training Goal 2, OT-IRF)  long term goal (LTG)  -DN  long term goal (LTG)   -DN  long term goal (LTG)  -DN    Progress/Outcomes (Caregiver Training Goal 2, OT-IRF)  goal ongoing  -DN  goal ongoing  -DN  continuing progress toward goal  -DN      User Key  (r) = Recorded By, (t) = Taken By, (c) = Cosigned By    Initials Name Provider Type    Prince Morfin OT Occupational Therapist                     Time Calculation:     Time Calculation- OT     Row Name 05/04/20 1544 05/04/20 0800          Time Calculation- OT    OT Start Time  1300  -DN  0800  -DN     OT Stop Time  1330  -DN  0900  -DN     OT Time Calculation (min)  30 min  -DN  60 min  -DN     OT Received On  05/04/20  -DN  05/04/20  -DN       User Key  (r) = Recorded By, (t) = Taken By, (c) = Cosigned By    Initials Name Provider Type    Prince Morfin OT Occupational Therapist          Therapy Charges for Today     Code Description Service Date Service Provider Modifiers Qty    [id number]  OT SELF CARE/MGMT/TRAIN EA 15 MIN 5/4/2020 Prince Obrien OT GO 4    [id number]  OT THER PROC EA 15 MIN 5/4/2020 Prince Obrien OT GO 2                   Prince Obrien OT  5/4/2020

## 2020-05-04 NOTE — PLAN OF CARE
Problem: Patient Care Overview  Goal: Plan of Care Review  Outcome: Ongoing (interventions implemented as appropriate)  Flowsheets (Taken 5/4/2020 0123)  Outcome Summary: Patient A&Ox4, she ambulates with the walker to the bathroom. Continent of B&B. Pain medication given q6 hours. She ate snack at bedtime. She reported numbness all over which is her baseline. Sleeping well tonight.  Progress, Functional Goals: demonstrating adequate progress  Plan of Care Reviewed With: patient  IRF Plan of Care Review: progress ongoing, continue     Problem: Fall Risk (Adult)  Goal: Absence of Fall  Description  Patient will demonstrate the desired outcomes by discharge/transition of care.  Outcome: Ongoing (interventions implemented as appropriate)  Flowsheets (Taken 5/4/2020 0123)  Absence of Fall: making progress toward outcome     Problem: Skin Injury Risk (Adult)  Goal: Identify Related Risk Factors and Signs and Symptoms  Description  Related risk factors and signs and symptoms are identified upon initiation of Human Response Clinical Practice Guideline (CPG).  Outcome: Outcome(s) achieved  Flowsheets (Taken 5/4/2020 0123)  Related Risk Factors (Skin Injury Risk): mobility impaired  Goal: Skin Health and Integrity  Description  Patient will demonstrate the desired outcomes by discharge/transition of care.  Outcome: Ongoing (interventions implemented as appropriate)  Flowsheets (Taken 5/4/2020 0123)  Skin Health and Integrity: making progress toward outcome     Problem: Pain, Acute (Adult)  Goal: Acceptable Pain Control/Comfort Level  Description  Patient will demonstrate the desired outcomes by discharge/transition of care.  Outcome: Ongoing (interventions implemented as appropriate)  Flowsheets (Taken 5/4/2020 0123)  Acceptable Pain Control/Comfort Level: making progress toward outcome

## 2020-05-04 NOTE — THERAPY TREATMENT NOTE
"Inpatient Rehabilitation - Physical Therapy Treatment Note  [facility]     Patient Name: Oralia Sánchez  : 1975  MRN: [id number]    Today's Date: 2020                 Admit Date: 2020      Visit Dx:    No diagnosis found.    Patient Active Problem List   Diagnosis   • Vitamin D deficiency   • Awareness of heartbeats   • Adiposity   • YOUNG (nonalcoholic steatohepatitis)   • Hypothyroid   • Diabetes mellitus arising in pregnancy   • Diabetes (CMS/HCC)   • Metabolic syndrome   • Chest pain   • Primary thunderclap headache   • Elevated LFTs   • Tobacco abuse   • Rheumatoid arthritis (CMS/HCC)   • Type 2 diabetes mellitus (CMS/HCC)   • Morbid obesity (CMS/HCC)   • UTI (urinary tract infection), bacterial   • Cerebral aneurysm   • Dyspnea   • Cough   • Hypomagnesemia   • Metabolic acidosis   • Fatigue   • History of COPD   • Abnormal CT scan, colon   • 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)   • Essential hypertension   • GBS (Guillain [place] syndrome) (CMS/HCC)   • Elevated transaminase level   • History of gestational diabetes   • Steroid-induced hyperglycemia   • Elevated aldolase level       Therapy Treatment    IRF Treatment Summary     Row Name 20             Evaluation/Treatment Time and Intent    Subjective Information  complains of;numbness \"It feels like my left leg is weaker today.\"   -LB      Existing Precautions/Restrictions  fall  -LB      Document Type  therapy note (daily note)  -LB      Mode of Treatment  physical therapy  -LB      Patient/Family Observations  up in w/c in no acute distress  -LB      Recorded by [LB] Preeti Reynaga, PT      Row Name 2029             Home Main Entrance    Number of Stairs, Main Entrance  six per pt has to step onto 2 cyclinder blocks & then 1st step   -LB      Stair Railings, Main Entrance  railing on left side (ascending)  -LB      Recorded by [LB] Preeti Reynaga, PT      Row Name 2089             " Cognition/Psychosocial- PT/OT    Affect/Mental Status (Cognitive)  WNL  -LB      Orientation Status (Cognition)  oriented x 4  -LB      Follows Commands (Cognition)  WNL  -LB      Personal Safety Interventions  fall prevention program maintained  -LB      Cognitive Function (Cognitive)  memory deficit  -LB      Memory Deficit (Cognitive)  mild deficit  -LB      Recorded by [LB] Preeti Reynaga, PT      Row Name 05/04/20 0930             Bed Mobility Assessment/Treatment    Rolling Left [place] (Bed Mobility)  independent  -LB      Rolling Right [place] (Bed Mobility)  independent  -LB      Scooting/Bridging [place] (Bed Mobility)  independent  -LB      Supine-Sit [place] (Bed Mobility)  independent  -LB      Recorded by [LB] Preeti Reynaga, PT      Row Name 05/04/20 0930             Sit-Stand Transfer    Sit-Stand [place] (Transfers)  stand by assist;contact guard;verbal cues  -LB      Assistive Device (Sit-Stand Transfers)  walker, front-wheeled quad tipped cane  no device  -LB      Recorded by [LB] Preeti Reynaga, PT      Row Name 05/04/20 0930             Stand-Sit Transfer    Stand-Sit [place] (Transfers)  stand by assist;verbal cues  -LB      Assistive Device (Stand-Sit Transfers)  walker, front-wheeled quad tipped cane, no device  -LB      Recorded by [LB] Preeti Reynaga, PT      Row Name 05/04/20 0930             Gait/Stairs Assessment/Training    [place] Level (Gait)  (S) contact guard;minimum assist (75% patient effort) CGA with R wx, minimal HHA with quad tipped cane  -LB      Assistive Device (Gait)  -- quad tipped cane   -LB      Distance in Feet (Gait)  90' x 3 with quad tipped cane, 160' x 2 w/ R Wx with SBA   -LB      Pattern (Gait)  step-through  -LB      Deviations/Abnormal Patterns (Gait)  stride length decreased  -LB      Bilateral Gait Deviations  weight shift ability decreased  -LB      Left Sided Gait Deviations  heel strike decreased  -LB      [place]  Level (Stairs)  minimum assist (75% patient effort);contact guard;2 person assist 2nd person for safety   -LB      Assistive Device (Stairs)  other (see comments) quad tipped cane   -LB      Handrail Location (Stairs)  left side (ascending)  -LB      Number of Steps (Stairs)  4  -LB      Ascending Technique (Stairs)  step-to-step  -LB      Descending Technique (Stairs)  step-to-step  -LB      Stairs, Safety Issues  sequencing ability decreased  -LB      Stairs, Impairments  strength decreased  -LB      Comment (Gait/Stairs)  Pt reporting she felt like her Left leg was not going to lift her up the last step.   -LB      Recorded by [LB] Preeti Reynaga, PT      Row Name 05/04/20 1168             Pain Scale: Numbers Pre/Post-Treatment    Pain Scale: Numbers, Pretreatment  2/10  -LB      Pain Scale: Numbers, Post-Treatment  2/10  -LB      Pain Location - Side  Bilateral  -LB      Pain Location - Orientation  distal  -LB      Pain Location  hand  -LB      Recorded by [LB] Preeti Reynaga, PT      Row Name 05/04/20 4920             Standing Balance Activity    Activities Performed (Standing, Balance Training)  other (see comments) standing on foam ma, ffet even & then tandem   -LB      Support Needed for Balance (Standing, Balance Training)  CGA  -LB      Recorded by [LB] Preeti Reynaga, PT      Row Name 05/04/20 6410             Dynamic Balance Activity    Therapeutic Training Performed (Dynamic Balance)  backward walking  -LB      Support Needed for Balance (Dynamic Balance Training)  uses both upper extremities for support lightly   -LB      Comment (Dynamic Balance Training)  in // bars   -LB      Recorded by [LB] Preeti Reynaga, PT      Row Name 05/04/20 1643             Lower Extremity Standing Therapeutic Exercise    Performed, Standing Lower Extremity (Therapeutic Exercise)  heel raises  -LB      Device, Standing Lower Extremity (Therapeutic Exercise)  parallel bars  -LB      Exercise Type, Standing Lower Extremity  (Therapeutic Exercise)  AROM (active range of motion)  -LB      Sets/Reps Detail, Standing Lower Extremity (Therapeutic Exercise)  2/10  -LB      Comment, Standing Lower Extremity (Therapeutic Exercise)  step ups in // bars 1/10 each LE with UE support    -LB      Recorded by [LB] Preeti Reynaga, PT      Row Name 05/04/20 0930             Vital Signs    O2 Delivery Pre Treatment  room air  -LB      O2 Delivery Intra Treatment  room air  -LB      O2 Delivery Post Treatment  room air  -LB      Recorded by [LB] Preeti Reynaga, PT      Row Name 05/04/20 0930             Weekly Summary of Progress (PT)    Weekly Outcome Summary: Physical Therapy  Pt progressing toward all PT goals. Pt advancing to stairs with one rail and a quad tipped cane. Pt reporting she didn't feel her L LE was going to lift her up the last step.   -LB      Recorded by [CALI] Preeti Reynaga, PT        User Key  (r) = Recorded By, (t) = Taken By, (c) = Cosigned By    Initials Name Effective Dates    LB Preeti Reynaga, PT 06/08/18 -                  PT Recommendation and Plan               PT IRF GOALS     Row Name 05/04/20 0930             Transfer Goal 2 (PT-IRF)    Activity/Assistive Device (Transfer Goal 2, PT-IRF)  sit-to-stand/stand-to-sit;walker, rolling  -LB      [place] Level (Transfer Goal 2, PT-IRF)  conditional independence  -LB      Time Frame (Transfer Goal 2, PT-IRF)  2 weeks  -LB      Progress/Outcomes (Transfer Goal 2, PT-IRF)  goal revised this date  -LB         Transfer Goal 3 (PT-IRF)    Time Frame (Transfer Goal 3, PT-IRF)  2 weeks  -LB      Progress/Outcomes (Transfer Goal 3, PT-IRF)  goal ongoing  -LB         Gait/Walking Locomotion Goal 1 (PT-IRF)    Progress/Outcomes (Gait/Walking Locomotion Goal 1, PT-IRF)  goal met  -LB         Gait/Walking Locomotion Goal 2 (PT-IRF)    Time Frame (Gait/Walking Locomotion Goal 2, PT-IRF)  2 weeks  -LB      Progress/Outcomes (Gait/Walking Locomotion Goal 2, PT-IRF)  goal ongoing  -LB          Stairs Goal 1 (PT-IRF)    Activity/Assistive Device (Stairs Goal 1, PT-IRF)  stairs, all skills;using handrail, left  -LB      Number of Stairs (Stairs Goal 1, PT-IRF)  4  -LB      [place] Level (Stairs Goal 1, PT-IRF)  verbal cues required;minimum assist (75% or more patient effort)  -LB      Progress/Outcomes (Stairs Goal 1, PT-IRF)  goal met  -LB         Stairs Goal 2 (PT-IRF)    Activity/Assistive Device (Stairs Goal 2, PT-IRF)  stairs, all skills;using handrail, left;other (see comments) with quad tipped cane  -LB      Number of Stairs (Stairs Goal 2, PT-IRF)  8  -LB      [place] Level (Stairs Goal 2, PT-IRF)  standby assist  -LB      Time Frame (Stairs Goal 2, PT-IRF)  2 weeks  -LB      Progress/Outcomes (Stairs Goal 2, PT-IRF)  goal ongoing  -LB        User Key  (r) = Recorded By, (t) = Taken By, (c) = Cosigned By    Initials Name Provider Type    Preeti Combs, PT Physical Therapist               Time Calculation:     PT Charges     Row Name 05/04/20 1523 05/04/20 1432 05/04/20 1149       Time Calculation    Start Time  --  1330  -LB  0930  -LB    Stop Time  --  1400  -LB  1030  -LB    Time Calculation (min)  --  30 min  -LB  60 min  -LB    PT Received On  --  05/04/20  -LB  --    PT - Next Appointment  --  05/05/20  -LB  --    PT Goal Re-Cert Due Date  05/11/20  -LB  --  --       Time Calculation- PT    Total Timed Code Minutes- PT  --  30 minute(s)  -LB  --      User Key  (r) = Recorded By, (t) = Taken By, (c) = Cosigned By    Initials Name Provider Type    Preeti Combs, PT Physical Therapist                       Preeti Reynaga, PT  5/4/2020

## 2020-05-04 NOTE — PROGRESS NOTES
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  Falls asleep well, but then is waking up around midnight.  Her legs start moving and won't seem to stop moving, which inhibits her falling back asleep.  Disrupts her sleep from midnight to about 6am.  Then feels tired during the daytime.  Doesn't feel like melatonin is helping.    Previously had an aching sensation in her left arm that would awaken her, but that has greatly improved with cymbalta.  Now she isn't sure what is causing her to awaken in middle of night.    History taken from: patient    Objective     Vital Signs  Temp:  [97.3 °F (36.3 °C)-98.4 °F (36.9 °C)] 97.3 °F (36.3 °C)  Heart Rate:  [89-97] 96  Resp:  [16-20] 20  BP: (122-131)/(70-88) 125/70    Objective   MENTAL STATUS -  AWAKE / ALERT  HEENT- NCAT, PUPILS EQUALLY ROUND, SCLERAE ANICTERIC, CONJUNCTIVAE PINK, OP MOIST, NO JVD, EARS UNREMARKABLE EXTERNALLY  LUNGS -normal respirations. ctab no w/r/c   HEART- RRR,    ABD -   SOFT, NT/ND, +bs  EXT - NO EDEMA OR CYANOSIS   No warmth or erythema at the bilateral wrist.  Skin - small bruise on left forearm, no warmth  NEURO -   A/ox4, speech is fluent, follows all commands  MOTOR EXAM -she has some pain inhibition with  in her hands.  Takes resistance with elbow flexion, shoulder abduction, and extension,  ankle dorsiflexion.    Results Review:     I reviewed the patient's new clinical results.  Results for SENTHIL SMITH (MRN [id number]) as of 5/4/2020 11:09   Ref. Range 5/2/2020 10:55 5/2/2020 16:18 5/3/2020 07:42 5/3/2020 11:38 5/3/2020 16:51 5/4/2020 07:14 5/4/2020 10:59   Glucose Latest Ref Range: 70 - 130 mg/dL 130 222 (H) 118 181 (H) 165 (H) 128 166 (H)     Results for SENTHIL SMITH (MRN [id number]) as of 5/4/2020 11:09   Ref. Range 4/25/2020 14:51   log 10 EBV DNA Qn PCR Latest Units: [id number]/mL 3.718     Results for SENTHIL SMITH (MRN [id number]) as of 5/4/2020 11:09   Ref. Range 4/25/2020 14:51   EBV PCR Quant WB Latest Ref Range: Negative copies/mL 5218     Component  Ref Range & Units 9d ago 4wk ago   EBV PCR Quant WB  Negative copies/mL 5218  75476 CM   Comment: The quantitative range of this assay is 100 to 1 million copies/mL.   This test was developed and its performance characteristics determined   by [employer].  It has not been cleared or approved by the Food and Drug   Administration.  The FDA has determined that such clearance or   approval is not necessary.   log 10 EBV DNA Qn PCR  [id number]/mL 3.718  4.224          Medication Review:   Scheduled Meds:  amLODIPine 10 mg Oral Nightly   budesonide-formoterol 2 puff Inhalation BID - RT   DULoxetine 30 mg Oral Daily   [START ON 5/9/2020] DULoxetine 60 mg Oral Daily   folic acid 1 mg Oral Daily   hydroCHLOROthiazide Oral 25 mg Oral Daily   insulin glargine 20 Units Subcutaneous QAM   insulin lispro 0-14 Units Subcutaneous TID AC   insulin lispro 14 Units Subcutaneous TID With Meals   levothyroxine 200 mcg Oral Q AM   pantoprazole 40 mg Oral BID AC   predniSONE 10 mg Oral Daily   Followed by      [START ON 5/7/2020] predniSONE 5 mg Oral Daily   pregabalin 100 mg Oral Q12H   sucralfate 1 g Oral 4x Daily AC & at Bedtime     Continuous Infusions:   PRN Meds:.Benzocaine  •  benzonatate  •  calcium carbonate  •  dextrose  •  dextrose  •   diphenhydrAMINE  •  diphenhydrAMINE  •  glucagon (human recombinant)  •  guaiFENesin-dextromethorphan  •  melatonin  •  ondansetron  •  oxyCODONE  •  oxyCODONE  •  [START ON 5/7/2020] oxyCODONE  •  polyethylene glycol  •  potassium chloride **OR** potassium chloride **OR** potassium chloride  •  sodium chloride      Assessment/Plan       YOUNG (nonalcoholic steatohepatitis)    Hypothyroid    Type 2 diabetes mellitus (CMS/HCC)    GBS (Guillain [place] syndrome) (CMS/HCC)    Elevated transaminase level    History of gestational diabetes    Steroid-induced hyperglycemia    Elevated aldolase level      Assessment & Plan   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  This is far greater than expected based on the nerve conduction findings.  This suggests either motor neuron disease or a primarily axonal polyneuropathy such as axonal Guillain Barré syndrome based on the patient's symptom history. F/U Dr Juventino Gaytan in 2-3 months.     Previous question of dermatomyositis or autoimmune process-trial of steroids-tapering off  April 27-has been on prednisone 40 mg daily since April 10 empiric trial for previous question of dermatomyositis but now not felt clinically present-taper off of prednisone 30 mg daily x3 days, 20 mg daily x3 days, 10 mg daily x3 days, 5 mg daily x3 days, then stop.     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 Also on oxycodone.  April 27-has some pain in the upper extremities.  Doubt that it is bilateral joint pain as she has been on a reasonable dose of prednisone for the last 16 days.  May be a variant for the neuropathic pain.  Will titrate up on Lyrica 200 mg every 12 hours, watch for any myoclonic jerks.  May 1- taking oxycodone 5 mg about 5 times a day, need to taper off over next week, changed to Oxycodone 5 mg q 6 hours prn x 3 days, then q 8 hours prn x 3 days, then q 12 hours prn x three days, then stop. SVETLANA reviewed.   May 2-add on Cymbalta 30 mg daily for 7 days and increase to 60 mg daily.  Discussed other options of titrating up on Lyrica or switching to gabapentin.  May 4 - Cymbalta is helping     DVT prophylaxis - SCDs        Hepatic steatosis-elevated LFTs  April 27-gastroenterology following  April 29 - reviewed with gastroenterology - a combination of fatty liver (the treatment would be weight loss) and Ebstein Horan viral hepatitis (the only treatment would be supportive care)- recommend against a liver biopsy. Follow up in three months with repeat labs and full colonscopy.  May 4 - patient asking what EBV labs indicate.  Will review chart further as labs were trending downward.    Restless leg syndrome -   May 4 - Will trial Requip     April 27-gait 80 feet CTG-min assist.  Team conference in the a.m.     TEAM CONF - April 28- BED CTG. 4 STAIRS MIN. GAIT 100 FEET CTG-MIN RW. TOILET TRANSFERS CTG. SHOWER TRANSFERS CTG.  UBD MIN ASSIST FOR BRA. LBD CTG. BATH CTG. GROOMING SBA.  TOILETING MOD INDEPENDENT. CONTINENT BOWEL AND BLADDER.   DIETICIAN EDUCATED ON DIABETIC DIET ON STEROIDS BUT PATIENT RESISTANT TO INSTRUCTION.   NEEDS TO BE MODIFIED INDEPENDENT FOR HOME  ELOS- TWO WEEKS    During rounds, used appropriate personal protective equipment including mask and gloves.  Mask used was standard procedure mask. Appropriate PPE was worn during the entire visit.  Hand  hygiene was completed before and after.    Millie Cm MD  05/04/20  11:06    Time: 20min

## 2020-05-04 NOTE — PAYOR COMM NOTE
"Good afternoon!     AUTH # [id number]    Included are current therapy notes as well as the most recent team conference report from Tuesday, 4/28. Next team conference planned for tomorrow, 5/5. The patient is participating and progressing with therapies. Please review for authorization of continued stay.      Thank you!     Tru Miranda RN  p   f     Oralia Smith (44 y.o. Female)     Date of Birth Social Security Number Address Home Phone MRN    1975  [id number] [address] [phone] [id number]    [place] Marital Status          Evangelical        Admission Date Admission Type Admitting Provider Attending Provider Department, Room/Bed    4/20/20 Elective Demetri Mariee MD Williamson, William P, MD [facility], 4407/1    Discharge Date Discharge Disposition Discharge Destination                       Attending Provider:  Demetri Mariee MD    Allergies:  No Known Drug Allergy    Isolation:  None   Infection:  None   Code Status:  CPR    Ht:  154.9 cm (61\")   Wt:  106 kg (233 lb 4 oz)    Admission Cmt:  None   Principal Problem:  None                Active Insurance as of 4/20/2020     Primary Coverage     Payor Plan Insurance Group Employer/Plan Group    [employer] BLUE [employer]OSS [employer] BLUE [employer]OSS BLUE [employer] PPO [id number]     Payor Plan Address Payor Plan Phone Number Payor Plan Fax Number Effective Dates    [address] [phone]  1/1/2018 - None Entered    [place] [id number]       Subscriber Name Subscriber Birth Date Member ID       ORALIA SMITH 1975 [id number]                 Emergency Contacts      (Rel.) Home Phone Work Phone Mobile Phone    LUCRECIA SMITH (Son) -- -- [phone]    scott miller -- -- [phone]               Occupational Therapy Notes (last 24 hours) (Notes from 05/03/20 1558 through 05/04/20 1558)      Prince Obrien, OT at 05/04/20 1545      " "    Inpatient Rehabilitation - Occupational Therapy Treatment Note    [facility]     Patient Name: Oralia Sánchez  : 1975  MRN: [id number]    Today's Date: 2020                 Admit Date: 2020      Visit Dx:  No diagnosis found.    Patient Active Problem List   Diagnosis   • Vitamin D deficiency   • Awareness of heartbeats   • Adiposity   • YOUNG (nonalcoholic steatohepatitis)   • Hypothyroid   • Diabetes mellitus arising in pregnancy   • Diabetes (CMS/HCC)   • Metabolic syndrome   • Chest pain   • Primary thunderclap headache   • Elevated LFTs   • Tobacco abuse   • Rheumatoid arthritis (CMS/HCC)   • Type 2 diabetes mellitus (CMS/HCC)   • Morbid obesity (CMS/HCC)   • UTI (urinary tract infection), bacterial   • Cerebral aneurysm   • Dyspnea   • Cough   • Hypomagnesemia   • Metabolic acidosis   • Fatigue   • History of COPD   • Abnormal CT scan, colon   • 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)   • Essential hypertension   • 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)   • Elevated transaminase level   • History of gestational diabetes   • Steroid-induced hyperglycemia   • Elevated aldolase level         Therapy Treatment    IRF Treatment Summary     Row Name 20 1500 20          Evaluation/Treatment Time and Intent    Subjective Information  complains of;numbness left hand  -DN  complains of;numbness \"It feels like my left leg is weaker today.\"   -LB     Existing Precautions/Restrictions  fall  -DN  fall  -LB     Document Type  therapy note (daily note)  -DN  therapy note (daily note)  -LB     Mode of Treatment  occupational therapy  -DN  physical therapy  -LB     Patient/Family Observations  supine in bed  -DN  up in w/c in no acute distress  -LB     Recorded by [DN] Prince Obrien, OT [LB] Preeti Reynaga, PT     Row Name 20 09             Home Main Entrance    Number of Stairs, Main Entrance  six per pt has to step onto 2 cyclinder blocks & then 1st step   -LB      " Stair Railings, Main Entrance  railing on left side (ascending)  -LB      Recorded by [LB] Preeti Reynaga, PT      Row Name 05/04/20 1500 05/04/20 0930          Cognition/Psychosocial- PT/OT    Affect/Mental Status (Cognitive)  WNL  -DN  WNL  -LB     Orientation Status (Cognition)  --  oriented x 4  -LB     Follows Commands (Cognition)  --  WNL  -LB     Personal Safety Interventions  fall prevention program maintained;gait belt  -DN  fall prevention program maintained  -LB     Cognitive Function (Cognitive)  memory deficit  -DN  memory deficit  -LB     Memory Deficit (Cognitive)  mild deficit  -DN  mild deficit  -LB     Recorded by [DN] Prince Obrien OT [LB] Preeti Reynaga, PT     Row Name 05/04/20 0930             Bed Mobility Assessment/Treatment    Rolling Left [place] (Bed Mobility)  independent  -LB      Rolling Right [place] (Bed Mobility)  independent  -LB      Scooting/Bridging [place] (Bed Mobility)  independent  -LB      Supine-Sit [place] (Bed Mobility)  independent  -LB      Recorded by [LB] Preeti Reynaga, PT      Row Name 05/04/20 1500             Bed-Chair Transfer    Bed-Chair [place] (Transfers)  supervision;verbal cues  -DN      Assistive Device (Bed-Chair Transfers)  wheelchair am and pm  -DN      Recorded by [DN] Prince Obrien, OT      Row Name 05/04/20 0930             Sit-Stand Transfer    Sit-Stand [place] (Transfers)  stand by assist;contact guard;verbal cues  -LB      Assistive Device (Sit-Stand Transfers)  walker, front-wheeled quad tipped cane  no device  -LB      Recorded by [LB] Preeti Reynaga PT      Row Name 05/04/20 0930             Stand-Sit Transfer    Stand-Sit [place] (Transfers)  stand by assist;verbal cues  -LB      Assistive Device (Stand-Sit Transfers)  walker, front-wheeled quad tipped cane, no device  -LB      Recorded by [LB] Preeti Reynaga, PT      Row Name 05/04/20 1500             Toilet Transfer    Type (Toilet Transfer)  stand  pivot/stand step  -DN      [place] Level (Toilet Transfer)  supervision  -DN      Assistive Device (Toilet Transfer)  wheelchair;walker, front-wheeled;commode chair  -DN      Recorded by [DN] Prince Obrien, OT      Row Name 05/04/20 1500             Shower Transfer    Type (Shower Transfer)  stand pivot/stand step  -DN      [place] Level (Shower Transfer)  contact guard;verbal cues  -DN      Assistive Device (Shower Transfer)  wheelchair;walker, front-wheeled;transfer board;grab bars/tub rail  -DN      Recorded by [DN] Prince Obrien, OT      Row Name 05/04/20 0930             Gait/Stairs Assessment/Training    [place] Level (Gait)  (S) contact guard;minimum assist (75% patient effort) CGA with R wx, minimal HHA with quad tipped cane  -LB      Assistive Device (Gait)  -- quad tipped cane   -LB      Distance in Feet (Gait)  90' x 3 with quad tipped cane, 160' x 2 w/ R Wx with SBA   -LB      Pattern (Gait)  step-through  -LB      Deviations/Abnormal Patterns (Gait)  stride length decreased  -LB      Bilateral Gait Deviations  weight shift ability decreased  -LB      Left Sided Gait Deviations  heel strike decreased  -LB      [place] Level (Stairs)  minimum assist (75% patient effort);contact guard;2 person assist 2nd person for safety   -LB      Assistive Device (Stairs)  other (see comments) quad tipped cane   -LB      Handrail Location (Stairs)  left side (ascending)  -LB      Number of Steps (Stairs)  4  -LB      Ascending Technique (Stairs)  step-to-step  -LB      Descending Technique (Stairs)  step-to-step  -LB      Stairs, Safety Issues  sequencing ability decreased  -LB      Stairs, Impairments  strength decreased  -LB      Comment (Gait/Stairs)  Pt reporting she felt like her Left leg was not going to lift her up the last step.   -LB      Recorded by [LB] Preeti Reynaga, PT      Row Name 05/04/20 1500             Bathing Assessment/Treatment    Bathing [place] Level  bathing  skills;supervision;verbal cues  -DN      Assistive Device (Bathing)  hand held shower spray hose;grab bar/tub rail;tub bench  -DN      Bathing Position  supported sitting;supported standing  -DN      Recorded by [KAYLEN] Prince Obrien OT      Row Name 05/04/20 1500             Upper Body Dressing Assessment/Treatment    Upper Body Dressing Task  upper body dressing skills;doff;don;bra/undergarment;pull over garment;minimum assist (75% or more patient effort);verbal cues  -DN      Upper Body Dressing Position  supported sitting  -DN      Set-up Assistance (Upper Body Dressing)  obtain clothing  -DN      Recorded by [KAYLEN] Prince Obrien, OT      Row Name 05/04/20 1500             Lower Body Dressing Assessment/Treatment    Lower Body Dressing [place] Level  doff;don;pants/bottoms;underwear;socks;supervision;verbal cues  -DN      Lower Body Dressing Position  supported sitting;supported standing  -DN      Lower Body Dressing Setup Assistance  obtain clothing  -DN      Recorded by [KAYLEN] Prince Obrien OT      Row Name 05/04/20 1500             Grooming Assessment/Treatment    Grooming [place] Level  grooming skills;deodorant application;oral care regimen;set up  -DN      Grooming Position  supported sitting  -DN      Recorded by [KAYLEN] Prince Obrien OT      Row Name 05/04/20 1500             Toileting Assessment/Treatment    Toileting [place] Level  toileting skills;adjust/manage clothing;supervision;verbal cues  -DN      Assistive Device Use (Toileting)  grab bar/safety frame;raised toilet seat  -DN      Toileting Position  supported sitting;supported standing  -DN      Recorded by [KAYLEN] Prince Obrien OT      Row Name 05/04/20 1500             Fine Motor Testing & Training    Comment, Fine Motor Coordination  [facility] BUE small pegs, washers, and spacers on board, with SBA  -DN      Recorded by [KAYLEN] Prince Obrien, OT      Row Name 05/04/20 1500 05/04/20 0930          Pain Scale: Numbers Pre/Post-Treatment     Pain Scale: Numbers, Pretreatment  0/10 - no pain  -DN  2/10  -LB     Pain Scale: Numbers, Post-Treatment  0/10 - no pain  -DN  2/10  -LB     Pain Location - Side  --  Bilateral  -LB     Pain Location - Orientation  --  distal  -LB     Pain Location  --  hand  -LB     Recorded by [DN] Prince Obrien, OT [LB] Preeti Reynaga, PT     Row Name 05/04/20 0930             Standing Balance Activity    Activities Performed (Standing, Balance Training)  other (see comments) standing on foam ma, ffet even & then tandem   -LB      Support Needed for Balance (Standing, Balance Training)  CGA  -LB      Recorded by [LB] Preeti Reynaga, PT      Row Name 05/04/20 0930             Dynamic Balance Activity    Therapeutic Training Performed (Dynamic Balance)  backward walking  -LB      Support Needed for Balance (Dynamic Balance Training)  uses both upper extremities for support lightly   -LB      Comment (Dynamic Balance Training)  in // bars   -LB      Recorded by [LB] Preeti Reynaga, PT      Row Name 05/04/20 1500             Upper Extremity Seated Therapeutic Exercise    Performed, Seated Upper Extremity (Therapeutic Exercise)  shoulder flexion/extension;shoulder abduction/adduction;digit flexion/extension;wrist flexion/extension;forearm supination/pronation;elbow flexion/extension  -DN      Device, Seated Upper Extremity (Therapeutic Exercise)  free weights, barbell  -DN      Exercise Type, Seated Upper Extremity (Therapeutic Exercise)  AROM (active range of motion)  -DN      Expected Outcomes, Seated Upper Extremity (Therapeutic Exercise)  improve functional tolerance, self-care activity;improve performance, transfer skills  -DN      Sets/Reps Detail, Seated Upper Extremity (Therapeutic Exercise)  1 and 2# dumbells, 2# dowel  -DN      Transfers Skills, Training to Functional Activity, Seated Upper Extremity (Therapeutic Exercise)  transfers skills to functional activity most of the time  -DN      Recorded by [DN] Prince Obrien,  OT      Row Name 05/04/20 0930             Lower Extremity Standing Therapeutic Exercise    Performed, Standing Lower Extremity (Therapeutic Exercise)  heel raises  -LB      Device, Standing Lower Extremity (Therapeutic Exercise)  parallel bars  -LB      Exercise Type, Standing Lower Extremity (Therapeutic Exercise)  AROM (active range of motion)  -LB      Sets/Reps Detail, Standing Lower Extremity (Therapeutic Exercise)  2/10  -LB      Comment, Standing Lower Extremity (Therapeutic Exercise)  step ups in // bars 1/10 each LE with UE support    -LB      Recorded by [LB] Preeti Reynaga, PT      Row Name 05/04/20 0930             Vital Signs    O2 Delivery Pre Treatment  room air  -LB      O2 Delivery Intra Treatment  room air  -LB      O2 Delivery Post Treatment  room air  -LB      Recorded by [LB] Preeti Reynaga, PT      Row Name 05/04/20 1500             Positioning and Restraints    Pre-Treatment Position  in bed  -DN      Post Treatment Position  wheelchair  -DN      In Bed  call light within reach;with PT  -DN      Recorded by [DN] Prince Obrien OT      Row Name 05/04/20 0930             Weekly Summary of Progress (PT)    Weekly Outcome Summary: Physical Therapy  Pt progressing toward all PT goals. Pt advancing to stairs with one rail and a quad tipped cane. Pt reporting she didn't feel her L LE was going to lift her up the last step.   -LB      Recorded by [CALI] Preeti Reynaga, PT        User Key  (r) = Recorded By, (t) = Taken By, (c) = Cosigned By    Initials Name Effective Dates    Preeti Combs, PT 06/08/18 -     Prince Morfin OT 06/08/18 -                  OT Recommendation and Plan    Anticipated Equipment Needs At Discharge (OT Eval): commode, bedside without drop arms, shower chair  Planned Therapy Interventions (OT Eval): activity tolerance training, BADL retraining, functional balance retraining, neuromuscular control/coordination retraining, strengthening exercise, transfer/mobility  retraining            OT IRF GOALS     Row Name 05/04/20 1500 04/28/20 1200 04/21/20 1413       Transfer Goal 1 (OT-IRF)    Activity/Assistive Device (Transfer Goal 1, OT-IRF)  toilet;tub  -DN  toilet;tub  -DN  toilet;tub  -DN    [place] Level (Transfer Goal 1, OT-IRF)  supervision required;contact guard assist  -DN  contact guard assist with RWX  -DN  contact guard assist  -DN    Time Frame (Transfer Goal 1, OT-IRF)  short term goal (STG)  -DN  short term goal (STG)  -DN  short term goal (STG)  -DN    Progress/Outcomes (Transfer Goal 1, OT-IRF)  goal revised this date;goal partially met  -DN  goal met;goal revised this date  -DN  continuing progress toward goal  -DN       Transfer Goal 2 (OT-IRF)    Activity/Assistive Device (Transfer Goal 2, OT-IRF)  toilet;tub;walker, rolling  -DN  toilet;tub;walker, rolling  -DN  toilet;tub;walker, rolling  -DN    [place] Level (Transfer Goal 2, OT-IRF)  conditional independence  -DN  conditional independence  -DN  supervision required  -DN    Time Frame (Transfer Goal 2, OT-IRF)  long term goal (LTG)  -DN  long term goal (LTG)  -DN  long term goal (LTG)  -DN    Progress/Outcomes (Transfer Goal 2, OT-IRF)  goal ongoing  -DN  goal revised this date  -DN  continuing progress toward goal  -DN       Bathing Goal 1 (OT-IRF)    Activity/Device (Bathing Goal 1, OT-IRF)  bathing skills, all  -DN  bathing skills, all  -DN  bathing skills, all  -DN    [place] Level (Bathing Goal 1, OT-IRF)  conditional independence  -DN  supervision required  -DN  contact guard assist;verbal cues required  -DN    Time Frame (Bathing Goal 1, OT-IRF)  short term goal (STG)  -DN  short term goal (STG)  -DN  short term goal (STG)  -DN    Progress/Outcomes (Bathing Goal 1, OT-IRF)  goal met;goal revised this date  -DN  goal met;goal revised this date  -DN  continuing progress toward goal  -DN       Bathing Goal 2 (OT-IRF)    Activity/Device (Bathing Goal 2, OT-IRF)  bathing skills, all  -DN   bathing skills, all  -DN  bathing skills, all  -DN    [place] Level (Bathing Goal 2, OT-IRF)  conditional independence  -DN  conditional independence  -DN  conditional independence  -DN    Time Frame (Bathing Goal 2, OT-IRF)  long term goal (LTG)  -DN  long term goal (LTG)  -DN  long term goal (LTG)  -DN    Progress/Outcomes (Bathing Goal 2, OT-IRF)  goal ongoing;goal partially met  -DN  goal ongoing  -DN  continuing progress toward goal  -DN       UB Dressing Goal 1 (OT-IRF)    Activity/Device (UB Dressing Goal 1, OT-IRF)  upper body dressing  -DN  upper body dressing  -DN  upper body dressing  -DN    [place] (UB Dress Goal 1, OT-IRF)  set-up required  -DN  set-up required  -DN  set-up required  -DN    Time Frame (UB Dressing Goal 1, OT-IRF)  short term goal (STG)  -DN  short term goal (STG)  -DN  short term goal (STG)  -DN    Progress/Outcomes (UB Dressing Goal 1, OT-IRF)  goal met  -DN  goal ongoing;goal not met  -DN  continuing progress toward goal  -DN       UB Dressing Goal 2 (OT-IRF)    Activity/Device (UB Dressing Goal 2, OT-IRF)  upper body dressing  -DN  upper body dressing  -DN  upper body dressing  -DN    [place] (UB Dress Goal 2, OT-IRF)  conditional independence  -DN  conditional independence  -DN  set-up required  -DN    Time Frame (UB Dressing Goal 2, OT-IRF)  long term goal (LTG)  -DN  long term goal (LTG)  -DN  long term goal (LTG)  -DN    Progress/Outcomes (UB Dressing Goal 2, OT-IRF)  goal ongoing  -DN  goal ongoing  -DN  continuing progress toward goal  -DN       LB Dressing Goal 1 (OT-IRF)    Activity/Device (LB Dressing Goal 1, OT-IRF)  lower body dressing  -DN  lower body dressing  -DN  lower body dressing  -DN    [place] (LB Dressing Goal 1, OT-IRF)  supervision required  -DN  supervision required  -DN  contact guard assist  -DN    Time Frame (LB Dressing Goal 1, OT-IRF)  short term goal (STG)  -DN  short term goal (STG)  -DN  short term goal (STG)  -DN     Progress/Outcomes (LB Dressing Goal 1, OT-IRF)  goal met;goal revised this date  -DN  goal revised this date  -DN  continuing progress toward goal  -DN       LB Dressing Goal 2 (OT-IRF)    Activity/Device (LB Dressing Goal 2, OT-IRF)  lower body dressing  -DN  lower body dressing  -DN  lower body dressing  -DN    [place] (LB Dressing Goal 2, OT-IRF)  conditional independence  -DN  conditional independence  -DN  set-up required  -DN    Time Frame (LB Dressing Goal 2, OT-IRF)  long term goal (LTG)  -DN  long term goal (LTG)  -DN  long term goal (LTG)  -DN    Progress/Outcomes (LB Dressing Goal 2, OT-IRF)  goal ongoing  -DN  goal revised this date  -DN  continuing progress toward goal  -DN       Grooming Goal 1 (OT-IRF)    Activity/Device (Grooming Goal 1, OT-IRF)  grooming skills, all seated at sink  -DN  grooming skills, all seated at sink  -DN  grooming skills, all  -DN    [place] (Grooming Goal 1, OT-IRF)  conditional independence  -DN  conditional independence  -DN  set-up required  -DN    Time Frame (Grooming Goal 1, OT-IRF)  short term goal (STG)  -DN  short term goal (STG)  -DN  short term goal (STG)  -DN    Progress/Outcomes (Grooming Goal 1, OT-IRF)  goal ongoing  -DN  goal revised this date  -DN  continuing progress toward goal  -DN       Grooming Goal 2 (OT-IRF)    Activity/Device (Grooming Goal 2, OT-IRF)  grooming skills, all standing at sink  -DN  grooming skills, all standing at sink  -DN  grooming skills, all  -DN    [place] (Grooming Goal 2, OT-IRF)  conditional independence  -DN  conditional independence  -DN  conditional independence  -DN    Time Frame (Grooming Goal 2, OT-IRF)  long term goal (LTG)  -DN  long term goal (LTG)  -DN  long term goal (LTG)  -DN    Progress/Outcomes (Grooming Goal 2, OT-IRF)  goal ongoing  -DN  goal ongoing  -DN  continuing progress toward goal  -DN       Toileting Goal 1 (OT-IRF)    Activity/Device (Toileting Goal 1, OT-IRF)  toileting skills, all  -DN   toileting skills, all  -DN  toileting skills, all  -DN    [place] Level (Toileting Goal 1, OT-IRF)  verbal cues required;supervision required  -DN  verbal cues required;supervision required  -DN  minimum assist (75% or more patient effort);verbal cues required  -DN    Time Frame (Toileting Goal 1, OT-IRF)  short term goal (STG)  -DN  short term goal (STG)  -DN  short term goal (STG)  -DN    Progress/Outcomes (Toileting Goal 1, OT-IRF)  goal met;goal revised this date  -DN  goal met;goal revised this date  -DN  continuing progress toward goal  -DN       Toileting Goal 2 (OT-IRF)    Activity/Device (Toileting Goal 2, OT-IRF)  toileting skills, all;commode chair  -DN  toileting skills, all;commode chair  -DN  toileting skills, all;commode chair  -DN    [place] Level (Toileting Goal 2, OT-IRF)  conditional independence;tactile cues required;verbal cues required  -DN  conditional independence;tactile cues required;verbal cues required  -DN  conditional independence;tactile cues required;verbal cues required  -DN    Time Frame (Toileting Goal 2, OT-IRF)  long term goal (LTG)  -DN  long term goal (LTG)  -DN  long term goal (LTG)  -DN    Progress/Outcomes (Toileting Goal 2, OT-IRF)  goal ongoing  -DN  goal ongoing  -DN  continuing progress toward goal  -DN       Strength Goal 1 (OT-IRF)    Strength Goal 1 (OT-IRF)  pt to be setup with BUE HEP  -DN  pt to be setup with BUE HEP  -DN  pt to be SBA with BUE HEP  -DN    Time Frame (Strength Goal 1, OT-IRF)  short term goal (STG)  -DN  short term goal (STG)  -DN  short term goal (STG)  -DN    Progress/Outcomes (Strength Goal 1, OT-IRF)  goal met  -DN  goal revised this date  -DN  continuing progress toward goal  -DN       Strength Goal 2 (OT-IRF)    Strength Goal 2 (OT-IRF)  pt to be independent with HEP for BUEs  -DN  pt to be independent with HEP for BUEs  -DN  pt to be independent with HEP for BUEs  -DN    Time Frame (Strength Goal 2, OT-IRF)  long term goal (LTG)   -DN  long term goal (LTG)  -DN  long term goal (LTG)  -DN    Progress/Outcomes (Strength Goal 2, OT-IRF)  goal ongoing  -DN  goal ongoing  -DN  continuing progress toward goal  -DN       Balance Goal 1 (OT)    Activity/Assistive Device (Balance Goal 1, OT)  assistive device use  -DN  assistive device use  -DN  assistive device use  -DN    [place] Level/Cues Needed (Balance Goal 1, OT)  contact guard assist during adls  -DN  contact guard assist during adls  -DN  contact guard assist during adls  -DN    Time Frame (Balance Goal 1, OT)  short term goal (STG)  -DN  short term goal (STG)  -DN  short term goal (STG)  -DN    Progress/Outcomes (Balance Goal 1, OT)  goal met;goal revised this date  -DN  goal ongoing  -DN  continuing progress toward goal  -DN       Balance Goal 2 (OT)    Activity/Assistive Device (Balance Goal 2, OT)  assistive device use;standing, dynamic;walker, rolling  -DN  assistive device use;standing, dynamic;walker, rolling  -DN  assistive device use;standing, dynamic;walker, rolling  -DN    [place] Level (Balance Goal 2, OT)  conditional independence during adls and home mgmt  -DN  supervision required during adls and home mgmt  -DN  supervision required during adls and home mgmt  -DN    Time Frame (Balance Goal 2, OT)  long term goal (LTG)  -DN  long term goal (LTG)  -DN  long term goal (LTG)  -DN    Progress/Outcome (Balance Goal 2, OT)  goal ongoing  -DN  goal ongoing  -DN  continuing progress toward goal  -DN       Caregiver Training Goal 2 (OT-IRF)    Caregiver Training Goal 2 (OT-IRF)  pt family or available caregivers to be independent with assist needed with adls, transfers etc  -DN  pt family or available caregivers to be independent with assist needed with adls, transfers etc  -DN  pt family or available caregivers to be independent with assist needed with adls, transfers etc  -DN    Time Frame (Caregiver Training Goal 2, OT-IRF)  long term goal (LTG)  -DN  long term goal (LTG)   -DN  long term goal (LTG)  -DN    Progress/Outcomes (Caregiver Training Goal 2, OT-IRF)  goal ongoing  -DN  goal ongoing  -DN  continuing progress toward goal  -DN      User Key  (r) = Recorded By, (t) = Taken By, (c) = Cosigned By    Initials Name Provider Type    Prince Morfin OT Occupational Therapist                     Time Calculation:     Time Calculation- OT     Row Name 05/04/20 1544 05/04/20 0800          Time Calculation- OT    OT Start Time  1300  -DN  0800  -DN     OT Stop Time  1330  -DN  0900  -DN     OT Time Calculation (min)  30 min  -DN  60 min  -DN     OT Received On  05/04/20  -DN  05/04/20  -DN       User Key  (r) = Recorded By, (t) = Taken By, (c) = Cosigned By    Initials Name Provider Type    Prince Morfin OT Occupational Therapist          Therapy Charges for Today     Code Description Service Date Service Provider Modifiers Qty    [id number]  OT SELF CARE/MGMT/TRAIN EA 15 MIN 5/4/2020 Prince Obrien OT GO 4    [id number]  OT THER PROC EA 15 MIN 5/4/2020 Prince Obrien OT GO 2                   Prince Obrien OT  5/4/2020    Electronically signed by Prince Obrien OT at 05/04/20 1545

## 2020-05-04 NOTE — PROGRESS NOTES
Inpatient Rehabilitation Plan of Care Note    Plan of Care  Care Plan Reviewed - Updates as Follows    Body Systems    [RN] Endocrine(Active)  Current Status(05/04/2020): Patients has DM and blood sugar poorly controlled  while being on steroid in hosp.  Weekly Goal(05/06/2020): Patient will have blood sugars within normal limits.  Discharge Goal: Patient will be independent with diabetes regimen    Performed Intervention(s)  Monitor labs and medication as ordered  Blood sugar testing- TID      Pain    [RN] Pain Management(Active)  Current Status(05/04/2020): Patient has generalized pain related GBS. takes pain  medications as needed.  Weekly Goal(05/06/2020): Patient will have pain under control and will be able  to tolerate therapy.  Discharge Goal: Patient will be free from pain or pain will be under control.    Performed Intervention(s)  Relaxation or distraction  Medication as ordered      Psychosocial    [RN] Coping/Adjustment(Active)  Current Status(05/04/2020): Patient expresses appropriate coping skills  Weekly Goal(05/06/2020): Patient will be allowed to express concerns regarding  life changes  Discharge Goal: Patient will continue to demonstrate healthy coping skills while  on Rehab    Performed Intervention(s)  Allow the opportunity to verbalize needs and concerns  Medication as ordered  Therapuetic environmental set up      Safety    [RN] Potential for Injury(Active)  Current Status(05/04/2020): Patient has had a history of falls, and complains of  numbness and unsteadiness on her feet, high risk of further injury.  Weekly Goal(05/06/2020): Patient will use call light, and have no other falls  while on Rehab  Discharge Goal: Patient will have no injuries while on Rehab    Performed Intervention(s)  Bed alarm and /or chair alarm  Safety rounds and items within reach  Falls precautions/protocol  Uses call light appropriately  Environmental set-up to reduce risk  safety strategies and  techniques      Sphincter Control    [RN] Bladder Management(Active)  Current Status(05/04/2020): Patient is 100% continent of bladder  Weekly Goal(05/06/2020): Patient will continue to be continent 100%  Discharge Goal: Patient will continue to be 100% continent.    [RN] Bowel Management(Active)  Current Status(05/04/2020): Patient is 100% continent of bowel  Weekly Goal(05/06/2020): Patient will continue to be 100% continent of bowel  Discharge Goal: Patient will remain continent 100% of dthe time while in Rehab.    Performed Intervention(s)  Monitor intake and output  Assist pt to bathroom in a timely manner  Encourage proper diet and fluid intake  Medication as ordered    Signed by: Lyudmila Campbell RN

## 2020-05-04 NOTE — PROGRESS NOTES
Occupational Therapy: Branch    Physical Therapy: Individual: 90 minutes.    Speech Language Pathology:  Branch    Signed by: Preeti Reynaga PT

## 2020-05-05 LAB
GLUCOSE BLDC GLUCOMTR-MCNC: 127 MG/DL (ref 70–130)
GLUCOSE BLDC GLUCOMTR-MCNC: 169 MG/DL (ref 70–130)
GLUCOSE BLDC GLUCOMTR-MCNC: 199 MG/DL (ref 70–130)
GLUCOSE BLDC GLUCOMTR-MCNC: 90 MG/DL (ref 70–130)
GLUCOSE BLDC GLUCOMTR-MCNC: 93 MG/DL (ref 70–130)
HFE GENE MUT ANL BLD/T: NORMAL

## 2020-05-05 PROCEDURE — 97535 SELF CARE MNGMENT TRAINING: CPT

## 2020-05-05 PROCEDURE — 63710000001 INSULIN LISPRO (HUMAN) PER 5 UNITS: Performed by: INTERNAL MEDICINE

## 2020-05-05 PROCEDURE — 97530 THERAPEUTIC ACTIVITIES: CPT

## 2020-05-05 PROCEDURE — 94799 UNLISTED PULMONARY SVC/PX: CPT

## 2020-05-05 PROCEDURE — 63710000001 PREDNISONE PER 5 MG: Performed by: PHYSICAL MEDICINE & REHABILITATION

## 2020-05-05 PROCEDURE — 97110 THERAPEUTIC EXERCISES: CPT

## 2020-05-05 PROCEDURE — 97112 NEUROMUSCULAR REEDUCATION: CPT

## 2020-05-05 PROCEDURE — 63710000001 INSULIN GLARGINE PER 5 UNITS: Performed by: INTERNAL MEDICINE

## 2020-05-05 PROCEDURE — 82962 GLUCOSE BLOOD TEST: CPT

## 2020-05-05 PROCEDURE — 97116 GAIT TRAINING THERAPY: CPT

## 2020-05-05 PROCEDURE — 63710000001 DIPHENHYDRAMINE PER 50 MG: Performed by: INTERNAL MEDICINE

## 2020-05-05 RX ORDER — ROSUVASTATIN CALCIUM 20 MG/1
40 TABLET, COATED ORAL DAILY
COMMUNITY
End: 2020-05-27 | Stop reason: ALTCHOICE

## 2020-05-05 RX ADMIN — DIPHENHYDRAMINE HYDROCHLORIDE 25 MG: 25 CAPSULE ORAL at 20:33

## 2020-05-05 RX ADMIN — FOLIC ACID 1 MG: 1 TABLET ORAL at 08:08

## 2020-05-05 RX ADMIN — INSULIN LISPRO 14 UNITS: 100 INJECTION, SOLUTION INTRAVENOUS; SUBCUTANEOUS at 11:55

## 2020-05-05 RX ADMIN — SUCRALFATE 1 G: 1 SUSPENSION ORAL at 20:33

## 2020-05-05 RX ADMIN — DIPHENHYDRAMINE HYDROCHLORIDE 25 MG: 25 CAPSULE ORAL at 16:09

## 2020-05-05 RX ADMIN — HYDROCHLOROTHIAZIDE 25 MG: 25 TABLET ORAL at 08:08

## 2020-05-05 RX ADMIN — DIPHENHYDRAMINE HYDROCHLORIDE 25 MG: 25 CAPSULE ORAL at 06:12

## 2020-05-05 RX ADMIN — OXYCODONE HYDROCHLORIDE 5 MG: 5 TABLET ORAL at 06:12

## 2020-05-05 RX ADMIN — AMLODIPINE BESYLATE 10 MG: 10 TABLET ORAL at 20:33

## 2020-05-05 RX ADMIN — OXYCODONE HYDROCHLORIDE 5 MG: 5 TABLET ORAL at 16:09

## 2020-05-05 RX ADMIN — PANTOPRAZOLE SODIUM 40 MG: 40 TABLET, DELAYED RELEASE ORAL at 05:54

## 2020-05-05 RX ADMIN — INSULIN LISPRO 14 UNITS: 100 INJECTION, SOLUTION INTRAVENOUS; SUBCUTANEOUS at 08:08

## 2020-05-05 RX ADMIN — BUDESONIDE AND FORMOTEROL FUMARATE DIHYDRATE 2 PUFF: 160; 4.5 AEROSOL RESPIRATORY (INHALATION) at 08:14

## 2020-05-05 RX ADMIN — PREDNISONE 10 MG: 10 TABLET ORAL at 08:08

## 2020-05-05 RX ADMIN — Medication 3 MG: at 20:33

## 2020-05-05 RX ADMIN — PREGABALIN 100 MG: 100 CAPSULE ORAL at 08:08

## 2020-05-05 RX ADMIN — PREGABALIN 100 MG: 100 CAPSULE ORAL at 20:33

## 2020-05-05 RX ADMIN — SUCRALFATE 1 G: 1 SUSPENSION ORAL at 05:54

## 2020-05-05 RX ADMIN — INSULIN GLARGINE 20 UNITS: 100 INJECTION, SOLUTION SUBCUTANEOUS at 08:07

## 2020-05-05 RX ADMIN — PANTOPRAZOLE SODIUM 40 MG: 40 TABLET, DELAYED RELEASE ORAL at 16:09

## 2020-05-05 RX ADMIN — INSULIN LISPRO 3 UNITS: 100 INJECTION, SOLUTION INTRAVENOUS; SUBCUTANEOUS at 11:55

## 2020-05-05 RX ADMIN — DULOXETINE HYDROCHLORIDE 30 MG: 30 CAPSULE, DELAYED RELEASE ORAL at 08:08

## 2020-05-05 RX ADMIN — LEVOTHYROXINE SODIUM 200 MCG: 100 TABLET ORAL at 05:54

## 2020-05-05 RX ADMIN — SUCRALFATE 1 G: 1 SUSPENSION ORAL at 11:54

## 2020-05-05 RX ADMIN — SUCRALFATE 1 G: 1 SUSPENSION ORAL at 16:09

## 2020-05-05 RX ADMIN — ROPINIROLE HYDROCHLORIDE 0.25 MG: 0.25 TABLET, FILM COATED ORAL at 20:34

## 2020-05-05 RX ADMIN — BUDESONIDE AND FORMOTEROL FUMARATE DIHYDRATE 2 PUFF: 160; 4.5 AEROSOL RESPIRATORY (INHALATION) at 21:31

## 2020-05-05 NOTE — PROGRESS NOTES
Case Management  Inpatient Rehabilitation Team Conference    Conference Date/Time: 2020 8:37:52 AM    Team Conference Attendees:  Dr. Millie Burnham, Alec Ritter, Pharmacist  Antonietta Mckeon, SHIREENW  Irlanda Wallis, PT  Prince Obrien, OT  Tsering Weeks, CTRS  Elizabeth Jordan RD, LD  Tru Miranda, RN  Lyudmila Campbell, OSCAR    Demographics            Age: 44Y            Gender: Female    Admission Date: 2020 2:16:19 PM  Rehabilitation Diagnosis:  GBS  Past Medical History: Past medical history patient has no known medical  allergies.  She has a history of sleep apnea and has been compliant with CPAP  however that machine equipment is not available at this time and patient will be  on overnight oxygen.  She also has a history of degenerative joint disease  obesity Mendez syndrome gastroesophageal reflux disease diabetes mellitus  hypothyroidism.  ?  Surgical history includes tonsillectomy adenoidectomy carpal tunnel release  .      Plan of Care  Anticipated Discharge Date/Estimated Length of Stay: ELOS: 2 weeks  Anticipated Discharge Destination: Community discharge with assistance  Discharge Plan : Pt lives alone in a 2 story home, 3 steps to enter, bed and  bath on the first floor. Pt plans to discharge to her home with intermittent  assistance.  Medical Necessity Expected Level Rationale: MOD I with outpatient therapies  Intensity and Duration: an average of 3 hours/5 days per week  Medical Supervision and 24 Hour Rehab Nursing: x  Physical Therapy: x  PT Intensity/Duration: 1 hour/day, 5 days/week for approximately  5- 10 days  Occupational Therapy: x  OT Intensity/Duration: 1 hour/day, 5 days/week for approximately  5- 10 days  Speech and Language Therapy: x  SLP Intensity/Duration: 1 hour/day, 5 days/week for approximately  5- 10 days  Social Work: x  Therapeutic Recreation: x  Updated (if changes indicated)    Anticipated Discharge Date/Estimated Length of Stay:   ELOS: DC     Based  on the patient's medical and functional status, their prognosis and  expected level of functional improvement is: MOD I with outpatient therapies      Interdisciplinary Problem/Goals/Status    All Rehab Problems:  Body Systems    [RN] Endocrine(Active)  Current Status(05/04/2020): Patients has DM and blood sugar poorly controlled  while being on steroid in hosp.  Weekly Goal(05/06/2020): Patient will have blood sugars within normal limits.  Discharge Goal: Patient will be independent with diabetes regimen        Cognition    [ST] Executive Functions(Active)  Current Status(04/22/2020): Pt exhibiting functional cognition. ST signed off.  Weekly Goal(04/24/2020): follow schedule I'ly  Discharge Goal: Functional cognition for home I'ly        Mobility    [PT] Bed/Chair/Wheelchair(Active)  Current Status(05/04/2020): SBA  Weekly Goal(05/13/2020): SBA  Discharge Goal: mod I    [PT] Walk(Active)  Current Status(05/04/2020): 160` CGA  RWx  Weekly Goal(05/12/2020): BR R Wx CGA  Discharge Goal: 200`mod I, RWx    [PT] Stairs(Active)  Current Status(04/27/2020): 4 with CGA 1 handrail and quad tipped cane  Weekly Goal(05/12/2020): PT only  Discharge Goal: 8 steps, 1 HR on L and quad tipped cane, SBA to supervision    [OT] Toilet Transfers(Active)  Current Status(05/04/2020): SBA RWX  Weekly Goal(05/12/2020): Mod indep  Discharge Goal: Mod indep    [OT] Tub/Shower Transfers(Active)  Current Status(05/04/2020): CGA RWX  Weekly Goal(05/12/2020): SBA RWX  Discharge Goal: Mod Indep RWX        Pain    [RN] Pain Management(Active)  Current Status(05/04/2020): Patient has generalized pain related GBS. takes pain  medications as needed.  Weekly Goal(05/06/2020): Patient will have pain under control and will be able  to tolerate therapy.  Discharge Goal: Patient will be free from pain or pain will be under control.        Psychosocial    [RN] Coping/Adjustment(Active)  Current Status(05/04/2020): Patient expresses appropriate coping  skills  Weekly Goal(05/06/2020): Patient will be allowed to express concerns regarding  life changes  Discharge Goal: Patient will continue to demonstrate healthy coping skills while  on Rehab        Safety    [RN] Potential for Injury(Active)  Current Status(05/04/2020): Patient has had a history of falls, and complains of  numbness and unsteadiness on her feet, high risk of further injury.  Weekly Goal(05/06/2020): Patient will use call light, and have no other falls  while on Rehab  Discharge Goal: Patient will have no injuries while on Rehab        Self Care    [OT] Bathing(Active)  Current Status(05/04/2020): SBA  Weekly Goal(05/12/2020): Mod Indep  Discharge Goal: Mod indep    [OT] Dressing (Lower)(Active)  Current Status(04/21/2020): SBA  Weekly Goal(05/12/2020): Mod Indep  Discharge Goal: Mod Indep    [OT] Dressing (Upper)(Active)  Current Status(04/27/2020): Min with bra  Weekly Goal(05/05/2020):  Discharge Goal: Mod Indep    [OT] Grooming(Active)  Current Status(05/04/2020): SBA seated  Weekly Goal(05/12/2020): SBA standing  Discharge Goal: Mod Indep    [OT] Toileting(Active)  Current Status(05/04/2020): SBA  Weekly Goal(05/12/2020): Mod Indep  Discharge Goal: Mod Indep with toilet aid        Sphincter Control    [RN] Bladder Management(Active)  Current Status(05/04/2020): Patient is 100% continent of bladder  Weekly Goal(05/06/2020): Patient will continue to be continent 100%  Discharge Goal: Patient will continue to be 100% continent.    [RN] Bowel Management(Active)  Current Status(05/04/2020): Patient is 100% continent of bowel  Weekly Goal(05/06/2020): Patient will continue to be 100% continent of bowel  Discharge Goal: Patient will remain continent 100% of dthe time while in Rehab.        Comments: 4/23: c/o pain overnight; CASI knees buckle, amb with knees locked;  anticipate SLP will sign off after today's session; liver enzymes elevated this  AM - all tylenol containing meds to be DC'd;    4/28: no  assist available after discharge, needs to be MOD I;    5/5: concerns about discharging home alone; home situation in question as well -  patient is a hoarder? not allowing staff to contact family or anyone into her  home;    Signed by: Tru Miranda RN    Physician CoSigned By: Millie Cm 05/05/2020 15:09:56

## 2020-05-05 NOTE — PLAN OF CARE
Problem: Patient Care Overview  Goal: Plan of Care Review  Outcome: Ongoing (interventions implemented as appropriate)  Flowsheets (Taken 5/5/2020 0016)  Outcome Summary: Pt. is pleasant and cooperative. A&OX4. Takes Meds whole with water. Complained of any pain to left hand. Oxycodone 5 mg and Benadryl 25 mg PO PRN given at 2215, and then relieved good. BG checks TID. Melatonin 3 mg PO PRN given at 2106 per Pt. required. O2 applied by nasal cannula at 2 L. Daily weights. Continent of B&B. Assist x 1 with walker to BR. Snacks applied at bedtime. Uses call light for assistance. No new issues to note at this time. Will continue to monitor.  Progress, Functional Goals: demonstrating adequate progress  Plan of Care Reviewed With: patient  IRF Plan of Care Review: progress ongoing, continue

## 2020-05-05 NOTE — PLAN OF CARE
Problem: Patient Care Overview  Goal: Plan of Care Review  Outcome: Ongoing (interventions implemented as appropriate)  Flowsheets (Taken 5/5/2020 1600)  Outcome Summary: pt worked in therapies. progressing. pain meds per pt request. has yet to ask for dose this shift. continent. 2l nc at pm. will continue to monitor.  Progress, Functional Goals: demonstrating adequate progress  Plan of Care Reviewed With: patient  IRF Plan of Care Review: progress ongoing, continue

## 2020-05-05 NOTE — PROGRESS NOTES
Inpatient Rehabilitation Plan of Care Note    Plan of Care  Care Plan Reviewed - Updates as Follows    Safety    Performed Intervention(s)  Bed alarm and /or chair alarm  Safety rounds and items within reach  Falls precautions/protocol  Uses call light appropriately  Environmental set-up to reduce risk  safety strategies and techniques      Psychosocial    Performed Intervention(s)  Allow the opportunity to verbalize needs and concerns  Medication as ordered  Therapuetic environmental set up      Body Systems    Performed Intervention(s)  Monitor labs and medication as ordered  Blood sugar testing- TID      Pain    Performed Intervention(s)  Relaxation or distraction  Medication as ordered  Modalities      Sphincter Control    Performed Intervention(s)  Monitor intake and output  Assist pt to bathroom in a timely manner  Encourage proper diet and fluid intake  Medication as ordered    Signed by: Lyudmila Campbell RN

## 2020-05-05 NOTE — PROGRESS NOTES
Adult Nutrition  Assessment/PES    Patient Name:  Oralia Sánchez  YOB: 1975  MRN: [id number]  Admit Date:  4/20/2020    Assessment Date:  5/5/2020    Reason for Assessment     Row Name 05/05/20 1110          Reason for Assessment    Reason For Assessment  follow-up protocol         Nutrition/Diet History     Row Name 05/05/20 1110          Nutrition/Diet History    Typical Food/Fluid Intake  Intake good, 100% most meals. Glucoses are trending in mid- to low-100s.          Anthropometrics     Row Name 05/05/20 0553          Anthropometrics    Weight  107 kg (235 lb 4.8 oz)         Labs/Tests/Procedures/Meds     Row Name 05/05/20 1115          Labs/Procedures/Meds    Lab Results Reviewed  reviewed     Lab Results Comments  Glu: 127, 145, 166 past 24 hr        Diagnostic Tests/Procedures    Diagnostic Test/Procedure Reviewed  reviewed        Medications    Pertinent Medications Reviewed  reviewed         Physical Findings     Row Name 05/05/20 1116          Physical Findings    Overall Physical Appearance  obese;other (see comments)           Nutrition Prescription Ordered     Row Name 05/05/20 1117          Nutrition Prescription PO    Current PO Diet  Regular             Problem/Interventions    Intervention Goal     Row Name 05/05/20 1117          Intervention Goal    General  Maintain nutrition     PO  Maintain intake;PO intake (%)     PO Intake %  75 %         Nutrition Intervention     Row Name 05/05/20 1117          Nutrition Intervention    RD/Tech Action  Follow Tx progress           Education/Evaluation     Row Name 05/05/20 1117          Education    Education  Education offered and refused           Electronically signed by:  Elizabeth Jordan RD  05/05/20 11:17

## 2020-05-05 NOTE — PROGRESS NOTES
"Weekly progress report and discharge date discussed with pt.  Initially, pt stated that she had no one to assist her at home and would have to transfer to ECF.      After further discussion, pt stated that she \"might\" ask her mother if she could stay with her temporarily.   Pt expressed concerns about being a \"burden\" to her mother and step father. Explored these feelings with the pt and again, reviewed pt's options for discharge.  In the end pt decided to call her mother and discuss the situation with her.  SW offered to contact pt's mother with her but pt declined.    Will follow up with pt tomorrow and schedule family conference to include pt's mother, if pt will agree.  "

## 2020-05-05 NOTE — PAYOR COMM NOTE
"Good afternoon!     AUTH # [id number]    A call was received from Justyna indicating that updated clinicals were needed to make a determination on a stay extension request. Below you will find today's team conference report. The middle column is today's status, and the far right column are her discharge goals. The team feels that the patient will meet her modified independent goals in time to discharge next Tuesday, 5/12. The patient's only available caregiver is her 75 year old mother, so for the sake of the safety of both women it is critical the patient be at as a high functional level as possible. Please review for authorization of continued stay and call if you need any further information.     Thank you!     Tru Miranda RN  p   f      Oralia Sánchez ANNIE (44 y.o. Female)     Date of Birth Social Security Number Address Home Phone MRN    1975  [id number] [id number] [phone] [id number]    [employer] Marital Status          [employer]        Admission Date Admission Type Admitting Provider Attending Provider Department, Room/Bed    4/20/20 Elective Demetri Mariee MD Williamson, William P, MD [facility], 4407/1    Discharge Date Discharge Disposition Discharge Destination                       Attending Provider:  Demetri Mariee MD    Allergies:  No Known Drug Allergy    Isolation:  None   Infection:  None   Code Status:  CPR    Ht:  154.9 cm (61\")   Wt:  107 kg (235 lb 4.8 oz)    Admission Cmt:  None   Principal Problem:  None                Active Insurance as of 4/20/2020     Primary Coverage     Payor Plan Insurance Group Employer/Plan Group    ANTH BLUE CROSS ANTH BLUE CROSS BLUE SHIELD PPO [id number]     Payor Plan Address Payor Plan Phone Number Payor Plan Fax Number Effective Dates    [address] [phone]  1/1/2018 - None Entered    [facility] [id number]       Subscriber Name Subscriber Birth " Date Member ID       SENTHIL SMITH 1975 [id number]                 Emergency Contacts      (Rel.) Home Phone Work Phone Mobile Phone    LUCRECIA SMITH (Son) -- -- [phone]    scott miller -- -- [phone]

## 2020-05-05 NOTE — PROGRESS NOTES
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  C/o ongoing weakness.  Tearful when discussing needing someone to assist her after discharge at her home.    History taken from: patient    Objective     Vital Signs  Temp:  [97.5 °F (36.4 °C)-98.5 °F (36.9 °C)] 98.5 °F (36.9 °C)  Heart Rate:  [89-96] 93  Resp:  [16-20] 16  BP: (127-140)/(63-83) 127/83    Objective   MENTAL STATUS -  AWAKE / ALERT  Gen - nad, sitting upright in wheelchair putting contacts in place  HEENT- NCAT, PUPILS EQUALLY ROUND, SCLERAE ANICTERIC, CONJUNCTIVAE PINK, OP MOIST, NO JVD, EARS UNREMARKABLE EXTERNALLY  LUNGS -normal respirations. equal chest rise   EXT - NO EDEMA OR CYANOSIS   Skin - small bruise on left forearm, no warmth  NEURO -   A/ox4, speech is fluent, follows all commands  MOTOR EXAM -JONES x4, symmetric muscle bulk  Psych - tearful when discussing discharge ploans    Results Review:     I reviewed the patient's new clinical results.   Results for SENTHIL SMITH (MRN [id number]) as of 5/5/2020 12:04   Ref. Range 5/4/2020 07:14 5/4/2020 10:59 5/4/2020 16:06 5/5/2020 07:15 5/5/2020 11:12   Glucose Latest Ref Range: 70 - 130 mg/dL 128 166 (H) 145 (H) 127 199 (H)       Medication Review:   Scheduled Meds:  amLODIPine 10 mg Oral Nightly   budesonide-formoterol  2 puff Inhalation BID - RT   DULoxetine 30 mg Oral Daily   [START ON 5/9/2020] DULoxetine 60 mg Oral Daily   folic acid 1 mg Oral Daily   hydroCHLOROthiazide Oral 25 mg Oral Daily   insulin glargine 20 Units Subcutaneous QAM   insulin lispro 0-14 Units Subcutaneous TID AC   insulin lispro 14 Units Subcutaneous TID With Meals   levothyroxine 200 mcg Oral Q AM   pantoprazole 40 mg Oral BID AC   predniSONE 10 mg Oral Daily   Followed by      [START ON 5/7/2020] predniSONE 5 mg Oral Daily   pregabalin 100 mg Oral Q12H   rOPINIRole 0.25 mg Oral Nightly   sucralfate 1 g Oral 4x Daily AC & at Bedtime     Continuous Infusions:   PRN Meds:.Benzocaine  •  benzonatate  •  calcium carbonate  •  dextrose  •  dextrose  •  diphenhydrAMINE  •  diphenhydrAMINE  •  glucagon (human recombinant)  •  guaiFENesin-dextromethorphan  •  melatonin  •  ondansetron  •  oxyCODONE  •  [START ON 5/7/2020] oxyCODONE  •  polyethylene glycol  •  potassium chloride **OR** potassium chloride **OR** potassium chloride  •  sodium chloride      Assessment/Plan       YOUNG (nonalcoholic steatohepatitis)    Hypothyroid    Type 2 diabetes mellitus (CMS/HCC)    GBS (Guillain McDonald syndrome) (CMS/HCC)    Elevated transaminase level    History of gestational diabetes    Steroid-induced hyperglycemia    Elevated aldolase level      Assessment & Plan     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  This is far greater than expected based on the nerve conduction findings.  This suggests either motor neuron disease or a primarily axonal polyneuropathy such as axonal Guillain Barré syndrome based on the patient's symptom history. F/U Dr Juventino Gaytan in 2-3 months.     Previous question of dermatomyositis or autoimmune process-trial of  steroids-tapering off  April 27-has been on prednisone 40 mg daily since April 10 empiric trial for previous question of dermatomyositis but now not felt clinically present-taper off of prednisone 30 mg daily x3 days, 20 mg daily x3 days, 10 mg daily x3 days, 5 mg daily x3 days, then stop.     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 Also on oxycodone.  April 27-has some pain in the upper extremities.  Doubt that it is bilateral joint pain as she has been on a reasonable dose of prednisone for the last 16 days.  May be a variant for the neuropathic pain.  Will titrate up on Lyrica 200 mg every 12 hours, watch for any myoclonic jerks.  May 1- taking oxycodone 5 mg about 5 times a day, need to taper off over next week, changed to Oxycodone 5 mg q 6 hours prn x 3 days, then q 8 hours prn x 3 days, then q 12 hours prn x three days, then stop. SVETLANA reviewed.   May 2-add on Cymbalta 30 mg daily for 7 days and increase to 60 mg daily.  Discussed other options of titrating up on Lyrica or switching to gabapentin.  May 4 - Cymbalta is helping     DVT prophylaxis - SCDs        Hepatic steatosis-elevated LFTs  April 27-gastroenterology following  April 29 - reviewed with gastroenterology - a combination of fatty liver (the treatment would be weight loss) and Ebstein Horan viral hepatitis (the only treatment would be supportive care)- recommend against a liver biopsy. Follow up in three months with repeat labs and full colonscopy.  May 5 - EBV labs reviewed, and will need outpatient GI f/up for hepatic steatosis     Restless leg  syndrome -   May 4 - Will trial Requip     April 27-gait 80 feet CTG-min assist.  Team conference in the a.m.     TEAM CONF - April 28- BED CTG. 4 STAIRS MIN. GAIT 100 FEET CTG-MIN RW. TOILET TRANSFERS CTG. SHOWER TRANSFERS CTG.  UBD MIN ASSIST FOR BRA. LBD CTG. BATH CTG. GROOMING SBA.  TOILETING MOD INDEPENDENT. CONTINENT BOWEL AND BLADDER.   DIETICIAN EDUCATED ON DIABETIC DIET ON STEROIDS BUT PATIENT RESISTANT TO INSTRUCTION.   NEEDS TO BE MODIFIED INDEPENDENT FOR HOME  ELOS- TWO WEEKS    TEAM CONF - May 5 -   Bed/Chair/Wc SBA,   Stairs 4 with CGa/MIN A with 1 HR and Quad tipped cane  Amb 160ft CGa RWx, Min A with a cane  Toilet Tx SBA RWx, Tub/Shower Tx CGa RWx  Bathing SBA, LBD SBA, UBD Min A with bra, grooming SBA seated  Toileting SBA  Continent of bowel and bladder  2L O2 during night (h/o ERLIN with CPAP at home)  ELOS - 1 week home with assistance vs SNF  Discussed at length with patient this option of disposition.  Patient feels she does not have anyone to assist her.  Will look into SNF facility as she still requires assistance and would benefit from further inpatient rehab.     During rounds, used appropriate personal protective equipment including mask and gloves.  Mask used was standard procedure mask. Appropriate PPE was worn during the entire visit.  Hand hygiene was completed before and after.       Millie Cm MD  05/05/20  08:38    Time: >30min involved in patient-team conference, review of chart, and adjustment of medical care

## 2020-05-05 NOTE — THERAPY TREATMENT NOTE
Inpatient Rehabilitation - Occupational Therapy Treatment Note    [facility]     Patient Name: Oralia Sánchez  : 1975  MRN: [id number]    Today's Date: 2020                 Admit Date: 2020      Visit Dx:  No diagnosis found.    Patient Active Problem List   Diagnosis   • Vitamin D deficiency   • Awareness of heartbeats   • Adiposity   • YOUNG (nonalcoholic steatohepatitis)   • Hypothyroid   • Diabetes mellitus arising in pregnancy   • Diabetes (CMS/HCC)   • Metabolic syndrome   • Chest pain   • Primary thunderclap headache   • Elevated LFTs   • Tobacco abuse   • Rheumatoid arthritis (CMS/HCC)   • Type 2 diabetes mellitus (CMS/HCC)   • Morbid obesity (CMS/HCC)   • UTI (urinary tract infection), bacterial   • Cerebral aneurysm   • Dyspnea   • Cough   • Hypomagnesemia   • Metabolic acidosis   • Fatigue   • History of COPD   • Abnormal CT scan, colon   • 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)   • Essential hypertension   • GBS (Guillain Boswell syndrome) (CMS/HCC)   • Elevated transaminase level   • History of gestational diabetes   • Steroid-induced hyperglycemia   • Elevated aldolase level         Therapy Treatment    IRF Treatment Summary     Row Name 20 1550 20 1206 20 0973       Evaluation/Treatment Time and Intent    Subjective Information  no complaints  -DN  no complaints  -DN  complains of;numbness B LEs  -EE    Existing Precautions/Restrictions  fall  -DN  fall  -DN  fall  -EE    Document Type  therapy note (daily note)  -DN  therapy note (daily note)  -DN  therapy note (daily note)  -EE    Mode of Treatment  occupational therapy  -DN  occupational therapy  -DN  physical therapy;individual therapy  -EE    Patient/Family Observations  pt sitting in bed  -DN  pt supine in bed  -DN  Pt sitting up in WC in no acute distress.  -EE    Recorded by [DN] Prince Obrien, OT [DN] Prince Obrien, OT [EE] Irlanda Wallis, PT    Row Name 20 1206 20 0708           Cognition/Psychosocial- PT/OT    Affect/Mental Status (Cognitive)  WNL  -DN  --     Orientation Status (Cognition)  oriented x 4  -DN  oriented x 4  -EE     Follows Commands (Cognition)  --  WNL  -EE     Personal Safety Interventions  fall prevention program maintained;gait belt  -DN  fall prevention program maintained;gait belt;muscle strengthening facilitated;nonskid shoes/slippers when out of bed;supervised activity  -EE     Cognitive Function (Cognitive)  memory deficit  -DN  --     Memory Deficit (Cognitive)  mild deficit  -DN  --     Recorded by [DN] Prince Obrien, OT [EE] Irlanda Wallis, PT     Row Name 05/05/20 0945             Bed Mobility Assessment/Treatment    Sit-Supine [place] (Bed Mobility)  conditional independence  -EE      Recorded by [EE] Irlanda Wallis, PT      Row Name 05/05/20 1550 05/05/20 1206 05/05/20 0945       Bed-Chair Transfer    Bed-Chair [place] (Transfers)  supervision  -DN  supervision  -DN  stand by assist;verbal cues  -EE    Assistive Device (Bed-Chair Transfers)  wheelchair  -DN  wheelchair  -DN  walker, front-wheeled;wheelchair  -EE    Recorded by [DN] Prince Obrien OT [DN] Prince Obrien, OT [EE] Irlanda Wallis, PT    Row Name 05/05/20 0945             Sit-Stand Transfer    Sit-Stand [place] (Transfers)  stand by assist;contact guard;verbal cues  -EE      Assistive Device (Sit-Stand Transfers)  walker, front-wheeled;wheelchair  -EE      Recorded by [EE] Irlanda Wallis, PT      Row Name 05/05/20 0945             Stand-Sit Transfer    Stand-Sit [place] (Transfers)  stand by assist;verbal cues  -EE      Assistive Device (Stand-Sit Transfers)  walker, front-wheeled;wheelchair  -EE      Recorded by [EE] Irlanda Wallis, PT      Row Name 05/05/20 1206             Toilet Transfer    Type (Toilet Transfer)  stand pivot/stand step  -DN      [place] Level (Toilet Transfer)  supervision  -DN      Assistive Device (Toilet Transfer)  grab bars/safety frame;raised toilet seat   -DN      Recorded by [DN] Prince Obrien, OT      Row Name 05/05/20 5839             Shower Transfer    Type (Shower Transfer)  stand pivot/stand step  -DN      [place] Level (Shower Transfer)  contact guard;verbal cues  -DN      Assistive Device (Shower Transfer)  wheelchair;tub bench;grab bars/tub rail  -DN      Recorded by [DN] Prince Obrien, OT      Row Name 05/05/20 0913             Gait/Stairs Assessment/Training    [place] Level (Gait)  contact guard;stand by assist;verbal cues  -EE      Assistive Device (Gait)  walker, front-wheeled  -EE      Distance in Feet (Gait)  240', 160' x 2  -EE      Pattern (Gait)  step-through  -EE      Deviations/Abnormal Patterns (Gait)  stride length decreased  -EE      Bilateral Gait Deviations  weight shift ability decreased  -EE      Left Sided Gait Deviations  heel strike decreased  -EE      [place] Level (Stairs)  minimum assist (75% patient effort);contact guard;verbal cues  -EE      Assistive Device (Stairs)  -- quad tip cane  -EE      Handrail Location (Stairs)  left side (ascending)  -EE      Number of Steps (Stairs)  4  -EE      Ascending Technique (Stairs)  step-to-step  -EE      Descending Technique (Stairs)  step-to-step  -EE      Stairs, Safety Issues  sequencing ability decreased;balance decreased during turns  -EE      Stairs, Impairments  strength decreased;impaired balance  -EE      Comment (Gait/Stairs)  Vc's required for sequencing on stairs. Amb 2 x 30' @ hemibars with CGA/Min.   -EE      Recorded by [EE] Irlanda Wallis PT      Row Name 05/05/20 0990             Safety Issues, Functional Mobility    Impairments Affecting Function (Mobility)  balance;endurance/activity tolerance;strength;sensation/sensory awareness  -EE      Recorded by [EE] Irlanda Wallis, AVINASH      Row Name 05/05/20 8753             Bathing Assessment/Treatment    Bathing [place] Level  bathing skills;supervision  -DN      Assistive Device (Bathing)  tub bench;hand held  shower spray hose;grab bar/tub rail  -DN      Bathing Position  supported sitting;supported standing  -DN      Recorded by [KAYLEN] Prince Obrien OT      Row Name 05/05/20 1206             Upper Body Dressing Assessment/Treatment    Upper Body Dressing Task  upper body dressing skills;don;pull over garment;bra/undergarment;supervision;verbal cues  -DN      Upper Body Dressing Position  supported sitting  -DN      Set-up Assistance (Upper Body Dressing)  obtain clothing  -DN      Recorded by [KAYLEN] Prince Obrien OT      Row Name 05/05/20 1206             Lower Body Dressing Assessment/Treatment    Lower Body Dressing [place] Level  don;pants/bottoms;socks;supervision  -DN      Lower Body Dressing Position  edge of bed sitting  -DN      Lower Body Dressing Setup Assistance  obtain clothing  -DN      Recorded by [KAYLEN] Prince Obrien OT      Row Name 05/05/20 1206             Grooming Assessment/Treatment    Grooming [place] Level  grooming skills;hair care, combing/brushing;oral care regimen;supervision  -DN      Grooming Position  supported sitting  -DN      Recorded by [KAYLEN] Prince Obrien OT      Row Name 05/05/20 1206             Toileting Assessment/Treatment    Toileting [place] Level  toileting skills;supervision  -DN      Assistive Device Use (Toileting)  grab bar/safety frame;raised toilet seat  -DN      Toileting Position  supported sitting;supported standing  -DN      Comment (Toileting)  RWX in place  -DN      Recorded by [KAYLEN] Prince Obrien OT      Row Name 05/05/20 1550 05/05/20 1206          Pain Scale: Numbers Pre/Post-Treatment    Pain Scale: Numbers, Pretreatment  0/10 - no pain  -DN  2/10  -DN     Pain Scale: Numbers, Post-Treatment  0/10 - no pain  -DN  2/10  -DN     Pain Location - Side  --  Bilateral  -DN     Pain Location - Orientation  --  distal  -DN     Pain Location  --  hand  -DN     Pain Intervention(s)  --  Repositioned  -DN     Recorded by [KAYLEN] Prince Obrien OT [DN]  Prince Obrien OT     Row Name 05/05/20 1550             Static Standing Balance    Level of [place] (Supported Standing, Static Balance)  contact guard assist bean bags, rings and square on dowels  -DN      Assistive Device Utilized (Supported Standing, Static Balance)  -- standing with BUE grasp activities with lateral bending  -DN      Recorded by [DN] Prince Obrien OT      Row Name 05/05/20 1206             Upper Extremity Seated Therapeutic Exercise    Performed, Seated Upper Extremity (Therapeutic Exercise)  shoulder flexion/extension;shoulder abduction/adduction;shoulder external/internal rotation;digit flexion/extension;wrist flexion/extension;forearm supination/pronation;elbow flexion/extension  -DN      Device, Seated Upper Extremity (Therapeutic Exercise)  elastic bands/tubing;free weights, barbell  -DN      Exercise Type, Seated Upper Extremity (Therapeutic Exercise)  AROM (active range of motion)  -DN      Expected Outcomes, Seated Upper Extremity (Therapeutic Exercise)  improve functional tolerance, self-care activity;improve performance, transfer skills  -DN      Sets/Reps Detail, Seated Upper Extremity (Therapeutic Exercise)  2# dumbells, 3# dowel, yellow theraband  -DN      Transfers Skills, Training to Functional Activity, Seated Upper Extremity (Therapeutic Exercise)  transfers skills to functional activity most of the time  -DN      Recorded by [DN] Prince Obrien OT      Row Name 05/05/20 1550 05/05/20 1206 05/05/20 0945       Positioning and Restraints    Pre-Treatment Position  sitting in chair/recliner  -DN  in bed  -DN  sitting in chair/recliner  -EE    Post Treatment Position  wheelchair  -DN  wheelchair  -DN  --    In Bed  sitting;call light within reach;exit alarm on  -DN  sitting;call light within reach;encouraged to call for assist  -DN  --    Recorded by [DN] Prince Obrien OT [DN] Prince Obrien OT [EE] Irlanda Wallis PT      User Key  (r) = Recorded By, (t) = Taken By, (c)  = Cosigned By    Initials Name Effective Dates    DN Camille Prince, OT 06/08/18 -     EE Bimal Irlanda, PT 04/03/18 -                  OT Recommendation and Plan    Anticipated Equipment Needs At Discharge (OT Eval): commode, bedside without drop arms, shower chair  Planned Therapy Interventions (OT Eval): activity tolerance training, BADL retraining, functional balance retraining, neuromuscular control/coordination retraining, strengthening exercise, transfer/mobility retraining            OT IRF GOALS     Row Name 05/04/20 1500 04/28/20 1200          Transfer Goal 1 (OT-IRF)    Activity/Assistive Device (Transfer Goal 1, OT-IRF)  toilet;tub  -DN  toilet;tub  -DN     [place] Level (Transfer Goal 1, OT-IRF)  supervision required;contact guard assist  -DN  contact guard assist with RWX  -DN     Time Frame (Transfer Goal 1, OT-IRF)  short term goal (STG)  -DN  short term goal (STG)  -DN     Progress/Outcomes (Transfer Goal 1, OT-IRF)  goal revised this date;goal partially met  -DN  goal met;goal revised this date  -DN        Transfer Goal 2 (OT-IRF)    Activity/Assistive Device (Transfer Goal 2, OT-IRF)  toilet;tub;walker, rolling  -DN  toilet;tub;walker, rolling  -DN     [place] Level (Transfer Goal 2, OT-IRF)  conditional independence  -DN  conditional independence  -DN     Time Frame (Transfer Goal 2, OT-IRF)  long term goal (LTG)  -DN  long term goal (LTG)  -DN     Progress/Outcomes (Transfer Goal 2, OT-IRF)  goal ongoing  -DN  goal revised this date  -DN        Bathing Goal 1 (OT-IRF)    Activity/Device (Bathing Goal 1, OT-IRF)  bathing skills, all  -DN  bathing skills, all  -DN     [place] Level (Bathing Goal 1, OT-IRF)  conditional independence  -DN  supervision required  -DN     Time Frame (Bathing Goal 1, OT-IRF)  short term goal (STG)  -DN  short term goal (STG)  -DN     Progress/Outcomes (Bathing Goal 1, OT-IRF)  goal met;goal revised this date  -DN  goal met;goal revised this date  -DN         Bathing Goal 2 (OT-IRF)    Activity/Device (Bathing Goal 2, OT-IRF)  bathing skills, all  -DN  bathing skills, all  -DN     [place] Level (Bathing Goal 2, OT-IRF)  conditional independence  -DN  conditional independence  -DN     Time Frame (Bathing Goal 2, OT-IRF)  long term goal (LTG)  -DN  long term goal (LTG)  -DN     Progress/Outcomes (Bathing Goal 2, OT-IRF)  goal ongoing;goal partially met  -DN  goal ongoing  -DN        UB Dressing Goal 1 (OT-IRF)    Activity/Device (UB Dressing Goal 1, OT-IRF)  upper body dressing  -DN  upper body dressing  -DN     [place] (UB Dress Goal 1, OT-IRF)  set-up required  -DN  set-up required  -DN     Time Frame (UB Dressing Goal 1, OT-IRF)  short term goal (STG)  -DN  short term goal (STG)  -DN     Progress/Outcomes (UB Dressing Goal 1, OT-IRF)  goal met  -DN  goal ongoing;goal not met  -DN        UB Dressing Goal 2 (OT-IRF)    Activity/Device (UB Dressing Goal 2, OT-IRF)  upper body dressing  -DN  upper body dressing  -DN     [place] (UB Dress Goal 2, OT-IRF)  conditional independence  -DN  conditional independence  -DN     Time Frame (UB Dressing Goal 2, OT-IRF)  long term goal (LTG)  -DN  long term goal (LTG)  -DN     Progress/Outcomes (UB Dressing Goal 2, OT-IRF)  goal ongoing  -DN  goal ongoing  -DN        LB Dressing Goal 1 (OT-IRF)    Activity/Device (LB Dressing Goal 1, OT-IRF)  lower body dressing  -DN  lower body dressing  -DN     [place] (LB Dressing Goal 1, OT-IRF)  supervision required  -DN  supervision required  -DN     Time Frame (LB Dressing Goal 1, OT-IRF)  short term goal (STG)  -DN  short term goal (STG)  -DN     Progress/Outcomes (LB Dressing Goal 1, OT-IRF)  goal met;goal revised this date  -DN  goal revised this date  -DN        LB Dressing Goal 2 (OT-IRF)    Activity/Device (LB Dressing Goal 2, OT-IRF)  lower body dressing  -DN  lower body dressing  -DN     [place] (LB Dressing Goal 2, OT-IRF)  conditional independence  -DN   conditional independence  -DN     Time Frame (LB Dressing Goal 2, OT-IRF)  long term goal (LTG)  -DN  long term goal (LTG)  -DN     Progress/Outcomes (LB Dressing Goal 2, OT-IRF)  goal ongoing  -DN  goal revised this date  -DN        Grooming Goal 1 (OT-IRF)    Activity/Device (Grooming Goal 1, OT-IRF)  grooming skills, all seated at sink  -DN  grooming skills, all seated at sink  -DN     [place] (Grooming Goal 1, OT-IRF)  conditional independence  -DN  conditional independence  -DN     Time Frame (Grooming Goal 1, OT-IRF)  short term goal (STG)  -DN  short term goal (STG)  -DN     Progress/Outcomes (Grooming Goal 1, OT-IRF)  goal ongoing  -DN  goal revised this date  -DN        Grooming Goal 2 (OT-IRF)    Activity/Device (Grooming Goal 2, OT-IRF)  grooming skills, all standing at sink  -DN  grooming skills, all standing at sink  -DN     [place] (Grooming Goal 2, OT-IRF)  conditional independence  -DN  conditional independence  -DN     Time Frame (Grooming Goal 2, OT-IRF)  long term goal (LTG)  -DN  long term goal (LTG)  -DN     Progress/Outcomes (Grooming Goal 2, OT-IRF)  goal ongoing  -DN  goal ongoing  -DN        Toileting Goal 1 (OT-IRF)    Activity/Device (Toileting Goal 1, OT-IRF)  toileting skills, all  -DN  toileting skills, all  -DN     [place] Level (Toileting Goal 1, OT-IRF)  verbal cues required;supervision required  -DN  verbal cues required;supervision required  -DN     Time Frame (Toileting Goal 1, OT-IRF)  short term goal (STG)  -DN  short term goal (STG)  -DN     Progress/Outcomes (Toileting Goal 1, OT-IRF)  goal met;goal revised this date  -DN  goal met;goal revised this date  -DN        Toileting Goal 2 (OT-IRF)    Activity/Device (Toileting Goal 2, OT-IRF)  toileting skills, all;commode chair  -DN  toileting skills, all;commode chair  -DN     [place] Level (Toileting Goal 2, OT-IRF)  conditional independence;tactile cues required;verbal cues required  -DN  conditional  independence;tactile cues required;verbal cues required  -DN     Time Frame (Toileting Goal 2, OT-IRF)  long term goal (LTG)  -DN  long term goal (LTG)  -DN     Progress/Outcomes (Toileting Goal 2, OT-IRF)  goal ongoing  -DN  goal ongoing  -DN        Strength Goal 1 (OT-IRF)    Strength Goal 1 (OT-IRF)  pt to be setup with BUE HEP  -DN  pt to be setup with BUE HEP  -DN     Time Frame (Strength Goal 1, OT-IRF)  short term goal (STG)  -DN  short term goal (STG)  -DN     Progress/Outcomes (Strength Goal 1, OT-IRF)  goal met  -DN  goal revised this date  -DN        Strength Goal 2 (OT-IRF)    Strength Goal 2 (OT-IRF)  pt to be independent with HEP for BUEs  -DN  pt to be independent with HEP for BUEs  -DN     Time Frame (Strength Goal 2, OT-IRF)  long term goal (LTG)  -DN  long term goal (LTG)  -DN     Progress/Outcomes (Strength Goal 2, OT-IRF)  goal ongoing  -DN  goal ongoing  -DN        Balance Goal 1 (OT)    Activity/Assistive Device (Balance Goal 1, OT)  assistive device use  -DN  assistive device use  -DN     [place] Level/Cues Needed (Balance Goal 1, OT)  contact guard assist during adls  -DN  contact guard assist during adls  -DN     Time Frame (Balance Goal 1, OT)  short term goal (STG)  -DN  short term goal (STG)  -DN     Progress/Outcomes (Balance Goal 1, OT)  goal met;goal revised this date  -DN  goal ongoing  -DN        Balance Goal 2 (OT)    Activity/Assistive Device (Balance Goal 2, OT)  assistive device use;standing, dynamic;walker, rolling  -DN  assistive device use;standing, dynamic;walker, rolling  -DN     [place] Level (Balance Goal 2, OT)  conditional independence during adls and home mgmt  -DN  supervision required during adls and home mgmt  -DN     Time Frame (Balance Goal 2, OT)  long term goal (LTG)  -DN  long term goal (LTG)  -DN     Progress/Outcome (Balance Goal 2, OT)  goal ongoing  -DN  goal ongoing  -DN        Caregiver Training Goal 2 (OT-IRF)    Caregiver Training Goal 2  (OT-IRF)  pt family or available caregivers to be independent with assist needed with adls, transfers etc  -DN  pt family or available caregivers to be independent with assist needed with adls, transfers etc  -DN     Time Frame (Caregiver Training Goal 2, OT-IRF)  long term goal (LTG)  -DN  long term goal (LTG)  -DN     Progress/Outcomes (Caregiver Training Goal 2, OT-IRF)  goal ongoing  -DN  goal ongoing  -DN       User Key  (r) = Recorded By, (t) = Taken By, (c) = Cosigned By    Initials Name Provider Type    Prince Morfin OT Occupational Therapist                     Time Calculation:     Time Calculation- OT     Row Name 05/05/20 1554 05/05/20 1553 05/05/20 1217       Time Calculation- OT    OT Start Time  1500  -DN  1300  -DN  1030  -DN    OT Stop Time  1530  -DN  1330  -DN  1100  -DN    OT Time Calculation (min)  30 min  -DN  30 min  -DN  30 min  -DN    OT Received On  05/05/20  -DN  05/05/20  -DN  05/05/20  -DN    Row Name 05/05/20 0845             Time Calculation- OT    OT Start Time  0845  -DN      OT Stop Time  0900  -DN      OT Time Calculation (min)  15 min  -DN      OT Received On  05/05/20  -DN        User Key  (r) = Recorded By, (t) = Taken By, (c) = Cosigned By    Initials Name Provider Type    Prince Morfin OT Occupational Therapist          Therapy Charges for Today     Code Description Service Date Service Provider Modifiers Qty    37970867511 HC OT SELF CARE/MGMT/TRAIN EA 15 MIN 5/4/2020 Prince Obrien OT GO 4    27584781706 HC OT THER PROC EA 15 MIN 5/4/2020 Prince Obrien OT GO 2    84822395657 HC OT SELF CARE/MGMT/TRAIN EA 15 MIN 5/5/2020 Prince Obrien OT GO 1    64096729727 HC OT THER PROC EA 15 MIN 5/5/2020 Prince Obrien OT GO 2    61729052012 HC OT SELF CARE/MGMT/TRAIN EA 15 MIN 5/5/2020 Prince Obrien OT GO 1    71419471348 HC OT THER PROC EA 15 MIN 5/5/2020 Prince Obrien OT GO 2    09692998194 HC OT NEUROMUSC RE EDUCATION EA 15 MIN 5/5/2020 Prince Obrien, OT GO 2     37145922080 HC OT SELF CARE/MGMT/TRAIN EA 15 MIN 5/5/2020 Prince Obrien, OT GO 3                   Prince Obrien OT  5/5/2020

## 2020-05-05 NOTE — THERAPY TREATMENT NOTE
Inpatient Rehabilitation - Occupational Therapy Treatment Note    [facility]     Patient Name: Oralia Sánchez  : 1975  MRN: [id number]    Today's Date: 2020                 Admit Date: 2020      Visit Dx:  No diagnosis found.    Patient Active Problem List   Diagnosis   • Vitamin D deficiency   • Awareness of heartbeats   • Adiposity   • YOUNG (nonalcoholic steatohepatitis)   • Hypothyroid   • Diabetes mellitus arising in pregnancy   • Diabetes (CMS/HCC)   • Metabolic syndrome   • Chest pain   • Primary thunderclap headache   • Elevated LFTs   • Tobacco abuse   • Rheumatoid arthritis (CMS/HCC)   • Type 2 diabetes mellitus (CMS/HCC)   • Morbid obesity (CMS/HCC)   • UTI (urinary tract infection), bacterial   • Cerebral aneurysm   • Dyspnea   • Cough   • Hypomagnesemia   • Metabolic acidosis   • Fatigue   • History of COPD   • Abnormal CT scan, colon   • 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)   • Essential hypertension   • GBS (Guillain Twining syndrome) (CMS/HCC)   • Elevated transaminase level   • History of gestational diabetes   • Steroid-induced hyperglycemia   • Elevated aldolase level         Therapy Treatment    IRF Treatment Summary     Row Name 20 1206 20 0945          Evaluation/Treatment Time and Intent    Subjective Information  no complaints  -DN  complains of;numbness B LEs  -EE     Existing Precautions/Restrictions  fall  -DN  fall  -EE     Document Type  therapy note (daily note)  -DN  therapy note (daily note)  -EE     Mode of Treatment  occupational therapy  -DN  physical therapy;individual therapy  -EE     Patient/Family Observations  pt supine in bed  -DN  Pt sitting up in WC in am in no acute distress.  -EE     Recorded by [DN] Prince Obrien, OT [EE] Irlanda Wallis, PT     Row Name 20 1206 20 0945          Cognition/Psychosocial- PT/OT    Affect/Mental Status (Cognitive)  WNL  -DN  --     Orientation Status (Cognition)  oriented x 4  -DN  oriented x 4   -EE     Follows Commands (Cognition)  --  WNL  -EE     Personal Safety Interventions  fall prevention program maintained;gait belt  -DN  fall prevention program maintained;gait belt;muscle strengthening facilitated;nonskid shoes/slippers when out of bed;supervised activity  -EE     Cognitive Function (Cognitive)  memory deficit  -DN  --     Memory Deficit (Cognitive)  mild deficit  -DN  --     Recorded by [DN] Prince Obrien, OT [EE] Irlanda Wallis, PT     Row Name 05/05/20 0945             Bed Mobility Assessment/Treatment    Sit-Supine [place] (Bed Mobility)  conditional independence  -EE      Recorded by [EE] Irlanda Wallis, PT      Row Name 05/05/20 1206 05/05/20 0945          Bed-Chair Transfer    Bed-Chair [place] (Transfers)  supervision  -DN  stand by assist;verbal cues  -EE     Assistive Device (Bed-Chair Transfers)  wheelchair  -DN  walker, front-wheeled;wheelchair  -EE     Recorded by [DN] Prince Obrien, OT [EE] Irlanda Wallis, PT     Row Name 05/05/20 0945             Sit-Stand Transfer    Sit-Stand [place] (Transfers)  stand by assist;contact guard;verbal cues  -EE      Assistive Device (Sit-Stand Transfers)  walker, front-wheeled;wheelchair  -EE      Recorded by [EE] Irlanda Wallis, PT      Row Name 05/05/20 0945             Stand-Sit Transfer    Stand-Sit [place] (Transfers)  stand by assist;verbal cues  -EE      Assistive Device (Stand-Sit Transfers)  walker, front-wheeled;wheelchair  -EE      Recorded by [EE] Irlanda Wallis, PT      Row Name 05/05/20 1206             Toilet Transfer    Type (Toilet Transfer)  stand pivot/stand step  -DN      [place] Level (Toilet Transfer)  supervision  -DN      Assistive Device (Toilet Transfer)  grab bars/safety frame;raised toilet seat  -DN      Recorded by [DN] Prince Obrien, OT      Row Name 05/05/20 0945             Gait/Stairs Assessment/Training    [place] Level (Gait)  contact guard;verbal cues  -EE      Assistive Device (Gait)   walker, front-wheeled  -EE      Distance in Feet (Gait)  160'  -EE      Pattern (Gait)  step-through  -EE      Deviations/Abnormal Patterns (Gait)  stride length decreased  -EE      Bilateral Gait Deviations  weight shift ability decreased  -EE      Left Sided Gait Deviations  heel strike decreased  -EE      [place] Level (Stairs)  minimum assist (75% patient effort);contact guard;verbal cues  -EE      Assistive Device (Stairs)  -- quad tip cane  -EE      Handrail Location (Stairs)  left side (ascending)  -EE      Number of Steps (Stairs)  4  -EE      Ascending Technique (Stairs)  step-to-step  -EE      Descending Technique (Stairs)  step-to-step  -EE      Stairs, Safety Issues  sequencing ability decreased;balance decreased during turns  -EE      Stairs, Impairments  strength decreased;impaired balance  -EE      Comment (Gait/Stairs)  vc's required for sequencing on stairs  -EE      Recorded by [EE] Irlanda Wallis, PT      Row Name 05/05/20 0945             Safety Issues, Functional Mobility    Impairments Affecting Function (Mobility)  balance;endurance/activity tolerance;strength;sensation/sensory awareness  -EE      Recorded by [EE] Irlanda Wallis, PT      Row Name 05/05/20 1206             Upper Body Dressing Assessment/Treatment    Upper Body Dressing Task  upper body dressing skills;don;pull over garment;bra/undergarment;supervision;verbal cues  -DN      Upper Body Dressing Position  supported sitting  -DN      Set-up Assistance (Upper Body Dressing)  obtain clothing  -DN      Recorded by [DN] Prince Obrien, OT      Row Name 05/05/20 1206             Lower Body Dressing Assessment/Treatment    Lower Body Dressing [place] Level  don;pants/bottoms;socks;supervision  -DN      Lower Body Dressing Position  edge of bed sitting  -DN      Lower Body Dressing Setup Assistance  obtain clothing  -DN      Recorded by [DN] Prince Obrien, OT      Row Name 05/05/20 1206             Grooming Assessment/Treatment     Grooming [place] Level  grooming skills;hair care, combing/brushing;oral care regimen;supervision  -DN      Grooming Position  supported sitting  -DN      Recorded by [DN] Prince Obrien OT      Row Name 05/05/20 1206             Toileting Assessment/Treatment    Toileting [place] Level  toileting skills;supervision  -DN      Assistive Device Use (Toileting)  grab bar/safety frame;raised toilet seat  -DN      Toileting Position  supported sitting;supported standing  -DN      Comment (Toileting)  RWX in place  -DN      Recorded by [DN] Prince Obrien, OT      Row Name 05/05/20 1206             Pain Scale: Numbers Pre/Post-Treatment    Pain Scale: Numbers, Pretreatment  2/10  -DN      Pain Scale: Numbers, Post-Treatment  2/10  -DN      Pain Location - Side  Bilateral  -DN      Pain Location - Orientation  distal  -DN      Pain Location  hand  -DN      Pain Intervention(s)  Repositioned  -DN      Recorded by [DN] Prince Obrien OT      Row Name 05/05/20 1206             Upper Extremity Seated Therapeutic Exercise    Performed, Seated Upper Extremity (Therapeutic Exercise)  shoulder flexion/extension;shoulder abduction/adduction;shoulder external/internal rotation;digit flexion/extension;wrist flexion/extension;forearm supination/pronation;elbow flexion/extension  -DN      Device, Seated Upper Extremity (Therapeutic Exercise)  elastic bands/tubing;free weights, barbell  -DN      Exercise Type, Seated Upper Extremity (Therapeutic Exercise)  AROM (active range of motion)  -DN      Expected Outcomes, Seated Upper Extremity (Therapeutic Exercise)  improve functional tolerance, self-care activity;improve performance, transfer skills  -DN      Sets/Reps Detail, Seated Upper Extremity (Therapeutic Exercise)  2# dumbells, 3# dowel, yellow theraband  -DN      Transfers Skills, Training to Functional Activity, Seated Upper Extremity (Therapeutic Exercise)  transfers skills to functional activity most of the time  -DN       Recorded by [DN] Prince Obrien OT      Row Name 05/05/20 1206 05/05/20 0945          Positioning and Restraints    Pre-Treatment Position  in bed  -DN  sitting in chair/recliner  -EE     Post Treatment Position  wheelchair  -DN  --     In Bed  sitting;call light within reach;encouraged to call for assist  -DN  --     Recorded by [DN] Prince Obrien, OT [EE] Irlanda Wallis, PT       User Key  (r) = Recorded By, (t) = Taken By, (c) = Cosigned By    Initials Name Effective Dates    Prince Morfin OT 06/08/18 -     EE Irlanda Wallis, PT 04/03/18 -                  OT Recommendation and Plan    Anticipated Equipment Needs At Discharge (OT Eval): commode, bedside without drop arms, shower chair  Planned Therapy Interventions (OT Eval): activity tolerance training, BADL retraining, functional balance retraining, neuromuscular control/coordination retraining, strengthening exercise, transfer/mobility retraining            OT IRF GOALS     Row Name 05/04/20 1500 04/28/20 1200          Transfer Goal 1 (OT-IRF)    Activity/Assistive Device (Transfer Goal 1, OT-IRF)  toilet;tub  -DN  toilet;tub  -DN     [place] Level (Transfer Goal 1, OT-IRF)  supervision required;contact guard assist  -DN  contact guard assist with RWX  -DN     Time Frame (Transfer Goal 1, OT-IRF)  short term goal (STG)  -DN  short term goal (STG)  -DN     Progress/Outcomes (Transfer Goal 1, OT-IRF)  goal revised this date;goal partially met  -DN  goal met;goal revised this date  -DN        Transfer Goal 2 (OT-IRF)    Activity/Assistive Device (Transfer Goal 2, OT-IRF)  toilet;tub;walker, rolling  -DN  toilet;tub;walker, rolling  -DN     [place] Level (Transfer Goal 2, OT-IRF)  conditional independence  -DN  conditional independence  -DN     Time Frame (Transfer Goal 2, OT-IRF)  long term goal (LTG)  -DN  long term goal (LTG)  -DN     Progress/Outcomes (Transfer Goal 2, OT-IRF)  goal ongoing  -DN  goal revised this date  -DN        Bathing Goal  1 (OT-IRF)    Activity/Device (Bathing Goal 1, OT-IRF)  bathing skills, all  -DN  bathing skills, all  -DN     [place] Level (Bathing Goal 1, OT-IRF)  conditional independence  -DN  supervision required  -DN     Time Frame (Bathing Goal 1, OT-IRF)  short term goal (STG)  -DN  short term goal (STG)  -DN     Progress/Outcomes (Bathing Goal 1, OT-IRF)  goal met;goal revised this date  -DN  goal met;goal revised this date  -DN        Bathing Goal 2 (OT-IRF)    Activity/Device (Bathing Goal 2, OT-IRF)  bathing skills, all  -DN  bathing skills, all  -DN     [place] Level (Bathing Goal 2, OT-IRF)  conditional independence  -DN  conditional independence  -DN     Time Frame (Bathing Goal 2, OT-IRF)  long term goal (LTG)  -DN  long term goal (LTG)  -DN     Progress/Outcomes (Bathing Goal 2, OT-IRF)  goal ongoing;goal partially met  -DN  goal ongoing  -DN        UB Dressing Goal 1 (OT-IRF)    Activity/Device (UB Dressing Goal 1, OT-IRF)  upper body dressing  -DN  upper body dressing  -DN     [place] (UB Dress Goal 1, OT-IRF)  set-up required  -DN  set-up required  -DN     Time Frame (UB Dressing Goal 1, OT-IRF)  short term goal (STG)  -DN  short term goal (STG)  -DN     Progress/Outcomes (UB Dressing Goal 1, OT-IRF)  goal met  -DN  goal ongoing;goal not met  -DN        UB Dressing Goal 2 (OT-IRF)    Activity/Device (UB Dressing Goal 2, OT-IRF)  upper body dressing  -DN  upper body dressing  -DN     [place] (UB Dress Goal 2, OT-IRF)  conditional independence  -DN  conditional independence  -DN     Time Frame (UB Dressing Goal 2, OT-IRF)  long term goal (LTG)  -DN  long term goal (LTG)  -DN     Progress/Outcomes (UB Dressing Goal 2, OT-IRF)  goal ongoing  -DN  goal ongoing  -DN        LB Dressing Goal 1 (OT-IRF)    Activity/Device (LB Dressing Goal 1, OT-IRF)  lower body dressing  -DN  lower body dressing  -DN     [place] (LB Dressing Goal 1, OT-IRF)  supervision required  -DN  supervision required  -DN      Time Frame (LB Dressing Goal 1, OT-IRF)  short term goal (STG)  -DN  short term goal (STG)  -DN     Progress/Outcomes (LB Dressing Goal 1, OT-IRF)  goal met;goal revised this date  -DN  goal revised this date  -DN        LB Dressing Goal 2 (OT-IRF)    Activity/Device (LB Dressing Goal 2, OT-IRF)  lower body dressing  -DN  lower body dressing  -DN     [place] (LB Dressing Goal 2, OT-IRF)  conditional independence  -DN  conditional independence  -DN     Time Frame (LB Dressing Goal 2, OT-IRF)  long term goal (LTG)  -DN  long term goal (LTG)  -DN     Progress/Outcomes (LB Dressing Goal 2, OT-IRF)  goal ongoing  -DN  goal revised this date  -DN        Grooming Goal 1 (OT-IRF)    Activity/Device (Grooming Goal 1, OT-IRF)  grooming skills, all seated at sink  -DN  grooming skills, all seated at sink  -DN     [place] (Grooming Goal 1, OT-IRF)  conditional independence  -DN  conditional independence  -DN     Time Frame (Grooming Goal 1, OT-IRF)  short term goal (STG)  -DN  short term goal (STG)  -DN     Progress/Outcomes (Grooming Goal 1, OT-IRF)  goal ongoing  -DN  goal revised this date  -DN        Grooming Goal 2 (OT-IRF)    Activity/Device (Grooming Goal 2, OT-IRF)  grooming skills, all standing at sink  -DN  grooming skills, all standing at sink  -DN     [place] (Grooming Goal 2, OT-IRF)  conditional independence  -DN  conditional independence  -DN     Time Frame (Grooming Goal 2, OT-IRF)  long term goal (LTG)  -DN  long term goal (LTG)  -DN     Progress/Outcomes (Grooming Goal 2, OT-IRF)  goal ongoing  -DN  goal ongoing  -DN        Toileting Goal 1 (OT-IRF)    Activity/Device (Toileting Goal 1, OT-IRF)  toileting skills, all  -DN  toileting skills, all  -DN     [place] Level (Toileting Goal 1, OT-IRF)  verbal cues required;supervision required  -DN  verbal cues required;supervision required  -DN     Time Frame (Toileting Goal 1, OT-IRF)  short term goal (STG)  -DN  short term goal (STG)  -DN      Progress/Outcomes (Toileting Goal 1, OT-IRF)  goal met;goal revised this date  -DN  goal met;goal revised this date  -DN        Toileting Goal 2 (OT-IRF)    Activity/Device (Toileting Goal 2, OT-IRF)  toileting skills, all;commode chair  -DN  toileting skills, all;commode chair  -DN     [place] Level (Toileting Goal 2, OT-IRF)  conditional independence;tactile cues required;verbal cues required  -DN  conditional independence;tactile cues required;verbal cues required  -DN     Time Frame (Toileting Goal 2, OT-IRF)  long term goal (LTG)  -DN  long term goal (LTG)  -DN     Progress/Outcomes (Toileting Goal 2, OT-IRF)  goal ongoing  -DN  goal ongoing  -DN        Strength Goal 1 (OT-IRF)    Strength Goal 1 (OT-IRF)  pt to be setup with BUE HEP  -DN  pt to be setup with BUE HEP  -DN     Time Frame (Strength Goal 1, OT-IRF)  short term goal (STG)  -DN  short term goal (STG)  -DN     Progress/Outcomes (Strength Goal 1, OT-IRF)  goal met  -DN  goal revised this date  -DN        Strength Goal 2 (OT-IRF)    Strength Goal 2 (OT-IRF)  pt to be independent with HEP for BUEs  -DN  pt to be independent with HEP for BUEs  -DN     Time Frame (Strength Goal 2, OT-IRF)  long term goal (LTG)  -DN  long term goal (LTG)  -DN     Progress/Outcomes (Strength Goal 2, OT-IRF)  goal ongoing  -DN  goal ongoing  -DN        Balance Goal 1 (OT)    Activity/Assistive Device (Balance Goal 1, OT)  assistive device use  -DN  assistive device use  -DN     [place] Level/Cues Needed (Balance Goal 1, OT)  contact guard assist during adls  -DN  contact guard assist during adls  -DN     Time Frame (Balance Goal 1, OT)  short term goal (STG)  -DN  short term goal (STG)  -DN     Progress/Outcomes (Balance Goal 1, OT)  goal met;goal revised this date  -DN  goal ongoing  -DN        Balance Goal 2 (OT)    Activity/Assistive Device (Balance Goal 2, OT)  assistive device use;standing, dynamic;walker, rolling  -DN  assistive device use;standing,  dynamic;walker, rolling  -DN     [place] Level (Balance Goal 2, OT)  conditional independence during adls and home mgmt  -DN  supervision required during adls and home mgmt  -DN     Time Frame (Balance Goal 2, OT)  long term goal (LTG)  -DN  long term goal (LTG)  -DN     Progress/Outcome (Balance Goal 2, OT)  goal ongoing  -DN  goal ongoing  -DN        Caregiver Training Goal 2 (OT-IRF)    Caregiver Training Goal 2 (OT-IRF)  pt family or available caregivers to be independent with assist needed with adls, transfers etc  -DN  pt family or available caregivers to be independent with assist needed with adls, transfers etc  -DN     Time Frame (Caregiver Training Goal 2, OT-IRF)  long term goal (LTG)  -DN  long term goal (LTG)  -DN     Progress/Outcomes (Caregiver Training Goal 2, OT-IRF)  goal ongoing  -DN  goal ongoing  -DN       User Key  (r) = Recorded By, (t) = Taken By, (c) = Cosigned By    Initials Name Provider Type    Prince Morfin OT Occupational Therapist                     Time Calculation:     Time Calculation- OT     Row Name 05/05/20 1217 05/05/20 0845          Time Calculation- OT    OT Start Time  1030  -DN  0845  -DN     OT Stop Time  1100  -DN  0900  -DN     OT Time Calculation (min)  30 min  -DN  15 min  -DN     OT Received On  05/05/20  -DN  05/05/20  -DN       User Key  (r) = Recorded By, (t) = Taken By, (c) = Cosigned By    Initials Name Provider Type    Prince Morfin OT Occupational Therapist          Therapy Charges for Today     Code Description Service Date Service Provider Modifiers Qty    42993247255 HC OT SELF CARE/MGMT/TRAIN EA 15 MIN 5/4/2020 Prince Obrien OT GO 4    80694697641 HC OT THER PROC EA 15 MIN 5/4/2020 Prince Obrien OT GO 2    98190004984 HC OT SELF CARE/MGMT/TRAIN EA 15 MIN 5/5/2020 Prince Obrien OT GO 1    40678553900 HC OT THER PROC EA 15 MIN 5/5/2020 Prince Obrien OT GO 2                   Prince Obrien OT  5/5/2020

## 2020-05-05 NOTE — THERAPY TREATMENT NOTE
Inpatient Rehabilitation - Physical Therapy Treatment Note  [facility]     Patient Name: Oralia Sánchez  : 1975  MRN: [id number]    Today's Date: 2020                 Admit Date: 2020      Visit Dx:    No diagnosis found.    Patient Active Problem List   Diagnosis   • Vitamin D deficiency   • Awareness of heartbeats   • Adiposity   • YOUNG (nonalcoholic steatohepatitis)   • Hypothyroid   • Diabetes mellitus arising in pregnancy   • Diabetes (CMS/HCC)   • Metabolic syndrome   • Chest pain   • Primary thunderclap headache   • Elevated LFTs   • Tobacco abuse   • Rheumatoid arthritis (CMS/HCC)   • Type 2 diabetes mellitus (CMS/HCC)   • Morbid obesity (CMS/HCC)   • UTI (urinary tract infection), bacterial   • Cerebral aneurysm   • Dyspnea   • Cough   • Hypomagnesemia   • Metabolic acidosis   • Fatigue   • History of COPD   • Abnormal CT scan, colon   • 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)   • Essential hypertension   • GBS (Guillain Vulcan syndrome) (CMS/HCC)   • Elevated transaminase level   • History of gestational diabetes   • Steroid-induced hyperglycemia   • Elevated aldolase level       Therapy Treatment    IRF Treatment Summary     Row Name 20 1550 20 1206 20 0906       Evaluation/Treatment Time and Intent    Subjective Information  no complaints  -DN  no complaints  -DN  complains of;numbness B LEs  -EE    Existing Precautions/Restrictions  fall  -DN  fall  -DN  fall  -EE    Document Type  therapy note (daily note)  -DN  therapy note (daily note)  -DN  therapy note (daily note)  -EE    Mode of Treatment  occupational therapy  -DN  occupational therapy  -DN  physical therapy;individual therapy  -EE    Patient/Family Observations  pt sitting in bed  -DN  pt supine in bed  -DN  Pt sitting up in WC in no acute distress.  -EE    Recorded by [DN] Prince Obrien, OT [DN] Prince Obrien, OT [EE] Irlanda Wallis, PT    Row Name 20 1206 20 5189           Cognition/Psychosocial- PT/OT    Affect/Mental Status (Cognitive)  WNL  -DN  --     Orientation Status (Cognition)  oriented x 4  -DN  oriented x 4  -EE     Follows Commands (Cognition)  --  WNL  -EE     Personal Safety Interventions  fall prevention program maintained;gait belt  -DN  fall prevention program maintained;gait belt;muscle strengthening facilitated;nonskid shoes/slippers when out of bed;supervised activity  -EE     Cognitive Function (Cognitive)  memory deficit  -DN  --     Memory Deficit (Cognitive)  mild deficit  -DN  --     Recorded by [DN] Prince Obrien, OT [EE] Irlanda Wallis, PT     Row Name 05/05/20 0945             Bed Mobility Assessment/Treatment    Sit-Supine [place] (Bed Mobility)  conditional independence  -EE      Recorded by [EE] Irlanda Wallis, PT      Row Name 05/05/20 1550 05/05/20 1206 05/05/20 0945       Bed-Chair Transfer    Bed-Chair [place] (Transfers)  supervision  -DN  supervision  -DN  stand by assist;verbal cues  -EE    Assistive Device (Bed-Chair Transfers)  wheelchair  -DN  wheelchair  -DN  walker, front-wheeled;wheelchair  -EE    Recorded by [DN] Prince Obrien, OT [DN] Prince Obrien, OT [EE] Irlanda Wallis, PT    Row Name 05/05/20 0945             Sit-Stand Transfer    Sit-Stand [place] (Transfers)  stand by assist;contact guard;verbal cues  -EE      Assistive Device (Sit-Stand Transfers)  walker, front-wheeled;wheelchair  -EE      Recorded by [EE] Irlanda Wallis, PT      Row Name 05/05/20 0945             Stand-Sit Transfer    Stand-Sit [place] (Transfers)  stand by assist;verbal cues  -EE      Assistive Device (Stand-Sit Transfers)  walker, front-wheeled;wheelchair  -EE      Recorded by [EE] Irlanda Wallis, PT      Row Name 05/05/20 1206 05/05/20 0945          Toilet Transfer    Type (Toilet Transfer)  stand pivot/stand step  -DN  stand pivot/stand step  -EE     [place] Level (Toilet Transfer)  supervision  -DN  supervision  -EE     Assistive Device  (Toilet Transfer)  grab bars/safety frame;raised toilet seat  -DN  wheelchair;grab bars/safety frame  -EE     Recorded by [DN] Prince Obrien OT [EE] Irlanda Wallis PT     Row Name 05/05/20 1550             Shower Transfer    Type (Shower Transfer)  stand pivot/stand step  -DN      [place] Level (Shower Transfer)  contact guard;verbal cues  -DN      Assistive Device (Shower Transfer)  wheelchair;tub bench;grab bars/tub rail  -DN      Recorded by [DN] Prince Obrien, OT      Row Name 05/05/20 0935             Gait/Stairs Assessment/Training    [place] Level (Gait)  contact guard;stand by assist;verbal cues  -EE      Assistive Device (Gait)  walker, front-wheeled  -EE      Distance in Feet (Gait)  240', 160' x 2  -EE      Pattern (Gait)  step-through  -EE      Deviations/Abnormal Patterns (Gait)  stride length decreased  -EE      Bilateral Gait Deviations  weight shift ability decreased  -EE      Left Sided Gait Deviations  heel strike decreased  -EE      [place] Level (Stairs)  minimum assist (75% patient effort);contact guard;verbal cues  -EE      Assistive Device (Stairs)  -- quad tip cane  -EE      Handrail Location (Stairs)  left side (ascending)  -EE      Number of Steps (Stairs)  4  -EE      Ascending Technique (Stairs)  step-to-step  -EE      Descending Technique (Stairs)  step-to-step  -EE      Stairs, Safety Issues  sequencing ability decreased;balance decreased during turns  -EE      Stairs, Impairments  strength decreased;impaired balance  -EE      Comment (Gait/Stairs)  Vc's required for sequencing on stairs. Amb 2 x 30' @ hemibars with CGA/Min.   -EE      Recorded by [EE] Irlanda Wallis, PT      Row Name 05/05/20 0914             Safety Issues, Functional Mobility    Impairments Affecting Function (Mobility)  balance;endurance/activity tolerance;strength;sensation/sensory awareness  -EE      Recorded by [EE] Irlanda Wallis PT      Row Name 05/05/20 1550             Bathing  Assessment/Treatment    Bathing [place] Level  bathing skills;supervision  -DN      Assistive Device (Bathing)  tub bench;hand held shower spray hose;grab bar/tub rail  -DN      Bathing Position  supported sitting;supported standing  -DN      Recorded by [DN] Prince Obrien, OT      Row Name 05/05/20 1206             Upper Body Dressing Assessment/Treatment    Upper Body Dressing Task  upper body dressing skills;don;pull over garment;bra/undergarment;supervision;verbal cues  -DN      Upper Body Dressing Position  supported sitting  -DN      Set-up Assistance (Upper Body Dressing)  obtain clothing  -DN      Recorded by [DN] Prince Obrien, OT      Row Name 05/05/20 1206             Lower Body Dressing Assessment/Treatment    Lower Body Dressing [place] Level  don;pants/bottoms;socks;supervision  -DN      Lower Body Dressing Position  edge of bed sitting  -DN      Lower Body Dressing Setup Assistance  obtain clothing  -DN      Recorded by [DN] Prince Obrien, OT      Row Name 05/05/20 1206             Grooming Assessment/Treatment    Grooming [place] Level  grooming skills;hair care, combing/brushing;oral care regimen;supervision  -DN      Grooming Position  supported sitting  -DN      Recorded by [DN] Prince Obrien, OT      Row Name 05/05/20 1206             Toileting Assessment/Treatment    Toileting [place] Level  toileting skills;supervision  -DN      Assistive Device Use (Toileting)  grab bar/safety frame;raised toilet seat  -DN      Toileting Position  supported sitting;supported standing  -DN      Comment (Toileting)  RWX in place  -DN      Recorded by [DN] Prince Obrien, OT      Row Name 05/05/20 1550 05/05/20 1206 05/05/20 0945       Pain Scale: Numbers Pre/Post-Treatment    Pain Scale: Numbers, Pretreatment  0/10 - no pain  -DN  2/10  -DN  0/10 - no pain  -EE    Pain Scale: Numbers, Post-Treatment  0/10 - no pain  -DN  2/10  -DN  0/10 - no pain  -EE    Pain Location - Side  --   Bilateral  -DN  --    Pain Location - Orientation  --  distal  -DN  --    Pain Location  --  hand  -DN  --    Pain Intervention(s)  --  Repositioned  -DN  --    Recorded by [DN] Prince Obrien OT [DN] Prince Obrien OT [EE] Irlanda Wallis, PT    Row Name 05/05/20 1550             Static Standing Balance    Level of [place] (Supported Standing, Static Balance)  contact guard assist bean bags, rings and square on dowels  -DN      Assistive Device Utilized (Supported Standing, Static Balance)  -- standing with BUE grasp activities with lateral bending  -DN      Recorded by [DN] Prince Obrien, OT      Row Name 05/05/20 0945             Dynamic Balance Activity    Therapeutic Training Performed (Dynamic Balance)  backward walking;side stepping sidestepping 2 x 15', backward walking 2 x 8'  -EE      Support Needed for Balance (Dynamic Balance Training)  uses at least one upper extremity for support;CGA UE support on hemibars  -EE      Comment (Dynamic Balance Training)  stepping over low hurdles 2 x 8' in // bars, CGA for balance  -EE      Recorded by [EE] Irlanda Wallis, PT      Row Name 05/05/20 1206             Upper Extremity Seated Therapeutic Exercise    Performed, Seated Upper Extremity (Therapeutic Exercise)  shoulder flexion/extension;shoulder abduction/adduction;shoulder external/internal rotation;digit flexion/extension;wrist flexion/extension;forearm supination/pronation;elbow flexion/extension  -DN      Device, Seated Upper Extremity (Therapeutic Exercise)  elastic bands/tubing;free weights, barbell  -DN      Exercise Type, Seated Upper Extremity (Therapeutic Exercise)  AROM (active range of motion)  -DN      Expected Outcomes, Seated Upper Extremity (Therapeutic Exercise)  improve functional tolerance, self-care activity;improve performance, transfer skills  -DN      Sets/Reps Detail, Seated Upper Extremity (Therapeutic Exercise)  2# dumbells, 3# dowel, yellow theraband  -DN      Transfers Skills,  Training to Functional Activity, Seated Upper Extremity (Therapeutic Exercise)  transfers skills to functional activity most of the time  -DN      Recorded by [DN] Prince Obrien OT      Row Name 05/05/20 0945             Lower Extremity Standing Therapeutic Exercise    Performed, Standing Lower Extremity (Therapeutic Exercise)  heel raises;mini-squats;hip abduction/adduction;knee flexion/extension  -EE      Device, Standing Lower Extremity (Therapeutic Exercise)  marni bars  -EE      Exercise Type, Standing Lower Extremity (Therapeutic Exercise)  AROM (active range of motion)  -EE      Sets/Reps Detail, Standing Lower Extremity (Therapeutic Exercise)  1/10  -EE      Comment, Standing Lower Extremity (Therapeutic Exercise)  step ups in // bars, forward and laterally, 1 x10 with each LE; B UE support on bars  -EE      Recorded by [EE] Irlanda Wallis, AVINASH      Row Name 05/05/20 1550 05/05/20 1206 05/05/20 0945       Positioning and Restraints    Pre-Treatment Position  sitting in chair/recliner  -DN  in bed  -DN  sitting in chair/recliner  -EE    Post Treatment Position  wheelchair  -DN  wheelchair  -DN  bathroom  -EE    In Bed  sitting;call light within reach;exit alarm on  -DN  sitting;call light within reach;encouraged to call for assist  -DN  --    Bathroom  --  --  sitting;call light within reach;encouraged to call for assist;notified nsg nsg assistantIrlanda, notified pt in BR  -EE    Recorded by [DN] Prince Obrien OT [DN] Prince Obrien OT [EE] Irlanda Wallis, PT      User Key  (r) = Recorded By, (t) = Taken By, (c) = Cosigned By    Initials Name Effective Dates    Prince Morfin OT 06/08/18 -     EE Irlanda Wallis, AVINASH 04/03/18 -                  PT Recommendation and Plan                        Time Calculation:     PT Charges     Row Name 05/05/20 1602 05/05/20 1043          Time Calculation    Start Time  1330  -EE  0930  -EE     Stop Time  1430  -EE  1000  -EE     Time Calculation (min)  60 min  -EE  30 min   -EE     PT Received On  05/05/20  -EE  05/05/20  -EE     PT - Next Appointment  05/06/20  -EE  05/05/20  -EE        Time Calculation- PT    Total Timed Code Minutes- PT  60 minute(s)  -EE  30 minute(s)  -EE       User Key  (r) = Recorded By, (t) = Taken By, (c) = Cosigned By    Initials Name Provider Type    EE Irlanda Wallis PT Physical Therapist          Therapy Charges for Today     Code Description Service Date Service Provider Modifiers Qty    42756910685  GAIT TRAINING EA 15 MIN 5/5/2020 Irlanda Wallis, PT GP 2    94136809627  PT NEUROMUSC RE EDUCATION EA 15 MIN 5/5/2020 Irlanda Wallis, PT GP 2    06670427028 HC PT THER PROC EA 15 MIN 5/5/2020 Irlanda Wallis, PT GP 1    06768414930 HC PT THERAPEUTIC ACT EA 15 MIN 5/5/2020 Irlanda Wallis, PT GP 1    85491867423 HC PT THER SUPP EA 15 MIN 5/5/2020 Irlanda Wallis, PT GP 2              Patient was wearing a face mask during this therapy encounter. Therapist used appropriate personal protective equipment including mask and gloves.  Mask used was standard procedure mask. Appropriate PPE was worn during the entire therapy session. Hand hygiene was completed before and after therapy session. Patient is not in enhanced droplet precautions.         Irlanda Wallis PT  5/5/2020

## 2020-05-05 NOTE — PROGRESS NOTES
Occupational Therapy: Individual: 105 minutes.    Physical Therapy: Branch    Speech Language Pathology:  Branch    Signed by: SANDRA Marion/JOSÉ MIGUEL

## 2020-05-05 NOTE — PROGRESS NOTES
Inpatient Rehabilitation Plan of Care Note    Plan of Care  Care Plan Reviewed - Updates as Follows    Safety    Performed Intervention(s)  Bed alarm and /or chair alarm  Safety rounds and items within reach  Falls precautions/protocol  Uses call light appropriately  Environmental set-up to reduce risk  safety strategies and techniques      Psychosocial    Performed Intervention(s)  Allow the opportunity to verbalize needs and concerns  Medication as ordered  Therapuetic environmental set up      Body Systems    Performed Intervention(s)  Monitor labs and medication as ordered  Blood sugar testing- TID      Pain    Performed Intervention(s)  Relaxation or distraction  Medication as ordered  Modalities      Sphincter Control    Performed Intervention(s)  Monitor intake and output  Assist pt to bathroom in a timely manner  Encourage proper diet and fluid intake  Medication as ordered    Signed by: Dedrick Pichardo, RN

## 2020-05-05 NOTE — PROGRESS NOTES
Occupational Therapy: Branch    Physical Therapy: Individual: 90 minutes.    Speech Language Pathology:  Branch    Signed by: Irlanda Wallis DPT

## 2020-05-05 NOTE — PROGRESS NOTES
Inpatient Rehabilitation Functional Measures Assessment and Plan of Care    Plan of Care  Updated Problems/Interventions  Mobility    [OT] Toilet Transfers(Active)  Current Status(05/04/2020): SBA RWX  Weekly Goal(05/12/2020): Mod indep  Discharge Goal: Mod indep    [OT] Tub/Shower Transfers(Active)  Current Status(05/04/2020): CGA RWX  Weekly Goal(05/12/2020): SBA RWX  Discharge Goal: Mod Indep RWX        Self Care    [OT] Bathing(Active)  Current Status(05/04/2020): SBA  Weekly Goal(05/12/2020): Mod Indep  Discharge Goal: Mod indep    [OT] Dressing (Lower)(Active)  Current Status(04/21/2020): SBA  Weekly Goal(05/12/2020): Mod Indep  Discharge Goal: Mod Indep    [OT] Dressing (Upper)(Active)  Current Status(04/27/2020): Min with bra  Weekly Goal(05/05/2020):  Discharge Goal: Mod Indep    [OT] Grooming(Active)  Current Status(05/04/2020): SBA seated  Weekly Goal(05/12/2020): SBA standing  Discharge Goal: Mod Indep    [OT] Toileting(Active)  Current Status(05/04/2020): SBA  Weekly Goal(05/12/2020): Mod Indep  Discharge Goal: Mod Indep with toilet aid    Functional Measures  ADALID Eating:  Branch  ADALID Grooming: Branch  ADALID Bathing:  Branch  ADALID Upper Body Dressing:  Branch  ADALID Lower Body Dressing:  Branch  ADALID Toileting:  Branch    ADALID Bladder Management  Level of Assistance:  Branch  Frequency/Number of Accidents this Shift:  Branch    ADALID Bowel Management  Level of Assistance: Branch  Frequency/Number of Accidents this Shift: Branch    ADALID Bed/Chair/Wheelchair Transfer:  Branch  ADALID Toilet Transfer:  Branch  ADALID Tub/Shower Transfer:  Branch    Previously Documented Mode of Locomotion at Discharge: Field  ADALID Expected Mode of Locomotion at Discharge: Branch  ADALID Walk/Wheelchair:  Branch  ADALID Stairs:  Branch    ADALID Comprehension:  Branch  ADALID Expression:  Branch  ADALID Social Interaction:  Branch  ADALID Problem Solving:  Branch  ADALID Memory:  Branch    Therapy Mode Minutes  Occupational Therapy: Individual: 90  minutes.  Physical Therapy: Branch  Speech Language Pathology:  Branch    Signed by: SANDRA Valencia/JOSÉ MIGUEL

## 2020-05-06 LAB
GLUCOSE BLDC GLUCOMTR-MCNC: 106 MG/DL (ref 70–130)
GLUCOSE BLDC GLUCOMTR-MCNC: 135 MG/DL (ref 70–130)
GLUCOSE BLDC GLUCOMTR-MCNC: 227 MG/DL (ref 70–130)

## 2020-05-06 PROCEDURE — 97530 THERAPEUTIC ACTIVITIES: CPT

## 2020-05-06 PROCEDURE — 63710000001 DIPHENHYDRAMINE PER 50 MG: Performed by: INTERNAL MEDICINE

## 2020-05-06 PROCEDURE — 94799 UNLISTED PULMONARY SVC/PX: CPT

## 2020-05-06 PROCEDURE — 97116 GAIT TRAINING THERAPY: CPT

## 2020-05-06 PROCEDURE — 97535 SELF CARE MNGMENT TRAINING: CPT

## 2020-05-06 PROCEDURE — 97110 THERAPEUTIC EXERCISES: CPT

## 2020-05-06 PROCEDURE — 63710000001 INSULIN GLARGINE PER 5 UNITS: Performed by: INTERNAL MEDICINE

## 2020-05-06 PROCEDURE — 82962 GLUCOSE BLOOD TEST: CPT

## 2020-05-06 PROCEDURE — 63710000001 INSULIN LISPRO (HUMAN) PER 5 UNITS: Performed by: INTERNAL MEDICINE

## 2020-05-06 PROCEDURE — 63710000001 PREDNISONE PER 5 MG: Performed by: PHYSICAL MEDICINE & REHABILITATION

## 2020-05-06 PROCEDURE — 97112 NEUROMUSCULAR REEDUCATION: CPT

## 2020-05-06 RX ORDER — ROPINIROLE 0.5 MG/1
0.5 TABLET, FILM COATED ORAL NIGHTLY
Status: DISCONTINUED | OUTPATIENT
Start: 2020-05-06 | End: 2020-05-12 | Stop reason: HOSPADM

## 2020-05-06 RX ORDER — OXYCODONE HYDROCHLORIDE 5 MG/1
5 TABLET ORAL 2 TIMES DAILY PRN
Status: DISCONTINUED | OUTPATIENT
Start: 2020-05-06 | End: 2020-05-12

## 2020-05-06 RX ADMIN — AMLODIPINE BESYLATE 10 MG: 10 TABLET ORAL at 21:06

## 2020-05-06 RX ADMIN — OXYCODONE HYDROCHLORIDE 5 MG: 5 TABLET ORAL at 08:00

## 2020-05-06 RX ADMIN — INSULIN LISPRO 14 UNITS: 100 INJECTION, SOLUTION INTRAVENOUS; SUBCUTANEOUS at 08:03

## 2020-05-06 RX ADMIN — SUCRALFATE 1 G: 1 SUSPENSION ORAL at 05:42

## 2020-05-06 RX ADMIN — SUCRALFATE 1 G: 1 SUSPENSION ORAL at 21:06

## 2020-05-06 RX ADMIN — PANTOPRAZOLE SODIUM 40 MG: 40 TABLET, DELAYED RELEASE ORAL at 05:42

## 2020-05-06 RX ADMIN — SUCRALFATE 1 G: 1 SUSPENSION ORAL at 17:20

## 2020-05-06 RX ADMIN — BUDESONIDE AND FORMOTEROL FUMARATE DIHYDRATE 2 PUFF: 160; 4.5 AEROSOL RESPIRATORY (INHALATION) at 19:59

## 2020-05-06 RX ADMIN — DULOXETINE HYDROCHLORIDE 30 MG: 30 CAPSULE, DELAYED RELEASE ORAL at 08:03

## 2020-05-06 RX ADMIN — DIPHENHYDRAMINE HYDROCHLORIDE 25 MG: 25 CAPSULE ORAL at 00:27

## 2020-05-06 RX ADMIN — DIPHENHYDRAMINE HYDROCHLORIDE 25 MG: 25 CAPSULE ORAL at 16:07

## 2020-05-06 RX ADMIN — LEVOTHYROXINE SODIUM 200 MCG: 100 TABLET ORAL at 05:42

## 2020-05-06 RX ADMIN — PREGABALIN 100 MG: 100 CAPSULE ORAL at 08:00

## 2020-05-06 RX ADMIN — SUCRALFATE 1 G: 1 SUSPENSION ORAL at 12:15

## 2020-05-06 RX ADMIN — OXYCODONE HYDROCHLORIDE 5 MG: 5 TABLET ORAL at 00:07

## 2020-05-06 RX ADMIN — PREDNISONE 10 MG: 10 TABLET ORAL at 08:03

## 2020-05-06 RX ADMIN — ROPINIROLE HYDROCHLORIDE 0.5 MG: 0.5 TABLET, FILM COATED ORAL at 21:06

## 2020-05-06 RX ADMIN — PREGABALIN 100 MG: 100 CAPSULE ORAL at 21:06

## 2020-05-06 RX ADMIN — PANTOPRAZOLE SODIUM 40 MG: 40 TABLET, DELAYED RELEASE ORAL at 17:20

## 2020-05-06 RX ADMIN — INSULIN LISPRO 14 UNITS: 100 INJECTION, SOLUTION INTRAVENOUS; SUBCUTANEOUS at 12:15

## 2020-05-06 RX ADMIN — INSULIN GLARGINE 20 UNITS: 100 INJECTION, SOLUTION SUBCUTANEOUS at 08:10

## 2020-05-06 RX ADMIN — HYDROCHLOROTHIAZIDE 25 MG: 25 TABLET ORAL at 08:03

## 2020-05-06 RX ADMIN — FOLIC ACID 1 MG: 1 TABLET ORAL at 08:03

## 2020-05-06 RX ADMIN — DIPHENHYDRAMINE HYDROCHLORIDE 25 MG: 25 CAPSULE ORAL at 08:00

## 2020-05-06 RX ADMIN — INSULIN LISPRO 5 UNITS: 100 INJECTION, SOLUTION INTRAVENOUS; SUBCUTANEOUS at 12:15

## 2020-05-06 RX ADMIN — BUDESONIDE AND FORMOTEROL FUMARATE DIHYDRATE 2 PUFF: 160; 4.5 AEROSOL RESPIRATORY (INHALATION) at 08:18

## 2020-05-06 RX ADMIN — INSULIN LISPRO 14 UNITS: 100 INJECTION, SOLUTION INTRAVENOUS; SUBCUTANEOUS at 17:20

## 2020-05-06 NOTE — PLAN OF CARE
Problem: Patient Care Overview  Goal: Plan of Care Review  Flowsheets  Taken 5/6/2020 1553 by Amita Becerra, RN  Outcome Summary: Patient worked hard in therapies, complains of pain to bilateral hands, taking Oxycodone Q 8 hours PRN along with Benadryl, looking forward to DC home with mom next week.  Taken 5/6/2020 0055 by Dedrick Pichardo, RN  Progress, Functional Goals: demonstrating adequate progress  Plan of Care Reviewed With: patient  IRF Plan of Care Review: progress ongoing, continue

## 2020-05-06 NOTE — THERAPY TREATMENT NOTE
Inpatient Rehabilitation - Occupational Therapy Treatment Note    [facility]     Patient Name: Oralia Sánchez  : 1975  MRN: [id number]    Today's Date: 2020                 Admit Date: 2020      Visit Dx:  No diagnosis found.    Patient Active Problem List   Diagnosis   • Vitamin D deficiency   • Awareness of heartbeats   • Adiposity   • YOUNG (nonalcoholic steatohepatitis)   • Hypothyroid   • Diabetes mellitus arising in pregnancy   • Diabetes (CMS/HCC)   • Metabolic syndrome   • Chest pain   • Primary thunderclap headache   • Elevated LFTs   • Tobacco abuse   • Rheumatoid arthritis (CMS/HCC)   • Type 2 diabetes mellitus (CMS/HCC)   • Morbid obesity (CMS/HCC)   • UTI (urinary tract infection), bacterial   • Cerebral aneurysm   • Dyspnea   • Cough   • Hypomagnesemia   • Metabolic acidosis   • Fatigue   • History of COPD   • Abnormal CT scan, colon   • 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)   • Essential hypertension   • GBS (Guillain [place] syndrome) (CMS/HCC)   • Elevated transaminase level   • History of gestational diabetes   • Steroid-induced hyperglycemia   • Elevated aldolase level         Therapy Treatment    IRF Treatment Summary     Row Name 20 1156 20 0921          Evaluation/Treatment Time and Intent    Subjective Information  no complaints  -DN  no complaints  -EE     Existing Precautions/Restrictions  fall  -DN  fall  -EE     Document Type  therapy note (daily note)  -DN  therapy note (daily note)  -EE     Mode of Treatment  occupational therapy  -DN  physical therapy;individual therapy  -EE     Patient/Family Observations  --  Pt sitting up in WC in no acute distress.   -EE     Recorded by [DN] Prince Obrien, OT [EE] Irlanda Wallis, PT     Row Name 20 1156 20 0921          Cognition/Psychosocial- PT/OT    Affect/Mental Status (Cognitive)  WNL  -DN  WNL  -EE     Orientation Status (Cognition)  oriented x 3  -DN  oriented x 4  -EE     Follows Commands  (Cognition)  --  WNL  -EE     Personal Safety Interventions  fall prevention program maintained;gait belt  -DN  fall prevention program maintained;gait belt;muscle strengthening facilitated;nonskid shoes/slippers when out of bed;supervised activity  -EE     Cognitive Function (Cognitive)  memory deficit  -DN  --     Memory Deficit (Cognitive)  mild deficit  -DN  --     Recorded by [DN] Prince Obrien, OT [EE] Irlanda Wallis, PT     Row Name 05/06/20 0921             Sit-Stand Transfer    Sit-Stand [place] (Transfers)  stand by assist;verbal cues  -EE      Assistive Device (Sit-Stand Transfers)  walker, front-wheeled  -EE      Recorded by [EE] Irlanda Wallis, PT      Row Name 05/06/20 0921             Stand-Sit Transfer    Stand-Sit [place] (Transfers)  stand by assist;verbal cues  -EE      Assistive Device (Stand-Sit Transfers)  walker, front-wheeled;wheelchair  -EE      Recorded by [EE] Irlanda Wallis, PT      Row Name 05/06/20 1156             Toilet Transfer    Type (Toilet Transfer)  stand pivot/stand step  -DN      [place] Level (Toilet Transfer)  supervision  -DN      Assistive Device (Toilet Transfer)  commode chair;raised toilet seat;walker, front-wheeled  -DN      Recorded by [DN] Prince Obrien, OT      Row Name 05/06/20 1156             Shower Transfer    Type (Shower Transfer)  stand pivot/stand step  -DN      [place] Level (Shower Transfer)  supervision  -DN      Assistive Device (Shower Transfer)  tub bench;grab bars/tub rail  -DN      Recorded by [DN] Prince Obrien, OT      Row Name 05/06/20 0921             Gait/Stairs Assessment/Training    [place] Level (Gait)  stand by assist;verbal cues  -EE      Assistive Device (Gait)  walker, front-wheeled  -EE      Distance in Feet (Gait)  160' x 2  -EE      Pattern (Gait)  step-through  -EE      Deviations/Abnormal Patterns (Gait)  stride length decreased  -EE      Bilateral Gait Deviations  weight shift ability decreased  -EE      Left  Sided Gait Deviations  heel strike decreased  -EE      [place] Level (Stairs)  contact guard;verbal cues  -EE      Handrail Location (Stairs)  both sides  -EE      Number of Steps (Stairs)  4  -EE      Ascending Technique (Stairs)  step-to-step  -EE      Descending Technique (Stairs)  step-to-step  -EE      Stairs, Safety Issues  balance decreased during turns;weight-shifting ability decreased  -EE      Stairs, Impairments  strength decreased;impaired balance;sensation decreased  -EE      Recorded by [EE] Irlanda Wallis, PT      Row Name 05/06/20 0921             Safety Issues, Functional Mobility    Impairments Affecting Function (Mobility)  balance;endurance/activity tolerance;strength;sensation/sensory awareness;pain  -EE      Recorded by [EE] Irlanda Wallis, PT      Row Name 05/06/20 1156             Bathing Assessment/Treatment    Bathing [place] Level  bathing skills;supervision  -DN      Assistive Device (Bathing)  tub bench;hand held shower spray hose;grab bar/tub rail  -DN      Bathing Position  supported standing;supported sitting CGA without grab bars and SBA with grab bars  -DN      Recorded by [DN] Prince Obrien, OT      Row Name 05/06/20 1156             Upper Body Dressing Assessment/Treatment    Upper Body Dressing Task  upper body dressing skills;doff;don;bra/undergarment;pull over garment;supervision;verbal cues  -DN      Upper Body Dressing Position  supported sitting  -DN      Set-up Assistance (Upper Body Dressing)  orthosis application  -DN      Recorded by [DN] Prince Obrien, OT      Row Name 05/06/20 1156             Lower Body Dressing Assessment/Treatment    Lower Body Dressing [place] Level  doff;don;pants/bottoms;shoes/slippers;socks  -DN      Lower Body Dressing Position  supported sitting;supported standing  -DN      Lower Body Dressing Setup Assistance  obtain clothing  -DN      Recorded by [DN] Prince Obrien, OT      Row Name 05/06/20 1156             Grooming  "Assessment/Treatment    Grooming [place] Level  grooming skills;deodorant application;hair care, combing/brushing;oral care regimen;supervision  -DN      Grooming Position  supported standing  -DN      Recorded by [DN] Prince Obrien, OT      Row Name 05/06/20 1156             Toileting Assessment/Treatment    Toileting [place] Level  toileting skills;adjust/manage clothing;perform perineal hygiene;supervision;verbal cues;nonverbal cues (demo/gesture)  -DN      Assistive Device Use (Toileting)  grab bar/safety frame;raised toilet seat  -DN      Toileting Position  supported standing;supported sitting  -DN      Recorded by [DN] Prince Obrien, OT      Row Name 05/06/20 1156 05/06/20 0921          Pain Scale: Numbers Pre/Post-Treatment    Pain Scale: Numbers, Pretreatment  0/10 - no pain  -DN  7/10  -EE     Pain Scale: Numbers, Post-Treatment  0/10 - no pain  -DN  --     Pain Location - Side  --  Bilateral  -EE     Pain Location - Orientation  --  distal  -EE     Pain Location  --  hand  -EE     Pain Intervention(s)  --  Repositioned;Medication (See MAR)  -EE     Recorded by [DN] Prince Obrien, OT [EE] Irlanda Wallis, PT     Row Name 05/06/20 0921             Standing Balance Activity    Comment (Standing, Balance Training)  Alt step taps to 4\" step with B UE support on hemibars; CGA for balance.   -EE      Recorded by [EE] Irlanda Wallis, PT      Row Name 05/06/20 0921             Dynamic Balance Activity    Therapeutic Training Performed (Dynamic Balance)  side stepping 2 x 15' each direction  -EE      Support Needed for Balance (Dynamic Balance Training)  uses both upper extremities for support;CGA;SBA  -EE      Recorded by [EE] Irlanda Wallis, PT      Row Name 05/06/20 0921             Therapeutic Exercise    Therapeutic Exercise  seated, lower extremities  -EE      Recorded by [EE] Irlanda Wallis PT      Row Name 05/06/20 0921             Lower Extremity Standing Therapeutic Exercise    Performed, Standing " Lower Extremity (Therapeutic Exercise)  heel raises;knee flexion/extension;hip abduction/adduction;mini-squats  -EE      Device, Standing Lower Extremity (Therapeutic Exercise)  marni bars  -EE      Exercise Type, Standing Lower Extremity (Therapeutic Exercise)  AROM (active range of motion)  -EE      Sets/Reps Detail, Standing Lower Extremity (Therapeutic Exercise)  1/10  -EE      Recorded by [EE] Irlanda Wallis, PT      Row Name 05/06/20 0921             Lower Extremity Seated Therapeutic Exercise    Performed, Seated Lower Extremity (Therapeutic Exercise)  plantarflexor stretch;hamstring stretch  -EE      Recorded by [EE] Irlanda Wallis, AVINASH      Row Name 05/06/20 1156             Positioning and Restraints    Pre-Treatment Position  sitting in chair/recliner  -DN      Post Treatment Position  wheelchair  -DN      In Bed  sitting;call light within reach;encouraged to call for assist;exit alarm on  -DN      Recorded by [DN] Prince Obrien OT        User Key  (r) = Recorded By, (t) = Taken By, (c) = Cosigned By    Initials Name Effective Dates    DN Prince Obrien OT 06/08/18 -     EE Irlanda Wallis, AVINASH 04/03/18 -                  OT Recommendation and Plan    Anticipated Equipment Needs At Discharge (OT Eval): commode, bedside without drop arms, shower chair  Planned Therapy Interventions (OT Eval): activity tolerance training, BADL retraining, functional balance retraining, neuromuscular control/coordination retraining, strengthening exercise, transfer/mobility retraining            OT IRF GOALS     Row Name 05/04/20 1500 04/28/20 1200          Transfer Goal 1 (OT-IRF)    Activity/Assistive Device (Transfer Goal 1, OT-IRF)  toilet;tub  -DN  toilet;tub  -DN     [place] Level (Transfer Goal 1, OT-IRF)  supervision required;contact guard assist  -DN  contact guard assist with RWX  -DN     Time Frame (Transfer Goal 1, OT-IRF)  short term goal (STG)  -DN  short term goal (STG)  -DN     Progress/Outcomes (Transfer Goal  1, OT-IRF)  goal revised this date;goal partially met  -DN  goal met;goal revised this date  -DN        Transfer Goal 2 (OT-IRF)    Activity/Assistive Device (Transfer Goal 2, OT-IRF)  toilet;tub;walker, rolling  -DN  toilet;tub;walker, rolling  -DN     [place] Level (Transfer Goal 2, OT-IRF)  conditional independence  -DN  conditional independence  -DN     Time Frame (Transfer Goal 2, OT-IRF)  long term goal (LTG)  -DN  long term goal (LTG)  -DN     Progress/Outcomes (Transfer Goal 2, OT-IRF)  goal ongoing  -DN  goal revised this date  -DN        Bathing Goal 1 (OT-IRF)    Activity/Device (Bathing Goal 1, OT-IRF)  bathing skills, all  -DN  bathing skills, all  -DN     [place] Level (Bathing Goal 1, OT-IRF)  conditional independence  -DN  supervision required  -DN     Time Frame (Bathing Goal 1, OT-IRF)  short term goal (STG)  -DN  short term goal (STG)  -DN     Progress/Outcomes (Bathing Goal 1, OT-IRF)  goal met;goal revised this date  -DN  goal met;goal revised this date  -DN        Bathing Goal 2 (OT-IRF)    Activity/Device (Bathing Goal 2, OT-IRF)  bathing skills, all  -DN  bathing skills, all  -DN     [place] Level (Bathing Goal 2, OT-IRF)  conditional independence  -DN  conditional independence  -DN     Time Frame (Bathing Goal 2, OT-IRF)  long term goal (LTG)  -DN  long term goal (LTG)  -DN     Progress/Outcomes (Bathing Goal 2, OT-IRF)  goal ongoing;goal partially met  -DN  goal ongoing  -DN        UB Dressing Goal 1 (OT-IRF)    Activity/Device (UB Dressing Goal 1, OT-IRF)  upper body dressing  -DN  upper body dressing  -DN     [place] (UB Dress Goal 1, OT-IRF)  set-up required  -DN  set-up required  -DN     Time Frame (UB Dressing Goal 1, OT-IRF)  short term goal (STG)  -DN  short term goal (STG)  -DN     Progress/Outcomes (UB Dressing Goal 1, OT-IRF)  goal met  -DN  goal ongoing;goal not met  -DN        UB Dressing Goal 2 (OT-IRF)    Activity/Device (UB Dressing Goal 2, OT-IRF)  upper  body dressing  -DN  upper body dressing  -DN     [place] (UB Dress Goal 2, OT-IRF)  conditional independence  -DN  conditional independence  -DN     Time Frame (UB Dressing Goal 2, OT-IRF)  long term goal (LTG)  -DN  long term goal (LTG)  -DN     Progress/Outcomes (UB Dressing Goal 2, OT-IRF)  goal ongoing  -DN  goal ongoing  -DN        LB Dressing Goal 1 (OT-IRF)    Activity/Device (LB Dressing Goal 1, OT-IRF)  lower body dressing  -DN  lower body dressing  -DN     [place] (LB Dressing Goal 1, OT-IRF)  supervision required  -DN  supervision required  -DN     Time Frame (LB Dressing Goal 1, OT-IRF)  short term goal (STG)  -DN  short term goal (STG)  -DN     Progress/Outcomes (LB Dressing Goal 1, OT-IRF)  goal met;goal revised this date  -DN  goal revised this date  -DN        LB Dressing Goal 2 (OT-IRF)    Activity/Device (LB Dressing Goal 2, OT-IRF)  lower body dressing  -DN  lower body dressing  -DN     [place] (LB Dressing Goal 2, OT-IRF)  conditional independence  -DN  conditional independence  -DN     Time Frame (LB Dressing Goal 2, OT-IRF)  long term goal (LTG)  -DN  long term goal (LTG)  -DN     Progress/Outcomes (LB Dressing Goal 2, OT-IRF)  goal ongoing  -DN  goal revised this date  -DN        Grooming Goal 1 (OT-IRF)    Activity/Device (Grooming Goal 1, OT-IRF)  grooming skills, all seated at sink  -DN  grooming skills, all seated at sink  -DN     [place] (Grooming Goal 1, OT-IRF)  conditional independence  -DN  conditional independence  -DN     Time Frame (Grooming Goal 1, OT-IRF)  short term goal (STG)  -DN  short term goal (STG)  -DN     Progress/Outcomes (Grooming Goal 1, OT-IRF)  goal ongoing  -DN  goal revised this date  -DN        Grooming Goal 2 (OT-IRF)    Activity/Device (Grooming Goal 2, OT-IRF)  grooming skills, all standing at sink  -DN  grooming skills, all standing at sink  -DN     [place] (Grooming Goal 2, OT-IRF)  conditional independence  -DN  conditional  independence  -DN     Time Frame (Grooming Goal 2, OT-IRF)  long term goal (LTG)  -DN  long term goal (LTG)  -DN     Progress/Outcomes (Grooming Goal 2, OT-IRF)  goal ongoing  -DN  goal ongoing  -DN        Toileting Goal 1 (OT-IRF)    Activity/Device (Toileting Goal 1, OT-IRF)  toileting skills, all  -DN  toileting skills, all  -DN     [place] Level (Toileting Goal 1, OT-IRF)  verbal cues required;supervision required  -DN  verbal cues required;supervision required  -DN     Time Frame (Toileting Goal 1, OT-IRF)  short term goal (STG)  -DN  short term goal (STG)  -DN     Progress/Outcomes (Toileting Goal 1, OT-IRF)  goal met;goal revised this date  -DN  goal met;goal revised this date  -DN        Toileting Goal 2 (OT-IRF)    Activity/Device (Toileting Goal 2, OT-IRF)  toileting skills, all;commode chair  -DN  toileting skills, all;commode chair  -DN     [place] Level (Toileting Goal 2, OT-IRF)  conditional independence;tactile cues required;verbal cues required  -DN  conditional independence;tactile cues required;verbal cues required  -DN     Time Frame (Toileting Goal 2, OT-IRF)  long term goal (LTG)  -DN  long term goal (LTG)  -DN     Progress/Outcomes (Toileting Goal 2, OT-IRF)  goal ongoing  -DN  goal ongoing  -DN        Strength Goal 1 (OT-IRF)    Strength Goal 1 (OT-IRF)  pt to be setup with BUE HEP  -DN  pt to be setup with BUE HEP  -DN     Time Frame (Strength Goal 1, OT-IRF)  short term goal (STG)  -DN  short term goal (STG)  -DN     Progress/Outcomes (Strength Goal 1, OT-IRF)  goal met  -DN  goal revised this date  -DN        Strength Goal 2 (OT-IRF)    Strength Goal 2 (OT-IRF)  pt to be independent with HEP for BUEs  -DN  pt to be independent with HEP for BUEs  -DN     Time Frame (Strength Goal 2, OT-IRF)  long term goal (LTG)  -DN  long term goal (LTG)  -DN     Progress/Outcomes (Strength Goal 2, OT-IRF)  goal ongoing  -DN  goal ongoing  -DN        Balance Goal 1 (OT)    Activity/Assistive  Device (Balance Goal 1, OT)  assistive device use  -DN  assistive device use  -DN     [place] Level/Cues Needed (Balance Goal 1, OT)  contact guard assist during adls  -DN  contact guard assist during adls  -DN     Time Frame (Balance Goal 1, OT)  short term goal (STG)  -DN  short term goal (STG)  -DN     Progress/Outcomes (Balance Goal 1, OT)  goal met;goal revised this date  -DN  goal ongoing  -DN        Balance Goal 2 (OT)    Activity/Assistive Device (Balance Goal 2, OT)  assistive device use;standing, dynamic;walker, rolling  -DN  assistive device use;standing, dynamic;walker, rolling  -DN     [place] Level (Balance Goal 2, OT)  conditional independence during adls and home mgmt  -DN  supervision required during adls and home mgmt  -DN     Time Frame (Balance Goal 2, OT)  long term goal (LTG)  -DN  long term goal (LTG)  -DN     Progress/Outcome (Balance Goal 2, OT)  goal ongoing  -DN  goal ongoing  -DN        Caregiver Training Goal 2 (OT-IRF)    Caregiver Training Goal 2 (OT-IRF)  pt family or available caregivers to be independent with assist needed with adls, transfers etc  -DN  pt family or available caregivers to be independent with assist needed with adls, transfers etc  -DN     Time Frame (Caregiver Training Goal 2, OT-IRF)  long term goal (LTG)  -DN  long term goal (LTG)  -DN     Progress/Outcomes (Caregiver Training Goal 2, OT-IRF)  goal ongoing  -DN  goal ongoing  -DN       User Key  (r) = Recorded By, (t) = Taken By, (c) = Cosigned By    Initials Name Provider Type    Prince Morfin OT Occupational Therapist                     Time Calculation:     Time Calculation- OT     Row Name 05/06/20 1207             Time Calculation- OT    OT Start Time  0800  -DN      OT Stop Time  0900  -DN      OT Time Calculation (min)  60 min  -DN      OT Received On  05/06/20  -DN        User Key  (r) = Recorded By, (t) = Taken By, (c) = Cosigned By    Initials Name Provider Type    Prince Morfin, OT  Occupational Therapist          Therapy Charges for Today     Code Description Service Date Service Provider Modifiers Qty    05701067888 HC OT SELF CARE/MGMT/TRAIN EA 15 MIN 5/5/2020 Prince Obrien OT GO 1    01318738069 HC OT THER PROC EA 15 MIN 5/5/2020 Prince Obrien OT GO 2    91185645063 HC OT SELF CARE/MGMT/TRAIN EA 15 MIN 5/5/2020 Prince Obrien OT GO 1    31643185034 HC OT THER PROC EA 15 MIN 5/5/2020 Prince Obrien OT GO 2    62008422670 HC OT NEUROMUSC RE EDUCATION EA 15 MIN 5/5/2020 Prince Obrien OT GO 2    50259911243 HC OT SELF CARE/MGMT/TRAIN EA 15 MIN 5/5/2020 Prince Obrien OT GO 3    44150566523 HC OT SELF CARE/MGMT/TRAIN EA 15 MIN 5/6/2020 Prince Obrien OT GO 4                   Prince Obrien OT  5/6/2020

## 2020-05-06 NOTE — THERAPY TREATMENT NOTE
Inpatient Rehabilitation - Physical Therapy Treatment Note  [facility]     Patient Name: Oralia Sánchez  : 1975  MRN: [id number]    Today's Date: 2020                 Admit Date: 2020      Visit Dx:    No diagnosis found.    Patient Active Problem List   Diagnosis   • Vitamin D deficiency   • Awareness of heartbeats   • Adiposity   • YOUNG (nonalcoholic steatohepatitis)   • Hypothyroid   • Diabetes mellitus arising in pregnancy   • Diabetes (CMS/HCC)   • Metabolic syndrome   • Chest pain   • Primary thunderclap headache   • Elevated LFTs   • Tobacco abuse   • Rheumatoid arthritis (CMS/HCC)   • Type 2 diabetes mellitus (CMS/HCC)   • Morbid obesity (CMS/HCC)   • UTI (urinary tract infection), bacterial   • Cerebral aneurysm   • Dyspnea   • Cough   • Hypomagnesemia   • Metabolic acidosis   • Fatigue   • History of COPD   • Abnormal CT scan, colon   • 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)   • Essential hypertension   • GBS (Guillain [place] syndrome) (CMS/HCC)   • Elevated transaminase level   • History of gestational diabetes   • Steroid-induced hyperglycemia   • Elevated aldolase level       Therapy Treatment    IRF Treatment Summary     Row Name 20 1510 20 1156 20 0921       Evaluation/Treatment Time and Intent    Subjective Information  no complaints  -DN  no complaints  -DN  no complaints  -EE    Existing Precautions/Restrictions  fall  -DN  fall  -DN  fall  -EE    Document Type  therapy note (daily note)  -DN  therapy note (daily note)  -DN  therapy note (daily note)  -EE    Mode of Treatment  occupational therapy  -DN  occupational therapy  -DN  physical therapy;individual therapy  -EE    Patient/Family Observations  pt supine in bed  -DN  --  Pt sitting up in WC in no acute distress.   -EE    Recorded by [DN] Prince Obrien OT [DN] Prince Obrien OT [EE] Irlanda Wallis, PT    Row Name 20 1156 20 0921          Cognition/Psychosocial- PT/OT    Affect/Mental  Status (Cognitive)  WNL  -DN  WNL  -EE     Orientation Status (Cognition)  oriented x 3  -DN  oriented x 4  -EE     Follows Commands (Cognition)  --  WNL  -EE     Personal Safety Interventions  fall prevention program maintained;gait belt  -DN  fall prevention program maintained;gait belt;muscle strengthening facilitated;nonskid shoes/slippers when out of bed;supervised activity  -EE     Cognitive Function (Cognitive)  memory deficit  -DN  --     Memory Deficit (Cognitive)  mild deficit  -DN  --     Recorded by [DN] Prince Obrien, OT [EE] Irlanda Wallis, PT     Row Name 05/06/20 0921             Mobility    Advanced Gait Activity  step over obstacle  -EE      Additional Documentation  Advanced Gait Activity (Row)  -EE      Recorded by [EE] Irlanda Wallis, PT      Row Name 05/06/20 0921             Bed Mobility Assessment/Treatment    Sit-Supine [place] (Bed Mobility)  independent  -EE      Recorded by [EE] Irlanda Wallis, PT      Row Name 05/06/20 1510             Bed-Chair Transfer    Bed-Chair [place] (Transfers)  supervision;verbal cues  -DN      Assistive Device (Bed-Chair Transfers)  wheelchair  -DN      Recorded by [DN] Prince Obrien, OT      Row Name 05/06/20 0921             Chair-Bed Transfer    Chair-Bed [place] (Transfers)  supervision;verbal cues  -EE      Assistive Device (Chair-Bed Transfers)  wheelchair  -EE      Recorded by [EE] Irlanda Wallis, PT      Row Name 05/06/20 0921             Sit-Stand Transfer    Sit-Stand [place] (Transfers)  stand by assist;verbal cues  -EE      Assistive Device (Sit-Stand Transfers)  walker, front-wheeled  -EE      Recorded by [EE] Irlanda Wallis, PT      Row Name 05/06/20 0921             Stand-Sit Transfer    Stand-Sit [place] (Transfers)  stand by assist;verbal cues  -EE      Assistive Device (Stand-Sit Transfers)  walker, front-wheeled;wheelchair  -EE      Recorded by [EE] Irlanda Wallis, PT      Row Name 05/06/20 1156             Toilet Transfer     Type (Toilet Transfer)  stand pivot/stand step  -DN      [place] Level (Toilet Transfer)  supervision  -DN      Assistive Device (Toilet Transfer)  commode chair;raised toilet seat;walker, front-wheeled  -DN      Recorded by [DN] Prince Obrien, OT      Row Name 05/06/20 1156             Shower Transfer    Type (Shower Transfer)  stand pivot/stand step  -DN      [place] Level (Shower Transfer)  supervision  -DN      Assistive Device (Shower Transfer)  tub bench;grab bars/tub rail  -DN      Recorded by [DN] Prince Obrien, OT      Row Name 05/06/20 1510             Bathtub Transfer    Type (Bathtub Transfer)  stand pivot/stand step pt may go to her mothers home now after d/c  -DN      [place] Level (Bathtub Transfer)  contact guard;verbal cues step father has shower chair with back and uses it dailly  -DN      Assistive Device (Bathtub Transfer)  shower chair;grab bars/tub rail;walker, front-wheeled tried pt on shower chiar with back instead of tub bench   -DN      Recorded by [DN] Prince Obrien, OT      Row Name 05/06/20 0921             Gait/Stairs Assessment/Training    [place] Level (Gait)  stand by assist;verbal cues  -EE      Assistive Device (Gait)  walker, front-wheeled  -EE      Distance in Feet (Gait)  175', 160' x 2  -EE      Pattern (Gait)  step-through  -EE      Deviations/Abnormal Patterns (Gait)  stride length decreased  -EE      Bilateral Gait Deviations  weight shift ability decreased  -EE      Left Sided Gait Deviations  heel strike decreased  -EE      [place] Level (Stairs)  contact guard;verbal cues  -EE      Handrail Location (Stairs)  both sides  -EE      Number of Steps (Stairs)  4  -EE      Ascending Technique (Stairs)  step-to-step  -EE      Descending Technique (Stairs)  step-to-step  -EE      Stairs, Safety Issues  balance decreased during turns;weight-shifting ability decreased  -EE      Stairs, Impairments  strength decreased;impaired balance;sensation decreased   -EE      Recorded by [EE] Irlanda Wallis, PT      Row Name 05/06/20 1510 05/06/20 0921          Safety Issues, Functional Mobility    Impairments Affecting Function (Mobility)  balance;endurance/activity tolerance;strength  -DN  balance;endurance/activity tolerance;strength;sensation/sensory awareness;pain  -EE     Recorded by [DN] Prince Obrien, OT [EE] Irlanda Wallis, PT     Row Name 05/06/20 0921             Step Over Obstacle (Mobility)    [place], Stepping Over Obstacles (Mobility)  contact guard;verbal cues  -EE      Comment, Stepping Over Obstacles (Mobility)  stepping over hurdles 2 x 8' in // bars with CGA; able to clear with 100% accuracy  -EE      Recorded by [EE] Irlanda Wallis, PT      Row Name 05/06/20 1156             Bathing Assessment/Treatment    Bathing [place] Level  bathing skills;supervision  -DN      Assistive Device (Bathing)  tub bench;hand held shower spray hose;grab bar/tub rail  -DN      Bathing Position  supported standing;supported sitting CGA without grab bars and SBA with grab bars  -DN      Recorded by [DN] Prince Obrien, OT      Row Name 05/06/20 1156             Upper Body Dressing Assessment/Treatment    Upper Body Dressing Task  upper body dressing skills;doff;don;bra/undergarment;pull over garment;supervision;verbal cues  -DN      Upper Body Dressing Position  supported sitting  -DN      Set-up Assistance (Upper Body Dressing)  orthosis application  -DN      Recorded by [DN] Prince Obrien, OT      Row Name 05/06/20 1156             Lower Body Dressing Assessment/Treatment    Lower Body Dressing [place] Level  doff;don;pants/bottoms;shoes/slippers;socks  -DN      Lower Body Dressing Position  supported sitting;supported standing  -DN      Lower Body Dressing Setup Assistance  obtain clothing  -DN      Recorded by [DN] Prince Obrein, OT      Row Name 05/06/20 1156             Grooming Assessment/Treatment    Grooming [place] Level  grooming skills;deodorant  "application;hair care, combing/brushing;oral care regimen;supervision  -DN      Grooming Position  supported standing  -DN      Recorded by [DN] Prince Obrien OT      Row Name 05/06/20 1156             Toileting Assessment/Treatment    Toileting [place] Level  toileting skills;adjust/manage clothing;perform perineal hygiene;supervision;verbal cues;nonverbal cues (demo/gesture)  -DN      Assistive Device Use (Toileting)  grab bar/safety frame;raised toilet seat  -DN      Toileting Position  supported standing;supported sitting  -DN      Recorded by [DN] Prince Obrien, OT      Row Name 05/06/20 1510 05/06/20 1156 05/06/20 0921       Pain Scale: Numbers Pre/Post-Treatment    Pain Scale: Numbers, Pretreatment  3/10  -DN  0/10 - no pain  -DN  7/10  -EE    Pain Scale: Numbers, Post-Treatment  3/10  -DN  0/10 - no pain  -DN  0/10 - no pain  -EE    Pain Location - Side  Right  -DN  --  Bilateral  -EE    Pain Location - Orientation  distal  -DN  --  distal  -EE    Pain Location  hand  -DN  --  hand  -EE    Pre/Post Treatment Pain Comment  --  --  reports hand pain in am; no pain in pm session  -EE    Pain Intervention(s)  Repositioned  -DN  --  Repositioned;Medication (See MAR)  -EE    Recorded by [DN] Prince Obrien, OT [DN] Prince Obrien, OT [EE] Irlanda Wallis, PT    Row Name 05/06/20 0921             Standing Balance Activity    Activities Performed (Standing, Balance Training)  standing on foam roll standing on blue foam, raising 1kg weighted ball w/B UEs  -EE      Support Needed for Balance (Standing, Balance Training)  CGA  -EE      Comment (Standing, Balance Training)  Alt step taps to 4\" step with B UE support on hemibars; CGA for balance.   -EE      Recorded by [EE] Irlanda Wallis, PT      Row Name 05/06/20 0921             Dynamic Balance Activity    Therapeutic Training Performed (Dynamic Balance)  side stepping 2 x 15' each direction  -EE      Support Needed for Balance (Dynamic Balance Training)  uses both " upper extremities for support;CGA;SBA  -EE      Recorded by [EE] Irlanda Wallis, AVINASH      Row Name 05/06/20 0921             Therapeutic Exercise    Therapeutic Exercise  --  -EE      Recorded by [EE] Irlanda Wallis, AVINASH      Row Name 05/06/20 1510             Upper Extremity Seated Therapeutic Exercise    Performed, Seated Upper Extremity (Therapeutic Exercise)  shoulder flexion/extension;shoulder abduction/adduction;shoulder external/internal rotation  -DN      Device, Seated Upper Extremity (Therapeutic Exercise)  free weights, barbell  -DN      Exercise Type, Seated Upper Extremity (Therapeutic Exercise)  AROM (active range of motion)  -DN      Expected Outcomes, Seated Upper Extremity (Therapeutic Exercise)  improve functional tolerance, self-care activity;improve performance, transfer skills  -DN      Sets/Reps Detail, Seated Upper Extremity (Therapeutic Exercise)  3/10s 3# dumbell on R and 2# dumbell on L , and 3# dowel  -DN      Transfers Skills, Training to Functional Activity, Seated Upper Extremity (Therapeutic Exercise)  transfers skills to functional activity most of the time  -DN      Recorded by [DN] Prince Obrien OT      Row Name 05/06/20 0921             Lower Extremity Standing Therapeutic Exercise    Performed, Standing Lower Extremity (Therapeutic Exercise)  heel raises;knee flexion/extension;hip abduction/adduction;mini-squats  -EE      Device, Standing Lower Extremity (Therapeutic Exercise)  marni bars  -EE      Exercise Type, Standing Lower Extremity (Therapeutic Exercise)  AROM (active range of motion)  -EE      Sets/Reps Detail, Standing Lower Extremity (Therapeutic Exercise)  1/10  -EE      Comment, Standing Lower Extremity (Therapeutic Exercise)  step ups in // bars, forward and laterally x10 reps each side  -EE      Recorded by [EE] Irlanda Wallis, AVINASH      Row Name 05/06/20 0921             Lower Extremity Seated Therapeutic Exercise    Performed, Seated Lower Extremity (Therapeutic Exercise)   plantarflexor stretch;hamstring stretch  -EE      Sets/Reps Detail, Seated Lower Extremity (Therapeutic Exercise)  2 x 30 sec each  -EE      Recorded by [EE] Irlanda Wallis PT      Row Name 05/06/20 1510 05/06/20 1156 05/06/20 0921       Positioning and Restraints    Pre-Treatment Position  sitting in chair/recliner  -DN  sitting in chair/recliner  -DN  sitting in chair/recliner  -EE    Post Treatment Position  wheelchair  -DN  wheelchair  -DN  bed  -EE    In Bed  with PT  -DN  sitting;call light within reach;encouraged to call for assist;exit alarm on  -DN  fowlers;call light within reach;encouraged to call for assist;exit alarm on;notified nsg  -EE    Recorded by [DN] Prince Obrien OT [DN] Prince Obrien OT [EE] Irlanda Wallis PT      User Key  (r) = Recorded By, (t) = Taken By, (c) = Cosigned By    Initials Name Effective Dates    Prince Morfin OT 06/08/18 -     EE Irlanda Wallis PT 04/03/18 -                  PT Recommendation and Plan                        Time Calculation:     PT Charges     Row Name 05/06/20 1604 05/06/20 1026          Time Calculation    Start Time  1330  -EE  0900  -EE     Stop Time  1400  -EE  1000  -EE     Time Calculation (min)  30 min  -EE  60 min  -EE     PT Received On  05/06/20  -EE  05/06/20  -EE     PT - Next Appointment  05/07/20  -EE  05/06/20  -EE        Time Calculation- PT    Total Timed Code Minutes- PT  30 minute(s)  -EE  60 minute(s)  -EE       User Key  (r) = Recorded By, (t) = Taken By, (c) = Cosigned By    Initials Name Provider Type    EE Irlanda Wallis PT Physical Therapist          Therapy Charges for Today     Code Description Service Date Service Provider Modifiers Qty    20679617184 HC GAIT TRAINING EA 15 MIN 5/5/2020 Irlanda Wallis, PT GP 2    80148037351 HC PT NEUROMUSC RE EDUCATION EA 15 MIN 5/5/2020 Irlanda Wallis, PT GP 2    79029279477 HC PT THER PROC EA 15 MIN 5/5/2020 Irlanda Wallis, PT GP 1    01777515445 HC PT THERAPEUTIC ACT EA 15 MIN 5/5/2020 Bimal,  Irlanda, PT GP 1    [id number]  PT THER SUPP EA 15 MIN 5/5/2020 Irlanda Wallis, PT GP 2    38879727037 HC GAIT TRAINING EA 15 MIN 5/6/2020 Irlanda Wallis, PT GP 2    81463330606 HC PT THER PROC EA 15 MIN 5/6/2020 Irlanda Wallis, PT GP 1    92195701243 HC PT NEUROMUSC RE EDUCATION EA 15 MIN 5/6/2020 Irlanda Wallis, PT GP 2    [id number]  PT THERAPEUTIC ACT EA 15 MIN 5/6/2020 Irlanda Wallis, PT GP 1                 Patient was wearing a face mask during this therapy encounter. Therapist used appropriate personal protective equipment including mask and gloves.  Mask used was standard procedure mask. Appropriate PPE was worn during the entire therapy session. Hand hygiene was completed before and after therapy session. Patient is not in enhanced droplet precautions.     Irlanda Wallis, PT  5/6/2020

## 2020-05-06 NOTE — THERAPY TREATMENT NOTE
Inpatient Rehabilitation - Occupational Therapy Treatment Note    [facility]     Patient Name: Oralia Sánchez  : 1975  MRN: [id number]    Today's Date: 2020                 Admit Date: 2020      Visit Dx:  No diagnosis found.    Patient Active Problem List   Diagnosis   • Vitamin D deficiency   • Awareness of heartbeats   • Adiposity   • YOUNG (nonalcoholic steatohepatitis)   • Hypothyroid   • Diabetes mellitus arising in pregnancy   • Diabetes (CMS/HCC)   • Metabolic syndrome   • Chest pain   • Primary thunderclap headache   • Elevated LFTs   • Tobacco abuse   • Rheumatoid arthritis (CMS/HCC)   • Type 2 diabetes mellitus (CMS/HCC)   • Morbid obesity (CMS/HCC)   • UTI (urinary tract infection), bacterial   • Cerebral aneurysm   • Dyspnea   • Cough   • Hypomagnesemia   • Metabolic acidosis   • Fatigue   • History of COPD   • Abnormal CT scan, colon   • 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)   • Essential hypertension   • GBS (Guillain Berkley syndrome) (CMS/HCC)   • Elevated transaminase level   • History of gestational diabetes   • Steroid-induced hyperglycemia   • Elevated aldolase level         Therapy Treatment    IRF Treatment Summary     Row Name 20 1510 20 1156 20 0921       Evaluation/Treatment Time and Intent    Subjective Information  no complaints  -DN  no complaints  -DN  no complaints  -EE    Existing Precautions/Restrictions  fall  -DN  fall  -DN  fall  -EE    Document Type  therapy note (daily note)  -DN  therapy note (daily note)  -DN  therapy note (daily note)  -EE    Mode of Treatment  occupational therapy  -DN  occupational therapy  -DN  physical therapy;individual therapy  -EE    Patient/Family Observations  pt supine in bed  -DN  --  Pt sitting up in WC in no acute distress.   -EE    Recorded by [DN] Prince Obrien OT [DN] Prince Obrien OT [EE] Irlanda Wallis, PT    Row Name 20 1156 20 0921          Cognition/Psychosocial- PT/OT     Affect/Mental Status (Cognitive)  WNL  -DN  WNL  -EE     Orientation Status (Cognition)  oriented x 3  -DN  oriented x 4  -EE     Follows Commands (Cognition)  --  WNL  -EE     Personal Safety Interventions  fall prevention program maintained;gait belt  -DN  fall prevention program maintained;gait belt;muscle strengthening facilitated;nonskid shoes/slippers when out of bed;supervised activity  -EE     Cognitive Function (Cognitive)  memory deficit  -DN  --     Memory Deficit (Cognitive)  mild deficit  -DN  --     Recorded by [DN] Prince Obrien OT [EE] Irlanda Wallis, PT     Row Name 05/06/20 1510             Bed-Chair Transfer    Bed-Chair [place] (Transfers)  supervision;verbal cues  -DN      Assistive Device (Bed-Chair Transfers)  wheelchair  -DN      Recorded by [DN] Prince Obrien OT      Row Name 05/06/20 0921             Sit-Stand Transfer    Sit-Stand [place] (Transfers)  stand by assist;verbal cues  -EE      Assistive Device (Sit-Stand Transfers)  walker, front-wheeled  -EE      Recorded by [EE] Irlanda Wallis, PT      Row Name 05/06/20 0921             Stand-Sit Transfer    Stand-Sit [place] (Transfers)  stand by assist;verbal cues  -EE      Assistive Device (Stand-Sit Transfers)  walker, front-wheeled;wheelchair  -EE      Recorded by [EE] Irlanda Wallis, PT      Row Name 05/06/20 1156             Toilet Transfer    Type (Toilet Transfer)  stand pivot/stand step  -DN      [place] Level (Toilet Transfer)  supervision  -DN      Assistive Device (Toilet Transfer)  commode chair;raised toilet seat;walker, front-wheeled  -DN      Recorded by [DN] Prince Obrien, OT      Row Name 05/06/20 1156             Shower Transfer    Type (Shower Transfer)  stand pivot/stand step  -DN      [place] Level (Shower Transfer)  supervision  -DN      Assistive Device (Shower Transfer)  tub bench;grab bars/tub rail  -DN      Recorded by [KAYLEN] Prince Obrien, HARMEET      Row Name 05/06/20 1510             Bathtub  Transfer    Type (Bathtub Transfer)  stand pivot/stand step pt may go to her mothers home now after d/c  -DN      [place] Level (Bathtub Transfer)  contact guard;verbal cues step father has shower chair with back and uses it dailly  -DN      Assistive Device (Bathtub Transfer)  shower chair;grab bars/tub rail;walker, front-wheeled tried pt on shower chiar with back instead of tub bench   -DN      Recorded by [DN] Prince Obrien, OT      Row Name 05/06/20 0921             Gait/Stairs Assessment/Training    [place] Level (Gait)  stand by assist;verbal cues  -EE      Assistive Device (Gait)  walker, front-wheeled  -EE      Distance in Feet (Gait)  160' x 2  -EE      Pattern (Gait)  step-through  -EE      Deviations/Abnormal Patterns (Gait)  stride length decreased  -EE      Bilateral Gait Deviations  weight shift ability decreased  -EE      Left Sided Gait Deviations  heel strike decreased  -EE      [place] Level (Stairs)  contact guard;verbal cues  -EE      Handrail Location (Stairs)  both sides  -EE      Number of Steps (Stairs)  4  -EE      Ascending Technique (Stairs)  step-to-step  -EE      Descending Technique (Stairs)  step-to-step  -EE      Stairs, Safety Issues  balance decreased during turns;weight-shifting ability decreased  -EE      Stairs, Impairments  strength decreased;impaired balance;sensation decreased  -EE      Recorded by [EE] Irlanda Wallis, PT      Row Name 05/06/20 1510 05/06/20 0921          Safety Issues, Functional Mobility    Impairments Affecting Function (Mobility)  balance;endurance/activity tolerance;strength  -DN  balance;endurance/activity tolerance;strength;sensation/sensory awareness;pain  -EE     Recorded by [DN] Prince Obrien, OT [EE] Irlanda Wallis, PT     Row Name 05/06/20 1156             Bathing Assessment/Treatment    Bathing [place] Level  bathing skills;supervision  -DN      Assistive Device (Bathing)  tub bench;hand held shower spray hose;grab bar/tub rail   -DN      Bathing Position  supported standing;supported sitting CGA without grab bars and SBA with grab bars  -DN      Recorded by [DN] Prince Obrien, OT      Row Name 05/06/20 1156             Upper Body Dressing Assessment/Treatment    Upper Body Dressing Task  upper body dressing skills;doff;don;bra/undergarment;pull over garment;supervision;verbal cues  -DN      Upper Body Dressing Position  supported sitting  -DN      Set-up Assistance (Upper Body Dressing)  orthosis application  -DN      Recorded by [KAYLEN] Prince Obrien, OT      Row Name 05/06/20 1156             Lower Body Dressing Assessment/Treatment    Lower Body Dressing [place] Level  doff;don;pants/bottoms;shoes/slippers;socks  -DN      Lower Body Dressing Position  supported sitting;supported standing  -DN      Lower Body Dressing Setup Assistance  obtain clothing  -DN      Recorded by [KAYLEN] Prince Obrien, OT      Row Name 05/06/20 1156             Grooming Assessment/Treatment    Grooming [place] Level  grooming skills;deodorant application;hair care, combing/brushing;oral care regimen;supervision  -DN      Grooming Position  supported standing  -DN      Recorded by [DN] Prince Obrien, OT      Row Name 05/06/20 1156             Toileting Assessment/Treatment    Toileting [place] Level  toileting skills;adjust/manage clothing;perform perineal hygiene;supervision;verbal cues;nonverbal cues (demo/gesture)  -DN      Assistive Device Use (Toileting)  grab bar/safety frame;raised toilet seat  -DN      Toileting Position  supported standing;supported sitting  -DN      Recorded by [KAYLEN] Prince Obrien, OT      Row Name 05/06/20 1510 05/06/20 1156 05/06/20 0921       Pain Scale: Numbers Pre/Post-Treatment    Pain Scale: Numbers, Pretreatment  3/10  -DN  0/10 - no pain  -DN  7/10  -EE    Pain Scale: Numbers, Post-Treatment  3/10  -DN  0/10 - no pain  -DN  --    Pain Location - Side  Right  -DN  --  Bilateral  -EE    Pain Location - Orientation   "distal  -DN  --  distal  -EE    Pain Location  hand  -DN  --  hand  -EE    Pain Intervention(s)  Repositioned  -DN  --  Repositioned;Medication (See MAR)  -EE    Recorded by [DN] Prince Obrien OT [DN] Prince Obrien, OT [EE] Irlanda Wallis, PT    Row Name 05/06/20 0921             Standing Balance Activity    Comment (Standing, Balance Training)  Alt step taps to 4\" step with B UE support on hemibars; CGA for balance.   -EE      Recorded by [EE] Irlanda Wallis, PT      Row Name 05/06/20 0921             Dynamic Balance Activity    Therapeutic Training Performed (Dynamic Balance)  side stepping 2 x 15' each direction  -EE      Support Needed for Balance (Dynamic Balance Training)  uses both upper extremities for support;CGA;SBA  -EE      Recorded by [EE] Irlanda Wallis, PT      Row Name 05/06/20 0921             Therapeutic Exercise    Therapeutic Exercise  seated, lower extremities  -EE      Recorded by [EE] Irlanda Wallis, PT      Row Name 05/06/20 1510             Upper Extremity Seated Therapeutic Exercise    Performed, Seated Upper Extremity (Therapeutic Exercise)  shoulder flexion/extension;shoulder abduction/adduction;shoulder external/internal rotation  -DN      Device, Seated Upper Extremity (Therapeutic Exercise)  free weights, barbell  -DN      Exercise Type, Seated Upper Extremity (Therapeutic Exercise)  AROM (active range of motion)  -DN      Expected Outcomes, Seated Upper Extremity (Therapeutic Exercise)  improve functional tolerance, self-care activity;improve performance, transfer skills  -DN      Sets/Reps Detail, Seated Upper Extremity (Therapeutic Exercise)  3/10s 3# dumbell on R and 2# dumbell on L , and 3# dowel  -DN      Transfers Skills, Training to Functional Activity, Seated Upper Extremity (Therapeutic Exercise)  transfers skills to functional activity most of the time  -DN      Recorded by [DN] Prince Obiren, OT      Row Name 05/06/20 0921             Lower Extremity Standing Therapeutic " Exercise    Performed, Standing Lower Extremity (Therapeutic Exercise)  heel raises;knee flexion/extension;hip abduction/adduction;mini-squats  -EE      Device, Standing Lower Extremity (Therapeutic Exercise)  marni bars  -EE      Exercise Type, Standing Lower Extremity (Therapeutic Exercise)  AROM (active range of motion)  -EE      Sets/Reps Detail, Standing Lower Extremity (Therapeutic Exercise)  1/10  -EE      Recorded by [EE] Irlanda Wallis, AVINASH      Row Name 05/06/20 0921             Lower Extremity Seated Therapeutic Exercise    Performed, Seated Lower Extremity (Therapeutic Exercise)  plantarflexor stretch;hamstring stretch  -EE      Recorded by [EE] Irlanda Wallis, AVINASH      Row Name 05/06/20 1510 05/06/20 1156          Positioning and Restraints    Pre-Treatment Position  sitting in chair/recliner  -DN  sitting in chair/recliner  -DN     Post Treatment Position  wheelchair  -DN  wheelchair  -DN     In Bed  with PT  -DN  sitting;call light within reach;encouraged to call for assist;exit alarm on  -DN     Recorded by [DN] Prince Obrien OT [DN] Prince Obrien OT       User Key  (r) = Recorded By, (t) = Taken By, (c) = Cosigned By    Initials Name Effective Dates    DN Prince Obrien OT 06/08/18 -     EE Irlanda Wallis PT 04/03/18 -                  OT Recommendation and Plan    Anticipated Equipment Needs At Discharge (OT Eval): commode, bedside without drop arms, shower chair  Planned Therapy Interventions (OT Eval): activity tolerance training, BADL retraining, functional balance retraining, neuromuscular control/coordination retraining, strengthening exercise, transfer/mobility retraining            OT IRF GOALS     Row Name 05/04/20 1500 04/28/20 1200          Transfer Goal 1 (OT-IRF)    Activity/Assistive Device (Transfer Goal 1, OT-IRF)  toilet;tub  -DN  toilet;tub  -DN     [place] Level (Transfer Goal 1, OT-IRF)  supervision required;contact guard assist  -DN  contact guard assist with RWX  -DN     Time  Frame (Transfer Goal 1, OT-IRF)  short term goal (STG)  -DN  short term goal (STG)  -DN     Progress/Outcomes (Transfer Goal 1, OT-IRF)  goal revised this date;goal partially met  -DN  goal met;goal revised this date  -DN        Transfer Goal 2 (OT-IRF)    Activity/Assistive Device (Transfer Goal 2, OT-IRF)  toilet;tub;walker, rolling  -DN  toilet;tub;walker, rolling  -DN     [place] Level (Transfer Goal 2, OT-IRF)  conditional independence  -DN  conditional independence  -DN     Time Frame (Transfer Goal 2, OT-IRF)  long term goal (LTG)  -DN  long term goal (LTG)  -DN     Progress/Outcomes (Transfer Goal 2, OT-IRF)  goal ongoing  -DN  goal revised this date  -DN        Bathing Goal 1 (OT-IRF)    Activity/Device (Bathing Goal 1, OT-IRF)  bathing skills, all  -DN  bathing skills, all  -DN     [place] Level (Bathing Goal 1, OT-IRF)  conditional independence  -DN  supervision required  -DN     Time Frame (Bathing Goal 1, OT-IRF)  short term goal (STG)  -DN  short term goal (STG)  -DN     Progress/Outcomes (Bathing Goal 1, OT-IRF)  goal met;goal revised this date  -DN  goal met;goal revised this date  -DN        Bathing Goal 2 (OT-IRF)    Activity/Device (Bathing Goal 2, OT-IRF)  bathing skills, all  -DN  bathing skills, all  -DN     [place] Level (Bathing Goal 2, OT-IRF)  conditional independence  -DN  conditional independence  -DN     Time Frame (Bathing Goal 2, OT-IRF)  long term goal (LTG)  -DN  long term goal (LTG)  -DN     Progress/Outcomes (Bathing Goal 2, OT-IRF)  goal ongoing;goal partially met  -DN  goal ongoing  -DN        UB Dressing Goal 1 (OT-IRF)    Activity/Device (UB Dressing Goal 1, OT-IRF)  upper body dressing  -DN  upper body dressing  -DN     [place] (UB Dress Goal 1, OT-IRF)  set-up required  -DN  set-up required  -DN     Time Frame (UB Dressing Goal 1, OT-IRF)  short term goal (STG)  -DN  short term goal (STG)  -DN     Progress/Outcomes (UB Dressing Goal 1, OT-IRF)  goal met   -DN  goal ongoing;goal not met  -DN        UB Dressing Goal 2 (OT-IRF)    Activity/Device (UB Dressing Goal 2, OT-IRF)  upper body dressing  -DN  upper body dressing  -DN     [place] (UB Dress Goal 2, OT-IRF)  conditional independence  -DN  conditional independence  -DN     Time Frame (UB Dressing Goal 2, OT-IRF)  long term goal (LTG)  -DN  long term goal (LTG)  -DN     Progress/Outcomes (UB Dressing Goal 2, OT-IRF)  goal ongoing  -DN  goal ongoing  -DN        LB Dressing Goal 1 (OT-IRF)    Activity/Device (LB Dressing Goal 1, OT-IRF)  lower body dressing  -DN  lower body dressing  -DN     [place] (LB Dressing Goal 1, OT-IRF)  supervision required  -DN  supervision required  -DN     Time Frame (LB Dressing Goal 1, OT-IRF)  short term goal (STG)  -DN  short term goal (STG)  -DN     Progress/Outcomes (LB Dressing Goal 1, OT-IRF)  goal met;goal revised this date  -DN  goal revised this date  -DN        LB Dressing Goal 2 (OT-IRF)    Activity/Device (LB Dressing Goal 2, OT-IRF)  lower body dressing  -DN  lower body dressing  -DN     [place] (LB Dressing Goal 2, OT-IRF)  conditional independence  -DN  conditional independence  -DN     Time Frame (LB Dressing Goal 2, OT-IRF)  long term goal (LTG)  -DN  long term goal (LTG)  -DN     Progress/Outcomes (LB Dressing Goal 2, OT-IRF)  goal ongoing  -DN  goal revised this date  -DN        Grooming Goal 1 (OT-IRF)    Activity/Device (Grooming Goal 1, OT-IRF)  grooming skills, all seated at sink  -DN  grooming skills, all seated at sink  -DN     [place] (Grooming Goal 1, OT-IRF)  conditional independence  -DN  conditional independence  -DN     Time Frame (Grooming Goal 1, OT-IRF)  short term goal (STG)  -DN  short term goal (STG)  -DN     Progress/Outcomes (Grooming Goal 1, OT-IRF)  goal ongoing  -DN  goal revised this date  -DN        Grooming Goal 2 (OT-IRF)    Activity/Device (Grooming Goal 2, OT-IRF)  grooming skills, all standing at sink  -DN  grooming  skills, all standing at sink  -DN     [place] (Grooming Goal 2, OT-IRF)  conditional independence  -DN  conditional independence  -DN     Time Frame (Grooming Goal 2, OT-IRF)  long term goal (LTG)  -DN  long term goal (LTG)  -DN     Progress/Outcomes (Grooming Goal 2, OT-IRF)  goal ongoing  -DN  goal ongoing  -DN        Toileting Goal 1 (OT-IRF)    Activity/Device (Toileting Goal 1, OT-IRF)  toileting skills, all  -DN  toileting skills, all  -DN     [place] Level (Toileting Goal 1, OT-IRF)  verbal cues required;supervision required  -DN  verbal cues required;supervision required  -DN     Time Frame (Toileting Goal 1, OT-IRF)  short term goal (STG)  -DN  short term goal (STG)  -DN     Progress/Outcomes (Toileting Goal 1, OT-IRF)  goal met;goal revised this date  -DN  goal met;goal revised this date  -DN        Toileting Goal 2 (OT-IRF)    Activity/Device (Toileting Goal 2, OT-IRF)  toileting skills, all;commode chair  -DN  toileting skills, all;commode chair  -DN     [place] Level (Toileting Goal 2, OT-IRF)  conditional independence;tactile cues required;verbal cues required  -DN  conditional independence;tactile cues required;verbal cues required  -DN     Time Frame (Toileting Goal 2, OT-IRF)  long term goal (LTG)  -DN  long term goal (LTG)  -DN     Progress/Outcomes (Toileting Goal 2, OT-IRF)  goal ongoing  -DN  goal ongoing  -DN        Strength Goal 1 (OT-IRF)    Strength Goal 1 (OT-IRF)  pt to be setup with BUE HEP  -DN  pt to be setup with BUE HEP  -DN     Time Frame (Strength Goal 1, OT-IRF)  short term goal (STG)  -DN  short term goal (STG)  -DN     Progress/Outcomes (Strength Goal 1, OT-IRF)  goal met  -DN  goal revised this date  -DN        Strength Goal 2 (OT-IRF)    Strength Goal 2 (OT-IRF)  pt to be independent with HEP for BUEs  -DN  pt to be independent with HEP for BUEs  -DN     Time Frame (Strength Goal 2, OT-IRF)  long term goal (LTG)  -DN  long term goal (LTG)  -DN      Progress/Outcomes (Strength Goal 2, OT-IRF)  goal ongoing  -DN  goal ongoing  -DN        Balance Goal 1 (OT)    Activity/Assistive Device (Balance Goal 1, OT)  assistive device use  -DN  assistive device use  -DN     [place] Level/Cues Needed (Balance Goal 1, OT)  contact guard assist during adls  -DN  contact guard assist during adls  -DN     Time Frame (Balance Goal 1, OT)  short term goal (STG)  -DN  short term goal (STG)  -DN     Progress/Outcomes (Balance Goal 1, OT)  goal met;goal revised this date  -DN  goal ongoing  -DN        Balance Goal 2 (OT)    Activity/Assistive Device (Balance Goal 2, OT)  assistive device use;standing, dynamic;walker, rolling  -DN  assistive device use;standing, dynamic;walker, rolling  -DN     [place] Level (Balance Goal 2, OT)  conditional independence during adls and home mgmt  -DN  supervision required during adls and home mgmt  -DN     Time Frame (Balance Goal 2, OT)  long term goal (LTG)  -DN  long term goal (LTG)  -DN     Progress/Outcome (Balance Goal 2, OT)  goal ongoing  -DN  goal ongoing  -DN        Caregiver Training Goal 2 (OT-IRF)    Caregiver Training Goal 2 (OT-IRF)  pt family or available caregivers to be independent with assist needed with adls, transfers etc  -DN  pt family or available caregivers to be independent with assist needed with adls, transfers etc  -DN     Time Frame (Caregiver Training Goal 2, OT-IRF)  long term goal (LTG)  -DN  long term goal (LTG)  -DN     Progress/Outcomes (Caregiver Training Goal 2, OT-IRF)  goal ongoing  -DN  goal ongoing  -DN       User Key  (r) = Recorded By, (t) = Taken By, (c) = Cosigned By    Initials Name Provider Type    Prince Morfin OT Occupational Therapist                     Time Calculation:     Time Calculation- OT     Row Name 05/06/20 1521 05/06/20 1207          Time Calculation- OT    OT Start Time  1300  -DN  0800  -DN     OT Stop Time  1330  -DN  0900  -DN     OT Time Calculation (min)  30 min  -DN   60 min  -DN     OT Received On  05/06/20  -DN  05/06/20  -DN       User Key  (r) = Recorded By, (t) = Taken By, (c) = Cosigned By    Initials Name Provider Type    Prince Morfin OT Occupational Therapist          Therapy Charges for Today     Code Description Service Date Service Provider Modifiers Qty    56334513695 HC OT SELF CARE/MGMT/TRAIN EA 15 MIN 5/5/2020 Prince Obrien OT GO 1    51935110529 HC OT THER PROC EA 15 MIN 5/5/2020 Prince Obrien, OT GO 2    32617207163 HC OT SELF CARE/MGMT/TRAIN EA 15 MIN 5/5/2020 Prince Obrien, OT GO 1    76548999949 HC OT THER PROC EA 15 MIN 5/5/2020 Prince Obrien, OT GO 2    [id number] HC OT NEUROMUSC RE EDUCATION EA 15 MIN 5/5/2020 Prince Obrien, OT GO 2    05645291211 HC OT SELF CARE/MGMT/TRAIN EA 15 MIN 5/5/2020 Prince Obrien OT GO 3    [id number] HC OT SELF CARE/MGMT/TRAIN EA 15 MIN 5/6/2020 Prince Obrien, OT GO 4    87816110569 HC OT SELF CARE/MGMT/TRAIN EA 15 MIN 5/6/2020 Prince Obrien, OT GO 1    [id number] HC OT THER PROC EA 15 MIN 5/6/2020 Prince Obrien OT GO 1                   Prince Obrien OT  5/6/2020

## 2020-05-06 NOTE — PROGRESS NOTES
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  Weakness continues.  Concerned about awakening 2 hrs after falling asleep every night and having difficulty going back to sleep.  Tolerating Requip at night.    History taken from: patient    Objective     Vital Signs  Temp:  [97.6 °F (36.4 °C)-98.5 °F (36.9 °C)] 97.9 °F (36.6 °C)  Heart Rate:  [] 101  Resp:  [18] 18  BP: (119-138)/(82-91) 138/91    Objective   MENTAL STATUS -  AWAKE / ALERT  Gen - nad, sitting upright in bed   HEENT- NCAT, PUPILS EQUALLY ROUND, SCLERAE ANICTERIC, CONJUNCTIVAE PINK, OP MOIST, NO JVD, EARS UNREMARKABLE EXTERNALLY  LUNGS -normal respirations. equal chest rise   EXT - NO EDEMA OR CYANOSIS   Skin - small bruise on left forearm, no warmth  NEURO -   A/ox4, speech is fluent, follows all commands  MOTOR EXAM -JONES x4, symmetric muscle bulk  Antigravity BUE strength  Sitting upright in bed with good truncal control  Psych - appropriate mood and affect    Results Review:     I reviewed the patient's new clinical results.   Results for SENTHIL SMITH (MRN [id number]) as of 5/6/2020 16:08   Ref. Range 5/5/2020 16:00 5/5/2020 16:01 5/5/2020 21:17 5/6/2020 07:26 5/6/2020 11:10   Glucose  Latest Ref Range: 70 - 130 mg/dL 90 93 169 (H) 135 (H) 227 (H)       Medication Review:   Scheduled Meds:  amLODIPine 10 mg Oral Nightly   budesonide-formoterol 2 puff Inhalation BID - RT   DULoxetine 30 mg Oral Daily   [START ON 5/9/2020] DULoxetine 60 mg Oral Daily   folic acid 1 mg Oral Daily   hydroCHLOROthiazide Oral 25 mg Oral Daily   insulin glargine 20 Units Subcutaneous QAM   insulin lispro 0-14 Units Subcutaneous TID AC   insulin lispro 14 Units Subcutaneous TID With Meals   levothyroxine 200 mcg Oral Q AM   pantoprazole 40 mg Oral BID AC   [START ON 5/7/2020] predniSONE 5 mg Oral Daily   pregabalin 100 mg Oral Q12H   rOPINIRole 0.25 mg Oral Nightly   sucralfate 1 g Oral 4x Daily AC & at Bedtime     Continuous Infusions:   PRN Meds:.Benzocaine  •  benzonatate  •  calcium carbonate  •  dextrose  •  dextrose  •  diphenhydrAMINE  •  diphenhydrAMINE  •  glucagon (human recombinant)  •  guaiFENesin-dextromethorphan  •  melatonin  •  ondansetron  •  [START ON 5/7/2020] oxyCODONE  •  oxyCODONE  •  polyethylene glycol  •  potassium chloride **OR** potassium chloride **OR** potassium chloride  •  sodium chloride      Assessment/Plan       YOUNG (nonalcoholic steatohepatitis)    Hypothyroid    Type 2 diabetes mellitus (CMS/HCC)    GBS (Guillain Hardwick syndrome) (CMS/HCC)    Elevated transaminase level    History of gestational diabetes    Steroid-induced hyperglycemia    Elevated aldolase level      Assessment & Plan   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  This is far greater than expected based on the nerve conduction findings.  This suggests either motor neuron disease or a primarily axonal polyneuropathy such as axonal Guillain Barré syndrome based on the patient's symptom history. F/U  Dr Juventino Gaytan in 2-3 months.     Previous question of dermatomyositis or autoimmune process-trial of steroids-tapering off  April 27-has been on prednisone 40 mg daily since April 10 empiric trial for previous question of dermatomyositis but now not felt clinically present-taper off of prednisone 30 mg daily x3 days, 20 mg daily x3 days, 10 mg daily x3 days, 5 mg daily x3 days, then stop.     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 Also on oxycodone.  April 27-has some pain in the upper extremities.  Doubt that it is bilateral joint pain as she has been on a reasonable dose of prednisone for the last 16 days.  May be a variant for the neuropathic pain.  Will titrate up on Lyrica 200 mg every 12 hours, watch for any myoclonic jerks.  May 1- taking oxycodone 5 mg about 5 times a day, need to taper off over next week, changed to Oxycodone 5 mg q 6 hours prn x 3 days, then q 8 hours prn x 3 days, then q 12 hours prn x three days, then stop. SVETLANA reviewed.   May 2-add on Cymbalta 30 mg daily for 7 days and increase to 60 mg daily.  Discussed other options of titrating up on Lyrica or switching to gabapentin.  May 4 - Cymbalta is helping  May 6 - increase to 60mg in a few days     DVT prophylaxis - SCDs        Hepatic steatosis-elevated LFTs  April 27-gastroenterology following  April 29 - reviewed with gastroenterology - a combination of fatty liver (the treatment would be weight loss) and Ebstein Horan viral hepatitis (the only treatment would be supportive care)- recommend against a liver biopsy. Follow up in three  months with repeat labs and full colonscopy.  May 5 - EBV labs reviewed, and will need outpatient GI f/up for hepatic steatosis     Restless leg syndrome -   May 4 - Will trial Requip  May 6 - will increase Requip dosage and see if helps sleep.      Sleep disturbance  May 6 - reviewed meds with pharmacy.  Will trial increase of Requip and steroids could be contributing but are currently being tapered.     April 27-gait 80 feet CTG-min assist.  Team conference in the a.m.     TEAM CONF - April 28- BED CTG. 4 STAIRS MIN. GAIT 100 FEET CTG-MIN RW. TOILET TRANSFERS CTG. SHOWER TRANSFERS CTG.  UBD MIN ASSIST FOR BRA. LBD CTG. BATH CTG. GROOMING SBA.  TOILETING MOD INDEPENDENT. CONTINENT BOWEL AND BLADDER.   DIETICIAN EDUCATED ON DIABETIC DIET ON STEROIDS BUT PATIENT RESISTANT TO INSTRUCTION.   NEEDS TO BE MODIFIED INDEPENDENT FOR HOME  ELOS- TWO WEEKS     TEAM CONF - May 5 -   Bed/Chair/Wc SBA,   Stairs 4 with CGa/MIN A with 1 HR and Quad tipped cane  Amb 160ft CGa RWx, Min A with a cane  Toilet Tx SBA RWx, Tub/Shower Tx CGa RWx  Bathing SBA, LBD SBA, UBD Min A with bra, grooming SBA seated  Toileting SBA  Continent of bowel and bladder  2L O2 during night (h/o ERLIN with CPAP at home)  ELOS - 1 week home with assistance vs SNF  Discussed at length with patient this option of disposition.  Patient feels she does not have anyone to assist her.  Will look into SNF facility as she still requires assistance and would benefit from further inpatient rehab.     During rounds, used appropriate personal protective equipment including mask and gloves.  Mask used was standard procedure mask. Appropriate PPE was worn during the entire visit.  Hand hygiene was completed before and after.       Millie Cm MD  05/06/20  16:05    Time: 30min

## 2020-05-06 NOTE — PROGRESS NOTES
Pt and her mother have come to an agreement and pt will discharge to her mother's home.  Suggested we include pt's mother in a family conference to address any concerns she may have, but pt does not wish to have her mother involved in a conference.      Family conference planned for Friday, 5/8 with pt and her rehab team. Will continue to encourage pt to include her mother in discharge planning.

## 2020-05-06 NOTE — PROGRESS NOTES
Inpatient Rehabilitation Plan of Care Note    Plan of Care  Care Plan Reviewed - Updates as Follows    Safety    Performed Intervention(s)  Bed alarm and /or chair alarm  Safety rounds and items within reach  Falls precautions/protocol  Uses call light appropriately  Environmental set-up to reduce risk  safety strategies and techniques      Psychosocial    Performed Intervention(s)  Allow the opportunity to verbalize needs and concerns  Medication as ordered  Therapuetic environmental set up  offer support      Body Systems    Performed Intervention(s)  Monitor labs and medication as ordered  Blood sugar testing- TID      Pain    Performed Intervention(s)  Relaxation or distraction  Medication as ordered  Modalities      Sphincter Control    Performed Intervention(s)  Monitor intake and output  Assist pt to bathroom in a timely manner  Encourage proper diet and fluid intake  Medication as ordered    Signed by: Dedrick Pichardo, RN

## 2020-05-06 NOTE — PROGRESS NOTES
Inpatient Rehabilitation Plan of Care Note    Plan of Care  Care Plan Reviewed - No updates at this time.    Safety    Performed Intervention(s)  Bed alarm and /or chair alarm  Safety rounds and items within reach  Falls precautions/protocol  Uses call light appropriately  Environmental set-up to reduce risk  safety strategies and techniques      Psychosocial    Performed Intervention(s)  Allow the opportunity to verbalize needs and concerns  Medication as ordered  Therapuetic environmental set up  offer support      Body Systems    Performed Intervention(s)  Monitor labs and medication as ordered  Blood sugar testing- TID      Pain    Performed Intervention(s)  Relaxation or distraction  Medication as ordered  Modalities      Sphincter Control    Performed Intervention(s)  Monitor intake and output  Assist pt to bathroom in a timely manner  Encourage proper diet and fluid intake  Medication as ordered    Signed by: Amita Becerra RN

## 2020-05-06 NOTE — PLAN OF CARE
Problem: Patient Care Overview  Goal: Plan of Care Review  Outcome: Ongoing (interventions implemented as appropriate)  Flowsheets (Taken 5/6/2020 0055)  Outcome Summary: Pt. is calm and cooperative with staff. A&OX4. Takes Meds whole with water. Complained of any pain to left hand. Oxycodone 5 mg Q8hrs and Benadryl 25 mg Q4hrs PO PRN given, and then relieved good. BG checks TID. Melatonin 3 mg PO PRN given at 2033 per Pt. required. O2 applied by nasal cannula at 2 L. Daily weights. Continent of B&B. Assist x 1 with walker to BR. Snacks applied at bedtime. Uses call light for assistance. No further issues to note at this time. Will continue to monitor.  Progress, Functional Goals: demonstrating adequate progress  Plan of Care Reviewed With: patient  IRF Plan of Care Review: progress ongoing, continue

## 2020-05-07 LAB
GLUCOSE BLDC GLUCOMTR-MCNC: 135 MG/DL (ref 70–130)
GLUCOSE BLDC GLUCOMTR-MCNC: 172 MG/DL (ref 70–130)
GLUCOSE BLDC GLUCOMTR-MCNC: 95 MG/DL (ref 70–130)

## 2020-05-07 PROCEDURE — 97530 THERAPEUTIC ACTIVITIES: CPT

## 2020-05-07 PROCEDURE — 63710000001 INSULIN LISPRO (HUMAN) PER 5 UNITS: Performed by: INTERNAL MEDICINE

## 2020-05-07 PROCEDURE — 97110 THERAPEUTIC EXERCISES: CPT

## 2020-05-07 PROCEDURE — 94799 UNLISTED PULMONARY SVC/PX: CPT

## 2020-05-07 PROCEDURE — 97112 NEUROMUSCULAR REEDUCATION: CPT

## 2020-05-07 PROCEDURE — 63710000001 PREDNISONE PER 5 MG: Performed by: PHYSICAL MEDICINE & REHABILITATION

## 2020-05-07 PROCEDURE — 97535 SELF CARE MNGMENT TRAINING: CPT

## 2020-05-07 PROCEDURE — 63710000001 INSULIN GLARGINE PER 5 UNITS: Performed by: INTERNAL MEDICINE

## 2020-05-07 PROCEDURE — 97116 GAIT TRAINING THERAPY: CPT

## 2020-05-07 PROCEDURE — 82962 GLUCOSE BLOOD TEST: CPT

## 2020-05-07 RX ADMIN — ROPINIROLE HYDROCHLORIDE 0.5 MG: 0.5 TABLET, FILM COATED ORAL at 21:18

## 2020-05-07 RX ADMIN — LEVOTHYROXINE SODIUM 200 MCG: 100 TABLET ORAL at 06:10

## 2020-05-07 RX ADMIN — SUCRALFATE 1 G: 1 SUSPENSION ORAL at 21:18

## 2020-05-07 RX ADMIN — INSULIN LISPRO 14 UNITS: 100 INJECTION, SOLUTION INTRAVENOUS; SUBCUTANEOUS at 17:11

## 2020-05-07 RX ADMIN — DULOXETINE HYDROCHLORIDE 30 MG: 30 CAPSULE, DELAYED RELEASE ORAL at 09:11

## 2020-05-07 RX ADMIN — BUDESONIDE AND FORMOTEROL FUMARATE DIHYDRATE 2 PUFF: 160; 4.5 AEROSOL RESPIRATORY (INHALATION) at 06:40

## 2020-05-07 RX ADMIN — BUDESONIDE AND FORMOTEROL FUMARATE DIHYDRATE 2 PUFF: 160; 4.5 AEROSOL RESPIRATORY (INHALATION) at 19:30

## 2020-05-07 RX ADMIN — INSULIN GLARGINE 20 UNITS: 100 INJECTION, SOLUTION SUBCUTANEOUS at 09:15

## 2020-05-07 RX ADMIN — INSULIN LISPRO 3 UNITS: 100 INJECTION, SOLUTION INTRAVENOUS; SUBCUTANEOUS at 11:23

## 2020-05-07 RX ADMIN — PREGABALIN 100 MG: 100 CAPSULE ORAL at 09:11

## 2020-05-07 RX ADMIN — SUCRALFATE 1 G: 1 SUSPENSION ORAL at 17:11

## 2020-05-07 RX ADMIN — SUCRALFATE 1 G: 1 SUSPENSION ORAL at 10:15

## 2020-05-07 RX ADMIN — PREGABALIN 100 MG: 100 CAPSULE ORAL at 21:18

## 2020-05-07 RX ADMIN — SUCRALFATE 1 G: 1 SUSPENSION ORAL at 06:11

## 2020-05-07 RX ADMIN — INSULIN LISPRO 14 UNITS: 100 INJECTION, SOLUTION INTRAVENOUS; SUBCUTANEOUS at 07:37

## 2020-05-07 RX ADMIN — PREDNISONE 5 MG: 5 TABLET ORAL at 09:11

## 2020-05-07 RX ADMIN — PANTOPRAZOLE SODIUM 40 MG: 40 TABLET, DELAYED RELEASE ORAL at 06:10

## 2020-05-07 RX ADMIN — INSULIN LISPRO 14 UNITS: 100 INJECTION, SOLUTION INTRAVENOUS; SUBCUTANEOUS at 11:23

## 2020-05-07 RX ADMIN — POLYETHYLENE GLYCOL 3350 17 G: 17 POWDER, FOR SOLUTION ORAL at 10:13

## 2020-05-07 RX ADMIN — FOLIC ACID 1 MG: 1 TABLET ORAL at 09:11

## 2020-05-07 RX ADMIN — HYDROCHLOROTHIAZIDE 25 MG: 25 TABLET ORAL at 09:11

## 2020-05-07 RX ADMIN — AMLODIPINE BESYLATE 10 MG: 10 TABLET ORAL at 21:18

## 2020-05-07 RX ADMIN — PANTOPRAZOLE SODIUM 40 MG: 40 TABLET, DELAYED RELEASE ORAL at 17:10

## 2020-05-07 NOTE — PLAN OF CARE
Problem: Patient Care Overview  Goal: Plan of Care Review  Outcome: Ongoing (interventions implemented as appropriate)  Flowsheets (Taken 5/7/2020 0139)  Outcome Summary: Patient pleasant and cooperative. Slept early tonight. She takes pain medication and benadryl PRN. She wears O2 at 2L at HS for sleep apnea. Ambulatory with RW. Plan to DC next week. Continues with numbness and tingling from chest down to extremities.  Progress, Functional Goals: demonstrating adequate progress  Plan of Care Reviewed With: patient  IRF Plan of Care Review: progress ongoing, continue     Problem: Fall Risk (Adult)  Goal: Absence of Fall  Description  Patient will demonstrate the desired outcomes by discharge/transition of care.  Outcome: Ongoing (interventions implemented as appropriate)  Flowsheets (Taken 5/7/2020 0139)  Absence of Fall: making progress toward outcome     Problem: Functional Mobility Impairment (IRF) (Adult)  Goal: Optimal/Safe Level of [place] with Mobility  Outcome: Ongoing (interventions implemented as appropriate)  Flowsheets (Taken 5/7/2020 0139)  Optimal/Safe Level of [place] with Mobility: demonstrating adequate progress     Problem: Skin Injury Risk (Adult)  Goal: Skin Health and Integrity  Description  Patient will demonstrate the desired outcomes by discharge/transition of care.  Outcome: Ongoing (interventions implemented as appropriate)  Flowsheets (Taken 5/7/2020 0139)  Skin Health and Integrity: making progress toward outcome     Problem: Pain, Acute (Adult)  Goal: Acceptable Pain Control/Comfort Level  Description  Patient will demonstrate the desired outcomes by discharge/transition of care.  Outcome: Ongoing (interventions implemented as appropriate)  Flowsheets (Taken 5/7/2020 0139)  Acceptable Pain Control/Comfort Level: making progress toward outcome

## 2020-05-07 NOTE — PROGRESS NOTES
Inpatient Rehabilitation Plan of Care Note    Plan of Care  Care Plan Reviewed - No updates at this time.    Body Systems    [RN] Endocrine(Active)  Current Status(05/07/2020): Patients has DM and blood sugar poorly controlled  while being on steroid in hosp.  Weekly Goal(05/14/2020): Patient will have blood sugars within normal limits.  Discharge Goal: Patient will be independent with diabetes regimen    Performed Intervention(s)  Monitor labs and medication as ordered  Blood sugar testing- TID      Pain    [RN] Pain Management(Active)  Current Status(05/07/2020): Patient has generalized pain related GBS. takes pain  medications as needed.  Weekly Goal(05/14/2020): Patient will have pain under control and will be able  to tolerate therapy.  Discharge Goal: Patient will be free from pain or pain will be under control.    Performed Intervention(s)  Relaxation or distraction  Medication as ordered  Modalities      Psychosocial    [RN] Coping/Adjustment(Active)  Current Status(05/07/2020): Patient expresses appropriate coping skills  Weekly Goal(05/06/2020): Patient will be allowed to express concerns regarding  life changes  Discharge Goal: Patient will continue to demonstrate healthy coping skills while  on Rehab    Performed Intervention(s)  Allow the opportunity to verbalize needs and concerns  Medication as ordered  Therapuetic environmental set up  offer support      Safety    [RN] Potential for Injury(Active)  Current Status(05/07/2020): Patient has had a history of falls, and complains of  numbness and unsteadiness on her feet, high risk of further injury.  Weekly Goal(05/14/2020): Patient will use call light, and have no other falls  while on Rehab  Discharge Goal: Patient will have no injuries while on Rehab    Performed Intervention(s)  Bed alarm and /or chair alarm  Safety rounds and items within reach  Falls precautions/protocol  Uses call light appropriately  Environmental set-up to reduce risk  safety  strategies and techniques      Sphincter Control    [RN] Bladder Management(Active)  Current Status(05/07/2020): Patient is 100% continent of bladder  Weekly Goal(05/06/2020): Patient will continue to be continent 100%  Discharge Goal: Patient will continue to be 100% continent.    [RN] Bowel Management(Active)  Current Status(05/07/2020): Patient is 100% continent of bowel  Weekly Goal(05/14/2020): Patient will continue to be 100% continent of bowel  Discharge Goal: Patient will remain continent 100% of dthe time while in Rehab.    Performed Intervention(s)  Monitor intake and output  Assist pt to bathroom in a timely manner  Encourage proper diet and fluid intake  Medication as ordered    Signed by: Colby Cox RN

## 2020-05-07 NOTE — THERAPY TREATMENT NOTE
Inpatient Rehabilitation - Physical Therapy Treatment Note  [facility]     Patient Name: Oralia Sánchez  : 1975  MRN: [id number]    Today's Date: 2020                 Admit Date: 2020      Visit Dx:    No diagnosis found.    Patient Active Problem List   Diagnosis   • Vitamin D deficiency   • Awareness of heartbeats   • Adiposity   • YOUNG (nonalcoholic steatohepatitis)   • Hypothyroid   • Diabetes mellitus arising in pregnancy   • Diabetes (CMS/HCC)   • Metabolic syndrome   • Chest pain   • Primary thunderclap headache   • Elevated LFTs   • Tobacco abuse   • Rheumatoid arthritis (CMS/HCC)   • Type 2 diabetes mellitus (CMS/HCC)   • Morbid obesity (CMS/HCC)   • UTI (urinary tract infection), bacterial   • Cerebral aneurysm   • Dyspnea   • Cough   • Hypomagnesemia   • Metabolic acidosis   • Fatigue   • History of COPD   • Abnormal CT scan, colon   • 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)   • Essential hypertension   • GBS (Guillain [place] syndrome) (CMS/HCC)   • Elevated transaminase level   • History of gestational diabetes   • Steroid-induced hyperglycemia   • Elevated aldolase level       Therapy Treatment    IRF Treatment Summary     Row Name 20 1507 20 1156 20 0930       Evaluation/Treatment Time and Intent    Subjective Information  no complaints  -AF  no complaints  -AF  no complaints  -EE    Existing Precautions/Restrictions  fall  -AF  fall  -AF  fall  -EE    Document Type  therapy note (daily note)  -AF  therapy note (daily note)  -AF  therapy note (daily note)  -EE    Mode of Treatment  occupational therapy  -AF  occupational therapy  -AF  physical therapy  -EE    Patient/Family Observations  supine in bed  -AF  pt supine in bed, stated that she slept well last night  -AF  Pt sitting up in WC in no acute distress.  -EE    Recorded by [AF] Sobeida Pennington, SANDRA [AF] Sobeida Pennington, SANDRA [EE] Irlanda Wallis, PT    Row Name 20 1507 20 1156 20 0930        Cognition/Psychosocial- PT/OT    Affect/Mental Status (Cognitive)  WNL  -AF  WNL  -AF  --    Orientation Status (Cognition)  oriented x 3  -AF  oriented x 3  -AF  oriented x 3  -EE    Follows Commands (Cognition)  WNL  -AF  WNL  -AF  WNL  -EE    Personal Safety Interventions  fall prevention program maintained;gait belt;nonskid shoes/slippers when out of bed  -AF  fall prevention program maintained;gait belt;nonskid shoes/slippers when out of bed  -AF  fall prevention program maintained;gait belt;muscle strengthening facilitated;nonskid shoes/slippers when out of bed;supervised activity  -EE    Cognitive Function (Cognitive)  memory deficit  -AF  memory deficit  -AF  --    Memory Deficit (Cognitive)  mild deficit  -AF  mild deficit  -AF  --    Recorded by [AF] Sobeida Pennington OTR [AF] Sobeida Pennington, HARMEETR [EE] Irlanda Wallis, PT    Row Name 05/07/20 1507 05/07/20 1156 05/07/20 0930       Bed Mobility Assessment/Treatment    Supine-Sit [place] (Bed Mobility)  independent  -AF  independent  -AF  independent  -EE    Sit-Supine [place] (Bed Mobility)  independent  -AF  --  independent  -EE    Recorded by [AF] Sobeida Pennington OTR [AF] Sobeida Pennington, OTR [EE] Irlanda Wallis, PT    Row Name 05/07/20 1156             Functional Mobility    Functional Mobility- Comment  walked in room, in bathroom, to and from shower room with RWX SBA  -AF      Recorded by [AF] Sobeida Pennington OTR      Row Name 05/07/20 1507 05/07/20 1156 05/07/20 0930       Bed-Chair Transfer    Bed-Chair [place] (Transfers)  supervision  -AF  supervision  -AF  supervision;stand by assist;verbal cues  -EE    Assistive Device (Bed-Chair Transfers)  walker, front-wheeled  -AF  walker, front-wheeled  -AF  wheelchair  -EE    Recorded by [AF] Sobeida Pennington, OTR [AF] Sobeida Pennington, HARMEETR [EE] Irlanda Wallis, PT    Row Name 05/07/20 1507 05/07/20 1156 05/07/20 0930       Chair-Bed Transfer    Chair-Bed [place] (Transfers)  supervision  -AF   supervision  -AF  supervision;stand by assist;verbal cues  -EE    Assistive Device (Chair-Bed Transfers)  walker, front-wheeled  -AF  walker, front-wheeled  -AF  wheelchair  -EE    Recorded by [AF] Sobeida Pennington OTR [AF] Sobeida Pennington, OTR [EE] Irlanda Wallis, PT    Row Name 05/07/20 0930             Sit-Stand Transfer    Sit-Stand [place] (Transfers)  stand by assist;verbal cues  -EE      Assistive Device (Sit-Stand Transfers)  walker, front-wheeled;wheelchair  -EE      Recorded by [EE] Irlanda Wallis, PT      Row Name 05/07/20 0930             Stand-Sit Transfer    Stand-Sit [place] (Transfers)  stand by assist;verbal cues  -EE      Assistive Device (Stand-Sit Transfers)  walker, front-wheeled;wheelchair  -EE      Recorded by [EE] Irlanda Wallis, PT      Row Name 05/07/20 1507 05/07/20 1156          Toilet Transfer    Type (Toilet Transfer)  stand pivot/stand step  -AF  stand pivot/stand step  -AF     [place] Level (Toilet Transfer)  supervision  -AF  supervision  -AF     Assistive Device (Toilet Transfer)  walker, front-wheeled  -AF  commode;grab bars/safety frame;walker, front-wheeled  -AF     Recorded by [AF] Sobeida Pennington, OTR [AF] Sobeida Pennington, OTR     Row Name 05/07/20 1156             Shower Transfer    Type (Shower Transfer)  stand pivot/stand step  -AF      [place] Level (Shower Transfer)  supervision  -AF      Assistive Device (Shower Transfer)  grab bars/tub rail;tub bench;walker, front-wheeled  -AF      Recorded by [AF] Sobeida Pennington, OTR      Row Name 05/07/20 0930             Gait/Stairs Assessment/Training    [place] Level (Gait)  stand by assist;verbal cues  -EE      Assistive Device (Gait)  walker, front-wheeled  -EE      Distance in Feet (Gait)  160' x 2  -EE      Pattern (Gait)  step-through  -EE      Deviations/Abnormal Patterns (Gait)  quang decreased;stride length decreased  -EE      Bilateral Gait Deviations  weight shift ability decreased  -EE      [place]  Level (Stairs)  contact guard;verbal cues  -EE      Handrail Location (Stairs)  right side (ascending);both sides;other (see comments) one trial with 2 handrails; one trial with R handrail only  -EE      Number of Steps (Stairs)  4 x2  -EE      Ascending Technique (Stairs)  step-to-step  -EE      Descending Technique (Stairs)  step-to-step  -EE      Stairs, Safety Issues  balance decreased during turns;weight-shifting ability decreased  -EE      Stairs, Impairments  strength decreased;impaired balance;coordination impaired  -EE      Comment (Gait/Stairs)  Ascending/descending stairs laterally when using only R handrail.   -EE      Recorded by [EE] Irlanda Wallis, PT      Row Name 05/07/20 0930             Safety Issues, Functional Mobility    Impairments Affecting Function (Mobility)  balance;endurance/activity tolerance;sensation/sensory awareness;coordination  -EE      Recorded by [EE] Irlanda Wallis, PT      Row Name 05/07/20 0930             Step Over Obstacle (Mobility)    [place], Stepping Over Obstacles (Mobility)  contact guard;verbal cues  -EE      Comment, Stepping Over Obstacles (Mobility)  stepping over low hurdles forward and laterally, 2 x 8' each, UE support on marni bars  -EE      Recorded by [EE] Irlanda Wallis, PT      Row Name 05/07/20 1156             Bathing Assessment/Treatment    Bathing [place] Level  bathing skills;supervision  -AF      Assistive Device (Bathing)  grab bar/tub rail;hand held shower spray hose;tub bench  -AF      Bathing Position  supported sitting;supported standing  -AF      Recorded by [AF] Sobeida Pennington OTR      Row Name 05/07/20 1156             Upper Body Dressing Assessment/Treatment    Upper Body Dressing Task  upper body dressing skills;supervision  -AF      Upper Body Dressing Position  supported sitting  -AF      Recorded by [AF] Sobeida Pennington OTR      Row Name 05/07/20 1156             Lower Body Dressing Assessment/Treatment    Lower Body Dressing  [place] Level  doff;don;pants/bottoms;shoes/slippers;socks;underwear;supervision  -AF      Lower Body Dressing Position  supported sitting;supported standing  -AF      Lower Body Dressing Setup Assistance  obtain clothing  -AF      Recorded by [AF] Sobeida Pennington, OTR      Row Name 05/07/20 1156             Grooming Assessment/Treatment    Grooming [place] Level  grooming skills;supervision  -AF      Grooming Position  supported sitting  -AF      Recorded by [AF] Sobeida Pennington, OTR      Row Name 05/07/20 1156             Toileting Assessment/Treatment    Toileting [place] Level  toileting skills;supervision  -AF      Assistive Device Use (Toileting)  grab bar/safety frame;raised toilet seat  -AF      Toileting Position  supported sitting;supported standing  -AF      Comment (Toileting)  RWX level   -AF      Recorded by [AF] Sobeida Pennington, OTR      Row Name 05/07/20 1507             Fine Motor Testing & Training    Comment, Fine Motor Coordination  FMC with maniuplating theraputty with R hand and small pieces for fill in the blank perceptual puzzle with MIN diffiuclt   -AF      Recorded by [AF] Sobeida Pennington, OTR      Row Name 05/07/20 1507 05/07/20 1156 05/07/20 0930       Pain Scale: Numbers Pre/Post-Treatment    Pain Scale: Numbers, Pretreatment  0/10 - no pain  -AF  0/10 - no pain  -AF  0/10 - no pain  -EE    Pain Scale: Numbers, Post-Treatment  0/10 - no pain  -AF  0/10 - no pain  -AF  0/10 - no pain  -EE    Recorded by [AF] Sobeida Pennington, OTR [AF] Sobeida Pennington, OTR [EE] Irlanda Wallis, PT    Row Name 05/07/20 0930             Balance    Balance  standing balance activity;dynamic balance activity;gustafson balance scale total score  -EE      Total Score: Gustafson Balance Scale  33  -EE      Recorded by [EE] Irlanda Wallis, PT      Row Name 05/07/20 1156             Static Sitting Balance    Level of [place] (Unsupported Sitting, Static Balance)  supervision  -AF      Recorded by [AF] Sobeida Pennington,  "OTR      Row Name 05/07/20 1156             Dynamic Standing Balance    Level of [place], Reaches Outside Midline (Standing, Dynamic Balance)  standby assist  -AF      Assistive Device Utilized (Supported Standing, Dynamic Balance)  walker, rolling;other (see comments) grab bars  -AF      Recorded by [AF] Sobeida Pennington OTR      Row Name 05/07/20 0930             Standing Balance Activity    Activities Performed (Standing, Balance Training)  feet together in stance;semi tandem stance;unilateral stance standing with eyes closed  -EE      Support Needed for Balance (Standing, Balance Training)  minimal external support for balance, 75% patient effort;CGA;balances without upper extremity support  -EE      Comment (Standing, Balance Training)  See comments for Jasso Balance test  -EE      Recorded by [EE] Irlanda Wallis PT      Row Name 05/07/20 0930             Dynamic Balance Activity    Therapeutic Training Performed (Dynamic Balance)  360 degree turns B LE alt step taps to 6\" step w/min A  -EE      Support Needed for Balance (Dynamic Balance Training)  balances without upper extremity support;minimal external support for balance, 75% patient effort;CGA  -EE      Comment (Dynamic Balance Training)  tandem walking 2 x8' with CGA and one UE support on hemibars  -EE      Recorded by [EE] Irlanda Wallis, PT      Row Name 05/07/20 1507             Upper Extremity Seated Therapeutic Exercise    Performed, Seated Upper Extremity (Therapeutic Exercise)  shoulder flexion/extension;scapular protraction/retraction;elbow flexion/extension;forearm supination/pronation;wrist flexion/extension  -AF      Device, Seated Upper Extremity (Therapeutic Exercise)  free weights, barbell;free weights, cuff #3 hand weight, #3 dowel hedy  -AF      Exercise Type, Seated Upper Extremity (Therapeutic Exercise)  AROM (active range of motion);resistive exercise  -AF      Expected Outcomes, Seated Upper Extremity (Therapeutic Exercise)  improve " functional tolerance, self-care activity;improve performance, BADLs;improve performance, transfer skills  -AF      Sets/Reps Detail, Seated Upper Extremity (Therapeutic Exercise)  3/10  -AF      Transfers Skills, Training to Functional Activity, Seated Upper Extremity (Therapeutic Exercise)  transfers skills to functional activity most of the time  -AF      Recorded by [AF] Sobeida Pennington OTR      Row Name 05/07/20 0930             Lower Extremity Standing Therapeutic Exercise    Performed, Standing Lower Extremity (Therapeutic Exercise)  heel raises;hip flexion/extension;knee flexion/extension;mini-squats  -EE      Device, Standing Lower Extremity (Therapeutic Exercise)  marni bars;elastic bands/tubing yellow tband for B hip ext  -EE      Exercise Type, Standing Lower Extremity (Therapeutic Exercise)  AROM (active range of motion);resistive exercise  -EE      Sets/Reps Detail, Standing Lower Extremity (Therapeutic Exercise)  1/10  -EE      Comment, Standing Lower Extremity (Therapeutic Exercise)  Side stepping/hip abd with yellow tband around ankles, x8' each direction, CGA and B UE support on hemibars.   -EE      Recorded by [EE] Irlanda Wallis PT      Row Name 05/07/20 1507 05/07/20 1156 05/07/20 0930       Positioning and Restraints    Pre-Treatment Position  in bed  -AF  in bed  -AF  sitting in chair/recliner  -EE    Post Treatment Position  bed  -AF  wheelchair  -AF  bed  -EE    In Bed  supine;call light within reach;encouraged to call for assist;exit alarm on  -AF  --  fowlers;call light within reach;encouraged to call for assist;exit alarm on  -EE    In Wheelchair  --  sitting;call light within reach;encouraged to call for assist;exit alarm on;with PT  -AF  --    Recorded by [AF] Sobeida Pennington OTR [AF] Sobeida Pennington OTR [EE] Irlanda Wallis, PT      User Key  (r) = Recorded By, (t) = Taken By, (c) = Cosigned By    Initials Name Effective Dates    EE Irlanda Wallis, AVINASH 04/03/18 -     AF Sobeida Pennington OTR  04/14/20 -            Physical Therapy Education                 Title: PT OT SLP Therapies (Done)     Topic: Physical Therapy (Done)     Point: Mobility training (Done)     Description:   Instruct learner(s) on safety and technique for assisting patient out of bed, chair or wheelchair.  Instruct in the proper use of assistive devices, such as walker, crutches, cane or brace.              Patient Friendly Description:   It's important to get you on your feet again, but we need to do so in a way that is safe for you. Falling has serious consequences, and your personal safety is the most important thing of all.        When it's time to get out of bed, one of us or a family member will sit next to you on the bed to give you support.     If your doctor or nurse tells you to use a walker, crutches, a cane, or a brace, be sure you use it every time you get out of bed, even if you think you don't need it.    Learning Progress Summary           Patient Acceptance, E,TB,D, VU,NR by EE at 5/7/2020 1223    Acceptance, E,TB, VU,NR by EE at 5/6/2020 0937    Acceptance, E, VU,NR by EE at 5/5/2020 1043    Acceptance, D, NR by LB at 5/4/2020 1522    Comment:  stairs with one rail and a quad tipped cane    Acceptance, E,TB,D, NR by LB at 5/4/2020 1420    Comment:  Stairs with one rail and quad tipped cane    Acceptance, TB,D,E, NR by LB at 5/4/2020 1148    Acceptance, E,TB, VU,NR by EE at 5/1/2020 1126    Acceptance, E,TB,D, NR by LB at 4/30/2020 1538    Comment:  Stairs with one rail and quad tipped cane.    Acceptance, E,TB,D, NR by LB at 4/27/2020 1616    Comment:  Stairs with one rail and HHA .    Acceptance, E, NR by LB at 4/25/2020 1352                   Point: Home exercise program (Done)     Description:   Instruct learner(s) on appropriate technique for monitoring, assisting and/or progressing patient with therapeutic exercises and activities.              Learning Progress Summary           Patient Acceptance, E,TB,D,  VU,NR by EE at 5/7/2020 1223    Acceptance, E,TB, VU,NR by EE at 5/6/2020 0937    Acceptance, E, VU,NR by EE at 5/5/2020 1043    Acceptance, E,TB, VU,NR by EE at 5/1/2020 1126                   Point: Body mechanics (Done)     Description:   Instruct learner(s) on proper positioning and spine alignment for patient and/or caregiver during mobility tasks and/or exercises.              Learning Progress Summary           Patient Acceptance, E,TB,D, VU,NR by EE at 5/7/2020 1223    Acceptance, E,TB, VU,NR by EE at 5/6/2020 0937    Acceptance, E, VU,NR by EE at 5/5/2020 1043                   Point: Precautions (Done)     Description:   Instruct learner(s) on prescribed precautions during mobility and gait tasks              Learning Progress Summary           Patient Acceptance, E,TB,D, VU,NR by EE at 5/7/2020 1223    Acceptance, E,TB, VU,NR by EE at 5/6/2020 0937    Acceptance, E, VU,NR by EE at 5/5/2020 1043    Acceptance, E, VU by MD at 5/2/2020 1213    Acceptance, E, VU by MD at 5/1/2020 1229    Acceptance, E, VU by MD at 4/29/2020 0924    Acceptance, E, VU by MD at 4/28/2020 0927    Acceptance, E, VU by MD at 4/24/2020 0955    Acceptance, E, NR by MD at 4/23/2020 0912    Acceptance, E, VU by MD at 4/22/2020 0923    Acceptance, E, VU by MD at 4/21/2020 1202                               User Key     Initials Effective Dates Name Provider Type Discipline    LB 06/08/18 -  Preeti Reynaga, PT Physical Therapist PT    EE 04/03/18 -  Irlanda Wallis, PT Physical Therapist PT    MD 04/03/18 -  Serena Philip PT Physical Therapist PT                  PT Recommendation and Plan                 Outcome Measures     Row Name 05/07/20 1500             Jasso Balance Scale    Sitting to Standing  4  -EE      Standing Unsupported  3  -EE      Sitting with Back Unsupported but Feet Supported on Floor or on Stool  4  -EE      Standing to Sitting  4  -EE      Transfers  4  -EE      Standing Unsupported with Eyes Closed  3  -EE      Standing  Unsupported with Feet Together  3  -EE      Reaching Forward with Outstretched Arm While Standing  2  -EE       Object From the Floor From a Standing Position  1  -EE      Turning to Look Behind Over Left and Right Shoulders While Standing  0  -EE      Turn 360 Degrees  1  -EE      Place Alternate Foot on Step or Stool While Standing Unsupported  1  -EE      Standing Unsupported with One Foot in Front  2  -EE      Standing on One Leg  1  -EE      Jasso Total Score  33  -EE         Functional Assessment    Outcome Measure Options  Jasso Balance  -EE        User Key  (r) = Recorded By, (t) = Taken By, (c) = Cosigned By    Initials Name Provider Type    EE Irlanda Wallis PT Physical Therapist             Time Calculation:     PT Charges     Row Name 05/07/20 1544 05/07/20 1223          Time Calculation    Start Time  1400  -EE  0900  -EE     Stop Time  1430  -EE  1000  -EE     Time Calculation (min)  30 min  -EE  60 min  -EE     PT Received On  05/07/20  -EE  05/07/20  -EE     PT - Next Appointment  05/08/20  -EE  05/07/20  -EE        Time Calculation- PT    Total Timed Code Minutes- PT  30 minute(s)  -EE  60 minute(s)  -EE       User Key  (r) = Recorded By, (t) = Taken By, (c) = Cosigned By    Initials Name Provider Type    Irlanda Duarte PT Physical Therapist          Therapy Charges for Today     Code Description Service Date Service Provider Modifiers Qty    33515554374 HC GAIT TRAINING EA 15 MIN 5/6/2020 Irlanda Wallis, PT GP 2    79426682718 HC PT THER PROC EA 15 MIN 5/6/2020 Irlanda Wallis, PT GP 1    80623276115 HC PT NEUROMUSC RE EDUCATION EA 15 MIN 5/6/2020 Irlanda Wallis, PT GP 2    49766566661 HC PT THERAPEUTIC ACT EA 15 MIN 5/6/2020 Irlanda Wallis, PT GP 1    39418852136 HC GAIT TRAINING EA 15 MIN 5/7/2020 Irlanda Wallis, PT GP 2    24101182992 HC PT NEUROMUSC RE EDUCATION EA 15 MIN 5/7/2020 Irlanda Wallis, PT GP 2    67852488908 HC PT THERAPEUTIC ACT EA 15 MIN 5/7/2020 Irlanda Wallis, PT GP 1    48819987254 HC PT  THER PROC EA 15 MIN 5/7/2020 Irlanda Wallis, PT GP 1            PT G-Codes  Outcome Measure Options: Jasso Balance  Jasso Total Score: 33    Patient was wearing a face mask during this therapy encounter. Therapist used appropriate personal protective equipment including mask and gloves.  Mask used was standard procedure mask. Appropriate PPE was worn during the entire therapy session. Hand hygiene was completed before and after therapy session. Patient is not in enhanced droplet precautions.     Irlanda Wallis, PT  5/7/2020

## 2020-05-07 NOTE — PROGRESS NOTES
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  Feeling better today.    History taken from: patient    Objective     Vital Signs  Temp:  [97.9 °F (36.6 °C)-98.1 °F (36.7 °C)] 98.1 °F (36.7 °C)  Heart Rate:  [] 94  Resp:  [18] 18  BP: (104-138)/(69-91) 104/69    Objective     MENTAL STATUS -  AWAKE / ALERT  Gen - nad, sitting upright in bed   HEENT- NCAT, PUPILS EQUALLY ROUND, SCLERAE ANICTERIC, CONJUNCTIVAE PINK, OP MOIST, NO JVD, EARS UNREMARKABLE EXTERNALLY  LUNGS -normal respirations. equal chest rise   EXT - NO EDEMA OR CYANOSIS   Skin - small bruise on left forearm, no warmth  NEURO -   A/ox4, speech is fluent, follows all commands  MOTOR EXAM -JONES x4, symmetric muscle bulk  Antigravity BUE strength  Sitting upright in bed with good truncal control  Psych - appropriate mood and affect    Results Review:     I reviewed the patient's new clinical results.     Results for SENTHIL SMITH (MRN [id number]) as of 5/7/2020 08:25   Ref. Range 5/5/2020 21:17 5/6/2020 07:26 5/6/2020 11:10 5/6/2020 16:16 5/7/2020 07:09   Glucose Latest Ref Range: 70 - 130 mg/dL 169 (H) 135 (H) 227 (H) 106 135 (H)       Medication Review:   Scheduled Meds:  amLODIPine 10 mg Oral Nightly   budesonide-formoterol 2 puff Inhalation BID - RT    DULoxetine 30 mg Oral Daily   [START ON 5/9/2020] DULoxetine 60 mg Oral Daily   folic acid 1 mg Oral Daily   hydroCHLOROthiazide Oral 25 mg Oral Daily   insulin glargine 20 Units Subcutaneous QAM   insulin lispro 0-14 Units Subcutaneous TID AC   insulin lispro 14 Units Subcutaneous TID With Meals   levothyroxine 200 mcg Oral Q AM   pantoprazole 40 mg Oral BID AC   predniSONE 5 mg Oral Daily   pregabalin 100 mg Oral Q12H   rOPINIRole 0.5 mg Oral Nightly   sucralfate 1 g Oral 4x Daily AC & at Bedtime     Continuous Infusions:   PRN Meds:.Benzocaine  •  benzonatate  •  calcium carbonate  •  dextrose  •  dextrose  •  diphenhydrAMINE  •  diphenhydrAMINE  •  glucagon (human recombinant)  •  guaiFENesin-dextromethorphan  •  melatonin  •  ondansetron  •  oxyCODONE  •  oxyCODONE  •  polyethylene glycol  •  potassium chloride **OR** potassium chloride **OR** potassium chloride  •  sodium chloride      Assessment/Plan       YOUNG (nonalcoholic steatohepatitis)    Hypothyroid    Type 2 diabetes mellitus (CMS/HCC)    GBS (Guillain Revere syndrome) (CMS/HCC)    Elevated transaminase level    History of gestational diabetes    Steroid-induced hyperglycemia    Elevated aldolase level      Assessment & Plan     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  This is far greater than expected based on the nerve conduction findings.  This suggests either motor neuron disease or a primarily axonal polyneuropathy such as axonal Guillain Barré syndrome based on the patient's symptom history. F/U Dr Juventino Gaytan in 2-3 months.     Previous question of dermatomyositis or autoimmune process-trial of steroids-tapering off  April 27-has been on prednisone 40 mg daily since April 10 empiric trial for previous question of  dermatomyositis but now not felt clinically present-taper off of prednisone 30 mg daily x3 days, 20 mg daily x3 days, 10 mg daily x3 days, 5 mg daily x3 days, then stop.     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 Also on oxycodone.  April 27-has some pain in the upper extremities.  Doubt that it is bilateral joint pain as she has been on a reasonable dose of prednisone for the last 16 days.  May be a variant for the neuropathic pain.  Will titrate up on Lyrica 200 mg every 12 hours, watch for any myoclonic jerks.  May 1- taking oxycodone 5 mg about 5 times a day, need to taper off over next week, changed to Oxycodone 5 mg q 6 hours prn x 3 days, then q 8 hours prn x 3 days, then q 12 hours prn x three days, then stop. SVETLANA reviewed.   May 2-add on Cymbalta 30 mg daily for 7 days and increase to 60 mg daily.  Discussed other options of titrating up on Lyrica or switching to gabapentin.  May 4 - Cymbalta is helping  May 6 - increase to 60mg in a few days     DVT prophylaxis - SCDs        Hepatic steatosis-elevated LFTs  April 27-gastroenterology following  April 29 - reviewed with gastroenterology - a combination of fatty liver (the treatment would be weight loss) and Ebstein Horan viral hepatitis (the only treatment would be supportive care)- recommend against a liver biopsy. Follow up in three months with repeat labs and full colonscopy.  May 5 - EBV labs reviewed, and will need outpatient GI f/up for hepatic steatosis     Restless leg syndrome -   May 4 - Will trial Requip  May 6 - will increase Requip dosage and  see if helps sleep.       Sleep disturbance  May 6 - reviewed meds with pharmacy.  Will trial increase of Requip and steroids could be contributing but are currently being tapered.  May 7 - slept better last night    May 7 - Plan is for patient to go to mother's home at discharge.  Patient feels comfortable with this plan.     April 27-gait 80 feet CTG-min assist.  Team conference in the a.m.     TEAM CONF - April 28- BED CTG. 4 STAIRS MIN. GAIT 100 FEET CTG-MIN RW. TOILET TRANSFERS CTG. SHOWER TRANSFERS CTG.  UBD MIN ASSIST FOR BRA. LBD CTG. BATH CTG. GROOMING SBA.  TOILETING MOD INDEPENDENT. CONTINENT BOWEL AND BLADDER.   DIETICIAN EDUCATED ON DIABETIC DIET ON STEROIDS BUT PATIENT RESISTANT TO INSTRUCTION.   NEEDS TO BE MODIFIED INDEPENDENT FOR HOME  ELOS- TWO WEEKS     TEAM CONF - May 5 -   Bed/Chair/Wc SBA,   Stairs 4 with CGa/MIN A with 1 HR and Quad tipped cane  Amb 160ft CGa RWx, Min A with a cane  Toilet Tx SBA RWx, Tub/Shower Tx CGa RWx  Bathing SBA, LBD SBA, UBD Min A with bra, grooming SBA seated  Toileting SBA  Continent of bowel and bladder  2L O2 during night (h/o ERLIN with CPAP at home)  ELOS - 1 week home with assistance vs SNF  Discussed at length with patient this option of disposition.  Patient feels she does not have anyone to assist her.  Will look into SNF facility as she still requires assistance and would benefit from further inpatient rehab.     During rounds, used appropriate personal protective equipment including mask and gloves.  Mask used was standard procedure mask. Appropriate PPE was worn during the entire visit.  Hand hygiene was completed before and after.    Millie Cm MD  05/07/20  08:25    Time: 15min

## 2020-05-07 NOTE — PROGRESS NOTES
Occupational Therapy: Individual: 90 minutes.    Physical Therapy: [facility]    Speech Language Pathology:  [facility]    Signed by: Sobeida Pennington OT

## 2020-05-07 NOTE — THERAPY TREATMENT NOTE
Inpatient Rehabilitation - Occupational Therapy Treatment Note    [facility]     Patient Name: Oralia Sánchez  : 1975  MRN: [id number]    Today's Date: 2020                 Admit Date: 2020      Visit Dx:  No diagnosis found.    Patient Active Problem List   Diagnosis   • Vitamin D deficiency   • Awareness of heartbeats   • Adiposity   • YOUNG (nonalcoholic steatohepatitis)   • Hypothyroid   • Diabetes mellitus arising in pregnancy   • Diabetes (CMS/HCC)   • Metabolic syndrome   • Chest pain   • Primary thunderclap headache   • Elevated LFTs   • Tobacco abuse   • Rheumatoid arthritis (CMS/HCC)   • Type 2 diabetes mellitus (CMS/HCC)   • Morbid obesity (CMS/HCC)   • UTI (urinary tract infection), bacterial   • Cerebral aneurysm   • Dyspnea   • Cough   • Hypomagnesemia   • Metabolic acidosis   • Fatigue   • History of COPD   • Abnormal CT scan, colon   • 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)   • Essential hypertension   • 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)   • Elevated transaminase level   • History of gestational diabetes   • Steroid-induced hyperglycemia   • Elevated aldolase level         Therapy Treatment    IRF Treatment Summary     Row Name 20 1156             Evaluation/Treatment Time and Intent    Subjective Information  no complaints  -AF      Existing Precautions/Restrictions  fall  -AF      Document Type  therapy note (daily note)  -AF      Mode of Treatment  occupational therapy  -AF      Patient/Family Observations  pt supine in bed, stated that she slept well last night  -AF      Recorded by [AF] Sobeida Pennington OTCHAGO      Row Name 20 1156             Cognition/Psychosocial- PT/OT    Affect/Mental Status (Cognitive)  WNL  -AF      Orientation Status (Cognition)  oriented x 3  -AF      Follows Commands (Cognition)  WNL  -AF      Personal Safety Interventions  fall prevention program maintained;gait belt;nonskid shoes/slippers when out of bed  -AF      Cognitive  Function (Cognitive)  memory deficit  -AF      Memory Deficit (Cognitive)  mild deficit  -AF      Recorded by [AF] Sobeida Pennington, OTR      Row Name 05/07/20 1156             Bed Mobility Assessment/Treatment    Supine-Sit [place] (Bed Mobility)  independent  -AF      Recorded by [AF] Sobeida Pennington, OTR      Row Name 05/07/20 1156             Functional Mobility    Functional Mobility- Comment  walked in room, in bathroom, to and from shower room with RWX SBA  -AF      Recorded by [AF] Sobeida Pennington, OTR      Row Name 05/07/20 1156             Bed-Chair Transfer    Bed-Chair [place] (Transfers)  supervision  -AF      Assistive Device (Bed-Chair Transfers)  walker, front-wheeled  -AF      Recorded by [AF] Sobeida Pennington, OTR      Row Name 05/07/20 1156             Chair-Bed Transfer    Chair-Bed [place] (Transfers)  supervision  -AF      Assistive Device (Chair-Bed Transfers)  walker, front-wheeled  -AF      Recorded by [AF] Sobeida Pennington, OTR      Row Name 05/07/20 1156             Toilet Transfer    Type (Toilet Transfer)  stand pivot/stand step  -AF      [place] Level (Toilet Transfer)  supervision  -AF      Assistive Device (Toilet Transfer)  commode;grab bars/safety frame;walker, front-wheeled  -AF      Recorded by [AF] Sobeida Pennington, OTR      Row Name 05/07/20 1156             Shower Transfer    Type (Shower Transfer)  stand pivot/stand step  -AF      [place] Level (Shower Transfer)  supervision  -AF      Assistive Device (Shower Transfer)  grab bars/tub rail;tub bench;walker, front-wheeled  -AF      Recorded by [AF] Sobeida Pennington, OTR      Row Name 05/07/20 1156             Bathing Assessment/Treatment    Bathing [place] Level  bathing skills;supervision  -AF      Assistive Device (Bathing)  grab bar/tub rail;hand held shower spray hose;tub bench  -AF      Bathing Position  supported sitting;supported standing  -AF      Recorded by [AF] Sobeida Pennington, OTR      Row Name  05/07/20 1156             Upper Body Dressing Assessment/Treatment    Upper Body Dressing Task  upper body dressing skills;supervision  -AF      Upper Body Dressing Position  supported sitting  -AF      Recorded by [AF] Sobeida Pennington, OTR      Row Name 05/07/20 1156             Lower Body Dressing Assessment/Treatment    Lower Body Dressing [place] Level  doff;don;pants/bottoms;shoes/slippers;socks;underwear;supervision  -AF      Lower Body Dressing Position  supported sitting;supported standing  -AF      Lower Body Dressing Setup Assistance  obtain clothing  -AF      Recorded by [AF] Sobeida Pennington, OTR      Row Name 05/07/20 1156             Grooming Assessment/Treatment    Grooming [place] Level  grooming skills;supervision  -AF      Grooming Position  supported sitting  -AF      Recorded by [AF] Sobeida Pennington, OTR      Row Name 05/07/20 1156             Toileting Assessment/Treatment    Toileting [place] Level  toileting skills;supervision  -AF      Assistive Device Use (Toileting)  grab bar/safety frame;raised toilet seat  -AF      Toileting Position  supported sitting;supported standing  -AF      Comment (Toileting)  RWX level   -AF      Recorded by [AF] Sobeida Pennington, OTR      Row Name 05/07/20 1156             Pain Scale: Numbers Pre/Post-Treatment    Pain Scale: Numbers, Pretreatment  0/10 - no pain  -AF      Pain Scale: Numbers, Post-Treatment  0/10 - no pain  -AF      Recorded by [AF] Sobeida Pennington, OTR      Row Name 05/07/20 1156             Static Sitting Balance    Level of [place] (Unsupported Sitting, Static Balance)  supervision  -AF      Recorded by [AF] Sobeida Pennington, OTR      Row Name 05/07/20 1156             Dynamic Standing Balance    Level of [place], Reaches Outside Midline (Standing, Dynamic Balance)  standby assist  -AF      Assistive Device Utilized (Supported Standing, Dynamic Balance)  walker, rolling;other (see comments) grab bars  -AF      Recorded by  [AF] Sobeida Pennington, OTR      Row Name 05/07/20 1156             Positioning and Restraints    Pre-Treatment Position  in bed  -AF      Post Treatment Position  wheelchair  -AF      In Wheelchair  sitting;call light within reach;encouraged to call for assist;exit alarm on;with PT  -AF      Recorded by [AF] Sobeida Pennington OTR        User Key  (r) = Recorded By, (t) = Taken By, (c) = Cosigned By    Initials Name Effective Dates    AF Sobeida Pennington, OTR 04/14/20 -                  OT Recommendation and Plan                 OT IRF GOALS     Row Name 05/04/20 1500 04/28/20 1200          Transfer Goal 1 (OT-IRF)    Activity/Assistive Device (Transfer Goal 1, OT-IRF)  toilet;tub  -DN  toilet;tub  -DN     [place] Level (Transfer Goal 1, OT-IRF)  supervision required;contact guard assist  -DN  contact guard assist with RWX  -DN     Time Frame (Transfer Goal 1, OT-IRF)  short term goal (STG)  -DN  short term goal (STG)  -DN     Progress/Outcomes (Transfer Goal 1, OT-IRF)  goal revised this date;goal partially met  -DN  goal met;goal revised this date  -DN        Transfer Goal 2 (OT-IRF)    Activity/Assistive Device (Transfer Goal 2, OT-IRF)  toilet;tub;walker, rolling  -DN  toilet;tub;walker, rolling  -DN     [place] Level (Transfer Goal 2, OT-IRF)  conditional independence  -DN  conditional independence  -DN     Time Frame (Transfer Goal 2, OT-IRF)  long term goal (LTG)  -DN  long term goal (LTG)  -DN     Progress/Outcomes (Transfer Goal 2, OT-IRF)  goal ongoing  -DN  goal revised this date  -DN        Bathing Goal 1 (OT-IRF)    Activity/Device (Bathing Goal 1, OT-IRF)  bathing skills, all  -DN  bathing skills, all  -DN     [place] Level (Bathing Goal 1, OT-IRF)  conditional independence  -DN  supervision required  -DN     Time Frame (Bathing Goal 1, OT-IRF)  short term goal (STG)  -DN  short term goal (STG)  -DN     Progress/Outcomes (Bathing Goal 1, OT-IRF)  goal met;goal revised this date  -DN  goal  met;goal revised this date  -DN        Bathing Goal 2 (OT-IRF)    Activity/Device (Bathing Goal 2, OT-IRF)  bathing skills, all  -DN  bathing skills, all  -DN     [place] Level (Bathing Goal 2, OT-IRF)  conditional independence  -DN  conditional independence  -DN     Time Frame (Bathing Goal 2, OT-IRF)  long term goal (LTG)  -DN  long term goal (LTG)  -DN     Progress/Outcomes (Bathing Goal 2, OT-IRF)  goal ongoing;goal partially met  -DN  goal ongoing  -DN        UB Dressing Goal 1 (OT-IRF)    Activity/Device (UB Dressing Goal 1, OT-IRF)  upper body dressing  -DN  upper body dressing  -DN     [place] (UB Dress Goal 1, OT-IRF)  set-up required  -DN  set-up required  -DN     Time Frame (UB Dressing Goal 1, OT-IRF)  short term goal (STG)  -DN  short term goal (STG)  -DN     Progress/Outcomes (UB Dressing Goal 1, OT-IRF)  goal met  -DN  goal ongoing;goal not met  -DN        UB Dressing Goal 2 (OT-IRF)    Activity/Device (UB Dressing Goal 2, OT-IRF)  upper body dressing  -DN  upper body dressing  -DN     [place] (UB Dress Goal 2, OT-IRF)  conditional independence  -DN  conditional independence  -DN     Time Frame (UB Dressing Goal 2, OT-IRF)  long term goal (LTG)  -DN  long term goal (LTG)  -DN     Progress/Outcomes (UB Dressing Goal 2, OT-IRF)  goal ongoing  -DN  goal ongoing  -DN        LB Dressing Goal 1 (OT-IRF)    Activity/Device (LB Dressing Goal 1, OT-IRF)  lower body dressing  -DN  lower body dressing  -DN     [place] (LB Dressing Goal 1, OT-IRF)  supervision required  -DN  supervision required  -DN     Time Frame (LB Dressing Goal 1, OT-IRF)  short term goal (STG)  -DN  short term goal (STG)  -DN     Progress/Outcomes (LB Dressing Goal 1, OT-IRF)  goal met;goal revised this date  -DN  goal revised this date  -DN        LB Dressing Goal 2 (OT-IRF)    Activity/Device (LB Dressing Goal 2, OT-IRF)  lower body dressing  -DN  lower body dressing  -DN     [place] (LB Dressing Goal 2, OT-IRF)   conditional independence  -DN  conditional independence  -DN     Time Frame (LB Dressing Goal 2, OT-IRF)  long term goal (LTG)  -DN  long term goal (LTG)  -DN     Progress/Outcomes (LB Dressing Goal 2, OT-IRF)  goal ongoing  -DN  goal revised this date  -DN        Grooming Goal 1 (OT-IRF)    Activity/Device (Grooming Goal 1, OT-IRF)  grooming skills, all seated at sink  -DN  grooming skills, all seated at sink  -DN     [place] (Grooming Goal 1, OT-IRF)  conditional independence  -DN  conditional independence  -DN     Time Frame (Grooming Goal 1, OT-IRF)  short term goal (STG)  -DN  short term goal (STG)  -DN     Progress/Outcomes (Grooming Goal 1, OT-IRF)  goal ongoing  -DN  goal revised this date  -DN        Grooming Goal 2 (OT-IRF)    Activity/Device (Grooming Goal 2, OT-IRF)  grooming skills, all standing at sink  -DN  grooming skills, all standing at sink  -DN     [place] (Grooming Goal 2, OT-IRF)  conditional independence  -DN  conditional independence  -DN     Time Frame (Grooming Goal 2, OT-IRF)  long term goal (LTG)  -DN  long term goal (LTG)  -DN     Progress/Outcomes (Grooming Goal 2, OT-IRF)  goal ongoing  -DN  goal ongoing  -DN        Toileting Goal 1 (OT-IRF)    Activity/Device (Toileting Goal 1, OT-IRF)  toileting skills, all  -DN  toileting skills, all  -DN     [place] Level (Toileting Goal 1, OT-IRF)  verbal cues required;supervision required  -DN  verbal cues required;supervision required  -DN     Time Frame (Toileting Goal 1, OT-IRF)  short term goal (STG)  -DN  short term goal (STG)  -DN     Progress/Outcomes (Toileting Goal 1, OT-IRF)  goal met;goal revised this date  -DN  goal met;goal revised this date  -DN        Toileting Goal 2 (OT-IRF)    Activity/Device (Toileting Goal 2, OT-IRF)  toileting skills, all;commode chair  -DN  toileting skills, all;commode chair  -DN     [place] Level (Toileting Goal 2, OT-IRF)  conditional independence;tactile cues required;verbal cues  required  -DN  conditional independence;tactile cues required;verbal cues required  -DN     Time Frame (Toileting Goal 2, OT-IRF)  long term goal (LTG)  -DN  long term goal (LTG)  -DN     Progress/Outcomes (Toileting Goal 2, OT-IRF)  goal ongoing  -DN  goal ongoing  -DN        Strength Goal 1 (OT-IRF)    Strength Goal 1 (OT-IRF)  pt to be setup with BUE HEP  -DN  pt to be setup with BUE HEP  -DN     Time Frame (Strength Goal 1, OT-IRF)  short term goal (STG)  -DN  short term goal (STG)  -DN     Progress/Outcomes (Strength Goal 1, OT-IRF)  goal met  -DN  goal revised this date  -DN        Strength Goal 2 (OT-IRF)    Strength Goal 2 (OT-IRF)  pt to be independent with HEP for BUEs  -DN  pt to be independent with HEP for BUEs  -DN     Time Frame (Strength Goal 2, OT-IRF)  long term goal (LTG)  -DN  long term goal (LTG)  -DN     Progress/Outcomes (Strength Goal 2, OT-IRF)  goal ongoing  -DN  goal ongoing  -DN        Balance Goal 1 (OT)    Activity/Assistive Device (Balance Goal 1, OT)  assistive device use  -DN  assistive device use  -DN     [place] Level/Cues Needed (Balance Goal 1, OT)  contact guard assist during adls  -DN  contact guard assist during adls  -DN     Time Frame (Balance Goal 1, OT)  short term goal (STG)  -DN  short term goal (STG)  -DN     Progress/Outcomes (Balance Goal 1, OT)  goal met;goal revised this date  -DN  goal ongoing  -DN        Balance Goal 2 (OT)    Activity/Assistive Device (Balance Goal 2, OT)  assistive device use;standing, dynamic;walker, rolling  -DN  assistive device use;standing, dynamic;walker, rolling  -DN     [place] Level (Balance Goal 2, OT)  conditional independence during adls and home mgmt  -DN  supervision required during adls and home mgmt  -DN     Time Frame (Balance Goal 2, OT)  long term goal (LTG)  -DN  long term goal (LTG)  -DN     Progress/Outcome (Balance Goal 2, OT)  goal ongoing  -DN  goal ongoing  -DN        Caregiver Training Goal 2 (OT-IRF)     Caregiver Training Goal 2 (OT-IRF)  pt family or available caregivers to be independent with assist needed with adls, transfers etc  -DN  pt family or available caregivers to be independent with assist needed with adls, transfers etc  -DN     Time Frame (Caregiver Training Goal 2, OT-IRF)  long term goal (LTG)  -DN  long term goal (LTG)  -DN     Progress/Outcomes (Caregiver Training Goal 2, OT-IRF)  goal ongoing  -DN  goal ongoing  -DN       User Key  (r) = Recorded By, (t) = Taken By, (c) = Cosigned By    Initials Name Provider Type    Prince Morfin OT Occupational Therapist          Occupational Therapy Education                 Title: PT OT SLP Therapies (Done)     Topic: Occupational Therapy (Done)     Point: ADL training (Done)     Description:   Instruct learner(s) on proper safety adaptation and remediation techniques during self care or transfers.   Instruct in proper use of assistive devices.              Learning Progress Summary           Patient Acceptance, E, VU,NR by DN at 4/28/2020 1437    Comment:  with pt in our bathroom to help decide realistic equipment needs at home in her own bathroom, pt states batroom very small, walker may not fit in bathroom, and that she dont have any person to help make any changes needed to make more accessable    Acceptance, E,TB,D, VU,NR by DN at 4/21/2020 1452    Comment:  OT role, transfers, adaptive adls                   Point: Home exercise program (Done)     Description:   Instruct learner(s) on appropriate technique for monitoring, assisting and/or progressing therapeutic exercises/activities.              Learning Progress Summary           Patient Acceptance, E, VU,NR by DN at 4/28/2020 1437    Comment:  with pt in our bathroom to help decide realistic equipment needs at home in her own bathroom, pt states batroom very small, walker may not fit in bathroom, and that she dont have any person to help make any changes needed to make more accessable     Acceptance, E,TB,D, VU,NR by DN at 4/21/2020 1452    Comment:  OT role, transfers, adaptive adls                   Point: Precautions (Done)     Description:   Instruct learner(s) on prescribed precautions during self-care and functional transfers.              Learning Progress Summary           Patient Acceptance, E, VU,NR by DN at 4/28/2020 1437    Comment:  with pt in our bathroom to help decide realistic equipment needs at home in her own bathroom, pt states batroom very small, walker may not fit in bathroom, and that she dont have any person to help make any changes needed to make more accessable    Acceptance, E,TB,D, VU,NR by DN at 4/21/2020 1452    Comment:  OT role, transfers, adaptive adls                   Point: Body mechanics (Done)     Description:   Instruct learner(s) on proper positioning and spine alignment during self-care, functional mobility activities and/or exercises.              Learning Progress Summary           Patient Acceptance, E, VU,NR by DN at 4/28/2020 1437    Comment:  with pt in our bathroom to help decide realistic equipment needs at home in her own bathroom, pt states batroom very small, walker may not fit in bathroom, and that she dont have any person to help make any changes needed to make more accessable    Acceptance, E,TB,D, VU,NR by DN at 4/21/2020 1452    Comment:  OT role, transfers, adaptive adls                               User Key     Initials Effective Dates Name Provider Type Discipline    DN 06/08/18 -  Prince Obrien OT Occupational Therapist OT                       Time Calculation:     Time Calculation- OT     Row Name 05/07/20 1204             Time Calculation- OT    OT Start Time  0800  -AF      OT Stop Time  0900  -AF      OT Time Calculation (min)  60 min  -AF        User Key  (r) = Recorded By, (t) = Taken By, (c) = Cosigned By    Initials Name Provider Type    AF Sobeida Pennington, OTR Occupational Therapist          Therapy Charges for Today     Code  Description Service Date Service Provider Modifiers Qty    [id number] HC OT SELF CARE/MGMT/TRAIN EA 15 MIN 5/7/2020 Sobeida Pennington, OTR GO 4                   Sobeida Pennington OTCHAGO  5/7/2020

## 2020-05-07 NOTE — THERAPY TREATMENT NOTE
Inpatient Rehabilitation - Occupational Therapy Treatment Note    [facility]     Patient Name: Oralia Sánchez  : 1975  MRN: [id number]    Today's Date: 2020                 Admit Date: 2020      Visit Dx:  No diagnosis found.    Patient Active Problem List   Diagnosis   • Vitamin D deficiency   • Awareness of heartbeats   • Adiposity   • YOUNG (nonalcoholic steatohepatitis)   • Hypothyroid   • Diabetes mellitus arising in pregnancy   • Diabetes (CMS/HCC)   • Metabolic syndrome   • Chest pain   • Primary thunderclap headache   • Elevated LFTs   • Tobacco abuse   • Rheumatoid arthritis (CMS/HCC)   • Type 2 diabetes mellitus (CMS/HCC)   • Morbid obesity (CMS/HCC)   • UTI (urinary tract infection), bacterial   • Cerebral aneurysm   • Dyspnea   • Cough   • Hypomagnesemia   • Metabolic acidosis   • Fatigue   • History of COPD   • Abnormal CT scan, colon   • 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)   • Essential hypertension   • GBS (Guillain [place] syndrome) (CMS/HCC)   • Elevated transaminase level   • History of gestational diabetes   • Steroid-induced hyperglycemia   • Elevated aldolase level         Therapy Treatment    IRF Treatment Summary     Row Name 20 1507 20 1156 20 0930       Evaluation/Treatment Time and Intent    Subjective Information  no complaints  -AF  no complaints  -AF  no complaints  -EE    Existing Precautions/Restrictions  fall  -AF  fall  -AF  fall  -EE    Document Type  therapy note (daily note)  -AF  therapy note (daily note)  -AF  therapy note (daily note)  -EE    Mode of Treatment  occupational therapy  -AF  occupational therapy  -AF  physical therapy  -EE    Patient/Family Observations  supine in bed  -AF  pt supine in bed, stated that she slept well last night  -AF  Pt sitting up in WC in no acute distress.  -EE    Recorded by [AF] Sobeida Pennington, SANDRA [AF] Sobeida Pennington, SANDRA [EE] Irlanda Wallis, PT    Row Name 20 1507 20 1156 20 0930        Cognition/Psychosocial- PT/OT    Affect/Mental Status (Cognitive)  WNL  -AF  WNL  -AF  --    Orientation Status (Cognition)  oriented x 3  -AF  oriented x 3  -AF  oriented x 3  -EE    Follows Commands (Cognition)  WNL  -AF  WNL  -AF  WNL  -EE    Personal Safety Interventions  fall prevention program maintained;gait belt;nonskid shoes/slippers when out of bed  -AF  fall prevention program maintained;gait belt;nonskid shoes/slippers when out of bed  -AF  fall prevention program maintained;gait belt;muscle strengthening facilitated;nonskid shoes/slippers when out of bed;supervised activity  -EE    Cognitive Function (Cognitive)  memory deficit  -AF  memory deficit  -AF  --    Memory Deficit (Cognitive)  mild deficit  -AF  mild deficit  -AF  --    Recorded by [AF] Sobeida Pennington OTR [AF] Sobeida Pennington, SANDRA [EE] Irlanda Wallis, PT    Row Name 05/07/20 1507 05/07/20 1156          Bed Mobility Assessment/Treatment    Supine-Sit [place] (Bed Mobility)  independent  -AF  independent  -AF     Sit-Supine [place] (Bed Mobility)  independent  -AF  --     Recorded by [AF] Sobeida Pennington OTR [AF] Sobeida Pennington, OTR     Row Name 05/07/20 1156             Functional Mobility    Functional Mobility- Comment  walked in room, in bathroom, to and from shower room with RWX SBA  -AF      Recorded by [AF] Sobeida Pennington OTR      Row Name 05/07/20 1507 05/07/20 1156          Bed-Chair Transfer    Bed-Chair [place] (Transfers)  supervision  -AF  supervision  -AF     Assistive Device (Bed-Chair Transfers)  walker, front-wheeled  -AF  walker, front-wheeled  -AF     Recorded by [AF] Sobeida Pennington OTR [AF] Sobeida Pennington OTR     Row Name 05/07/20 1507 05/07/20 1156          Chair-Bed Transfer    Chair-Bed [place] (Transfers)  supervision  -AF  supervision  -AF     Assistive Device (Chair-Bed Transfers)  walker, front-wheeled  -AF  walker, front-wheeled  -AF     Recorded by [AF] Sobeida Pennington, OTR [AF] [place]orge  Sobeida VALDEZ, OTR     Row Name 05/07/20 0930             Sit-Stand Transfer    Sit-Stand [place] (Transfers)  stand by assist;verbal cues  -EE      Assistive Device (Sit-Stand Transfers)  walker, front-wheeled;wheelchair  -EE      Recorded by [EE] Irlanda Wallis, AVINASH      Row Name 05/07/20 0930             Stand-Sit Transfer    Stand-Sit [place] (Transfers)  stand by assist;verbal cues  -EE      Assistive Device (Stand-Sit Transfers)  walker, front-wheeled;wheelchair  -EE      Recorded by [EE] Irlanda Wallis, PT      Row Name 05/07/20 1507 05/07/20 1156          Toilet Transfer    Type (Toilet Transfer)  stand pivot/stand step  -AF  stand pivot/stand step  -AF     [place] Level (Toilet Transfer)  supervision  -AF  supervision  -AF     Assistive Device (Toilet Transfer)  walker, front-wheeled  -AF  commode;grab bars/safety frame;walker, front-wheeled  -AF     Recorded by [AF] Sobeida Pennington, OTR [AF] Sobeida Pennington, OTR     Row Name 05/07/20 1156             Shower Transfer    Type (Shower Transfer)  stand pivot/stand step  -AF      [place] Level (Shower Transfer)  supervision  -AF      Assistive Device (Shower Transfer)  grab bars/tub rail;tub bench;walker, front-wheeled  -AF      Recorded by [AF] Sobeida Pennington, OTR      Row Name 05/07/20 0930             Gait/Stairs Assessment/Training    [place] Level (Gait)  stand by assist;verbal cues  -EE      Assistive Device (Gait)  walker, front-wheeled  -EE      Distance in Feet (Gait)  160' x 2  -EE      Pattern (Gait)  step-through  -EE      Deviations/Abnormal Patterns (Gait)  quang decreased;stride length decreased  -EE      Bilateral Gait Deviations  weight shift ability decreased  -EE      [place] Level (Stairs)  contact guard;verbal cues  -EE      Handrail Location (Stairs)  right side (ascending);both sides;other (see comments) one trial with 2 handrails; one trial with R handrail only  -EE      Number of Steps (Stairs)  4  -EE      Ascending  Technique (Stairs)  step-to-step  -EE      Descending Technique (Stairs)  step-to-step  -EE      Stairs, Safety Issues  balance decreased during turns;weight-shifting ability decreased  -EE      Stairs, Impairments  strength decreased;impaired balance;coordination impaired  -EE      Comment (Gait/Stairs)  Ascending/descending stairs laterally when using only R handrail.   -EE      Recorded by [EE] Irlanda Wallis, PT      Row Name 05/07/20 0930             Safety Issues, Functional Mobility    Impairments Affecting Function (Mobility)  balance;endurance/activity tolerance;sensation/sensory awareness;coordination  -EE      Recorded by [EE] Irlanda Wallis, PT      Row Name 05/07/20 0930             Step Over Obstacle (Mobility)    [place], Stepping Over Obstacles (Mobility)  contact guard;verbal cues  -EE      Comment, Stepping Over Obstacles (Mobility)  stepping over low hurdles forward and laterally, 2 x 8' each, UE support on marni bars  -EE      Recorded by [EE] Irlanda Wallis, PT      Row Name 05/07/20 1156             Bathing Assessment/Treatment    Bathing [place] Level  bathing skills;supervision  -AF      Assistive Device (Bathing)  grab bar/tub rail;hand held shower spray hose;tub bench  -AF      Bathing Position  supported sitting;supported standing  -AF      Recorded by [AF] Sobeida Pennington, ASNDRA      Row Name 05/07/20 1156             Upper Body Dressing Assessment/Treatment    Upper Body Dressing Task  upper body dressing skills;supervision  -AF      Upper Body Dressing Position  supported sitting  -AF      Recorded by [AF] Sobeida Pennington OTR      Row Name 05/07/20 1156             Lower Body Dressing Assessment/Treatment    Lower Body Dressing [place] Level  doff;don;pants/bottoms;shoes/slippers;socks;underwear;supervision  -AF      Lower Body Dressing Position  supported sitting;supported standing  -AF      Lower Body Dressing Setup Assistance  obtain clothing  -AF      Recorded by [AF] Gorge  Sobeida VALDEZ OTR      Row Name 05/07/20 1156             Grooming Assessment/Treatment    Grooming [place] Level  grooming skills;supervision  -AF      Grooming Position  supported sitting  -AF      Recorded by [AF] Sobeida Pennington OTR      Row Name 05/07/20 1156             Toileting Assessment/Treatment    Toileting [place] Level  toileting skills;supervision  -AF      Assistive Device Use (Toileting)  grab bar/safety frame;raised toilet seat  -AF      Toileting Position  supported sitting;supported standing  -AF      Comment (Toileting)  RWX level   -AF      Recorded by [AF] Sobeida Pennington, OTR      Row Name 05/07/20 1507             Fine Motor Testing & Training    Comment, Fine Motor Coordination  FMC with maniuplating theraputty with R hand and small pieces for fill in the blank perceptual puzzle with MIN diffiuclt   -AF      Recorded by [AF] Sobeida Pennington, SANDRA      Row Name 05/07/20 1507 05/07/20 1156 05/07/20 0930       Pain Scale: Numbers Pre/Post-Treatment    Pain Scale: Numbers, Pretreatment  0/10 - no pain  -AF  0/10 - no pain  -AF  0/10 - no pain  -EE    Pain Scale: Numbers, Post-Treatment  0/10 - no pain  -AF  0/10 - no pain  -AF  0/10 - no pain  -EE    Recorded by [AF] Sobeida Pennington, OTR [AF] Sobeida Pennington, HARMEETR [EE] Irlanda Wallis, PT    Row Name 05/07/20 1156             Static Sitting Balance    Level of [place] (Unsupported Sitting, Static Balance)  supervision  -AF      Recorded by [AF] Sobeida Pennington OTR      Row Name 05/07/20 1156             Dynamic Standing Balance    Level of [place], Reaches Outside Midline (Standing, Dynamic Balance)  standby assist  -AF      Assistive Device Utilized (Supported Standing, Dynamic Balance)  walker, rolling;other (see comments) grab bars  -AF      Recorded by [AF] Sobeida Pennington OTR      Row Name 05/07/20 0930             Dynamic Balance Activity    Comment (Dynamic Balance Training)  tandem walking 2 x8' with CGA and one UE support on  hemibars  -EE      Recorded by [EE] Irlanda Wallis PT      Row Name 05/07/20 1507             Upper Extremity Seated Therapeutic Exercise    Performed, Seated Upper Extremity (Therapeutic Exercise)  shoulder flexion/extension;scapular protraction/retraction;elbow flexion/extension;forearm supination/pronation;wrist flexion/extension  -AF      Device, Seated Upper Extremity (Therapeutic Exercise)  free weights, barbell;free weights, cuff #3 hand weight, #3 dowel hedy  -AF      Exercise Type, Seated Upper Extremity (Therapeutic Exercise)  AROM (active range of motion);resistive exercise  -AF      Expected Outcomes, Seated Upper Extremity (Therapeutic Exercise)  improve functional tolerance, self-care activity;improve performance, BADLs;improve performance, transfer skills  -AF      Sets/Reps Detail, Seated Upper Extremity (Therapeutic Exercise)  3/10  -AF      Transfers Skills, Training to Functional Activity, Seated Upper Extremity (Therapeutic Exercise)  transfers skills to functional activity most of the time  -AF      Recorded by [AF] Sobeida Pennington OTR      Row Name 05/07/20 0930             Lower Extremity Standing Therapeutic Exercise    Performed, Standing Lower Extremity (Therapeutic Exercise)  heel raises;hip flexion/extension;knee flexion/extension;mini-squats  -EE      Device, Standing Lower Extremity (Therapeutic Exercise)  marni bars;elastic bands/tubing yellow tband for B hip ext  -EE      Exercise Type, Standing Lower Extremity (Therapeutic Exercise)  AROM (active range of motion);resistive exercise  -EE      Sets/Reps Detail, Standing Lower Extremity (Therapeutic Exercise)  1/10  -EE      Comment, Standing Lower Extremity (Therapeutic Exercise)  Side stepping/hip abd with yellow tband around ankles, x8' each direction, CGA and B UE support on hemibars.   -EE      Recorded by [EE] Irlanda Wallis PT      Row Name 05/07/20 1507 05/07/20 1156 05/07/20 0930       Positioning and Restraints    Pre-Treatment  Position  in bed  -AF  in bed  -AF  sitting in chair/recliner  -EE    Post Treatment Position  bed  -AF  wheelchair  -AF  --    In Bed  supine;call light within reach;encouraged to call for assist;exit alarm on  -AF  --  --    In Wheelchair  --  sitting;call light within reach;encouraged to call for assist;exit alarm on;with PT  -AF  --    Recorded by [AF] Sobeida Pennington, OTR [AF] Sobeida Pennington, OTR [EE] Irlanda Wallis, PT      User Key  (r) = Recorded By, (t) = Taken By, (c) = Cosigned By    Initials Name Effective Dates    EE Irlanda Wallis, PT 04/03/18 -     AF Sobeida Pennington, OTR 04/14/20 -                  OT Recommendation and Plan                 OT IRF GOALS     Row Name 05/04/20 1500 04/28/20 1200          Transfer Goal 1 (OT-IRF)    Activity/Assistive Device (Transfer Goal 1, OT-IRF)  toilet;tub  -DN  toilet;tub  -DN     [place] Level (Transfer Goal 1, OT-IRF)  supervision required;contact guard assist  -DN  contact guard assist with RWX  -DN     Time Frame (Transfer Goal 1, OT-IRF)  short term goal (STG)  -DN  short term goal (STG)  -DN     Progress/Outcomes (Transfer Goal 1, OT-IRF)  goal revised this date;goal partially met  -DN  goal met;goal revised this date  -DN        Transfer Goal 2 (OT-IRF)    Activity/Assistive Device (Transfer Goal 2, OT-IRF)  toilet;tub;walker, rolling  -DN  toilet;tub;walker, rolling  -DN     [place] Level (Transfer Goal 2, OT-IRF)  conditional independence  -DN  conditional independence  -DN     Time Frame (Transfer Goal 2, OT-IRF)  long term goal (LTG)  -DN  long term goal (LTG)  -DN     Progress/Outcomes (Transfer Goal 2, OT-IRF)  goal ongoing  -DN  goal revised this date  -DN        Bathing Goal 1 (OT-IRF)    Activity/Device (Bathing Goal 1, OT-IRF)  bathing skills, all  -DN  bathing skills, all  -DN     [place] Level (Bathing Goal 1, OT-IRF)  conditional independence  -DN  supervision required  -DN     Time Frame (Bathing Goal 1, OT-IRF)  short term goal  (STG)  -DN  short term goal (STG)  -DN     Progress/Outcomes (Bathing Goal 1, OT-IRF)  goal met;goal revised this date  -DN  goal met;goal revised this date  -DN        Bathing Goal 2 (OT-IRF)    Activity/Device (Bathing Goal 2, OT-IRF)  bathing skills, all  -DN  bathing skills, all  -DN     [place] Level (Bathing Goal 2, OT-IRF)  conditional independence  -DN  conditional independence  -DN     Time Frame (Bathing Goal 2, OT-IRF)  long term goal (LTG)  -DN  long term goal (LTG)  -DN     Progress/Outcomes (Bathing Goal 2, OT-IRF)  goal ongoing;goal partially met  -DN  goal ongoing  -DN        UB Dressing Goal 1 (OT-IRF)    Activity/Device (UB Dressing Goal 1, OT-IRF)  upper body dressing  -DN  upper body dressing  -DN     [place] (UB Dress Goal 1, OT-IRF)  set-up required  -DN  set-up required  -DN     Time Frame (UB Dressing Goal 1, OT-IRF)  short term goal (STG)  -DN  short term goal (STG)  -DN     Progress/Outcomes (UB Dressing Goal 1, OT-IRF)  goal met  -DN  goal ongoing;goal not met  -DN        UB Dressing Goal 2 (OT-IRF)    Activity/Device (UB Dressing Goal 2, OT-IRF)  upper body dressing  -DN  upper body dressing  -DN     [place] (UB Dress Goal 2, OT-IRF)  conditional independence  -DN  conditional independence  -DN     Time Frame (UB Dressing Goal 2, OT-IRF)  long term goal (LTG)  -DN  long term goal (LTG)  -DN     Progress/Outcomes (UB Dressing Goal 2, OT-IRF)  goal ongoing  -DN  goal ongoing  -DN        LB Dressing Goal 1 (OT-IRF)    Activity/Device (LB Dressing Goal 1, OT-IRF)  lower body dressing  -DN  lower body dressing  -DN     [place] (LB Dressing Goal 1, OT-IRF)  supervision required  -DN  supervision required  -DN     Time Frame (LB Dressing Goal 1, OT-IRF)  short term goal (STG)  -DN  short term goal (STG)  -DN     Progress/Outcomes (LB Dressing Goal 1, OT-IRF)  goal met;goal revised this date  -DN  goal revised this date  -DN        LB Dressing Goal 2 (OT-IRF)     Activity/Device (LB Dressing Goal 2, OT-IRF)  lower body dressing  -DN  lower body dressing  -DN     [place] (LB Dressing Goal 2, OT-IRF)  conditional independence  -DN  conditional independence  -DN     Time Frame (LB Dressing Goal 2, OT-IRF)  long term goal (LTG)  -DN  long term goal (LTG)  -DN     Progress/Outcomes (LB Dressing Goal 2, OT-IRF)  goal ongoing  -DN  goal revised this date  -DN        Grooming Goal 1 (OT-IRF)    Activity/Device (Grooming Goal 1, OT-IRF)  grooming skills, all seated at sink  -DN  grooming skills, all seated at sink  -DN     [place] (Grooming Goal 1, OT-IRF)  conditional independence  -DN  conditional independence  -DN     Time Frame (Grooming Goal 1, OT-IRF)  short term goal (STG)  -DN  short term goal (STG)  -DN     Progress/Outcomes (Grooming Goal 1, OT-IRF)  goal ongoing  -DN  goal revised this date  -DN        Grooming Goal 2 (OT-IRF)    Activity/Device (Grooming Goal 2, OT-IRF)  grooming skills, all standing at sink  -DN  grooming skills, all standing at sink  -DN     [place] (Grooming Goal 2, OT-IRF)  conditional independence  -DN  conditional independence  -DN     Time Frame (Grooming Goal 2, OT-IRF)  long term goal (LTG)  -DN  long term goal (LTG)  -DN     Progress/Outcomes (Grooming Goal 2, OT-IRF)  goal ongoing  -DN  goal ongoing  -DN        Toileting Goal 1 (OT-IRF)    Activity/Device (Toileting Goal 1, OT-IRF)  toileting skills, all  -DN  toileting skills, all  -DN     [place] Level (Toileting Goal 1, OT-IRF)  verbal cues required;supervision required  -DN  verbal cues required;supervision required  -DN     Time Frame (Toileting Goal 1, OT-IRF)  short term goal (STG)  -DN  short term goal (STG)  -DN     Progress/Outcomes (Toileting Goal 1, OT-IRF)  goal met;goal revised this date  -DN  goal met;goal revised this date  -DN        Toileting Goal 2 (OT-IRF)    Activity/Device (Toileting Goal 2, OT-IRF)  toileting skills, all;commode chair  -DN  toileting  skills, all;commode chair  -DN     [place] Level (Toileting Goal 2, OT-IRF)  conditional independence;tactile cues required;verbal cues required  -DN  conditional independence;tactile cues required;verbal cues required  -DN     Time Frame (Toileting Goal 2, OT-IRF)  long term goal (LTG)  -DN  long term goal (LTG)  -DN     Progress/Outcomes (Toileting Goal 2, OT-IRF)  goal ongoing  -DN  goal ongoing  -DN        Strength Goal 1 (OT-IRF)    Strength Goal 1 (OT-IRF)  pt to be setup with BUE HEP  -DN  pt to be setup with BUE HEP  -DN     Time Frame (Strength Goal 1, OT-IRF)  short term goal (STG)  -DN  short term goal (STG)  -DN     Progress/Outcomes (Strength Goal 1, OT-IRF)  goal met  -DN  goal revised this date  -DN        Strength Goal 2 (OT-IRF)    Strength Goal 2 (OT-IRF)  pt to be independent with HEP for BUEs  -DN  pt to be independent with HEP for BUEs  -DN     Time Frame (Strength Goal 2, OT-IRF)  long term goal (LTG)  -DN  long term goal (LTG)  -DN     Progress/Outcomes (Strength Goal 2, OT-IRF)  goal ongoing  -DN  goal ongoing  -DN        Balance Goal 1 (OT)    Activity/Assistive Device (Balance Goal 1, OT)  assistive device use  -DN  assistive device use  -DN     [place] Level/Cues Needed (Balance Goal 1, OT)  contact guard assist during adls  -DN  contact guard assist during adls  -DN     Time Frame (Balance Goal 1, OT)  short term goal (STG)  -DN  short term goal (STG)  -DN     Progress/Outcomes (Balance Goal 1, OT)  goal met;goal revised this date  -DN  goal ongoing  -DN        Balance Goal 2 (OT)    Activity/Assistive Device (Balance Goal 2, OT)  assistive device use;standing, dynamic;walker, rolling  -DN  assistive device use;standing, dynamic;walker, rolling  -DN     [place] Level (Balance Goal 2, OT)  conditional independence during adls and home mgmt  -DN  supervision required during adls and home mgmt  -DN     Time Frame (Balance Goal 2, OT)  long term goal (LTG)  -DN  long term goal  (LTG)  -DN     Progress/Outcome (Balance Goal 2, OT)  goal ongoing  -DN  goal ongoing  -DN        Caregiver Training Goal 2 (OT-IRF)    Caregiver Training Goal 2 (OT-IRF)  pt family or available caregivers to be independent with assist needed with adls, transfers etc  -DN  pt family or available caregivers to be independent with assist needed with adls, transfers etc  -DN     Time Frame (Caregiver Training Goal 2, OT-IRF)  long term goal (LTG)  -DN  long term goal (LTG)  -DN     Progress/Outcomes (Caregiver Training Goal 2, OT-IRF)  goal ongoing  -DN  goal ongoing  -DN       User Key  (r) = Recorded By, (t) = Taken By, (c) = Cosigned By    Initials Name Provider Type    Prince Morfin OT Occupational Therapist          Occupational Therapy Education                 Title: PT OT SLP Therapies (Done)     Topic: Occupational Therapy (Done)     Point: ADL training (Done)     Description:   Instruct learner(s) on proper safety adaptation and remediation techniques during self care or transfers.   Instruct in proper use of assistive devices.              Learning Progress Summary           Patient Acceptance, E, VU,NR by DN at 4/28/2020 1437    Comment:  with pt in our bathroom to help decide realistic equipment needs at home in her own bathroom, pt states batroom very small, walker may not fit in bathroom, and that she dont have any person to help make any changes needed to make more accessable    Acceptance, E,TB,D, VU,NR by DN at 4/21/2020 3182    Comment:  OT role, transfers, adaptive adls                   Point: Home exercise program (Done)     Description:   Instruct learner(s) on appropriate technique for monitoring, assisting and/or progressing therapeutic exercises/activities.              Learning Progress Summary           Patient Acceptance, E, VU,NR by DN at 4/28/2020 1437    Comment:  with pt in our bathroom to help decide realistic equipment needs at home in her own bathroom, pt states batroom very  small, walker may not fit in bathroom, and that she dont have any person to help make any changes needed to make more accessable    Acceptance, E,TB,D, VU,NR by DN at 4/21/2020 1452    Comment:  OT role, transfers, adaptive adls                   Point: Precautions (Done)     Description:   Instruct learner(s) on prescribed precautions during self-care and functional transfers.              Learning Progress Summary           Patient Acceptance, E, VU,NR by DN at 4/28/2020 1437    Comment:  with pt in our bathroom to help decide realistic equipment needs at home in her own bathroom, pt states batroom very small, walker may not fit in bathroom, and that she dont have any person to help make any changes needed to make more accessable    Acceptance, E,TB,D, VU,NR by DN at 4/21/2020 1452    Comment:  OT role, transfers, adaptive adls                   Point: Body mechanics (Done)     Description:   Instruct learner(s) on proper positioning and spine alignment during self-care, functional mobility activities and/or exercises.              Learning Progress Summary           Patient Acceptance, E, VU,NR by DN at 4/28/2020 1437    Comment:  with pt in our bathroom to help decide realistic equipment needs at home in her own bathroom, pt states batroom very small, walker may not fit in bathroom, and that she dont have any person to help make any changes needed to make more accessable    Acceptance, E,TB,D, VU,NR by DN at 4/21/2020 1452    Comment:  OT role, transfers, adaptive adls                               User Key     Initials Effective Dates Name Provider Type Discipline    DN 06/08/18 -  Prince Obrien OT Occupational Therapist OT                       Time Calculation:     Time Calculation- OT     Row Name 05/07/20 1509 05/07/20 1204          Time Calculation- OT    OT Start Time  1300  -AF  0800  -AF     OT Stop Time  1330  -AF  0900  -AF     OT Time Calculation (min)  30 min  -AF  60 min  -AF       User Key  (r)  = Recorded By, (t) = Taken By, (c) = Cosigned By    Initials Name Provider Type    AF Sobeida Pennington, OTR Occupational Therapist          Therapy Charges for Today     Code Description Service Date Service Provider Modifiers Qty    93724292680 HC OT SELF CARE/MGMT/TRAIN EA 15 MIN 5/7/2020 Sobeida Pennington, OTR GO 4    44169596208 HC OT SELF CARE/MGMT/TRAIN EA 15 MIN 5/7/2020 Sobeida Pennington, OTR GO 1    08943135121 HC OT THER PROC EA 15 MIN 5/7/2020 Sobeida Pennington OTR GO 1                   SANDRA Mathews  5/7/2020

## 2020-05-07 NOTE — PLAN OF CARE
Problem: Patient Care Overview  Goal: Plan of Care Review  Outcome: Ongoing (interventions implemented as appropriate)  Flowsheets (Taken 5/7/2020 1916)  Outcome Summary: AAO x 4. Less needy as discharge looms. Much less reliance on pain medication. Preparing for discharge next Tuesday. Numbness and pain in hands still present but pain is improving.  Progress, Functional Goals: demonstrating adequate progress  Plan of Care Reviewed With: patient  IRF Plan of Care Review: progress ongoing, continue

## 2020-05-08 LAB
GLUCOSE BLDC GLUCOMTR-MCNC: 110 MG/DL (ref 70–130)
GLUCOSE BLDC GLUCOMTR-MCNC: 149 MG/DL (ref 70–130)
GLUCOSE BLDC GLUCOMTR-MCNC: 150 MG/DL (ref 70–130)

## 2020-05-08 PROCEDURE — 82962 GLUCOSE BLOOD TEST: CPT

## 2020-05-08 PROCEDURE — 63710000001 INSULIN GLARGINE PER 5 UNITS: Performed by: INTERNAL MEDICINE

## 2020-05-08 PROCEDURE — 63710000001 INSULIN LISPRO (HUMAN) PER 5 UNITS: Performed by: INTERNAL MEDICINE

## 2020-05-08 PROCEDURE — 97110 THERAPEUTIC EXERCISES: CPT

## 2020-05-08 PROCEDURE — 97535 SELF CARE MNGMENT TRAINING: CPT

## 2020-05-08 PROCEDURE — 99232 SBSQ HOSP IP/OBS MODERATE 35: CPT | Performed by: INTERNAL MEDICINE

## 2020-05-08 PROCEDURE — 63710000001 PREDNISONE PER 5 MG: Performed by: PHYSICAL MEDICINE & REHABILITATION

## 2020-05-08 PROCEDURE — 94799 UNLISTED PULMONARY SVC/PX: CPT

## 2020-05-08 PROCEDURE — 63710000001 DIPHENHYDRAMINE PER 50 MG: Performed by: INTERNAL MEDICINE

## 2020-05-08 RX ORDER — BLOOD-GLUCOSE METER
EACH MISCELLANEOUS
Qty: 1 KIT | Refills: 0 | Status: SHIPPED | OUTPATIENT
Start: 2020-05-08

## 2020-05-08 RX ORDER — LANCETS
EACH MISCELLANEOUS
Qty: 100 EACH | Refills: 12 | Status: SHIPPED | OUTPATIENT
Start: 2020-05-08 | End: 2020-05-08 | Stop reason: SDUPTHER

## 2020-05-08 RX ORDER — BLOOD-GLUCOSE METER
KIT MISCELLANEOUS
Qty: 1 EACH | Refills: 0 | Status: SHIPPED | OUTPATIENT
Start: 2020-05-08 | End: 2020-05-08

## 2020-05-08 RX ORDER — LANCETS
EACH MISCELLANEOUS
Qty: 100 EACH | Refills: 12 | Status: SHIPPED | OUTPATIENT
Start: 2020-05-08

## 2020-05-08 RX ADMIN — AMLODIPINE BESYLATE 10 MG: 10 TABLET ORAL at 20:21

## 2020-05-08 RX ADMIN — FOLIC ACID 1 MG: 1 TABLET ORAL at 07:26

## 2020-05-08 RX ADMIN — INSULIN GLARGINE 20 UNITS: 100 INJECTION, SOLUTION SUBCUTANEOUS at 07:25

## 2020-05-08 RX ADMIN — LEVOTHYROXINE SODIUM 200 MCG: 100 TABLET ORAL at 06:06

## 2020-05-08 RX ADMIN — BUDESONIDE AND FORMOTEROL FUMARATE DIHYDRATE 2 PUFF: 160; 4.5 AEROSOL RESPIRATORY (INHALATION) at 20:38

## 2020-05-08 RX ADMIN — PANTOPRAZOLE SODIUM 40 MG: 40 TABLET, DELAYED RELEASE ORAL at 17:04

## 2020-05-08 RX ADMIN — PREGABALIN 100 MG: 100 CAPSULE ORAL at 20:21

## 2020-05-08 RX ADMIN — INSULIN LISPRO 3 UNITS: 100 INJECTION, SOLUTION INTRAVENOUS; SUBCUTANEOUS at 07:27

## 2020-05-08 RX ADMIN — Medication 3 MG: at 01:40

## 2020-05-08 RX ADMIN — SUCRALFATE 1 G: 1 SUSPENSION ORAL at 06:06

## 2020-05-08 RX ADMIN — INSULIN LISPRO 14 UNITS: 100 INJECTION, SOLUTION INTRAVENOUS; SUBCUTANEOUS at 07:26

## 2020-05-08 RX ADMIN — ROPINIROLE HYDROCHLORIDE 0.5 MG: 0.5 TABLET, FILM COATED ORAL at 20:21

## 2020-05-08 RX ADMIN — OXYCODONE HYDROCHLORIDE 5 MG: 5 TABLET ORAL at 20:22

## 2020-05-08 RX ADMIN — OXYCODONE HYDROCHLORIDE 5 MG: 5 TABLET ORAL at 01:40

## 2020-05-08 RX ADMIN — DIPHENHYDRAMINE HYDROCHLORIDE 25 MG: 25 CAPSULE ORAL at 01:40

## 2020-05-08 RX ADMIN — PREGABALIN 100 MG: 100 CAPSULE ORAL at 07:27

## 2020-05-08 RX ADMIN — SUCRALFATE 1 G: 1 SUSPENSION ORAL at 17:04

## 2020-05-08 RX ADMIN — SUCRALFATE 1 G: 1 SUSPENSION ORAL at 20:21

## 2020-05-08 RX ADMIN — INSULIN LISPRO 14 UNITS: 100 INJECTION, SOLUTION INTRAVENOUS; SUBCUTANEOUS at 12:13

## 2020-05-08 RX ADMIN — Medication 3 MG: at 20:22

## 2020-05-08 RX ADMIN — INSULIN LISPRO 14 UNITS: 100 INJECTION, SOLUTION INTRAVENOUS; SUBCUTANEOUS at 17:04

## 2020-05-08 RX ADMIN — HYDROCHLOROTHIAZIDE 25 MG: 25 TABLET ORAL at 07:27

## 2020-05-08 RX ADMIN — DULOXETINE HYDROCHLORIDE 30 MG: 30 CAPSULE, DELAYED RELEASE ORAL at 07:26

## 2020-05-08 RX ADMIN — SUCRALFATE 1 G: 1 SUSPENSION ORAL at 12:24

## 2020-05-08 RX ADMIN — PREDNISONE 5 MG: 5 TABLET ORAL at 07:26

## 2020-05-08 RX ADMIN — BUDESONIDE AND FORMOTEROL FUMARATE DIHYDRATE 2 PUFF: 160; 4.5 AEROSOL RESPIRATORY (INHALATION) at 10:50

## 2020-05-08 RX ADMIN — PANTOPRAZOLE SODIUM 40 MG: 40 TABLET, DELAYED RELEASE ORAL at 06:06

## 2020-05-08 RX ADMIN — DIPHENHYDRAMINE HYDROCHLORIDE 25 MG: 25 CAPSULE ORAL at 20:22

## 2020-05-08 NOTE — THERAPY TREATMENT NOTE
"Inpatient Rehabilitation - Occupational Therapy Treatment Note    [facility]     Patient Name: Oralia Sánchez  : 1975  MRN: [id number]    Today's Date: 2020                 Admit Date: 2020      Visit Dx:  No diagnosis found.    Patient Active Problem List   Diagnosis   • Vitamin D deficiency   • Awareness of heartbeats   • Adiposity   • YOUNG (nonalcoholic steatohepatitis)   • Hypothyroid   • Diabetes mellitus arising in pregnancy   • Diabetes (CMS/HCC)   • Metabolic syndrome   • Chest pain   • Primary thunderclap headache   • Elevated LFTs   • Tobacco abuse   • Rheumatoid arthritis (CMS/HCC)   • Type 2 diabetes mellitus (CMS/HCC)   • Morbid obesity (CMS/HCC)   • UTI (urinary tract infection), bacterial   • Cerebral aneurysm   • Dyspnea   • Cough   • Hypomagnesemia   • Metabolic acidosis   • Fatigue   • History of COPD   • Abnormal CT scan, colon   • 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)   • Essential hypertension   • 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)   • Elevated transaminase level   • History of gestational diabetes   • Steroid-induced hyperglycemia   • Elevated aldolase level         Therapy Treatment    IRF Treatment Summary     Row Name 20 1149 20 0900          Evaluation/Treatment Time and Intent    Subjective Information  complains of;pain  -DN  complains of \"Hands being stiff.\"  -LB     Existing Precautions/Restrictions  fall  -DN  fall  -LB     Document Type  therapy note (daily note)  -DN  therapy note (daily note)  -LB     Mode of Treatment  occupational therapy  -DN  physical therapy  -LB     Patient/Family Observations  supine in bed  -DN  up in w/c  -LB     Recorded by [DN] Prince Obrien, OT [LB] Preeti Reynaga PT     Row Name 20 1149 20 0900          Cognition/Psychosocial- PT/OT    Affect/Mental Status (Cognitive)  WNL  -DN  WNL  -LB     Orientation Status (Cognition)  oriented x 4  -DN  oriented x 4  -LB     Follows Commands (Cognition)  " WNL  -DN  WNL  -LB     Personal Safety Interventions  --  fall prevention program maintained  -LB     Recorded by [DN] Prince Obrien, OT [LB] Preeti Reynaga, PT     Row Name 05/08/20 1149             Bed-Chair Transfer    Bed-Chair [place] (Transfers)  supervision  -DN      Assistive Device (Bed-Chair Transfers)  walker, front-wheeled  -DN      Recorded by [DN] Prince Obrien, OT      Row Name 05/08/20 0900             Sit-Stand Transfer    Sit-Stand [place] (Transfers)  stand by assist;supervision  -LB      Assistive Device (Sit-Stand Transfers)  walker, front-wheeled;wheelchair  -LB      Recorded by [LB] Preeti Reynaga, PT      Row Name 05/08/20 0900             Stand-Sit Transfer    Stand-Sit [place] (Transfers)  stand by assist;verbal cues  -LB      Assistive Device (Stand-Sit Transfers)  walker, front-wheeled;wheelchair  -LB      Recorded by [LB] Preeti Reynaga, PT      Row Name 05/08/20 1149             Toilet Transfer    Type (Toilet Transfer)  stand pivot/stand step  -DN      [place] Level (Toilet Transfer)  supervision  -DN      Assistive Device (Toilet Transfer)  raised toilet seat;walker, front-wheeled  -DN      Recorded by [DN] Prince Obrien, OT      Row Name 05/08/20 1149             Shower Transfer    Type (Shower Transfer)  stand pivot/stand step  -DN      [place] Level (Shower Transfer)  supervision  -DN      Assistive Device (Shower Transfer)  tub bench;grab bars/tub rail;walker, front-wheeled  -DN      Recorded by [DN] Prince Obrien, OT      Row Name 05/08/20 0900             Gait/Stairs Assessment/Training    [place] Level (Gait)  stand by assist;supervision  -LB      Assistive Device (Gait)  walker, front-wheeled  -LB      Distance in Feet (Gait)  200' x 3  -LB      Pattern (Gait)  step-through  -LB      Deviations/Abnormal Patterns (Gait)  quang decreased;stride length decreased  -LB      [place] Level (Stairs)  contact guard  -LB      Assistive Device  (Stairs)  -- none  -LB      Handrail Location (Stairs)  right side (ascending) two trials with 2 handrails; two trials with R handrail only  -LB      Number of Steps (Stairs)  4 x 2  -LB      Ascending Technique (Stairs)  step-to-step  -LB      Descending Technique (Stairs)  step-to-step  -LB      Stairs, Safety Issues  balance decreased during turns  -LB      Stairs, Impairments  strength decreased;impaired balance;coordination impaired  -LB      Comment (Gait/Stairs)  Pt ambulated around the marni-bars 40' x 4 (2 x with R UE, 2 x with L UE) with CGA.   -LB      Recorded by [LB] Preeti Reynaga, PT      Row Name 05/08/20 0900             Safety Issues, Functional Mobility    Impairments Affecting Function (Mobility)  balance;endurance/activity tolerance;sensation/sensory awareness;coordination  -LB      Recorded by [LB] Preeti Reynaga, PT      Row Name 05/08/20 1149             Bathing Assessment/Treatment    Bathing [place] Level  bathing skills;set up  -DN      Assistive Device (Bathing)  grab bar/tub rail;hand held shower spray hose;tub bench  -DN      Bathing Position  supported sitting;supported standing  -DN      Recorded by [DN] Prince Obrien OT      Row Name 05/08/20 1149             Upper Body Dressing Assessment/Treatment    Upper Body Dressing Task  upper body dressing skills;doff;don;bra/undergarment;pull over garment;supervision  -DN      Upper Body Dressing Position  supported sitting  -DN      Set-up Assistance (Upper Body Dressing)  obtain clothing  -DN      Recorded by [DN] Prince Obrien OT      Row Name 05/08/20 1149             Lower Body Dressing Assessment/Treatment    Lower Body Dressing [place] Level  doff;don;socks;pants/bottoms;underwear;supervision  -DN      Lower Body Dressing Position  supported standing;supported sitting  -DN      Lower Body Dressing Setup Assistance  obtain clothing  -DN      Comment (Lower Body Dressing)  no shoes to fit currently  -DN      Recorded by  [DN] Prince Obrien, OT      Row Name 05/08/20 1149             Grooming Assessment/Treatment    Grooming [place] Level  grooming skills;deodorant application;hair care, combing/brushing;oral care regimen;conditional independence  -DN      Grooming Position  supported sitting  -DN      Recorded by [DN] Prince Obrien, OT      Row Name 05/08/20 1149 05/08/20 0900          Pain Scale: Numbers Pre/Post-Treatment    Pain Scale: Numbers, Pretreatment  5/10  -DN  0/10 - no pain  -LB     Pain Scale: Numbers, Post-Treatment  5/10  -DN  0/10 - no pain  -LB     Pain Location - Side  Left  -DN  --     Pain Location - Orientation  distal  -DN  --     Pain Location  hand  -DN  --     Pain Intervention(s)  Repositioned;Medication (See MAR)  -DN  --     Recorded by [DN] Prince Obrien, OT [LB] Preeti Reynaga, PT     Row Name 05/08/20 0900             Standing Balance Activity    Activities Performed (Standing, Balance Training)  standing on foam half roll;feet together in stance;semi tandem stance;other (see comments)  -LB      Support Needed for Balance (Standing, Balance Training)  CGA;uses both upper extremities for support  -LB      Recorded by [LB] Preeti Reynaga, PT      Row Name 05/08/20 0900             Dynamic Balance Activity    Therapeutic Training Performed (Dynamic Balance)  backward walking;other (see comments) trampoline   -LB      Support Needed for Balance (Dynamic Balance Training)  uses at least one upper extremity for support  -LB      Recorded by [LB] Preeti Reynaga, PT      Row Name 05/08/20 1149             Upper Extremity Seated Therapeutic Exercise    Performed, Seated Upper Extremity (Therapeutic Exercise)  shoulder flexion/extension;shoulder abduction/adduction;wrist flexion/extension;forearm supination/pronation;elbow flexion/extension  -DN      Device, Seated Upper Extremity (Therapeutic Exercise)  free weights, barbell  -DN      Exercise Type, Seated Upper Extremity (Therapeutic Exercise)  AROM  "(active range of motion)  -DN      Expected Outcomes, Seated Upper Extremity (Therapeutic Exercise)  improve functional tolerance, self-care activity;improve performance, transfer skills  -DN      Sets/Reps Detail, Seated Upper Extremity (Therapeutic Exercise)  3/10  -DN      Transfers Skills, Training to Functional Activity, Seated Upper Extremity (Therapeutic Exercise)  transfers skills to functional activity most of the time  -DN      Recorded by [DN] Prince Obrien OT      Row Name 05/08/20 0900             Lower Extremity Standing Therapeutic Exercise    Performed, Standing Lower Extremity (Therapeutic Exercise)  heel raises  -LB      Device, Standing Lower Extremity (Therapeutic Exercise)  marni bars;half foam roll;other (see comments) 4\" foam in semi-tandem, trampoline  -LB      Recorded by [LB] Preeti Reynaga, PT      Row Name 05/08/20 0900             Vital Signs    O2 Delivery Pre Treatment  room air  -LB      O2 Delivery Intra Treatment  room air  -LB      O2 Delivery Post Treatment  room air  -LB      Recorded by [LB] Preeti Reynaga, PT      Row Name 05/08/20 1149 05/08/20 0900          Positioning and Restraints    Pre-Treatment Position  in bed  -DN  sitting in chair/recliner  -LB     Post Treatment Position  wheelchair  -DN  bathroom  -LB     In Bed  sitting;call light within reach;exit alarm on;encouraged to call for assist  -DN  --     Bathroom  --  notified nsg;sitting;call light within reach;encouraged to call for assist  -LB     Recorded by [DN] Prince Obrien, OT [LB] Preeti Reynaga, PT       User Key  (r) = Recorded By, (t) = Taken By, (c) = Cosigned By    Initials Name Effective Dates    Preeti Combs, PT 06/08/18 -     Prince Morfin OT 06/08/18 -                  OT Recommendation and Plan    Anticipated Equipment Needs At Discharge (OT Eval): commode, bedside without drop arms, shower chair  Planned Therapy Interventions (OT Eval): activity tolerance training, BADL retraining, " functional balance retraining, neuromuscular control/coordination retraining, strengthening exercise, transfer/mobility retraining            OT IRF GOALS     Row Name 05/04/20 1500 04/28/20 1200          Transfer Goal 1 (OT-IRF)    Activity/Assistive Device (Transfer Goal 1, OT-IRF)  toilet;tub  -DN  toilet;tub  -DN     [place] Level (Transfer Goal 1, OT-IRF)  supervision required;contact guard assist  -DN  contact guard assist with RWX  -DN     Time Frame (Transfer Goal 1, OT-IRF)  short term goal (STG)  -DN  short term goal (STG)  -DN     Progress/Outcomes (Transfer Goal 1, OT-IRF)  goal revised this date;goal partially met  -DN  goal met;goal revised this date  -DN        Transfer Goal 2 (OT-IRF)    Activity/Assistive Device (Transfer Goal 2, OT-IRF)  toilet;tub;walker, rolling  -DN  toilet;tub;walker, rolling  -DN     [place] Level (Transfer Goal 2, OT-IRF)  conditional independence  -DN  conditional independence  -DN     Time Frame (Transfer Goal 2, OT-IRF)  long term goal (LTG)  -DN  long term goal (LTG)  -DN     Progress/Outcomes (Transfer Goal 2, OT-IRF)  goal ongoing  -DN  goal revised this date  -DN        Bathing Goal 1 (OT-IRF)    Activity/Device (Bathing Goal 1, OT-IRF)  bathing skills, all  -DN  bathing skills, all  -DN     [place] Level (Bathing Goal 1, OT-IRF)  conditional independence  -DN  supervision required  -DN     Time Frame (Bathing Goal 1, OT-IRF)  short term goal (STG)  -DN  short term goal (STG)  -DN     Progress/Outcomes (Bathing Goal 1, OT-IRF)  goal met;goal revised this date  -DN  goal met;goal revised this date  -DN        Bathing Goal 2 (OT-IRF)    Activity/Device (Bathing Goal 2, OT-IRF)  bathing skills, all  -DN  bathing skills, all  -DN     [place] Level (Bathing Goal 2, OT-IRF)  conditional independence  -DN  conditional independence  -DN     Time Frame (Bathing Goal 2, OT-IRF)  long term goal (LTG)  -DN  long term goal (LTG)  -DN     Progress/Outcomes  (Bathing Goal 2, OT-IRF)  goal ongoing;goal partially met  -DN  goal ongoing  -DN        UB Dressing Goal 1 (OT-IRF)    Activity/Device (UB Dressing Goal 1, OT-IRF)  upper body dressing  -DN  upper body dressing  -DN     [place] (UB Dress Goal 1, OT-IRF)  set-up required  -DN  set-up required  -DN     Time Frame (UB Dressing Goal 1, OT-IRF)  short term goal (STG)  -DN  short term goal (STG)  -DN     Progress/Outcomes (UB Dressing Goal 1, OT-IRF)  goal met  -DN  goal ongoing;goal not met  -DN        UB Dressing Goal 2 (OT-IRF)    Activity/Device (UB Dressing Goal 2, OT-IRF)  upper body dressing  -DN  upper body dressing  -DN     [place] (UB Dress Goal 2, OT-IRF)  conditional independence  -DN  conditional independence  -DN     Time Frame (UB Dressing Goal 2, OT-IRF)  long term goal (LTG)  -DN  long term goal (LTG)  -DN     Progress/Outcomes (UB Dressing Goal 2, OT-IRF)  goal ongoing  -DN  goal ongoing  -DN        LB Dressing Goal 1 (OT-IRF)    Activity/Device (LB Dressing Goal 1, OT-IRF)  lower body dressing  -DN  lower body dressing  -DN     [place] (LB Dressing Goal 1, OT-IRF)  supervision required  -DN  supervision required  -DN     Time Frame (LB Dressing Goal 1, OT-IRF)  short term goal (STG)  -DN  short term goal (STG)  -DN     Progress/Outcomes (LB Dressing Goal 1, OT-IRF)  goal met;goal revised this date  -DN  goal revised this date  -DN        LB Dressing Goal 2 (OT-IRF)    Activity/Device (LB Dressing Goal 2, OT-IRF)  lower body dressing  -DN  lower body dressing  -DN     [place] (LB Dressing Goal 2, OT-IRF)  conditional independence  -DN  conditional independence  -DN     Time Frame (LB Dressing Goal 2, OT-IRF)  long term goal (LTG)  -DN  long term goal (LTG)  -DN     Progress/Outcomes (LB Dressing Goal 2, OT-IRF)  goal ongoing  -DN  goal revised this date  -DN        Grooming Goal 1 (OT-IRF)    Activity/Device (Grooming Goal 1, OT-IRF)  grooming skills, all seated at sink  -DN   grooming skills, all seated at sink  -DN     [place] (Grooming Goal 1, OT-IRF)  conditional independence  -DN  conditional independence  -DN     Time Frame (Grooming Goal 1, OT-IRF)  short term goal (STG)  -DN  short term goal (STG)  -DN     Progress/Outcomes (Grooming Goal 1, OT-IRF)  goal ongoing  -DN  goal revised this date  -DN        Grooming Goal 2 (OT-IRF)    Activity/Device (Grooming Goal 2, OT-IRF)  grooming skills, all standing at sink  -DN  grooming skills, all standing at sink  -DN     [place] (Grooming Goal 2, OT-IRF)  conditional independence  -DN  conditional independence  -DN     Time Frame (Grooming Goal 2, OT-IRF)  long term goal (LTG)  -DN  long term goal (LTG)  -DN     Progress/Outcomes (Grooming Goal 2, OT-IRF)  goal ongoing  -DN  goal ongoing  -DN        Toileting Goal 1 (OT-IRF)    Activity/Device (Toileting Goal 1, OT-IRF)  toileting skills, all  -DN  toileting skills, all  -DN     [place] Level (Toileting Goal 1, OT-IRF)  verbal cues required;supervision required  -DN  verbal cues required;supervision required  -DN     Time Frame (Toileting Goal 1, OT-IRF)  short term goal (STG)  -DN  short term goal (STG)  -DN     Progress/Outcomes (Toileting Goal 1, OT-IRF)  goal met;goal revised this date  -DN  goal met;goal revised this date  -DN        Toileting Goal 2 (OT-IRF)    Activity/Device (Toileting Goal 2, OT-IRF)  toileting skills, all;commode chair  -DN  toileting skills, all;commode chair  -DN     [place] Level (Toileting Goal 2, OT-IRF)  conditional independence;tactile cues required;verbal cues required  -DN  conditional independence;tactile cues required;verbal cues required  -DN     Time Frame (Toileting Goal 2, OT-IRF)  long term goal (LTG)  -DN  long term goal (LTG)  -DN     Progress/Outcomes (Toileting Goal 2, OT-IRF)  goal ongoing  -DN  goal ongoing  -DN        Strength Goal 1 (OT-IRF)    Strength Goal 1 (OT-IRF)  pt to be setup with BUE HEP  -DN  pt to be setup  with BUE HEP  -DN     Time Frame (Strength Goal 1, OT-IRF)  short term goal (STG)  -DN  short term goal (STG)  -DN     Progress/Outcomes (Strength Goal 1, OT-IRF)  goal met  -DN  goal revised this date  -DN        Strength Goal 2 (OT-IRF)    Strength Goal 2 (OT-IRF)  pt to be independent with HEP for BUEs  -DN  pt to be independent with HEP for BUEs  -DN     Time Frame (Strength Goal 2, OT-IRF)  long term goal (LTG)  -DN  long term goal (LTG)  -DN     Progress/Outcomes (Strength Goal 2, OT-IRF)  goal ongoing  -DN  goal ongoing  -DN        Balance Goal 1 (OT)    Activity/Assistive Device (Balance Goal 1, OT)  assistive device use  -DN  assistive device use  -DN     [place] Level/Cues Needed (Balance Goal 1, OT)  contact guard assist during adls  -DN  contact guard assist during adls  -DN     Time Frame (Balance Goal 1, OT)  short term goal (STG)  -DN  short term goal (STG)  -DN     Progress/Outcomes (Balance Goal 1, OT)  goal met;goal revised this date  -DN  goal ongoing  -DN        Balance Goal 2 (OT)    Activity/Assistive Device (Balance Goal 2, OT)  assistive device use;standing, dynamic;walker, rolling  -DN  assistive device use;standing, dynamic;walker, rolling  -DN     [place] Level (Balance Goal 2, OT)  conditional independence during adls and home mgmt  -DN  supervision required during adls and home mgmt  -DN     Time Frame (Balance Goal 2, OT)  long term goal (LTG)  -DN  long term goal (LTG)  -DN     Progress/Outcome (Balance Goal 2, OT)  goal ongoing  -DN  goal ongoing  -DN        Caregiver Training Goal 2 (OT-IRF)    Caregiver Training Goal 2 (OT-IRF)  pt family or available caregivers to be independent with assist needed with adls, transfers etc  -DN  pt family or available caregivers to be independent with assist needed with adls, transfers etc  -DN     Time Frame (Caregiver Training Goal 2, OT-IRF)  long term goal (LTG)  -DN  long term goal (LTG)  -DN     Progress/Outcomes (Caregiver Training  Goal 2, OT-IRF)  goal ongoing  -DN  goal ongoing  -DN       User Key  (r) = Recorded By, (t) = Taken By, (c) = Cosigned By    Initials Name Provider Type    Prince Morfin OT Occupational Therapist          Occupational Therapy Education                 Title: PT OT SLP Therapies (Done)     Topic: Occupational Therapy (Done)     Point: ADL training (Done)     Description:   Instruct learner(s) on proper safety adaptation and remediation techniques during self care or transfers.   Instruct in proper use of assistive devices.              Learning Progress Summary           Patient Acceptance, E, VU,NR by DN at 4/28/2020 1437    Comment:  with pt in our bathroom to help decide realistic equipment needs at home in her own bathroom, pt states batroom very small, walker may not fit in bathroom, and that she dont have any person to help make any changes needed to make more accessable    Acceptance, E,TB,D, VU,NR by DN at 4/21/2020 1452    Comment:  OT role, transfers, adaptive adls                   Point: Home exercise program (Done)     Description:   Instruct learner(s) on appropriate technique for monitoring, assisting and/or progressing therapeutic exercises/activities.              Learning Progress Summary           Patient Acceptance, E, VU,NR by DN at 4/28/2020 1437    Comment:  with pt in our bathroom to help decide realistic equipment needs at home in her own bathroom, pt states batroom very small, walker may not fit in bathroom, and that she dont have any person to help make any changes needed to make more accessable    Acceptance, E,TB,D, VU,NR by DN at 4/21/2020 1452    Comment:  OT role, transfers, adaptive adls                   Point: Precautions (Done)     Description:   Instruct learner(s) on prescribed precautions during self-care and functional transfers.              Learning Progress Summary           Patient Acceptance, E, VU,NR by DN at 4/28/2020 1437    Comment:  with pt in our bathroom to  help decide realistic equipment needs at home in her own bathroom, pt states batroom very small, walker may not fit in bathroom, and that she dont have any person to help make any changes needed to make more accessable    Acceptance, E,TB,D, VU,NR by DN at 4/21/2020 1452    Comment:  OT role, transfers, adaptive adls                   Point: Body mechanics (Done)     Description:   Instruct learner(s) on proper positioning and spine alignment during self-care, functional mobility activities and/or exercises.              Learning Progress Summary           Patient Acceptance, E, VU,NR by DN at 4/28/2020 1437    Comment:  with pt in our bathroom to help decide realistic equipment needs at home in her own bathroom, pt states batroom very small, walker may not fit in bathroom, and that she dont have any person to help make any changes needed to make more accessable    Acceptance, E,TB,D, VU,NR by DN at 4/21/2020 1452    Comment:  OT role, transfers, adaptive adls                               User Key     Initials Effective Dates Name Provider Type Discipline    DN 06/08/18 -  Prince Obrien OT Occupational Therapist OT                Outcome Measures     Row Name 05/07/20 1500             Jasso Balance Scale    Sitting to Standing  4  -EE      Standing Unsupported  3  -EE      Sitting with Back Unsupported but Feet Supported on Floor or on Stool  4  -EE      Standing to Sitting  4  -EE      Transfers  4  -EE      Standing Unsupported with Eyes Closed  3  -EE      Standing Unsupported with Feet Together  3  -EE      Reaching Forward with Outstretched Arm While Standing  2  -EE       Object From the Floor From a Standing Position  1  -EE      Turning to Look Behind Over Left and Right Shoulders While Standing  0  -EE      Turn 360 Degrees  1  -EE      Place Alternate Foot on Step or Stool While Standing Unsupported  1  -EE      Standing Unsupported with One Foot in Front  2  -EE      Standing on One Leg  1  -EE       Jasso Total Score  33  -EE         Functional Assessment    Outcome Measure Options  Jasso Balance  -EE        User Key  (r) = Recorded By, (t) = Taken By, (c) = Cosigned By    Initials Name Provider Type    Irlanda Duarte PT Physical Therapist             Time Calculation:     Time Calculation- OT     Row Name 05/08/20 1157             Time Calculation- OT    OT Start Time  0800  -DN      OT Stop Time  0900  -DN      OT Time Calculation (min)  60 min  -DN      OT Received On  05/08/20  -DN        User Key  (r) = Recorded By, (t) = Taken By, (c) = Cosigned By    Initials Name Provider Type    Prince Morfin OT Occupational Therapist          Therapy Charges for Today     Code Description Service Date Service Provider Modifiers Qty    [id number]  OT SELF CARE/MGMT/TRAIN EA 15 MIN 5/8/2020 Prince Obrien OT GO 3    [id number]  OT THER PROC EA 15 MIN 5/8/2020 Prince Obrien OT GO 1                   Prince Obrien OT  5/8/2020

## 2020-05-08 NOTE — PROGRESS NOTES
Family conference held today with pt and pt's rehab team ( PT,OT,RN and SW).  Pt did not wish to have her mother or any other family member or friend included in the conference.  Discussed pt's progress, current status and discharge plans.  Pt continues to make steady progress in therapies.  PT reports pt is independent with bed mobility and SBA with transfers.  She ambulates 200' with rolling walker and SBA. Pt can ascend/descend 4 steps with handrails and CG assist.     OT reports pt completes commode and tub transfers with SBA. She bathes, seated with SBA. She dresses her upper body with set up and lower body with SBA and assist to alonzo her shoes.  Endurance is impaired, but improving.  Hand strength is functional. Pt continues to experience pain and impaired sensation in both hands.  Kitchen mobility will be evaluated on Monday.  Anticipate pt will be allowed independence in her room on Monday.    Nursing reviewed current medications and DM management. Blood glucose levels have been running high. Pt reports she used insulin during 2 of her pregnancies, but has not been needed prior to this hospital admission.  Pt will need to meet with the DM clinician and will need supplies (glucometer, test strips and lancets) for home. Pt has a c-pap at home but states she is due for a repeat sleep study.   Follow up appointments with pt's endocrinologist, Dr Choudhury, pulmonologist, neurologist and gastroenterologist will be made prior to discharge.  Pt does not have a PCP and requests assistance to find one. Pt would like to stay in the [employer] system, so will contact the physician liaison for a referral.       DME discussed. Pt to receive a rolling walker. There is a tub seat and grab bars available at her mother's home.  Outpatient PT and possibly OT is recommended.  Pt requests referral to a facility close to her mother's home in [place].  There is a [facility] near [facility] that may provide OT as well as PT. Pt may  also return to [facility] for outpatient therapies.  Pt will discuss with her mother and let SW know her decision next week.    Pt to discharge on Tuesday, 5/12 to her mother's home where she will have 24 hour SBA.

## 2020-05-08 NOTE — THERAPY TREATMENT NOTE
"Inpatient Rehabilitation - Occupational Therapy Treatment Note    [facility]     Patient Name: Oralia Sánchez  : 1975  MRN: [id number]    Today's Date: 2020                 Admit Date: 2020      Visit Dx:  No diagnosis found.    Patient Active Problem List   Diagnosis   • Vitamin D deficiency   • Awareness of heartbeats   • Adiposity   • YOUNG (nonalcoholic steatohepatitis)   • Hypothyroid   • Diabetes mellitus arising in pregnancy   • Diabetes (CMS/HCC)   • Metabolic syndrome   • Chest pain   • Primary thunderclap headache   • Elevated LFTs   • Tobacco abuse   • Rheumatoid arthritis (CMS/HCC)   • Type 2 diabetes mellitus (CMS/HCC)   • Morbid obesity (CMS/HCC)   • UTI (urinary tract infection), bacterial   • Cerebral aneurysm   • Dyspnea   • Cough   • Hypomagnesemia   • Metabolic acidosis   • Fatigue   • History of COPD   • Abnormal CT scan, colon   • 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)   • Essential hypertension   • 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)   • Elevated transaminase level   • History of gestational diabetes   • Steroid-induced hyperglycemia   • Elevated aldolase level         Therapy Treatment    IRF Treatment Summary     Row Name 20 1448 20 1149 20 0900       Evaluation/Treatment Time and Intent    Subjective Information  complains of;pain  -DN  complains of;pain  -DN  complains of \"Hands being stiff.\"  -LB    Existing Precautions/Restrictions  fall  -DN  fall  -DN  fall  -LB    Document Type  therapy note (daily note)  -DN  therapy note (daily note)  -DN  therapy note (daily note)  -LB    Mode of Treatment  occupational therapy  -DN  occupational therapy  -DN  physical therapy  -LB    Patient/Family Observations  supine in bed  -DN  supine in bed  -DN  up in w/c  -LB    Recorded by [DN] Prince Obrien OT [DN] Prince Obrien OT [LB] Preeti Reynaga, PT    Row Name 20 1149 20 0900          Cognition/Psychosocial- PT/OT    Affect/Mental " Status (Cognitive)  WNL  -DN  WNL  -LB     Orientation Status (Cognition)  oriented x 4  -DN  oriented x 4  -LB     Follows Commands (Cognition)  WNL  -DN  WNL  -LB     Personal Safety Interventions  --  fall prevention program maintained  -LB     Recorded by [DN] Prince Obrien, OT [LB] Preeti Reynaga, PT     Row Name 05/08/20 1448 05/08/20 1149          Bed-Chair Transfer    Bed-Chair [place] (Transfers)  supervision  -DN  supervision  -DN     Assistive Device (Bed-Chair Transfers)  wheelchair;walker, front-wheeled  -DN  walker, front-wheeled  -DN     Recorded by [DN] Prince Obrien OT [DN] Prince Obrien, OT     Row Name 05/08/20 0900             Sit-Stand Transfer    Sit-Stand [place] (Transfers)  stand by assist;supervision  -LB      Assistive Device (Sit-Stand Transfers)  walker, front-wheeled;wheelchair  -LB      Recorded by [LB] Preeti Reynaga, PT      Row Name 05/08/20 0900             Stand-Sit Transfer    Stand-Sit [place] (Transfers)  stand by assist;verbal cues  -LB      Assistive Device (Stand-Sit Transfers)  walker, front-wheeled;wheelchair  -LB      Recorded by [LB] Preeti Reynaga, PT      Row Name 05/08/20 1149             Toilet Transfer    Type (Toilet Transfer)  stand pivot/stand step  -DN      [place] Level (Toilet Transfer)  supervision  -DN      Assistive Device (Toilet Transfer)  raised toilet seat;walker, front-wheeled  -DN      Recorded by [KAYLEN] Prince Obrien OT      Row Name 05/08/20 1149             Shower Transfer    Type (Shower Transfer)  stand pivot/stand step  -DN      [place] Level (Shower Transfer)  supervision  -DN      Assistive Device (Shower Transfer)  tub bench;grab bars/tub rail;walker, front-wheeled  -DN      Recorded by [KAYLEN] Prince Obrien, OT      Row Name 05/08/20 0900             Gait/Stairs Assessment/Training    [place] Level (Gait)  stand by assist;supervision  -LB      Assistive Device (Gait)  walker, front-wheeled  -LB      Distance  in Feet (Gait)  200' x 3  -LB      Pattern (Gait)  step-through  -LB      Deviations/Abnormal Patterns (Gait)  quang decreased;stride length decreased  -LB      [place] Level (Stairs)  contact guard  -LB      Assistive Device (Stairs)  -- none  -LB      Handrail Location (Stairs)  right side (ascending) two trials with 2 handrails; two trials with R handrail only  -LB      Number of Steps (Stairs)  4 x 2  -LB      Ascending Technique (Stairs)  step-to-step  -LB      Descending Technique (Stairs)  step-to-step  -LB      Stairs, Safety Issues  balance decreased during turns  -LB      Stairs, Impairments  strength decreased;impaired balance;coordination impaired  -LB      Comment (Gait/Stairs)  Pt ambulated around the marni-bars 40' x 4 (2 x with R UE, 2 x with L UE) with CGA.   -LB      Recorded by [LB] Preeti Reynaga, PT      Row Name 05/08/20 0900             Safety Issues, Functional Mobility    Impairments Affecting Function (Mobility)  balance;endurance/activity tolerance;sensation/sensory awareness;coordination  -LB      Recorded by [LB] Preeti Reynaga, PT      Row Name 05/08/20 1149             Bathing Assessment/Treatment    Bathing [place] Level  bathing skills;set up  -DN      Assistive Device (Bathing)  grab bar/tub rail;hand held shower spray hose;tub bench  -DN      Bathing Position  supported sitting;supported standing  -DN      Recorded by [DN] Prince Obrien, OT      Row Name 05/08/20 1149             Upper Body Dressing Assessment/Treatment    Upper Body Dressing Task  upper body dressing skills;doff;don;bra/undergarment;pull over garment;supervision  -DN      Upper Body Dressing Position  supported sitting  -DN      Set-up Assistance (Upper Body Dressing)  obtain clothing  -DN      Recorded by [DN] Prince Obrien OT      Row Name 05/08/20 1149             Lower Body Dressing Assessment/Treatment    Lower Body Dressing [place] Level  doff;don;socks;pants/bottoms;underwear;supervision   -DN      Lower Body Dressing Position  supported standing;supported sitting  -DN      Lower Body Dressing Setup Assistance  obtain clothing  -DN      Comment (Lower Body Dressing)  no shoes to fit currently  -DN      Recorded by [DN] Prince Obrien OT      Row Name 05/08/20 1149             Grooming Assessment/Treatment    Grooming [place] Level  grooming skills;deodorant application;hair care, combing/brushing;oral care regimen;conditional independence  -DN      Grooming Position  supported sitting  -DN      Recorded by [DN] Prince Obrien, OT      Row Name 05/08/20 1448             Hand  Strength Testing    Right Hand, Setting 2 (Dynamometer Testing)  24  -DN      Left Hand, Setting 2 (Dynamometer Testing)  4  -DN      Right Hand: Lateral (Key) Pinch Strength (Pinch Dynamometer Testing)  10  -DN      Right Hand: [place] (Robert) Pinch Strength (Pinch Dynamometer Testing)  5  -DN      Left Hand: Lateral (Key) Pinch Strength (Pinch Dynamometer Testing)  3  -DN      Left Hand: [place] (Orbert) Pinch Strength (Pinch Dynamometer Testing)  2  -DN      Recorded by [DN] Prince Obrien, OT      Row Name 05/08/20 1448             Fine Motor Testing & Training    Results, 9 Hole Peg Test of Fine Motor Coordination-Right  50  -DN      Results, 9 Hole Peg Test of Fine Motor Coordination-Left  34  -DN      Comment, Fine Motor Coordination  FMC with BUEs with small pegs with irregular shapes into peg board with increased time but able to finish with both hands without dropping. Increased concern due to decreased  and pinch scores, MD is aware  -DN      Recorded by [DN] Prince Obrien, OT      Row Name 05/08/20 1149 05/08/20 0900          Pain Scale: Numbers Pre/Post-Treatment    Pain Scale: Numbers, Pretreatment  5/10  -DN  0/10 - no pain  -LB     Pain Scale: Numbers, Post-Treatment  5/10  -DN  0/10 - no pain  -LB     Pain Location - Side  Left  -DN  --     Pain Location - Orientation  distal  -DN  --      Pain Location  hand  -DN  --     Pain Intervention(s)  Repositioned;Medication (See MAR)  -DN  --     Recorded by [DN] Prince Obrien, OT [LB] Preeti Reynaga, PT     Row Name 05/08/20 0900             Standing Balance Activity    Activities Performed (Standing, Balance Training)  standing on foam half roll;feet together in stance;semi tandem stance;other (see comments)  -LB      Support Needed for Balance (Standing, Balance Training)  CGA;uses both upper extremities for support  -LB      Recorded by [LB] Preeti Reynaga, PT      Row Name 05/08/20 0900             Dynamic Balance Activity    Therapeutic Training Performed (Dynamic Balance)  backward walking;other (see comments) trampoline   -LB      Support Needed for Balance (Dynamic Balance Training)  uses at least one upper extremity for support  -LB      Recorded by [LB] Preeti Reynaga, PT      Row Name 05/08/20 1149             Upper Extremity Seated Therapeutic Exercise    Performed, Seated Upper Extremity (Therapeutic Exercise)  shoulder flexion/extension;shoulder abduction/adduction;wrist flexion/extension;forearm supination/pronation;elbow flexion/extension  -DN      Device, Seated Upper Extremity (Therapeutic Exercise)  free weights, barbell  -DN      Exercise Type, Seated Upper Extremity (Therapeutic Exercise)  AROM (active range of motion)  -DN      Expected Outcomes, Seated Upper Extremity (Therapeutic Exercise)  improve functional tolerance, self-care activity;improve performance, transfer skills  -DN      Sets/Reps Detail, Seated Upper Extremity (Therapeutic Exercise)  3/10  -DN      Transfers Skills, Training to Functional Activity, Seated Upper Extremity (Therapeutic Exercise)  transfers skills to functional activity most of the time  -DN      Recorded by [DN] Prince Obrien, OT      Row Name 05/08/20 0900             Lower Extremity Standing Therapeutic Exercise    Performed, Standing Lower Extremity (Therapeutic Exercise)  heel raises  -LB       "Device, Standing Lower Extremity (Therapeutic Exercise)  marni bars;half foam roll;other (see comments) 4\" foam in semi-tandem, trampoline  -LB      Recorded by [LB] Preeti Reynaga, PT      Row Name 05/08/20 0900             Vital Signs    O2 Delivery Pre Treatment  room air  -LB      O2 Delivery Intra Treatment  room air  -LB      O2 Delivery Post Treatment  room air  -LB      Recorded by [LB] Preeti Reynaga PT      Row Name 05/08/20 1448 05/08/20 1149 05/08/20 0900       Positioning and Restraints    Pre-Treatment Position  in bed  -DN  in bed  -DN  sitting in chair/recliner  -LB    Post Treatment Position  wheelchair  -DN  wheelchair  -DN  bathroom  -LB    In Bed  sitting;with PT  -DN  sitting;call light within reach;exit alarm on;encouraged to call for assist  -DN  --    Bathroom  --  --  notified nsg;sitting;call light within reach;encouraged to call for assist  -LB    Recorded by [DN] Prince Obrien, OT [DN] Prince Obrien, OT [LB] Preeti Reynaga, PT      User Key  (r) = Recorded By, (t) = Taken By, (c) = Cosigned By    Initials Name Effective Dates    Preeti Combs, PT 06/08/18 -     Prince Morfin OT 06/08/18 -                  OT Recommendation and Plan    Anticipated Equipment Needs At Discharge (OT Eval): commode, bedside without drop arms, shower chair  Planned Therapy Interventions (OT Eval): activity tolerance training, BADL retraining, functional balance retraining, neuromuscular control/coordination retraining, strengthening exercise, transfer/mobility retraining            OT IRF GOALS     Row Name 05/04/20 1500 04/28/20 1200          Transfer Goal 1 (OT-IRF)    Activity/Assistive Device (Transfer Goal 1, OT-IRF)  toilet;tub  -DN  toilet;tub  -DN     [place] Level (Transfer Goal 1, OT-IRF)  supervision required;contact guard assist  -DN  contact guard assist with RWX  -DN     Time Frame (Transfer Goal 1, OT-IRF)  short term goal (STG)  -DN  short term goal (STG)  -DN     " Progress/Outcomes (Transfer Goal 1, OT-IRF)  goal revised this date;goal partially met  -DN  goal met;goal revised this date  -DN        Transfer Goal 2 (OT-IRF)    Activity/Assistive Device (Transfer Goal 2, OT-IRF)  toilet;tub;walker, rolling  -DN  toilet;tub;walker, rolling  -DN     [place] Level (Transfer Goal 2, OT-IRF)  conditional independence  -DN  conditional independence  -DN     Time Frame (Transfer Goal 2, OT-IRF)  long term goal (LTG)  -DN  long term goal (LTG)  -DN     Progress/Outcomes (Transfer Goal 2, OT-IRF)  goal ongoing  -DN  goal revised this date  -DN        Bathing Goal 1 (OT-IRF)    Activity/Device (Bathing Goal 1, OT-IRF)  bathing skills, all  -DN  bathing skills, all  -DN     [place] Level (Bathing Goal 1, OT-IRF)  conditional independence  -DN  supervision required  -DN     Time Frame (Bathing Goal 1, OT-IRF)  short term goal (STG)  -DN  short term goal (STG)  -DN     Progress/Outcomes (Bathing Goal 1, OT-IRF)  goal met;goal revised this date  -DN  goal met;goal revised this date  -DN        Bathing Goal 2 (OT-IRF)    Activity/Device (Bathing Goal 2, OT-IRF)  bathing skills, all  -DN  bathing skills, all  -DN     [place] Level (Bathing Goal 2, OT-IRF)  conditional independence  -DN  conditional independence  -DN     Time Frame (Bathing Goal 2, OT-IRF)  long term goal (LTG)  -DN  long term goal (LTG)  -DN     Progress/Outcomes (Bathing Goal 2, OT-IRF)  goal ongoing;goal partially met  -DN  goal ongoing  -DN        UB Dressing Goal 1 (OT-IRF)    Activity/Device (UB Dressing Goal 1, OT-IRF)  upper body dressing  -DN  upper body dressing  -DN     [place] (UB Dress Goal 1, OT-IRF)  set-up required  -DN  set-up required  -DN     Time Frame (UB Dressing Goal 1, OT-IRF)  short term goal (STG)  -DN  short term goal (STG)  -DN     Progress/Outcomes (UB Dressing Goal 1, OT-IRF)  goal met  -DN  goal ongoing;goal not met  -DN        UB Dressing Goal 2 (OT-IRF)    Activity/Device (UB  Dressing Goal 2, OT-IRF)  upper body dressing  -DN  upper body dressing  -DN     [place] (UB Dress Goal 2, OT-IRF)  conditional independence  -DN  conditional independence  -DN     Time Frame (UB Dressing Goal 2, OT-IRF)  long term goal (LTG)  -DN  long term goal (LTG)  -DN     Progress/Outcomes (UB Dressing Goal 2, OT-IRF)  goal ongoing  -DN  goal ongoing  -DN        LB Dressing Goal 1 (OT-IRF)    Activity/Device (LB Dressing Goal 1, OT-IRF)  lower body dressing  -DN  lower body dressing  -DN     [place] (LB Dressing Goal 1, OT-IRF)  supervision required  -DN  supervision required  -DN     Time Frame (LB Dressing Goal 1, OT-IRF)  short term goal (STG)  -DN  short term goal (STG)  -DN     Progress/Outcomes (LB Dressing Goal 1, OT-IRF)  goal met;goal revised this date  -DN  goal revised this date  -DN        LB Dressing Goal 2 (OT-IRF)    Activity/Device (LB Dressing Goal 2, OT-IRF)  lower body dressing  -DN  lower body dressing  -DN     [place] (LB Dressing Goal 2, OT-IRF)  conditional independence  -DN  conditional independence  -DN     Time Frame (LB Dressing Goal 2, OT-IRF)  long term goal (LTG)  -DN  long term goal (LTG)  -DN     Progress/Outcomes (LB Dressing Goal 2, OT-IRF)  goal ongoing  -DN  goal revised this date  -DN        Grooming Goal 1 (OT-IRF)    Activity/Device (Grooming Goal 1, OT-IRF)  grooming skills, all seated at sink  -DN  grooming skills, all seated at sink  -DN     [place] (Grooming Goal 1, OT-IRF)  conditional independence  -DN  conditional independence  -DN     Time Frame (Grooming Goal 1, OT-IRF)  short term goal (STG)  -DN  short term goal (STG)  -DN     Progress/Outcomes (Grooming Goal 1, OT-IRF)  goal ongoing  -DN  goal revised this date  -DN        Grooming Goal 2 (OT-IRF)    Activity/Device (Grooming Goal 2, OT-IRF)  grooming skills, all standing at sink  -DN  grooming skills, all standing at sink  -DN     [place] (Grooming Goal 2, OT-IRF)  conditional  independence  -DN  conditional independence  -DN     Time Frame (Grooming Goal 2, OT-IRF)  long term goal (LTG)  -DN  long term goal (LTG)  -DN     Progress/Outcomes (Grooming Goal 2, OT-IRF)  goal ongoing  -DN  goal ongoing  -DN        Toileting Goal 1 (OT-IRF)    Activity/Device (Toileting Goal 1, OT-IRF)  toileting skills, all  -DN  toileting skills, all  -DN     [place] Level (Toileting Goal 1, OT-IRF)  verbal cues required;supervision required  -DN  verbal cues required;supervision required  -DN     Time Frame (Toileting Goal 1, OT-IRF)  short term goal (STG)  -DN  short term goal (STG)  -DN     Progress/Outcomes (Toileting Goal 1, OT-IRF)  goal met;goal revised this date  -DN  goal met;goal revised this date  -DN        Toileting Goal 2 (OT-IRF)    Activity/Device (Toileting Goal 2, OT-IRF)  toileting skills, all;commode chair  -DN  toileting skills, all;commode chair  -DN     [place] Level (Toileting Goal 2, OT-IRF)  conditional independence;tactile cues required;verbal cues required  -DN  conditional independence;tactile cues required;verbal cues required  -DN     Time Frame (Toileting Goal 2, OT-IRF)  long term goal (LTG)  -DN  long term goal (LTG)  -DN     Progress/Outcomes (Toileting Goal 2, OT-IRF)  goal ongoing  -DN  goal ongoing  -DN        Strength Goal 1 (OT-IRF)    Strength Goal 1 (OT-IRF)  pt to be setup with BUE HEP  -DN  pt to be setup with BUE HEP  -DN     Time Frame (Strength Goal 1, OT-IRF)  short term goal (STG)  -DN  short term goal (STG)  -DN     Progress/Outcomes (Strength Goal 1, OT-IRF)  goal met  -DN  goal revised this date  -DN        Strength Goal 2 (OT-IRF)    Strength Goal 2 (OT-IRF)  pt to be independent with HEP for BUEs  -DN  pt to be independent with HEP for BUEs  -DN     Time Frame (Strength Goal 2, OT-IRF)  long term goal (LTG)  -DN  long term goal (LTG)  -DN     Progress/Outcomes (Strength Goal 2, OT-IRF)  goal ongoing  -DN  goal ongoing  -DN        Balance Goal 1  (OT)    Activity/Assistive Device (Balance Goal 1, OT)  assistive device use  -DN  assistive device use  -DN     [place] Level/Cues Needed (Balance Goal 1, OT)  contact guard assist during adls  -DN  contact guard assist during adls  -DN     Time Frame (Balance Goal 1, OT)  short term goal (STG)  -DN  short term goal (STG)  -DN     Progress/Outcomes (Balance Goal 1, OT)  goal met;goal revised this date  -DN  goal ongoing  -DN        Balance Goal 2 (OT)    Activity/Assistive Device (Balance Goal 2, OT)  assistive device use;standing, dynamic;walker, rolling  -DN  assistive device use;standing, dynamic;walker, rolling  -DN     [place] Level (Balance Goal 2, OT)  conditional independence during adls and home mgmt  -DN  supervision required during adls and home mgmt  -DN     Time Frame (Balance Goal 2, OT)  long term goal (LTG)  -DN  long term goal (LTG)  -DN     Progress/Outcome (Balance Goal 2, OT)  goal ongoing  -DN  goal ongoing  -DN        Caregiver Training Goal 2 (OT-IRF)    Caregiver Training Goal 2 (OT-IRF)  pt family or available caregivers to be independent with assist needed with adls, transfers etc  -DN  pt family or available caregivers to be independent with assist needed with adls, transfers etc  -DN     Time Frame (Caregiver Training Goal 2, OT-IRF)  long term goal (LTG)  -DN  long term goal (LTG)  -DN     Progress/Outcomes (Caregiver Training Goal 2, OT-IRF)  goal ongoing  -DN  goal ongoing  -DN       User Key  (r) = Recorded By, (t) = Taken By, (c) = Cosigned By    Initials Name Provider Type    DN Prince Obrien OT Occupational Therapist          Occupational Therapy Education                 Title: PT OT SLP Therapies (Done)     Topic: Occupational Therapy (Done)     Point: ADL training (Done)     Description:   Instruct learner(s) on proper safety adaptation and remediation techniques during self care or transfers.   Instruct in proper use of assistive devices.              Learning  Progress Summary           Patient Acceptance, E, VU by DN at 5/8/2020 1504    Comment:  conferance held with pt and staff today to discuss current status with adls, transfers, equipment needs, recommeded further therapies after d/c    Acceptance, E, VU,NR by DN at 4/28/2020 1437    Comment:  with pt in our bathroom to help decide realistic equipment needs at home in her own bathroom, pt states batroom very small, walker may not fit in bathroom, and that she dont have any person to help make any changes needed to make more accessable    Acceptance, E,TB,D, VU,NR by DN at 4/21/2020 1452    Comment:  OT role, transfers, adaptive adls                   Point: Home exercise program (Done)     Description:   Instruct learner(s) on appropriate technique for monitoring, assisting and/or progressing therapeutic exercises/activities.              Learning Progress Summary           Patient Acceptance, E, VU by DN at 5/8/2020 1504    Comment:  conferance held with pt and staff today to discuss current status with adls, transfers, equipment needs, recommeded further therapies after d/c    Acceptance, E, VU,NR by DN at 4/28/2020 1437    Comment:  with pt in our bathroom to help decide realistic equipment needs at home in her own bathroom, pt states batroom very small, walker may not fit in bathroom, and that she dont have any person to help make any changes needed to make more accessable    Acceptance, E,TB,D, VU,NR by DN at 4/21/2020 1452    Comment:  OT role, transfers, adaptive adls                   Point: Precautions (Done)     Description:   Instruct learner(s) on prescribed precautions during self-care and functional transfers.              Learning Progress Summary           Patient Acceptance, E, VU by DN at 5/8/2020 1504    Comment:  conferance held with pt and staff today to discuss current status with adls, transfers, equipment needs, recommeded further therapies after d/c    Acceptance, E, VU,NR by DN at 4/28/2020  1437    Comment:  with pt in our bathroom to help decide realistic equipment needs at home in her own bathroom, pt states batroom very small, walker may not fit in bathroom, and that she dont have any person to help make any changes needed to make more accessable    Acceptance, E,TB,D, VU,NR by DN at 4/21/2020 1452    Comment:  OT role, transfers, adaptive adls                   Point: Body mechanics (Done)     Description:   Instruct learner(s) on proper positioning and spine alignment during self-care, functional mobility activities and/or exercises.              Learning Progress Summary           Patient Acceptance, E, VU by DN at 5/8/2020 1504    Comment:  conferance held with pt and staff today to discuss current status with adls, transfers, equipment needs, recommeded further therapies after d/c    Acceptance, E, VU,NR by DN at 4/28/2020 1437    Comment:  with pt in our bathroom to help decide realistic equipment needs at home in her own bathroom, pt states batroom very small, walker may not fit in bathroom, and that she dont have any person to help make any changes needed to make more accessable    Acceptance, E,TB,D, VU,NR by DN at 4/21/2020 1452    Comment:  OT role, transfers, adaptive adls                               User Key     Initials Effective Dates Name Provider Type Discipline    KAYLEN 06/08/18 -  Prince Obrien OT Occupational Therapist OT                Outcome Measures     Row Name 05/07/20 1500             [place] Balance Scale    Sitting to Standing  4  -EE      Standing Unsupported  3  -EE      Sitting with Back Unsupported but Feet Supported on Floor or on Stool  4  -EE      Standing to Sitting  4  -EE      Transfers  4  -EE      Standing Unsupported with Eyes Closed  3  -EE      Standing Unsupported with Feet Together  3  -EE      Reaching Forward with Outstretched Arm While Standing  2  -EE       Object From the Floor From a Standing Position  1  -EE      Turning to Look Behind Over  Left and Right Shoulders While Standing  0  -EE      Turn 360 Degrees  1  -EE      Place Alternate Foot on Step or Stool While Standing Unsupported  1  -EE      Standing Unsupported with One Foot in Front  2  -EE      Standing on One Leg  1  -EE      Jasso Total Score  33  -EE         Functional Assessment    Outcome Measure Options  [place] Balance  -EE        User Key  (r) = Recorded By, (t) = Taken By, (c) = Cosigned By    Initials Name Provider Type    EE Irlanda Wallis, PT Physical Therapist             Time Calculation:     Time Calculation- OT     Row Name 05/08/20 1505 05/08/20 1157          Time Calculation- OT    OT Start Time  1300  -DN  0800  -DN     OT Stop Time  1330  -DN  0900  -DN     OT Time Calculation (min)  30 min  -DN  60 min  -DN     OT Received On  05/08/20  -DN  05/08/20  -DN       User Key  (r) = Recorded By, (t) = Taken By, (c) = Cosigned By    Initials Name Provider Type    Prince Morfin OT Occupational Therapist          Therapy Charges for Today     Code Description Service Date Service Provider Modifiers Qty    02119988266 HC OT SELF CARE/MGMT/TRAIN EA 15 MIN 5/8/2020 Prince Obrien OT GO 3    19682379823 HC OT THER PROC EA 15 MIN 5/8/2020 Prince bOrien OT GO 1    96416099814 HC OT THER PROC EA 15 MIN 5/8/2020 Prince Obrien OT GO 2                   Prince Obrien OT  5/8/2020

## 2020-05-08 NOTE — PROGRESS NOTES
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  Has been having some aching in her hands, but feels it is weather related and ibuprofen helped her in the past.  Asking about what her meds will be at time of discharge.    History taken from: patient    Objective     Vital Signs  Temp:  [97.9 °F (36.6 °C)-98.4 °F (36.9 °C)] 98.1 °F (36.7 °C)  Heart Rate:  [85-96] 92  Resp:  [18] 18  BP: (125-129)/(78-82) 125/82    Objective     MENTAL STATUS -  AWAKE / ALERT  Gen - nad, sitting upright in wheelchair   HEENT- NCAT, PUPILS EQUALLY ROUND, SCLERAE ANICTERIC, CONJUNCTIVAE PINK, OP MOIST, NO JVD, EARS UNREMARKABLE EXTERNALLY  LUNGS -normal respirations. equal chest rise, ctab no w/r/c  CV - rrr no m/r/g   EXT - NO EDEMA OR CYANOSIS   Skin - small bruise on left forearm, no warmth  NEURO -   A/ox4, speech is fluent, follows all commands  MOTOR EXAM -JONES x4, symmetric muscle bulk  Antigravity BUE strength  Sitting upright in bed with good truncal control  No red or swollen joints in hands  Psych - appropriate mood and affect    Results Review:     I reviewed the patient's new clinical results.     Results for LUIS SENTHIL A (MRN [id number]) as of 5/8/2020 08:24   Ref. Range 5/6/2020 16:16 5/7/2020 07:09 5/7/2020 11:09 5/7/2020 16:12 5/8/2020 07:11    Glucose Latest Ref Range: 70 - 130 mg/dL 106 135 (H) 172 (H) 95 150 (H)       Medication Review:   Scheduled Meds:  amLODIPine 10 mg Oral Nightly   budesonide-formoterol 2 puff Inhalation BID - RT   [START ON 5/9/2020] DULoxetine 60 mg Oral Daily   folic acid 1 mg Oral Daily   hydroCHLOROthiazide Oral 25 mg Oral Daily   insulin glargine 20 Units Subcutaneous QAM   insulin lispro 0-14 Units Subcutaneous TID AC   insulin lispro 14 Units Subcutaneous TID With Meals   levothyroxine 200 mcg Oral Q AM   pantoprazole 40 mg Oral BID AC   predniSONE 5 mg Oral Daily   pregabalin 100 mg Oral Q12H   rOPINIRole 0.5 mg Oral Nightly   sucralfate 1 g Oral 4x Daily AC & at Bedtime     Continuous Infusions:   PRN Meds:.Benzocaine  •  benzonatate  •  calcium carbonate  •  dextrose  •  dextrose  •  diphenhydrAMINE  •  diphenhydrAMINE  •  glucagon (human recombinant)  •  guaiFENesin-dextromethorphan  •  melatonin  •  ondansetron  •  oxyCODONE  •  oxyCODONE  •  polyethylene glycol  •  potassium chloride **OR** potassium chloride **OR** potassium chloride  •  sodium chloride      Assessment/Plan       YOUNG (nonalcoholic steatohepatitis)    Hypothyroid    Type 2 diabetes mellitus (CMS/HCC)    GBS (Guillain [place] syndrome) (CMS/HCC)    Elevated transaminase level    History of gestational diabetes    Steroid-induced hyperglycemia    Elevated aldolase level      Assessment & Plan     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  This is far greater than expected based on the nerve conduction findings.  This suggests either motor neuron disease or a primarily axonal polyneuropathy such as axonal Guillain Barré syndrome based on the patient's symptom history. F/U Dr Juventino Gaytan in 2-3 months.     Previous question of  dermatomyositis or autoimmune process-trial of steroids-tapering off  April 27-has been on prednisone 40 mg daily since April 10 empiric trial for previous question of dermatomyositis but now not felt clinically present-taper off of prednisone 30 mg daily x3 days, 20 mg daily x3 days, 10 mg daily x3 days, 5 mg daily x3 days, then stop.     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 Also on oxycodone.  April 27-has some pain in the upper extremities.  Doubt that it is bilateral joint pain as she has been on a reasonable dose of prednisone for the last 16 days.  May be a variant for the neuropathic pain.  Will titrate up on Lyrica 200 mg every 12 hours, watch for any myoclonic jerks.  May 1- taking oxycodone 5 mg about 5 times a day, need to taper off over next week, changed to Oxycodone 5 mg q 6 hours prn x 3 days, then q 8 hours prn x 3 days, then q 12 hours prn x three days, then stop. SVETLANA reviewed.   May 2-add on Cymbalta 30 mg daily for 7 days and increase to 60 mg daily.  Discussed other options of titrating up on Lyrica or switching to gabapentin.  May 4 - Cymbalta is helping  May 6 - increase to 60mg in a few days     DVT prophylaxis - SCDs        Hepatic steatosis-elevated LFTs  April 27-gastroenterology following  April 29 - reviewed with gastroenterology - a combination of fatty liver (the treatment would be weight loss) and Ebstein Horan viral hepatitis (the only treatment would be supportive care)- recommend against a liver biopsy. Follow up in three months with repeat labs and full colonscopy.  May 5 - EBV  labs reviewed, and will need outpatient GI f/up for hepatic steatosis  May 8 - recheck AM labs     Restless leg syndrome -   May 4 - Will trial Requip  May 6 - will increase Requip dosage and see if helps sleep.     Joint pain -   May 8 - patient reports arthritic pain in hands that ibuprofen has helped in past.  Will hold on adding ibuprofen until steroids weaned.      Sleep disturbance  May 6 - reviewed meds with pharmacy.  Will trial increase of Requip and steroids could be contributing but are currently being tapered.  May 7 - slept better last night     May 7 - Plan is for patient to go to mother's home at discharge.  Patient feels comfortable with this plan.     April 27-gait 80 feet CTG-min assist.  Team conference in the a.m.     TEAM CONF - April 28- BED CTG. 4 STAIRS MIN. GAIT 100 FEET CTG-MIN RW. TOILET TRANSFERS CTG. SHOWER TRANSFERS CTG.  UBD MIN ASSIST FOR BRA. LBD CTG. BATH CTG. GROOMING SBA.  TOILETING MOD INDEPENDENT. CONTINENT BOWEL AND BLADDER.   DIETICIAN EDUCATED ON DIABETIC DIET ON STEROIDS BUT PATIENT RESISTANT TO INSTRUCTION.   NEEDS TO BE MODIFIED INDEPENDENT FOR HOME  ELOS- TWO WEEKS     TEAM CONF - May 5 -   Bed/Chair/Wc SBA,   Stairs 4 with CGa/MIN A with 1 HR and Quad tipped cane  Amb 160ft CGa RWx, Min A with a cane  Toilet Tx SBA RWx, Tub/Shower Tx CGa RWx  Bathing SBA, LBD SBA, UBD Min A with bra, grooming SBA seated  Toileting SBA  Continent of bowel and bladder  2L O2 during night (h/o ERLIN with CPAP at home)  ELOS - 1 week home with assistance vs SNF  Discussed at length with patient this option of disposition.  Patient feels she does not have anyone to assist her.  Will look into SNF facility as she still requires assistance and would benefit from further inpatient rehab.     During rounds, used appropriate personal protective equipment including mask and gloves.  Mask used was standard procedure mask. Appropriate PPE was worn during the entire visit.  Hand hygiene was completed  before and after.    Millie Cm MD  05/08/20  08:24    Time: 20min

## 2020-05-08 NOTE — PLAN OF CARE
Problem: Patient Care Overview  Goal: Plan of Care Review  Flowsheets  Taken 5/8/2020 1601 by Amita Becerra, RN  Outcome Summary: Patient worked hard in therapies, hands weak and sore, family conference held discharge medications and follow ups discussed, no unsafe behaviors this shift, DC planned for Tuesday 5/12/20.  Taken 5/8/2020 0228 by Colby Cox, RN  Progress, Functional Goals: demonstrating adequate progress  Plan of Care Reviewed With: patient  IRF Plan of Care Review: progress ongoing, continue

## 2020-05-08 NOTE — PLAN OF CARE
Problem: Patient Care Overview  Goal: Plan of Care Review  Outcome: Ongoing (interventions implemented as appropriate)  Flowsheets (Taken 5/8/2020 0228)  Outcome Summary: Patient pleasant and cooperative. She did not receive pain medications for almost 2 days but woke up around 1:30 AM and requested pain medication, benadryl and melatonin for pain and sleep. She's A&Ox4, ambulatory with RW to the bathroom and continent of B&B. Family conference set today friday 5/8 @11AM. Continues with numbness but she said it's improving,  was noticed to have gotten a bit stronger as well  Problem: Fall Risk (Adult)  Goal: Absence of Fall  Description  Patient will demonstrate the desired outcomes by discharge/transition of care.  Outcome: Ongoing (interventions implemented as appropriate)  Flowsheets (Taken 5/8/2020 0228)  Absence of Fall: making progress toward outcome     Problem: Fall Risk (Adult)  Goal: Absence of Fall  Description  Patient will demonstrate the desired outcomes by discharge/transition of care.  Outcome: Ongoing (interventions implemented as appropriate)  Flowsheets (Taken 5/8/2020 0228)  Absence of Fall: making progress toward outcome     Problem: Functional Mobility Impairment (IRF) (Adult)  Goal: Optimal/Safe Level of [place] with Mobility  Outcome: Ongoing (interventions implemented as appropriate)  Flowsheets (Taken 5/7/2020 0139)  Optimal/Safe Level of Foard with Mobility: demonstrating adequate progress     Problem: Skin Injury Risk (Adult)  Goal: Skin Health and Integrity  Description  Patient will demonstrate the desired outcomes by discharge/transition of care.  Outcome: Ongoing (interventions implemented as appropriate)  Flowsheets (Taken 5/8/2020 0228)  Skin Health and Integrity: making progress toward outcome     Problem: Pain, Acute (Adult)  Goal: Identify Related Risk Factors and Signs and Symptoms  Description  Related risk factors and signs and symptoms are identified upon  initiation of Human Response Clinical Practice Guideline (CPG).  Outcome: Outcome(s) achieved  Goal: Acceptable Pain Control/Comfort Level  Description  Patient will demonstrate the desired outcomes by discharge/transition of care.  Outcome: Ongoing (interventions implemented as appropriate)  Flowsheets (Taken 5/8/2020 0228)  Acceptable Pain Control/Comfort Level: making progress toward outcome   .  Progress, Functional Goals: demonstrating adequate progress  Plan of Care Reviewed With: patient  IRF Plan of Care Review: progress ongoing, continue

## 2020-05-08 NOTE — PROGRESS NOTES
Case Management  Inpatient Rehabilitation Plan of Care and Discharge Plan Note    Rehabilitation Diagnosis:  Branch  Date of Onset:  Branch    Medical Summary:  Branch  Past Medical History: Branch    Plan of Care  Updated Problems/Interventions  Field    Expected Intensity:  Branch  Interdisciplinary Team:  Tanisha  Estimated Length of Stay/Anticipated Discharge Date: Branch  Anticipated Discharge Destination:  Anticipated discharge destination from inpatient rehabilitation is community  discharge with assistance. Family conference held 5/8/2020. Pt will discharge to  her mother's home where she will have daily assistance.  Outpatient therapies to  be scheduled.      Based on the patient's medical and functional status, their prognosis and  expected level of functional improvement is:  Branch    Signed by: JARED Cox

## 2020-05-08 NOTE — PROGRESS NOTES
Inpatient Rehabilitation Plan of Care Note    Plan of Care  Care Plan Reviewed - No updates at this time.    Safety    Performed Intervention(s)  Bed alarm and /or chair alarm  Safety rounds and items within reach  Falls precautions/protocol  Uses call light appropriately  Environmental set-up to reduce risk  safety strategies and techniques      Psychosocial    Performed Intervention(s)  Allow the opportunity to verbalize needs and concerns  Medication as ordered  Therapuetic environmental set up  offer support      Body Systems    Performed Intervention(s)  Monitor labs and medication as ordered  Blood sugar testing- TID      Pain    Performed Intervention(s)  Relaxation or distraction  Medication as ordered  Modalities      Sphincter Control    Performed Intervention(s)  Monitor intake and output  Assist pt to bathroom in a timely manner  Encourage proper diet and fluid intake  Medication as ordered    Signed by: Colby Cox RN

## 2020-05-08 NOTE — PROGRESS NOTES
44 y.o.   LOS: 18 days   Patient Care Team:  Provider, No Known as PCP - General    Chief Complaint: Elevated blood sugars    No chief complaint on file.      Subjective   Patient is participating in physical and occupational therapy.  Blood sugars are decent.  No complaints.    Interval History:    Review of Systems:   Constitutional: Positive for appetite change and fatigue.   Eyes: Negative for visual disturbance.   Respiratory: no shortness of breath.    Cardiovascular: no leg swelling.   Gastrointestinal: Negative for abdominal pain.   Endocrine: Negative for polydipsia and polyuria.   Musculoskeletal: Positive for arthralgias and myalgias.   Skin: Negative for pallor.   Neurological: Positive for weakness and numbness.   Psychiatric/Behavioral: Positive for sleep disturbance.         Review of Systems  Objective     Vital Signs   Temp:  [98 °F (36.7 °C)-98.4 °F (36.9 °C)] 98 °F (36.7 °C)  Heart Rate:  [85-95] 88  Resp:  [18] 18  BP: (125-135)/(73-82) 135/73    Physical Exam:  Constitutional - No distress, appears stated age  Eyes - PERRL, anicteric sclera  Ear nose and throat - External ears/nose normal, No adenopathy, midline trachea  Respiratory - Normal chest on inspection, palpation and diminished bilateral breath sounds on auscultation  Cardiovascular - Normal S1S2, without murmur  GI - Non tender, non distended, no hepatosplenomegaly, +BS  Musculoskeletal - No edema, cyanosis or clubbing  Neuro - alert          Physical ExamResults Review:     I reviewed the patient's new clinical results and summarized them in subjective and in plan.      No results found for: GLUCOSE  Lab Results (last 24 hours)     Procedure Component Value Units Date/Time    POC Glucose Once [[id number]]  (Abnormal) Collected:  05/08/20 1138    Specimen:  Blood Updated:  05/08/20 1140     Glucose 149 mg/dL     POC Glucose Once [[id number]]  (Abnormal) Collected:  05/08/20 0711    Specimen:  Blood Updated:  05/08/20 0713     Glucose 150  mg/dL     POC Glucose Once [[id number]]  (Normal) Collected:  05/07/20 1612    Specimen:  Blood Updated:  05/07/20 1613     Glucose 95 mg/dL         Imaging Results (Last 24 Hours)     ** No results found for the last 24 hours. **          Medication Review: done      Current Facility-Administered Medications:   •  amLODIPine (NORVASC) tablet 10 mg, 10 mg, Oral, Nightly, Demetri Mariee MD, 10 mg at 05/07/20 2118  •  Benzocaine 20 % ointment 1 application, 1 application, Apply externally, BID PRN, Gerber Aldana MD  •  benzonatate (TESSALON) capsule 200 mg, 200 mg, Oral, TID PRN, Gerber Aldana MD  •  budesonide-formoterol (SYMBICORT) 160-4.5 MCG/ACT inhaler 2 puff, 2 puff, Inhalation, BID - RT, Gerber Aldana MD, 2 puff at 05/08/20 1050  •  calcium carbonate (TUMS) chewable tablet 500 mg (200 mg elemental), 2 tablet, Oral, 4x Daily PRN, Gerber Aldana MD  •  dextrose (D50W) 25 g/ 50mL Intravenous Solution 25 g, 25 g, Intravenous, Q15 Min PRN, Gerber Aldana MD  •  dextrose (GLUTOSE) oral gel 15 g, 15 g, Oral, Q15 Min PRN, Gerber Aldana MD  •  diphenhydrAMINE (BENADRYL) capsule 25 mg, 25 mg, Oral, Q4H PRN, Gerber Aldana MD, 25 mg at 05/08/20 0140  •  diphenhydrAMINE (BENADRYL) injection 50 mg, 50 mg, Intravenous, Once PRN, Gerber Aldana MD  •  [START ON 5/9/2020] DULoxetine (CYMBALTA) DR capsule 60 mg, 60 mg, Oral, Daily, Phill Campos MD  •  folic acid (FOLVITE) tablet 1 mg, 1 mg, Oral, Daily, Gerber Aldana MD, 1 mg at 05/08/20 0726  •  glucagon (human recombinant) (GLUCAGEN DIAGNOSTIC) injection 1 mg, 1 mg, Subcutaneous, Q15 Min PRN, Gerber Aldana MD  •  guaiFENesin-dextromethorphan (ROBITUSSIN DM) 100-10 MG/5ML syrup 5 mL, 5 mL, Oral, Q4H PRN, Gerber Aldana MD  •  hydroCHLOROthiazide (HYDRODIURIL) tablet 25 mg, 25 mg, Oral, Daily, Demetri Mariee MD, 25 mg  at 05/08/20 0727  •  insulin glargine (LANTUS) injection 20 Units, 20 Units, Subcutaneous, QAJorge OQUENDO Manikya, MD, 20 Units at 05/08/20 0725  •  insulin lispro (humaLOG) injection 0-14 Units, 0-14 Units, Subcutaneous, TID JANES, Gerber Aldana MD, 3 Units at 05/08/20 0727  •  insulin lispro (humaLOG) injection 14 Units, 14 Units, Subcutaneous, TID With Meals, Gustavo Marquez MD, 14 Units at 05/08/20 1213  •  levothyroxine (SYNTHROID, LEVOTHROID) tablet 200 mcg, 200 mcg, Oral, Q AM, Gerber Aldana MD, 200 mcg at 05/08/20 0606  •  melatonin tablet 3 mg, 3 mg, Oral, Nightly PRN, Demetri Mariee MD, 3 mg at 05/08/20 0140  •  ondansetron (ZOFRAN) tablet 4 mg, 4 mg, Oral, Q4H PRN, Demetri Mariee MD  •  oxyCODONE (ROXICODONE) immediate release tablet 5 mg, 5 mg, Oral, Q12H PRN, Phill Campos MD  •  oxyCODONE (ROXICODONE) immediate release tablet 5 mg, 5 mg, Oral, BID PRN, Millie Cm MD, 5 mg at 05/08/20 0140  •  pantoprazole (PROTONIX) EC tablet 40 mg, 40 mg, Oral, BID JANES, Gerber Aldana MD, 40 mg at 05/08/20 0606  •  polyethylene glycol 3350 powder (packet), 17 g, Oral, BID PRN, Phill Campos MD, 17 g at 05/07/20 1013  •  potassium chloride (MICRO-K) CR capsule 40 mEq, 40 mEq, Oral, PRN, 40 mEq at 04/20/20 1542 **OR** potassium chloride (KLOR-CON) packet 40 mEq, 40 mEq, Oral, PRN **OR** potassium chloride 10 mEq in 100 mL IVPB, 10 mEq, Intravenous, Q1H PRN, Gerber Aldana MD  •  [COMPLETED] predniSONE (DELTASONE) tablet 30 mg, 30 mg, Oral, Daily, 30 mg at 04/30/20 0835 **FOLLOWED BY** [COMPLETED] predniSONE (DELTASONE) tablet 20 mg, 20 mg, Oral, Daily, 20 mg at 05/03/20 0939 **FOLLOWED BY** [COMPLETED] predniSONE (DELTASONE) tablet 10 mg, 10 mg, Oral, Daily, 10 mg at 05/06/20 0803 **FOLLOWED BY** predniSONE (DELTASONE) tablet 5 mg, 5 mg, Oral, Daily, Phill Campos MD, 5 mg at 05/08/20 0726  •  pregabalin (LYRICA) capsule  "100 mg, 100 mg, Oral, Q12H, Phill Campos MD, 100 mg at 05/08/20 0727  •  rOPINIRole (REQUIP) tablet 0.5 mg, 0.5 mg, Oral, Nightly, Millie Cm MD, 0.5 mg at 05/07/20 2118  •  sodium chloride 0.9 % flush 10 mL, 10 mL, Intravenous, PRN, Gerber Aldana MD  •  sucralfate (CARAFATE) 1 GM/10ML suspension 1 g, 1 g, Oral, 4x Daily AC & at Bedtime, Gerber Aldana MD, 1 g at 05/08/20 1224    Assessment/Plan     Active Hospital Problems    Diagnosis  POA   • Elevated aldolase level [R74.8]  Yes   • Elevated transaminase level [R74.0]  Yes   • History of gestational diabetes [Z86.32]  Yes   • Steroid-induced hyperglycemia [R73.9, T38.0X5A]  Yes   • GBS (Guillain [place] syndrome) (CMS/HCC) [G61.0]  Yes   • Type 2 diabetes mellitus (CMS/HCC) [E11.9]  Yes   • YOUNG (nonalcoholic steatohepatitis) [K75.81]  Yes   • Hypothyroid [E03.9]  Yes      Resolved Hospital Problems   No resolved problems to display.     Type 2 Dm - uncontrolled   Continue Lantus 20 units daily  Continue Humalog 12 units with each meal  Cover with Humalog sliding scale      Hypothyroidism   Continue levothyroxine 200 mcg oral daily  Plans noted to repeat blood work-up in 4 to 6 weeks after discharge.    Follow-up with Dr. Choudhury 4 to 6 weeks after discharge    We will sign off on the patient, please call us if there are any further questions in terms of her care      Discussed plan with Nurse.     Janina Patel MD.  05/08/20  3:22 PM      EMR Dragon / transcription disclaimer:    \"Dictated utilizing Dragon dictation\".   "

## 2020-05-08 NOTE — THERAPY TREATMENT NOTE
"Inpatient Rehabilitation - Physical Therapy Treatment Note  [facility]     Patient Name: Oralia Sánchez  : 1975  MRN: [id number]    Today's Date: 2020                 Admit Date: 2020      Visit Dx:    No diagnosis found.    Patient Active Problem List   Diagnosis   • Vitamin D deficiency   • Awareness of heartbeats   • Adiposity   • YOUNG (nonalcoholic steatohepatitis)   • Hypothyroid   • Diabetes mellitus arising in pregnancy   • Diabetes (CMS/HCC)   • Metabolic syndrome   • Chest pain   • Primary thunderclap headache   • Elevated LFTs   • Tobacco abuse   • Rheumatoid arthritis (CMS/HCC)   • Type 2 diabetes mellitus (CMS/HCC)   • Morbid obesity (CMS/HCC)   • UTI (urinary tract infection), bacterial   • Cerebral aneurysm   • Dyspnea   • Cough   • Hypomagnesemia   • Metabolic acidosis   • Fatigue   • History of COPD   • Abnormal CT scan, colon   • 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)   • Essential hypertension   • GBS (Guillain Jerusalem syndrome) (CMS/HCC)   • Elevated transaminase level   • History of gestational diabetes   • Steroid-induced hyperglycemia   • Elevated aldolase level       Therapy Treatment    IRF Treatment Summary     Row Name 20 1448 20 1149 20 0900       Evaluation/Treatment Time and Intent    Subjective Information  complains of;pain  -DN  complains of;pain  -DN  complains of;pain \"Hands being stiff.\"(am) c/o pain in hands (pm)  -LB    Existing Precautions/Restrictions  fall  -DN  fall  -DN  fall  -LB    Document Type  therapy note (daily note)  -DN  therapy note (daily note)  -DN  therapy note (daily note)  -LB    Mode of Treatment  occupational therapy  -DN  occupational therapy  -DN  physical therapy  -LB    Patient/Family Observations  supine in bed  -DN  supine in bed  -DN  up in w/c  -LB    Recorded by [DN] Prince Obrien OT [DN] Prince Obrien OT [LB] Preeti Reynaga, PT    Row Name 20 1149 20 0900          Cognition/Psychosocial- " PT/OT    Affect/Mental Status (Cognitive)  WNL  -DN  WNL  -LB     Orientation Status (Cognition)  oriented x 4  -DN  oriented x 4  -LB     Follows Commands (Cognition)  WNL  -DN  WNL  -LB     Personal Safety Interventions  --  fall prevention program maintained  -LB     Recorded by [DN] Prince Obrien, OT [LB] Preeti Reynaga, PT     Row Name 05/08/20 1448 05/08/20 1149          Bed-Chair Transfer    Bed-Chair [place] (Transfers)  supervision  -DN  supervision  -DN     Assistive Device (Bed-Chair Transfers)  wheelchair;walker, front-wheeled  -DN  walker, front-wheeled  -DN     Recorded by [DN] Prince Obrien OT [DN] Prince Obrien, OT     Row Name 05/08/20 0900             Sit-Stand Transfer    Sit-Stand [place] (Transfers)  stand by assist;supervision  -LB      Assistive Device (Sit-Stand Transfers)  walker, front-wheeled;wheelchair  -LB      Recorded by [LB] Preeti Reynaga, PT      Row Name 05/08/20 0900             Stand-Sit Transfer    Stand-Sit [place] (Transfers)  stand by assist;verbal cues  -LB      Assistive Device (Stand-Sit Transfers)  walker, front-wheeled;wheelchair  -LB      Recorded by [LB] Preeti Reynaga, PT      Row Name 05/08/20 1149             Toilet Transfer    Type (Toilet Transfer)  stand pivot/stand step  -DN      [place] Level (Toilet Transfer)  supervision  -DN      Assistive Device (Toilet Transfer)  raised toilet seat;walker, front-wheeled  -DN      Recorded by [DN] Prince Obrien, OT      Row Name 05/08/20 1149             Shower Transfer    Type (Shower Transfer)  stand pivot/stand step  -DN      [place] Level (Shower Transfer)  supervision  -DN      Assistive Device (Shower Transfer)  tub bench;grab bars/tub rail;walker, front-wheeled  -DN      Recorded by [KAYLEN] Prince Obrien, OT      Row Name 05/08/20 0900             Gait/Stairs Assessment/Training    [place] Level (Gait)  stand by assist;supervision  -LB      Assistive Device (Gait)  walker,  front-wheeled  -LB      Distance in Feet (Gait)  200' x 3  -LB      Pattern (Gait)  step-through  -LB      Deviations/Abnormal Patterns (Gait)  quang decreased;stride length decreased  -LB      [place] Level (Stairs)  contact guard  -LB      Assistive Device (Stairs)  -- none  -LB      Handrail Location (Stairs)  right side (ascending) two trials with 2 handrails; two trials with R handrail only  -LB      Number of Steps (Stairs)  4 x 2  -LB      Ascending Technique (Stairs)  step-to-step  -LB      Descending Technique (Stairs)  step-to-step  -LB      Stairs, Safety Issues  balance decreased during turns  -LB      Stairs, Impairments  strength decreased;impaired balance;coordination impaired  -LB      Comment (Gait/Stairs)  Pt ambulated around the marni-bars 40' x 4 (2 x with R UE, 2 x with L UE) with CGA.   -LB      Recorded by [LB] Preeti Reynaga, PT      Row Name 05/08/20 0900             Safety Issues, Functional Mobility    Impairments Affecting Function (Mobility)  balance;endurance/activity tolerance;sensation/sensory awareness;coordination  -LB      Recorded by [LB] Preeti Reynaga, PT      Row Name 05/08/20 1149             Bathing Assessment/Treatment    Bathing [place] Level  bathing skills;set up  -DN      Assistive Device (Bathing)  grab bar/tub rail;hand held shower spray hose;tub bench  -DN      Bathing Position  supported sitting;supported standing  -DN      Recorded by [DN] Prince Obrien OT      Row Name 05/08/20 1149             Upper Body Dressing Assessment/Treatment    Upper Body Dressing Task  upper body dressing skills;doff;don;bra/undergarment;pull over garment;supervision  -DN      Upper Body Dressing Position  supported sitting  -DN      Set-up Assistance (Upper Body Dressing)  obtain clothing  -DN      Recorded by [DN] Prince Obrien OT      Row Name 05/08/20 1145             Lower Body Dressing Assessment/Treatment    Lower Body Dressing [place] Level   doff;don;socks;pants/bottoms;underwear;supervision  -DN      Lower Body Dressing Position  supported standing;supported sitting  -DN      Lower Body Dressing Setup Assistance  obtain clothing  -DN      Comment (Lower Body Dressing)  no shoes to fit currently  -DN      Recorded by [DN] Prince Obrien, OT      Row Name 05/08/20 1149             Grooming Assessment/Treatment    Grooming [place] Level  grooming skills;deodorant application;hair care, combing/brushing;oral care regimen;conditional independence  -DN      Grooming Position  supported sitting  -DN      Recorded by [DN] Prince Obrien, OT      Row Name 05/08/20 1448             Hand  Strength Testing    Right Hand, Setting 2 (Dynamometer Testing)  24  -DN      Left Hand, Setting 2 (Dynamometer Testing)  4  -DN      Right Hand: Lateral (Key) Pinch Strength (Pinch Dynamometer Testing)  10  -DN      Right Hand: [place] (Robert) Pinch Strength (Pinch Dynamometer Testing)  5  -DN      Left Hand: Lateral (Key) Pinch Strength (Pinch Dynamometer Testing)  3  -DN      Left Hand: [place] (Robert) Pinch Strength (Pinch Dynamometer Testing)  2  -DN      Recorded by [DN] Prince Obrien, OT      Row Name 05/08/20 1448             Fine Motor Testing & Training    Results, 9 Hole Peg Test of Fine Motor Coordination-Right  50  -DN      Results, 9 Hole Peg Test of Fine Motor Coordination-Left  34  -DN      Comment, Fine Motor Coordination  FMC with BUEs with small pegs with irregular shapes into peg board with increased time but able to finish with both hands without dropping. Increased concern due to decreased  and pinch scores, MD is aware  -DN      Recorded by [DN] Prince Obrien, OT      Row Name 05/08/20 1149 05/08/20 0900          Pain Scale: Numbers Pre/Post-Treatment    Pain Scale: Numbers, Pretreatment  5/10  -DN  0/10 - no pain  -LB     Pain Scale: Numbers, Post-Treatment  5/10  -DN  0/10 - no pain  -LB     Pain Location - Side  Left  -DN  --      Pain Location - Orientation  distal  -DN  --     Pain Location  hand  -DN  --     Pain Intervention(s)  Repositioned;Medication (See MAR)  -DN  --     Recorded by [DN] Prince Obrien OT [LB] Preeti Reynaga, PT     Row Name 05/08/20 0900             Standing Balance Activity    Activities Performed (Standing, Balance Training)  standing on foam half roll;feet together in stance;semi tandem stance;other (see comments)  -LB      Support Needed for Balance (Standing, Balance Training)  CGA;uses both upper extremities for support  -LB      Recorded by [LB] Preeti Reynaga, PT      Row Name 05/08/20 0900             Dynamic Balance Activity    Therapeutic Training Performed (Dynamic Balance)  backward walking;other (see comments);side stepping trampoline   -LB      Support Needed for Balance (Dynamic Balance Training)  uses at least one upper extremity for support  -LB      Comment (Dynamic Balance Training)  unilateral standing ~ 10 seconds each LE with light wt on hi-lo mat with CGA   -LB      Recorded by [LB] Preeti Reynaga, PT      Row Name 05/08/20 1149             Upper Extremity Seated Therapeutic Exercise    Performed, Seated Upper Extremity (Therapeutic Exercise)  shoulder flexion/extension;shoulder abduction/adduction;wrist flexion/extension;forearm supination/pronation;elbow flexion/extension  -DN      Device, Seated Upper Extremity (Therapeutic Exercise)  free weights, barbell  -DN      Exercise Type, Seated Upper Extremity (Therapeutic Exercise)  AROM (active range of motion)  -DN      Expected Outcomes, Seated Upper Extremity (Therapeutic Exercise)  improve functional tolerance, self-care activity;improve performance, transfer skills  -DN      Sets/Reps Detail, Seated Upper Extremity (Therapeutic Exercise)  3/10  -DN      Transfers Skills, Training to Functional Activity, Seated Upper Extremity (Therapeutic Exercise)  transfers skills to functional activity most of the time  -DN      Recorded by [DN]  "Prince Obrien, OT      Row Name 05/08/20 0900             Lower Extremity Standing Therapeutic Exercise    Performed, Standing Lower Extremity (Therapeutic Exercise)  heel raises  -LB      Device, Standing Lower Extremity (Therapeutic Exercise)  marni bars;half foam roll;other (see comments) 4\" foam in semi-tandem, trampoline  -LB      Recorded by [LB] Preeti Reynaga, PT      Row Name 05/08/20 0900             Lower Extremity Seated Therapeutic Exercise    Performed, Seated Lower Extremity (Therapeutic Exercise)  LAQ (long arc quad), knee extension;ankle dorsiflexion/plantarflexion  -LB      Exercise Type, Seated Lower Extremity (Therapeutic Exercise)  AROM (active range of motion)  -LB      Sets/Reps Detail, Seated Lower Extremity (Therapeutic Exercise)  1/10  -LB      Recorded by [LB] Preeti Reynaga PT      Row Name 05/08/20 0900             Lower Extremity Supine Therapeutic Exercise    Performed, Supine Lower Extremity (Therapeutic Exercise)  SLR (straight leg raise)  -LB      Exercise Type, Supine Lower Extremity (Therapeutic Exercise)  AROM (active range of motion)  -LB      Sets/Reps Detail, Supine Lower Extremity (Therapeutic Exercise)  1/10 each LE   -LB      Recorded by [LB] Preeti Reynaga, PT      Row Name 05/08/20 0900             Vital Signs    O2 Delivery Pre Treatment  room air  -LB      O2 Delivery Intra Treatment  room air  -LB      O2 Delivery Post Treatment  room air  -LB      Recorded by [LB] Preeti Reynaga, PT      Row Name 05/08/20 1448 05/08/20 1149 05/08/20 0900       Positioning and Restraints    Pre-Treatment Position  in bed  -DN  in bed  -DN  sitting in chair/recliner  -LB    Post Treatment Position  wheelchair  -DN  wheelchair  -DN  bathroom  -LB    In Bed  sitting;with PT  -DN  sitting;call light within reach;exit alarm on;encouraged to call for assist  -DN  --    Bathroom  --  --  notified nsg;sitting;call light within reach;encouraged to call for assist  -LB    Recorded by [DN] " Prince Obrien OT [DN] Prince Obrien OT [LB] Preeti Reynaga, PT      User Key  (r) = Recorded By, (t) = Taken By, (c) = Cosigned By    Initials Name Effective Dates    Preeti Combs, PT 06/08/18 -     Prince Morfin OT 06/08/18 -            Physical Therapy Education                 Title: PT OT SLP Therapies (Done)     Topic: Physical Therapy (Done)     Point: Mobility training (Done)     Description:   Instruct learner(s) on safety and technique for assisting patient out of bed, chair or wheelchair.  Instruct in the proper use of assistive devices, such as walker, crutches, cane or brace.              Patient Friendly Description:   It's important to get you on your feet again, but we need to do so in a way that is safe for you. Falling has serious consequences, and your personal safety is the most important thing of all.        When it's time to get out of bed, one of us or a family member will sit next to you on the bed to give you support.     If your doctor or nurse tells you to use a walker, crutches, a cane, or a brace, be sure you use it every time you get out of bed, even if you think you don't need it.    Learning Progress Summary           Patient Acceptance, E,TB,D, DU,VU by LB at 5/8/2020 1140    Comment:  stairs with one rail sideways and then 2 rails forward, recommended OP PT    Acceptance, E,TB,D, VU,NR by EE at 5/7/2020 1223    Acceptance, E,TB, VU,NR by EE at 5/6/2020 0937    Acceptance, E, VU,NR by EE at 5/5/2020 1043    Acceptance, D, NR by LB at 5/4/2020 1522    Comment:  stairs with one rail and a quad tipped cane    Acceptance, E,TB,D, NR by LB at 5/4/2020 1420    Comment:  Stairs with one rail and quad tipped cane    Acceptance, TB,D,E, NR by LB at 5/4/2020 1148    Acceptance, E,TB, VU,NR by EE at 5/1/2020 1126    Acceptance, E,TB,D, NR by LB at 4/30/2020 1538    Comment:  Stairs with one rail and quad tipped cane.    Acceptance, E,TB,D, NR by LB at 4/27/2020 1616    Comment:   Stairs with one rail and HHA .    Acceptance, E, NR by LB at 4/25/2020 1352                   Point: Home exercise program (Done)     Description:   Instruct learner(s) on appropriate technique for monitoring, assisting and/or progressing patient with therapeutic exercises and activities.              Learning Progress Summary           Patient Acceptance, E,TB,D, VU,NR by EE at 5/7/2020 1223    Acceptance, E,TB, VU,NR by EE at 5/6/2020 0937    Acceptance, E, VU,NR by EE at 5/5/2020 1043    Acceptance, E,TB, VU,NR by EE at 5/1/2020 1126                   Point: Body mechanics (Done)     Description:   Instruct learner(s) on proper positioning and spine alignment for patient and/or caregiver during mobility tasks and/or exercises.              Learning Progress Summary           Patient Acceptance, E,TB,D, VU,NR by EE at 5/7/2020 1223    Acceptance, E,TB, VU,NR by EE at 5/6/2020 0937    Acceptance, E, VU,NR by EE at 5/5/2020 1043                   Point: Precautions (Done)     Description:   Instruct learner(s) on prescribed precautions during mobility and gait tasks              Learning Progress Summary           Patient Acceptance, E,TB,D, VU,NR by EE at 5/7/2020 1223    Acceptance, E,TB, VU,NR by EE at 5/6/2020 0937    Acceptance, E, VU,NR by EE at 5/5/2020 1043    Acceptance, E, VU by MD at 5/2/2020 1213    Acceptance, E, VU by MD at 5/1/2020 1229    Acceptance, E, VU by MD at 4/29/2020 0924    Acceptance, E, VU by MD at 4/28/2020 0927    Acceptance, E, VU by MD at 4/24/2020 0955    Acceptance, E, NR by MD at 4/23/2020 0912    Acceptance, E, VU by MD at 4/22/2020 0923    Acceptance, E, VU by MD at 4/21/2020 1202                               User Key     Initials Effective Dates Name Provider Type Discipline    LB 06/08/18 -  Preeti Reynaga, PT Physical Therapist PT    EE 04/03/18 -  Irlanda Wallis, PT Physical Therapist PT    MD 04/03/18 -  Serena Philip, PT Physical Therapist PT                  PT Recommendation  and Plan                 Outcome Measures     Row Name 05/07/20 1500             Jasso Balance Scale    Sitting to Standing  4  -EE      Standing Unsupported  3  -EE      Sitting with Back Unsupported but Feet Supported on Floor or on Stool  4  -EE      Standing to Sitting  4  -EE      Transfers  4  -EE      Standing Unsupported with Eyes Closed  3  -EE      Standing Unsupported with Feet Together  3  -EE      Reaching Forward with Outstretched Arm While Standing  2  -EE       Object From the Floor From a Standing Position  1  -EE      Turning to Look Behind Over Left and Right Shoulders While Standing  0  -EE      Turn 360 Degrees  1  -EE      Place Alternate Foot on Step or Stool While Standing Unsupported  1  -EE      Standing Unsupported with One Foot in Front  2  -EE      Standing on One Leg  1  -EE      Jasso Total Score  33  -EE         Functional Assessment    Outcome Measure Options  Jasso Balance  -EE        User Key  (r) = Recorded By, (t) = Taken By, (c) = Cosigned By    Initials Name Provider Type    Irlanda Duarte, PT Physical Therapist             Time Calculation:     PT Charges     Row Name 05/08/20 1532 05/08/20 1141          Time Calculation    Start Time  1330  -LB  0900  -LB     Stop Time  1400  -LB  1000  -LB     Time Calculation (min)  30 min  -LB  60 min  -LB     PT Received On  05/08/20  -LB  --     PT - Next Appointment  05/09/20  -LB  --     PT Goal Re-Cert Due Date  05/11/20  -LB  --        Time Calculation- PT    Total Timed Code Minutes- PT  --  60 minute(s)  -LB       User Key  (r) = Recorded By, (t) = Taken By, (c) = Cosigned By    Initials Name Provider Type    LB Preeti Reynaga, PT Physical Therapist          Therapy Charges for Today     Code Description Service Date Service Provider Modifiers Qty    [id number]  PT THER PROC EA 15 MIN 5/8/2020 Pretei Reynaga, PT GP 6            PT G-Codes  Outcome Measure Options: Jasso Balance  Jasso Total Score: 33  Patient was wearing a  face mask during this therapy encounter. Therapist used appropriate personal protective equipment including mask and gloves.  Mask used was standard procedure mask. Appropriate PPE was worn during the entire therapy session. Hand hygiene was completed before and after therapy session. Patient is not in enhanced droplet precautions.       Preeti Reynaga, PT  5/8/2020

## 2020-05-09 LAB
ALBUMIN SERPL-MCNC: 3.3 G/DL (ref 3.5–5.2)
ALBUMIN/GLOB SERPL: 0.9 G/DL
ALP SERPL-CCNC: 102 U/L (ref 39–117)
ALT SERPL W P-5'-P-CCNC: 148 U/L (ref 1–33)
ANION GAP SERPL CALCULATED.3IONS-SCNC: 12.7 MMOL/L (ref 5–15)
AST SERPL-CCNC: 181 U/L (ref 1–32)
BASOPHILS # BLD AUTO: 0.04 10*3/MM3 (ref 0–0.2)
BASOPHILS NFR BLD AUTO: 0.3 % (ref 0–1.5)
BILIRUB SERPL-MCNC: 0.6 MG/DL (ref 0.2–1.2)
BUN BLD-MCNC: 16 MG/DL (ref 6–20)
BUN/CREAT SERPL: 37.2 (ref 7–25)
CALCIUM SPEC-SCNC: 9.4 MG/DL (ref 8.6–10.5)
CHLORIDE SERPL-SCNC: 95 MMOL/L (ref 98–107)
CO2 SERPL-SCNC: 30.3 MMOL/L (ref 22–29)
CREAT BLD-MCNC: 0.43 MG/DL (ref 0.57–1)
DEPRECATED RDW RBC AUTO: 48.1 FL (ref 37–54)
EOSINOPHIL # BLD AUTO: 0.2 10*3/MM3 (ref 0–0.4)
EOSINOPHIL NFR BLD AUTO: 1.6 % (ref 0.3–6.2)
ERYTHROCYTE [DISTWIDTH] IN BLOOD BY AUTOMATED COUNT: 13.3 % (ref 12.3–15.4)
GFR SERPL CREATININE-BSD FRML MDRD: >150 ML/MIN/1.73
GLOBULIN UR ELPH-MCNC: 3.6 GM/DL
GLUCOSE BLD-MCNC: 127 MG/DL (ref 65–99)
GLUCOSE BLDC GLUCOMTR-MCNC: 112 MG/DL (ref 70–130)
GLUCOSE BLDC GLUCOMTR-MCNC: 146 MG/DL (ref 70–130)
GLUCOSE BLDC GLUCOMTR-MCNC: 91 MG/DL (ref 70–130)
HCT VFR BLD AUTO: 36.5 % (ref 34–46.6)
HGB BLD-MCNC: 12.5 G/DL (ref 12–15.9)
IMM GRANULOCYTES # BLD AUTO: 0.07 10*3/MM3 (ref 0–0.05)
IMM GRANULOCYTES NFR BLD AUTO: 0.6 % (ref 0–0.5)
LYMPHOCYTES # BLD AUTO: 2.11 10*3/MM3 (ref 0.7–3.1)
LYMPHOCYTES NFR BLD AUTO: 17.1 % (ref 19.6–45.3)
MCH RBC QN AUTO: 34.3 PG (ref 26.6–33)
MCHC RBC AUTO-ENTMCNC: 34.2 G/DL (ref 31.5–35.7)
MCV RBC AUTO: 100.3 FL (ref 79–97)
MONOCYTES # BLD AUTO: 0.86 10*3/MM3 (ref 0.1–0.9)
MONOCYTES NFR BLD AUTO: 7 % (ref 5–12)
NEUTROPHILS # BLD AUTO: 9.07 10*3/MM3 (ref 1.7–7)
NEUTROPHILS NFR BLD AUTO: 73.4 % (ref 42.7–76)
NRBC BLD AUTO-RTO: 0 /100 WBC (ref 0–0.2)
PLATELET # BLD AUTO: 328 10*3/MM3 (ref 140–450)
PMV BLD AUTO: 10.1 FL (ref 6–12)
POTASSIUM BLD-SCNC: 3.5 MMOL/L (ref 3.5–5.2)
PROT SERPL-MCNC: 6.9 G/DL (ref 6–8.5)
RBC # BLD AUTO: 3.64 10*6/MM3 (ref 3.77–5.28)
SODIUM BLD-SCNC: 138 MMOL/L (ref 136–145)
WBC NRBC COR # BLD: 12.35 10*3/MM3 (ref 3.4–10.8)

## 2020-05-09 PROCEDURE — 80053 COMPREHEN METABOLIC PANEL: CPT | Performed by: PHYSICAL MEDICINE & REHABILITATION

## 2020-05-09 PROCEDURE — 63710000001 PREDNISONE PER 5 MG: Performed by: PHYSICAL MEDICINE & REHABILITATION

## 2020-05-09 PROCEDURE — 63710000001 INSULIN LISPRO (HUMAN) PER 5 UNITS: Performed by: INTERNAL MEDICINE

## 2020-05-09 PROCEDURE — 97110 THERAPEUTIC EXERCISES: CPT

## 2020-05-09 PROCEDURE — 82962 GLUCOSE BLOOD TEST: CPT

## 2020-05-09 PROCEDURE — 97112 NEUROMUSCULAR REEDUCATION: CPT

## 2020-05-09 PROCEDURE — 97116 GAIT TRAINING THERAPY: CPT

## 2020-05-09 PROCEDURE — 94799 UNLISTED PULMONARY SVC/PX: CPT

## 2020-05-09 PROCEDURE — 85025 COMPLETE CBC W/AUTO DIFF WBC: CPT | Performed by: PHYSICAL MEDICINE & REHABILITATION

## 2020-05-09 PROCEDURE — 63710000001 INSULIN GLARGINE PER 5 UNITS: Performed by: INTERNAL MEDICINE

## 2020-05-09 PROCEDURE — 63710000001 DIPHENHYDRAMINE PER 50 MG: Performed by: INTERNAL MEDICINE

## 2020-05-09 RX ORDER — IBUPROFEN 600 MG/1
600 TABLET ORAL EVERY 6 HOURS PRN
Status: DISCONTINUED | OUTPATIENT
Start: 2020-05-09 | End: 2020-05-12 | Stop reason: HOSPADM

## 2020-05-09 RX ADMIN — SUCRALFATE 1 G: 1 SUSPENSION ORAL at 12:11

## 2020-05-09 RX ADMIN — SUCRALFATE 1 G: 1 SUSPENSION ORAL at 16:09

## 2020-05-09 RX ADMIN — INSULIN LISPRO 14 UNITS: 100 INJECTION, SOLUTION INTRAVENOUS; SUBCUTANEOUS at 08:13

## 2020-05-09 RX ADMIN — HYDROCHLOROTHIAZIDE 25 MG: 25 TABLET ORAL at 08:23

## 2020-05-09 RX ADMIN — OXYCODONE HYDROCHLORIDE 5 MG: 5 TABLET ORAL at 22:12

## 2020-05-09 RX ADMIN — LEVOTHYROXINE SODIUM 200 MCG: 100 TABLET ORAL at 06:04

## 2020-05-09 RX ADMIN — SUCRALFATE 1 G: 1 SUSPENSION ORAL at 21:08

## 2020-05-09 RX ADMIN — INSULIN LISPRO 14 UNITS: 100 INJECTION, SOLUTION INTRAVENOUS; SUBCUTANEOUS at 12:11

## 2020-05-09 RX ADMIN — INSULIN LISPRO 14 UNITS: 100 INJECTION, SOLUTION INTRAVENOUS; SUBCUTANEOUS at 16:50

## 2020-05-09 RX ADMIN — PREDNISONE 5 MG: 5 TABLET ORAL at 08:23

## 2020-05-09 RX ADMIN — POLYETHYLENE GLYCOL 3350 17 G: 17 POWDER, FOR SOLUTION ORAL at 08:23

## 2020-05-09 RX ADMIN — DULOXETINE HYDROCHLORIDE 60 MG: 60 CAPSULE, DELAYED RELEASE ORAL at 08:23

## 2020-05-09 RX ADMIN — BUDESONIDE AND FORMOTEROL FUMARATE DIHYDRATE 2 PUFF: 160; 4.5 AEROSOL RESPIRATORY (INHALATION) at 19:37

## 2020-05-09 RX ADMIN — ROPINIROLE HYDROCHLORIDE 0.5 MG: 0.5 TABLET, FILM COATED ORAL at 21:08

## 2020-05-09 RX ADMIN — AMLODIPINE BESYLATE 10 MG: 10 TABLET ORAL at 21:07

## 2020-05-09 RX ADMIN — FOLIC ACID 1 MG: 1 TABLET ORAL at 08:23

## 2020-05-09 RX ADMIN — IBUPROFEN 600 MG: 600 TABLET, FILM COATED ORAL at 21:07

## 2020-05-09 RX ADMIN — PANTOPRAZOLE SODIUM 40 MG: 40 TABLET, DELAYED RELEASE ORAL at 06:04

## 2020-05-09 RX ADMIN — PREGABALIN 100 MG: 100 CAPSULE ORAL at 08:13

## 2020-05-09 RX ADMIN — INSULIN GLARGINE 20 UNITS: 100 INJECTION, SOLUTION SUBCUTANEOUS at 08:12

## 2020-05-09 RX ADMIN — Medication 3 MG: at 22:12

## 2020-05-09 RX ADMIN — DIPHENHYDRAMINE HYDROCHLORIDE 25 MG: 25 CAPSULE ORAL at 22:13

## 2020-05-09 RX ADMIN — OXYCODONE HYDROCHLORIDE 5 MG: 5 TABLET ORAL at 08:23

## 2020-05-09 RX ADMIN — PREGABALIN 150 MG: 100 CAPSULE ORAL at 21:00

## 2020-05-09 RX ADMIN — PANTOPRAZOLE SODIUM 40 MG: 40 TABLET, DELAYED RELEASE ORAL at 16:08

## 2020-05-09 RX ADMIN — DIPHENHYDRAMINE HYDROCHLORIDE 25 MG: 25 CAPSULE ORAL at 08:23

## 2020-05-09 RX ADMIN — SUCRALFATE 1 G: 1 SUSPENSION ORAL at 06:04

## 2020-05-09 RX ADMIN — DIPHENHYDRAMINE HYDROCHLORIDE 25 MG: 25 CAPSULE ORAL at 03:25

## 2020-05-09 NOTE — PLAN OF CARE
Problem: Patient Care Overview  Goal: Plan of Care Review  Outcome: Ongoing (interventions implemented as appropriate)  Flowsheets (Taken 5/9/2020 1430)  Outcome Summary: Pain in hands worse. new order for ibuprofen. Did not sleep well tx x 1. AAO x 4 cont B&B.  Progress, Functional Goals: demonstrating adequate progress  Plan of Care Reviewed With: patient  IRF Plan of Care Review: progress ongoing, continue

## 2020-05-09 NOTE — PLAN OF CARE
Problem: Fall Risk (Adult)  Goal: Absence of Fall  Outcome: Ongoing (interventions implemented as appropriate)     Problem: Skin Injury Risk (Adult)  Goal: Skin Health and Integrity  Outcome: Ongoing (interventions implemented as appropriate)     Problem: Pain, Acute (Adult)  Goal: Acceptable Pain Control/Comfort Level  Outcome: Ongoing (interventions implemented as appropriate)     Problem: Patient Care Overview  Goal: Plan of Care Review  Outcome: Ongoing (interventions implemented as appropriate)  Flowsheets (Taken 5/9/2020 9898)  Outcome Summary: Slept fair. Oxyco given x1 for hand pain, with relief. Melatonin given for sleep, Benadryl given prn for itching as requested. No unsafe behaviors. Continent of B&B. Meds whole with water.

## 2020-05-09 NOTE — PROGRESS NOTES
Occupational Therapy: Branch    Physical Therapy: Individual: 45 minutes.    Speech Language Pathology:  Branch    Signed by: Irlanda Wallis DPT

## 2020-05-09 NOTE — PROGRESS NOTES
Inpatient Rehabilitation Plan of Care Note    Plan of Care  Care Plan Reviewed - No updates at this time.    Safety    Performed Intervention(s)  Bed alarm and /or chair alarm  Safety rounds and items within reach      Psychosocial    Performed Intervention(s)  Allow the opportunity to verbalize needs and concerns      Body Systems    Performed Intervention(s)  Monitor labs and medication as ordered  Blood sugar testing- TID      Pain    Performed Intervention(s)  Medication as ordered    Signed by: Luis Alford RN

## 2020-05-09 NOTE — PROGRESS NOTES
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  Recalls similar sensation in left arm and hands prior to cymbalta and meds.  Unsure why the discomfort and ache has recurred in her hands.  Has required ibuprofen in past for pain in hands, but states this feels different.  No change in sensation in hands.  Bilateral legs - no change in sensation and continues to feel her legs and feet are getting stronger.  No vision changes, no f/c/n/v.    History taken from: patient    Objective     Vital Signs  Temp:  [97 °F (36.1 °C)-98 °F (36.7 °C)] 97 °F (36.1 °C)  Heart Rate:  [88-99] 99  Resp:  [18] 18  BP: (102-135)/(70-74) 102/70    Objective     Gen - nad, lying in bed sleeping soundly, but easily awakened  HEENT- NCAT, PUPILS EQUALLY ROUND, SCLERAE ANICTERIC, CONJUNCTIVAE PINK, OP MOIST, NO JVD, EARS UNREMARKABLE EXTERNALLY  LUNGS -normal respirations. equal chest rise, ctab no w/r/c  CV - rrr no m/r/g   EXT - NO EDEMA OR CYANOSIS   Skin - small bruise on left forearm, no warmth  NEURO -   A/ox4, speech is fluent, follows all commands  MOTOR EXAM -JONES x4, symmetric muscle bulk  Antigravity BUE strength  No red or swollen joints in hands or wrists, able to flex and extend fingers but slowly due to discomfort   strength - reduced to 3/5 in bilateral hands  Psych - appropriate  mood and affect    Results Review:     I reviewed the patient's new clinical results.     Results for SENTHIL SMITH (MRN [id number]) as of 5/9/2020 08:27   Ref. Range 5/8/2020 07:11 5/8/2020 11:38 5/8/2020 16:01 5/9/2020 05:48 5/9/2020 07:06   Glucose Latest Ref Range: 70 - 130 mg/dL 150 (H) 149 (H) 110 127 (H) 146 (H)     Results from last 7 days   Lab Units 05/09/20  0548   SODIUM mmol/L 138   POTASSIUM mmol/L 3.5   CHLORIDE mmol/L 95*   CO2 mmol/L 30.3*   BUN mg/dL 16   CREATININE mg/dL 0.43*   CALCIUM mg/dL 9.4   BILIRUBIN mg/dL 0.6   ALK PHOS U/L 102   ALT (SGPT) U/L 148*   AST (SGOT) U/L 181*   GLUCOSE mg/dL 127*     Results from last 7 days   Lab Units 05/09/20  0548   WBC 10*3/mm3 12.35*   HEMOGLOBIN g/dL 12.5   HEMATOCRIT % 36.5   PLATELETS 10*3/mm3 328       Results for SENTHIL SMITH (MRN [id number]) as of 5/9/2020 08:27   Ref. Range 4/29/2020 04:00   Glucose Latest Ref Range: 65 - 99 mg/dL 106 (H)   Sodium Latest Ref Range: 136 - 145 mmol/L 139   Potassium Latest Ref Range: 3.5 - 5.2 mmol/L 4.1   CO2 Latest Ref Range: 22.0 - 29.0 mmol/L 31.9 (H)   Chloride Latest Ref Range: 98 - 107 mmol/L 95 (L)   Anion Gap Latest Ref Range: 5.0 - 15.0 mmol/L 12.1   Creatinine Latest Ref Range: 0.57 - 1.00 mg/dL 0.51 (L)   BUN Latest Ref Range: 6 - 20 mg/dL 15   BUN/Creatinine Ratio Latest Ref Range: 7.0 - 25.0  29.4 (H)   Calcium Latest Ref Range: 8.6 - 10.5 mg/dL 9.6   eGFR Non  Am Latest Ref Range: >60 mL/min/1.73 130   Alkaline Phosphatase Latest Ref Range: 39 - 117 U/L 109   Total Protein Latest Ref Range: 6.0 - 8.5 g/dL 7.1   ALT (SGPT) Latest Ref Range: 1 - 33 U/L 233 (H)   AST (SGOT) Latest Ref Range: 1 - 32 U/L 278 (H)   Total Bilirubin Latest Ref Range: 0.2 - 1.2 mg/dL 0.6   Albumin Latest Ref Range: 3.50 - 5.20 g/dL 3.20 (L)   Globulin Latest Units: gm/dL 3.9   A/G Ratio Latest Units: g/dL 0.8         Medication Review:   Scheduled Meds:  amLODIPine 10 mg Oral Nightly   budesonide-formoterol 2  puff Inhalation BID - RT   DULoxetine 60 mg Oral Daily   folic acid 1 mg Oral Daily   hydroCHLOROthiazide Oral 25 mg Oral Daily   insulin glargine 20 Units Subcutaneous QAM   insulin lispro 0-14 Units Subcutaneous TID AC   insulin lispro 14 Units Subcutaneous TID With Meals   levothyroxine 200 mcg Oral Q AM   pantoprazole 40 mg Oral BID AC   pregabalin 150 mg Oral Q12H   rOPINIRole 0.5 mg Oral Nightly   sucralfate 1 g Oral 4x Daily AC & at Bedtime     Continuous Infusions:   PRN Meds:.Benzocaine  •  benzonatate  •  calcium carbonate  •  dextrose  •  dextrose  •  diphenhydrAMINE  •  diphenhydrAMINE  •  glucagon (human recombinant)  •  guaiFENesin-dextromethorphan  •  ibuprofen  •  melatonin  •  ondansetron  •  oxyCODONE  •  oxyCODONE  •  polyethylene glycol  •  potassium chloride **OR** potassium chloride **OR** potassium chloride  •  sodium chloride      Assessment/Plan       YOUNG (nonalcoholic steatohepatitis)    Hypothyroid    Type 2 diabetes mellitus (CMS/HCC)    GBS (Guillain [place] syndrome) (CMS/HCC)    Elevated transaminase level    History of gestational diabetes    Steroid-induced hyperglycemia    Elevated aldolase level      Assessment & Plan     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  This is far greater than expected based on the nerve conduction findings.  This suggests either motor neuron disease or a primarily axonal polyneuropathy such as axonal Guillain Barré syndrome based on the patient's symptom history. F/U Dr Juventino Gaytan in 2-3 months.     Previous question of dermatomyositis or autoimmune process-trial of steroids-tapering off  April 27-has been on prednisone 40 mg daily since April 10 empiric trial for previous question of dermatomyositis but now not felt clinically  present-taper off of prednisone 30 mg daily x3 days, 20 mg daily x3 days, 10 mg daily x3 days, 5 mg daily x3 days, then stop.  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 Also on oxycodone.  April 27-has some pain in the upper extremities.  Doubt that it is bilateral joint pain as she has been on a reasonable dose of prednisone for the last 16 days.  May be a variant for the neuropathic pain.  Will titrate up on Lyrica 200 mg every 12 hours, watch for any myoclonic jerks.  May 1- taking oxycodone 5 mg about 5 times a day, need to taper off over next week, changed to Oxycodone 5 mg q 6 hours prn x 3 days, then q 8 hours prn x 3 days, then q 12 hours prn x three days, then stop. SVETLANA reviewed.   May 2-add on Cymbalta 30 mg daily for 7 days and increase to 60 mg daily.  Discussed other options of titrating up on Lyrica or switching to gabapentin.  May 4 - Cymbalta is helping  May 6 - increase to 60mg in a few days     DVT prophylaxis - SCDs        Hepatic steatosis-elevated LFTs  April 27-gastroenterology following  April 29 - reviewed with gastroenterology - a combination of fatty liver (the treatment would be weight loss) and Ebstein Horan viral hepatitis (the only treatment would be supportive care)- recommend against a liver biopsy. Follow up in three months with repeat labs and full colonscopy.  May 5 - EBV labs reviewed, and will need outpatient GI f/up for hepatic steatosis  May 8 - recheck AM labs     Restless leg syndrome -   May 4 - Will trial Requip  May 6 - will  increase Requip dosage and see if helps sleep.      Joint pain -   May 8 - patient reports arthritic pain in hands that ibuprofen has helped in past.  Will hold on adding ibuprofen until steroids weaned.   May 9 - question if steroid taper is contributing to joint pain in hands. Now on increased cymbalta dosage as well.  Will check ESR and CRP in AM.  Add ibuprofen prn with steroids discontinuing.     Sleep disturbance  May 6 - reviewed meds with pharmacy.  Will trial increase of Requip and steroids could be contributing but are currently being tapered.  May 7 - slept better last night     May 7 - Plan is for patient to go to mother's home at discharge.  Patient feels comfortable with this plan.     April 27-gait 80 feet CTG-min assist.  Team conference in the a.m.     TEAM CONF - April 28- BED CTG. 4 STAIRS MIN. GAIT 100 FEET CTG-MIN RW. TOILET TRANSFERS CTG. SHOWER TRANSFERS CTG.  UBD MIN ASSIST FOR BRA. LBD CTG. BATH CTG. GROOMING SBA.  TOILETING MOD INDEPENDENT. CONTINENT BOWEL AND BLADDER.   DIETICIAN EDUCATED ON DIABETIC DIET ON STEROIDS BUT PATIENT RESISTANT TO INSTRUCTION.   NEEDS TO BE MODIFIED INDEPENDENT FOR HOME  ELOS- TWO WEEKS     TEAM CONF - May 5 -   Bed/Chair/Wc SBA,   Stairs 4 with CGa/MIN A with 1 HR and Quad tipped cane  Amb 160ft CGa RWx, Min A with a cane  Toilet Tx SBA RWx, Tub/Shower Tx CGa RWx  Bathing SBA, LBD SBA, UBD Min A with bra, grooming SBA seated  Toileting SBA  Continent of bowel and bladder  2L O2 during night (h/o ERLIN with CPAP at home)  ELOS - 1 week home with assistance vs SNF  Discussed at length with patient this option of disposition.  Patient feels she does not have anyone to assist her.  Will look into SNF facility as she still requires assistance and would benefit from further inpatient rehab.     During rounds, used appropriate personal protective equipment including mask and gloves.  Mask used was standard procedure mask. Appropriate PPE was worn during the entire  visit.  Hand hygiene was completed before and after.       Millie Cm MD  05/09/20  08:26    Time: 30min

## 2020-05-09 NOTE — THERAPY TREATMENT NOTE
Inpatient Rehabilitation - Physical Therapy Treatment Note  [facility]     Patient Name: Oralia Sánchez  : 1975  MRN: [id number]    Today's Date: 2020                 Admit Date: 2020      Visit Dx:    No diagnosis found.    Patient Active Problem List   Diagnosis   • Vitamin D deficiency   • Awareness of heartbeats   • Adiposity   • YOUNG (nonalcoholic steatohepatitis)   • Hypothyroid   • Diabetes mellitus arising in pregnancy   • Diabetes (CMS/HCC)   • Metabolic syndrome   • Chest pain   • Primary thunderclap headache   • Elevated LFTs   • Tobacco abuse   • Rheumatoid arthritis (CMS/HCC)   • Type 2 diabetes mellitus (CMS/HCC)   • Morbid obesity (CMS/HCC)   • UTI (urinary tract infection), bacterial   • Cerebral aneurysm   • Dyspnea   • Cough   • Hypomagnesemia   • Metabolic acidosis   • Fatigue   • History of COPD   • Abnormal CT scan, colon   • 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)   • Essential hypertension   • GBS (Guillain [place] syndrome) (CMS/HCC)   • Elevated transaminase level   • History of gestational diabetes   • Steroid-induced hyperglycemia   • Elevated aldolase level       Therapy Treatment    IRF Treatment Summary     Row Name 20 1240             Evaluation/Treatment Time and Intent    Subjective Information  complains of;pain  -EE      Existing Precautions/Restrictions  fall  -EE      Document Type  therapy note (daily note)  -EE      Patient/Family Observations  Pt up in room with nsg assistant upon therapist arrival.   -EE      Recorded by [EE] Irlanda Wallis, PT      Row Name 20 1240             Cognition/Psychosocial- PT/OT    Affect/Mental Status (Cognitive)  WNL  -EE      Orientation Status (Cognition)  oriented x 4  -EE      Follows Commands (Cognition)  WNL  -EE      Personal Safety Interventions  fall prevention program maintained;gait belt;muscle strengthening facilitated;nonskid shoes/slippers when out of bed;supervised activity  -EE      Recorded by  [EE] Irlanda Wallis, PT      Row Name 05/09/20 1240             Bed Mobility Assessment/Treatment    Comment (Bed Mobility)  up in chair  -EE      Recorded by [EE] Irlanda Wallis, PT      Row Name 05/09/20 1240             Sit-Stand Transfer    Sit-Stand [place] (Transfers)  stand by assist  -EE      Assistive Device (Sit-Stand Transfers)  walker, front-wheeled  -EE      Recorded by [EE] Irlanda Wallis, PT      Row Name 05/09/20 1240             Stand-Sit Transfer    Stand-Sit [place] (Transfers)  stand by assist  -EE      Assistive Device (Stand-Sit Transfers)  walker, front-wheeled  -EE      Recorded by [EE] Irlanda Wallis, PT      Row Name 05/09/20 1240             Gait/Stairs Assessment/Training    [place] Level (Gait)  stand by assist;verbal cues  -EE      Assistive Device (Gait)  walker, front-wheeled  -EE      Distance in Feet (Gait)  200', 160' x2  -EE      Pattern (Gait)  step-through  -EE      Deviations/Abnormal Patterns (Gait)  quang decreased;stride length decreased  -EE      Bilateral Gait Deviations  weight shift ability decreased  -EE      [place] Level (Stairs)  contact guard  -EE      Handrail Location (Stairs)  both sides  -EE      Number of Steps (Stairs)  4  -EE      Ascending Technique (Stairs)  step-over-step  -EE      Descending Technique (Stairs)  step-over-step  -EE      Stairs, Safety Issues  balance decreased during turns;weight-shifting ability decreased  -EE      Stairs, Impairments  strength decreased;impaired balance;sensation decreased  -EE      Recorded by [EE] Irlanda Wallis, PT      Row Name 05/09/20 1240             Safety Issues, Functional Mobility    Impairments Affecting Function (Mobility)  balance;endurance/activity tolerance;strength;sensation/sensory awareness  -EE      Recorded by [EE] Irlanda Wallis, PT      Row Name 05/09/20 1240             Step Over Obstacle (Mobility)    [place], Stepping Over Obstacles (Mobility)  contact guard;verbal cues  -EE       Comment, Stepping Over Obstacles (Mobility)  Stepping over low hurdles 2 x 8' both forward and laterally; one UE supported on hemibars for balance. Able to clear with 100% accuracy.  -EE      Recorded by [EE] Irlanda Wallis PT      Row Name 05/09/20 1400             Hand  Strength Testing    Right Hand, Setting 2 (Dynamometer Testing)  10  -DN      Left Hand, Setting 2 (Dynamometer Testing)  5  -DN      Right Hand: Lateral (Key) Pinch Strength (Pinch Dynamometer Testing)  2  -DN      Right Hand: [place] (Robert) Pinch Strength (Pinch Dynamometer Testing)  2  -DN      Left Hand: Lateral (Key) Pinch Strength (Pinch Dynamometer Testing)  2  -DN      Left Hand: [place] (Robert) Pinch Strength (Pinch Dynamometer Testing)  2  -DN      Recorded by [DN] Prince Obrien OT      Row Name 05/09/20 1240             Pain Scale: Numbers Pre/Post-Treatment    Pain Scale: Numbers, Pretreatment  7/10  -EE      Pain Scale: Numbers, Post-Treatment  7/10  -EE      Pain Location - Side  Bilateral  -EE      Pain Location - Orientation  distal  -EE      Pain Location  hand  -EE      Pain Intervention(s)  Repositioned;Medication (See MAR);Distraction  -EE      Recorded by [EE] Irlanda Wallis PT      Row Name 05/09/20 1240             Dynamic Balance Activity    Comment (Dynamic Balance Training)  B LE alt step taps to 6' step x 20 reps; no UE support; CGA/min A for balance  -EE      Recorded by [EE] Irlanda Wallis, AVINASH      Row Name 05/09/20 1240             Lower Extremity Standing Therapeutic Exercise    Performed, Standing Lower Extremity (Therapeutic Exercise)  heel raises;mini-squats;knee flexion/extension;hip abduction/adduction  -EE      Device, Standing Lower Extremity (Therapeutic Exercise)  marni bars  -EE      Exercise Type, Standing Lower Extremity (Therapeutic Exercise)  AROM (active range of motion)  -EE      Sets/Reps Detail, Standing Lower Extremity (Therapeutic Exercise)  1/10  -EE      Recorded by [EE] Irlanda Wallis,  PT      Row Name 05/09/20 1240             Positioning and Restraints    Pre-Treatment Position  standing in room  -EE      Post Treatment Position  wheelchair  -EE      In Wheelchair  sitting;call light within reach;encouraged to call for assist;exit alarm on;notified nsg  -EE      Recorded by [EE] Irlanda Wallis PT        User Key  (r) = Recorded By, (t) = Taken By, (c) = Cosigned By    Initials Name Effective Dates    DN Prince Obrien, OT 06/08/18 -     EE Irlanda Wallis PT 04/03/18 -            Physical Therapy Education                 Title: PT OT SLP Therapies (Done)     Topic: Physical Therapy (Done)     Point: Mobility training (Done)     Description:   Instruct learner(s) on safety and technique for assisting patient out of bed, chair or wheelchair.  Instruct in the proper use of assistive devices, such as walker, crutches, cane or brace.              Patient Friendly Description:   It's important to get you on your feet again, but we need to do so in a way that is safe for you. Falling has serious consequences, and your personal safety is the most important thing of all.        When it's time to get out of bed, one of us or a family member will sit next to you on the bed to give you support.     If your doctor or nurse tells you to use a walker, crutches, a cane, or a brace, be sure you use it every time you get out of bed, even if you think you don't need it.    Learning Progress Summary           Patient Acceptance, E,TB, VU,NR by EE at 5/9/2020 1423    Acceptance, E,TB,D, DU,VU by LB at 5/8/2020 1140    Comment:  stairs with one rail sideways and then 2 rails forward, recommended OP PT    Acceptance, E,TB,D, VU,NR by EE at 5/7/2020 1223    Acceptance, E,TB, VU,NR by EE at 5/6/2020 0937    Acceptance, E, VU,NR by EE at 5/5/2020 1043    Acceptance, D, NR by LB at 5/4/2020 1522    Comment:  stairs with one rail and a quad tipped cane    Acceptance, E,TB,D, NR by LB at 5/4/2020 1420    Comment:  Stairs with  one rail and quad tipped cane    Acceptance, TB,D,E, NR by LB at 5/4/2020 1148    Acceptance, E,TB, VU,NR by EE at 5/1/2020 1126    Acceptance, E,TB,D, NR by LB at 4/30/2020 1538    Comment:  Stairs with one rail and quad tipped cane.    Acceptance, E,TB,D, NR by LB at 4/27/2020 1616    Comment:  Stairs with one rail and HHA .    Acceptance, E, NR by LB at 4/25/2020 1352                   Point: Home exercise program (Done)     Description:   Instruct learner(s) on appropriate technique for monitoring, assisting and/or progressing patient with therapeutic exercises and activities.              Learning Progress Summary           Patient Acceptance, E,TB, VU,NR by EE at 5/9/2020 1423    Acceptance, E,TB,D, VU,NR by EE at 5/7/2020 1223    Acceptance, E,TB, VU,NR by EE at 5/6/2020 0937    Acceptance, E, VU,NR by EE at 5/5/2020 1043    Acceptance, E,TB, VU,NR by EE at 5/1/2020 1126                   Point: Body mechanics (Done)     Description:   Instruct learner(s) on proper positioning and spine alignment for patient and/or caregiver during mobility tasks and/or exercises.              Learning Progress Summary           Patient Acceptance, E,TB, VU,NR by EE at 5/9/2020 1423    Acceptance, E,TB,D, VU,NR by EE at 5/7/2020 1223    Acceptance, E,TB, VU,NR by EE at 5/6/2020 0937    Acceptance, E, VU,NR by EE at 5/5/2020 1043                   Point: Precautions (Done)     Description:   Instruct learner(s) on prescribed precautions during mobility and gait tasks              Learning Progress Summary           Patient Acceptance, E,TB, VU,NR by EE at 5/9/2020 1423    Acceptance, E,TB,D, VU,NR by EE at 5/7/2020 1223    Acceptance, E,TB, VU,NR by EE at 5/6/2020 0937    Acceptance, E, VU,NR by EE at 5/5/2020 1043    Acceptance, E, VU by MD at 5/2/2020 1213    Acceptance, E, VU by MD at 5/1/2020 1229    Acceptance, E, VU by MD at 4/29/2020 0924    Acceptance, E, VU by MD at 4/28/2020 0927    Acceptance, E, VU by MD at  4/24/2020 0955    Acceptance, E, NR by MD at 4/23/2020 0912    Acceptance, E, VU by MD at 4/22/2020 0923    Acceptance, E, VU by MD at 4/21/2020 1202                               User Key     Initials Effective Dates Name Provider Type Discipline    LB 06/08/18 -  Preeti Reynaga, PT Physical Therapist PT    EE 04/03/18 -  Irlanda Wallis, PT Physical Therapist PT    MD 04/03/18 -  Serena Philip PT Physical Therapist PT                  PT Recommendation and Plan                 Outcome Measures     Row Name 05/07/20 1500             Jasso Balance Scale    Sitting to Standing  4  -EE      Standing Unsupported  3  -EE      Sitting with Back Unsupported but Feet Supported on Floor or on Stool  4  -EE      Standing to Sitting  4  -EE      Transfers  4  -EE      Standing Unsupported with Eyes Closed  3  -EE      Standing Unsupported with Feet Together  3  -EE      Reaching Forward with Outstretched Arm While Standing  2  -EE       Object From the Floor From a Standing Position  1  -EE      Turning to Look Behind Over Left and Right Shoulders While Standing  0  -EE      Turn 360 Degrees  1  -EE      Place Alternate Foot on Step or Stool While Standing Unsupported  1  -EE      Standing Unsupported with One Foot in Front  2  -EE      Standing on One Leg  1  -EE      Jasso Total Score  33  -EE         Functional Assessment    Outcome Measure Options  Jasso Balance  -EE        User Key  (r) = Recorded By, (t) = Taken By, (c) = Cosigned By    Initials Name Provider Type    EE Irlanda Wallis, PT Physical Therapist             Time Calculation:     PT Charges     Row Name 05/09/20 1423             Time Calculation    Start Time  1230  -EE      Stop Time  1315  -EE      Time Calculation (min)  45 min  -EE      PT Received On  05/09/20  -EE      PT - Next Appointment  05/11/20  -EE         Time Calculation- PT    Total Timed Code Minutes- PT  45 minute(s)  -EE        User Key  (r) = Recorded By, (t) = Taken By, (c) = Cosigned By     Initials Name Provider Type     Irlanda Wallis, PT Physical Therapist          Therapy Charges for Today     Code Description Service Date Service Provider Modifiers Qty    43160738459 HC GAIT TRAINING EA 15 MIN 5/9/2020 Irlanda Wallis, PT GP 1    60947502405 HC PT THER PROC EA 15 MIN 5/9/2020 Irlanda Wallis, PT GP 1    03513794005 HC PT NEUROMUSC RE EDUCATION EA 15 MIN 5/9/2020 Irlanda Wallis, PT GP 1            PT G-Codes  Outcome Measure Options: Jasso Balance  Jasso Total Score: 33    Patient was wearing a face mask during this therapy encounter. Therapist used appropriate personal protective equipment including mask and gloves.  Mask used was standard procedure mask. Appropriate PPE was worn during the entire therapy session. Hand hygiene was completed before and after therapy session. Patient is not in enhanced droplet precautions.     Irlanda Wallis PT  5/9/2020

## 2020-05-09 NOTE — PLAN OF CARE
Spoke with MD about decreasing strength in BUE hands yesterday after testing, MD wanted to repeat today; RUE ; 10#, lateral pinch 2#, 3 pt 5#, LUE  5#, lateral pinch 2#, 3 point pinch 2#.

## 2020-05-09 NOTE — THERAPY TREATMENT NOTE
Inpatient Rehabilitation - Physical Therapy Treatment Note  [facility]     Patient Name: Oralia Sánchez  : 1975  MRN: [id number]    Today's Date: 2020                 Admit Date: 2020      Visit Dx:    No diagnosis found.    Patient Active Problem List   Diagnosis   • Vitamin D deficiency   • Awareness of heartbeats   • Adiposity   • YOUNG (nonalcoholic steatohepatitis)   • Hypothyroid   • Diabetes mellitus arising in pregnancy   • Diabetes (CMS/HCC)   • Metabolic syndrome   • Chest pain   • Primary thunderclap headache   • Elevated LFTs   • Tobacco abuse   • Rheumatoid arthritis (CMS/HCC)   • Type 2 diabetes mellitus (CMS/HCC)   • Morbid obesity (CMS/HCC)   • UTI (urinary tract infection), bacterial   • Cerebral aneurysm   • Dyspnea   • Cough   • Hypomagnesemia   • Metabolic acidosis   • Fatigue   • History of COPD   • Abnormal CT scan, colon   • 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)   • Essential hypertension   • 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   -EE      Recorded by [EE] Irlanda Wallis, PT      Row Name 20 1240             Cognition/Psychosocial- PT/OT    Affect/Mental Status (Cognitive)  WNL  -EE      Orientation Status (Cognition)  oriented x 4  -EE      Follows Commands (Cognition)  WNL  -EE      Personal Safety Interventions  fall prevention program maintained;gait belt;muscle strengthening facilitated;nonskid  shoes/slippers when out of bed;supervised activity  -EE      Recorded by [EE] Irlanda Wallis, PT      Row Name 05/09/20 1240             Bed Mobility Assessment/Treatment    Comment (Bed Mobility)  up in chair  -EE      Recorded by [EE] Irlanda Wallis, PT      Row Name 05/09/20 1240             Sit-Stand Transfer    Sit-Stand [place] (Transfers)  stand by assist  -EE      Assistive Device (Sit-Stand Transfers)  walker, front-wheeled  -EE      Recorded by [EE] Irlanda Wallis, PT      Row Name 05/09/20 1240             Stand-Sit Transfer    Stand-Sit [place] (Transfers)  stand by assist  -EE      Assistive Device (Stand-Sit Transfers)  walker, front-wheeled  -EE      Recorded by [EE] Irlanda Wallis, PT      Row Name 05/09/20 1240             Gait/Stairs Assessment/Training    [place] Level (Gait)  stand by assist;verbal cues  -EE      Assistive Device (Gait)  walker, front-wheeled  -EE      Distance in Feet (Gait)  200', 160' x2  -EE      Pattern (Gait)  step-through  -EE      Deviations/Abnormal Patterns (Gait)  quang decreased;stride length decreased  -EE      Bilateral Gait Deviations  weight shift ability decreased  -EE      [place] Level (Stairs)  contact guard  -EE      Handrail Location (Stairs)  both sides  -EE      Number of Steps (Stairs)  4  -EE      Ascending Technique (Stairs)  step-over-step  -EE      Descending Technique (Stairs)  step-over-step  -EE      Stairs, Safety Issues  balance decreased during turns;weight-shifting ability decreased  -EE      Stairs, Impairments  strength decreased;impaired balance;sensation decreased  -EE      Recorded by [EE] Irlanda Wallis, PT      Row Name 05/09/20 1240             Safety Issues, Functional Mobility    Impairments Affecting Function (Mobility)  balance;endurance/activity tolerance;strength;sensation/sensory awareness  -EE      Recorded by [EE] Irlanda Wallis, PT      Row Name 05/09/20 1240             Step Over Obstacle (Mobility)    [place],  Stepping Over Obstacles (Mobility)  contact guard;verbal cues  -EE      Comment, Stepping Over Obstacles (Mobility)  Stepping over low hurdles 2 x 8' both forward and laterally; one UE supported on hemibars for balance. Able to clear with 100% accuracy.  -EE      Recorded by [EE] Irlanda Wallis PT      Row Name 05/09/20 1400             Hand  Strength Testing    Right Hand, Setting 2 (Dynamometer Testing)  10  -DN      Left Hand, Setting 2 (Dynamometer Testing)  5  -DN      Right Hand: Lateral (Key) Pinch Strength (Pinch Dynamometer Testing)  2  -DN      Right Hand: Three Point (Robert) Pinch Strength (Pinch Dynamometer Testing)  2  -DN      Left Hand: Lateral (Key) Pinch Strength (Pinch Dynamometer Testing)  2  -DN      Left Hand: Three Point (Robert) Pinch Strength (Pinch Dynamometer Testing)  2  -DN      Recorded by [DN] Prince Obrien OT      Row Name 05/09/20 1240             Pain Scale: Numbers Pre/Post-Treatment    Pain Scale: Numbers, Pretreatment  7/10  -EE      Pain Scale: Numbers, Post-Treatment  7/10  -EE      Pain Location - Side  Bilateral  -EE      Pain Location - Orientation  distal  -EE      Pain Location  hand  -EE      Pain Intervention(s)  Repositioned;Medication (See MAR);Distraction  -EE      Recorded by [EE] Irlanda Wallis PT      Row Name 05/09/20 1240             Dynamic Balance Activity    Comment (Dynamic Balance Training)  B LE alt step taps to 6' step x 20 reps; no UE support; CGA/min A for balance  -EE      Recorded by [EE] Irlanda Wallis PT      Row Name 05/09/20 1240             Lower Extremity Standing Therapeutic Exercise    Performed, Standing Lower Extremity (Therapeutic Exercise)  heel raises;mini-squats;knee flexion/extension;hip abduction/adduction  -EE      Device, Standing Lower Extremity (Therapeutic Exercise)  marni bars  -EE      Exercise Type, Standing Lower Extremity (Therapeutic Exercise)  AROM (active range of motion)  -EE      Sets/Reps Detail, Standing Lower Extremity  (Therapeutic Exercise)  1/10  -EE      Recorded by [EE] Irlanda Wallis PT      Row Name 05/09/20 1240             Positioning and Restraints    Pre-Treatment Position  standing in room  -EE      Post Treatment Position  wheelchair  -EE      In Wheelchair  sitting;call light within reach;encouraged to call for assist;exit alarm on;notified nsg  -EE      Recorded by [EE] Irlanda Wallis PT        User Key  (r) = Recorded By, (t) = Taken By, (c) = Cosigned By    Initials Name Effective Dates    DN Prince Obrien, OT 06/08/18 -     EE Irlanda Wallis, PT 04/03/18 -            Physical Therapy Education                 Title: PT OT SLP Therapies (Done)     Topic: Physical Therapy (Done)     Point: Mobility training (Done)     Description:   Instruct learner(s) on safety and technique for assisting patient out of bed, chair or wheelchair.  Instruct in the proper use of assistive devices, such as walker, crutches, cane or brace.              Patient Friendly Description:   It's important to get you on your feet again, but we need to do so in a way that is safe for you. Falling has serious consequences, and your personal safety is the most important thing of all.        When it's time to get out of bed, one of us or a family member will sit next to you on the bed to give you support.     If your doctor or nurse tells you to use a walker, crutches, a cane, or a brace, be sure you use it every time you get out of bed, even if you think you don't need it.    Learning Progress Summary           Patient Acceptance, E,TB, VU,NR by EE at 5/9/2020 1423    Acceptance, E,TB,D, DU,VU by LB at 5/8/2020 1140    Comment:  stairs with one rail sideways and then 2 rails forward, recommended OP PT    Acceptance, E,TB,D, VU,NR by EE at 5/7/2020 1223    Acceptance, E,TB, VU,NR by EE at 5/6/2020 0937    Acceptance, E, VU,NR by EE at 5/5/2020 1043    Acceptance, D, NR by LB at 5/4/2020 1522    Comment:  stairs with one rail and a quad tipped cane     Acceptance, E,TB,D, NR by LB at 5/4/2020 1420    Comment:  Stairs with one rail and quad tipped cane    Acceptance, TB,D,E, NR by LB at 5/4/2020 1148    Acceptance, E,TB, VU,NR by EE at 5/1/2020 1126    Acceptance, E,TB,D, NR by LB at 4/30/2020 1538    Comment:  Stairs with one rail and quad tipped cane.    Acceptance, E,TB,D, NR by LB at 4/27/2020 1616    Comment:  Stairs with one rail and HHA .    Acceptance, E, NR by LB at 4/25/2020 1352                   Point: Home exercise program (Done)     Description:   Instruct learner(s) on appropriate technique for monitoring, assisting and/or progressing patient with therapeutic exercises and activities.              Learning Progress Summary           Patient Acceptance, E,TB, VU,NR by EE at 5/9/2020 1423    Acceptance, E,TB,D, VU,NR by EE at 5/7/2020 1223    Acceptance, E,TB, VU,NR by EE at 5/6/2020 0937    Acceptance, E, VU,NR by EE at 5/5/2020 1043    Acceptance, E,TB, VU,NR by EE at 5/1/2020 1126                   Point: Body mechanics (Done)     Description:   Instruct learner(s) on proper positioning and spine alignment for patient and/or caregiver during mobility tasks and/or exercises.              Learning Progress Summary           Patient Acceptance, E,TB, VU,NR by EE at 5/9/2020 1423    Acceptance, E,TB,D, VU,NR by EE at 5/7/2020 1223    Acceptance, E,TB, VU,NR by EE at 5/6/2020 0937    Acceptance, E, VU,NR by EE at 5/5/2020 1043                   Point: Precautions (Done)     Description:   Instruct learner(s) on prescribed precautions during mobility and gait tasks              Learning Progress Summary           Patient Acceptance, E,TB, VU,NR by EE at 5/9/2020 1423    Acceptance, E,TB,D, VU,NR by EE at 5/7/2020 1223    Acceptance, E,TB, VU,NR by EE at 5/6/2020 0937    Acceptance, E, VU,NR by EE at 5/5/2020 1043    Acceptance, E, VU by MD at 5/2/2020 1213    Acceptance, E, VU by MD at 5/1/2020 1229    Acceptance, E, VU by MD at 4/29/2020 0924     Acceptance, E, VU by MD at 4/28/2020 0927    Acceptance, E, VU by MD at 4/24/2020 0955    Acceptance, E, NR by MD at 4/23/2020 0912    Acceptance, E, VU by MD at 4/22/2020 0923    Acceptance, E, VU by MD at 4/21/2020 1202                               User Key     Initials Effective Dates Name Provider Type Discipline    LB 06/08/18 -  Preeti Reynaga, PT Physical Therapist PT    EE 04/03/18 -  Irlanda Wallis, PT Physical Therapist PT    MD 04/03/18 -  Serena Philip PT Physical Therapist PT                  PT Recommendation and Plan                 Outcome Measures     Row Name 05/07/20 1500             Jasso Balance Scale    Sitting to Standing  4  -EE      Standing Unsupported  3  -EE      Sitting with Back Unsupported but Feet Supported on Floor or on Stool  4  -EE      Standing to Sitting  4  -EE      Transfers  4  -EE      Standing Unsupported with Eyes Closed  3  -EE      Standing Unsupported with Feet Together  3  -EE      Reaching Forward with Outstretched Arm While Standing  2  -EE       Object From the Floor From a Standing Position  1  -EE      Turning to Look Behind Over Left and Right Shoulders While Standing  0  -EE      Turn 360 Degrees  1  -EE      Place Alternate Foot on Step or Stool While Standing Unsupported  1  -EE      Standing Unsupported with One Foot in Front  2  -EE      Standing on One Leg  1  -EE      Jasso Total Score  33  -EE         Functional Assessment    Outcome Measure Options  Jasso Balance  -EE        User Key  (r) = Recorded By, (t) = Taken By, (c) = Cosigned By    Initials Name Provider Type    EE Irlanda Wallis, PT Physical Therapist             Time Calculation:     PT Charges     Row Name 05/09/20 1423             Time Calculation    Start Time  1230  -EE      Stop Time  1315  -EE      Time Calculation (min)  45 min  -EE      PT Received On  05/09/20  -EE      PT - Next Appointment  05/11/20  -EE         Time Calculation- PT    Total Timed Code Minutes- PT  45 minute(s)  -EE         User Key  (r) = Recorded By, (t) = Taken By, (c) = Cosigned By    Initials Name Provider Type     Irlanda Wallis, PT Physical Therapist          Therapy Charges for Today     Code Description Service Date Service Provider Modifiers Qty    47452179361 HC GAIT TRAINING EA 15 MIN 5/9/2020 Irlanda Wallis, PT GP 1    10380592115 HC PT THER PROC EA 15 MIN 5/9/2020 Irlanda Wallis, PT GP 1    42198839361 HC PT NEUROMUSC RE EDUCATION EA 15 MIN 5/9/2020 Irlanda Wallis, PT GP 1            PT G-Codes  Outcome Measure Options: Jasso Balance  Jasso Total Score: 33      Patient was wearing a face mask during this therapy encounter. Therapist used appropriate personal protective equipment including mask and gloves.  Mask used was standard procedure mask. Appropriate PPE was worn during the entire therapy session. Hand hygiene was completed before and after therapy session. Patient is not in enhanced droplet precautions.       Irlanda Wallis PT  5/9/2020

## 2020-05-09 NOTE — PROGRESS NOTES
Inpatient Rehabilitation Plan of Care Note    Plan of Care  Care Plan Reviewed - No updates at this time.    Safety    Performed Intervention(s)  Bed alarm and /or chair alarm  Safety rounds and items within reach  Falls precautions/protocol  Uses call light appropriately  Environmental set-up to reduce risk  safety strategies and techniques      Psychosocial    Performed Intervention(s)  Allow the opportunity to verbalize needs and concerns  Medication as ordered  Therapuetic environmental set up  offer support      Body Systems    Performed Intervention(s)  Monitor labs and medication as ordered  Blood sugar testing- TID      Pain    Performed Intervention(s)  Relaxation or distraction  Medication as ordered  Modalities      Sphincter Control    Performed Intervention(s)  Monitor intake and output  Assist pt to bathroom in a timely manner  Encourage proper diet and fluid intake  Medication as ordered    Signed by: Bailey Aguilera RN

## 2020-05-10 ENCOUNTER — APPOINTMENT (OUTPATIENT)
Dept: GENERAL RADIOLOGY | Facility: HOSPITAL | Age: 45
End: 2020-05-10

## 2020-05-10 LAB
CRP SERPL-MCNC: 4.73 MG/DL (ref 0–0.5)
ERYTHROCYTE [SEDIMENTATION RATE] IN BLOOD: 74 MM/HR (ref 0–20)
GLUCOSE BLDC GLUCOMTR-MCNC: 126 MG/DL (ref 70–130)
GLUCOSE BLDC GLUCOMTR-MCNC: 167 MG/DL (ref 70–130)
GLUCOSE BLDC GLUCOMTR-MCNC: 91 MG/DL (ref 70–130)

## 2020-05-10 PROCEDURE — 86140 C-REACTIVE PROTEIN: CPT | Performed by: PHYSICAL MEDICINE & REHABILITATION

## 2020-05-10 PROCEDURE — 85652 RBC SED RATE AUTOMATED: CPT | Performed by: PHYSICAL MEDICINE & REHABILITATION

## 2020-05-10 PROCEDURE — 82962 GLUCOSE BLOOD TEST: CPT

## 2020-05-10 PROCEDURE — 63710000001 INSULIN GLARGINE PER 5 UNITS: Performed by: INTERNAL MEDICINE

## 2020-05-10 PROCEDURE — 73120 X-RAY EXAM OF HAND: CPT

## 2020-05-10 PROCEDURE — 63710000001 INSULIN LISPRO (HUMAN) PER 5 UNITS: Performed by: INTERNAL MEDICINE

## 2020-05-10 PROCEDURE — 94799 UNLISTED PULMONARY SVC/PX: CPT

## 2020-05-10 PROCEDURE — 63710000001 DIPHENHYDRAMINE PER 50 MG: Performed by: INTERNAL MEDICINE

## 2020-05-10 RX ORDER — LIDOCAINE 50 MG/G
1 PATCH TOPICAL
Status: DISCONTINUED | OUTPATIENT
Start: 2020-05-10 | End: 2020-05-12 | Stop reason: HOSPADM

## 2020-05-10 RX ADMIN — PREGABALIN 150 MG: 100 CAPSULE ORAL at 08:18

## 2020-05-10 RX ADMIN — OXYCODONE HYDROCHLORIDE 5 MG: 5 TABLET ORAL at 20:35

## 2020-05-10 RX ADMIN — ROPINIROLE HYDROCHLORIDE 0.5 MG: 0.5 TABLET, FILM COATED ORAL at 20:35

## 2020-05-10 RX ADMIN — IBUPROFEN 600 MG: 600 TABLET, FILM COATED ORAL at 11:31

## 2020-05-10 RX ADMIN — DIPHENHYDRAMINE HYDROCHLORIDE 25 MG: 25 CAPSULE ORAL at 09:24

## 2020-05-10 RX ADMIN — AMLODIPINE BESYLATE 10 MG: 10 TABLET ORAL at 20:35

## 2020-05-10 RX ADMIN — PANTOPRAZOLE SODIUM 40 MG: 40 TABLET, DELAYED RELEASE ORAL at 15:36

## 2020-05-10 RX ADMIN — DIPHENHYDRAMINE HYDROCHLORIDE 25 MG: 25 CAPSULE ORAL at 03:59

## 2020-05-10 RX ADMIN — PREGABALIN 150 MG: 100 CAPSULE ORAL at 21:08

## 2020-05-10 RX ADMIN — PANTOPRAZOLE SODIUM 40 MG: 40 TABLET, DELAYED RELEASE ORAL at 06:52

## 2020-05-10 RX ADMIN — SUCRALFATE 1 G: 1 SUSPENSION ORAL at 06:52

## 2020-05-10 RX ADMIN — LEVOTHYROXINE SODIUM 200 MCG: 100 TABLET ORAL at 06:05

## 2020-05-10 RX ADMIN — IBUPROFEN 600 MG: 600 TABLET, FILM COATED ORAL at 22:54

## 2020-05-10 RX ADMIN — OXYCODONE HYDROCHLORIDE 5 MG: 5 TABLET ORAL at 09:25

## 2020-05-10 RX ADMIN — INSULIN LISPRO 3 UNITS: 100 INJECTION, SOLUTION INTRAVENOUS; SUBCUTANEOUS at 11:29

## 2020-05-10 RX ADMIN — INSULIN LISPRO 14 UNITS: 100 INJECTION, SOLUTION INTRAVENOUS; SUBCUTANEOUS at 11:29

## 2020-05-10 RX ADMIN — INSULIN GLARGINE 20 UNITS: 100 INJECTION, SOLUTION SUBCUTANEOUS at 08:24

## 2020-05-10 RX ADMIN — POLYETHYLENE GLYCOL 3350 17 G: 17 POWDER, FOR SOLUTION ORAL at 08:18

## 2020-05-10 RX ADMIN — IBUPROFEN 600 MG: 600 TABLET, FILM COATED ORAL at 03:58

## 2020-05-10 RX ADMIN — LIDOCAINE 1 PATCH: 50 PATCH TOPICAL at 15:35

## 2020-05-10 RX ADMIN — DIPHENHYDRAMINE HYDROCHLORIDE 25 MG: 25 CAPSULE ORAL at 20:35

## 2020-05-10 RX ADMIN — SUCRALFATE 1 G: 1 SUSPENSION ORAL at 20:35

## 2020-05-10 RX ADMIN — FOLIC ACID 1 MG: 1 TABLET ORAL at 08:18

## 2020-05-10 RX ADMIN — INSULIN LISPRO 14 UNITS: 100 INJECTION, SOLUTION INTRAVENOUS; SUBCUTANEOUS at 08:18

## 2020-05-10 RX ADMIN — SUCRALFATE 1 G: 1 SUSPENSION ORAL at 11:31

## 2020-05-10 RX ADMIN — DULOXETINE HYDROCHLORIDE 60 MG: 60 CAPSULE, DELAYED RELEASE ORAL at 08:18

## 2020-05-10 RX ADMIN — HYDROCHLOROTHIAZIDE 25 MG: 25 TABLET ORAL at 08:18

## 2020-05-10 RX ADMIN — BUDESONIDE AND FORMOTEROL FUMARATE DIHYDRATE 2 PUFF: 160; 4.5 AEROSOL RESPIRATORY (INHALATION) at 19:59

## 2020-05-10 RX ADMIN — BUDESONIDE AND FORMOTEROL FUMARATE DIHYDRATE 2 PUFF: 160; 4.5 AEROSOL RESPIRATORY (INHALATION) at 08:10

## 2020-05-10 RX ADMIN — SUCRALFATE 1 G: 1 SUSPENSION ORAL at 15:36

## 2020-05-10 NOTE — PROGRESS NOTES
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  Denies f/c.  Feels she can  items a little better.  Doesn't recall ever having this before in her hands.  Denies family history of arthritic conditions.    History taken from: patient    Objective     Vital Signs  Temp:  [97.8 °F (36.6 °C)-98.7 °F (37.1 °C)] 97.8 °F (36.6 °C)  Heart Rate:  [89-94] 89  Resp:  [18] 18  BP: (117-130)/(67-86) 117/67    Objective     Gen - nad, sitting in bed comfortably  HEENT- NCAT, PUPILS EQUALLY ROUND, SCLERAE ANICTERIC, CONJUNCTIVAE PINK, OP MOIST, NO JVD, EARS UNREMARKABLE EXTERNALLY  LUNGS -normal respirations. equal chest rise, ctab no w/r/c  CV - rrr no m/r/g   EXT - NO EDEMA OR CYANOSIS   Skin - small bruise on left forearm, no warmth  NEURO -   A/ox4, speech is fluent, follows all commands  MOTOR EXAM -JONES x4, symmetric muscle bulk  Antigravity BUE strength  No red or swollen joints in hands or wrists, able to flex and extend fingers but slowly due to discomfort   strength - improved in bilateral hands today compared to yesterday - 4/5 today  Psych - appropriate mood and affect    Results Review:     I reviewed the patient's new clinical results.     Results for SENTHIL SMITH (MRN [id number]) as of 5/10/2020 07:16   Ref. Range 5/9/2020 07:06 5/9/2020  11:09 5/9/2020 16:07 5/10/2020 04:15 5/10/2020 07:02   Glucose Latest Ref Range: 70 - 130 mg/dL 146 (H) 112 91  126     Results for SENTHIL SMITH (MRN [id number]) as of 5/10/2020 07:16   Ref. Range 5/10/2020 04:15   C-Reactive Protein Latest Ref Range: 0.00 - 0.50 mg/dL 4.73 (H)     Results for SENTHIL SMITH (MRN [id number]) as of 5/10/2020 07:16   Ref. Range 5/10/2020 04:15   Sed Rate Latest Ref Range: 0 - 20 mm/hr 74 (H)     Results for SENTHIL SMITH (MRN [id number]) as of 5/10/2020 07:16   Ref. Range 4/10/2020 05:24 5/10/2020 04:15   Sed Rate Latest Ref Range: 0 - 20 mm/hr 27 (H) 74 (H)     Results for SENTHIL SMITH (MRN [id number]) as of 5/10/2020 07:16   Ref. Range 4/1/2020 08:09 4/3/2020 06:20 4/10/2020 05:24 4/13/2020 04:42 5/10/2020 04:15   C-Reactive Protein Latest Ref Range: 0.00 - 0.50 mg/dL 0.63 (H) 0.55 (H) 5.14 (H) 3.04 (H) 4.73 (H)       Medication Review:   Scheduled Meds:  amLODIPine 10 mg Oral Nightly   budesonide-formoterol 2 puff Inhalation BID - RT   DULoxetine 60 mg Oral Daily   folic acid 1 mg Oral Daily   hydroCHLOROthiazide Oral 25 mg Oral Daily   insulin glargine 20 Units Subcutaneous QAM   insulin lispro 0-14 Units Subcutaneous TID AC   insulin lispro 14 Units Subcutaneous TID With Meals   levothyroxine 200 mcg Oral Q AM   pantoprazole 40 mg Oral BID AC   pregabalin 150 mg Oral Q12H   rOPINIRole 0.5 mg Oral Nightly   sucralfate 1 g Oral 4x Daily AC & at Bedtime     Continuous Infusions:   PRN Meds:.Benzocaine  •  benzonatate  •  calcium carbonate  •  dextrose  •  dextrose  •  diphenhydrAMINE  •  diphenhydrAMINE  •  glucagon (human recombinant)  •  guaiFENesin-dextromethorphan  •  ibuprofen  •  melatonin  •  ondansetron  •  oxyCODONE  •  oxyCODONE  •  polyethylene glycol  •  potassium chloride **OR** potassium chloride **OR** potassium chloride  •  sodium chloride      Assessment/Plan       YOUNG (nonalcoholic steatohepatitis)    Hypothyroid    Type 2 diabetes mellitus  (CMS/HCC)    GBS (Guillain [place] syndrome) (CMS/HCC)    Elevated transaminase level    History of gestational diabetes    Steroid-induced hyperglycemia    Elevated aldolase level      Assessment & Plan     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  This is far greater than expected based on the nerve conduction findings.  This suggests either motor neuron disease or a primarily axonal polyneuropathy such as axonal Guillain Barré syndrome based on the patient's symptom history. F/U Dr Juventino Gaytan in 2-3 months.     Previous question of dermatomyositis or autoimmune process-trial of steroids-tapering off  April 27-has been on prednisone 40 mg daily since April 10 empiric trial for previous question of dermatomyositis but now not felt clinically present-taper off of prednisone 30 mg daily x3 days, 20 mg daily x3 days, 10 mg daily x3 days, 5 mg daily x3 days, then stop.  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 Also on oxycodone.  April 27-has some pain in the upper extremities.  Doubt that it is bilateral joint pain as she has been on a reasonable dose of prednisone for the last 16 days.  May be a variant for the neuropathic pain.  Will titrate up on Lyrica 200 mg every 12 hours, watch for any myoclonic jerks.  May 1- taking oxycodone 5 mg about 5 times a  day, need to taper off over next week, changed to Oxycodone 5 mg q 6 hours prn x 3 days, then q 8 hours prn x 3 days, then q 12 hours prn x three days, then stop. SVETLANA reviewed.   May 2-add on Cymbalta 30 mg daily for 7 days and increase to 60 mg daily.  Discussed other options of titrating up on Lyrica or switching to gabapentin.  May 4 - Cymbalta is helping  May 6 - increase to 60mg in a few days     DVT prophylaxis - SCDs        Hepatic steatosis-elevated LFTs  April 27-gastroenterology following  April 29 - reviewed with gastroenterology - a combination of fatty liver (the treatment would be weight loss) and Ebstein Horan viral hepatitis (the only treatment would be supportive care)- recommend against a liver biopsy. Follow up in three months with repeat labs and full colonscopy.  May 5 - EBV labs reviewed, and will need outpatient GI f/up for hepatic steatosis  May 8 - recheck AM labs     Restless leg syndrome -   May 4 - Will trial Requip  May 6 - will increase Requip dosage and see if helps sleep.      Joint pain -   May 8 - patient reports arthritic pain in hands that ibuprofen has helped in past.  Will hold on adding ibuprofen until steroids weaned.   May 9 - question if steroid taper is contributing to joint pain in hands. Now on increased cymbalta dosage as well.  Will check ESR and CRP in AM.  Add ibuprofen prn with steroids discontinuing.  May 10 - ESR and CRP elevated.  Bilateral hand improved today with prn ibuprofen.  She is now off steroid taper.  Will check hand xrays.  Denies FHx of arthritic conditions.  Rheum consult to establish care and likely proceed with further workup as outpatient as well.     Sleep disturbance  May 6 - reviewed meds with pharmacy.  Will trial increase of Requip and steroids could be contributing but are currently being tapered.  May 7 - slept better last night     May 7 - Plan is for patient to go to mother's home at discharge.  Patient feels comfortable with this  plan.     April 27-gait 80 feet CTG-min assist.  Team conference in the a.m.     TEAM CONF - April 28- BED CTG. 4 STAIRS MIN. GAIT 100 FEET CTG-MIN RW. TOILET TRANSFERS CTG. SHOWER TRANSFERS CTG.  UBD MIN ASSIST FOR BRA. LBD CTG. BATH CTG. GROOMING SBA.  TOILETING MOD INDEPENDENT. CONTINENT BOWEL AND BLADDER.   DIETICIAN EDUCATED ON DIABETIC DIET ON STEROIDS BUT PATIENT RESISTANT TO INSTRUCTION.   NEEDS TO BE MODIFIED INDEPENDENT FOR HOME  ELOS- TWO WEEKS     TEAM CONF - May 5 -   Bed/Chair/Wc SBA,   Stairs 4 with CGa/MIN A with 1 HR and Quad tipped cane  Amb 160ft CGa RWx, Min A with a cane  Toilet Tx SBA RWx, Tub/Shower Tx CGa RWx  Bathing SBA, LBD SBA, UBD Min A with bra, grooming SBA seated  Toileting SBA  Continent of bowel and bladder  2L O2 during night (h/o ERLIN with CPAP at home)  ELOS - 1 week home with assistance vs SNF  Discussed at length with patient this option of disposition.  Patient feels she does not have anyone to assist her.  Will look into SNF facility as she still requires assistance and would benefit from further inpatient rehab.     During rounds, used appropriate personal protective equipment including mask and gloves.  Mask used was standard procedure mask. Appropriate PPE was worn during the entire visit.  Hand hygiene was completed before and after.       Millie Cm MD  05/10/20  07:15    Time: 35min

## 2020-05-10 NOTE — PLAN OF CARE
Problem: Patient Care Overview  Goal: Plan of Care Review  Outcome: Ongoing (interventions implemented as appropriate)  Flowsheets (Taken 5/10/2020 0259)  Outcome Summary: Pt rested this shift.  Pain in hands.  Ibuprofen and oxy administered for pain.  Meds whole with water.  Alert and oriented.  Pleasant.  Progress, Functional Goals: demonstrating adequate progress  Plan of Care Reviewed With: patient  IRF Plan of Care Review: progress ongoing, continue

## 2020-05-10 NOTE — PLAN OF CARE
Problem: Patient Care Overview  Goal: Plan of Care Review  Outcome: Ongoing (interventions implemented as appropriate)  Flowsheets  Taken 5/10/2020 1508 by Luis Alford RN  Outcome Summary: AAO x 4. Tx x 1. Ibuprofen and q 12 hour oxy IR for hand pain. Has new pain unresolved by Pain pills. Dr. Cm ordered Lidocain patch. Home Tuesday.  Progress, Functional Goals: demonstrating adequate progress  Plan of Care Reviewed With: patient  Taken 5/10/2020 0259 by Prateek Alvarez, RN  IRF Plan of Care Review: progress ongoing, continue

## 2020-05-10 NOTE — NURSING NOTE
Consult from Dr. Cm for Rheumatology consults not yet complete. They are only ones with privliges. [place], put in sticky and Emaied Mili to call Monday.

## 2020-05-10 NOTE — PROGRESS NOTES
Inpatient Rehabilitation Plan of Care Note    Plan of Care  Care Plan Reviewed - No updates at this time.    Safety    Performed Intervention(s)  Bed alarm and /or chair alarm      Body Systems    Performed Intervention(s)  Blood sugar testing- TID      Pain    Performed Intervention(s)  Medication as ordered    Signed by: Luis Alford RN

## 2020-05-10 NOTE — PROGRESS NOTES
Inpatient Rehabilitation Plan of Care Note    Plan of Care  Care Plan Reviewed - No updates at this time.    Safety    Performed Intervention(s)  Bed alarm and /or chair alarm  Safety rounds and items within reach  Falls precautions/protocol  Uses call light appropriately  Environmental set-up to reduce risk  safety strategies and techniques      Psychosocial    Performed Intervention(s)  Allow the opportunity to verbalize needs and concerns  Medication as ordered  Therapuetic environmental set up  offer support      Body Systems    Performed Intervention(s)  Monitor labs and medication as ordered  Blood sugar testing- TID      Pain    Performed Intervention(s)  Relaxation or distraction  Medication as ordered  Modalities      Sphincter Control    Performed Intervention(s)  Monitor intake and output  Assist pt to bathroom in a timely manner  Encourage proper diet and fluid intake  Medication as ordered    Signed by: Prateek Alvarez RN

## 2020-05-11 LAB
GLUCOSE BLDC GLUCOMTR-MCNC: 114 MG/DL (ref 70–130)
GLUCOSE BLDC GLUCOMTR-MCNC: 131 MG/DL (ref 70–130)
GLUCOSE BLDC GLUCOMTR-MCNC: 84 MG/DL (ref 70–130)

## 2020-05-11 PROCEDURE — 94799 UNLISTED PULMONARY SVC/PX: CPT

## 2020-05-11 PROCEDURE — 82962 GLUCOSE BLOOD TEST: CPT

## 2020-05-11 PROCEDURE — 63710000001 INSULIN GLARGINE PER 5 UNITS: Performed by: INTERNAL MEDICINE

## 2020-05-11 PROCEDURE — 63710000001 INSULIN LISPRO (HUMAN) PER 5 UNITS: Performed by: INTERNAL MEDICINE

## 2020-05-11 PROCEDURE — 97110 THERAPEUTIC EXERCISES: CPT

## 2020-05-11 PROCEDURE — 63710000001 DIPHENHYDRAMINE PER 50 MG: Performed by: INTERNAL MEDICINE

## 2020-05-11 PROCEDURE — 97535 SELF CARE MNGMENT TRAINING: CPT

## 2020-05-11 RX ADMIN — SUCRALFATE 1 G: 1 SUSPENSION ORAL at 17:02

## 2020-05-11 RX ADMIN — INSULIN LISPRO 14 UNITS: 100 INJECTION, SOLUTION INTRAVENOUS; SUBCUTANEOUS at 11:43

## 2020-05-11 RX ADMIN — AMLODIPINE BESYLATE 10 MG: 10 TABLET ORAL at 19:54

## 2020-05-11 RX ADMIN — INSULIN GLARGINE 20 UNITS: 100 INJECTION, SOLUTION SUBCUTANEOUS at 07:26

## 2020-05-11 RX ADMIN — ROPINIROLE HYDROCHLORIDE 0.5 MG: 0.5 TABLET, FILM COATED ORAL at 19:54

## 2020-05-11 RX ADMIN — OXYCODONE HYDROCHLORIDE 5 MG: 5 TABLET ORAL at 10:20

## 2020-05-11 RX ADMIN — HYDROCHLOROTHIAZIDE 25 MG: 25 TABLET ORAL at 07:28

## 2020-05-11 RX ADMIN — BUDESONIDE AND FORMOTEROL FUMARATE DIHYDRATE 2 PUFF: 160; 4.5 AEROSOL RESPIRATORY (INHALATION) at 07:08

## 2020-05-11 RX ADMIN — PREGABALIN 150 MG: 100 CAPSULE ORAL at 07:29

## 2020-05-11 RX ADMIN — LIDOCAINE 1 PATCH: 50 PATCH TOPICAL at 07:31

## 2020-05-11 RX ADMIN — SUCRALFATE 1 G: 1 SUSPENSION ORAL at 07:27

## 2020-05-11 RX ADMIN — LEVOTHYROXINE SODIUM 200 MCG: 100 TABLET ORAL at 06:09

## 2020-05-11 RX ADMIN — PANTOPRAZOLE SODIUM 40 MG: 40 TABLET, DELAYED RELEASE ORAL at 17:02

## 2020-05-11 RX ADMIN — PANTOPRAZOLE SODIUM 40 MG: 40 TABLET, DELAYED RELEASE ORAL at 06:08

## 2020-05-11 RX ADMIN — INSULIN LISPRO 14 UNITS: 100 INJECTION, SOLUTION INTRAVENOUS; SUBCUTANEOUS at 07:27

## 2020-05-11 RX ADMIN — OXYCODONE HYDROCHLORIDE 5 MG: 5 TABLET ORAL at 21:52

## 2020-05-11 RX ADMIN — PREGABALIN 150 MG: 100 CAPSULE ORAL at 19:54

## 2020-05-11 RX ADMIN — FOLIC ACID 1 MG: 1 TABLET ORAL at 07:28

## 2020-05-11 RX ADMIN — DIPHENHYDRAMINE HYDROCHLORIDE 25 MG: 25 CAPSULE ORAL at 21:52

## 2020-05-11 RX ADMIN — SUCRALFATE 1 G: 1 SUSPENSION ORAL at 19:54

## 2020-05-11 RX ADMIN — BUDESONIDE AND FORMOTEROL FUMARATE DIHYDRATE 2 PUFF: 160; 4.5 AEROSOL RESPIRATORY (INHALATION) at 20:02

## 2020-05-11 RX ADMIN — INSULIN LISPRO 14 UNITS: 100 INJECTION, SOLUTION INTRAVENOUS; SUBCUTANEOUS at 17:03

## 2020-05-11 RX ADMIN — IBUPROFEN 600 MG: 600 TABLET, FILM COATED ORAL at 06:09

## 2020-05-11 RX ADMIN — Medication 3 MG: at 21:52

## 2020-05-11 RX ADMIN — IBUPROFEN 600 MG: 600 TABLET, FILM COATED ORAL at 17:06

## 2020-05-11 RX ADMIN — IBUPROFEN 600 MG: 600 TABLET, FILM COATED ORAL at 22:44

## 2020-05-11 RX ADMIN — DULOXETINE HYDROCHLORIDE 60 MG: 60 CAPSULE, DELAYED RELEASE ORAL at 07:28

## 2020-05-11 RX ADMIN — SUCRALFATE 1 G: 1 SUSPENSION ORAL at 11:43

## 2020-05-11 NOTE — PROGRESS NOTES
Pt has been allowed independence in her room today.  She is in good spirits and feels good about discharge tomorrow.  Pt requests referral to [facility] for outpatient therapies.

## 2020-05-11 NOTE — PROGRESS NOTES
Inpatient Rehabilitation Functional Measures Assessment and Plan of Care    Plan of Care  Updated Problems/Interventions  Field    Functional Measures  ADALID Eating:  [place]  [facility] Grooming: [facility] Bathing:  [facility] Upper Body Dressing:  [facility] Lower Body Dressing:  [facility] Toileting:  [facility] Bladder Management  Level of Assistance:  [place]  Frequency/Number of Accidents this Shift:  [facility] Bowel Management  Level of Assistance: [place]  Frequency/Number of Accidents this Shift: [facility] Bed/Chair/Wheelchair Transfer:  [facility] Toilet Transfer:  [facility] Tub/Shower Transfer:  [place]    Previously Documented Mode of Locomotion at Discharge: Field  ADALID Expected Mode of Locomotion at Discharge: [facility] Walk/Wheelchair:  [facility] Stairs:  [facility] Comprehension:  [place]  ADALID Expression:  [facility] Social Interaction:  [facility] Problem Solving:  [facility] Memory:  [place]    Therapy Mode Minutes  Occupational Therapy: Individual: 105 minutes.  Physical Therapy: [place]  Speech Language Pathology:  [place]    Signed by: SANDRA Marion/JOSÉ MIGUEL

## 2020-05-11 NOTE — PROGRESS NOTES
Inpatient Rehabilitation Plan of Care Note    Plan of Care  Care Plan Reviewed - Updates as Follows    Sphincter Control    [RN] Bladder Management(Active)  Current Status(05/07/2020): Patient is 100% continent of bladder  Weekly Goal(05/18/2020): Patient will continue to be continent 100%  Discharge Goal: Patient will continue to be 100% continent.    [RN] Bowel Management(Active)  Current Status(05/07/2020): Patient is 100% continent of bowel  Weekly Goal(05/18/2020): Patient will continue to be 100% continent of bowel  Discharge Goal: Patient will remain continent 100% of dthe time while in Rehab.    Performed Intervention(s)  Monitor intake and output  Assist pt to bathroom in a timely manner  Encourage proper diet and fluid intake  Medication as ordered      Safety    Performed Intervention(s)  Bed alarm and /or chair alarm  Safety rounds and items within reach  [place]alls precautions/protocol  Uses call light appropriately  Environmental set-up to reduce risk  safety strategies and techniques      Psychosocial    Performed Intervention(s)  Allow the opportunity to verbalize needs and concerns  Medication as ordered  Therapuetic environmental set up  offer support      Body Systems    Performed Intervention(s)  Monitor blood sugar as ordered- TID  Monitor labs and medication as ordered  Blood sugar testing- TID      Pain    Performed Intervention(s)  Relaxation or distraction  Medication as ordered  Modalities    Signed by: Herlinda Quesada RN

## 2020-05-11 NOTE — THERAPY TREATMENT NOTE
"Inpatient Rehabilitation - Occupational Therapy Treatment Note    [facility]     Patient Name: Oralia Sánchez  : 1975  MRN: [id number]    Today's Date: 2020                 Admit Date: 2020      Visit Dx:  No diagnosis found.    Patient Active Problem List   Diagnosis   • Vitamin D deficiency   • Awareness of heartbeats   • Adiposity   • YOUNG (nonalcoholic steatohepatitis)   • Hypothyroid   • Diabetes mellitus arising in pregnancy   • Diabetes (CMS/HCC)   • Metabolic syndrome   • Chest pain   • Primary thunderclap headache   • Elevated LFTs   • Tobacco abuse   • Rheumatoid arthritis (CMS/HCC)   • Type 2 diabetes mellitus (CMS/HCC)   • Morbid obesity (CMS/HCC)   • UTI (urinary tract infection), bacterial   • Cerebral aneurysm   • Dyspnea   • Cough   • Hypomagnesemia   • Metabolic acidosis   • Fatigue   • History of COPD   • Abnormal CT scan, colon   • 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)   • Essential hypertension   • 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)   • Elevated transaminase level   • History of gestational diabetes   • Steroid-induced hyperglycemia   • Elevated aldolase level         Therapy Treatment    IRF Treatment Summary     Row Name 20 1606 20          Evaluation/Treatment Time and Intent    Subjective Information  no complaints  -DN  no complaints \"My hands feel better today.I finally had a full night sleep  -LB     Existing Precautions/Restrictions  fall  -DN  --     Document Type  discharge treatment;discharge evaluation/summary  -DN  therapy note (daily note)  -LB     Mode of Treatment  occupational therapy  -DN  physical therapy  -LB     Patient/Family Observations  pt to be independent in room today and if all OK D/C tomorrow to mothers home  -DN  Pt up in w/c in no acute distress.  -LB     Recorded by [DN] Prince Obrien, OT [LB] Preeti Reynaga, PT     Row Name 20 1606 20 0900          Cognition/Psychosocial- PT/OT    Affect/Mental " Status (Cognitive)  WNL  -DN  WNL  -LB     Orientation Status (Cognition)  --  oriented x 4  -LB     Follows Commands (Cognition)  --  WNL  -LB     Personal Safety Interventions  --  nonskid shoes/slippers when out of bed  -LB     Recorded by [DN] Prince Obrien OT [LB] Preeti Reynaga, PT     Row Name 05/11/20 0900             Bed Mobility Assessment/Treatment    Rolling Left [place] (Bed Mobility)  independent  -LB      Rolling Right [place] (Bed Mobility)  independent  -LB      Scooting/Bridging [place] (Bed Mobility)  independent  -LB      Supine-Sit [place] (Bed Mobility)  independent  -LB      Recorded by [LB] Preeti Reynaga, PT      Row Name 05/11/20 0900             Sit-Stand Transfer    Sit-Stand [place] (Transfers)  conditional independence  -LB      Assistive Device (Sit-Stand Transfers)  walker, front-wheeled  -LB      Recorded by [LB] Preeti Reynaga, PT      Row Name 05/11/20 0900             Stand-Sit Transfer    Stand-Sit [place] (Transfers)  conditional independence;supervision  -LB      Assistive Device (Stand-Sit Transfers)  walker, front-wheeled  -LB      Recorded by [LB] Preeti Reynaga, PT      Row Name 05/11/20 1606             Toilet Transfer    Type (Toilet Transfer)  stand pivot/stand step  -DN      [place] Level (Toilet Transfer)  conditional independence  -DN      Assistive Device (Toilet Transfer)  raised toilet seat;grab bars/safety frame  -DN      Recorded by [DN] Prince Obrien, OT      Row Name 05/11/20 1606             Bathtub Transfer    Type (Bathtub Transfer)  stand pivot/stand step  -DN      [place] Level (Bathtub Transfer)  conditional independence  -DN      Assistive Device (Bathtub Transfer)  shower chair;grab bars/tub rail;walker, front-wheeled  -DN      Recorded by [DN] Prince Obrien, OT      Row Name 05/11/20 0900             Car Transfer    Type (Car Transfer)  sit-stand;stand-sit  -LB      [place] Level (Car Transfer)  contact  guard;verbal cues  -LB      Assistive Device (Car Transfer)  walker, front-wheeled  -LB      Recorded by [LB] Preeti Reynaga, PT      Row Name 05/11/20 0900             Gait/Stairs Assessment/Training    [place] Level (Gait)  conditional independence  -LB      Assistive Device (Gait)  walker, front-wheeled  -LB      Distance in Feet (Gait)  90' within the room, 200' x 2   -LB      Pattern (Gait)  step-through  -LB      Deviations/Abnormal Patterns (Gait)  quang decreased;stride length decreased  -LB      [place] Level (Stairs)  contact guard  -LB      Handrail Location (Stairs)  both sides first step sideways with R rail   -LB      Number of Steps (Stairs)  8  -LB      Ascending Technique (Stairs)  step-to-step  -LB      Descending Technique (Stairs)  step-to-step  -LB      Comment (Gait/Stairs)  Pt ambulated within her room and into the bathroom with the rolling walker conditinal independent. Pt ambulated ~ 80' x 2 with quad tipped cane with CGA.   -LB      Recorded by [LB] Preeti Reynaga, PT      Row Name 05/11/20 1606             Safety Issues, Functional Mobility    Comment, Safety Issues/Impairments (Mobility)  pt SBA with light meal prep wiith baking muffins, rest periods needed, used safe judgement, no safety concerns  -DN      Recorded by [DN] Prince Obrien, OT      Row Name 05/11/20 0900             Rough/Uneven Surface Gait Skills (Mobility)    [place], Gait on Rough/Uneven Surface (Mobility)  contact guard  -LB      Comment, Gait Rough/Uneven Surface (Mobility)  10' with RWx   -LB      Recorded by [LB] Preeti Reynaga, PT      Row Name 05/11/20 1606             Bathing Assessment/Treatment    Bathing [place] Level  bathing skills;conditional independence  -DN      Assistive Device (Bathing)  grab bar/tub rail;hand held shower spray hose;shower chair  -DN      Recorded by [DN] Prince Obrien, OT      Row Name 05/11/20 1606             Upper Body Dressing Assessment/Treatment    Upper  Body Dressing Task  upper body dressing skills;doff;don;conditional independence  -DN      Upper Body Dressing Position  supported sitting  -DN      Set-up Assistance (Upper Body Dressing)  obtain clothing  -DN      Recorded by [KAYLEN] Prince Obrien OT      Row Name 05/11/20 1606             Lower Body Dressing Assessment/Treatment    Lower Body Dressing [place] Level  doff;don;pants/bottoms;shoes/slippers;socks;conditional independence  -DN      Lower Body Dressing Position  supported sitting;supported standing  -DN      Lower Body Dressing Setup Assistance  obtain clothing  -DN      Recorded by [DN] Prince Obrien, OT      Row Name 05/11/20 1606             Grooming Assessment/Treatment    Grooming [place] Level  grooming skills;deodorant application;hair care, combing/brushing;oral care regimen;conditional independence  -DN      Grooming Position  supported standing  -DN      Recorded by [DN] Prince Obrien, OT      Row Name 05/11/20 1606             Toileting Assessment/Treatment    Toileting [place] Level  toileting skills;adjust/manage clothing;perform perineal hygiene;conditional independence  -DN      Assistive Device Use (Toileting)  grab bar/safety frame;raised toilet seat  -DN      Comment (Toileting)  RWX  -DN      Recorded by [KAYLEN] Prince Obrien, OT      Row Name 05/11/20 1606             General ROM    GENERAL ROM COMMENTS  BUE WFL  -DN      Recorded by [KAYLEN] Prince Obrien, OT      Row Name 05/11/20 1606             MMT (Manual Muscle Testing)    General MMT Comments  BUES 4-/5 thoughout  -DN      Recorded by [DN] Prince Obrien, OT      Row Name 05/11/20 0900             MMT Right Lower Ext    Rt Hip Flexion MMT, Gross Movement  (5/5) normal  -LB      Rt Hip ABduction MMT, Gross Movement  (5/5) normal  -LB      Rt Knee Extension MMT, Gross Movement  (5/5) normal  -LB      Rt Knee Flexion MMT, Gross Movement  -- max resistance in sitting  -LB      Rt Ankle Dorsiflexion MMT, Gross Movement   (4/5) good  -LB      Recorded by [LB] Preeti Reynaga, PT      Row Name 05/11/20 0900             MMT Left Lower Ext    Lt Hip Flexion MMT, Gross Movement  (5/5) normal  -LB      Lt Hip ABduction MMT, Gross Movement  (5/5) normal  -LB      Lt Knee Extension MMT, Gross Movement  (5/5) normal  -LB      Lt Knee Flexion MMT, Gross Movement  -- with max resistance  -LB      Lt Ankle Dorsiflexion MMT, Gross Movement  (4/5) good  -LB      Recorded by [LB] Preeti Reynaga, PT      Row Name 05/11/20 1606             Hand  Strength Testing    Right Hand, Setting 2 (Dynamometer Testing)  35  -DN      Left Hand, Setting 2 (Dynamometer Testing)  35  -DN      Right Hand: Lateral (Key) Pinch Strength (Pinch Dynamometer Testing)  17  -DN      Right Hand: [place] (Robert) Pinch Strength (Pinch Dynamometer Testing)  15  -DN      Left Hand: Lateral (Key) Pinch Strength (Pinch Dynamometer Testing)  10  -DN      Left Hand: [place] (Robert) Pinch Strength (Pinch Dynamometer Testing)  11  -DN      Recorded by [DN] Prince Obrien, OT      Row Name 05/11/20 1606             Fine Motor Testing & Training    Results, 9 Hole Peg Test of Fine Motor Coordination-Right  24  -DN      Results, 9 Hole Peg Test of Fine Motor Coordination-Left  28  -DN      Results, Box N Block Test-Right  68  -DN      Results, Box N Block Test-Left  53  -DN      Comment, Fine Motor Coordination  pt states slept good last night and dont know why the decrease in function in hand late last week  -DN      Recorded by [DN] Prince Obrien, OT      Row Name 05/11/20 1606             Sensory    Sensory General Assessment  no sensation deficits identified  -DN      Recorded by [DN] Prince Obrien, OT      Row Name 05/11/20 1606 05/11/20 0900          Pain Scale: Numbers Pre/Post-Treatment    Pain Scale: Numbers, Pretreatment  1/10  -DN  1/10  -LB     Pain Scale: Numbers, Post-Treatment  1/10  -DN  1/10  -LB     Pain Location - Side  Bilateral  -DN  Bilateral  -LB      Pain Location - Orientation  distal  -DN  --     Pain Location  hand  -DN  hand  -LB     Pain Intervention(s)  Repositioned  -DN  --     Recorded by [KAYLEN] Prince Obrien, OT [LB] Preeti Reynaga, PT     Row Name 05/11/20 0900             Static Standing Balance    Level of [place] (Supported Standing, Static Balance)  contact guard assist pt was able to  a cup   -LB      Recorded by [LB] Preeti Reynaga, PT      Row Name 05/11/20 1606 05/11/20 0900          Positioning and Restraints    Pre-Treatment Position  sitting in chair/recliner  -DN  in bed  -LB     Post Treatment Position  wheelchair  -DN  --     In Bed  sitting  -DN  notified nsg;call light within reach Pt is independent in her room   -LB     Recorded by [KAYLEN] Prince Obrien, OT [LB] Preeti Reynaga, PT       User Key  (r) = Recorded By, (t) = Taken By, (c) = Cosigned By    Initials Name Effective Dates    LB Preeti Reynaga, PT 06/08/18 -     Prince Morfin, OT 06/08/18 -                  OT Recommendation and Plan    Anticipated Equipment Needs At Discharge (OT Eval): commode, bedside without drop arms, shower chair  Anticipated Discharge Disposition (OT): home with assist  Planned Therapy Interventions (OT Eval): activity tolerance training, BADL retraining, functional balance retraining, neuromuscular control/coordination retraining, strengthening exercise, transfer/mobility retraining            OT IRF GOALS     Row Name 05/11/20 1600 05/04/20 1500 04/28/20 1200       Transfer Goal 1 (OT-IRF)    Activity/Assistive Device (Transfer Goal 1, OT-IRF)  toilet;tub  -DN  toilet;tub  -DN  toilet;tub  -DN    [place] Level (Transfer Goal 1, OT-IRF)  supervision required;contact guard assist  -DN  supervision required;contact guard assist  -DN  contact guard assist with RWX  -DN    Time Frame (Transfer Goal 1, OT-IRF)  short term goal (STG)  -DN  short term goal (STG)  -DN  short term goal (STG)  -DN    Progress/Outcomes (Transfer Goal 1, OT-IRF)   goal met  -DN  goal revised this date;goal partially met  -DN  goal met;goal revised this date  -DN       Transfer Goal 2 (OT-IRF)    Activity/Assistive Device (Transfer Goal 2, OT-IRF)  toilet;tub;walker, rolling  -DN  toilet;tub;walker, rolling  -DN  toilet;tub;walker, rolling  -DN    [place] Level (Transfer Goal 2, OT-IRF)  conditional independence  -DN  conditional independence  -DN  conditional independence  -DN    Time Frame (Transfer Goal 2, OT-IRF)  long term goal (LTG)  -DN  long term goal (LTG)  -DN  long term goal (LTG)  -DN    Progress/Outcomes (Transfer Goal 2, OT-IRF)  goal met  -DN  goal ongoing  -DN  goal revised this date  -DN       Bathing Goal 1 (OT-IRF)    Activity/Device (Bathing Goal 1, OT-IRF)  bathing skills, all  -DN  bathing skills, all  -DN  bathing skills, all  -DN    [place] Level (Bathing Goal 1, OT-IRF)  conditional independence  -DN  conditional independence  -DN  supervision required  -DN    Time Frame (Bathing Goal 1, OT-IRF)  short term goal (STG)  -DN  short term goal (STG)  -DN  short term goal (STG)  -DN    Progress/Outcomes (Bathing Goal 1, OT-IRF)  goal met  -DN  goal met;goal revised this date  -DN  goal met;goal revised this date  -DN       Bathing Goal 2 (OT-IRF)    Activity/Device (Bathing Goal 2, OT-IRF)  bathing skills, all  -DN  bathing skills, all  -DN  bathing skills, all  -DN    [place] Level (Bathing Goal 2, OT-IRF)  conditional independence  -DN  conditional independence  -DN  conditional independence  -DN    Time Frame (Bathing Goal 2, OT-IRF)  long term goal (LTG)  -DN  long term goal (LTG)  -DN  long term goal (LTG)  -DN    Progress/Outcomes (Bathing Goal 2, OT-IRF)  goal met  -DN  goal ongoing;goal partially met  -DN  goal ongoing  -DN       UB Dressing Goal 1 (OT-IRF)    Activity/Device (UB Dressing Goal 1, OT-IRF)  upper body dressing  -DN  upper body dressing  -DN  upper body dressing  -DN    [place] (UB Dress Goal 1, OT-IRF)  set-up  required  -DN  set-up required  -DN  set-up required  -DN    Time Frame (UB Dressing Goal 1, OT-IRF)  short term goal (STG)  -DN  short term goal (STG)  -DN  short term goal (STG)  -DN    Progress/Outcomes (UB Dressing Goal 1, OT-IRF)  goal met  -DN  goal met  -DN  goal ongoing;goal not met  -DN       UB Dressing Goal 2 (OT-IRF)    Activity/Device (UB Dressing Goal 2, OT-IRF)  upper body dressing  -DN  upper body dressing  -DN  upper body dressing  -DN    [place] (UB Dress Goal 2, OT-IRF)  conditional independence  -DN  conditional independence  -DN  conditional independence  -DN    Time Frame (UB Dressing Goal 2, OT-IRF)  long term goal (LTG)  -DN  long term goal (LTG)  -DN  long term goal (LTG)  -DN    Progress/Outcomes (UB Dressing Goal 2, OT-IRF)  goal met  -DN  goal ongoing  -DN  goal ongoing  -DN       LB Dressing Goal 1 (OT-IRF)    Activity/Device (LB Dressing Goal 1, OT-IRF)  lower body dressing  -DN  lower body dressing  -DN  lower body dressing  -DN    [place] (LB Dressing Goal 1, OT-IRF)  supervision required  -DN  supervision required  -DN  supervision required  -DN    Time Frame (LB Dressing Goal 1, OT-IRF)  short term goal (STG)  -DN  short term goal (STG)  -DN  short term goal (STG)  -DN    Progress/Outcomes (LB Dressing Goal 1, OT-IRF)  goal met  -DN  goal met;goal revised this date  -DN  goal revised this date  -DN       LB Dressing Goal 2 (OT-IRF)    Activity/Device (LB Dressing Goal 2, OT-IRF)  lower body dressing  -DN  lower body dressing  -DN  lower body dressing  -DN    [place] (LB Dressing Goal 2, OT-IRF)  conditional independence  -DN  conditional independence  -DN  conditional independence  -DN    Time Frame (LB Dressing Goal 2, OT-IRF)  long term goal (LTG)  -DN  long term goal (LTG)  -DN  long term goal (LTG)  -DN    Progress/Outcomes (LB Dressing Goal 2, OT-IRF)  goal met  -DN  goal ongoing  -DN  goal revised this date  -DN       Grooming Goal 1 (OT-IRF)    Activity/Device  (Grooming Goal 1, OT-IRF)  grooming skills, all seated at sink  -DN  grooming skills, all seated at sink  -DN  grooming skills, all seated at sink  -DN    [place] (Grooming Goal 1, OT-IRF)  conditional independence  -DN  conditional independence  -DN  conditional independence  -DN    Time Frame (Grooming Goal 1, OT-IRF)  short term goal (STG)  -DN  short term goal (STG)  -DN  short term goal (STG)  -DN    Progress/Outcomes (Grooming Goal 1, OT-IRF)  goal met  -DN  goal ongoing  -DN  goal revised this date  -DN       Grooming Goal 2 (OT-IRF)    Activity/Device (Grooming Goal 2, OT-IRF)  grooming skills, all standing at sink  -DN  grooming skills, all standing at sink  -DN  grooming skills, all standing at sink  -DN    [place] (Grooming Goal 2, OT-IRF)  conditional independence  -DN  conditional independence  -DN  conditional independence  -DN    Time Frame (Grooming Goal 2, OT-IRF)  long term goal (LTG)  -DN  long term goal (LTG)  -DN  long term goal (LTG)  -DN    Progress/Outcomes (Grooming Goal 2, OT-IRF)  goal met  -DN  goal ongoing  -DN  goal ongoing  -DN       Toileting Goal 1 (OT-IRF)    Activity/Device (Toileting Goal 1, OT-IRF)  toileting skills, all  -DN  toileting skills, all  -DN  toileting skills, all  -DN    [place] Level (Toileting Goal 1, OT-IRF)  verbal cues required;supervision required  -DN  verbal cues required;supervision required  -DN  verbal cues required;supervision required  -DN    Time Frame (Toileting Goal 1, OT-IRF)  short term goal (STG)  -DN  short term goal (STG)  -DN  short term goal (STG)  -DN    Progress/Outcomes (Toileting Goal 1, OT-IRF)  goal met  -DN  goal met;goal revised this date  -DN  goal met;goal revised this date  -DN       Toileting Goal 2 (OT-IRF)    Activity/Device (Toileting Goal 2, OT-IRF)  toileting skills, all;commode chair  -DN  toileting skills, all;commode chair  -DN  toileting skills, all;commode chair  -DN    [place] Level (Toileting Goal 2,  OT-IRF)  conditional independence;tactile cues required;verbal cues required  -DN  conditional independence;tactile cues required;verbal cues required  -DN  conditional independence;tactile cues required;verbal cues required  -DN    Time Frame (Toileting Goal 2, OT-IRF)  long term goal (LTG)  -DN  long term goal (LTG)  -DN  long term goal (LTG)  -DN    Progress/Outcomes (Toileting Goal 2, OT-IRF)  goal met  -DN  goal ongoing  -DN  goal ongoing  -DN       Strength Goal 1 (OT-IRF)    Strength Goal 1 (OT-IRF)  pt to be setup with BUE HEP  -DN  pt to be setup with BUE HEP  -DN  pt to be setup with BUE HEP  -DN    Time Frame (Strength Goal 1, OT-IRF)  short term goal (STG)  -DN  short term goal (STG)  -DN  short term goal (STG)  -DN    Progress/Outcomes (Strength Goal 1, OT-IRF)  goal met  -DN  goal met  -DN  goal revised this date  -DN       Strength Goal 2 (OT-IRF)    Strength Goal 2 (OT-IRF)  pt to be independent with HEP for BUEs  -DN  pt to be independent with HEP for BUEs  -DN  pt to be independent with HEP for BUEs  -DN    Time Frame (Strength Goal 2, OT-IRF)  long term goal (LTG)  -DN  long term goal (LTG)  -DN  long term goal (LTG)  -DN    Progress/Outcomes (Strength Goal 2, OT-IRF)  goal met  -DN  goal ongoing  -DN  goal ongoing  -DN       Balance Goal 1 (OT)    Activity/Assistive Device (Balance Goal 1, OT)  assistive device use  -DN  assistive device use  -DN  assistive device use  -DN    [place] Level/Cues Needed (Balance Goal 1, OT)  contact guard assist during adls  -DN  contact guard assist during adls  -DN  contact guard assist during adls  -DN    Time Frame (Balance Goal 1, OT)  short term goal (STG)  -DN  short term goal (STG)  -DN  short term goal (STG)  -DN    Progress/Outcomes (Balance Goal 1, OT)  goal met  -DN  goal met;goal revised this date  -DN  goal ongoing  -DN       Balance Goal 2 (OT)    Activity/Assistive Device (Balance Goal 2, OT)  assistive device use;standing, dynamic;walker,  rolling  -DN  assistive device use;standing, dynamic;walker, rolling  -DN  assistive device use;standing, dynamic;walker, rolling  -DN    [place] Level (Balance Goal 2, OT)  conditional independence during adls and home mgmt  -DN  conditional independence during adls and home mgmt  -DN  supervision required during adls and home mgmt  -DN    Time Frame (Balance Goal 2, OT)  long term goal (LTG)  -DN  long term goal (LTG)  -DN  long term goal (LTG)  -DN    Progress/Outcome (Balance Goal 2, OT)  goal met  -DN  goal ongoing  -DN  goal ongoing  -DN       Caregiver Training Goal 2 (OT-IRF)    Caregiver Training Goal 2 (OT-IRF)  pt family or available caregivers to be independent with assist needed with adls, transfers etc  -DN  pt family or available caregivers to be independent with assist needed with adls, transfers etc  -DN  pt family or available caregivers to be independent with assist needed with adls, transfers etc  -DN    Time Frame (Caregiver Training Goal 2, OT-IRF)  long term goal (LTG)  -DN  long term goal (LTG)  -DN  long term goal (LTG)  -DN    Progress/Outcomes (Caregiver Training Goal 2, OT-IRF)  goal met  -DN  goal ongoing  -DN  goal ongoing  -DN      User Key  (r) = Recorded By, (t) = Taken By, (c) = Cosigned By    Initials Name Provider Type    Prince Morfin OT Occupational Therapist          Occupational Therapy Education                 Title: PT OT SLP Therapies (Done)     Topic: Occupational Therapy (Done)     Point: ADL training (Done)     Description:   Instruct learner(s) on proper safety adaptation and remediation techniques during self care or transfers.   Instruct in proper use of assistive devices.              Learning Progress Summary           Patient Acceptance, E, VU by KAYLEN at 5/8/2020 5874    Comment:  conferance held with pt and staff today to discuss current status with adls, transfers, equipment needs, recommeded further therapies after d/c    Acceptance, E, VU,NR by KAYLEN at  4/28/2020 1437    Comment:  with pt in our bathroom to help decide realistic equipment needs at home in her own bathroom, pt states batroom very small, walker may not fit in bathroom, and that she dont have any person to help make any changes needed to make more accessable    Acceptance, E,TB,D, VU,NR by DN at 4/21/2020 1452    Comment:  OT role, transfers, adaptive adls                   Point: Home exercise program (Done)     Description:   Instruct learner(s) on appropriate technique for monitoring, assisting and/or progressing therapeutic exercises/activities.              Learning Progress Summary           Patient Acceptance, E, VU by DN at 5/8/2020 1504    Comment:  conferance held with pt and staff today to discuss current status with adls, transfers, equipment needs, recommeded further therapies after d/c    Acceptance, E, VU,NR by DN at 4/28/2020 1437    Comment:  with pt in our bathroom to help decide realistic equipment needs at home in her own bathroom, pt states batroom very small, walker may not fit in bathroom, and that she dont have any person to help make any changes needed to make more accessable    Acceptance, E,TB,D, VU,NR by DN at 4/21/2020 1452    Comment:  OT role, transfers, adaptive adls                   Point: Precautions (Done)     Description:   Instruct learner(s) on prescribed precautions during self-care and functional transfers.              Learning Progress Summary           Patient Acceptance, E, VU by DN at 5/8/2020 1504    Comment:  conferance held with pt and staff today to discuss current status with adls, transfers, equipment needs, recommeded further therapies after d/c    Acceptance, E, VU,NR by DN at 4/28/2020 1437    Comment:  with pt in our bathroom to help decide realistic equipment needs at home in her own bathroom, pt states batroom very small, walker may not fit in bathroom, and that she dont have any person to help make any changes needed to make more accessable     Acceptance, E,TB,D, VU,NR by KAYLEN at 4/21/2020 1452    Comment:  OT role, transfers, adaptive adls                   Point: Body mechanics (Done)     Description:   Instruct learner(s) on proper positioning and spine alignment during self-care, functional mobility activities and/or exercises.              Learning Progress Summary           Patient Acceptance, E, VU by KAYLEN at 5/8/2020 1504    Comment:  conferance held with pt and staff today to discuss current status with adls, transfers, equipment needs, recommeded further therapies after d/c    Acceptance, E, VU,NR by KAYLEN at 4/28/2020 1437    Comment:  with pt in our bathroom to help decide realistic equipment needs at home in her own bathroom, pt states batroom very small, walker may not fit in bathroom, and that she dont have any person to help make any changes needed to make more accessable    Acceptance, E,TB,D, VU,NR by KAYLEN at 4/21/2020 1452    Comment:  OT role, transfers, adaptive adls                               User Key     Initials Effective Dates Name Provider Type Discipline    DN 06/08/18 -  Prince Obrien OT Occupational Therapist OT                       Time Calculation:     Time Calculation- OT     Row Name 05/11/20 1624 05/11/20 0800          Time Calculation- OT    OT Start Time  1430  -DN  0800  -DN     OT Stop Time  1515  -DN  0900  -DN     OT Time Calculation (min)  45 min  -DN  60 min  -DN     OT Received On  05/11/20  -DN  05/11/20  -DN       User Key  (r) = Recorded By, (t) = Taken By, (c) = Cosigned By    Initials Name Provider Type    Prince Morfin OT Occupational Therapist          Therapy Charges for Today     Code Description Service Date Service Provider Modifiers Qty    50413959180  OT SELF CARE/MGMT/TRAIN EA 15 MIN 5/11/2020 Prince Obrien OT GO 5    84568691186 HC OT THER PROC EA 15 MIN 5/11/2020 Prince Obrien OT GO 1                   Prince Obrien OT  5/11/2020

## 2020-05-11 NOTE — PROGRESS NOTES
SECTION GG      Self Care Performance Discharge:   Oral Hygiene: Patient completed the activities by him/herself with no  assistance from a helper.   Toileting Hygiene: : Patient completed the activities by him/herself with no  assistance from a helper.   Shower/Bathe Self: Patient completed the activities by him/herself with no  assistance from a helper.   Upper Body Dressing: Patient completed the activities by him/herself with no  assistance from a helper.   Lower Body Dressing: Patient completed the activities by him/herself with no  assistance from a helper.   Putting On/Taking Off Footwear: Not applicable.    Mobility Toilet Transfer Discharge: Patient completed the activities by  him/herself with no assistance from a helper.    Signed by: Prince Obrien OTR/JOSÉ MIGUEL

## 2020-05-11 NOTE — THERAPY DISCHARGE NOTE
Inpatient Rehabilitation - Physical Therapy Progress Note/Discharge  [facility]     Patient Name: Oralia Sánchez  : 1975  MRN: [id number]  Today's Date: 2020             Admit Date: 2020    Visit Dx:  No diagnosis found.  Patient Active Problem List   Diagnosis   • Vitamin D deficiency   • Awareness of heartbeats   • Adiposity   • YOUNG (nonalcoholic steatohepatitis)   • Hypothyroid   • Diabetes mellitus arising in pregnancy   • Diabetes (CMS/HCC)   • Metabolic syndrome   • Chest pain   • Primary thunderclap headache   • Elevated LFTs   • Tobacco abuse   • Rheumatoid arthritis (CMS/HCC)   • Type 2 diabetes mellitus (CMS/HCC)   • Morbid obesity (CMS/HCC)   • UTI (urinary tract infection), bacterial   • Cerebral aneurysm   • Dyspnea   • Cough   • Hypomagnesemia   • Metabolic acidosis   • Fatigue   • History of COPD   • Abnormal CT scan, colon   • 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  Instruct in the proper use of assistive devices, such as walker, crutches, cane or brace.              Patient Friendly Description:   It's important to get you on your feet again, but we need to do so in a way that is safe for you. Falling has serious consequences, and your personal safety is the most important thing of all.        When it's time to get out of bed, one of us or a family member will sit next to you on the bed to give you support.     If your doctor or nurse tells you to use a walker, crutches, a cane, or a brace, be sure you use  it every time you get out of bed, even if you think you don't need it.    Learning Progress Summary           Patient Acceptance, E, DU by LB at 5/11/2020 1159    Comment:  Stairs with 2 rails.    Acceptance, E,TB, VU,NR by EE at 5/9/2020 1423    Acceptance, E,TB,D, DU,VU by LB at 5/8/2020 1140    Comment:  stairs with one rail sideways and then 2 rails forward, recommended OP PT    Acceptance, E,TB,D, VU,NR by EE at 5/7/2020 1223    Acceptance, E,TB, VU,NR by EE at 5/6/2020 0937    Acceptance, E, VU,NR by EE at 5/5/2020 1043    Acceptance, D, NR by LB at 5/4/2020 1522    Comment:  stairs with one rail and a quad tipped cane    Acceptance, E,TB,D, NR by LB at 5/4/2020 1420    Comment:  Stairs with one rail and quad tipped cane    Acceptance, TB,D,E, NR by LB at 5/4/2020 1148    Acceptance, E,TB, VU,NR by EE at 5/1/2020 1126    Acceptance, E,TB,D, NR by LB at 4/30/2020 1538    Comment:  Stairs with one rail and quad tipped cane.    Acceptance, E,TB,D, NR by LB at 4/27/2020 1616    Comment:  Stairs with one rail and HHA .    Acceptance, E, NR by LB at 4/25/2020 1352                   Point: Home exercise program (Done)     Description:   Instruct learner(s) on appropriate technique for monitoring, assisting and/or progressing patient with therapeutic exercises and activities.              Learning Progress Summary           Patient Acceptance, E,TB, VU,NR by EE at 5/9/2020 1423    Acceptance, E,TB,D, VU,NR by EE at 5/7/2020 1223    Acceptance, E,TB, VU,NR by EE at 5/6/2020 0937    Acceptance, E, VU,NR by EE at 5/5/2020 1043    Acceptance, E,TB, VU,NR by EE at 5/1/2020 1126                   Point: Body mechanics (Done)     Description:   Instruct learner(s) on proper positioning and spine alignment for patient and/or caregiver during mobility tasks and/or exercises.              Learning Progress Summary           Patient Acceptance, E,TB, VU,NR by EE at 5/9/2020 1423    Acceptance, E,TB,D, VU,NR by EE at 5/7/2020  1223    Acceptance, E,TB, VU,NR by EE at 5/6/2020 0937    Acceptance, E, VU,NR by EE at 5/5/2020 1043                   Point: Precautions (Done)     Description:   Instruct learner(s) on prescribed precautions during mobility and gait tasks              Learning Progress Summary           Patient Acceptance, E,TB, VU,NR by EE at 5/9/2020 1423    Acceptance, E,TB,D, VU,NR by EE at 5/7/2020 1223    Acceptance, E,TB, VU,NR by EE at 5/6/2020 0937    Acceptance, E, VU,NR by EE at 5/5/2020 1043    Acceptance, E, VU by MD at 5/2/2020 1213    Acceptance, E, VU by MD at 5/1/2020 1229    Acceptance, E, VU by MD at 4/29/2020 0924    Acceptance, E, VU by MD at 4/28/2020 0927    Acceptance, E, VU by MD at 4/24/2020 0955    Acceptance, E, NR by MD at 4/23/2020 0912    Acceptance, E, VU by MD at 4/22/2020 0923    Acceptance, E, VU by MD at 4/21/2020 1202                               User Key     Initials Effective Dates Name Provider Type Discipline    LB 06/08/18 -  Preeti Reynaga, PT Physical Therapist PT    EE 04/03/18 -  Irlanda Wallis, PT Physical Therapist PT    MD 04/03/18 -  Serena Philip PT Physical Therapist PT              PT IRF GOALS     Row Name 05/11/20 1500             Transfer Goal 2 (PT-IRF)    Activity/Assistive Device (Transfer Goal 2, PT-IRF)  sit-to-stand/stand-to-sit;walker, rolling  -LB      [place] Level (Transfer Goal 2, PT-IRF)  conditional independence  -LB      Progress/Outcomes (Transfer Goal 2, PT-IRF)  goal met  -LB         Transfer Goal 3 (PT-IRF)    Activity/Assistive Device (Transfer Goal 3, PT-IRF)  car transfer;walker, rolling  -LB      [place] Level (Transfer Goal 3, PT-IRF)  standby assist  -LB      Progress/Outcomes (Transfer Goal 3, PT-IRF)  goal partially met requierd CGA   -LB         Gait/Walking Locomotion Goal 1 (PT-IRF)    Activity/Assistive Device (Gait/Walking Locomotion Goal 1, PT-IRF)  gait (walking locomotion);walker, rolling  -LB      Gait/Walking Locomotion Distance  "Goal 1 (PT-IRF)  80  -LB      [place] Level (Gait/Walking Locomotion Goal 1, PT-IRF)  verbal cues required;contact guard assist  -LB      Progress/Outcomes (Gait/Walking Locomotion Goal 1, PT-IRF)  goal met  -LB         Gait/Walking Locomotion Goal 2 (PT-IRF)    Activity/Assistive Device (Gait/Walking Locomotion Goal 2, PT-IRF)  gait (walking locomotion);walker, rolling  -LB      Gait/Walking Locomotion Distance Goal 2 (PT-IRF)  200  -LB      Progress/Outcomes (Gait/Walking Locomotion Goal 2, PT-IRF)  goal met  -LB         Stairs Goal 1 (PT-IRF)    Activity/Assistive Device (Stairs Goal 1, PT-IRF)  stairs, all skills;using handrail, left  -LB      Number of Stairs (Stairs Goal 1, PT-IRF)  4  -LB      Progress/Outcomes (Stairs Goal 1, PT-IRF)  goal met pt required CGA   -LB         Stairs Goal 2 (PT-IRF)    Activity/Assistive Device (Stairs Goal 2, PT-IRF)  stairs, all skills;using handrail, left;other (see comments)  -LB      Number of Stairs (Stairs Goal 2, PT-IRF)  8  -LB      [place] Level (Stairs Goal 2, PT-IRF)  contact guard assist  -LB      Progress/Outcomes (Stairs Goal 2, PT-IRF)  goal partially met  -LB        User Key  (r) = Recorded By, (t) = Taken By, (c) = Cosigned By    Initials Name Provider Type    LB Preeti Reynaga PT Physical Therapist          Therapy Treatment    IRF Treatment Summary     Row Name 05/11/20 0900             Evaluation/Treatment Time and Intent    Subjective Information  no complaints \"My hands feel better today.I finally had a full night sleep  -LB      Document Type  therapy note (daily note)  -LB      Mode of Treatment  physical therapy  -LB      Patient/Family Observations  Pt up in w/c in no acute distress.  -LB      Recorded by [LB] Preeti Reynaga PT      Row Name 05/11/20 0900             Cognition/Psychosocial- PT/OT    Affect/Mental Status (Cognitive)  WNL  -LB      Orientation Status (Cognition)  oriented x 4  -LB      Follows Commands (Cognition)  WNL  -LB   "    Personal Safety Interventions  nonskid shoes/slippers when out of bed  -LB      Recorded by [LB] Preeti Reynaga, PT      Row Name 05/11/20 0900             Bed Mobility Assessment/Treatment    Rolling Left [place] (Bed Mobility)  independent  -LB      Rolling Right [place] (Bed Mobility)  independent  -LB      Scooting/Bridging [place] (Bed Mobility)  independent  -LB      Supine-Sit [place] (Bed Mobility)  independent  -LB      Recorded by [LB] Preeti Reynaga, PT      Row Name 05/11/20 0900             Sit-Stand Transfer    Sit-Stand [place] (Transfers)  conditional independence  -LB      Assistive Device (Sit-Stand Transfers)  walker, front-wheeled  -LB      Recorded by [LB] Preeti Reynaga, PT      Row Name 05/11/20 0900             Stand-Sit Transfer    Stand-Sit [place] (Transfers)  conditional independence;supervision  -LB      Assistive Device (Stand-Sit Transfers)  walker, front-wheeled  -LB      Recorded by [LB] Preeti Reynaga, PT      Row Name 05/11/20 0900             Car Transfer    Type (Car Transfer)  sit-stand;stand-sit  -LB      [place] Level (Car Transfer)  contact guard;verbal cues  -LB      Assistive Device (Car Transfer)  walker, front-wheeled  -LB      Recorded by [LB] Preeti Reynaga, PT      Row Name 05/11/20 0900             Gait/Stairs Assessment/Training    [place] Level (Gait)  conditional independence  -LB      Assistive Device (Gait)  walker, front-wheeled  -LB      Distance in Feet (Gait)  90' within the room, 200' x 2   -LB      Pattern (Gait)  step-through  -LB      Deviations/Abnormal Patterns (Gait)  quang decreased;stride length decreased  -LB      [place] Level (Stairs)  contact guard  -LB      Handrail Location (Stairs)  both sides first step sideways with R rail   -LB      Number of Steps (Stairs)  8  -LB      Ascending Technique (Stairs)  step-to-step  -LB      Descending Technique (Stairs)  step-to-step  -LB      Comment  (Gait/Stairs)  Pt ambulated within her room and into the bathroom with the rolling walker conditinal independent. Pt ambulated ~ 80' x 2 with quad tipped cane with CGA.   -LB      Recorded by [LB] Preeti Reynaga, PT      Row Name 05/11/20 0900             Rough/Uneven Surface Gait Skills (Mobility)    [place], Gait on Rough/Uneven Surface (Mobility)  contact guard  -LB      Comment, Gait Rough/Uneven Surface (Mobility)  10' with RWx   -LB      Recorded by [LB] Preeti Reynaga, PT      Row Name 05/11/20 0900             MMT Right Lower Ext    Rt Hip Flexion MMT, Gross Movement  (5/5) normal  -LB      Rt Hip ABduction MMT, Gross Movement  (5/5) normal  -LB      Rt Knee Extension MMT, Gross Movement  (5/5) normal  -LB      Rt Knee Flexion MMT, Gross Movement  -- max resistance in sitting  -LB      Rt Ankle Dorsiflexion MMT, Gross Movement  (4/5) good  -LB      Recorded by [LB] Preeti Reynaga, PT      Row Name 05/11/20 0900             MMT Left Lower Ext    Lt Hip Flexion MMT, Gross Movement  (5/5) normal  -LB      Lt Hip ABduction MMT, Gross Movement  (5/5) normal  -LB      Lt Knee Extension MMT, Gross Movement  (5/5) normal  -LB      Lt Knee Flexion MMT, Gross Movement  -- with max resistance  -LB      Lt Ankle Dorsiflexion MMT, Gross Movement  (4/5) good  -LB      Recorded by [LB] Preeti Reynaga, PT      Row Name 05/11/20 0900             Pain Scale: Numbers Pre/Post-Treatment    Pain Scale: Numbers, Pretreatment  1/10  -LB      Pain Scale: Numbers, Post-Treatment  1/10  -LB      Pain Location - Side  Bilateral  -LB      Pain Location  hand  -LB      Recorded by [LB] Preeti Reynaga, PT      Row Name 05/11/20 0900             Static Standing Balance    Level of [place] (Supported Standing, Static Balance)  contact guard assist pt was able to  a cup   -LB      Recorded by [LB] Preeti Reynaga, PT      Row Name 05/11/20 0900             Positioning and Restraints    Pre-Treatment Position  in bed  -LB       In Bed  notified nsg;call light within reach Pt is independent in her room   -LB      Recorded by [CALI] Preeti Reynaga, PT        User Key  (r) = Recorded By, (t) = Taken By, (c) = Cosigned By    Initials Name Effective Dates    Preeti Combs, PT 06/08/18 -           PT Recommendation and Plan               Time Calculation:   PT Charges     Row Name 05/11/20 1539 05/11/20 1200          Time Calculation    Start Time  1330  -LB  0900  -LB     Stop Time  1400  -LB  1000  -LB     Time Calculation (min)  30 min  -LB  60 min  -LB     PT Received On  --  05/11/20  -LB       User Key  (r) = Recorded By, (t) = Taken By, (c) = Cosigned By    Initials Name Provider Type    Preeti Combs, PT Physical Therapist          Therapy Charges for Today     Code Description Service Date Service Provider Modifiers Qty    70211104303 HC PT THER PROC EA 15 MIN 5/11/2020 Preeti Reynaga, PT GP 6          PT G-Codes  Outcome Measure Options: Jasso Balance  Jasso Total Score: 33         Preeti Reynaga, PT  5/11/2020

## 2020-05-11 NOTE — NURSING NOTE
"Diabetes Education  Assessment/Teaching    Patient Name:  Oralia Sánchez  YOB: 1975  MRN: [id number]  Admit Date:  4/20/2020      Assessment Date:  5/11/2020    Most Recent Value   General Information    Referral From:  Other -rehab staff for insulin teaching/review. F/u with 45 y/o at bedside to assess further needs for DM ed prior to dc tomorrow.    Height  154.9 cm (61\")   Height Method  Actual   Weight  105 kg (232 lb 8 oz)   Weight Method  Standing scale   Diabetes History   What type of diabetes do you have?  Type 2   Have you had diabetes education/teaching in the past?  --pt has a hx of GDM-insulin-requiring.    Do you test your blood sugar at home?  no   Education Preferences   Barriers to Learning  --no barriers evident this morning.    Assessment Topics   Taking Medication - Assessment  Needs education/Review. anticipate pt to dc home w/both -long-acting and fast-acting- Lantus & Humalog insulins. Pt able to give own insulin injections per RN report.    Problem Solving - Assessment  --Review: hypoglycemia/tx.   Healthy Coping - Assessment  Competent            Most Recent Value   DM Education Needs   Meter  --Accuchek Misti Plus meter rx has been sent to pt [facility] by our pharmD   Meter Type  Accuchek Misti Plus   Frequency of Testing  AC meals [advise pt to check BG at least tid ac meals. ]   Medication  Insulin, Administration, Vial. Reinforce Lantus/Levemir, Humalog names/doses/scheduling.    Problem Solving  Hypoglycemia, Treatment. Advise pt to carry source of carbs when away from home in case of episode of low BG.   Healthy Coping  Appropriate   Discharge Plan  Home, Follow-up with MD. d/w pt f/u with her outpt endo Dr Choudhury for BG control. Advise pt to call endo in case of repeat low BG.    Teaching Method  Explanation, Discussion, Teach back   Patient Response  Verbalized understanding        Comment: pt is requesting insulin rxs as Vial/syringe d/t concern over cost/potential " co-pays.         Electronically signed by:  Cherry Bosch RN, BSN, CDE   05/11/20 11:45

## 2020-05-11 NOTE — PROGRESS NOTES
SECTION GG      Mobility Performance Discharge:     Roll Left and Right: Patient completed the activities by him/herself with no  assistance from a helper.   Sit to Lying: Patient completed the activities by him/herself with no  assistance from a helper.   Lying to Sitting on Side of Bed: Patient completed the activities by  him/herself with no assistance from a helper.   Sit to Stand: Patient completed the activities by him/herself with no  assistance from a helper.   Chair/Bed to Chair Transfer: Patient completed the activities by him/herself  with no assistance from a helper.   Car Transfer: [place] provides verbal cues and/or touching/steadying and/or  contact guard assistance as patient completes activity. Assistance may be  provided throughout the activity or intermittently.   Walk 10 Feet:   Patient completed the activities by him/herself with no  assistance from a helper.  Walk 50 Feet with 2 Turns:   Patient completed the activities by him/herself  with no assistance from a helper.  Walk 150 Feet:   Patient completed the activities by him/herself with no  assistance from a helper.  Walking 10 Feet on Uneven Surfaces:   [place] provides verbal cues and/or  touching/steadying and/or contact guard assistance as patient completes  activity. Assistance may be provided throughout the activity or intermittently.  1 Step Over Curb or Up/Down Stair:   [place] provides verbal cues and/or  touching/steadying and/or contact guard assistance as patient completes  activity. Assistance may be provided throughout the activity or intermittently.  4 Steps Up and Down, With/Without Rail:   [place] provides verbal cues and/or  touching/steadying and/or contact guard assistance as patient completes  activity. Assistance may be provided throughout the activity or intermittently.  12 Steps Up and Down, With/Without Rail:   Not attempted due to medical or  safety concerns.  Picking up an Object:   [place] provides verbal cues and/or  touching/steadying  and/or contact guard assistance as patient completes activity. Assistance may be  provided throughout the activity or intermittently. Uses Wheelchair and/or  Scooter: No    Signed by: Preeti Reynaga PT

## 2020-05-11 NOTE — PROGRESS NOTES
Adult Nutrition  Assessment/PES    Patient Name:  Oralia Sánchez  YOB: 1975  MRN: [id number]  Admit Date:  4/20/2020    Assessment Date:  5/11/2020    Comments:  Intake 100%. Glucoses in 100's. Noted plan for dc tomorrow.     Reason for Assessment     Row Name 05/11/20 0912          Reason for Assessment    Reason For Assessment  follow-up protocol           Anthropometrics     Row Name 05/11/20 0912 05/11/20 0625       Anthropometrics    Weight  --  105 kg (232 lb 8 oz)       Usual Body Weight (UBW)    Weight Loss Time Frame  stable wt  --        Labs/Tests/Procedures/Meds     Row Name 05/11/20 0912          Labs/Procedures/Meds    Lab Results Reviewed  reviewed     Lab Results Comments  glu         Diagnostic Tests/Procedures    Diagnostic Test/Procedure Reviewed  reviewed        Medications    Pertinent Medications Reviewed  reviewed         Physical Findings     Row Name 05/11/20 0912          Physical Findings    Overall Physical Appearance  obese           Nutrition Prescription Ordered     Row Name 05/11/20 0912          Nutrition Prescription PO    Current PO Diet  Regular         Evaluation of Received Nutrient/Fluid Intake     Row Name 05/11/20 0912          PO Evaluation    Number of Days PO Intake Evaluated  3 days     % PO Intake  100               Problem/Interventions:          Intervention Goal     Row Name 05/11/20 0913          Intervention Goal    General  Maintain nutrition     PO  Maintain intake             Education/Evaluation     Row Name 05/11/20 0913          Education    Education  Education offered and refused        Monitor/Evaluation    Monitor  Per protocol           Electronically signed by:  Elizabeth Jordan RD  05/11/20 09:13

## 2020-05-11 NOTE — PROGRESS NOTES
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     History taken from: patient    Objective     Vital Signs  Temp:  [97.7 °F (36.5 °C)-98.7 °F (37.1 °C)] 97.7 °F (36.5 °C)  Heart Rate:  [88-95] 95  Resp:  [18-20] 18  BP: (122-124)/(74-86) 124/74    Objective:  Vital signs: (most recent): Blood pressure 124/74, pulse 95, temperature 97.7 °F (36.5 °C), temperature source Oral, resp. rate 18, height 154.9 cm (61\"), weight 105 kg (232 lb 8 oz), SpO2 98 %, not currently breastfeeding.               Gen - nad, sitting in bed comfortably  HEENT- NCAT, PUPILS EQUALLY ROUND, SCLERAE ANICTERIC, CONJUNCTIVAE PINK, OP MOIST, NO JVD, EARS UNREMARKABLE EXTERNALLY  LUNGS -normal respirations. equal chest rise, ctab no w/r/c  CV - rrr no m/r/g   EXT - NO EDEMA OR CYANOSIS   Skin - small bruise on left forearm, no warmth  NEURO -   A/ox4, speech is fluent, follows all commands  MOTOR EXAM -JONES x4, symmetric muscle bulk  Antigravity BUE strength  No red or swollen joints in hands or wrists, able to flex and extend fingers     strength - improved in bilateral hands-takes resistance bilaterally.  Psych - appropriate mood and affect    Results Review:     I reviewed the patient's new clinical results.     Results for " SENTHIL SMITH (MRN [id number]) as of 5/10/2020 07:16   Ref. Range 5/9/2020 07:06 5/9/2020 11:09 5/9/2020 16:07 5/10/2020 04:15 5/10/2020 07:02   Glucose Latest Ref Range: 70 - 130 mg/dL 146 (H) 112 91  126     Results for SENTHIL SMITH (MRN [id number]) as of 5/10/2020 07:16   Ref. Range 5/10/2020 04:15   C-Reactive Protein Latest Ref Range: 0.00 - 0.50 mg/dL 4.73 (H)     Results for SENTHIL SMITH (MRN [id number]) as of 5/10/2020 07:16   Ref. Range 5/10/2020 04:15   Sed Rate Latest Ref Range: 0 - 20 mm/hr 74 (H)     Results for SENTHIL SMITH (MRN [id number]) as of 5/10/2020 07:16   Ref. Range 4/10/2020 05:24 5/10/2020 04:15   Sed Rate Latest Ref Range: 0 - 20 mm/hr 27 (H) 74 (H)     Results for SENTHIL SMITH (MRN [id number]) as of 5/10/2020 07:16   Ref. Range 4/1/2020 08:09 4/3/2020 06:20 4/10/2020 05:24 4/13/2020 04:42 5/10/2020 04:15   C-Reactive Protein Latest Ref Range: 0.00 - 0.50 mg/dL 0.63 (H) 0.55 (H) 5.14 (H) 3.04 (H) 4.73 (H)       Medication Review:   Scheduled Meds:    amLODIPine 10 mg Oral Nightly   budesonide-formoterol 2 puff Inhalation BID - RT   DULoxetine 60 mg Oral Daily   folic acid 1 mg Oral Daily   hydroCHLOROthiazide Oral 25 mg Oral Daily   insulin glargine 20 Units Subcutaneous QAM   insulin lispro 0-14 Units Subcutaneous TID AC   insulin lispro 14 Units Subcutaneous TID With Meals   levothyroxine 200 mcg Oral Q AM   lidocaine 1 patch Transdermal Q24H   pantoprazole 40 mg Oral BID AC   pregabalin 150 mg Oral Q12H   rOPINIRole 0.5 mg Oral Nightly   sucralfate 1 g Oral 4x Daily AC & at Bedtime     Continuous Infusions:   PRN Meds:.Benzocaine  •  benzonatate  •  calcium carbonate  •  dextrose  •  dextrose  •  diphenhydrAMINE  •  diphenhydrAMINE  •  glucagon (human recombinant)  •  guaiFENesin-dextromethorphan  •  ibuprofen  •  melatonin  •  ondansetron  •  oxyCODONE  •  polyethylene glycol  •  potassium chloride **OR** potassium chloride **OR** potassium chloride  •  sodium  chloride      Assessment/Plan       YOUNG (nonalcoholic steatohepatitis)    Hypothyroid    Type 2 diabetes mellitus (CMS/HCC)    GBS (Guillain [place] syndrome) (CMS/HCC)    Elevated transaminase level    History of gestational diabetes    Steroid-induced hyperglycemia    Elevated aldolase level      Assessment & Plan     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  This is far greater than expected based on the nerve conduction findings.  This suggests either motor neuron disease or a primarily axonal polyneuropathy such as axonal Guillain Barré syndrome based on the patient's symptom history. F/U Dr Juventino Gaytan in 2-3 months.     Previous question of dermatomyositis or autoimmune process-trial of steroids-tapering off  April 27-has been on prednisone 40 mg daily since April 10 empiric trial for previous question of dermatomyositis but now not felt clinically present-taper off of prednisone 30 mg daily x3 days, 20 mg daily x3 days, 10 mg daily x3 days, 5 mg daily x3 days, then stop.  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 Also on oxycodone.  April 27-has some pain in the upper extremities.  Doubt that it is bilateral joint pain as she has been on a reasonable dose of prednisone for the last 16 days.  May be a variant for the neuropathic pain.  Will  titrate up on Lyrica 200 mg every 12 hours, watch for any myoclonic jerks.  May 1- taking oxycodone 5 mg about 5 times a day, need to taper off over next week, changed to Oxycodone 5 mg q 6 hours prn x 3 days, then q 8 hours prn x 3 days, then q 12 hours prn x three days, then stop. SVETLANA reviewed.   May 2-add on Cymbalta 30 mg daily for 7 days and increase to 60 mg daily.  Discussed other options of titrating up on Lyrica or switching to gabapentin.  May 4 - Cymbalta is helping  May 6 - increase to 60mg in a few days     DVT prophylaxis - SCDs        Hepatic steatosis-elevated LFTs  April 27-gastroenterology following  April 29 - reviewed with gastroenterology - a combination of fatty liver (the treatment would be weight loss) and Ebstein Horan viral hepatitis (the only treatment would be supportive care)- recommend against a liver biopsy. Follow up in three months with repeat labs and full colonscopy.  May 5 - EBV labs reviewed, and will need outpatient GI f/up for hepatic steatosis  May 8 - recheck AM labs     Restless leg syndrome -   May 4 - Will trial Requip  May 6 - will increase Requip dosage and see if helps sleep.      Joint pain -   May 8 - patient reports arthritic pain in hands that ibuprofen has helped in past.  Will hold on adding ibuprofen until steroids weaned.   May 9 - question if steroid taper is contributing to joint pain in hands. Now on increased cymbalta dosage as well.  Will check ESR and CRP in AM.  Add ibuprofen prn with steroids discontinuing.  May 10 - ESR and CRP elevated.  Bilateral hand improved today with prn ibuprofen.  She is now off steroid taper.  Will check hand xrays.  Denies FHx of arthritic conditions.  Rheum consult to establish care and likely proceed with further workup as outpatient as well.  May 11-bilateral wrist pain improved with use of nonsteroidal anti-inflammatory drug now that off of steroid taper.  Better  today.  X-ray showed right greater than left wrist  degenerative changes.     Sleep disturbance  May 6 - reviewed meds with pharmacy.  Will trial increase of Requip and steroids could be contributing but are currently being tapered.  May 7 - slept better last night     May 7 - Plan is for patient to go to mother's home at discharge.  Patient feels comfortable with this plan.     April 27-gait 80 feet CTG-min assist.  Team conference in the a.m.     TEAM CONF - April 28- BED CTG. 4 STAIRS MIN. GAIT 100 FEET CTG-MIN RW. TOILET TRANSFERS CTG. SHOWER TRANSFERS CTG.  UBD MIN ASSIST FOR BRA. LBD CTG. BATH CTG. GROOMING SBA.  TOILETING MOD INDEPENDENT. CONTINENT BOWEL AND BLADDER.   DIETICIAN EDUCATED ON DIABETIC DIET ON STEROIDS BUT PATIENT RESISTANT TO INSTRUCTION.   NEEDS TO BE MODIFIED INDEPENDENT FOR HOME  ELOS- TWO WEEKS     TEAM CONF - May 5 -   Bed/Chair/Wc SBA,   Stairs 4 with CGa/MIN A with 1 HR and Quad tipped cane  Amb 160ft CGa RWx, Min A with a cane  Toilet Tx SBA RWx, Tub/Shower Tx CGa RWx  Bathing SBA, LBD SBA, UBD Min A with bra, grooming SBA seated  Toileting SBA  Continent of bowel and bladder  2L O2 during night (h/o ERLIN with CPAP at home)  ELOS - 1 week home with assistance vs SNF  Discussed at length with patient this option of disposition.  Patient feels she does not have anyone to assist her.  Will look into SNF facility as she still requires assistance and would benefit from further inpatient rehab.     During rounds, used appropriate personal protective equipment including mask and gloves.  Mask used was standard procedure mask. Appropriate PPE was worn during the entire visit.  Hand hygiene was completed before and after.       Phill Campos MD  05/11/20  16:13    Time:

## 2020-05-11 NOTE — PROGRESS NOTES
Case Management  Inpatient Rehabilitation Plan of Care and Discharge Plan Note    Rehabilitation Diagnosis:  Branch  Date of Onset:  Branch    Medical Summary:  Branch  Past Medical History: Branch    Plan of Care  Updated Problems/Interventions  Field    Expected Intensity:  Branch  Interdisciplinary Team:  Tanisha  Estimated Length of Stay/Anticipated Discharge Date: Branch  Anticipated Discharge Destination:  Anticipated discharge destination from inpatient rehabilitation is community  discharge with assistance. Family conference held 5/8/2020. Pt will discharge to  her mother's home where she will have daily assistance.  Outpatient therapies to  be scheduled at [facility]..      Based on the patient's medical and functional status, their prognosis and  expected level of functional improvement is:  Tanisha    Signed by: JARED Cox

## 2020-05-11 NOTE — PLAN OF CARE
Problem: Patient Care Overview  Goal: Coping Plan  Outcome: Ongoing (interventions implemented as appropriate)     Problem: Fall Risk (Adult)  Goal: Absence of Fall  Outcome: Ongoing (interventions implemented as appropriate)     Problem: Skin Injury Risk (Adult)  Goal: Skin Health and Integrity  Outcome: Ongoing (interventions implemented as appropriate)     Problem: Pain, Acute (Adult)  Goal: Acceptable Pain Control/Comfort Level  Outcome: Ongoing (interventions implemented as appropriate)       Pt A/Ox4, OOB x1 CGA w RW. Pt c/o aching pain in hands; prn oxycodone and prn ibuprofen given. Pt continent of urine overnight using toilet. O2 at 2L via nasal cannula worn at night. No acute events or changes overnight.

## 2020-05-11 NOTE — PLAN OF CARE
Problem: Patient Care Overview  Goal: Plan of Care Review  Outcome: Ongoing (interventions implemented as appropriate)  Flowsheets  Taken 5/11/2020 0901 by Herlinda Quesada, RN  Outcome Summary: Pt. has slight swelling in both hands, some weakness in both hands, numbness and pain. Rheumatology consult. A&OX4. Continent of B&B. Indpendent in room now. D/C tomorrow. Regular diet, thin liquids. HS snack. Blood glucose TID. Daily weights. White count slightly elevated.  Taken 5/10/2020 1508 by Luis Alford RN  Progress, Functional Goals: demonstrating adequate progress  Taken 5/11/2020 0715 by Herlinda Quesada, RN  Plan of Care Reviewed With: patient  Taken 5/10/2020 0259 by Prateek Alvarez RN  IRF Plan of Care Review: progress ongoing, continue

## 2020-05-12 VITALS
DIASTOLIC BLOOD PRESSURE: 72 MMHG | HEART RATE: 85 BPM | OXYGEN SATURATION: 95 % | RESPIRATION RATE: 18 BRPM | WEIGHT: 234.35 LBS | TEMPERATURE: 98.3 F | HEIGHT: 61 IN | SYSTOLIC BLOOD PRESSURE: 105 MMHG | BODY MASS INDEX: 44.25 KG/M2

## 2020-05-12 LAB
GLUCOSE BLDC GLUCOMTR-MCNC: 131 MG/DL (ref 70–130)
GLUCOSE BLDC GLUCOMTR-MCNC: 78 MG/DL (ref 70–130)

## 2020-05-12 PROCEDURE — 82962 GLUCOSE BLOOD TEST: CPT

## 2020-05-12 PROCEDURE — 63710000001 INSULIN LISPRO (HUMAN) PER 5 UNITS: Performed by: INTERNAL MEDICINE

## 2020-05-12 PROCEDURE — 97110 THERAPEUTIC EXERCISES: CPT

## 2020-05-12 PROCEDURE — 94799 UNLISTED PULMONARY SVC/PX: CPT

## 2020-05-12 PROCEDURE — 63710000001 INSULIN GLARGINE PER 5 UNITS: Performed by: INTERNAL MEDICINE

## 2020-05-12 RX ORDER — DULOXETIN HYDROCHLORIDE 60 MG/1
60 CAPSULE, DELAYED RELEASE ORAL DAILY
Qty: 90 CAPSULE | Refills: 1 | Status: SHIPPED | OUTPATIENT
Start: 2020-05-12 | End: 2020-09-01

## 2020-05-12 RX ORDER — FOLIC ACID 1 MG/1
1 TABLET ORAL DAILY
Qty: 90 TABLET | Refills: 0 | Status: SHIPPED | OUTPATIENT
Start: 2020-05-12 | End: 2020-12-10

## 2020-05-12 RX ORDER — SUCRALFATE ORAL 1 G/10ML
1 SUSPENSION ORAL
Qty: 420 ML | Refills: 2 | Status: SHIPPED | OUTPATIENT
Start: 2020-05-12 | End: 2020-09-01

## 2020-05-12 RX ORDER — PANTOPRAZOLE SODIUM 40 MG/1
40 TABLET, DELAYED RELEASE ORAL DAILY
Qty: 90 TABLET | Refills: 0 | Status: SHIPPED | OUTPATIENT
Start: 2020-05-12 | End: 2020-09-01

## 2020-05-12 RX ORDER — ROPINIROLE 0.5 MG/1
0.5 TABLET, FILM COATED ORAL NIGHTLY
Qty: 30 TABLET | Refills: 1 | Status: SHIPPED | OUTPATIENT
Start: 2020-05-12 | End: 2020-05-27 | Stop reason: ALTCHOICE

## 2020-05-12 RX ORDER — IBUPROFEN/PSEUDOEPHEDRINE HCL 200MG-30MG
3 TABLET ORAL NIGHTLY PRN
Qty: 30 TABLET | Refills: 1 | Status: SHIPPED | OUTPATIENT
Start: 2020-05-12

## 2020-05-12 RX ORDER — DIPHENHYDRAMINE HCL 25 MG
25 CAPSULE ORAL EVERY 4 HOURS PRN
Qty: 30 CAPSULE | Refills: 1 | Status: SHIPPED | OUTPATIENT
Start: 2020-05-12

## 2020-05-12 RX ORDER — HYDROCHLOROTHIAZIDE 25 MG/1
25 TABLET ORAL DAILY
Qty: 90 TABLET | Refills: 0 | Status: SHIPPED | OUTPATIENT
Start: 2020-05-12 | End: 2020-09-01

## 2020-05-12 RX ORDER — IBUPROFEN 600 MG/1
600 TABLET ORAL EVERY 8 HOURS PRN
Qty: 60 TABLET | Refills: 0 | Status: SHIPPED | OUTPATIENT
Start: 2020-05-12

## 2020-05-12 RX ORDER — PREGABALIN 150 MG/1
150 CAPSULE ORAL EVERY 12 HOURS SCHEDULED
Qty: 60 CAPSULE | Refills: 2 | Status: SHIPPED | OUTPATIENT
Start: 2020-05-12 | End: 2021-01-20 | Stop reason: SDUPTHER

## 2020-05-12 RX ORDER — BUDESONIDE AND FORMOTEROL FUMARATE DIHYDRATE 160; 4.5 UG/1; UG/1
2 AEROSOL RESPIRATORY (INHALATION)
Qty: 10.2 G | Refills: 1 | Status: SHIPPED | OUTPATIENT
Start: 2020-05-12 | End: 2020-09-01

## 2020-05-12 RX ORDER — AMLODIPINE BESYLATE 10 MG/1
10 TABLET ORAL NIGHTLY
Qty: 90 TABLET | Refills: 0 | Status: SHIPPED | OUTPATIENT
Start: 2020-05-12 | End: 2020-06-08

## 2020-05-12 RX ORDER — POLYETHYLENE GLYCOL 3350 17 G/17G
17 POWDER, FOR SOLUTION ORAL 2 TIMES DAILY PRN
Qty: 510 G | Refills: 1 | Status: SHIPPED | OUTPATIENT
Start: 2020-05-12

## 2020-05-12 RX ORDER — LIDOCAINE 50 MG/G
1 PATCH TOPICAL
Start: 2020-05-12 | End: 2020-05-27 | Stop reason: ALTCHOICE

## 2020-05-12 RX ADMIN — INSULIN LISPRO 14 UNITS: 100 INJECTION, SOLUTION INTRAVENOUS; SUBCUTANEOUS at 07:57

## 2020-05-12 RX ADMIN — BUDESONIDE AND FORMOTEROL FUMARATE DIHYDRATE 2 PUFF: 160; 4.5 AEROSOL RESPIRATORY (INHALATION) at 07:01

## 2020-05-12 RX ADMIN — POLYETHYLENE GLYCOL 3350 17 G: 17 POWDER, FOR SOLUTION ORAL at 07:58

## 2020-05-12 RX ADMIN — PANTOPRAZOLE SODIUM 40 MG: 40 TABLET, DELAYED RELEASE ORAL at 05:42

## 2020-05-12 RX ADMIN — FOLIC ACID 1 MG: 1 TABLET ORAL at 07:58

## 2020-05-12 RX ADMIN — IBUPROFEN 600 MG: 600 TABLET, FILM COATED ORAL at 07:58

## 2020-05-12 RX ADMIN — DULOXETINE HYDROCHLORIDE 60 MG: 60 CAPSULE, DELAYED RELEASE ORAL at 07:57

## 2020-05-12 RX ADMIN — SUCRALFATE 1 G: 1 SUSPENSION ORAL at 05:42

## 2020-05-12 RX ADMIN — SUCRALFATE 1 G: 1 SUSPENSION ORAL at 07:58

## 2020-05-12 RX ADMIN — PREGABALIN 150 MG: 100 CAPSULE ORAL at 07:57

## 2020-05-12 RX ADMIN — LEVOTHYROXINE SODIUM 200 MCG: 100 TABLET ORAL at 05:42

## 2020-05-12 RX ADMIN — INSULIN GLARGINE 20 UNITS: 100 INJECTION, SOLUTION SUBCUTANEOUS at 08:59

## 2020-05-12 RX ADMIN — HYDROCHLOROTHIAZIDE 25 MG: 25 TABLET ORAL at 07:58

## 2020-05-12 RX ADMIN — LIDOCAINE 1 PATCH: 50 PATCH TOPICAL at 07:59

## 2020-05-12 RX ADMIN — SUCRALFATE 1 G: 1 SUSPENSION ORAL at 11:53

## 2020-05-12 NOTE — PROGRESS NOTES
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    History taken from: patient    Objective     Vital Signs  Temp:  [97.7 °F (36.5 °C)-98.5 °F (36.9 °C)] 97.8 °F (36.6 °C)  Heart Rate:  [79-96] 79  Resp:  [18-20] 20  BP: (121-127)/(74-84) 127/84    Objective:  Vital signs: (most recent): Blood pressure 127/84, pulse 79, temperature 97.8 °F (36.6 °C), temperature source Oral, resp. rate 20, height 154.9 cm (61\"), weight 106 kg (234 lb 5.6 oz), SpO2 100 %, not currently breastfeeding.          Physical examination near discharge  Gen - nad, sitting in bed comfortably  HEENT- NCAT, PUPILS EQUALLY ROUND, SCLERAE ANICTERIC, CONJUNCTIVAE PINK, OP MOIST, NO JVD, EARS UNREMARKABLE EXTERNALLY  LUNGS -normal respirations. equal chest rise,   CV -S1, S2  EXT - NO EDEMA OR CYANOSIS   Skin -   NEURO -   A/ox4, speech is fluent, follows all commands  MOTOR EXAM -JONES x4, symmetric muscle bulk  Antigravity BUE strength  No red or swollen joints in hands or wrists, able to flex and extend fingers     strength - improved in bilateral hands-takes resistance bilaterally.  Psych - appropriate mood and affect    Results Review:     I reviewed the patient's new clinical results.     Glucose   Date/Time Value Ref Range Status   05/12/2020 0706 131 (H) 70 - 130 mg/dL Final   05/11/2020 1608 " 84 70 - 130 mg/dL Final   05/11/2020 1126 114 70 - 130 mg/dL Final   05/11/2020 0713 131 (H) 70 - 130 mg/dL Final   05/10/2020 1607 91 70 - 130 mg/dL Final   05/10/2020 1100 167 (H) 70 - 130 mg/dL Final   05/10/2020 0702 126 70 - 130 mg/dL Final   05/09/2020 1607 91 70 - 130 mg/dL Final     Results from last 7 days   Lab Units 05/09/20  0548   WBC 10*3/mm3 12.35*   HEMOGLOBIN g/dL 12.5   HEMATOCRIT % 36.5   PLATELETS 10*3/mm3 328     Results from last 7 days   Lab Units 05/09/20  0548   SODIUM mmol/L 138   POTASSIUM mmol/L 3.5   CHLORIDE mmol/L 95*   CO2 mmol/L 30.3*   BUN mg/dL 16   CREATININE mg/dL 0.43*   CALCIUM mg/dL 9.4   BILIRUBIN mg/dL 0.6   ALK PHOS U/L 102   ALT (SGPT) U/L 148*   AST (SGOT) U/L 181*   GLUCOSE mg/dL 127*         Results for SENTHIL SMITH (MRN [id number]) as of 5/10/2020 07:16   Ref. Range 5/10/2020 04:15   C-Reactive Protein Latest Ref Range: 0.00 - 0.50 mg/dL 4.73 (H)     Results for SENTHIL SMITH (MRN [id number]) as of 5/10/2020 07:16   Ref. Range 5/10/2020 04:15   Sed Rate Latest Ref Range: 0 - 20 mm/hr 74 (H)     Results for SENTHIL SMITH (MRN [id number]) as of 5/10/2020 07:16   Ref. Range 4/10/2020 05:24 5/10/2020 04:15   Sed Rate Latest Ref Range: 0 - 20 mm/hr 27 (H) 74 (H)     Results for SENTHIL SMITH (MRN [id number]) as of 5/10/2020 07:16   Ref. Range 4/1/2020 08:09 4/3/2020 06:20 4/10/2020 05:24 4/13/2020 04:42 5/10/2020 04:15   C-Reactive Protein Latest Ref Range: 0.00 - 0.50 mg/dL 0.63 (H) 0.55 (H) 5.14 (H) 3.04 (H) 4.73 (H)       Medication Review:   Scheduled Meds:    amLODIPine 10 mg Oral Nightly   budesonide-formoterol 2 puff Inhalation BID - RT   DULoxetine 60 mg Oral Daily   folic acid 1 mg Oral Daily   hydroCHLOROthiazide Oral 25 mg Oral Daily   insulin glargine 20 Units Subcutaneous QAM   insulin lispro 14 Units Subcutaneous TID With Meals   levothyroxine 200 mcg Oral Q AM   lidocaine 1 patch Transdermal Q24H   pantoprazole 40 mg Oral BID AC   pregabalin  150 mg Oral Q12H   rOPINIRole 0.5 mg Oral Nightly   sucralfate 1 g Oral 4x Daily AC & at Bedtime     Continuous Infusions:   PRN Meds:.dextrose  •  dextrose  •  diphenhydrAMINE  •  glucagon (human recombinant)  •  ibuprofen  •  melatonin  •  polyethylene glycol      Assessment/Plan       YOUNG (nonalcoholic steatohepatitis)    Hypothyroid    Type 2 diabetes mellitus (CMS/HCC)    GBS (Guillain [place] syndrome) (CMS/HCC)    Elevated transaminase level    History of gestational diabetes    Steroid-induced hyperglycemia    Elevated aldolase level      Assessment & Plan     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  This is far greater than expected based on the nerve conduction findings.  This suggests either motor neuron disease or a primarily axonal polyneuropathy such as axonal Guillain Barré syndrome based on the patient's symptom history. F/U Dr Juventino Gaytan in 2-3 months.     Previous question of dermatomyositis or autoimmune process-trial of steroids-tapering off  April 27-has been on prednisone 40 mg daily since April 10 empiric trial for previous question of dermatomyositis but now not felt clinically present-taper off of prednisone 30 mg daily x3 days, 20 mg daily x3 days, 10 mg daily x3 days, 5 mg daily x3 days, then stop.  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 Also on oxycodone.  April 27-has some pain in the upper extremities.  Doubt that it is bilateral joint pain as she has been on a reasonable dose of prednisone for the last 16 days.  May be a variant for the neuropathic pain.  Will titrate up on Lyrica 200 mg every 12 hours, watch for any myoclonic jerks.  May 1- taking oxycodone 5 mg about 5 times a day, need to taper off over next week, changed to Oxycodone 5 mg q 6 hours prn x 3 days, then q 8 hours prn x 3 days, then q 12 hours prn x three days, then stop. SVETLANA reviewed.   May 2-add on Cymbalta 30 mg daily for 7 days and increase to 60 mg daily.  Discussed other options of titrating up on Lyrica or switching to gabapentin.  May 4 - Cymbalta is helping  May 6 - increase to 60mg in a few days     DVT prophylaxis - SCDs        Hepatic steatosis-elevated LFTs  April 27-gastroenterology following  April 29 - reviewed with gastroenterology - a combination of fatty liver (the treatment would be weight loss) and Ebstein Horan viral hepatitis (the only treatment would be supportive care)- recommend against a liver biopsy. Follow up in three months with repeat labs and full colonscopy.  May 5 - EBV labs reviewed, and will need outpatient GI f/up for hepatic steatosis  May 8 - recheck AM labs     Restless leg syndrome -   May 4 - Will trial Requip  May 6 - will increase Requip dosage and see if helps sleep.      Joint pain -   May 8 - patient reports arthritic pain in hands that ibuprofen has helped in past.  Will hold on adding ibuprofen until steroids weaned.   May 9 - question if steroid taper is contributing to joint pain in hands. Now on increased cymbalta dosage as well.  Will check ESR and CRP in AM.  Add ibuprofen prn with steroids discontinuing.  May 10 - ESR and CRP elevated.  Bilateral hand improved today with prn ibuprofen.  She is now off steroid taper.  Will check hand xrays.  Denies FHx of arthritic conditions.  Rheum  consult to establish care and likely proceed with further workup as outpatient as well.  May 11-bilateral wrist pain improved with use of nonsteroidal anti-inflammatory drug now that off of steroid taper.  Better  today.  X-ray showed right greater than left wrist degenerative changes.     Sleep disturbance  May 6 - reviewed meds with pharmacy.  Will trial increase of Requip and steroids could be contributing but are currently being tapered.  May 7 - slept better last night     May 7 - Plan is for patient to go to mother's home at discharge.  Patient feels comfortable with this plan.     April 27-gait 80 feet CTG-min assist.  Team conference in the a.m.     TEAM CONF - April 28- BED CTG. 4 STAIRS MIN. GAIT 100 FEET CTG-MIN RW. TOILET TRANSFERS CTG. SHOWER TRANSFERS CTG.  UBD MIN ASSIST FOR BRA. LBD CTG. BATH CTG. GROOMING SBA.  TOILETING MOD INDEPENDENT. CONTINENT BOWEL AND BLADDER.   DIETICIAN EDUCATED ON DIABETIC DIET ON STEROIDS BUT PATIENT RESISTANT TO INSTRUCTION.   NEEDS TO BE MODIFIED INDEPENDENT FOR HOME  ELOS- TWO WEEKS     TEAM CONF - May 5 -   Bed/Chair/Wc SBA,   Stairs 4 with CGa/MIN A with 1 HR and Quad tipped cane  Amb 160ft CGa RWx, Min A with a cane  Toilet Tx SBA RWx, Tub/Shower Tx CGa RWx  Bathing SBA, LBD SBA, UBD Min A with bra, grooming SBA seated  Toileting SBA  Continent of bowel and bladder  2L O2 during night (h/o ERLIN with CPAP at home)  ELOS - 1 week home with assistance vs SNF  Discussed at length with patient this option of disposition.  Patient feels she does not have anyone to assist her.  Will look into SNF facility as she still requires assistance and would benefit from further inpatient rehab.    TEAM CONF - MAY 12 - CONTINENT BOWEL AND BLADDER. BED SBA. 4 STAIRS CTG. GAIT 100 FEET RW CTG. TOIELT TRANSFER MODIFIED INDEPENDENT. ADLS MODIFIED INDEPENDENT. WAS MODIFIED INDEPENDENT IN ROOM YESTERDAY.   ELOS - HOME TODAY. HEP WITH OT. OUTPATIENT PT.   >30 ON DISCHARGE.  MEDS AND F/U  REVIEWED.    Name Relationship Specialty Phone Fax Address Order              [facility]      [address] [address]  [phone]    4th floor rehab     Next Steps: Follow up      Instructions: [facility] will contact you to schedule outpatient physical therapy.      [facility] CARDIOLOGY   Cardiology [phone] [address] [place] [address]     Next Steps: Follow up      Severino Choudhury MD   Endocrinology [phone] [address]  [place] [address]     Next Steps: Follow up in 4 week(s)      Instructions: 4-6 weeks with repeat thyroid labs.for labs appointment on june 5,2020 @ 11:10 am.and  on june 12,2020 appointment to see Kei Arriaza @ 11;10 am      Dr Fam Castañeda      [facility]  [address], [address]  [place] [address]    [phone]     Next Steps: Go on 6/12/2020      Instructions: You have a new patient appointment with primary care provider, Dr Castañeda on June 12, 2020 @ 1:45.      Phill Campos MD   Physical Medicine and Rehabilitation [phone] [facility] [facility] [address]  [place] [id number]     Next Steps: Follow up in 3 week(s)      Edson Sharma MD   Gastroenterology [phone] [address] [facility] [address] [address]  [place] [id number]     Next Steps: Follow up in 3 month(s)      Instructions: with repeat labs and full colonscopy  appointment on july 29,2020 @ 1:30 pm      No Known Provider  PCP - General  [phone]  [facility] [address]     Juventino Gaytan MD   Neurology [phone] [address] [facility] [address]  [facility] [address]     Next Steps: Follow up      Instructions: 2-3 months ( july 14,2020 @ 8:30 am)           Discharge Medications      New Medications      Instructions Start Date   Accu-Chek Misti Plus w/Device kit   Use to test blood sugar up to three times  daily.      Accu-Chek Softclix Lancets lancets   Use to test blood sugar up to 3 times daily.      amLODIPine 10 MG tablet  Commonly known as:  NORVASC   10 mg, Oral, Nightly      budesonide-formoterol 160-4.5 MCG/ACT inhaler  Commonly known as:  SYMBICORT   2 puffs, Inhalation, 2 Times Daily - RT      diphenhydrAMINE 25 mg capsule  Commonly known as:  BENADRYL   25 mg, Oral, Every 4 Hours PRN      DULoxetine 60 MG capsule  Commonly known as:  CYMBALTA   60 mg, Oral, Daily      folic acid 1 MG tablet  Commonly known as:  FOLVITE   1 mg, Oral, Daily      glucose blood test strip  Commonly known as:  Accu-Chek Misti   Use to test blood sugar up to three times daily.      hydroCHLOROthiazide 25 MG tablet  Commonly known as:  HYDRODIURIL   25 mg, Oral, Daily      ibuprofen 600 MG tablet  Commonly known as:  ADVIL,MOTRIN   600 mg, Oral, Every 8 Hours PRN      insulin glargine 100 UNIT/ML injection  Commonly known as:  LANTUS   20 UNITS UNDER SKIN Q MORNING. MAY USE PENS. DISPENSE: MONTH SUPPLY      insulin lispro 100 UNIT/ML injection  Commonly known as:  humaLOG   14 Units, Subcutaneous, 3 Times Daily With Meals, MAY USE PENS. DISPENSE MONTH SUPPLY      lidocaine 5 %  Commonly known as:  LIDODERM   1 patch, Transdermal, Every 24 Hours Scheduled, Remove & Discard patch within 12 hours or as directed by MD. OVER THE COUNTER 4% PATCH      melatonin 3 MG tablet   3 mg, Oral, Nightly PRN      pantoprazole 40 MG EC tablet  Commonly known as:  PROTONIX   40 mg, Oral, Daily      polyethylene glycol 17 g packet  Commonly known as:  MIRALAX   17 g, Oral, 2 Times Daily PRN      pregabalin 150 MG capsule  Commonly known as:  LYRICA   150 mg, Oral, Every 12 Hours Scheduled      rOPINIRole 0.5 MG tablet  Commonly known as:  REQUIP   0.5 mg, Oral, Nightly, Take 1 hour before bedtime.      sucralfate 1 GM/10ML suspension  Commonly known as:  CARAFATE   1 g, Oral, 4 Times Daily Before Meals & Nightly, FOR REFLUX SYMPTOMS          Continue These Medications      Instructions Start Date   rosuvastatin 20 MG tablet  Commonly known as:  CRESTOR   40 mg, Oral, Daily      Synthroid 200 MCG tablet  Generic drug:  levothyroxine   200 mcg, Oral, Daily                During rounds, used appropriate personal protective equipment including mask and gloves.  Mask used was standard procedure mask. Appropriate PPE was worn during the entire visit.  Hand hygiene was completed before and after.       Phill Campos MD  05/12/20  09:14    Time:     >30 on discharge

## 2020-05-12 NOTE — PROGRESS NOTES
Inpatient Rehabilitation Plan of Care Note    Plan of Care  Care Plan Reviewed - No updates at this time.    Body Systems    [RN] Endocrine(Active)  Current Status(05/12/2020): Patients has DM and blood sugar poorly controlled  while being on steroid in hosp.  Weekly Goal(05/14/2020): Patient will have blood sugars within normal limits.  Discharge Goal: Patient will be independent with diabetes regimen    Performed Intervention(s)  Monitor labs and medication as ordered  Blood sugar testing- TID      Pain    [RN] Pain Management(Active)  Current Status(05/12/2020): Patient has generalized pain related GBS. takes pain  medications as needed.  Weekly Goal(05/14/2020): Patient will have pain under control and will be able  to tolerate therapy.  Discharge Goal: Patient will be free from pain or pain will be under control.    Performed Intervention(s)  Relaxation or distraction  Medication as ordered  Modalities      Psychosocial    [RN] Coping/Adjustment(Active)  Current Status(05/12/2020): Patient expresses appropriate coping skills  Weekly Goal(05/06/2020): Patient will be allowed to express concerns regarding  life changes  Discharge Goal: Patient will continue to demonstrate healthy coping skills while  on Rehab    Performed Intervention(s)  Allow the opportunity to verbalize needs and concerns  Medication as ordered  Therapuetic environmental set up  offer support      Safety    [RN] Potential for Injury(Active)  Current Status(05/12/2020): Patient has had a history of falls, and complains of  numbness and unsteadiness on her feet, high risk of further injury.  Weekly Goal(05/14/2020): Patient will use call light, and have no other falls  while on Rehab  Discharge Goal: Patient will have no injuries while on Rehab    Performed Intervention(s)  Bed alarm and /or chair alarm  Safety rounds and items within reach  Falls precautions/protocol  Uses call light appropriately  Environmental set-up to reduce risk  safety  strategies and techniques      Sphincter Control    [RN] Bladder Management(Active)  Current Status(05/12/2020): Patient is 100% continent of bladder  Weekly Goal(05/18/2020): Patient will continue to be continent 100%  Discharge Goal: Patient will continue to be 100% continent.    [RN] Bowel Management(Active)  Current Status(05/12/2020): Patient is 100% continent of bowel  Weekly Goal(05/18/2020): Patient will continue to be 100% continent of bowel  Discharge Goal: Patient will remain continent 100% of dthe time while in Rehab.    Performed Intervention(s)  Monitor intake and output  Assist pt to bathroom in a timely manner  Encourage proper diet and fluid intake  Medication as ordered    Signed by: Colby Cox RN

## 2020-05-12 NOTE — PROGRESS NOTES
Pt to d/c today to her mother's home here she will have daily assistance.  Outpatient physical therapy to be scheduled.  [facility] will contact pt when schedule is finalized.    Pt has received a rolling walker and has a tub seat with back at home.  TAR 3 application has been filed.  Pt is pleased with her progress and feels she is ready for discharge today.

## 2020-05-12 NOTE — PROGRESS NOTES
Case Management  Inpatient Rehabilitation Team Conference    Conference Date/Time: 2020 8:55:16 AM    Team Conference Attendees:  Dr. Phill Burnham, Alec Ritter, Pharmacist  Antonietta Mckeon, JARED Philip, PT  Roxana Nichols, OT  Tsering Weeks, CTRS  Elizabeth Jordan RD, LD  Tru Miranda, RN   Nkechi Marquez, OSCAR    Demographics            Age: 44Y            Gender: Female    Admission Date: 2020 2:16:19 PM  Rehabilitation Diagnosis:  GBS  Past Medical History: Past medical history patient has no known medical  allergies.  She has a history of sleep apnea and has been compliant with CPAP  however that machine equipment is not available at this time and patient will be  on overnight oxygen.  She also has a history of degenerative joint disease  obesity Mendez syndrome gastroesophageal reflux disease diabetes mellitus  hypothyroidism.  ?  Surgical history includes tonsillectomy adenoidectomy carpal tunnel release  .      Plan of Care  Anticipated Discharge Date/Estimated Length of Stay: ELOS: DC   Anticipated Discharge Destination: Community discharge with assistance  Discharge Plan : Family conference held 2020. Pt will discharge to her  mother's home where she will have daily assistance.  Outpatient [facility] to be  scheduled at [facility]  Medical Necessity Expected Level Rationale: MOD I with outpatient therapies  Intensity and Duration: an average of 3 hours/5 days per week  Medical Supervision and 24 Hour Rehab Nursing: x  Physical Therapy: x  PT Intensity/Duration: 1 hour/day, 5 days/week for approximately  5- 10 days  Occupational Therapy: x  OT Intensity/Duration: 1 hour/day, 5 days/week for approximately  5- 10 days  Speech and Language Therapy: x  SLP Intensity/Duration: 1 hour/day, 5 days/week for approximately  5- 10 days  Social Work: x  Therapeutic Recreation: x  Updated (if changes indicated)    Anticipated Discharge Date/Estimated Length  of Stay:   ELOS: DC 5/12    Based on the patient's medical and functional status, their prognosis and  expected level of functional improvement is: MOD I with outpatient therapies      Interdisciplinary Problem/Goals/Status    All Rehab Problems:  Body Systems    [RN] Endocrine(Active)  Current Status(05/12/2020): Patients has DM and blood sugar poorly controlled  while being on steroid in hosp.  Weekly Goal(05/14/2020): Patient will have blood sugars within normal limits.  Discharge Goal: Patient will be independent with diabetes regimen        Cognition    [ST] Executive Functions(Active)  Current Status(04/22/2020): Pt exhibiting functional cognition. ST signed off.  Weekly Goal(04/24/2020): follow schedule I'ly  Discharge Goal: Functional cognition for home I'ly        Mobility    [PT] Bed/Chair/Wheelchair(Active)  Current Status(05/04/2020): SBA  Weekly Goal(05/13/2020): SBA  Discharge Goal: mod I    [PT] Walk(Active)  Current Status(05/04/2020): 160` CGA  RWx  Weekly Goal(05/12/2020): BR R Wx CGA  Discharge Goal: 200`mod I, RWx    [PT] Stairs(Active)  Current Status(04/27/2020): 4 with CGA 1 handrail and quad tipped cane  Weekly Goal(05/12/2020): PT only  Discharge Goal: 8 steps, 1 HR on L and quad tipped cane, SBA to supervision    [OT] Toilet Transfers(Active)  Current Status(05/11/2020): Mod Indep RWX  Weekly Goal(05/12/2020): Mod indep  Discharge Goal: Mod indep    [OT] Tub/Shower Transfers(Active)  Current Status(05/11/2020): Mod Indep RWX  Weekly Goal(05/12/2020): Mod Indep  RWX  Discharge Goal: Mod Indep RWX        Pain    [RN] Pain Management(Active)  Current Status(05/12/2020): Patient has generalized pain related GBS. takes pain  medications as needed.  Weekly Goal(05/14/2020): Patient will have pain under control and will be able  to tolerate therapy.  Discharge Goal: Patient will be free from pain or pain will be under control.        Psychosocial    [RN] Coping/Adjustment(Active)  Current  Status(05/12/2020): Patient expresses appropriate coping skills  Weekly Goal(05/06/2020): Patient will be allowed to express concerns regarding  life changes  Discharge Goal: Patient will continue to demonstrate healthy coping skills while  on Rehab        Safety    [RN] Potential for Injury(Active)  Current Status(05/12/2020): Patient has had a history of falls, and complains of  numbness and unsteadiness on her feet, high risk of further injury.  Weekly Goal(05/14/2020): Patient will use call light, and have no other falls  while on Rehab  Discharge Goal: Patient will have no injuries while on Rehab        Self Care    [OT] Bathing(Active)  Current Status(05/11/2020): Mod Indep  Weekly Goal(05/12/2020): Mod Indep  Discharge Goal: Mod indep    [OT] Dressing (Lower)(Active)  Current Status(05/11/2020): Mod Indep  Weekly Goal(05/12/2020): Mod Indep  Discharge Goal: Mod Indep    [OT] Dressing (Upper)(Active)  Current Status(05/11/2020): Mod Indep  Weekly Goal(05/12/2020): Mod Indep  Discharge Goal: Mod Indep    [OT] Grooming(Active)  Current Status(05/11/2020): Mod Indep standing  Weekly Goal(05/12/2020): Mod Indep standing  Discharge Goal: Mod Indep    [OT] Toileting(Active)  Current Status(05/11/2020): Mod Indep  Weekly Goal(05/12/2020): Mod Indep  Discharge Goal: Mod Indep with toilet aid        Sphincter Control    [RN] Bladder Management(Active)  Current Status(05/12/2020): Patient is 100% continent of bladder  Weekly Goal(05/18/2020): Patient will continue to be continent 100%  Discharge Goal: Patient will continue to be 100% continent.    [RN] Bowel Management(Active)  Current Status(05/12/2020): Patient is 100% continent of bowel  Weekly Goal(05/18/2020): Patient will continue to be 100% continent of bowel  Discharge Goal: Patient will remain continent 100% of dthe time while in Rehab.        Comments: 4/23: c/o pain overnight; CASI knees buckle, amb with knees locked;  anticipate SLP will sign off after  today's session; liver enzymes elevated this  AM - all tylenol containing meds to be DC'd;    4/28: no assist available after discharge, needs to be MOD I;    5/5: concerns about discharging home alone; home situation in question as well -  patient is a hoarder? not allowing staff to contact family or anyone into her  home;    5/12: indep in room yesterday;    Signed by: Tru Miranda RN    Physician CoSigned By: Phill Campos 05/12/2020 09:48:38

## 2020-05-12 NOTE — PLAN OF CARE
Problem: Patient Care Overview  Goal: Plan of Care Review  Outcome: Ongoing (interventions implemented as appropriate)  Flowsheets (Taken 5/12/2020 0225)  Outcome Summary: Patient pleasant and cooperative. She's independent in the room. To DC today.  Progress, Functional Goals: demonstrating adequate progress  Plan of Care Reviewed With: patient  IRF Plan of Care Review: progress ongoing, continue     Problem: Fall Risk (Adult)  Goal: Absence of Fall  Description  Patient will demonstrate the desired outcomes by discharge/transition of care.  Outcome: Ongoing (interventions implemented as appropriate)  Flowsheets (Taken 5/12/2020 0225)  Absence of Fall: making progress toward outcome     Problem: Functional Mobility Impairment (IRF) (Adult)  Goal: Optimal/Safe Level of [place] with Mobility  Outcome: Ongoing (interventions implemented as appropriate)  Flowsheets (Taken 5/12/2020 0225)  Optimal/Safe Level of [place] with Mobility: demonstrating adequate progress     Problem: Pain, Acute (Adult)  Goal: Acceptable Pain Control/Comfort Level  Description  Patient will demonstrate the desired outcomes by discharge/transition of care.  Outcome: Ongoing (interventions implemented as appropriate)  Flowsheets (Taken 5/12/2020 0225)  Acceptable Pain Control/Comfort Level: making progress toward outcome

## 2020-05-12 NOTE — THERAPY DISCHARGE NOTE
"Inpatient Rehabilitation - Physical Therapy Treatment Note  [facility]     Patient Name: Oralia Sánchez  : 1975  MRN: [id number]    Today's Date: 2020                 Admit Date: 2020      Visit Dx:      ICD-10-CM ICD-9-CM   1. GBS (Guillain Shawnee syndrome) (CMS/HCC) G61.0 357.0       Patient Active Problem List   Diagnosis   • Vitamin D deficiency   • Awareness of heartbeats   • Adiposity   • YOUNG (nonalcoholic steatohepatitis)   • Hypothyroid   • Diabetes mellitus arising in pregnancy   • Diabetes (CMS/HCC)   • Metabolic syndrome   • Chest pain   • Primary thunderclap headache   • Elevated LFTs   • Tobacco abuse   • Rheumatoid arthritis (CMS/HCC)   • Type 2 diabetes mellitus (CMS/HCC)   • Morbid obesity (CMS/HCC)   • UTI (urinary tract infection), bacterial   • Cerebral aneurysm   • Dyspnea   • Cough   • Hypomagnesemia   • Metabolic acidosis   • Fatigue   • History of COPD   • Abnormal CT scan, colon   • 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)   • Essential hypertension   • GBS (Guillain Shawnee syndrome) (CMS/HCC)   • Elevated transaminase level   • History of gestational diabetes   • Steroid-induced hyperglycemia   • Elevated aldolase level       Therapy Treatment    IRF Treatment Summary     Row Name 20 0945             Evaluation/Treatment Time and Intent    Subjective Information  no complaints \"Excited about gpoing home.\"   -LB      Existing Precautions/Restrictions  fall  -LB      Document Type  other (see comments) addendeum  -LB      Mode of Treatment  physical therapy  -LB      Recorded by [CALI] Preeti Reynaga PT      Row Name 20 0945             Positioning and Restraints    Pre-Treatment Position  in bed  -LB      In Bed  sitting EOB  -LB      Recorded by [CALI] Preeti Reynaga, PT        User Key  (r) = Recorded By, (t) = Taken By, (c) = Cosigned By    Initials Name Effective Dates    Preeti Combs, PT 18 -            Physical Therapy Education              "    Title: PT OT SLP Therapies (Done)     Topic: Physical Therapy (Done)     Point: Mobility training (Done)     Description:   Instruct learner(s) on safety and technique for assisting patient out of bed, chair or wheelchair.  Instruct in the proper use of assistive devices, such as walker, crutches, cane or brace.              Patient Friendly Description:   It's important to get you on your feet again, but we need to do so in a way that is safe for you. Falling has serious consequences, and your personal safety is the most important thing of all.        When it's time to get out of bed, one of us or a family member will sit next to you on the bed to give you support.     If your doctor or nurse tells you to use a walker, crutches, a cane, or a brace, be sure you use it every time you get out of bed, even if you think you don't need it.    Learning Progress Summary           Patient Acceptance, E, DU by LB at 5/11/2020 1159    Comment:  Stairs with 2 rails.    Acceptance, E,TB, VU,NR by EE at 5/9/2020 1423    Acceptance, E,TB,D, DU,VU by LB at 5/8/2020 1140    Comment:  stairs with one rail sideways and then 2 rails forward, recommended OP PT    Acceptance, E,TB,D, VU,NR by EE at 5/7/2020 1223    Acceptance, E,TB, VU,NR by EE at 5/6/2020 0937    Acceptance, E, VU,NR by EE at 5/5/2020 1043    Acceptance, D, NR by LB at 5/4/2020 1522    Comment:  stairs with one rail and a quad tipped cane    Acceptance, E,TB,D, NR by LB at 5/4/2020 1420    Comment:  Stairs with one rail and quad tipped cane    Acceptance, TB,D,E, NR by LB at 5/4/2020 1148    Acceptance, E,TB, VU,NR by EE at 5/1/2020 1126    Acceptance, E,TB,D, NR by LB at 4/30/2020 1538    Comment:  Stairs with one rail and quad tipped cane.    Acceptance, E,TB,D, NR by LB at 4/27/2020 1616    Comment:  Stairs with one rail and HHA .    Acceptance, E, NR by LB at 4/25/2020 1352                   Point: Home exercise program (Done)     Description:   Instruct  learner(s) on appropriate technique for monitoring, assisting and/or progressing patient with therapeutic exercises and activities.              Learning Progress Summary           Patient Acceptance, E,TB,D, DU by  at 5/12/2020 1018    Comment:  HEP including bilateral plantarflexion in standing, sidestepping wiht UE support, unilateral standing with UE support on the counter  BALANCE activities-- standing in a corner with walker in front with feet together and then feet in semi-tandem. Pt Indep    Acceptance, E,TB, VU,NR by EE at 5/9/2020 1423    Acceptance, E,TB,D, VU,NR by EE at 5/7/2020 1223    Acceptance, E,TB, VU,NR by EE at 5/6/2020 0937    Acceptance, E, VU,NR by EE at 5/5/2020 1043    Acceptance, E,TB, VU,NR by EE at 5/1/2020 1126                   Point: Body mechanics (Done)     Description:   Instruct learner(s) on proper positioning and spine alignment for patient and/or caregiver during mobility tasks and/or exercises.              Learning Progress Summary           Patient Acceptance, E,TB, VU,NR by EE at 5/9/2020 1423    Acceptance, E,TB,D, VU,NR by EE at 5/7/2020 1223    Acceptance, E,TB, VU,NR by EE at 5/6/2020 0937    Acceptance, E, VU,NR by EE at 5/5/2020 1043                   Point: Precautions (Done)     Description:   Instruct learner(s) on prescribed precautions during mobility and gait tasks              Learning Progress Summary           Patient Acceptance, E,TB, VU,NR by EE at 5/9/2020 1423    Acceptance, E,TB,D, VU,NR by EE at 5/7/2020 1223    Acceptance, E,TB, VU,NR by EE at 5/6/2020 0937    Acceptance, E, VU,NR by EE at 5/5/2020 1043    Acceptance, E, VU by MD at 5/2/2020 1213    Acceptance, E, VU by MD at 5/1/2020 1229    Acceptance, E, VU by MD at 4/29/2020 0924    Acceptance, E, VU by MD at 4/28/2020 0927    Acceptance, E, VU by MD at 4/24/2020 0955    Acceptance, E, NR by MD at 4/23/2020 0912    Acceptance, E, VU by MD at 4/22/2020 0923    Acceptance, E, VU by MD at 4/21/2020  1202                               User Key     Initials Effective Dates Name Provider Type Discipline    LB 06/08/18 -  Preeti Reynaga, PT Physical Therapist PT    EE 04/03/18 -  Irlanda Wallis, PT Physical Therapist PT    MD 04/03/18 -  Serena Philip, PT Physical Therapist PT                  PT Recommendation and Plan                        Time Calculation:     PT Charges     Row Name 05/12/20 1020             Time Calculation    Start Time  0945  -LB      Stop Time  1000  -LB      Time Calculation (min)  15 min  -LB        User Key  (r) = Recorded By, (t) = Taken By, (c) = Cosigned By    Initials Name Provider Type    LB Preeti Reynaga, PT Physical Therapist          Therapy Charges for Today     Code Description Service Date Service Provider Modifiers Qty    11440566012 HC PT THER PROC EA 15 MIN 5/11/2020 Preeti Reynaga, PT GP 6    94753651190 HC PT THER PROC EA 15 MIN 5/12/2020 Preeti Reynaga, PT GP 1            PT G-Codes  Outcome Measure Options: Jasso Balance  Jasso Total Score: 33      Preeti Reynaga, PT  5/12/2020

## 2020-05-13 NOTE — PROGRESS NOTES
Per staff report.  SECTION GG    Eating Performance Discharge: Patient completed the activities by him/herself  with no assistance from a helper.    Signed by: Tru Miranda RN

## 2020-05-13 NOTE — DISCHARGE SUMMARY
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 Smith is a 44-year-old  white female who presented here in transfer from [facility].  Initial presenting complaints include cough shortness of air fevers extreme fatigue and sore throat.  Work-up had revealed likely diagnosis of Guyon Barré syndrome patient did participate in therapies in the acute care setting and was requiring min to mod assistance x2 to transfer and go sit to stand.  Toilet transfers were min assistance.  Toilet transfers were min assistance as well.  Patient's limiting factors include endurance balance and overall strength.     Work-up in the acute care setting who did a CT scan which showed no infiltrate and minimal groundglass opacities.  CT of the abdomen was read as cholelithiasis hepatic steatosis hepatomegaly.  Head CT was negative.  Echocardiogram on 4 7 showed trace mitral regurgitation as well as trace tricuspid valve regurgitation.  Ultrasound of the gallbladder showed sludge.  COVID-19 testing was negative.     Past medical history patient has no known medical allergies.  She has a history of sleep apnea and has been compliant with CPAP however that machine equipment is not available at this time and patient will be on overnight oxygen.  She also has a history of degenerative joint  disease obesity Mendez syndrome gastroesophageal reflux disease diabetes mellitus hypothyroidism.     Surgical history includes tonsillectomy adenoidectomy carpal tunnel release .     Family history is pertinent for hypertension alcohol abuse coronary artery disease stroke and diabetes.  Social history patient lives alone locally in a single-story home with 6-7 step access.  She does have a basement all of her living quarters except for a washer and dryer on the first floor.  Patient works nights she states that she has no one that she can rely on to help her at home.  She is a smoker of 1 to 2 packs/day but states that she has not had a cigarette in 3 weeks and plans to quit.  She does have 1-2 drinks per week.  Patient was independent with all activities of daily living and mobility prior to this admission.  Review of systems patient notes occasional vertigo.  She does acknowledge dyspnea on exertion and occasional wheezing.  She also has had a recent productive cough.  She states that her weight is stable.  She does have occasional constipation and her appetite is good.  Patient acknowledges low endurance and impaired balance.  She has no open sores or rashes.     Hospital course:  Guillain Barré syndrome  Status post IVIG completed   EMG/nerve conduction study -  - There is evidence of peripheral neuropathy but in addition there are needle exam changes in multiple muscles in the right arm and leg consistent with acute denervation.  This is far greater than expected based on the nerve conduction findings.  This suggests either motor neuron disease or a primarily axonal polyneuropathy such as axonal Guillain Barré syndrome based on the patient's symptom history. F/U Dr Juventino Gaytan in 2-3 months.     Previous question of dermatomyositis or autoimmune process-trial of steroids-tapering off  -has been on prednisone 40 mg daily since April 10 empiric trial for previous question of  dermatomyositis but now not felt clinically present-taper off of prednisone 30 mg daily x3 days, 20 mg daily x3 days, 10 mg daily x3 days, 5 mg daily x3 days, then stop.  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 Also on oxycodone.  April 27-has some pain in the upper extremities.  Doubt that it is bilateral joint pain as she has been on a reasonable dose of prednisone for the last 16 days.  May be a variant for the neuropathic pain.  Will titrate up on Lyrica 200 mg every 12 hours, watch for any myoclonic jerks.  May 1- taking oxycodone 5 mg about 5 times a day, need to taper off over next week, changed to Oxycodone 5 mg q 6 hours prn x 3 days, then q 8 hours prn x 3 days, then q 12 hours prn x three days, then stop. SVETLANA reviewed.   May 2-add on Cymbalta 30 mg daily for 7 days and increase to 60 mg daily.  Discussed other options of titrating up on Lyrica or switching to gabapentin.  May 4 - Cymbalta is helping  May 6 - increase to 60mg in a few days     DVT prophylaxis - SCDs        Hepatic steatosis-elevated LFTs  April 27-gastroenterology following  April 29 - reviewed with gastroenterology - a combination of fatty liver (the treatment would be weight loss) and Ebstein Horan viral hepatitis (the only treatment would be supportive care)- recommend against a liver biopsy. Follow up in three months with repeat labs and full colonscopy.  May 5 - EBV labs reviewed, and will need outpatient GI f/up for hepatic steatosis  May 8 - recheck AM labs     Restless leg syndrome -    May 4 - Will trial Requip  May 6 - will increase Requip dosage and see if helps sleep.      Joint pain -   May 8 - patient reports arthritic pain in hands that ibuprofen has helped in past.  Will hold on adding ibuprofen until steroids weaned.   May 9 - question if steroid taper is contributing to joint pain in hands. Now on increased cymbalta dosage as well.  Will check ESR and CRP in AM.  Add ibuprofen prn with steroids discontinuing.  May 10 - ESR and CRP elevated.  Bilateral hand improved today with prn ibuprofen.  She is now off steroid taper.  Will check hand xrays.  Denies FHx of arthritic conditions.  Rheum consult to establish care and likely proceed with further workup as outpatient as well.  May 11-bilateral wrist pain improved with use of nonsteroidal anti-inflammatory drug now that off of steroid taper.  Better  today.  X-ray showed right greater than left wrist degenerative changes.     Sleep disturbance  May 6 - reviewed meds with pharmacy.  Will trial increase of Requip and steroids could be contributing but are currently being tapered.  May 7 - slept better last night     May 7 - Plan is for patient to go to mother's home at discharge.  Patient feels comfortable with this plan.     April 27-gait 80 feet CTG-min assist.  Team conference in the a.m.     TEAM CONF - April 28- BED CTG. 4 STAIRS MIN. GAIT 100 FEET CTG-MIN RW. TOILET TRANSFERS CTG. SHOWER TRANSFERS CTG.  UBD MIN ASSIST FOR BRA. LBD CTG. BATH CTG. GROOMING SBA.  TOILETING MOD INDEPENDENT. CONTINENT BOWEL AND BLADDER.   DIETICIAN EDUCATED ON DIABETIC DIET ON STEROIDS BUT PATIENT RESISTANT TO INSTRUCTION.   NEEDS TO BE MODIFIED INDEPENDENT FOR HOME  ELOS- TWO WEEKS     TEAM CONF - May 5 -   Bed/Chair/Wc SBA,   Stairs 4 with CGa/MIN A with 1 HR and Quad tipped cane  Amb 160ft CGa RWx, Min A with a cane  Toilet Tx SBA RWx, Tub/Shower Tx CGa RWx  Bathing SBA, LBD SBA, UBD Min A with bra, grooming SBA seated  Toileting SBA  Continent of  bowel and bladder  2L O2 during night (h/o ERLIN with CPAP at home)  ELOS - 1 week home with assistance vs SNF  Discussed at length with patient this option of disposition.  Patient feels she does not have anyone to assist her.  Will look into SNF facility as she still requires assistance and would benefit from further inpatient rehab.     TEAM CONF - MAY 12 - CONTINENT BOWEL AND BLADDER. BED SBA. 4 STAIRS CTG. GAIT 100 FEET RW CTG. TOIELT TRANSFER MODIFIED INDEPENDENT. ADLS MODIFIED INDEPENDENT. WAS MODIFIED INDEPENDENT IN ROOM YESTERDAY.   ELOS - HOME TODAY. HEP WITH OT. OUTPATIENT PT.   >30 ON DISCHARGE.  MEDS AND F/U REVIEWED.                Name Relationship Specialty Phone Fax Address Order                         [place] [address]  [phone]    4th floor rehab        Next Steps: Follow up      Instructions: [facility] will contact you to schedule outpatient physical therapy.      [facility] CARDIOLOGY    Cardiology [phone] [address] [place] [address]        Next Steps: Follow up      Severino Choudhury MD    Endocrinology [phone] [address] [place] [id number]        Next Steps: Follow up in 4 week(s)      Instructions: 4-6 weeks with repeat thyroid labs.for labs appointment on june 5,2020 @ 11:10 am.and  on june 12,2020 appointment to see Kei Arriaaz @ 11;10 am      Dr Fam Castañeda          [facility]  [address]  [place], [place] [address] [phone]        Next Steps: Go on 6/12/2020      Instructions: You have a new patient appointment with primary care provider, Dr Castñaeda on June 12, 2020 @ 1:45.      Phill Campos MD    Physical Medicine and Rehabilitation [phone] [address]  [facility] [address]        Next Steps: Follow up in 3 week(s)      Edson Sharma,  MD    Gastroenterology [phone] [address]  [place] [id number]        Next Steps: Follow up in 3 month(s)      Instructions: with repeat labs and full colonscopy  appointment on july 29,2020 @ 1:30 pm      No Known Provider  PCP - General   [phone]   [facility] [address]        Juventino Gaytan MD    Neurology [phone] [address]  [place] [id number]        Next Steps: Follow up      Instructions: 2-3 months ( july 14,2020 @ 8:30 am)                  Discharge Medications            New Medications      Instructions Start Date   Accu-Chek Misti Plus w/Device kit    Use to test blood sugar up to three times daily.        Accu-Chek Softclix Lancets lancets    Use to test blood sugar up to 3 times daily.        amLODIPine 10 MG tablet  Commonly known as:  NORVASC    10 mg, Oral, Nightly        budesonide-formoterol 160-4.5 MCG/ACT inhaler  Commonly known as:  SYMBICORT    2 puffs, Inhalation, 2 Times Daily - RT        diphenhydrAMINE 25 mg capsule  Commonly known as:  BENADRYL    25 mg, Oral, Every 4 Hours PRN        DULoxetine 60 MG capsule  Commonly known as:  CYMBALTA    60 mg, Oral, Daily        folic acid 1 MG tablet  Commonly known as:  FOLVITE    1 mg, Oral, Daily        glucose blood test strip  Commonly known as:  Accu-Chek Misti    Use to test blood sugar up to three times daily.        hydroCHLOROthiazide 25 MG tablet  Commonly known as:  HYDRODIURIL    25 mg, Oral, Daily        ibuprofen 600 MG tablet  Commonly known as:  ADVIL,MOTRIN    600 mg, Oral, Every 8 Hours PRN        insulin glargine 100 UNIT/ML injection  Commonly known as:  LANTUS    20 UNITS UNDER SKIN Q MORNING. MAY USE PENS. DISPENSE: MONTH SUPPLY        insulin lispro 100 UNIT/ML injection  Commonly known as:  humaLOG    14 Units, Subcutaneous, 3 Times Daily With Meals, MAY USE PENS. DISPENSE MONTH SUPPLY        lidocaine 5 %  Commonly known as:   LIDODERM    1 patch, Transdermal, Every 24 Hours Scheduled, Remove & Discard patch within 12 hours or as directed by MD. OVER THE COUNTER 4% PATCH        melatonin 3 MG tablet    3 mg, Oral, Nightly PRN        pantoprazole 40 MG EC tablet  Commonly known as:  PROTONIX    40 mg, Oral, Daily        polyethylene glycol 17 g packet  Commonly known as:  MIRALAX    17 g, Oral, 2 Times Daily PRN        pregabalin 150 MG capsule  Commonly known as:  LYRICA    150 mg, Oral, Every 12 Hours Scheduled        rOPINIRole 0.5 MG tablet  Commonly known as:  REQUIP    0.5 mg, Oral, Nightly, Take 1 hour before bedtime.        sucralfate 1 GM/10ML suspension  Commonly known as:  CARAFATE    1 g, Oral, 4 Times Daily Before Meals & Nightly, FOR REFLUX SYMPTOMS                      Continue These Medications      Instructions Start Date   rosuvastatin 20 MG tablet  Commonly known as:  CRESTOR    40 mg, Oral, Daily        Synthroid 200 MCG tablet  Generic drug:  levothyroxine    200 mcg, Oral, Daily               Physical examination near discharge  Gen - nad, sitting in bed comfortably  HEENT- NCAT, PUPILS EQUALLY ROUND, SCLERAE ANICTERIC, CONJUNCTIVAE PINK, OP MOIST, NO JVD, EARS UNREMARKABLE EXTERNALLY  LUNGS -normal respirations. equal chest rise,   CV -S1, S2  EXT - NO EDEMA OR CYANOSIS   Skin -   NEURO -   A/ox4, speech is fluent, follows all commands  MOTOR EXAM -JONES x4, symmetric muscle bulk  Antigravity BUE strength  No red or swollen joints in hands or wrists, able to flex and extend fingers     strength - improved in bilateral hands-takes resistance bilaterally.  Psych - appropriate mood and affect     Results Review:                I reviewed the patient's new clinical results.            Glucose   Date/Time Value Ref Range Status   05/12/2020 0706 131 (H) 70 - 130 mg/dL Final   05/11/2020 1608 84 70 - 130 mg/dL Final   05/11/2020 1126 114 70 - 130 mg/dL Final   05/11/2020 0713 131 (H) 70 - 130 mg/dL Final   05/10/2020  1607 91 70 - 130 mg/dL Final   05/10/2020 1100 167 (H) 70 - 130 mg/dL Final   05/10/2020 0702 126 70 - 130 mg/dL Final   05/09/2020 1607 91 70 - 130 mg/dL Final           Results from last 7 days   Lab Units 05/09/20  0548   WBC 10*3/mm3 12.35*   HEMOGLOBIN g/dL 12.5   HEMATOCRIT % 36.5   PLATELETS 10*3/mm3 328           Results from last 7 days   Lab Units 05/09/20  0548   SODIUM mmol/L 138   POTASSIUM mmol/L 3.5   CHLORIDE mmol/L 95*   CO2 mmol/L 30.3*   BUN mg/dL 16   CREATININE mg/dL 0.43*   CALCIUM mg/dL 9.4   BILIRUBIN mg/dL 0.6   ALK PHOS U/L 102   ALT (SGPT) U/L 148*   AST (SGOT) U/L 181*   GLUCOSE mg/dL 127*            Results for SENTHIL SMITH (MRN [id number]) as of 5/10/2020 07:16    Ref. Range 5/10/2020 04:15   C-Reactive Protein Latest Ref Range: 0.00 - 0.50 mg/dL 4.73 (H)      Results for SENTHIL SMITH (MRN [id number]) as of 5/10/2020 07:16    Ref. Range 5/10/2020 04:15   Sed Rate Latest Ref Range: 0 - 20 mm/hr 74 (H)      Results for SENTHIL SMITH (MRN [id number]) as of 5/10/2020 07:16    Ref. Range 4/10/2020 05:24 5/10/2020 04:15   Sed Rate Latest Ref Range: 0 - 20 mm/hr 27 (H) 74 (H)      Results for SENTHIL SMITH (MRN [id number]) as of 5/10/2020 07:16    Ref. Range 4/1/2020 08:09 4/3/2020 06:20 4/10/2020 05:24 4/13/2020 04:42 5/10/2020 04:15   C-Reactive Protein Latest Ref Range: 0.00 - 0.50 mg/dL 0.63 (H) 0.55 (H) 5.14 (H) 3.04 (H) 4.73 (H)        >30 on discharge

## 2020-05-15 NOTE — PAYOR COMM NOTE
"ATTN: JACKELYN    Good afternoon!    AUTH # [id number]    Per our conversation today I have included therapy notes for Tuesday, 5/5. Please note that the patient's discharge goals are at a modified independent level. Additional notes to follow. Call if you need further information or have any questions.     Thank you!    Tru Miranda RN  p   f         Oralia Smith (44 y.o. Female)     Date of Birth Social Security Number Address Home Phone MRN    1975  [id number] [facility] [address] [phone] [id number]    Worship Marital Status          Anglican        Admission Date Admission Type Admitting Provider Attending Provider Department, Room/Bed    4/20/20 Elective Demetri Mariee MD  [facility], 4407/1    Discharge Date Discharge Disposition Discharge Destination        5/12/2020 Home or Self Care              Attending Provider:  (none)   Allergies:  No Known Drug Allergy    Isolation:  None   Infection:  None   Code Status:  Prior    Ht:  154.9 cm (61\")   Wt:  106 kg (234 lb 5.6 oz)    Admission Cmt:  None   Principal Problem:  None                Active Insurance as of 4/20/2020     Primary Coverage     Payor Plan Insurance Group Employer/Plan Group    [employer] BLUE CROSS [employer] BLUE CROSS BLUE SHIELD PPO [id number]     Payor Plan Address Payor Plan Phone Number Payor Plan Fax Number Effective Dates    [address] [phone]  1/1/2018 - None Entered    [place] [address]       Subscriber Name Subscriber Birth Date Member ID       ORALIA SMITH 1975 [id number]                 Emergency Contacts      (Rel.) Home Phone Work Phone Mobile Phone    LUCRECIA SMITH (Son) -- -- [phone]    scott miller -- -- [phone]            Therapy Treatment                IRF Treatment Summary                    Row Name 05/05/20 1550 05/05/20 1206 05/05/20 0945                  Evaluation/Treatment " Time and Intent      Subjective Information  no complaints  -DN  no complaints  -DN  complains of;numbness B LEs  -EE      Existing Precautions/Restrictions  fall  -DN  fall  -DN  fall  -EE      Document Type  therapy note (daily note)  -DN  therapy note (daily note)  -DN  therapy note (daily note)  -EE      Mode of Treatment  occupational therapy  -DN  occupational therapy  -DN  physical therapy;individual therapy  -EE      Patient/Family Observations  pt sitting in bed  -DN  pt supine in bed  -DN  Pt sitting up in WC in no acute distress.  -EE      Recorded by [DN] Prince Obrien OT [DN] Prince Obrien, OT [EE] Irlanda Wallis, PT                 Row Name 05/05/20 1206 05/05/20 0945                       Cognition/Psychosocial- PT/OT      Affect/Mental Status (Cognitive)  WNL  -DN  --        Orientation Status (Cognition)  oriented x 4  -DN  oriented x 4  -EE        Follows Commands (Cognition)  --  WNL  -EE        Personal Safety Interventions  fall prevention program maintained;gait belt  -DN  fall prevention program maintained;gait belt;muscle strengthening facilitated;nonskid shoes/slippers when out of bed;supervised activity  -EE        Cognitive Function (Cognitive)  memory deficit  -DN  --        Memory Deficit (Cognitive)  mild deficit  -DN  --        Recorded by [DN] Prince Obrien OT [EE] Irlanda Wallis, PT                   Row Name 05/05/20 0945                            Bed Mobility Assessment/Treatment      Sit-Supine [place] (Bed Mobility)  conditional independence  -EE          Recorded by [EE] Irlanda Wallis, PT                     Row Name 05/05/20 1550 05/05/20 1206 05/05/20 0945                  Bed-Chair Transfer      Bed-Chair [place] (Transfers)  supervision  -DN  supervision  -DN  stand by assist;verbal cues  -EE      Assistive Device (Bed-Chair Transfers)  wheelchair  -DN  wheelchair  -DN  walker, front-wheeled;wheelchair  -EE      Recorded by [DN] Prince Obrien OT [DN] Camille  HARMEET Morrison [EE] Irlanda Wallis, PT                 Row Name 05/05/20 0945                            Sit-Stand Transfer      Sit-Stand [place] (Transfers)  stand by assist;contact guard;verbal cues  -EE          Assistive Device (Sit-Stand Transfers)  walker, front-wheeled;wheelchair  -EE          Recorded by [EE] Irlanda Wallsi, PT                     Row Name 05/05/20 0945                            Stand-Sit Transfer      Stand-Sit [place] (Transfers)  stand by assist;verbal cues  -EE          Assistive Device (Stand-Sit Transfers)  walker, front-wheeled;wheelchair  -EE          Recorded by [EE] Irlanda Wallis, PT                     Row Name 05/05/20 1206 05/05/20 0945                       Toilet Transfer      Type (Toilet Transfer)  stand pivot/stand step  -DN  stand pivot/stand step  -EE        [place] Level (Toilet Transfer)  supervision  -DN  supervision  -EE        Assistive Device (Toilet Transfer)  grab bars/safety frame;raised toilet seat  -DN  wheelchair;grab bars/safety frame  -EE        Recorded by [DN] Prince Obrien, OT [EE] Irlanda Wallis, PT                   Row Name 05/05/20 1550                            Shower Transfer      Type (Shower Transfer)  stand pivot/stand step  -DN          [place] Level (Shower Transfer)  contact guard;verbal cues  -DN          Assistive Device (Shower Transfer)  wheelchair;tub bench;grab bars/tub rail  -DN          Recorded by [DN] Prince Obrien, OT                     Row Name 05/05/20 0945                            Gait/Stairs Assessment/Training      [place] Level (Gait)  contact guard;stand by assist;verbal cues  -EE          Assistive Device (Gait)  walker, front-wheeled  -EE          Distance in Feet (Gait)  240', 160' x 2  -EE          Pattern (Gait)  step-through  -EE          Deviations/Abnormal Patterns (Gait)  stride length decreased  -EE          Bilateral Gait Deviations  weight shift ability decreased  -EE          Left Sided Gait  Deviations  heel strike decreased  -EE          [place] Level (Stairs)  minimum assist (75% patient effort);contact guard;verbal cues  -EE          Assistive Device (Stairs)  -- quad tip cane  -EE          Handrail Location (Stairs)  left side (ascending)  -EE          Number of Steps (Stairs)  4  -EE          Ascending Technique (Stairs)  step-to-step  -EE          Descending Technique (Stairs)  step-to-step  -EE          Stairs, Safety Issues  sequencing ability decreased;balance decreased during turns  -EE          Stairs, Impairments  strength decreased;impaired balance  -EE          Comment (Gait/Stairs)  Vc's required for sequencing on stairs. Amb 2 x 30' @ hemibars with CGA/Min.   -EE          Recorded by [EE] Irlanda Wallis PT                     Row Name 05/05/20 0945                            Safety Issues, Functional Mobility      Impairments Affecting Function (Mobility)  balance;endurance/activity tolerance;strength;sensation/sensory awareness  -EE          Recorded by [EE] Irlanda Wallis PT                     Row Name 05/05/20 1550                            Bathing Assessment/Treatment      Bathing [place] Level  bathing skills;supervision  -DN          Assistive Device (Bathing)  tub bench;hand held shower spray hose;grab bar/tub rail  -DN          Bathing Position  supported sitting;supported standing  -DN          Recorded by [DN] Prince Obrien OT                     Row Name 05/05/20 1206                            Upper Body Dressing Assessment/Treatment      Upper Body Dressing Task  upper body dressing skills;don;pull over garment;bra/undergarment;supervision;verbal cues  -DN          Upper Body Dressing Position  supported sitting  -DN          Set-up Assistance (Upper Body Dressing)  obtain clothing  -DN          Recorded by [DN] Prince Obrien OT                     Row Name 05/05/20 1206                            Lower Body Dressing Assessment/Treatment      Lower Body  Dressing [place] Level  don;pants/bottoms;socks;supervision  -DN          Lower Body Dressing Position  edge of bed sitting  -DN          Lower Body Dressing Setup Assistance  obtain clothing  -DN          Recorded by [DN] Prince Obrien OT                     Row Name 05/05/20 1206                            Grooming Assessment/Treatment      Grooming [place] Level  grooming skills;hair care, combing/brushing;oral care regimen;supervision  -DN          Grooming Position  supported sitting  -DN          Recorded by [DN] Prince Obrien OT                     Row Name 05/05/20 1206                            Toileting Assessment/Treatment      Toileting [place] Level  toileting skills;supervision  -DN          Assistive Device Use (Toileting)  grab bar/safety frame;raised toilet seat  -DN          Toileting Position  supported sitting;supported standing  -DN          Comment (Toileting)  RWX in place  -DN          Recorded by [DN] Prince Obrien, OT

## 2020-05-15 NOTE — PAYOR COMM NOTE
"ATTN: JACKELYN     Good afternoon!     AUTH # [id number]     Per our conversation today I have included therapy notes for Saturday, 5/9. Please note that the patient's discharge goals are at a modified independent level. Additional notes to follow. Call if you need further information or have any questions.      Thank you!     Tru Miranda RN  p   f     Oralia Smith (44 y.o. Female)     Date of Birth Social Security Number Address Home Phone MRN    1975  [id number] [facility] [address] [phone] [id number]    Oriental orthodox Marital Status          Hindu        Admission Date Admission Type Admitting Provider Attending Provider Department, Room/Bed    4/20/20 Elective Demetri Mariee MD  [facility], 4407/1    Discharge Date Discharge Disposition Discharge Destination        5/12/2020 Home or Self Care              Attending Provider:  (none)   Allergies:  No Known Drug Allergy    Isolation:  None   Infection:  None   Code Status:  Prior    Ht:  154.9 cm (61\")   Wt:  106 kg (234 lb 5.6 oz)    Admission Cmt:  None   Principal Problem:  None                Active Insurance as of 4/20/2020     Primary Coverage     Payor Plan Insurance Group Employer/Plan Group    [employer] BLUE CROSS AN[employer]M BLUE CROSS BLUE SHIELD PPO [id number]     Payor Plan Address Payor Plan Phone Number Payor Plan Fax Number Effective Dates    [address] [phone]  1/1/2018 - None Entered    [place] [address]       Subscriber Name Subscriber Birth Date Member ID       ORALIA SMITH 1975 [id number]                 Emergency Contacts      (Rel.) Home Phone Work Phone Mobile Phone    LUCRECIA SMITH (Son) -- -- [phone]    scott miller -- -- [phone]            Therapy Treatment                IRF Treatment Summary                    Row Name 05/09/20 1240                            Evaluation/Treatment Time and Intent  "     Subjective Information  complains of;pain  -EE          Existing Precautions/Restrictions  fall  -EE          Document Type  therapy note (daily note)  -EE          Patient/Family Observations  Pt up in room with nsg assistant upon therapist arrival.   -EE          Recorded by [EE] Irlanda Wallis, PT                     Row Name 05/09/20 1240                            Cognition/Psychosocial- PT/OT      Affect/Mental Status (Cognitive)  WNL  -EE          Orientation Status (Cognition)  oriented x 4  -EE          Follows Commands (Cognition)  WNL  -EE          Personal Safety Interventions  fall prevention program maintained;gait belt;muscle strengthening facilitated;nonskid shoes/slippers when out of bed;supervised activity  -EE          Recorded by [EE] Irlanda Wallis, PT                     Row Name 05/09/20 1240                            Bed Mobility Assessment/Treatment      Comment (Bed Mobility)  up in chair  -EE          Recorded by [EE] Irlanda Wallis, PT                     Row Name 05/09/20 1240                            Sit-Stand Transfer      Sit-Stand [place] (Transfers)  stand by assist  -EE          Assistive Device (Sit-Stand Transfers)  walker, front-wheeled  -EE          Recorded by [EE] Irlanda Wallis, PT                     Row Name 05/09/20 1240                            Stand-Sit Transfer      Stand-Sit [place] (Transfers)  stand by assist  -EE          Assistive Device (Stand-Sit Transfers)  walker, front-wheeled  -EE          Recorded by [EE] Irlanda Wallis, PT                     Row Name 05/09/20 1240                            Gait/Stairs Assessment/Training      [place] Level (Gait)  stand by assist;verbal cues  -EE          Assistive Device (Gait)  walker, front-wheeled  -EE          Distance in Feet (Gait)  200', 160' x2  -EE          Pattern (Gait)  step-through  -EE          Deviations/Abnormal Patterns (Gait)  quang decreased;stride length decreased  -EE           Bilateral Gait Deviations  weight shift ability decreased  -EE          [place] Level (Stairs)  contact guard  -EE          Handrail Location (Stairs)  both sides  -EE          Number of Steps (Stairs)  4  -EE          Ascending Technique (Stairs)  step-over-step  -EE          Descending Technique (Stairs)  step-over-step  -EE          Stairs, Safety Issues  balance decreased during turns;weight-shifting ability decreased  -EE          Stairs, Impairments  strength decreased;impaired balance;sensation decreased  -EE          Recorded by [EE] Irlanda Wallis, PT                     Row Name 05/09/20 1240                            Safety Issues, Functional Mobility      Impairments Affecting Function (Mobility)  balance;endurance/activity tolerance;strength;sensation/sensory awareness  -EE          Recorded by [EE] Irlanda Wallis, PT                     Row Name 05/09/20 1240                            Step Over Obstacle (Mobility)      [place], Stepping Over Obstacles (Mobility)  contact guard;verbal cues  -EE          Comment, Stepping Over Obstacles (Mobility)  Stepping over low hurdles 2 x 8' both forward and laterally; one UE supported on hemibars for balance. Able to clear with 100% accuracy.  -EE          Recorded by [EE] Irlanda Wallis, AVINASH                     Row Name 05/09/20 1400                            Hand  Strength Testing      Right Hand, Setting 2 (Dynamometer Testing)  10  -DN          Left Hand, Setting 2 (Dynamometer Testing)  5  -DN          Right Hand: Lateral (Key) Pinch Strength (Pinch Dynamometer Testing)  2  -DN          Right Hand: Three Point (Robert) Pinch Strength (Pinch Dynamometer Testing)  2  -DN          Left Hand: Lateral (Key) Pinch Strength (Pinch Dynamometer Testing)  2  -DN          Left Hand: Three Point (Robert) Pinch Strength (Pinch Dynamometer Testing)  2  -DN          Recorded by [DN] Prince Obrien OT                     Row Name 05/09/20 1240                             Pain Scale: Numbers Pre/Post-Treatment      Pain Scale: Numbers, Pretreatment  7/10  -EE          Pain Scale: Numbers, Post-Treatment  7/10  -EE          Pain Location - Side  Bilateral  -EE          Pain Location - Orientation  distal  -EE          Pain Location  hand  -EE          Pain Intervention(s)  Repositioned;Medication (See MAR);Distraction  -EE          Recorded by [EE] Irlanda Wallis, PT                     Row Name 05/09/20 1240                            Dynamic Balance Activity      Comment (Dynamic Balance Training)  B LE alt step taps to 6' step x 20 reps; no UE support; CGA/min A for balance  -EE          Recorded by [EE] Irlanda Wallis, PT                     Row Name 05/09/20 1240                            Lower Extremity Standing Therapeutic Exercise      Performed, Standing Lower Extremity (Therapeutic Exercise)  heel raises;mini-squats;knee flexion/extension;hip abduction/adduction  -EE          Device, Standing Lower Extremity (Therapeutic Exercise)  marni bars  -EE          Exercise Type, Standing Lower Extremity (Therapeutic Exercise)  AROM (active range of motion)  -EE          Sets/Reps Detail, Standing Lower Extremity (Therapeutic Exercise)  1/10  -EE          Recorded by [EE] Irlanda Wallis, PT

## 2020-05-15 NOTE — PAYOR COMM NOTE
"ATTN: JACKELYN     Good afternoon!     AUTH # [id number]     Per our conversation today I have included therapy notes for Monday, 5/11. Patient discharged Tuesday 5/12 at [facility] for ADL and functional mobility (with the exception of stairs - SBA/Supervision with a cane). Call if you need further information or have any questions.      Thank you!     Tru Miranda RN  p   f     Oralia Smith (44 y.o. Female)     Date of Birth Social Security Number Address Home Phone MRN    1975  [id number] [facility] [address] [phone] [id number]    Mandaeism Marital Status          Alevism        Admission Date Admission Type Admitting Provider Attending Provider Department, Room/Bed    4/20/20 Elective Demetri Mariee MD  [facility], 4407/1    Discharge Date Discharge Disposition Discharge Destination        5/12/2020 Home or Self Care              Attending Provider:  (none)   Allergies:  No Known Drug Allergy    Isolation:  None   Infection:  None   Code Status:  Prior    Ht:  154.9 cm (61\")   Wt:  106 kg (234 lb 5.6 oz)    Admission Cmt:  None   Principal Problem:  None                Active Insurance as of 4/20/2020     Primary Coverage     Payor Plan Insurance Group Employer/Plan Group    [facility] [employer] [facility] [employer] [place] BLUE [facility] PPO [id number]     Payor Plan Address Payor Plan Phone Number Payor Plan Fax Number Effective Dates    [address] [phone]  1/1/2018 - None Entered    [facility] [id number]       Subscriber Name Subscriber Birth Date Member ID       ORALIA SMITH 1975 [id number]                 Emergency Contacts      (Rel.) Home Phone Work Phone Mobile Phone    LUCRECIA SMITH (Son) -- -- [phone]    scott miller -- -- [phone]            Therapy Treatment                IRF Treatment Summary                    Row Name 05/11/20 1606 05/11/20 0900                  " "     Evaluation/Treatment Time and Intent      Subjective Information  no complaints  -DN  no complaints \"My hands feel better today.I finally had a full night sleep  -LB        Existing Precautions/Restrictions  fall  -DN  --        Document Type  discharge treatment;discharge evaluation/summary  -DN  therapy note (daily note)  -LB        Mode of Treatment  occupational therapy  -DN  physical therapy  -LB        Patient/Family Observations  pt to be independent in room today and if all OK D/C tomorrow to mothers home  -DN  Pt up in w/c in no acute distress.  -LB        Recorded by [DN] Prince Obrien, OT [LB] Preeti Reynaga, PT                   Row Name 05/11/20 1606 05/11/20 0900                       Cognition/Psychosocial- PT/OT      Affect/Mental Status (Cognitive)  WNL  -DN  WNL  -LB        Orientation Status (Cognition)  --  oriented x 4  -LB        Follows Commands (Cognition)  --  WNL  -LB        Personal Safety Interventions  --  nonskid shoes/slippers when out of bed  -LB        Recorded by [DN] Prince Obrien, OT [LB] Preeti Reynaga, PT                   Row Name 05/11/20 0900                            Bed Mobility Assessment/Treatment      Rolling Left [place] (Bed Mobility)  independent  -LB          Rolling Right [place] (Bed Mobility)  independent  -LB          Scooting/Bridging [place] (Bed Mobility)  independent  -LB          Supine-Sit [place] (Bed Mobility)  independent  -LB          Recorded by [LB] Preeti Reynaga, PT                     Row Name 05/11/20 0900                            Sit-Stand Transfer      Sit-Stand [place] (Transfers)  conditional independence  -LB          Assistive Device (Sit-Stand Transfers)  walker, front-wheeled  -LB          Recorded by [LB] Preeti Reynaga, PT                     Row Name 05/11/20 0900                            Stand-Sit Transfer      Stand-Sit [place] (Transfers)  conditional independence;supervision  -LB          " Assistive Device (Stand-Sit Transfers)  walker, front-wheeled  -LB          Recorded by [LB] Preeti Reynaga, PT                     Row Name 05/11/20 1606                            Toilet Transfer      Type (Toilet Transfer)  stand pivot/stand step  -DN          [place] Level (Toilet Transfer)  conditional independence  -DN          Assistive Device (Toilet Transfer)  raised toilet seat;grab bars/safety frame  -DN          Recorded by [DN] Prince Obrien, OT                     Row Name 05/11/20 1606                            Bathtub Transfer      Type (Bathtub Transfer)  stand pivot/stand step  -DN          [place] Level (Bathtub Transfer)  conditional independence  -DN          Assistive Device (Bathtub Transfer)  shower chair;grab bars/tub rail;walker, front-wheeled  -DN          Recorded by [DN] Prince Obrien, OT                     Row Name 05/11/20 0900                            Car Transfer      Type (Car Transfer)  sit-stand;stand-sit  -LB          [place] Level (Car Transfer)  contact guard;verbal cues  -LB          Assistive Device (Car Transfer)  walker, front-wheeled  -LB          Recorded by [LB] Preeti Reynaga, PT                     Row Name 05/11/20 0900                            Gait/Stairs Assessment/Training      [place] Level (Gait)  conditional independence  -LB          Assistive Device (Gait)  walker, front-wheeled  -LB          Distance in Feet (Gait)  90' within the room, 200' x 2   -LB          Pattern (Gait)  step-through  -LB          Deviations/Abnormal Patterns (Gait)  quang decreased;stride length decreased  -LB          [place] Level (Stairs)  contact guard  -LB          Handrail Location (Stairs)  both sides first step sideways with R rail   -LB          Number of Steps (Stairs)  8  -LB          Ascending Technique (Stairs)  step-to-step  -LB          Descending Technique (Stairs)  step-to-step  -LB          Comment (Gait/Stairs)  Pt ambulated within  her room and into the bathroom with the rolling walker conditinal independent. Pt ambulated ~ 80' x 2 with quad tipped cane with CGA.   -LB          Recorded by [LB] Preeti Reynaga PT                     Row Name 05/11/20 1606                            Safety Issues, Functional Mobility      Comment, Safety Issues/Impairments (Mobility)  pt SBA with light meal prep wiith baking muffins, rest periods needed, used safe judgement, no safety concerns  -DN          Recorded by [DN] Prince Obrien OT                     Row Name 05/11/20 0900                            Rough/Uneven Surface Gait Skills (Mobility)      [place], Gait on Rough/Uneven Surface (Mobility)  contact guard  -LB          Comment, Gait Rough/Uneven Surface (Mobility)  10' with RWx   -LB          Recorded by [LB] Preeti Reynaga PT                     Row Name 05/11/20 1606                            Bathing Assessment/Treatment      Bathing [place] Level  bathing skills;conditional independence  -DN          Assistive Device (Bathing)  grab bar/tub rail;hand held shower spray hose;shower chair  -DN          Recorded by [DN] Prince Obrien OT                     Row Name 05/11/20 1606                            Upper Body Dressing Assessment/Treatment      Upper Body Dressing Task  upper body dressing skills;doff;don;conditional independence  -DN          Upper Body Dressing Position  supported sitting  -DN          Set-up Assistance (Upper Body Dressing)  obtain clothing  -DN          Recorded by [DN] Prince Obrien OT                     Row Name 05/11/20 1606                            Lower Body Dressing Assessment/Treatment      Lower Body Dressing [place] Level  doff;don;pants/bottoms;shoes/slippers;socks;conditional independence  -DN          Lower Body Dressing Position  supported sitting;supported standing  -DN          Lower Body Dressing Setup Assistance  obtain clothing  -DN          Recorded by [DN] Prince Obrien, OT                      Row Name 05/11/20 1606                            Grooming Assessment/Treatment      Grooming [place] Level  grooming skills;deodorant application;hair care, combing/brushing;oral care regimen;conditional independence  -DN          Grooming Position  supported standing  -DN          Recorded by [DN] Prince Obrien, OT                     Row Name 05/11/20 1606                            Toileting Assessment/Treatment      Toileting [place] Level  toileting skills;adjust/manage clothing;perform perineal hygiene;conditional independence  -DN          Assistive Device Use (Toileting)  grab bar/safety frame;raised toilet seat  -DN          Comment (Toileting)  RWX  -DN          Recorded by [DN] Prince Obrien, OT

## 2020-05-15 NOTE — PAYOR COMM NOTE
"ATTN: JACKELYN     Good afternoon!     AUTH # [id number]     Per our conversation today I have included therapy notes for Thursday, 5/7. Please note that the patient's discharge goals are at a modified independent level. Additional notes to follow. Call if you need further information or have any questions.      Thank you!     Tru Miranda RN  p   f     Oralia Smith (44 y.o. Female)     Date of Birth Social Security Number Address Home Phone MRN    1975  [id number] [facility] [id number] [phone] [id number]    [place] Marital Status          Yazidism        Admission Date Admission Type Admitting Provider Attending Provider Department, Room/Bed    4/20/20 Elective Demetri Mariee MD  [facility], 4407/1    Discharge Date Discharge Disposition Discharge Destination        5/12/2020 Home or Self Care              Attending Provider:  (none)   Allergies:  No Known Drug Allergy    Isolation:  None   Infection:  None   Code Status:  Prior    Ht:  154.9 cm (61\")   Wt:  106 kg (234 lb 5.6 oz)    Admission Cmt:  None   Principal Problem:  None                Active Insurance as of 4/20/2020     Primary Coverage     Payor Plan Insurance Group Employer/Plan Group    [employer] PPO [id number]     Payor Plan Address Payor Plan Phone Number Payor Plan Fax Number Effective Dates    [address] [phone]  1/1/2018 - None Entered    [place] [address]       Subscriber Name Subscriber Birth Date Member ID       ORALIA SMITH 1975 [id number]                 Emergency Contacts      (Rel.) Home Phone Work Phone Mobile Phone    LUCRECIA SMITH (Son) -- -- [phone]    scott miller -- -- [phone]            Therapy Treatment                IRF Treatment Summary                    Row Name 05/07/20 1507 05/07/20 1156 05/07/20 0930                  Evaluation/Treatment " Time and Intent      Subjective Information  no complaints  -AF  no complaints  -AF  no complaints  -EE      Existing Precautions/Restrictions  fall  -AF  fall  -AF  fall  -EE      Document Type  therapy note (daily note)  -AF  therapy note (daily note)  -AF  therapy note (daily note)  -EE      Mode of Treatment  occupational therapy  -AF  occupational therapy  -AF  physical therapy  -EE      Patient/Family Observations  supine in bed  -AF  pt supine in bed, stated that she slept well last night  -AF  Pt sitting up in WC in no acute distress.  -EE      Recorded by [AF] Sobeida Pennington OTR [AF] Sobeida Pennington, OTR [EE] Irlanda Wallis, PT                 Row Name 05/07/20 1507 05/07/20 1156 05/07/20 0930                  Cognition/Psychosocial- PT/OT      Affect/Mental Status (Cognitive)  WNL  -AF  WNL  -AF  --      Orientation Status (Cognition)  oriented x 3  -AF  oriented x 3  -AF  oriented x 3  -EE      Follows Commands (Cognition)  WNL  -AF  WNL  -AF  WNL  -EE      Personal Safety Interventions  fall prevention program maintained;gait belt;nonskid shoes/slippers when out of bed  -AF  fall prevention program maintained;gait belt;nonskid shoes/slippers when out of bed  -AF  fall prevention program maintained;gait belt;muscle strengthening facilitated;nonskid shoes/slippers when out of bed;supervised activity  -EE      Cognitive Function (Cognitive)  memory deficit  -AF  memory deficit  -AF  --      Memory Deficit (Cognitive)  mild deficit  -AF  mild deficit  -AF  --      Recorded by [AF] Sobeida Pennington, HARMEETR [AF] Sobeida Pennington, OTCHAGO [EE] Irlanda Wallis, PT                 Row Name 05/07/20 1507 05/07/20 1156 05/07/20 0930                  Bed Mobility Assessment/Treatment      Supine-Sit [place] (Bed Mobility)  independent  -AF  independent  -AF  independent  -EE      Sit-Supine [place] (Bed Mobility)  independent  -AF  --  independent  -EE      Recorded by [AF] Sobeida Pennington, SANDRA [AF] Sobeida Pennington, HARMEETR  [EE] Irlanda Wallis, PT                 Row Name 05/07/20 1156                            Functional Mobility      Functional Mobility- Comment  walked in room, in bathroom, to and from shower room with RWX SBA  -AF          Recorded by [AF] Sobeida Pennington, OTR                     Row Name 05/07/20 1507 05/07/20 1156 05/07/20 0930                  Bed-Chair Transfer      Bed-Chair [place] (Transfers)  supervision  -AF  supervision  -AF  supervision;stand by assist;verbal cues  -EE      Assistive Device (Bed-Chair Transfers)  walker, front-wheeled  -AF  walker, front-wheeled  -AF  wheelchair  -EE      Recorded by [AF] Sobeida Pennington, OTR [AF] Sobeida Pennington, OTR [EE] Irlanda Wallis, PT                 Row Name 05/07/20 1507 05/07/20 1156 05/07/20 0930                  Chair-Bed Transfer      Chair-Bed [place] (Transfers)  supervision  -AF  supervision  -AF  supervision;stand by assist;verbal cues  -EE      Assistive Device (Chair-Bed Transfers)  walker, front-wheeled  -AF  walker, front-wheeled  -AF  wheelchair  -EE      Recorded by [AF] Sobeida Pennington, OTR [AF] Sobeida Pennington, OTR [EE] Irlanda Wallis, PT                 Row Name 05/07/20 0930                            Sit-Stand Transfer      Sit-Stand [place] (Transfers)  stand by assist;verbal cues  -EE          Assistive Device (Sit-Stand Transfers)  walker, front-wheeled;wheelchair  -EE          Recorded by [EE] Irlanda Wallis, PT                     Row Name 05/07/20 0930                            Stand-Sit Transfer      Stand-Sit [place] (Transfers)  stand by assist;verbal cues  -EE          Assistive Device (Stand-Sit Transfers)  walker, front-wheeled;wheelchair  -EE          Recorded by [EE] Irlanda Wallis, PT                     Row Name 05/07/20 1507 05/07/20 1156                       Toilet Transfer      Type (Toilet Transfer)  stand pivot/stand step  -AF  stand pivot/stand step  -AF        [place] Level (Toilet Transfer)  supervision   -AF  supervision  -AF        Assistive Device (Toilet Transfer)  walker, front-wheeled  -AF  commode;grab bars/safety frame;walker, front-wheeled  -AF        Recorded by [AF] Sobeida Pennington OTR [AF] Sobeida Pennington, HARMEETR                   Row Name 05/07/20 1156                            Shower Transfer      Type (Shower Transfer)  stand pivot/stand step  -AF          [place] Level (Shower Transfer)  supervision  -AF          Assistive Device (Shower Transfer)  grab bars/tub rail;tub bench;walker, front-wheeled  -AF          Recorded by [AF] Sobeida Pennington, OTR                     Row Name 05/07/20 0930                            Gait/Stairs Assessment/Training      [place] Level (Gait)  stand by assist;verbal cues  -EE          Assistive Device (Gait)  walker, front-wheeled  -EE          Distance in Feet (Gait)  160' x 2  -EE          Pattern (Gait)  step-through  -EE          Deviations/Abnormal Patterns (Gait)  quang decreased;stride length decreased  -EE          Bilateral Gait Deviations  weight shift ability decreased  -EE          [place] Level (Stairs)  contact guard;verbal cues  -EE          Handrail Location (Stairs)  right side (ascending);both sides;other (see comments) one trial with 2 handrails; one trial with R handrail only  -EE          Number of Steps (Stairs)  4 x2  -EE          Ascending Technique (Stairs)  step-to-step  -EE          Descending Technique (Stairs)  step-to-step  -EE          Stairs, Safety Issues  balance decreased during turns;weight-shifting ability decreased  -EE          Stairs, Impairments  strength decreased;impaired balance;coordination impaired  -EE          Comment (Gait/Stairs)  Ascending/descending stairs laterally when using only R handrail.   -EE          Recorded by [EE] Irlanda Wallis, PT                     Row Name 05/07/20 0930                            Safety Issues, Functional Mobility      Impairments Affecting Function (Mobility)   balance;endurance/activity tolerance;sensation/sensory awareness;coordination  -EE          Recorded by [EE] Irlanda Wallis, PT                     Row Name 05/07/20 0930                            Step Over Obstacle (Mobility)      [place], Stepping Over Obstacles (Mobility)  contact guard;verbal cues  -EE          Comment, Stepping Over Obstacles (Mobility)  stepping over low hurdles forward and laterally, 2 x 8' each, UE support on marni bars  -EE          Recorded by [EE] Irlanda Wallis, PT                     Row Name 05/07/20 1156                            Bathing Assessment/Treatment      Bathing [place] Level  bathing skills;supervision  -AF          Assistive Device (Bathing)  grab bar/tub rail;hand held shower spray hose;tub bench  -AF          Bathing Position  supported sitting;supported standing  -AF          Recorded by [AF] Sobeida Pennington OTR                     Row Name 05/07/20 1156                            Upper Body Dressing Assessment/Treatment      Upper Body Dressing Task  upper body dressing skills;supervision  -AF          Upper Body Dressing Position  supported sitting  -AF          Recorded by [AF] Sobeida Pennington OTR                     Row Name 05/07/20 1156                            Lower Body Dressing Assessment/Treatment      Lower Body Dressing [place] Level  doff;don;pants/bottoms;shoes/slippers;socks;underwear;supervision  -AF          Lower Body Dressing Position  supported sitting;supported standing  -AF          Lower Body Dressing Setup Assistance  obtain clothing  -AF          Recorded by [AF] Sobeida Pennington OTR                     Row Name 05/07/20 1156                            Grooming Assessment/Treatment      Grooming [place] Level  grooming skills;supervision  -AF          Grooming Position  supported sitting  -AF          Recorded by [AF] Sobeida Pennington OTR                     Row Name 05/07/20 1156                            Toileting  Assessment/Treatment      Toileting [place] Level  toileting skills;supervision  -AF          Assistive Device Use (Toileting)  grab bar/safety frame;raised toilet seat  -AF          Toileting Position  supported sitting;supported standing  -AF          Comment (Toileting)  RWX level   -AF          Recorded by [AF] Sobeida Pennington OTR                     Row Name 05/07/20 1500                            Fine Motor Testing & Training      Comment, Fine Motor Coordination  FMC with maniuplating theraputty with R hand and small pieces for fill in the blank perceptual puzzle with MIN diffiuclt   -AF          Recorded by [AF] Sobeida Pennington, SANDRA

## 2020-05-19 ENCOUNTER — TRANSCRIBE ORDERS (OUTPATIENT)
Dept: PHYSICAL THERAPY | Facility: HOSPITAL | Age: 45
End: 2020-05-19

## 2020-05-19 DIAGNOSIS — G61.0 GUILLAIN BARRÉ SYNDROME (HCC): Primary | ICD-10-CM

## 2020-05-21 ENCOUNTER — HOSPITAL ENCOUNTER (OUTPATIENT)
Dept: PHYSICAL THERAPY | Facility: HOSPITAL | Age: 45
Setting detail: THERAPIES SERIES
Discharge: HOME | End: 2020-05-21

## 2020-05-21 DIAGNOSIS — Z74.09 IMPAIRED FUNCTIONAL MOBILITY, BALANCE, GAIT, AND ENDURANCE: ICD-10-CM

## 2020-05-21 DIAGNOSIS — R26.89 FUNCTIONAL GAIT ABNORMALITY: ICD-10-CM

## 2020-05-21 DIAGNOSIS — G61.0 GBS (GUILLAIN BARRE SYNDROME) (HCC): Primary | ICD-10-CM

## 2020-05-21 PROCEDURE — 97162 PT EVAL MOD COMPLEX 30 MIN: CPT

## 2020-05-21 PROCEDURE — 97112 NEUROMUSCULAR REEDUCATION: CPT

## 2020-05-21 NOTE — THERAPY EVALUATION
.Outpatient Physical Therapy Neuro Initial Evaluation  [facility]     Patient Name: Oralia Sánchez  : 1975  MRN: [id number]  Today's Date: 2020      Visit Date: 2020    Patient Active Problem List   Diagnosis   • Vitamin D deficiency   • Awareness of heartbeats   • Adiposity   • YOUNG (nonalcoholic steatohepatitis)   • Hypothyroid   • Diabetes mellitus arising in pregnancy   • Diabetes (CMS/HCC)   • Metabolic syndrome   • Chest pain   • Primary thunderclap headache   • Elevated LFTs   • Tobacco abuse   • Rheumatoid arthritis (CMS/HCC)   • Type 2 diabetes mellitus (CMS/HCC)   • Morbid obesity (CMS/HCC)   • UTI (urinary tract infection), bacterial   • Cerebral aneurysm   • Dyspnea   • Cough   • Hypomagnesemia   • Metabolic acidosis   • Fatigue   • History of COPD   • Abnormal CT scan, colon   • 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)   • Essential hypertension   • 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)   • Elevated transaminase level   • History of gestational diabetes   • Steroid-induced hyperglycemia   • Elevated aldolase level        Past Medical History:   Diagnosis Date   • Aneurysm (CMS/HCC)    • Arthritis    • Carpal tunnel syndrome    • Chest pain    • Diabetes mellitus (CMS/HCC)    • Dysmetabolic syndrome X    • Gestational diabetes mellitus    • Guillain Barré syndrome (CMS/HCC)    • Hypothyroidism    • Metabolic disorder    • Nonalcoholic steatohepatitis    • Obesity    • Palpitations    • Sleep apnea    • Spinal headache    • Type 2 diabetes mellitus (CMS/HCC)    • Vitamin D deficiency         Past Surgical History:   Procedure Laterality Date   • CARPAL TUNNEL RELEASE     • CEREBRAL ANGIOGRAM N/A 3/28/2019    Procedure: DIAGNOSTIC CEREBRAL ANGIOGRAM;  Surgeon: Alexx Barrow MD;  Location: [facility] OR ;  Service: Neurosurgery   • CEREBRAL ANGIOGRAM N/A 10/2/2019    Procedure: CEREBRAL ANGIOGRAM;  Surgeon: Santosh Garza MD;  Location: [facility] OR  ";  Service: Interventional Radiology   •  SECTION      x6   • COLONOSCOPY N/A 2020    Procedure: Flexible sigmoidoscopy WITH POLYPECTOMY COLD BIOPSY;  Surgeon: Kendall Villafana MD;  Location: [facility] ENDOSCOPY;  Service: Gastroenterology;  Laterality: N/A;  PRE ABN CT  POST POLYP, SMALL HEMORRHOIDS   • DILATATION AND CURETTAGE      x 2   • EMBOLIZATION CEREBRAL N/A 3/29/2019    Procedure: Cererbal angiogram and embolization of cerebral aneurysm;  Surgeon: Alexx Barrow MD;  Location: [facility] HYBRID OR ;  Service: Neurosurgery   • MYRINGOTOMY     • TONSILLECTOMY     • TONSILLECTOMY AND ADENOIDECTOMY           Visit Dx:     ICD-10-CM ICD-9-CM   1. GBS (Guillain [place] syndrome) (CMS/[facility]) G61.0 357.0   2. Functional gait abnormality R26.89 781.2   3. Impaired functional mobility, balance, gait, and endurance Z74.09 V49.89       Patient History     Row Name 20 1302             History    Chief Complaint  Balance Problems;Difficulty Walking;Difficulty with daily activities;Falls/history of falls;Muscle weakness;Impaired sensation  -JOCELYN      Date Current Problem(s) Began  20  -JOCELYN      Brief Description of Current Complaint  Pt had one week history of SOB, cough, fever, fatigue and then came to ED. Pt was diagnosed with Guillain [place]. Pt was transferred to [facility] inpt rehab from 2020 until d/c her parents' home on 2020. Pt now referred for outpt PT.   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   -JOCELYN         Pain     Pain Location  Hand Bilateral  -JOCELYN      Pain at Present  7  -JOCELYN         Fall Risk Assessment    Any falls in the past year:  Yes  -JOCELYN      Number of falls reported in the last 12 months  2  -JOCELYN   "    Factors that contributed to the fall:  Lost balance 1x since home - rolled off couch   -JOCELYN         Daily Activities    Primary Language  English  -JOCELYN      Are you able to read  Yes  -JOCELYN      Are you able to write  Yes  -JOCELYN      How does patient learn best?  Demonstration  -JOCELYN      Teaching needs identified  Home Exercise Program;Falls Prevention;Home Safety  -JOCELYN      Patient is concerned about/has problems with  Climbing Stairs;Performing home management (household chores, shopping, care of dependents);Performing job responsibilities/community activities (work, school,;Repetitive movements of the hand, arm, shoulder;Transfers (getting out of a chair, bed);Walking;Standing  -JOCELYN      Does patient have problems with the following?  None  -JOCELYN      Barriers to learning  None  -JOCELYN      Pt Participated in POC and Goals  Yes  -JOCELYN         Safety    Are you being hurt, hit, or frightened by anyone at home or in your life?  No  -JOCELYN      Are you being neglected by a caregiver  No  -JOCELYN        User Key  (r) = Recorded By, (t) = Taken By, (c) = Cosigned By    Initials Name Provider Type    JOCELYN Justine Santos, PT Physical Therapist              PT Neuro     Row Name 05/21/20 7187             Subjective Comments    Subjective Comments  Pt said she has been using her grandfather's rollator in the house and likes it. She is using the rolling walker she got while inpt at d/c home for outside use. Pt said she has tried to ambulate without walker a little at home. Pt said she has been doing a little cooking at home too. Pt c/o weakness of B knees while up ambulating.   -JOCELYN         Precautions and Contraindications    Precautions/Limitations  fall precautions  -JOCELYN         Subjective Pain    Able to rate subjective pain?  yes  -JOCELYN      Pre-Treatment Pain Level  7  -JOCELYN      Post-Treatment Pain Level  7  -JOCELYN      Subjective Pain Comment  B hands -- due to sensory changes from the Guillain [place]  -JOCELYN         Home Living    Living  "Arrangements  house currently at her parents' home  -JOCELYN      Home Accessibility  stairs to enter home  -JOCELYN      Number of Stairs, Main Entrance  -- a few steps to enter parents' home  -JOCELYN      Stair Railings, Main Entrance  railings on both sides of stairs only 1 rail at her home with uneven steps   -JOCELYN      Home Equipment  Rolling walker;Rollator  -JOCELYN      Living Environment Comment  Pt lives alone. However, she currently cannot be home alone so she is at her parents' home.   -JOCELYN         Vision-Basic Assessment    Current Vision  Does not wear glasses  -JOCELYN         Cognition    Overall Cognitive Status  WFL  -JOCELYN      Memory  Appears intact  -JOCELYN      Orientation Level  Oriented X4  -JOCELYN      Safety Judgment  Good awareness of safety precautions  -JOCELYN      Deficits  Fully aware of deficits  -JOCELYN         Sensation    Light Touch  No apparent deficits  -JOCELYN      Additional Comments  Although pt said her sensation in her entire body is \"weird\" and \"hard to describe\". She mentioned hitting arm on doorframe at home felt like \"shock\". Hands and feet are the most sensitive and describes pain in hands as constant.   -JOCELYN         Proprioception    Proprioception  Intact proprioception B feet/ankles  -JOCELYN         Posture/Observations    Posture/Observations Comments  Pt is obese female who was brought up to rehab waiting room via w/c from main entrance of hospital.   -JOCELYN         General ROM    GENERAL ROM COMMENTS  AROM: WFLs all extremities   -JOCELYN         MMT (Manual Muscle Testing)    General MMT Comments  B UE's overall 4+/5. B LE's overall 4+/5 except 4/5 B hip flexion.   -JOCELYN         Transfers    Sit-Stand [place] (Transfers)  conditional independence;verbal cues verbal cues for hand placement for cane   -JOCELYN      Stand-Sit [place] (Transfers)  conditional independence;supervision always reaching/looking back to be sure chair is there  -JOCELYN      Transfers, Sit-Stand-Sit, Assist Device  other (see comments) rollator, no " device & quad tipped base cane   -JOCELYN      Comment (Transfers)  To stand from armchair and armless chair   -JOCELYN         Gait/Stairs Assessment/Training    [place] Level (Gait)  conditional independence;contact guard indep with rollator; CG with cane or no device   -JOCELYN      Assistive Device (Gait)  walker, 4-wheeled;other (see comments) quad tipped base cane; no device   -JOCELYN      Distance in Feet (Gait)  120' x 1, 60' with rollator; 80' x 1 with cane with difficulty with sequencing gait/cane and more rigid; 80' x 1 no device with pt more relaxed and less rigid.   -JOCELYN      Deviations/Abnormal Patterns (Gait)  bilateral deviations;stride length decreased;gait speed decreased;base of support, wide abducts B arms   -JOCELYN      Bilateral Gait Deviations  weight shift ability decreased;heel strike decreased  -JOCELYN      [place] Level (Stairs)  supervision  -JOCELYN      Handrail Location (Stairs)  both sides  -JOCELYN      Number of Steps (Stairs)  4  -JOCELYN      Ascending Technique (Stairs)  step-over-step  -JOCELYN      Descending Technique (Stairs)  step-over-step  -JOCELYN      Stairs, Safety Issues  weight-shifting ability decreased;balance decreased during turns  -JOCELYN      Stairs, Impairments  sensation decreased;impaired balance;coordination impaired  -JOCELYN         Balance Skills Training    SLS  R LE: 1 second and CG of one; L LE 3 seconds   -JOCELYN      Rhomberg  Rhomberg pt held onto walker to assume position, CG of one.   -JOCELYN      Balance Comments  see KYLEE BRICE TUG  -JOCELYN        User Key  (r) = Recorded By, (t) = Taken By, (c) = Cosigned By    Initials Name Provider Type    Justine Joseph, PT Physical Therapist                  Therapy Education  Education Details: HEP issued: supine traditional SLR, SLR with foot in 2 o'clock and 10 o'clock positions. Encouraged pt to increase ambulation in her neighborhood using rollator.     Given: HEP, Mobility training  Program: New  How Provided: Verbal, Demonstration, Written  Provided to:  Patient  Level of Understanding: Teach back education performed, Verbalized, Demonstrated    PT OP Goals     Row Name 05/21/20 1302          PT Short Term Goals    STG Date to Achieve  06/18/20  -     STG 1  Pt will be independent with HEP to improve strength and balance.   -     STG 2  Pt to ambulate without cane 100' x 3 in one session with armswing present and heel strike.   -     STG 3  Pt to score at least 42/56 on the PICHARDO balance test to demonstrate improved balance and decreased fall risk.   -     STG 4  Pt to score at least 16/24 on the Dynamic Gait Index for improved balance with gait related tasks and decreased fall risk.   -     STG 5  Pt will assume Rhomberg position independently and maintain position for 1 minute.   -[facility] 6  Pt will perform SLS 3-5 seconds for each LE.   -        Long Term Goals    LTG Date to Achieve  07/16/20  -     LTG 1  Pt to score at least 49/56 on the PICHARDO balance test to demonstrate improved balance and reduced risk for falls.   -     LTG 2  Pt to score at least 21/24 on the DGI for improved balance and gait related tasks.   -     LTG 3  Pt will complete the TUG test in < or = 10 seconds without an assistive device.   -     LTG 4  Pt will ascend/descend 4 steps without rail, reciprocally, independently.   -     LTG 5  Pt will be able to maintain balance independently while doing some work simulated activities in therapy.   -     LTG 6  Pt will ambulate up to 400' in the community without assistive device indpendently.   -        Time Calculation    PT Goal Re-Cert Due Date  06/18/20  -       User Key  (r) = Recorded By, (t) = Taken By, (c) = Cosigned By    Initials Name Provider Type    Justine Joseph PT Physical Therapist          PT Assessment/Plan     Row Name 05/21/20 1626          PT Assessment    Functional Limitations  Decreased safety during functional activities;Impaired gait;Limitation in home management;Limitations in  community activities;Limitations in functional capacity and performance;Performance in leisure activities;Performance in self-care ADL;Performance in work activities  -JOCELYN     Impairments  Balance;Gait;Muscle strength;Sensation  -JOCELYN     Assessment Comments  Pt is a 45 y/o female with recent diagnosis of Guillain [place] and stay on inpt rehab unit. Pt has sensory changes all extremities especially hands and feet with this affecting pt's balance. Pt's PT clinical presentation is evolving with pt having deficits with ambulation and balance. Pt is at high risk for falls as indicated by low scores on the PICHARDO (32/56) and DGI (11/24). Pt's biggest goal is to return back to work. Pt will benefit from outpt PT for ambulation, balance and strengthening.   -JOCELYN     Please refer to paper survey for additional self-reported information  Yes  -JOCELYN     Rehab Potential  Good  -JOCELYN     Patient/caregiver participated in establishment of treatment plan and goals  Yes  -JOCELYN     Patient would benefit from skilled therapy intervention  Yes  -JOCELYN        PT Plan    PT Frequency  2x/week  -JOCELYN     Predicted Duration of Therapy Intervention (Therapy Eval)  6-8 weeks   -JOCLEYN     Planned CPT's?  PT EVAL MOD COMPLELITY: 37734;PT THER PROC EA 15 MIN: 59490;PT NEUROMUSC RE-EDUCATION EA 15 MIN: 24345;PT THER ACT EA 15 MIN: 70059  -JOCELYN     Physical Therapy Interventions (Optional Details)  balance training;gait training;home exercise program;neuromuscular re-education;patient/family education;stair training;strengthening  -JOCELYN       User Key  (r) = Recorded By, (t) = Taken By, (c) = Cosigned By    Initials Name Provider Type    Justine Joseph, PT Physical Therapist             OP Exercises     Row Name 05/21/20 0103             Subjective Comments    Subjective Comments  Pt said she has been using her grandfather's rollator in the house and likes it. She is using the rolling walker she got while inpt at d/c home for outside use. Pt said she has tried to  ambulate without walker a little at home. Pt said she has been doing a little cooking at home too. Pt c/o weakness of B knees while up ambulating.   -JOCELYN         Subjective Pain    Able to rate subjective pain?  yes  -JOCELYN      Pre-Treatment Pain Level  7  -JOCELYN      Post-Treatment Pain Level  7  -JOCELYN      Subjective Pain Comment  B hands -- due to sensory changes from the Guillain [place]  -JOCELYN        User Key  (r) = Recorded By, (t) = Taken By, (c) = Cosigned By    Initials Name Provider Type    Justine Joseph, PT Physical Therapist                      Outcome Measure Options: Jasso Balance, Dynamic Gait Index, Timed Up and Go (TUG)  Jasso Balance Scale  Sitting to Standing: able to stand without using hands and stabilize independently  Standing Unsupported: able to stand 2 minutes with supervision  Sitting with Back Unsupported but Feet Supported on Floor or on Stool: able to sit safely and securely for 2 minutes  Standing to Sitting: controls descent by using hands  Transfers: able to transfer safely definite need of hands  Standing Unsupported with Eyes Closed: able to stand 3 seconds  Standing Unsupported with Feet Together: able to place feet together independently but unable to hold for 30 seconds  Reaching Forward with Outstretched Arm While Standing: can reach forward 5 cm (2 inches)   Object From the Floor From a Standing Position: able to  object but needs supervision  Turning to Look Behind Over Left and Right Shoulders While Standing: turns sideways only but maintains balance  Turn 360 Degrees: needs close supervision or verbal cuing  Place Alternate Foot on Step or Stool While Standing Unsupported: able to complete > 2 steps needs minimal assist  Standing Unsupported with One Foot in Front: needs help to step but can hold 15 seconds  Standing on One Leg: tries to lift leg unable to hold 3 seconds but remains standing independently  Jasso Total Score: 32  Jasso Comments: back/forth sway of body  with feet trying to adjust weight to try to maintain balance.   Dynamic Gait Index (DGI)  Gait Level Surface: Mild impairment: walks 20’, uses assistive devices, slower speed, mild gait deviations  Change in Gait Speed: Mild impairment: Is able to change speed but demonstrates mild gait deviations, or no gait deviations but unable to achieve a significant change in velocity, or uses as assistive device  Gait with Horizontal Head Turns: Moderate impairment: Performs head turns with moderate change in gait velocity, slows down, staggers, but recovers, can continue to walk.  Gait with Vertical Head Turns: Moderate impairment: Performs head turns with moderate change in gait velocity, slows down,staggers, but recovers, can continue to walk.  Gait and Pivot Turn: Mild impairment: pivot turns safely in >3 seconds and stops with no loss of balance.  Step Over Obstacle: Severe impairment: Cannot perform without assistance.  Step Around Obstacles: Moderate impairment: Is able to clear cones but must significantly slow speed to accomplish task, or requires verbal cueing.  Steps: Mild impairment: Alternating feet, must use rail.  Dynamic Gait Index Score: 11  Dynamic Gait Index Comments: no device   Timed Up and Go (TUG)  TUG Test 1: 12 seconds(with rollator )    Time Calculation:   Start Time: 1302  Stop Time: 1402  Time Calculation (min): 60 min  Total Timed Code Minutes- PT: 60 minute(s)   Therapy Charges for Today     Code Description Service Date Service Provider Modifiers Qty    [id number]  PT EVAL MOD COMPLEXITY 3 5/21/2020 Justine Santos, PT GP 1    [id number]  PT NEUROMUSC RE EDUCATION EA 15 MIN 5/21/2020 Justine Santos, PT GP 1          PT G-Codes  Outcome Measure Options: Jasso Balance, Dynamic Gait Index, Timed Up and Go (TUG)  Jasso Total Score: 32  TUG Test 1: 12 seconds(with rollator )       Patient was wearing a face mask during this therapy encounter. Therapist used appropriate personal protective  equipment including mask and gloves.  Mask used was standard procedure mask. Appropriate PPE was worn during the entire therapy session. Hand hygiene was completed before and after therapy session. Patient is not in enhanced droplet precautions.       Justine Santos, PT  5/21/2020

## 2020-05-27 ENCOUNTER — OFFICE VISIT (OUTPATIENT)
Dept: CARDIOLOGY | Facility: CLINIC | Age: 45
End: 2020-05-27

## 2020-05-27 VITALS
OXYGEN SATURATION: 96 % | HEART RATE: 96 BPM | DIASTOLIC BLOOD PRESSURE: 82 MMHG | BODY MASS INDEX: 43.99 KG/M2 | WEIGHT: 233 LBS | HEIGHT: 61 IN | SYSTOLIC BLOOD PRESSURE: 120 MMHG

## 2020-05-27 DIAGNOSIS — G61.0 GBS (GUILLAIN BARRE SYNDROME) (HCC): ICD-10-CM

## 2020-05-27 DIAGNOSIS — I10 ESSENTIAL HYPERTENSION: ICD-10-CM

## 2020-05-27 DIAGNOSIS — G47.33 OBSTRUCTIVE SLEEP APNEA SYNDROME: Primary | ICD-10-CM

## 2020-05-27 PROCEDURE — 99204 OFFICE O/P NEW MOD 45 MIN: CPT | Performed by: INTERNAL MEDICINE

## 2020-05-27 NOTE — PROGRESS NOTES
Subjective:     Encounter Date:05/27/2020      Patient ID: Oralia Sánchez is a 44 y.o. female.    Chief Complaint:  Hypertension   This is a new problem. The problem has been gradually improving since onset.       44-year-old female who presents today for evaluation.  Patient was recently in the hospital a very prolonged stay.  She said she was in from April 1-20.  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  Patient during hospitalization gained 44 pounds in a matter of weeks when she was in rehab.  Patient has sleep apnea and had a CPAP given to her several years ago.  She says she takes it off frequently because the pressures go too high.  She had a friend who had an auto CPAP and she did much better with that and has been doing well.  Blood pressure was also elevated she was diuresed placed on several medications and subsequently was told to follow-up with me.  Overall she is doing better.  She did have a walker with her today but says that every day she continues to get more more strength.    Review of Systems   All other systems reviewed and are negative.      Procedures       Objective:     Physical Exam   Constitutional: She is oriented to person, place, and time. She appears well-developed.   HENT:   Head: Normocephalic.   Eyes: Conjunctivae are normal.   Neck: Normal range of motion.   Cardiovascular: Normal rate, regular rhythm and normal heart sounds.   Pulmonary/Chest: Breath sounds normal.   Abdominal: Soft. Bowel sounds are normal.   Musculoskeletal: Normal range of motion. She exhibits no edema.   Neurological: She is alert and oriented to person, place, and time.   Skin: Skin is warm and dry.   Psychiatric: She has a normal mood and affect. Her behavior is normal.   Vitals reviewed.      Lab Review:       Assessment:          Diagnosis Plan   1. Obstructive  sleep apnea syndrome  Ambulatory Referral to Sleep Medicine   2. Essential hypertension     3. GBS (Guillain [place] syndrome) (CMS/[facility])            Plan:         1.  Hypertension patient's blood pressure is excellent today at 120/82.  She says this is where he has been running.  I told her it with massive weight gain is usually from fluid which she said she was swollen all over.  Overall she continues to improve and her blood pressure has come down nicely.  She is lost a lot of her weight as well as her fluid.  She now describes dependent edema.  She says it when she is on her legs for prolonged period of time they will start to swell some.  She says when she was swollen all over she was in a lot of pain the pain is also improved as the swelling has decreased.  Unfortunately as noted above per the HPI she is not wearing her mask for her sleep apnea consistently.  I therefore told her that I would refer her to sleep clinic for evaluation of possible an auto CPAP.  Will obviously defer recommendations to the pulmonologist.  From a cardiovascular standpoint her echo was unremarkable so I am going to decrease her Norvasc down to 5 mg a day and see how she does.  I am hoping if she gets her muscle usage back we may ultimately be able to get off her diuretic but right now she is having quite a bit of dependent edema and this will help with that some.

## 2020-05-28 ENCOUNTER — HOSPITAL ENCOUNTER (OUTPATIENT)
Dept: PHYSICAL THERAPY | Facility: HOSPITAL | Age: 45
Setting detail: THERAPIES SERIES
Discharge: HOME | End: 2020-05-28

## 2020-05-28 DIAGNOSIS — G61.0 GBS (GUILLAIN BARRE SYNDROME) (HCC): Primary | ICD-10-CM

## 2020-05-28 DIAGNOSIS — R26.89 FUNCTIONAL GAIT ABNORMALITY: ICD-10-CM

## 2020-05-28 DIAGNOSIS — Z74.09 IMPAIRED FUNCTIONAL MOBILITY, BALANCE, GAIT, AND ENDURANCE: ICD-10-CM

## 2020-05-28 PROCEDURE — 97112 NEUROMUSCULAR REEDUCATION: CPT

## 2020-05-28 PROCEDURE — 97530 THERAPEUTIC ACTIVITIES: CPT

## 2020-05-28 NOTE — THERAPY TREATMENT NOTE
Outpatient Physical Therapy Neuro Treatment Note  [facility]     Patient Name: Oralia Sánchez  : 1975  MRN: [id number]  Today's Date: 2020      Visit Date: 2020    Visit Dx:    ICD-10-CM ICD-9-CM   1. 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 Functional gait abnormality R26.89 781.2   3. Impaired functional mobility, balance, gait, and endurance Z74.09 V49.89       Patient Active Problem List   Diagnosis   • Vitamin D deficiency   • Awareness of heartbeats   • Adiposity   • YOUNG (nonalcoholic steatohepatitis)   • Hypothyroid   • Diabetes mellitus arising in pregnancy   • Diabetes (CMS/HCC)   • Metabolic syndrome   • Chest pain   • Primary thunderclap headache   • Elevated LFTs   • Tobacco abuse   • Rheumatoid arthritis (CMS/HCC)   • Type 2 diabetes mellitus (CMS/HCC)   • Morbid obesity (CMS/HCC)   • UTI (urinary tract infection), bacterial   • Cerebral aneurysm   • Dyspnea   • Cough   • Hypomagnesemia   • Metabolic acidosis   • Fatigue   • History of COPD   • Abnormal CT scan, colon   • 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 She said Dr. Campos told her she could when she felt she could. Pt said she is walking at home up to 80% of the time without a device, occasionally reaching out for wall/furniture for support.  Walking some in neighborhood with rollator.   -JOCELYN         Precautions and Contraindications    Precautions/Limitations  fall precautions  -JOCELYN         Subjective Pain    Able to rate subjective pain?  yes  -JOCELYN      Subjective Pain Comment  B hands/feet from the Guillain [place]-- did not provide a number  -JOCELYN          Posture/Observations    Posture/Observations Comments  Pt drove herself to therapy, got rollator out of car on her own but was brought up to rehab waiting room via w/c from main entrance of hospital. Pt requested to be brought back down to entrance in w/c because of fatigue following therapy.   -JOCELYN         Transfers    Sit-Stand [place] (Transfers)  conditional independence;verbal cues reminded pt to not pull up on rollator   -JOCELYN      Stand-Sit [place] (Transfers)  conditional independence;supervision  -JOCELYN      Transfers, Sit-Stand-Sit, Assist Device  other (see comments) rollator; no device   -JOCELYN      Comment (Transfers)  Once fully stands: minimal forward/back sway of feet   -JOCELYN         Gait/Stairs Assessment/Training    [place] Level (Gait)  conditional independence;contact guard;stand by assist  -JOCELYN      Assistive Device (Gait)  walker, 4-wheeled;other (see comments) no device   -JOCELYN      Distance in Feet (Gait)  80' x 3 with rollator; 180' x 1 -- trunk rigid by end of walk as she was tired, 80' x 2 without device   -JOCELYN      Deviations/Abnormal Patterns (Gait)  bilateral deviations;stride length decreased;gait speed decreased;base of support, wide abducts arms without armswing   -JOCELYN      Bilateral Gait Deviations  weight shift ability decreased;heel strike decreased  -JOCELYN         Balance Skills Training    Standing-Level of Assistance  Contact guard;Minimum assistance  -JOCELYN      Static Standing Balance Support  No upper extremity supported  -JOCELYN      Standing-Balance Activities  Kicking ball Standing: staggered step raising ball up/down; foam incline   -JOCELYN      Gait Balance-Level of Assistance  Contact guard  -JOCELYN      Gait Balance Support  No upper extremity supported  -JOCELYN      Gait Balance Activities  backwards;tandem;side-stepping pass ball while ambulating  -JOCELYN      Ger  Stood in corner of room: Ger eyes open with head turns/head nods rebounded off wall a couple of times; more difficulty  with eyes closed; semi heel to toe eyes open difficulty with static stance and then with head turns - frequent rebound off wall.   -JOCELYN        User Key  (r) = Recorded By, (t) = Taken By, (c) = Cosigned By    Initials Name Provider Type    Justine Joseph, PT Physical Therapist                  PT Assessment/Plan     Row Name 05/28/20 1536          PT Assessment    Assessment Comments  Pt's balance is challenged most with narrow SARABJIT support activities. She had difficulty with [place] and semi heel to toes. Pt tires quickly with ambulation and needs to work more on ambulating in neighborhood to improve endurance.   -JOCELYN       User Key  (r) = Recorded By, (t) = Taken By, (c) = Cosigned By    Initials Name Provider Type    Justine Joseph, AVINASH Physical Therapist             OP Exercises     Row Name 05/28/20 1302             Subjective Comments    Subjective Comments  Pt said she drove herself to therapy today. She said Dr. Campos told her she could when she felt she could. Pt said she is walking at home up to 80% of the time without a device, occasionally reaching out for wall/furniture for support.  Walking some in neighborhood with rollator.   -JOCELYN         Subjective Pain    Able to rate subjective pain?  yes  -JOCELYN      Subjective Pain Comment  B hands/feet from the Guillain [place]-- did not provide a number  -JOCELYN        User Key  (r) = Recorded By, (t) = Taken By, (c) = Cosigned By    Initials Name Provider Type    Justine Joseph, PT Physical Therapist                          Therapy Education  Education Details: HEP: standing in corner of room - feet shoulder width apart - eyes open, head turns/nods, then eyes closed. Repeat in rhomberg and then try semi heel to toe. Encouraged pt to continue getting out to walk in neighborhood with rollator to improve endurance.   Given: HEP, Mobility training  How Provided: Verbal, Demonstration, Written  Provided to: Patient  Level of Understanding: Teach back  education performed, Verbalized, Demonstrated              Time Calculation:   Start Time: 1302  Stop Time: 1346  Time Calculation (min): 44 min  Total Timed Code Minutes- PT: 44 minute(s)   Therapy Charges for Today     Code Description Service Date Service Provider Modifiers Qty    [id number]  PT NEUROMUSC RE EDUCATION EA 15 MIN 5/28/2020 Justine Santos, PT GP 2    [id number]  PT THERAPEUTIC ACT EA 15 MIN 5/28/2020 Justine Santos, PT GP 1                    Justine Santos, PT  5/28/2020

## 2020-06-01 ENCOUNTER — HOSPITAL ENCOUNTER (OUTPATIENT)
Dept: PHYSICAL THERAPY | Facility: HOSPITAL | Age: 45
Setting detail: THERAPIES SERIES
Discharge: HOME | End: 2020-06-01

## 2020-06-01 DIAGNOSIS — Z74.09 IMPAIRED FUNCTIONAL MOBILITY, BALANCE, GAIT, AND ENDURANCE: ICD-10-CM

## 2020-06-01 DIAGNOSIS — G61.0 GBS (GUILLAIN BARRE SYNDROME) (HCC): Primary | ICD-10-CM

## 2020-06-01 DIAGNOSIS — R26.89 FUNCTIONAL GAIT ABNORMALITY: ICD-10-CM

## 2020-06-01 PROCEDURE — 97112 NEUROMUSCULAR REEDUCATION: CPT

## 2020-06-01 PROCEDURE — 97530 THERAPEUTIC ACTIVITIES: CPT

## 2020-06-01 NOTE — THERAPY TREATMENT NOTE
"    Outpatient Physical Therapy Neuro Treatment Note  [facility]     Patient Name: Oralia Sánchez  : 1975  MRN: [id number]  Today's Date: 2020      Visit Date: 2020    Visit Dx:    ICD-10-CM ICD-9-CM   1. 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 Functional gait abnormality R26.89 781.2   3. Impaired functional mobility, balance, gait, and endurance Z74.09 V49.89       Patient Active Problem List   Diagnosis   • Vitamin D deficiency   • Awareness of heartbeats   • Adiposity   • YOUNG (nonalcoholic steatohepatitis)   • Hypothyroid   • Diabetes mellitus arising in pregnancy   • Diabetes (CMS/HCC)   • Metabolic syndrome   • Chest pain   • Primary thunderclap headache   • Elevated LFTs   • Tobacco abuse   • Rheumatoid arthritis (CMS/HCC)   • Type 2 diabetes mellitus (CMS/HCC)   • Morbid obesity (CMS/HCC)   • UTI (urinary tract infection), bacterial   • Cerebral aneurysm   • Dyspnea   • Cough   • Hypomagnesemia   • Metabolic acidosis   • Fatigue   • History of COPD   • Abnormal CT scan, colon   • 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 Pt said she took her daughters to the store today and walked in but used rollator a little. Rollator outside but not at home.   -JOCELYN         Precautions and Contraindications    Precautions/Limitations  fall precautions  -JOCELYN         Subjective Pain    Able to rate subjective pain?  yes  -JOCELYN      Pre-Treatment Pain Level  0  -JOCELYN      Subjective Pain Comment  c/o pain if walks alot in feet and hands if uses quite a bit but did " not provide a number.   -JOCELYN         Posture/Observations    Posture/Observations Comments  Pt drove herself to therapy, got rollator out of car on her own but was brought up to rehab waiting room via w/c from main entrance of hospital.   -JOCELYN         Transfers    Sit-Stand [place] (Transfers)  conditional independence  -JOCELYN      Stand-Sit [place] (Transfers)  conditional independence  -JOCELYN      Transfers, Sit-Stand-Sit, Assist Device  other (see comments) no device   -JOCELYN      Comment (Transfers)  from armless chair   -JOCELYN         Gait/Stairs Assessment/Training    [place] Level (Gait)  conditional independence;stand by assist  -JOCELYN      Assistive Device (Gait)  other (see comments) no device   -JOCELYN      Distance in Feet (Gait)  100' x 3 practiced quick turns, scanning environment; 300' but with pt had 2 standing stop rests walking from PT department to outside to her car. Mostly on sidewalk, incline.   -JOCELYN      Deviations/Abnormal Patterns (Gait)  bilateral deviations;stride length decreased;gait speed decreased;base of support, wide occasional abduction of arms.   -JOCELYN      Bilateral Gait Deviations  weight shift ability decreased;heel strike decreased  -JOCELYN      Comment (Gait/Stairs)  Pt really SOA with outdoor walk, needed frequent sit down rest breaks during session today.   -JOCELYN         Balance Skills Training    Standing-Level of Assistance  Contact guard  -JOCELYN      Static Standing Balance Support  No upper extremity supported  -JOCELYN      Standing-Balance Activities  Kicking ball  -JOCELYN      Gait Balance-Level of Assistance  Contact guard  -JOCELYN      Gait Balance Support  No upper extremity supported  -JOCELYN      Gait Balance Activities  backwards;side-stepping;scanning environment R/L quick turns; pass ball with PT  -JOCELYN        User Key  (r) = Recorded By, (t) = Taken By, (c) = Cosigned By    Initials Name Provider Type    Justine Joseph PT Physical Therapist                  PT Assessment/Plan     Row Name  "06/01/20 1813          PT Assessment    Assessment Comments  Pt has progressed herself off rollator at least at home and short distances in the community which is good and bad. She is still a little unsteady and tires quickly. Pt's endurance remains low and she was SOA, requiring frequent rest breaks during PT and during outside ambulation. Pt needs continued PT to work on improving balance, ambulation and endurance so pt can return to work.   -JOCELYN       User Key  (r) = Recorded By, (t) = Taken By, (c) = Cosigned By    Initials Name Provider Type    Justine Joseph PT Physical Therapist             OP Exercises     Row Name 06/01/20 1300             Subjective Comments    Subjective Comments  \"I really want to get back to work.\" Pt said she has stopped using her rolling walker since last PT session and denies any falls. Pt said she took her daughters to the store today and walked in but used rollator a little. Rollator outside but not at home.   -JOCELYN         Subjective Pain    Able to rate subjective pain?  yes  -JOCELYN      Pre-Treatment Pain Level  0  -JOCELYN      Subjective Pain Comment  c/o pain if walks alot in feet and hands if uses quite a bit but did not provide a number.   -JOCELYN         Exercise 1    Exercise Name 1  Alternate tapping feet 6\" step, no rail   -JOCELYN      Cueing 1  Verbal;Demo  -JOCELYN      Reps 1  25  -JOCELYN        User Key  (r) = Recorded By, (t) = Taken By, (c) = Cosigned By    Initials Name Provider Type    Justine Joseph PT Physical Therapist                          Therapy Education  Education Details: Discussed pt's shoes she is currently wearing (has flexible heel counter/no medial/lateral support) and one's she wears at work. Recommended shoes with more support at her heel and M/L. Encouraged pt bring in her work shoes next session. Worked on outside walking.   Given: Mobility training, Fall prevention and home safety  How Provided: Verbal, Demonstration  Provided to: Patient  Level of " Understanding: Teach back education performed, Verbalized, Demonstrated              Time Calculation:   Start Time: 1300  Stop Time: 1345  Time Calculation (min): 45 min  Total Timed Code Minutes- PT: 45 minute(s)   Therapy Charges for Today     Code Description Service Date Service Provider Modifiers Qty    15936677690  PT NEUROMUSC RE EDUCATION EA 15 MIN 6/1/2020 Justine Santos, PT GP 2    [id number]  PT THERAPEUTIC ACT EA 15 MIN 6/1/2020 Justine Santos, PT GP 1              Patient was wearing a face mask during this therapy encounter. Therapist used appropriate personal protective equipment including mask and gloves.  Mask used was standard procedure mask. Appropriate PPE was worn during the entire therapy session. Hand hygiene was completed before and after therapy session. Patient is not in enhanced droplet precautions.           Justine Santos, PT  6/1/2020

## 2020-06-04 ENCOUNTER — HOSPITAL ENCOUNTER (OUTPATIENT)
Dept: PHYSICAL THERAPY | Facility: HOSPITAL | Age: 45
Setting detail: THERAPIES SERIES
Discharge: HOME | End: 2020-06-04

## 2020-06-04 DIAGNOSIS — G61.0 GBS (GUILLAIN BARRE SYNDROME) (HCC): Primary | ICD-10-CM

## 2020-06-04 DIAGNOSIS — Z74.09 IMPAIRED FUNCTIONAL MOBILITY, BALANCE, GAIT, AND ENDURANCE: ICD-10-CM

## 2020-06-04 DIAGNOSIS — R26.89 FUNCTIONAL GAIT ABNORMALITY: ICD-10-CM

## 2020-06-04 PROCEDURE — 97530 THERAPEUTIC ACTIVITIES: CPT

## 2020-06-04 PROCEDURE — 97112 NEUROMUSCULAR REEDUCATION: CPT

## 2020-06-04 NOTE — THERAPY TREATMENT NOTE
"    Outpatient Physical Therapy Neuro Treatment Note  [facility]     Patient Name: Oralia Sánchez  : 1975  MRN: [id number]  Today's Date: 2020      Visit Date: 2020    Visit Dx:    ICD-10-CM ICD-9-CM   1. 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 Functional gait abnormality R26.89 781.2   3. Impaired functional mobility, balance, gait, and endurance Z74.09 V49.89       Patient Active Problem List   Diagnosis   • Vitamin D deficiency   • Awareness of heartbeats   • Adiposity   • YOUNG (nonalcoholic steatohepatitis)   • Hypothyroid   • Diabetes mellitus arising in pregnancy   • Diabetes (CMS/HCC)   • Metabolic syndrome   • Chest pain   • Primary thunderclap headache   • Elevated LFTs   • Tobacco abuse   • Rheumatoid arthritis (CMS/HCC)   • Type 2 diabetes mellitus (CMS/HCC)   • Morbid obesity (CMS/HCC)   • UTI (urinary tract infection), bacterial   • Cerebral aneurysm   • Dyspnea   • Cough   • Hypomagnesemia   • Metabolic acidosis   • Fatigue   • History of COPD   • Abnormal CT scan, colon   • 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 Pt said she was very busy \"running errands\", walking without her cane. She said she walked at a park yesterday using rolling walker and able to walk more without sitting down to rest.   -JOCELYN         Precautions and Contraindications    Precautions/Limitations  fall precautions  -JOCELYN         Subjective Pain    Subjective Pain Comment  c/o her R arch hurting and feet bother her the more she is up due to decreased sensation.   -JOCELYN         " Posture/Observations    Posture/Observations Comments  Pt drove herself to therapy, walked into hospital without device but was pushed up to therapy in w/c. PT assisted her down to main lobby while she used cane. Pt did not leave using any device.   -JOCELYN         Transfers    Sit-Stand [place] (Transfers)  conditional independence;verbal cues to not pull up on cane   -JOCELYN      Stand-Sit [place] (Transfers)  conditional independence  -JOCELYN      Transfers, Sit-Stand-Sit, Assist Device  straight cane;other (see comments) no device   -JOCELYN      Comment (Transfers)  from armchair   -JOCELYN         Gait/Stairs Assessment/Training    [place] Level (Gait)  conditional independence;stand by assist;contact guard SBA/CG without device - 2 LOB, pt reaching for wall   -JOCELYN      Assistive Device (Gait)  -- no device  -JOCELYN      Distance in Feet (Gait)  100' x 2 no device - 2 LOB reached for wall to recover, abducted arms, little armswing; 100' x 2, 150' with cane longer strides, less abduction of arms, no weaving   -JOCELYN      Deviations/Abnormal Patterns (Gait)  bilateral deviations;stride length decreased;gait speed decreased;base of support, wide;other (see comments) abducts arms with little armswing with no device   -JOCELYN      Bilateral Gait Deviations  weight shift ability decreased;heel strike decreased  -JOCELYN      Comment (Gait/Stairs)  Used mirror for visual feedback so that pt could see difference of gait with and without cane.   -         Balance Skills Training    Standing-Level of Assistance  Distant supervision;Independent  -JOCELYN      Static Standing Balance Support  No upper extremity supported  -JOCELYN      Standing-Balance Activities  -- rocking forward/back at feet in static stance noted   -JOCELYN      Gait Balance-Level of Assistance  Contact guard  -JOCELYN      Gait Balance Support  No upper extremity supported  -JOCELYN      Gait Balance Activities  backwards;side-stepping;scanning environment R/L quick turns   -JOCELYN      Balance  "Comments  [employer] (VSR) was used for 5 minutes. Pt workded on weight shift forward/back - most difficulty with and then M/L.   -JOCELYN        User Key  (r) = Recorded By, (t) = Taken By, (c) = Cosigned By    Initials Name Provider Type    Justine Joseph, PT Physical Therapist                  PT Assessment/Plan     Row Name 06/04/20 0693          PT Assessment    Assessment Comments  Pt continues increase ambulation without device. However, pt is unstable (2 LOB today - needed wall to recover)  and more guarded ambulation noted without device. Tried to show and strongly encourage pt look into getting a cane. She was more stable with improved ambulation.   -JOCELYN       User Key  (r) = Recorded By, (t) = Taken By, (c) = Cosigned By    Initials Name Provider Type    Justine Joseph, PT Physical Therapist             OP Exercises     Row Name 06/04/20 1304             Subjective Comments    Subjective Comments  Pt said she will be talking to her work on Monday to see if they can place her somewhere short term that she can sit. Pt said she was very busy \"running errands\", walking without her cane. She said she walked at a park yesterday using rolling walker and able to walk more without sitting down to rest.   -JOCELYN         Subjective Pain    Subjective Pain Comment  c/o her R arch hurting and feet bother her the more she is up due to decreased sensation.   -JOCELYN        User Key  (r) = Recorded By, (t) = Taken By, (c) = Cosigned By    Initials Name Provider Type    Justine Joseph PT Physical Therapist                          Therapy Education  Education Details: Pt said she has ordered more stable shoes. Encouraged pt to try to get STC and use to be more stable while walking.   Given: Mobility training, Fall prevention and home safety  How Provided: Verbal, Demonstration  Provided to: Patient  Level of Understanding: Teach back education performed, Verbalized, Demonstrated              Time Calculation:   Start " Time: 1301  Stop Time: 1345  Time Calculation (min): 44 min  Total Timed Code Minutes- PT: 44 minute(s)   Therapy Charges for Today     Code Description Service Date Service Provider Modifiers Qty    68822580133  PT NEUROMUSC RE EDUCATION EA 15 MIN 6/4/2020 Justine Santos, PT GP 2    [id number]  PT THERAPEUTIC ACT EA 15 MIN 6/4/2020 Justine Santos, PT GP 1              Patient was wearing a face mask during this therapy encounter. Therapist used appropriate personal protective equipment including mask and gloves.  Mask used was standard procedure mask. Appropriate PPE was worn during the entire therapy session. Hand hygiene was completed before and after therapy session. Patient is not in enhanced droplet precautions.           Justine Santos, PT  6/4/2020

## 2020-06-05 DIAGNOSIS — E03.9 ACQUIRED HYPOTHYROIDISM: ICD-10-CM

## 2020-06-05 DIAGNOSIS — E55.9 VITAMIN D DEFICIENCY: Primary | ICD-10-CM

## 2020-06-05 DIAGNOSIS — E11.69 TYPE 2 DIABETES MELLITUS WITH OTHER SPECIFIED COMPLICATION, UNSPECIFIED WHETHER LONG TERM INSULIN USE (HCC): Chronic | ICD-10-CM

## 2020-06-08 ENCOUNTER — HOSPITAL ENCOUNTER (OUTPATIENT)
Dept: PHYSICAL THERAPY | Facility: HOSPITAL | Age: 45
Setting detail: THERAPIES SERIES
Discharge: HOME | End: 2020-06-08

## 2020-06-08 ENCOUNTER — TELEPHONE (OUTPATIENT)
Dept: CARDIOLOGY | Facility: CLINIC | Age: 45
End: 2020-06-08

## 2020-06-08 DIAGNOSIS — G61.0 GBS (GUILLAIN BARRE SYNDROME) (HCC): Primary | ICD-10-CM

## 2020-06-08 DIAGNOSIS — Z74.09 IMPAIRED FUNCTIONAL MOBILITY, BALANCE, GAIT, AND ENDURANCE: ICD-10-CM

## 2020-06-08 DIAGNOSIS — R26.89 FUNCTIONAL GAIT ABNORMALITY: ICD-10-CM

## 2020-06-08 LAB
25(OH)D3+25(OH)D2 SERPL-MCNC: 64.1 NG/ML (ref 30–100)
ALBUMIN SERPL-MCNC: 4 G/DL (ref 3.5–5.2)
ALBUMIN/GLOB SERPL: 1.4 G/DL
ALP SERPL-CCNC: 73 U/L (ref 39–117)
ALT SERPL-CCNC: 147 U/L (ref 1–33)
AST SERPL-CCNC: 123 U/L (ref 1–32)
BILIRUB SERPL-MCNC: 0.4 MG/DL (ref 0.2–1.2)
BUN SERPL-MCNC: 15 MG/DL (ref 6–20)
BUN/CREAT SERPL: 24.6 (ref 7–25)
C PEPTIDE SERPL-MCNC: 5.1 NG/ML (ref 1.1–4.4)
CALCIUM SERPL-MCNC: 9.9 MG/DL (ref 8.6–10.5)
CHLORIDE SERPL-SCNC: 100 MMOL/L (ref 98–107)
CHOLEST SERPL-MCNC: 167 MG/DL (ref 0–200)
CO2 SERPL-SCNC: 30.4 MMOL/L (ref 22–29)
CREAT SERPL-MCNC: 0.61 MG/DL (ref 0.57–1)
GLOBULIN SER CALC-MCNC: 2.8 GM/DL
GLUCOSE SERPL-MCNC: 148 MG/DL (ref 65–99)
HBA1C MFR BLD: 6 % (ref 4.8–5.6)
HDLC SERPL-MCNC: 48 MG/DL (ref 40–60)
INTERPRETATION: NORMAL
LDLC SERPL CALC-MCNC: 98 MG/DL (ref 0–100)
Lab: NORMAL
MICROALBUMIN UR-MCNC: <3 UG/ML
POTASSIUM SERPL-SCNC: 4 MMOL/L (ref 3.5–5.2)
PROT SERPL-MCNC: 6.8 G/DL (ref 6–8.5)
SODIUM SERPL-SCNC: 141 MMOL/L (ref 136–145)
T3FREE SERPL-MCNC: 3.9 PG/ML (ref 2–4.4)
T4 FREE SERPL-MCNC: 2.18 NG/DL (ref 0.93–1.7)
T4 SERPL-MCNC: 18.1 MCG/DL (ref 4.5–11.7)
THYROGLOB AB SERPL-ACNC: <1 IU/ML
THYROGLOB SERPL-MCNC: 1.5 NG/ML
THYROGLOB SERPL-MCNC: NORMAL NG/ML
TRIGL SERPL-MCNC: 104 MG/DL (ref 0–150)
TSH SERPL DL<=0.005 MIU/L-ACNC: 0.8 UIU/ML (ref 0.27–4.2)
URATE SERPL-MCNC: 6.4 MG/DL (ref 2.4–5.7)
VLDLC SERPL CALC-MCNC: 20.8 MG/DL (ref 5–40)

## 2020-06-08 PROCEDURE — 97530 THERAPEUTIC ACTIVITIES: CPT

## 2020-06-08 PROCEDURE — 97112 NEUROMUSCULAR REEDUCATION: CPT

## 2020-06-08 RX ORDER — AMLODIPINE BESYLATE 2.5 MG/1
2.5 TABLET ORAL DAILY
Qty: 30 TABLET | Refills: 1 | Status: SHIPPED | OUTPATIENT
Start: 2020-06-08 | End: 2020-09-01

## 2020-06-08 NOTE — TELEPHONE ENCOUNTER
Decrease amlodipine to 2.5 mg. New refill sent. Have her take BP twice daily and call with update by Friday.

## 2020-06-08 NOTE — TELEPHONE ENCOUNTER
Pt left msg saying she has had dizzy spells the past few days when she wakes up and turns over in bed.  BP today is 97/78.  Pt # [phone]  Thanks,  Jenn GONZALES 05/27/20 with Dr. Mullen  Plan          1.  Hypertension patient's blood pressure is excellent today at 120/82.  She says this is where he has been running.  I told her it with massive weight gain is usually from fluid which she said she was swollen all over.  Overall she continues to improve and her blood pressure has come down nicely.  She is lost a lot of her weight as well as her fluid.  She now describes dependent edema.  She says it when she is on her legs for prolonged period of time they will start to swell some.  She says when she was swollen all over she was in a lot of pain the pain is also improved as the swelling has decreased.  Unfortunately as noted above per the HPI she is not wearing her mask for her sleep apnea consistently.  I therefore told her that I would refer her to sleep clinic for evaluation of possible an auto CPAP.  Will obviously defer recommendations to the pulmonologist.  From a cardiovascular standpoint her echo was unremarkable so I am going to decrease her Norvasc down to 5 mg a day and see how she does.  I am hoping if she gets her muscle usage back we may ultimately be able to get off her diuretic but right now she is having quite a bit of dependent edema and this will help with that some.

## 2020-06-08 NOTE — THERAPY TREATMENT NOTE
Outpatient Physical Therapy Neuro Treatment Note  [facility]     Patient Name: Oralia Sánchez  : 1975  MRN: [id number]  Today's Date: 2020      Visit Date: 2020    Visit Dx:    ICD-10-CM ICD-9-CM   1. 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 Functional gait abnormality R26.89 781.2   3. Impaired functional mobility, balance, gait, and endurance Z74.09 V49.89       Patient Active Problem List   Diagnosis   • Vitamin D deficiency   • Awareness of heartbeats   • Adiposity   • YOUNG (nonalcoholic steatohepatitis)   • Hypothyroid   • Diabetes mellitus arising in pregnancy   • Diabetes (CMS/HCC)   • Metabolic syndrome   • Chest pain   • Primary thunderclap headache   • Elevated LFTs   • Tobacco abuse   • Rheumatoid arthritis (CMS/HCC)   • Type 2 diabetes mellitus (CMS/HCC)   • Morbid obesity (CMS/HCC)   • UTI (urinary tract infection), bacterial   • Cerebral aneurysm   • Dyspnea   • Cough   • Hypomagnesemia   • Metabolic acidosis   • Fatigue   • History of COPD   • Abnormal CT scan, colon   • 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 Walking in neighborhood with rollator -- long distance. Pt c/o having some dizziness over past few days mostly getting in/out of bed or rolling. She said she called her cardiologist this morning and they changed her medicine today.   -JOCELYN         Precautions and Contraindications    Precautions/Limitations  fall precautions  -JOCELYN         Subjective Pain    Able to rate subjective pain?  yes  -JOCELYN      Pre-Treatment Pain Level  3  -JOCELYN      Post-Treatment Pain Level  3  -JOCELYN      Subjective  "Pain Comment  pain discomfort varies in hands   -JOCELYN         Posture/Observations    Posture/Observations Comments  Pt drove herself to therapy, walked into hospital without device but was pushed up to therapy in w/c. PT assisted her down to main lobby while she used cane. Pt did not leave using any device.   -JOCELYN         Transfers    Sit-Stand [place] (Transfers)  conditional independence  -JOCELYN      Stand-Sit [place] (Transfers)  conditional independence  -JOCELYN      Transfers, Sit-Stand-Sit, Assist Device  other (see comments) no device   -JOCELYN         Gait/Stairs Assessment/Training    [place] Level (Gait)  conditional independence;stand by assist  -JOCELYN      Assistive Device (Gait)  -- no device   -JOCELYN      Distance in Feet (Gait)  180', 250' (front entrance of hospital), 80' x 2  -JOCELYN      Deviations/Abnormal Patterns (Gait)  bilateral deviations;stride length decreased;gait speed decreased;base of support, wide;other (see comments) improved armswing   -JOCELYN      Bilateral Gait Deviations  weight shift ability decreased;heel strike decreased  -JOCELYN      Comment (Gait/Stairs)  No \"dizziness\" during session today. Pt c/o side of her hips hurt with ambulating long walks.   -JOCELYN         Balance Skills Training    Standing-Level of Assistance  Contact guard;Minimum assistance  -JOCELYN      Static Standing Balance Support  No upper extremity supported;Left upper extremity supported  -JOCELYN      Standing-Balance Activities  Trampoline;Compliant surfaces;Ball toss to rebounder while on foam pad; blue foam beam   -JOCELYN      Gait Balance-Level of Assistance  Contact guard  -JOCELYN      Gait Balance Support  No upper extremity supported  -JOCELYN      Gait Balance Activities  backwards;side-stepping;scanning environment R/L  -JOCELYN      [place]  Stood on foam pad, Rhomberg eyes open, head turns/head nods with pt having occasional LOB but much movement of ankles while stationary. Immediate LOB with eyes closed. Then semi heel to toe eyes open " "with frequent LOB.   -JOCELYN        User Key  (r) = Recorded By, (t) = Taken By, (c) = Cosigned By    Initials Name Provider Type    Justine Joseph, PT Physical Therapist                  PT Assessment/Plan     Row Name 06/08/20 1647          PT Assessment    Assessment Comments  Pt was more steady today without device. She demonstrated increased armswing as well. Her balance still is challenged with narrow SARABJIT activities especially on foam pad or trampoline.   -JOCELYN       User Key  (r) = Recorded By, (t) = Taken By, (c) = Cosigned By    Initials Name Provider Type    Justine Joseph, PT Physical Therapist             OP Exercises     Row Name 06/08/20 1300             Subjective Comments    Subjective Comments  Pt said she walked into a small store without device and did well. Walking in neighborhood with rollator -- long distance. Pt c/o having some dizziness over past few days mostly getting in/out of bed or rolling. She said she called her cardiologist this morning and they changed her medicine today.   -JOCELYN         Subjective Pain    Able to rate subjective pain?  yes  -JOCELYN      Pre-Treatment Pain Level  3  -JOCELYN      Post-Treatment Pain Level  3  -JOCELYN      Subjective Pain Comment  pain discomfort varies in hands   -JOCELYN         Exercise 2    Exercise Name 2  Standing stretch PF: via \"runner's stretch\"   -JOCELYN      Reps 2  1  -JOCELYN      Time 2  1 minute each side   -JOCELYN         Exercise 3    Exercise Name 3  Sidestep up ramp R side leading up then L side leading up.   -JOCELYN      Cueing 3  Verbal;Demo  -JOCELYN        User Key  (r) = Recorded By, (t) = Taken By, (c) = Cosigned By    Initials Name Provider Type    Justine Joseph, PT Physical Therapist                          Therapy Education  Education Details: Pt's shoes on order. Stretch PF via \"runner's stretch\" at home. Encouraged pt to try ambulating some in neighborhood (short distance) without device.   Given: HEP, Mobility training  How Provided: Verbal, " Demonstration  Provided to: Patient  Level of Understanding: Teach back education performed, Verbalized, Demonstrated              Time Calculation:   Start Time: 1300  Stop Time: 1345  Time Calculation (min): 45 min  Total Timed Code Minutes- PT: 45 minute(s)   Therapy Charges for Today     Code Description Service Date Service Provider Modifiers Qty    03118610105  PT NEUROMUSC RE EDUCATION EA 15 MIN 6/8/2020 Justine Santos, PT GP 2    [id number]  PT THERAPEUTIC ACT EA 15 MIN 6/8/2020 Justine Santos, PT GP 1            Patient was wearing a face mask during this therapy encounter. Therapist used appropriate personal protective equipment including mask and gloves.  Mask used was standard procedure mask. Appropriate PPE was worn during the entire therapy session. Hand hygiene was completed before and after therapy session. Patient is not in enhanced droplet precautions.             Justine Santos, PT  6/8/2020

## 2020-06-11 ENCOUNTER — APPOINTMENT (OUTPATIENT)
Dept: PHYSICAL THERAPY | Facility: HOSPITAL | Age: 45
End: 2020-06-11

## 2020-06-15 ENCOUNTER — APPOINTMENT (OUTPATIENT)
Dept: PHYSICAL THERAPY | Facility: HOSPITAL | Age: 45
End: 2020-06-15

## 2020-06-15 NOTE — THERAPY TREATMENT NOTE
Acute Care - Occupational Therapy Progress Note  [facility]     Patient Name: Oralia Sánchez  : 1975  MRN: [id number]  Today's Date: 2020             Admit Date: 2020       ICD-10-CM ICD-9-CM   1. Dyspnea, unspecified type R06.00 786.09   2. Metabolic acidosis E87.2 276.2   3. Cough R05 786.2   4. Fatigue, unspecified type R53.83 780.79   5. History of COPD Z87.09 V12.69   6. Hypomagnesemia E83.42 275.2   7. Cerebral aneurysm rupture (CMS/HCC) I60.7 430   8. Abnormal CT scan, colon R93.3 793.4     Patient Active Problem List   Diagnosis   • Vitamin D deficiency   • Awareness of heartbeats   • Adiposity   • YOUNG (nonalcoholic steatohepatitis)   • Hypothyroid   • Diabetes mellitus arising in pregnancy   • Diabetes (CMS/HCC)   • Metabolic syndrome   • Chest pain   • Primary thunderclap headache   • Elevated LFTs   • Tobacco abuse   • Rheumatoid arthritis (CMS/HCC)   • Type 2 diabetes mellitus (CMS/HCC)   • Morbid obesity (CMS/HCC)   • UTI (urinary tract infection), bacterial   • Cerebral aneurysm   • Dyspnea   • Cough   • Hypomagnesemia   • Metabolic acidosis   • Fatigue   • History of COPD   • Abnormal CT scan, colon   • 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)   • Essential hypertension     Past Medical History:   Diagnosis Date   • Aneurysm (CMS/HCC)    • Arthritis    • Carpal tunnel syndrome    • Chest pain    • Diabetes mellitus (CMS/HCC)    • Dysmetabolic syndrome X    • Gestational diabetes mellitus    • Hypothyroidism    • Metabolic disorder    • Nonalcoholic steatohepatitis    • Obesity    • Palpitations    • Sleep apnea    • Spinal headache    • Type 2 diabetes mellitus (CMS/HCC)    • Vitamin D deficiency      Past Surgical History:   Procedure Laterality Date   • CARPAL TUNNEL RELEASE     • CEREBRAL ANGIOGRAM N/A 3/28/2019    Procedure: DIAGNOSTIC CEREBRAL ANGIOGRAM;  Surgeon: Alexx Barrow MD;  Location: [facility] ;  Service: Neurosurgery   • CEREBRAL ANGIOGRAM N/A  normal appearance , without tenderness upon palpation , no deformities , trachea midline , Thyroid normal size , no thyroid nodules , no masses , no JVD , thyroid nontender 10/2/2019    Procedure: CEREBRAL ANGIOGRAM;  Surgeon: Santosh Garza MD;  Location: [facility] OR ;  Service: Interventional Radiology   •  SECTION      x6   • DILATATION AND CURETTAGE      x 2   • EMBOLIZATION CEREBRAL N/A 3/29/2019    Procedure: Cererbal angiogram and embolization of cerebral aneurysm;  Surgeon: Alexx Barrow MD;  Location: [facility] OR ;  Service: Neurosurgery   • MYRINGOTOMY     • TONSILLECTOMY AND ADENOIDECTOMY         Therapy Treatment    Rehabilitation Treatment Summary     Row Name 20 1354             Treatment Time/Intention    Discipline  occupational therapist  -LE      Document Type  therapy note (daily note)  -LE      Subjective Information  complains of;weakness  -LE      Mode of Treatment  individual therapy;occupational therapy now treated for Gulllian Clark  -LE      Patient/Family Observations  sitting EOB  -LE      Care Plan Review  care plan/treatment goals reviewed  -LE      Patient Effort  good  -LE      Existing Precautions/Restrictions  fall  -LE      Equipment Issued to Patient  gait belt  -LE      Recorded by [LE] Preeti Lopez OTR 20 1400      Row Name 20 1354             Vital Signs    O2 Delivery Pre Treatment  room air  -LE      O2 Delivery Intra Treatment  room air  -LE      Recorded by [LE] Preeti Lopez OTR 20 1400      Row Name 20 1354             Functional Mobility    Functional Mobility- Ind. Level  contact guard assist  -LE      Functional Mobility- Device  rolling walker  -LE      Functional Mobility-Distance (Feet)  20  -LE      Functional Mobility- Safety Issues  sequencing ability decreased;step length decreased;balance decreased during turns;weight-shifting ability decreased  -LE      Functional Mobility- Comment  -- to/from bathroom  -LE      Recorded by [LE] Preeti Lopez [place] 20 1400      Row Name 20 1354             Transfer Assessment/Treatment    Comment (Transfers)  -- cues and  assist hand placement, body mechancis  -LE      Recorded by [LE] Preeti Lopez, OTR 04/20/20 1400      Row Name 04/20/20 1354             Sit-Stand Transfer    Sit-Stand [place] (Transfers)  contact guard  -LE      Assistive Device (Sit-Stand Transfers)  walker, front-wheeled  -LE      Recorded by [LE] Preeti Lopez, OTR 04/20/20 1400      Row Name 04/20/20 1354             Stand-Sit Transfer    Stand-Sit [place] (Transfers)  contact guard  -LE      Assistive Device (Stand-Sit Transfers)  walker, front-wheeled  -LE      Recorded by [LE] Preeti Lopez, OTR 04/20/20 1400      Row Name 04/20/20 1354             Toilet Transfer    Type (Toilet Transfer)  stand pivot/stand step  -LE      [place] Level (Toilet Transfer)  contact guard  -LE      Assistive Device (Toilet Transfer)  commode, 3-in-1;walker, front-wheeled;grab bars/safety frame  -LE      Recorded by [LE] Preeti Lopez, OTR 04/20/20 1400      Row Name 04/20/20 1354             Grooming Assessment/Training    [place] Level (Grooming)  oral care regimen;wash face, hands;contact guard assist;supervision;set up  -LE      Grooming Position  sink side;supported standing  -LE      Recorded by [LE] Preeti Lopez, OTR 04/20/20 1400      Row Name 04/20/20 1354             Toileting Assessment/Training    [place] Level (Toileting)  moderate assist (50% patient effort);adjust/manage clothing;perform perineal hygiene  -LE      Assistive Devices (Toileting)  commode, bedside without drop arms  -LE      Toileting Position  supported sitting;supported standing  -LE      Comment (Toileting)  discuss and problem solve body mechanics, legs too weak for pt to stand and reach for hygiene  -LE      Recorded by [LE] Preeti Lopez, OTR 04/20/20 1400      Row Name 04/20/20 1354             Static Sitting Balance    Sitting Position (Unsupported Sitting, Static Balance)  sitting on edge of bed on 3in1 at sink and on 3in1 over toilet  -LE      Time Able to Maintain  Position (Unsupported Sitting, Static Balance)  more than 5 minutes  -LE      Recorded by [LE] Preeti Lopez, OTR 04/20/20 1400      Row Name 04/20/20 1354             Static Standing Balance    Level of [place] (Supported Standing, Static Balance)  contact guard assist  -LE      Assistive Device Utilized (Supported Standing, Static Balance)  walker, rolling 3 min sink then needed to rest  -LE      Recorded by [LE] Preeti Lopez, OTR 04/20/20 1400      Row Name 04/20/20 1354             Positioning and Restraints    Pre-Treatment Position  in bed  -LE      Post Treatment Position  chair  -LE      In Bed  sitting;encouraged to call for assist;call light within reach;notified nsg  -LE      Recorded by [LE] Preeti Lopez, OTR 04/20/20 1400      Row Name 04/20/20 1354             Pain Scale: Numbers Pre/Post-Treatment    Pain Scale: Numbers, Pretreatment  0/10 - no pain  -LE      Recorded by [LE] Preeti Lopez, OTR 04/20/20 1400      Row Name 04/20/20 1354             Coping    Observed Emotional State  cooperative  -LE      Recorded by [LE] Preeti Lopez, OTR 04/20/20 1400      Row Name 04/20/20 1354             Plan of Care Review    Plan of Care Reviewed With  patient  -LE      Outcome Summary  Pt seen for grooming and toileting tasks.  B LE weakness improving but still limits ability to complete ADL tasks.   -LE      Recorded by [LE] Preeti Lopez, OTR 04/20/20 1400        User Key  (r) = Recorded By, (t) = Taken By, (c) = Cosigned By    Initials Name Effective Dates Discipline    LE Preeti Lopez, OTR 06/08/18 -  OT                 OT Recommendation and Plan  Outcome Summary/Treatment Plan (OT)  Anticipated Equipment Needs at Discharge (OT): bedside commode, shower chair, front wheeled walker  Anticipated Discharge Disposition (OT): inpatient rehabilitation facility  Planned Therapy Interventions (OT Eval): adaptive equipment training, activity tolerance training, BADL retraining, functional balance retraining,  occupation/activity based interventions, patient/caregiver education/training, strengthening exercise, transfer/mobility retraining  Therapy Frequency (OT Eval): 3 times/wk  Plan of Care Review  Plan of Care Reviewed With: patient  Plan of Care Reviewed With: patient  Outcome Summary: Pt seen for grooming and toileting tasks.  B LE weakness improving but still limits ability to complete ADL tasks.   Outcome Measures     Row Name 04/20/20 1400             How much help from another is currently needed...    Putting on and taking off regular lower body clothing?  2  -LE      Bathing (including washing, rinsing, and drying)  2  -LE      Toileting (which includes using toilet bed pan or urinal)  2  -LE      Putting on and taking off regular upper body clothing  3  -LE      Taking care of personal grooming (such as brushing teeth)  3  -LE      Eating meals  3  -LE      AM-PAC 6 Clicks Score (OT)  15  -LE         Functional Assessment    Outcome Measure Options  AM-PAC 6 Clicks Daily Activity (OT)  -LE        User Key  (r) = Recorded By, (t) = Taken By, (c) = Cosigned By    Initials Name Provider Type    Preeti Sandy OTR Occupational Therapist           Time Calculation:   Time Calculation- OT     Row Name 04/20/20 1401             Time Calculation- OT    OT Start Time  1321  -LE      OT Stop Time  1351  -LE      OT Time Calculation (min)  30 min  -LE      Total Timed Code Minutes- OT  30 minute(s)  -LE      OT Received On  04/20/20  -LE      OT - Next Appointment  04/22/20  -LE        User Key  (r) = Recorded By, (t) = Taken By, (c) = Cosigned By    Initials Name Provider Type    Preeti Sandy OTR Occupational Therapist        Therapy Charges for Today     Code Description Service Date Service Provider Modifiers Qty    [id number]  OT SELF CARE/MGMT/TRAIN EA 15 MIN 4/20/2020 Preeti Lopez OTR GO 2               SANDRA Vázquez  4/20/2020

## 2020-06-18 ENCOUNTER — APPOINTMENT (OUTPATIENT)
Dept: PHYSICAL THERAPY | Facility: HOSPITAL | Age: 45
End: 2020-06-18

## 2020-06-22 ENCOUNTER — APPOINTMENT (OUTPATIENT)
Dept: PHYSICAL THERAPY | Facility: HOSPITAL | Age: 45
End: 2020-06-22

## 2020-06-22 ENCOUNTER — DOCUMENTATION (OUTPATIENT)
Dept: PHYSICAL THERAPY | Facility: HOSPITAL | Age: 45
End: 2020-06-22

## 2020-06-22 NOTE — THERAPY DISCHARGE NOTE
Outpatient Physical Therapy Discharge Summary         Patient Name: Oralia Sánchez  : 1975  MRN: [id number]    Today's Date: 2020    Visit Dx:  No diagnosis found.    PT OP Goals     Row Name 20 1300          PT Short Term Goals    STG 1  Pt will be independent with HEP to improve strength and balance.   -JOCELYN     STG 1 Progress  Partially Met  -JOCELYN     STG 2  Pt to ambulate without cane 100' x 3 in one session with armswing present and heel strike.   -JOCELYN     STG 2 Progress  Partially Met  -JOCELYN     STG 3  Pt to score at least 42/56 on the PICHARDO balance test to demonstrate improved balance and decreased fall risk.   -JOCELYN     STG 3 Progress  Not Met  -JOCELYN     STG 3 Progress Comments  did not return to re-evaluate   -JOCELYN     STG 4  Pt to score at least 16/24 on the Dynamic Gait Index for improved balance with gait related tasks and decreased fall risk.   -JOCELYN     STG 4 Progress  Not Met  -JOCELYN     STG 4 Progress Comments  did not return to be re-evaluated   -JOCELYN     STG 5  Pt will assume Rhomberg position independently and maintain position for 1 minute.   -JOCELYN     STG 5 Progress  Not Met  -JOCELYN        Long Term Goals    LTG 1  Pt to score at least 49/56 on the PICHARDO balance test to demonstrate improved balance and reduced risk for falls.   -JOCELYN     LTG 1 Progress  Not Met  -JOCELYN     LTG 1 Progress Comments  did not return to be re-evaluated   -JOCELYN     LTG 2  Pt to score at least 21/24 on the DGI for improved balance and gait related tasks.   -JOCELYN     LTG 2 Progress  Not Met  -JOCELYN     LTG 3  Pt will complete the TUG test in < or = 10 seconds without an assistive device.   -JOCELYN     LTG 3 Progress  Not Met  -JOCELYN     LTG 4  Pt will ascend/descend 4 steps without rail, reciprocally, independently.   -JOCELYN     LTG 4 Progress  Not Met  -JOCELYN     LTG 5  Pt will be able to maintain balance independently while doing some work simulated activities in therapy.   -JOCELYN     LTG 5 Progress  Not Met  -JOCELYN     LTG 6  Pt will ambulate up to  400' in the community without assistive device indpendently.   -JOCELYN     LTG 6 Progress  Partially Met  -JOCELYN     LTG 6 Progress Comments  ambulated up to PT without device & pt reported ambulating into stores   -JOCELYN       User Key  (r) = Recorded By, (t) = Taken By, (c) = Cosigned By    Initials Name Provider Type    Justine Joseph, PT Physical Therapist          OP PT Discharge Summary  Date of Discharge: 06/22/20  Reason for Discharge: (Pt had 2 no call/no shows and therefore d/c from PT )  Outcomes Achieved: Patient able to partially acheive established goals(did not achieve all goals as pt did not return for therapy since 6/8/2020. She did call to cancel a couple of sessions but 2 no call/no shows and therefore d/c. )  Discharge Destination: Home without follow-up, Home with home program      Time Calculation:                    Justine Santos, PT  6/22/2020

## 2020-06-23 ENCOUNTER — APPOINTMENT (OUTPATIENT)
Dept: SLEEP MEDICINE | Facility: HOSPITAL | Age: 45
End: 2020-06-23

## 2020-06-25 ENCOUNTER — APPOINTMENT (OUTPATIENT)
Dept: PHYSICAL THERAPY | Facility: HOSPITAL | Age: 45
End: 2020-06-25

## 2020-06-29 ENCOUNTER — APPOINTMENT (OUTPATIENT)
Dept: PHYSICAL THERAPY | Facility: HOSPITAL | Age: 45
End: 2020-06-29

## 2020-07-02 ENCOUNTER — APPOINTMENT (OUTPATIENT)
Dept: PHYSICAL THERAPY | Facility: HOSPITAL | Age: 45
End: 2020-07-02

## 2020-07-05 ENCOUNTER — RESULTS ENCOUNTER (OUTPATIENT)
Dept: ENDOCRINOLOGY | Age: 45
End: 2020-07-05

## 2020-07-05 DIAGNOSIS — E11.69 TYPE 2 DIABETES MELLITUS WITH OTHER SPECIFIED COMPLICATION, UNSPECIFIED WHETHER LONG TERM INSULIN USE (HCC): Chronic | ICD-10-CM

## 2020-07-05 DIAGNOSIS — E55.9 VITAMIN D DEFICIENCY: ICD-10-CM

## 2020-07-05 DIAGNOSIS — E03.9 ACQUIRED HYPOTHYROIDISM: ICD-10-CM

## 2020-07-06 ENCOUNTER — APPOINTMENT (OUTPATIENT)
Dept: PHYSICAL THERAPY | Facility: HOSPITAL | Age: 45
End: 2020-07-06

## 2020-07-09 ENCOUNTER — APPOINTMENT (OUTPATIENT)
Dept: PHYSICAL THERAPY | Facility: HOSPITAL | Age: 45
End: 2020-07-09

## 2020-07-13 ENCOUNTER — APPOINTMENT (OUTPATIENT)
Dept: PHYSICAL THERAPY | Facility: HOSPITAL | Age: 45
End: 2020-07-13

## 2020-07-16 ENCOUNTER — APPOINTMENT (OUTPATIENT)
Dept: PHYSICAL THERAPY | Facility: HOSPITAL | Age: 45
End: 2020-07-16

## 2020-07-20 ENCOUNTER — APPOINTMENT (OUTPATIENT)
Dept: PHYSICAL THERAPY | Facility: HOSPITAL | Age: 45
End: 2020-07-20

## 2020-07-23 ENCOUNTER — APPOINTMENT (OUTPATIENT)
Dept: PHYSICAL THERAPY | Facility: HOSPITAL | Age: 45
End: 2020-07-23

## 2020-08-04 ENCOUNTER — TELEPHONE (OUTPATIENT)
Dept: NEUROLOGY | Facility: CLINIC | Age: 45
End: 2020-08-04

## 2020-08-04 NOTE — TELEPHONE ENCOUNTER
PT CALLED IN SAYING SHE HAS GUILLAIN BARRE SYNDROME AND DR. ANN HAS GONE TO HER REHAB FACILITY TO SEE HER FOR IT. SHE STATES HER DISABILITY WILL NO LONGER APPROVE HER TIME OFF WORK UNLESS THE MD WRITES A STATEMENT EXPLAINING HER CONDITION SAYING THAT SHE IS STILL EXPERIENCING HER SYMPTOMS AND STILL HAS GUILLAIN [employer]RE SYNDROME AND NEEDS TO BE OFF WORK. SHE IS REQUESTING THE PAPERWORK BE FAXED TO THE [employer] AND STATES HER CLAIM NUMBER NEEDS TO BE ON EVERY PIECE OF PAPER THAT IS FAXED.          CLAIM NUMBER [id number]    FAX - [phone]      CALL BACK- [phone]

## 2020-08-05 NOTE — TELEPHONE ENCOUNTER
PT CALLED IN CHECKING ON THE STATUS OF THIS REQUEST. I DID ADVISE PT THAT IS WAS BEING DISCUSSED BY DARIEL AND , AND ALSO STATED THAT  IS OUT OF OFFICE TODAY.

## 2020-08-05 NOTE — TELEPHONE ENCOUNTER
Have attempted to contact patient at both numbers listed in chart with no answer and could not leave a voicemail. Wanted to inform patient that she would need to contact [employer] to send over disability paperwork to the office to be filled will try to call patient back a later time.

## 2020-08-07 RX ORDER — LEVOTHYROXINE SODIUM 200 MCG
TABLET ORAL
Qty: 90 TABLET | Refills: 0 | OUTPATIENT
Start: 2020-08-07

## 2020-08-10 ENCOUNTER — TELEPHONE (OUTPATIENT)
Dept: NEUROLOGY | Facility: CLINIC | Age: 45
End: 2020-08-10

## 2020-08-10 NOTE — TELEPHONE ENCOUNTER
Spoke with patient told her she would need to contact [place] to have them fax over disability paperwork.

## 2020-08-14 NOTE — TELEPHONE ENCOUNTER
It appears that communication is a problem with this patient.  What I am willing to do is to extend disability until actually see her.  I still have to see her.    GNS

## 2020-08-17 NOTE — TELEPHONE ENCOUNTER
I made patient aware that disability will be extended the until the scheduled appt. I told she would need to have to paperwork sent over to the office.

## 2020-08-19 NOTE — TELEPHONE ENCOUNTER
PT CALLED IN TO ASK IF THE [employer] EVER SENT IN HER DISABILITY PAPERWORK. PLEASE ADVISE      CALL BACK- [phone]

## 2020-08-21 ENCOUNTER — TELEPHONE (OUTPATIENT)
Dept: NEUROLOGY | Facility: CLINIC | Age: 45
End: 2020-08-21

## 2020-08-21 NOTE — TELEPHONE ENCOUNTER
PT CALLED BACK REGARDING DISABILITY PPW WANTING TO MAKE SURE IT'S BEEN RECEIVED. SHE STATES SHE IS CURRENTLY ON AN UNAUTHORIZED LEAVE FOR WORK AND COULD LOSE HER JOB SO THIS IS A VERY IMPORTANT MATTER. PLEASE GIVE PT CALL BACK ASAP          CALL BACK- [phone]

## 2020-08-21 NOTE — TELEPHONE ENCOUNTER
Received call from patient who stated that [employer] is faxing paperwork for us to complete. She apparently is having a hard time getting the paperwork to us from [employer] so she has been following up with it to see if we have received. From what I can tell I do not see where we have received it but told the patient I would enter this encounter and let you all look to see if it has been received.     She has been working on this for awhile mainly with [employer] and they are not really returning her calls.     Can someone please contact patient and let her know if we have received this paperwork?       Oralia Sánchez   [phone]

## 2020-08-21 NOTE — TELEPHONE ENCOUNTER
----- Message from Martin Alexis sent at 8/21/2020  2:29 PM EDT -----  Contact: [phone]  Oralia Johnson called you back but biju said you were on the phone and to send a message .  She was checking on paperwork . She said her best call back number was [phone] .        Thank you .

## 2020-08-21 NOTE — TELEPHONE ENCOUNTER
Previous note stating that I lvm to make patient aware that we still have not received disability paperwork from [place].

## 2020-08-25 NOTE — TELEPHONE ENCOUNTER
PT IS CALLING TO SEE IF THE DISABILITY PAPER WORK HAS BEEN RECEIVED YET. I ALSO GAVE A FAX # [phone] TO FAX TO. PLEASE CALL HER BACK -[phone]

## 2020-08-26 NOTE — TELEPHONE ENCOUNTER
PT STATED TO PLEASE DATE THE PAPER WORK AS OF 6-12- TO NOW, BUT DR. ANN SEEN PT IN REHAB IN 5-2020 IF HE WANTS TO DATE  THE PAPER WORK AS OF 5-2020 -NOW THAT'S OK TO.

## 2020-08-28 NOTE — TELEPHONE ENCOUNTER
PT CALLED WANTING TO CHECK THE STATUS OF HER PPW AND WOULD LIKE TO KNOW IF IT'S BEEN FAXED BACK TO [place]. SHE INSISTED THAT IT'S AN URGENT MATTER AND HER JOB IS ON THE LINE. SHE SAID IT IS FINE TO LEAVE A MESSAGE IF SHE DOESN'T  STATING WHETHER OR NOT IT'S BEEN FAXED BACK OVER AS SHE NEEDS TO GIVE HER BOSS AN UPDATE IMMEDIATELY. PLEASE ADVISE

## 2020-09-01 ENCOUNTER — TELEPHONE (OUTPATIENT)
Dept: GASTROENTEROLOGY | Facility: CLINIC | Age: 45
End: 2020-09-01

## 2020-09-01 ENCOUNTER — OFFICE VISIT (OUTPATIENT)
Dept: NEUROLOGY | Facility: CLINIC | Age: 45
End: 2020-09-01

## 2020-09-01 VITALS
BODY MASS INDEX: 45.69 KG/M2 | HEIGHT: 61 IN | WEIGHT: 242 LBS | HEART RATE: 105 BPM | OXYGEN SATURATION: 98 % | DIASTOLIC BLOOD PRESSURE: 78 MMHG | SYSTOLIC BLOOD PRESSURE: 130 MMHG

## 2020-09-01 DIAGNOSIS — G61.0 GUILLAIN-BARRE SYNDROME (HCC): Primary | ICD-10-CM

## 2020-09-01 PROCEDURE — 99215 OFFICE O/P EST HI 40 MIN: CPT | Performed by: PSYCHIATRY & NEUROLOGY

## 2020-09-01 RX ORDER — LEVOTHYROXINE SODIUM 0.2 MG/1
200 TABLET ORAL DAILY
COMMUNITY
End: 2020-11-11

## 2020-09-01 NOTE — PROGRESS NOTES
CC: Guillain Barré syndrome    HPI:  Oralia Sánchez is a  44 y.o.  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  She has a background of diabetes and hypothyroidism.  She was admitted on 4/1/2020 with cough fever and an increased gap metabolic acidosis.  Chest x-ray was negative for pneumonia.  By 4/14 she had a history of some increasing troubles with leg weakness and trouble walking and was seen by Dr. Bolanos and Estefany Cartagena, LIYAH with exam concerning for acute peripheral neuropathy.  A lumbar puncture was obtained showing 1 white cell and 0 red cells.  The glucose was 79 and protein was elevated at 83.  Oligoclonal bands were negative (4/15/2020).  She had a hemoglobin A1c of 6.68% and a TSH of 16.9.  Her CPK was elevated at 216 which quickly dropped to 22 and then 17 by 4/25/20.  I was asked to perform an EMG which was done 4/20/2020.  I performed nerve conductions in the right arm and leg and needle exam of the right leg.  Her right median, ulnar and radial sensory studies showed prolonged latencies and low amplitudes.  The right median motor distal latency was prolonged at 4.8 ms and conduction velocity was slow at 34.4 m/s.  The amplitude was a little low at 4.3 mV and the F-wave was normal.  The right ulnar motor study showed a borderline velocity below the elbow at 48.9 m/s with normal velocity in the short segment across the elbow as well as normal distal latency, amplitude and F-wave.  The right sural and superficial peroneal sensory studies showed normal distal latency with temperature correction with a low amplitude for the sural and a normal amplitude for the peroneal sensory.  The right peroneal and tibial motor studies were normal including F waves.  Needle examination in the right leg showed 4 of 5 muscles with fibrillations and positive sharp waves.  Paraspinals showed no abnormality.    The patient  was treated with ivy immunoglobulin.  Within a few days of starting the therapy she started getting better.  The process was slow and she went to rehab.  She was released around May 12.  She has had some follow-up studies on 6/5/2020 her hemoglobin A1c was 6% and her free T4 was elevated at 2.18 and TSH was 0.803.  She had elevated liver functions with   and hepatitis C antibodies were negative x2.    She indicates that she has slowly improved but she continues to have problems with shooting pains in the lower legs to the feet with numbness in the feet and hands and muscular sounding pain with fatigability of proximal thigh and hip girdle muscles.  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  She has some additional pain in the hands which she feels is more aching and not neuritic sounding and she continues to have numbness in the chest area including arms and legs.  She had been treated with Cymbalta and Lyrica but she said it made her feel dopey and she stopped the medications.  She states she would rather put up with the pain then to feel dopey.  She does describe some cramping in her arches.    She describes some additional interesting history around Thanksgiving last year her eyebrows fell out and she began having troubles going up about a half flight of steps.  This improved but she has lost her eyebrows again.  The origin is not entirely clear.    Patient has previous history of cerebral aneurysm rupture treated by Dr Barrow intravascularly.    She indicates that near all of her medication she stopped.  She indicates that her blood sugars were under.  She takes ibuprofen for pain and Benadryl for sleep.  She takes levothyroxine and she states she is not sure if it is the same doses when she came in the hospital but it has not been changed.  She missed an  appointment with her endocrinologist Dr. Choudhury because of some family health issues she states.      In addition she has history of bilateral carpal tunnel releases and pronator releases by Dr. Callahan/Kleinert        Past Medical History:   Diagnosis Date   • Aneurysm (CMS/HCC)    • Arthritis    • Carpal tunnel syndrome    • Chest pain    • Diabetes mellitus (CMS/HCC)    • Dysmetabolic syndrome X    • Gestational diabetes mellitus    • Guillain Barré syndrome (CMS/HCC)    • Hypothyroidism    • Metabolic disorder    • Nonalcoholic steatohepatitis    • Obesity    • Palpitations    • Sleep apnea    • Spinal headache    • Type 2 diabetes mellitus (CMS/HCC)    • Vitamin D deficiency          Past Surgical History:   Procedure Laterality Date   • CARPAL TUNNEL RELEASE     • CEREBRAL ANGIOGRAM N/A 3/28/2019    Procedure: DIAGNOSTIC CEREBRAL ANGIOGRAM;  Surgeon: Alexx Barrow MD;  Location: [facility] OR ;  Service: Neurosurgery   • CEREBRAL ANGIOGRAM N/A 10/2/2019    Procedure: CEREBRAL ANGIOGRAM;  Surgeon: Santosh Garza MD;  Location: [facility] OR ;  Service: Interventional Radiology   •  SECTION      x6   • COLONOSCOPY N/A 2020    Procedure: Flexible sigmoidoscopy WITH POLYPECTOMY COLD BIOPSY;  Surgeon: Kendall Villafana MD;  Location: [facility] ENDOSCOPY;  Service: Gastroenterology;  Laterality: N/A;  PRE ABN CT  POST POLYP, SMALL HEMORRHOIDS   • DILATATION AND CURETTAGE      x 2   • EMBOLIZATION CEREBRAL N/A 3/29/2019    Procedure: Cererbal angiogram and embolization of cerebral aneurysm;  Surgeon: Alexx Barrow MD;  Location: [facility] OR ;  Service: Neurosurgery   • MYRINGOTOMY     • TONSILLECTOMY     • TONSILLECTOMY AND ADENOIDECTOMY             Current Outpatient Medications:   •  diphenhydrAMINE (BENADRYL) 25 mg capsule, Take 1 capsule by mouth Every 4 (Four) Hours As Needed for Itching., Disp: 30 capsule, Rfl: 1  •  ibuprofen (ADVIL,MOTRIN) 600 MG tablet, Take 1  tablet by mouth Every 8 (Eight) Hours As Needed for Mild Pain ., Disp: 60 tablet, Rfl: 0  •  levothyroxine (SYNTHROID, LEVOTHROID) 200 MCG tablet, Take 200 mcg by mouth Daily., Disp: , Rfl:   •  Accu-Chek Softclix Lancets lancets, Use to test blood sugar up to 3 times daily., Disp: 100 each, Rfl: 12  •  Blood Glucose Monitoring Suppl (ACCU-CHEK COREY PLUS) w/Device kit, Use to test blood sugar up to three times daily., Disp: 1 kit, Rfl: 0  •  folic acid (FOLVITE) 1 MG tablet, Take 1 tablet by mouth Daily., Disp: 90 tablet, Rfl: 0  •  glucose blood (Accu-Chek Corey) test strip, Use to test blood sugar up to three times daily., Disp: 100 each, Rfl: 12  •  Melatonin 3 MG tablet dispersible, Place 1 tablet on the tongue At Night As Needed for sleep., Disp: 30 tablet, Rfl: 1  •  polyethylene glycol (MIRALAX) 17 GM/SCOOP powder, Take 1 capful (17 g) and mix with 4-8oz of liquid, Take by mouth 2 (Two) Times a Day As Needed  for constipation., Disp: 510 g, Rfl: 1  •  pregabalin (LYRICA) 150 MG capsule, Take 1 capsule by mouth Every 12 (Twelve) Hours., Disp: 60 capsule, Rfl: 2      Family History   Problem Relation Age of Onset   • Hypertension Mother    • Alcohol abuse Father    • Heart attack Father         acute MI   • Stroke Father         CVA   • Heart disease Father    • Diabetes Maternal Grandmother    • Hypertension Maternal Grandmother    • Cancer Maternal Grandmother    • Diabetes Paternal Grandmother          Social History     Socioeconomic History   • Marital status:      Spouse name: Not on file   • Number of children: Not on file   • Years of education: Not on file   • Highest education level: Not on file   Occupational History   • Occupation: admin   Tobacco Use   • Smoking status: Former Smoker     Packs/day: 1.50     Years: 5.00     Pack years: 7.50     Types: Cigarettes     Last attempt to quit: 3/31/2020     Years since quittin.4   • Smokeless tobacco: Never Used   • Tobacco comment: caffeine  "use 1 cup coffee daily   Substance and Sexual Activity   • Alcohol use: Not Currently     Alcohol/week: 1.0 - 2.0 standard drinks     Types: 1 - 2 Shots of liquor per week   • Drug use: No   • Sexual activity: Defer         Allergies   Allergen Reactions   • No Known Drug Allergy Other (See Comments)     N/A         Pain Scale: 8/10        ROS:  Review of Systems   Constitutional: Positive for fatigue. Negative for activity change and appetite change.   HENT: Negative for ear pain, facial swelling and hearing loss.    Eyes: Negative for pain, redness and itching.   Respiratory: Positive for cough and choking. Negative for shortness of breath.    Cardiovascular: Negative for chest pain and leg swelling.   Gastrointestinal: Positive for nausea. Negative for abdominal pain and vomiting.   Endocrine: Positive for heat intolerance. Negative for cold intolerance.   Musculoskeletal: Positive for arthralgias and joint swelling. Negative for back pain.   Skin: Negative for color change, pallor and rash.   Allergic/Immunologic: Positive for environmental allergies. Negative for food allergies.   Neurological: Positive for weakness and numbness. Negative for dizziness, tremors, seizures, syncope, facial asymmetry, speech difficulty, light-headedness and headaches.   Psychiatric/Behavioral: Positive for sleep disturbance. Negative for agitation, behavioral problems, confusion, decreased concentration, dysphoric mood, hallucinations, self-injury and suicidal ideas. The patient is nervous/anxious. The patient is not hyperactive.          I have reviewed and agree with the above ROS completed by the medical assistant.      Physical Exam:  Vitals:    09/01/20 1606   BP: 130/78   Pulse: 105   SpO2: 98%   Weight: 110 kg (242 lb)   Height: 154.9 cm (61\")     Orthostatic BP:    Body mass index is 45.73 kg/m².    Physical Exam  General: M obese white female no acute distress  HEENT: Normocephalic no evidence of trauma.  Discs flat.  No " AV nicking.  Throat negative  Neck: Supple.  No thyromegaly.  No cervical bruits.  Heart: Regular rate and rhythm without murmurs.  No pedal edema  Extremities: Radial pulses strong and simultaneous      Neurological Exam:   Mental Status: Awake, alert, oriented to person, place and time.  Conversant without evidence of an affective disorder, thought disorder, delusions or hallucinations.  Attention span and concentration are normal.  HCF: No aphasia, apraxia or dysarthria.  Recent and remote memory intact.  Knowledge of recent events intact.  CN: I:   II: Visual fields full without left inattention   III, IV, VI: Eye movements intact without nystagmus or ptosis.  Pupils equal round and reactive to light.   V,VII: Light touch and pinprick intact all 3 divisions of V.  Facial muscles symmetrical.   VIII: Hearing intact to finger rub   IX,X: Soft palate elevates symmetrically   XI: Sternomastoid and trapezius are strong.   XII: Tongue midline without atrophy or fasciculations  Motor: Normal tone and bulk in the upper and lower extremities   Power testing: Mild weakness of interossei and abductor pollicis brevis muscles bilaterally with good strength in all other muscles tested in the arms.  In the lower extremities there is weakness of toe flexion and extension with good strength in all other muscles tested.  Reflexes: Upper extremities: Absent        Lower extremities: Absent        Toe signs: Downgoing  Sensory: Light touch: Reduced in all digits of the right hand.  Somewhat hyperesthetic in the left hand.  Reduced in the lower half of each leg but hyperesthetic on the feet.        Pinprick: Reduced in the feet.        Vibration: Absent at the right ankle reduced on the left        Position: Intact great toes    Cerebellar: Finger-to-nose: Intact           Rapid movement: Intact           Heel-to-shin: Intact  Gait and Station: Comes to stand without difficulty.  Mildly broad-based casual walk.  Able to walk on toes  and heels but balance is a factor.  Tandem walking is abnormal.  Titubates on Romberg testing but did not fall.  Demonstrated fear of falling.    Results:      Lab Results   Component Value Date    GLUCOSE 127 (H) 05/09/2020    BUN 15 06/05/2020    CREATININE 0.61 06/05/2020    EGFRIFNONA 106 06/05/2020    EGFRIFAFRI 128 06/05/2020    BCR 24.6 06/05/2020    CO2 30.4 (H) 06/05/2020    CALCIUM 9.9 06/05/2020    PROTENTOTREF 6.8 06/05/2020    ALBUMIN 4.00 06/05/2020    LABIL2 1.4 06/05/2020     (H) 06/05/2020     (H) 06/05/2020       Lab Results   Component Value Date    WBC 12.35 (H) 05/09/2020    HGB 12.5 05/09/2020    HCT 36.5 05/09/2020    .3 (H) 05/09/2020     05/09/2020         .  Lab Results   Component Value Date    RPR Non-Reactive 04/15/2020         Lab Results   Component Value Date    TSH 0.803 06/05/2020    I0WGNSD 18.10 (H) 06/05/2020         Lab Results   Component Value Date    CJYFAZEC48 651 04/15/2020         Lab Results   Component Value Date    FOLATE 2.52 (L) 04/16/2020         Lab Results   Component Value Date    HGBA1C 6.00 (H) 06/05/2020         Lab Results   Component Value Date    GLUCOSE 127 (H) 05/09/2020    BUN 15 06/05/2020    CREATININE 0.61 06/05/2020    EGFRIFNONA 106 06/05/2020    EGFRIFAFRI 128 06/05/2020    BCR 24.6 06/05/2020    K 4.0 06/05/2020    CO2 30.4 (H) 06/05/2020    CALCIUM 9.9 06/05/2020    PROTENTOTREF 6.8 06/05/2020    ALBUMIN 4.00 06/05/2020    LABIL2 1.4 06/05/2020     (H) 06/05/2020     (H) 06/05/2020         Lab Results   Component Value Date    WBC 12.35 (H) 05/09/2020    HGB 12.5 05/09/2020    HCT 36.5 05/09/2020    .3 (H) 05/09/2020     05/09/2020             Assessment:   1.  History and findings most consistent with axonal Guillain Barré.  She has some residual findings however given that she has diabetes and hypothyroidism as well as previous bilateral carpal tunnel and pronator releases I cannot be  entirely sure if some of her findings were preexistent.  She has continued fatigability of proximal muscles in the lower extremities as well as painful symptoms more in the lower legs and feet than in the uppers.  2.  History of hypothyroidism-the patient was apparently hypothyroid on admission but it is unclear if her dosage of Synthroid was changed but most recent lab work suggests she is taking in excess amount of Synthroid        Plan:  1.  Would complete the evaluation of treatable causes of neuropathy with a sed rate, RPR, SPE, IEP, cryoglobulins, heavy metal screen and copper level and a recheck her thyroid blood tests.  2.  Repeat EMG right arm and leg  3.  To see her at time of her EMG          >50% of this 40-minute follow-up was spent counseling the patient on her work-up of peripheral neuropathy in particular focusing on persistent symptoms.  She does not wish to take any medications for neuropathic pain at this time.                Dictated utilizing Dragon dictation.

## 2020-09-03 ENCOUNTER — LAB (OUTPATIENT)
Dept: LAB | Facility: HOSPITAL | Age: 45
End: 2020-09-03

## 2020-09-03 ENCOUNTER — TELEPHONE (OUTPATIENT)
Dept: NEUROLOGY | Facility: CLINIC | Age: 45
End: 2020-09-03

## 2020-09-03 DIAGNOSIS — G61.0 GUILLAIN-BARRE SYNDROME (HCC): ICD-10-CM

## 2020-09-03 LAB
ERYTHROCYTE [SEDIMENTATION RATE] IN BLOOD: 31 MM/HR (ref 0–20)
FOLATE SERPL-MCNC: 10.3 NG/ML (ref 4.78–24.2)
T4 FREE SERPL-MCNC: 1.58 NG/DL (ref 0.93–1.7)
TSH SERPL DL<=0.05 MIU/L-ACNC: 10 UIU/ML (ref 0.27–4.2)
VIT B12 BLD-MCNC: 878 PG/ML (ref 211–946)

## 2020-09-03 PROCEDURE — 83655 ASSAY OF LEAD: CPT | Performed by: PSYCHIATRY & NEUROLOGY

## 2020-09-03 PROCEDURE — 82525 ASSAY OF COPPER: CPT

## 2020-09-03 PROCEDURE — 82595 ASSAY OF CRYOGLOBULIN: CPT

## 2020-09-03 PROCEDURE — 36415 COLL VENOUS BLD VENIPUNCTURE: CPT | Performed by: PSYCHIATRY & NEUROLOGY

## 2020-09-03 PROCEDURE — 82746 ASSAY OF FOLIC ACID SERUM: CPT | Performed by: PSYCHIATRY & NEUROLOGY

## 2020-09-03 PROCEDURE — 84155 ASSAY OF PROTEIN SERUM: CPT | Performed by: PSYCHIATRY & NEUROLOGY

## 2020-09-03 PROCEDURE — 84443 ASSAY THYROID STIM HORMONE: CPT | Performed by: PSYCHIATRY & NEUROLOGY

## 2020-09-03 PROCEDURE — 83825 ASSAY OF MERCURY: CPT | Performed by: PSYCHIATRY & NEUROLOGY

## 2020-09-03 PROCEDURE — 86592 SYPHILIS TEST NON-TREP QUAL: CPT | Performed by: PSYCHIATRY & NEUROLOGY

## 2020-09-03 PROCEDURE — 82175 ASSAY OF ARSENIC: CPT | Performed by: PSYCHIATRY & NEUROLOGY

## 2020-09-03 PROCEDURE — 86334 IMMUNOFIX E-PHORESIS SERUM: CPT

## 2020-09-03 PROCEDURE — 84439 ASSAY OF FREE THYROXINE: CPT | Performed by: PSYCHIATRY & NEUROLOGY

## 2020-09-03 PROCEDURE — 82784 ASSAY IGA/IGD/IGG/IGM EACH: CPT

## 2020-09-03 PROCEDURE — 85652 RBC SED RATE AUTOMATED: CPT | Performed by: PSYCHIATRY & NEUROLOGY

## 2020-09-03 PROCEDURE — 82607 VITAMIN B-12: CPT | Performed by: PSYCHIATRY & NEUROLOGY

## 2020-09-03 PROCEDURE — 84165 PROTEIN E-PHORESIS SERUM: CPT | Performed by: PSYCHIATRY & NEUROLOGY

## 2020-09-03 NOTE — TELEPHONE ENCOUNTER
THE PT CALLED IN STATING HER DISABILITY  IS OVER ON 09/29  AND HER EMG ISN'T UNTIL December . I LOOKED FOR HER BUT COULDN'T SEE A EARLIER APPOINTMENT .  I PUT HER ON THE WAITING LIST BUT SHE IS REQUESTING I SEND A MESSAGE IN INFORMING THE OFFICE OF THE SITUATION . HER BEST CALL BACK NUMBER [phone]

## 2020-09-04 LAB
ALBUMIN SERPL-MCNC: 3.4 G/DL (ref 2.9–4.4)
ALBUMIN/GLOB SERPL: 0.8 {RATIO} (ref 0.7–1.7)
ALPHA1 GLOB FLD ELPH-MCNC: 0.4 G/DL (ref 0–0.4)
ALPHA2 GLOB SERPL ELPH-MCNC: 1 G/DL (ref 0.4–1)
B-GLOBULIN SERPL ELPH-MCNC: 1.8 G/DL (ref 0.7–1.3)
GAMMA GLOB SERPL ELPH-MCNC: 1 G/DL (ref 0.4–1.8)
GLOBULIN SER CALC-MCNC: 4.2 G/DL (ref 2.2–3.9)
Lab: ABNORMAL
M-SPIKE: ABNORMAL G/DL
PROT PATTERN SERPL ELPH-IMP: ABNORMAL
PROT SERPL-MCNC: 7.6 G/DL (ref 6–8.5)
RPR SER QL: NORMAL

## 2020-09-08 LAB
CRYOGLOB SER QL 1D COLD INC: NORMAL
IGA SERPL-MCNC: 672 MG/DL (ref 87–352)
IGG SERPL-MCNC: 1023 MG/DL (ref 586–1602)
IGM SERPL-MCNC: 53 MG/DL (ref 26–217)
PROT PATTERN SERPL IFE-IMP: ABNORMAL

## 2020-09-09 LAB
ARSENIC BLD-MCNC: 5 UG/L (ref 2–23)
LEAD BLD-MCNC: <1 UG/DL (ref 0–4)
MERCURY BLD-MCNC: <1 UG/L (ref 0–14.9)

## 2020-09-10 LAB — COPPER SERPL-MCNC: 142 UG/DL (ref 72–166)

## 2020-09-16 ENCOUNTER — TELEPHONE (OUTPATIENT)
Dept: NEUROLOGY | Facility: CLINIC | Age: 45
End: 2020-09-16

## 2020-09-21 ENCOUNTER — TELEPHONE (OUTPATIENT)
Dept: NEUROLOGY | Facility: CLINIC | Age: 45
End: 2020-09-21

## 2020-09-21 NOTE — TELEPHONE ENCOUNTER
SENTHIL  [phone]    WOULD LIKE A RETURN TO WORK NOTE,  SHE WOULD LIKE TO PICK IT UP WHEN IT IS FINISHED

## 2020-09-22 NOTE — TELEPHONE ENCOUNTER
9/21/2020    To whom it may concern    Oralia Sánchez is a patient of mine and had been hospitalized with Guillain Barré syndrome.  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    Sincerely    Juventino Gaytan MD

## 2020-11-11 ENCOUNTER — TELEPHONE (OUTPATIENT)
Dept: ENDOCRINOLOGY | Age: 45
End: 2020-11-11

## 2020-11-11 RX ORDER — LEVOTHYROXINE SODIUM 200 MCG
200 TABLET ORAL DAILY
Qty: 30 TABLET | Refills: 0 | Status: SHIPPED | OUTPATIENT
Start: 2020-11-11 | End: 2020-12-08

## 2020-11-11 NOTE — TELEPHONE ENCOUNTER
Patient was a radha patient will see avis in dec  Patient needs synthroid  200 mg (doesn't want levothyroxine)    Send to [facility] [address]     30 day supply    Completely out

## 2020-11-16 ENCOUNTER — APPOINTMENT (OUTPATIENT)
Dept: WOMENS IMAGING | Facility: HOSPITAL | Age: 45
End: 2020-11-16

## 2020-11-16 PROCEDURE — 77063 BREAST TOMOSYNTHESIS BI: CPT | Performed by: RADIOLOGY

## 2020-11-16 PROCEDURE — 77067 SCR MAMMO BI INCL CAD: CPT | Performed by: RADIOLOGY

## 2020-11-18 ENCOUNTER — TELEPHONE (OUTPATIENT)
Dept: NEUROLOGY | Facility: CLINIC | Age: 45
End: 2020-11-18

## 2020-11-18 DIAGNOSIS — G47.33 OSA ON CPAP: Primary | ICD-10-CM

## 2020-11-18 DIAGNOSIS — Z99.89 OSA ON CPAP: Primary | ICD-10-CM

## 2020-11-18 NOTE — TELEPHONE ENCOUNTER
Patient would like a new sleep study referral placed  previously ordered it but she stated that since youre the only doc she is following up with if you can order it? Please advise

## 2020-11-18 NOTE — TELEPHONE ENCOUNTER
PT CALLING STATING THAT SHE HAD A REFERRAL (DON'T REMEMBER WHO THE DOCTOR WAS) FOR A SLEEP STUDY AND THINGS HAPPENED AND SHE COULDN'T GET IT DONE. SHE CALLED  SLEEP LAB AND THEY TOLD HER THAT THEY NEEDED A NEW ORDER FOR THE SLEEP STUDY AND SHE WANTED TO SEE IF DR. ANN WOULD WRITE AN ORDER FOR THE SLEEP STUDY. SHE STATES THAT SHE HAS ALREADY BEEN DIAGNOSIS FOR SLEEP APNEA AND SHE HAS A CPAP ALREADY. SHE NEEDS A NEW CPAP MACHINE AND SUPPLIES CAUSE HER MACHINE IS OLD.     PLEASE CALL HER BACK [phone], PLEASE LEAVE A MESSAGE CAUSE SHE WORKS [address] SHIFT.

## 2020-11-19 NOTE — TELEPHONE ENCOUNTER
I normally have Dr Francis evaluate. Since I don't write for the equipment it works better when it is done that way.  I will go ahead and order for Tito to see her. She may be able to do it by Telemedicine    gns

## 2020-11-30 ENCOUNTER — APPOINTMENT (OUTPATIENT)
Dept: SLEEP MEDICINE | Facility: HOSPITAL | Age: 45
End: 2020-11-30

## 2020-12-01 ENCOUNTER — TELEPHONE (OUTPATIENT)
Dept: NEUROLOGY | Facility: CLINIC | Age: 45
End: 2020-12-01

## 2020-12-01 NOTE — TELEPHONE ENCOUNTER
PT CALLING TO MAKE DR. ANN AWARE THAT THE APPT SHE HAD SCHEDULED FOR THE EMG NCV TESTING ON 12/2/20, HAS BEEN RESCHEDULED ON 2/11/21. PT WAS EXPOSED TO THE CORONAVIRUS AND IS EXPERIENCING SYMPTOMS.    PT CAN BE REACHED AT [phone] TO CONFIRM THAT EMG NCV TESTING IS OKAY TO BE SCHEDULED A LITTLE WAYS OUT.    PLEASE ADVISE.

## 2020-12-02 ENCOUNTER — APPOINTMENT (OUTPATIENT)
Dept: INFUSION THERAPY | Facility: HOSPITAL | Age: 45
End: 2020-12-02

## 2020-12-05 NOTE — TELEPHONE ENCOUNTER
Nash    I like to get her in before February if possible in a cancellation perhaps.  I saw her in September and the study was already delayed until December.    Thanks    CATARINO   Thank you for allowing us to care for you at [facility] today. Thank you for your patience.     Today you had a laceration repair with absorbable sutures so you will not need them repaired. You also had a head CT that was normal.  Return to the emergency department earlier if you develop fevers, if you see pus coming from the wound, or if you develop redness around the wound that extends beyond 1 inch from the wound edges as these can be signs of wound infection.      Remember, you care process does not end after your visit today. Please follow-up with your doctor within 1-2 days for a follow-up check to ensure you are  improving, to see if you need any further evaluation/testing, or to evaluate for any alternate diagnoses. If you do not have a primary care provider, call the [facility] referral line at [phone] to help find you a primary care provider within our system.    Please return to the emergency department for any new or worsening symptoms as discussed as these could be signs of more serious medical illness.    We hope you feel better.

## 2020-12-07 NOTE — TELEPHONE ENCOUNTER
Called patient could not lvm because the mailbox was full. Will attempt to call patient at a later time

## 2020-12-08 ENCOUNTER — APPOINTMENT (OUTPATIENT)
Dept: SLEEP MEDICINE | Facility: HOSPITAL | Age: 45
End: 2020-12-08

## 2020-12-08 RX ORDER — LEVOTHYROXINE SODIUM 200 MCG
TABLET ORAL
Qty: 30 TABLET | Refills: 0 | Status: SHIPPED | OUTPATIENT
Start: 2020-12-08 | End: 2020-12-11

## 2020-12-10 ENCOUNTER — OFFICE VISIT (OUTPATIENT)
Dept: ENDOCRINOLOGY | Age: 45
End: 2020-12-10

## 2020-12-10 VITALS
WEIGHT: 241.4 LBS | BODY MASS INDEX: 45.58 KG/M2 | HEIGHT: 61 IN | DIASTOLIC BLOOD PRESSURE: 72 MMHG | SYSTOLIC BLOOD PRESSURE: 118 MMHG

## 2020-12-10 DIAGNOSIS — E06.3 HYPOTHYROIDISM DUE TO HASHIMOTO'S THYROIDITIS: Primary | ICD-10-CM

## 2020-12-10 DIAGNOSIS — E03.8 HYPOTHYROIDISM DUE TO HASHIMOTO'S THYROIDITIS: Primary | ICD-10-CM

## 2020-12-10 PROCEDURE — 99213 OFFICE O/P EST LOW 20 MIN: CPT | Performed by: NURSE PRACTITIONER

## 2020-12-10 RX ORDER — MEDROXYPROGESTERONE ACETATE 10 MG/1
TABLET ORAL
COMMUNITY
Start: 2020-10-23

## 2020-12-10 RX ORDER — ACETAMINOPHEN AND CODEINE PHOSPHATE 120; 12 MG/5ML; MG/5ML
1 SOLUTION ORAL DAILY
COMMUNITY
Start: 2020-10-23

## 2020-12-10 RX ORDER — DULOXETIN HYDROCHLORIDE 60 MG/1
60 CAPSULE, DELAYED RELEASE ORAL DAILY
COMMUNITY
Start: 2020-10-21

## 2020-12-11 DIAGNOSIS — E03.8 HYPOTHYROIDISM DUE TO HASHIMOTO'S THYROIDITIS: Primary | ICD-10-CM

## 2020-12-11 DIAGNOSIS — E06.3 HYPOTHYROIDISM DUE TO HASHIMOTO'S THYROIDITIS: Primary | ICD-10-CM

## 2020-12-11 LAB
T4 FREE SERPL-MCNC: 1.4 NG/DL (ref 0.93–1.7)
TSH SERPL DL<=0.005 MIU/L-ACNC: 8.23 UIU/ML (ref 0.27–4.2)

## 2020-12-11 RX ORDER — LEVOTHYROXINE SODIUM 112 MCG
224 TABLET ORAL DAILY
Qty: 60 TABLET | Refills: 5 | Status: SHIPPED | OUTPATIENT
Start: 2020-12-11 | End: 2021-12-11

## 2020-12-11 NOTE — PROGRESS NOTES
Tried to call patient voice mail was full was unable to leave message  Results sent to patient my chart

## 2020-12-14 ENCOUNTER — APPOINTMENT (OUTPATIENT)
Dept: SLEEP MEDICINE | Facility: HOSPITAL | Age: 45
End: 2020-12-14

## 2020-12-16 ENCOUNTER — OFFICE VISIT (OUTPATIENT)
Dept: SLEEP MEDICINE | Facility: HOSPITAL | Age: 45
End: 2020-12-16

## 2020-12-16 VITALS
DIASTOLIC BLOOD PRESSURE: 68 MMHG | OXYGEN SATURATION: 94 % | WEIGHT: 239 LBS | BODY MASS INDEX: 45.12 KG/M2 | HEIGHT: 61 IN | SYSTOLIC BLOOD PRESSURE: 135 MMHG | HEART RATE: 91 BPM

## 2020-12-16 DIAGNOSIS — G47.8 NON-RESTORATIVE SLEEP: ICD-10-CM

## 2020-12-16 DIAGNOSIS — G47.30 SLEEP APNEA, UNSPECIFIED TYPE: Primary | ICD-10-CM

## 2020-12-16 DIAGNOSIS — E66.01 MORBID OBESITY (HCC): ICD-10-CM

## 2020-12-16 DIAGNOSIS — G47.10 HYPERSOMNIA: ICD-10-CM

## 2020-12-16 PROCEDURE — 99213 OFFICE O/P EST LOW 20 MIN: CPT | Performed by: FAMILY MEDICINE

## 2020-12-16 PROCEDURE — G0463 HOSPITAL OUTPT CLINIC VISIT: HCPCS

## 2020-12-16 NOTE — PROGRESS NOTES
[facility] New Patient/Consultation       Reason for Consultation: History of sleep apnea      Patient Care Team:  Provider, No Known as PCP - General  Brennen Erickson MD as Consulting Physician (Sleep Medicine)      History of present illness:  Thank you for asking me to see your patient.  The patient is a 45 y.o. female with history of sleep apnea presents today to establish care.  Had a sleep study about 15 years ago; was prescribed a Pap machine.  Since the machine is broken beyond repair.  Would like to get back into care.  Has done well with auto CPAP in the past.    Sleep Schedule:  Bed time: 6 AM  Sleep latency: Immediately  Wake time: 2 PM  Average hours slept: 8  Non-restorative sleep: No  Number of naps per day: 1  Rotating shifts?:  Yes; works 10:30 PM to 4:30 AM.  Nocturia: N  Electronics in bedroom: N    Excessive daytime sleepiness or drowsiness:Y  Any accidents at work due to sleepiness in the last 5 years:N  Any difficulty driving due to sleepiness or being drowsy: N  Weight changed in the last 5 years:Y - GAINED 20 + LBS    Snoring:Y  Witnessed apneas:Y  Have you ever awakened gasping for breath, coughing, choking or respiratory discomfort: N  Morning headaches: N  Awaken with a sore throat or dry mouth: N    Any reports of leg jerking at night: N  Urge sensations: N  Does pain disrupt sleep: N  Sweating during sleep: N  Teeth grinding: N    Any sudden episodes of sleep during the day: N  Sleep paralysis/hallucinations: N  Muscle weakness with laughing/anger: N  Nightmares: N  Sleep walking: N    Are you sleepy when you increase your sleep time: N  Do you sleep better away from your own bed: N    ESS: 9    Social History:  No tobacco use since age 44; 3 alcoholic drinks per week; no drug use; 2 caffeinated beverages a day    Review of Systems:    A complete review of systems was done and all were negative with the exception of all negative    Allergies:  No known drug allergy    Family  "History: ERLIN y - brother       Current Outpatient Medications:   •  Accu-Chek Softclix Lancets lancets, Use to test blood sugar up to 3 times daily., Disp: 100 each, Rfl: 12  •  Blood Glucose Monitoring Suppl (ACCU-CHEK COREY PLUS) w/Device kit, Use to test blood sugar up to three times daily., Disp: 1 kit, Rfl: 0  •  diphenhydrAMINE (BENADRYL) 25 mg capsule, Take 1 capsule by mouth Every 4 (Four) Hours As Needed for Itching., Disp: 30 capsule, Rfl: 1  •  DULoxetine (CYMBALTA) 60 MG capsule, Take 60 mg by mouth Daily., Disp: , Rfl:   •  glucose blood (Accu-Chek Corey) test strip, Use to test blood sugar up to three times daily., Disp: 100 each, Rfl: 12  •  ibuprofen (ADVIL,MOTRIN) 600 MG tablet, Take 1 tablet by mouth Every 8 (Eight) Hours As Needed for Mild Pain ., Disp: 60 tablet, Rfl: 0  •  medroxyPROGESTERone (PROVERA) 10 MG tablet, TAKE 1 (ONE) TABLET BY MOUTH DAILY FOR 10 DAYS EVERY 3 MONTHS, Disp: , Rfl:   •  Melatonin 3 MG tablet dispersible, Place 1 tablet on the tongue At Night As Needed for sleep., Disp: 30 tablet, Rfl: 1  •  norethindrone (MICRONOR) 0.35 MG tablet, Take 1 tablet by mouth Daily., Disp: , Rfl:   •  polyethylene glycol (MIRALAX) 17 GM/SCOOP powder, Take 1 capful (17 g) and mix with 4-8oz of liquid, Take by mouth 2 (Two) Times a Day As Needed  for constipation., Disp: 510 g, Rfl: 1  •  pregabalin (LYRICA) 150 MG capsule, Take 1 capsule by mouth Every 12 (Twelve) Hours., Disp: 60 capsule, Rfl: 2  •  Synthroid 112 MCG tablet, Take 2 tablets by mouth Daily., Disp: 60 tablet, Rfl: 5    Vital Signs:    Vitals:    12/16/20 1413   BP: 135/68   Pulse: 91   SpO2: 94%   Weight: 108 kg (239 lb)   Height: 154.9 cm (61\")      Body mass index is 45.16 kg/m².  Neck Circumference: 16 inches      Physical Exam:   General Alert and oriented. No acute distress noted   Pharynx/Throat Class III Mallampati airway, large tongue, no evidence of redundant lateral pharyngeal tissue. No oral lesions. No thrush. Moist " mucous membranes.   Head Normocephalic. Symmetrical. Atraumatic.    Nose No septal deviation. No drainage   Chest Wall Normal shape. Symmetric expansion with respiration. No tenderness.   Neck Trachea midline, no thyromegaly or adenopathy    Lungs Clear to auscultation bilaterally. No wheezes. No rhonchi. No rales. Respirations regular, even and unlabored.   Heart Regular rhythm and normal rate. Normal S1 and S2. No murmur   Abdomen Soft, non-tender and non-distended. Normal bowel sounds. No masses.   Extremities Moves all extremities well. No edema   Psychiatric Normal mood and affect.       Impression:  1. Sleep apnea, unspecified type    2. Hypersomnia    3. Non-restorative sleep    4. Morbid obesity (CMS/[facility])        Plan:    Good sleep hygiene measures should be maintained.  Weight loss would be beneficial in this patient who is morbidy obese with BMI of 45.2.    I discussed the pathophysiology of obstructive sleep apnea with the patient.  We discussed the adverse outcomes associated with untreated sleep-disordered breathing.  We discussed treatment modalities of obstructive sleep apnea including CPAP device as well as oral mandibular advancement device. Sleep study will be scheduled to establish definitive diagnosis of sleep disorder breathing.  Weight loss will be strongly beneficial in order to reduce the severity of sleep-disordered breathing.  Patient has narrow oropharyngeal structure.  Caution during activities that require prolonged concentration is strongly advised.  Patient will be notified of sleep study results after sleep study is completed.  If sleep apnea is only mild,  oral mandibular advancement device may be one of the treatment options.  However if sleep apnea is moderately severe, CPAP treatment will be strongly encouraged.  The patient is not opposed to treatment with CPAP device if we confirm significant obstructive sleep apnea on polysomnography.    Machine broken beyond repair.  Needs new  machine.  We will follow-up home sleep study to reassess severity of sleep apnea given weight gain since last study then order new AutoPap.    Thank you for allowing me to participate in your patient's care.    Brennen Erickson MD  Sleep Medicine  12/16/20  14:41 EST

## 2020-12-22 ENCOUNTER — OFFICE VISIT (OUTPATIENT)
Dept: GASTROENTEROLOGY | Facility: CLINIC | Age: 45
End: 2020-12-22

## 2020-12-22 DIAGNOSIS — Z80.0 FAMILY HISTORY OF COLON CANCER: ICD-10-CM

## 2020-12-22 DIAGNOSIS — K63.5 POLYP OF SIGMOID COLON, UNSPECIFIED TYPE: ICD-10-CM

## 2020-12-22 DIAGNOSIS — G61.0 GBS (GUILLAIN BARRE SYNDROME) (HCC): Primary | ICD-10-CM

## 2020-12-22 PROCEDURE — 99442 PR PHYS/QHP TELEPHONE EVALUATION 11-20 MIN: CPT | Performed by: NURSE PRACTITIONER

## 2020-12-23 NOTE — PROGRESS NOTES
Chief Complaint   Patient presents with   • Follow-up     discuss CLS; no issues       HPI  Patient presents today via telephone for follow-up.  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  She has had a flexible sigmoidoscopy with a precancerous rectal polyp and it was recommended for a full colonoscopy.  Given diagnosis of Guillain-Barré patient is hesitant to have anesthesia for a colonoscopy.  There is a family history of colon cancer in a second-degree relative, her maternal grandmother.  She would like to discuss recommendations for endoscopic evaluation.    Flexible sigmoidoscopy was performed for abnormal findings on CT scan in the rectum during hospital admission April 2020.     Bowel habits are regular, she denies melena or hematochezia.  She denies weight loss.    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  She still has weakness and fatigue but is no longer requiring assistance with ambulation.  She is scheduled for follow-up with her neurologist February 2021.    She would like to avoid anesthesia if possible given Guillain-Barré but does not want to delay colonoscopy given risk of malignancy.    She has never had a full colonoscopy.    REVIEW OF PREVIOUS RECORDS:  Flexible sigmoidoscopy April 14, 2020 with Dr. Villafana. prep was fair fair.  4 mm polyp found in the rectum, sessile.  No infiltrative mass in the rectum.  Nonbleeding internal hemorrhoids, mild, grade 1.  Tubular adenomatous colon polyp.    You have chosen to receive care through a telephone visit today. Do you consent to use a telephone visit for your medical care today? Yes      Review of Systems   Constitutional: Negative.    HENT: Negative.    Eyes: Negative.    Respiratory: Negative.    Cardiovascular: Negative.    Gastrointestinal: Negative.    Endocrine: Negative.    Genitourinary: Negative.    Musculoskeletal: Negative.    Skin: Positive for rash.   Allergic/Immunologic: Negative.     Neurological: Negative.    Hematological: Negative.    Psychiatric/Behavioral: Negative.        Problem List:    Patient Active Problem List   Diagnosis   • Vitamin D deficiency   • Awareness of heartbeats   • Adiposity   • YOUNG (nonalcoholic steatohepatitis)   • Hypothyroid   • Diabetes mellitus arising in pregnancy   • Diabetes (CMS/HCC)   • Metabolic syndrome   • Chest pain   • Primary thunderclap headache   • Elevated LFTs   • Tobacco abuse   • Rheumatoid arthritis (CMS/HCC)   • Type 2 diabetes mellitus (CMS/HCC)   • Morbid obesity (CMS/HCC)   • UTI (urinary tract infection), bacterial   • Cerebral aneurysm   • Dyspnea   • Cough   • Hypomagnesemia   • Metabolic acidosis   • Fatigue   • History of COPD   • Abnormal CT scan, colon   • 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)   • Essential hypertension   • GBS (Guillain Hanover syndrome) (CMS/HCC)   • Elevated transaminase level   • History of gestational diabetes   • Steroid-induced hyperglycemia   • Elevated aldolase level       Medical History:    Past Medical History:   Diagnosis Date   • Aneurysm (CMS/HCC)    • Arthritis    • Carpal tunnel syndrome    • Chest pain    • Diabetes mellitus (CMS/HCC)    • Dysmetabolic syndrome X    • Gestational diabetes mellitus    • Guillain Barré syndrome (CMS/HCC)    • Hypothyroidism    • Metabolic disorder    • Nonalcoholic steatohepatitis    • Obesity    • Palpitations    • Sleep apnea    • Spinal headache    • Type 2 diabetes mellitus (CMS/HCC)    • Vitamin D deficiency         Social History:    Social History     Socioeconomic History   • Marital status:      Spouse name: Not on file   • Number of children: Not on file   • Years of education: Not on file   • Highest education level: Not on file   Occupational History   • Occupation: admin   Tobacco Use   • Smoking status: Former Smoker     Packs/day: 1.50     Years: 5.00     Pack years: 7.50     Types: Cigarettes     Quit date: 3/31/2020     Years since  quittin.7   • Smokeless tobacco: Never Used   • Tobacco comment: caffeine use 1 cup coffee daily   Substance and Sexual Activity   • Alcohol use: Not Currently     Alcohol/week: 1.0 - 2.0 standard drinks     Types: 1 - 2 Shots of liquor per week   • Drug use: No   • Sexual activity: Defer       Family History:   Family History   Problem Relation Age of Onset   • Hypertension Mother    • Alcohol abuse Father    • Heart attack Father         acute MI   • Stroke Father         CVA   • Heart disease Father    • Diabetes Maternal Grandmother    • Hypertension Maternal Grandmother    • Cancer Maternal Grandmother    • Colon cancer Maternal Grandmother    • Diabetes Paternal Grandmother    • Ulcerative colitis Neg Hx    • Crohn's disease Neg Hx    • Irritable bowel syndrome Neg Hx        Surgical History:   Past Surgical History:   Procedure Laterality Date   • CARPAL TUNNEL RELEASE     • CEREBRAL ANGIOGRAM N/A 3/28/2019    Procedure: DIAGNOSTIC CEREBRAL ANGIOGRAM;  Surgeon: Alexx Barrow MD;  Location: [facility] OR ;  Service: Neurosurgery   • CEREBRAL ANGIOGRAM N/A 10/2/2019    Procedure: CEREBRAL ANGIOGRAM;  Surgeon: Santosh Garza MD;  Location: [facility] OR ;  Service: Interventional Radiology   •  SECTION      x6   • COLONOSCOPY N/A 2020    Procedure: Flexible sigmoidoscopy WITH POLYPECTOMY COLD BIOPSY;  Surgeon: Kendall Villafana MD;  Location: [facility] ENDOSCOPY;  Service: Gastroenterology;  Laterality: N/A;  PRE ABN CT  POST POLYP, SMALL HEMORRHOIDS   • DILATATION AND CURETTAGE      x 2   • EMBOLIZATION CEREBRAL N/A 3/29/2019    Procedure: Cererbal angiogram and embolization of cerebral aneurysm;  Surgeon: Alexx Barrow MD;  Location: [facility] OR ;  Service: Neurosurgery   • MYRINGOTOMY     • TONSILLECTOMY     • TONSILLECTOMY AND ADENOIDECTOMY           Current Outpatient Medications:   •  Accu-Chek Softclix Lancets lancets, Use to test blood sugar up to 3  times daily., Disp: 100 each, Rfl: 12  •  Blood Glucose Monitoring Suppl (ACCU-CHEK COREY PLUS) w/Device kit, Use to test blood sugar up to three times daily., Disp: 1 kit, Rfl: 0  •  diphenhydrAMINE (BENADRYL) 25 mg capsule, Take 1 capsule by mouth Every 4 (Four) Hours As Needed for Itching., Disp: 30 capsule, Rfl: 1  •  DULoxetine (CYMBALTA) 60 MG capsule, Take 60 mg by mouth Daily., Disp: , Rfl:   •  glucose blood (Accu-Chek Corey) test strip, Use to test blood sugar up to three times daily., Disp: 100 each, Rfl: 12  •  ibuprofen (ADVIL,MOTRIN) 600 MG tablet, Take 1 tablet by mouth Every 8 (Eight) Hours As Needed for Mild Pain ., Disp: 60 tablet, Rfl: 0  •  medroxyPROGESTERone (PROVERA) 10 MG tablet, TAKE 1 (ONE) TABLET BY MOUTH DAILY FOR 10 DAYS EVERY 3 MONTHS, Disp: , Rfl:   •  Melatonin 3 MG tablet dispersible, Place 1 tablet on the tongue At Night As Needed for sleep., Disp: 30 tablet, Rfl: 1  •  norethindrone (MICRONOR) 0.35 MG tablet, Take 1 tablet by mouth Daily., Disp: , Rfl:   •  polyethylene glycol (MIRALAX) 17 GM/SCOOP powder, Take 1 capful (17 g) and mix with 4-8oz of liquid, Take by mouth 2 (Two) Times a Day As Needed  for constipation., Disp: 510 g, Rfl: 1  •  pregabalin (LYRICA) 150 MG capsule, Take 1 capsule by mouth Every 12 (Twelve) Hours., Disp: 60 capsule, Rfl: 2  •  Synthroid 112 MCG tablet, Take 2 tablets by mouth Daily., Disp: 60 tablet, Rfl: 5    Allergies:  No known drug allergy    The following portions of the patient's history were reviewed and updated as appropriate: allergies, current medications, past family history, past medical history, past social history, past surgical history and problem list.    Assessment/ Plan  Diagnoses and all orders for this visit:    1. GBS (Guillain Tarzana syndrome) (CMS/HCC) (Primary)    2. Polyp of sigmoid colon, unspecified type    3. Family history of colon cancer         No follow-ups on file.    Patient Instructions   Given history of adenomatous  rectal polyp that was small and family history of GI malignancy, we do recommend a colonoscopy however it is important that you be cleared from a neurology standpoint given new diagnosis of Guillain-Barré.  Endoscopic evaluation is not urgent as she is not having any alarm symptoms such as rectal bleeding, weight loss, sweats, fever or abdominal pain.  I also discussed this with Dr. Villafana upon completion of today's office visit and he agrees that endoscopic evaluation is not urgent and can wait until you have been cleared or you and your neurologist are comfortable proceeding with endoscopic evaluation that again is not urgent.    Please discuss with your neurologist at upcoming appointment and contact our office and let us know how you wish to proceed and we will accommodate with your timeframe.    Otherwise contact the office if we may be of any additional assistance or you have any further questions.      Discussion:  As discussed in detail during today's visit, we recommend colonoscopy outpatient, not urgent when patient stable for full bowel prep and has been cleared by neurology with diagnosis of Guillain-Barré.     I have confirmed this with Dr. Villafana upon completion of today's telephone visit.  I have tried to contact the patient twice via telephone however there is no answer and her voicemail is full.    This visit was completed as a telephone visit due to COVID-19 pandemic. This visit has been rescheduled as a phone visit to comply with patient safety concerns in accordance with CDC recommendations. Total time of discussion was 16 minutes.

## 2020-12-23 NOTE — PATIENT INSTRUCTIONS
Given history of adenomatous rectal polyp that was small and family history of GI malignancy, we do recommend a colonoscopy however it is important that you be cleared from a neurology standpoint given new diagnosis of Guillain-Barré.  Endoscopic evaluation is not urgent as she is not having any alarm symptoms such as rectal bleeding, weight loss, sweats, fever or abdominal pain.  I also discussed this with Dr. Villafana upon completion of today's office visit and he agrees that endoscopic evaluation is not urgent and can wait until you have been cleared or you and your neurologist are comfortable proceeding with endoscopic evaluation that again is not urgent.    Please discuss with your neurologist at upcoming appointment and contact our office and let us know how you wish to proceed and we will accommodate with your timeframe.    Otherwise contact the office if we may be of any additional assistance or you have any further questions.

## 2020-12-28 ENCOUNTER — HOSPITAL ENCOUNTER (OUTPATIENT)
Dept: SLEEP MEDICINE | Facility: HOSPITAL | Age: 45
Discharge: HOME | End: 2020-12-28
Admitting: FAMILY MEDICINE

## 2020-12-28 DIAGNOSIS — G47.8 NON-RESTORATIVE SLEEP: ICD-10-CM

## 2020-12-28 DIAGNOSIS — G47.10 HYPERSOMNIA: ICD-10-CM

## 2020-12-28 DIAGNOSIS — E66.01 MORBID OBESITY (HCC): ICD-10-CM

## 2020-12-28 DIAGNOSIS — G47.30 SLEEP APNEA, UNSPECIFIED TYPE: ICD-10-CM

## 2020-12-28 PROCEDURE — 95806 SLEEP STUDY UNATT&RESP EFFT: CPT

## 2021-01-14 ENCOUNTER — TELEPHONE (OUTPATIENT)
Dept: SLEEP MEDICINE | Facility: HOSPITAL | Age: 46
End: 2021-01-14

## 2021-01-14 NOTE — TELEPHONE ENCOUNTER
Spoke with patient about results, faxed orders to [facility]. F/U on 3/10/2020. Made a note on fax cover sheet, Patient requested an initial text from [facility], said she doesn't answer calls she doesn't know. Treasure from [employer] said they could not text but that the caller ID will say [facility] [phone] when they call. Called patient back and left her a message regarding this-AK

## 2021-01-20 DIAGNOSIS — G61.0 GBS (GUILLAIN BARRE SYNDROME) (HCC): ICD-10-CM

## 2021-01-20 NOTE — TELEPHONE ENCOUNTER
I tried calling patient to get more info on when she started back taking lyrica I left a voicemail for her to callback. At her last office visit she stated that she stopped it because it made her feeling dopey.     Valentin in chart to review

## 2021-01-20 NOTE — TELEPHONE ENCOUNTER
Caller: SENTHIL SMITH    Relationship: SELF    Best call back number: [phone]    Medication needed:   Requested Prescriptions     Pending Prescriptions Disp Refills   • pregabalin (LYRICA) 150 MG capsule 60 capsule 2     Sig: Take 1 capsule by mouth Every 12 (Twelve) Hours.       When do you need the refill by: ASAP    What details did the patient provide when requesting the medication: THE PT IS COMPLETELY OUT OF THE MEDICATION AND SHE STATES SHE IS GOING TO SPEND A WEEK WITH SOME FRIENDS AND SHE NEEDS THIS SEND TO A DIFFERENT PHARMACY    Does the patient have less than a 3 day supply:  [x] Yes  [] No    What is the patient's preferred pharmacy:    [facility]  [address], [place] [address] [phone]

## 2021-01-27 ENCOUNTER — TELEPHONE (OUTPATIENT)
Dept: NEUROLOGY | Facility: CLINIC | Age: 46
End: 2021-01-27

## 2021-01-27 NOTE — TELEPHONE ENCOUNTER
Provider: DR. ANN  Caller: SENTHIL SMITH  Relationship to Patient: SELF  Pharmacy: [facility]    Reason for Call: PT WOULD LIKE A REFILL AFTER pregabalin (LYRICA) D/T SHE RETURNED TO WORK AND COULD NOT ENDURE THE PAIN SO SHE HAD TO START TAKING THE LYRICA AGAIN.       PT CAN BE REACHED [phone]

## 2021-01-27 NOTE — TELEPHONE ENCOUNTER
Called pt lvm to have patient callback to verify the location of the [facility] pharmacy she would like the prescription sent to.

## 2021-01-27 NOTE — TELEPHONE ENCOUNTER
PT VERIFY THAT THE [facility]Y IS THE ONE ON [facility] [facility] [address] [facility] AND SHE WOULD LIKE A 90 DAY SUPPLY OF THE pregabalin (LYRICA.

## 2021-01-28 RX ORDER — PREGABALIN 150 MG/1
150 CAPSULE ORAL EVERY 12 HOURS SCHEDULED
Qty: 60 CAPSULE | Refills: 2 | Status: SHIPPED | OUTPATIENT
Start: 2021-01-28

## 2021-02-11 ENCOUNTER — HOSPITAL ENCOUNTER (OUTPATIENT)
Dept: INFUSION THERAPY | Facility: HOSPITAL | Age: 46
Discharge: HOME | End: 2021-02-11
Admitting: PSYCHIATRY & NEUROLOGY

## 2021-02-11 DIAGNOSIS — G61.0 GUILLAIN-BARRE SYNDROME (HCC): ICD-10-CM

## 2021-02-11 PROCEDURE — 95886 MUSC TEST DONE W/N TEST COMP: CPT | Performed by: PSYCHIATRY & NEUROLOGY

## 2021-02-11 PROCEDURE — 95886 MUSC TEST DONE W/N TEST COMP: CPT

## 2021-02-11 PROCEDURE — 95911 NRV CNDJ TEST 9-10 STUDIES: CPT | Performed by: PSYCHIATRY & NEUROLOGY

## 2021-02-11 PROCEDURE — 95911 NRV CNDJ TEST 9-10 STUDIES: CPT

## 2021-02-11 NOTE — PROCEDURES
EMG and Nerve Conduction Studies    I.      Instrument used: Neuromax 1002  II.     Please see data sheets for tabular summary of NCS and details on methods, temperatures and lab standards.   III.    EMG muscles tested for upper extremity studies include the deltoid, biceps, triceps, pronator teres, extensor digitorum communis, first dorsal interosseous and abductor pollicis brevis.    IV.   EMG muscles tested for lower extremity studies include the vastus lateralis, tibialis anterior, peroneus longus, medial gastrocnemius and extensor digitorum brevis.    V.    Additional muscles tested as needed.  Paraspinal muscles tested as needed.   VI.   Please see data sheets for tabular summary of EMG findings.   VII. The complete report includes the data sheets.      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  Her EMG demonstrated mild slowing in several nerves but also irritative changes in multiple muscles.  A primarily axonal form of Guillain-Barré was thought likely.      Ht: 154.9 cm  Wt: 108 kg  HbA1C:   Lab Results   Component Value Date    HGBA1C 6.00 (H) 06/05/2020     TSH:   Lab Results   Component Value Date    TSH 8.230 (H) 12/10/2020       Technical summary:  Nerve conduction studies were obtained in the right arm and right leg.  Skin temperatures on the palm were at least 32 °C but on the foot the temperatures were very cold and it was difficult to warm them.  Temperature correction was used as indicated.  Needle examination was obtained on selected muscles in the right arm and right leg.    Results:  1.  Absent right median sensory potential.  2.  Prolonged right ulnar sensory latency of 3.9 ms with low amplitude of 8 µV.  3.  Normal right radial sensory latency with low amplitude of  11.3 µV.  4.  Moderately prolonged right median motor latency at 5.5 ms with slow velocity of 44 m/s.  Normal amplitude.  5.  Normal right ulnar motor study.  6.  Mildly prolonged right sural sensory latency at 4.2 ms with temperature correction.  The amplitude was low at 4.3 µV.  7.  Mildly prolonged right superficial peroneal sensory latency with temperature correction at 4.4 ms with low amplitude of 4 µV.  8.  Slow right tibial motor velocity below the knee at 36.8 m/s with normal velocity in the short segment across the fibular head.  Normal distal latency and amplitudes.  9.  Slow right tibial motor velocity at 34.8 m/s with normal distal latency and amplitudes.  10.  Needle examination of selected muscles in the right arm and right leg showed normal insertional activities in the right arm.  There was a mild increased number of large motor units in the abductor pollicis brevis with increased firing rate and reduced interference pattern.  The remaining muscles tested in the right arm showed normal motor units and recruitment patterns.    In the right leg there were fibrillations and/or positive sharp waves in the extensor digitorum brevis and medial gastrocnemius.  There was an increased number of large motor units in these 2 muscles with increased firing rate and reduced interference pattern.  The remaining muscles tested showed normal insertional activities.  There was a mild increased number of large motor units in the tibialis anterior with some increase in firing rate and reduced interference pattern.  Remaining muscles tested showed normal motor units and recruitment.  Lumbar paraspinals at L5 on both sides showed positive sharp waves.    Impression:  Abnormal study showing moderate peripheral neuropathy.  Some needle exam changes in the  right leg along with paraspinal abnormalities can be consistent with an acute/ongoing S1 radiculopathy.  Root level involvement from the peripheral neuropathy is also a  possibility.  Study results were discussed with the patient.    Juventino Gaytan M.D.    Addendum:  In comparison to the previous study there are some notable difference in the nerve conductions as follows:    The right median sensory study shows no response or is a low amplitude response was previously obtained.  There is improvement in the right median motor conduction velocity from 34 m/s to 44 m/s but some further prolongation of the distal latency from 4.8 ms to 5.5 ms.  The amplitude has improved.  There was a borderline right ulnar neuropathy with very normal appearing velocities currently.  There was minor changes in the right sural and superficial peroneal sensory studies.  There is now slowing of conduction velocity for the right peroneal and tibial motor studies whereas previously they were not slow.  Regarding the widespread needle exam changes seen previously the vast majority of that has resolved with out the development of significant chronic reinnervation changes.    When I saw the patient in the office last a panel of laboratories for treatable causes of neuropathy was obtained.  These demonstrated a sedimentation rate of 31, RPR nonreactive, B12 level of 878 and folate 10.3.  The TSH was elevated at 10 and the free T4 was normal at 1.58.  The copper level was normal.  Cryoglobulins were negative.  There was no MGUS on the SPE/IEP.  Heavy metal screen was negative.    I asked her to make an appointment to see me in the office within a couple of months.  Option to pursue CT or MRI of the lumbar spine (the patient has a cerebral stent)    GNS        Dictated utilizing Dragon dictation.

## 2021-02-23 ENCOUNTER — TELEPHONE (OUTPATIENT)
Dept: NEUROLOGY | Facility: CLINIC | Age: 46
End: 2021-02-23

## 2021-02-23 DIAGNOSIS — G89.29 CHRONIC BILATERAL LOW BACK PAIN WITHOUT SCIATICA: ICD-10-CM

## 2021-02-23 DIAGNOSIS — E66.01 MORBIDLY OBESE (HCC): ICD-10-CM

## 2021-02-23 DIAGNOSIS — M54.50 CHRONIC BILATERAL LOW BACK PAIN WITHOUT SCIATICA: ICD-10-CM

## 2021-02-23 DIAGNOSIS — G62.9 PERIPHERAL POLYNEUROPATHY: Primary | ICD-10-CM

## 2021-02-24 NOTE — ADDENDUM NOTE
Encounter addended by: Juventino Gaytan MD on: 2/24/2021 2:12 PM   Actions taken: Clinical Note Signed

## 2021-03-01 DIAGNOSIS — G62.9 PERIPHERAL POLYNEUROPATHY: ICD-10-CM

## 2021-03-01 RX ORDER — GABAPENTIN 300 MG/1
300 CAPSULE ORAL 3 TIMES DAILY
Qty: 90 CAPSULE | Refills: 0 | Status: SHIPPED | OUTPATIENT
Start: 2021-03-01 | End: 2021-03-01 | Stop reason: SDUPTHER

## 2021-03-01 RX ORDER — GABAPENTIN 300 MG/1
300 CAPSULE ORAL 3 TIMES DAILY
Qty: 90 CAPSULE | Refills: 0 | Status: SHIPPED | OUTPATIENT
Start: 2021-03-01 | End: 2021-03-31

## 2021-03-01 RX ORDER — GABAPENTIN 300 MG/1
300 CAPSULE ORAL 3 TIMES DAILY
Qty: 90 CAPSULE | Refills: 0 | Status: SHIPPED | OUTPATIENT
Start: 2021-03-01 | End: 2021-03-01

## 2021-03-11 ENCOUNTER — TELEPHONE (OUTPATIENT)
Dept: NEUROLOGY | Facility: CLINIC | Age: 46
End: 2021-03-11

## 2021-03-11 NOTE — TELEPHONE ENCOUNTER
Provider:     Caller: PT    Relationship to Patient: SELF    Phone Number: [phone]    Reason for Call: PT CALLED IN TODAY STATING THAT HER LEGS AND FEET ARE FEELING WAY BETTER AND SHE IS WALKING BETTER. SHE SAID THAT SINCE HER LEGS STARTED FEELING BETTER SHE HAS BEEN FEELING VERY ITCHY. SHE STATED SHE ITCHED SO BAD SHE VANDANA BLOOD. SHE STATED IT IS MOSTLY LOCATED IN HER HEAD AND LEGS FROM ABOUT MID CALF DOWN. SHE STATED THAT SHE WILL GET ITCHY SPELLS WHERE HER BACK AND OTHER PLACES WILL ITCH BUT IT IS PRIMARILY LOCATED IN HER HEAD AND LOWER LEGS. SHE ALSO STATED SHE IS EXPERIENCING VERTIGO. SHE SAID WHEN LAYS DOWN TO GO TO BED SHE FEELS LIKE HER HEAD IS SWIMMING. SHE STATED IF SHE ROLLS OVER, BENDS OVER, STANDS UP OR ANY QUICK MOTIONS SHE GETS REALLY DIZZY. SHE STATED WHEN SHE BENDS OVER SHE HAS TO HOLD ON TO SOMETHING. SHE ALSO STATED THAT A FEW DAYS AGO SHE CHIPPED A TOOTH AND MADE AN APPT FOR THIS ISSUE AND SHE STATED SHE LOST A PART OF HER TOOTH THAT IS NEXT TO THE ORIGINAL TOOTH. SHE STATED SHE DOES NOT BELIEVE THAT IT IS RELATED TO THIS ISSUE BUT JUST WANTED TO MENTION IT TO BE SURE.     PLEASE REVIEW AND ADVISE WITH FURTHER INSTRUCTIONS FOR PT.    SHE STATED SHE IS GOING BACK TO SLEEP BECAUSE SHE WORKS NIGHT SHIFT SHE SAID IF SHE DOES NOT ANSWER PLEASE LEAVE A DETAILED MESSAGE FOR HER.

## 2021-03-11 NOTE — TELEPHONE ENCOUNTER
Patient stated that her legs and feet are feeling better and she's walking better. Since her legs are feeling better they have become very itchy where she scratching until she bled. The itching is mostly located in her head and legs mid calf. She's also experiencing vertigo she gets dizzy when bending over, rolling over, standing up quickly. She stated she chipped a tooth and lost part of  another tooth. She stated she don't think it's related to the other issue she's having. Please advise.

## 2021-03-13 NOTE — TELEPHONE ENCOUNTER
The dizziness sounds like it might be positional vertigo.    I am not sure what to make of the itching.  Does she have any rash?  Did seem to start soon after starting a new medication?.  I looked at her list of medications and nothing looked obvious to be a source.    She could be seen by Riddhi in the office fairly quickly    CATARINO

## 2021-03-17 ENCOUNTER — OFFICE VISIT (OUTPATIENT)
Dept: NEUROLOGY | Facility: CLINIC | Age: 46
End: 2021-03-17

## 2021-03-17 ENCOUNTER — LAB (OUTPATIENT)
Dept: LAB | Facility: HOSPITAL | Age: 46
End: 2021-03-17

## 2021-03-17 VITALS
DIASTOLIC BLOOD PRESSURE: 98 MMHG | SYSTOLIC BLOOD PRESSURE: 138 MMHG | OXYGEN SATURATION: 96 % | BODY MASS INDEX: 43.8 KG/M2 | HEIGHT: 61 IN | WEIGHT: 232 LBS | HEART RATE: 87 BPM

## 2021-03-17 DIAGNOSIS — H81.11 BENIGN PAROXYSMAL POSITIONAL VERTIGO OF RIGHT EAR: ICD-10-CM

## 2021-03-17 DIAGNOSIS — L29.9 PRURITUS: Primary | ICD-10-CM

## 2021-03-17 LAB
ALBUMIN SERPL-MCNC: 4.1 G/DL (ref 3.5–5.2)
ALBUMIN/GLOB SERPL: 1.3 G/DL
ALP SERPL-CCNC: 116 U/L (ref 39–117)
ALT SERPL W P-5'-P-CCNC: 57 U/L (ref 1–33)
ANION GAP SERPL CALCULATED.3IONS-SCNC: 10.8 MMOL/L (ref 5–15)
AST SERPL-CCNC: 72 U/L (ref 1–32)
BASOPHILS # BLD AUTO: 0.07 10*3/MM3 (ref 0–0.2)
BASOPHILS NFR BLD AUTO: 1 % (ref 0–1.5)
BILIRUB SERPL-MCNC: 0.9 MG/DL (ref 0–1.2)
BUN SERPL-MCNC: 8 MG/DL (ref 6–20)
BUN/CREAT SERPL: 11.8 (ref 7–25)
CALCIUM SPEC-SCNC: 9 MG/DL (ref 8.6–10.5)
CHLORIDE SERPL-SCNC: 95 MMOL/L (ref 98–107)
CO2 SERPL-SCNC: 30.2 MMOL/L (ref 22–29)
CREAT SERPL-MCNC: 0.68 MG/DL (ref 0.57–1)
DEPRECATED RDW RBC AUTO: 49.2 FL (ref 37–54)
EOSINOPHIL # BLD AUTO: 0.12 10*3/MM3 (ref 0–0.4)
EOSINOPHIL NFR BLD AUTO: 1.6 % (ref 0.3–6.2)
ERYTHROCYTE [DISTWIDTH] IN BLOOD BY AUTOMATED COUNT: 13.6 % (ref 12.3–15.4)
GFR SERPL CREATININE-BSD FRML MDRD: 93 ML/MIN/1.73
GLOBULIN UR ELPH-MCNC: 3.1 GM/DL
GLUCOSE SERPL-MCNC: 196 MG/DL (ref 65–99)
HCT VFR BLD AUTO: 50.8 % (ref 34–46.6)
HGB BLD-MCNC: 17.1 G/DL (ref 12–15.9)
IMM GRANULOCYTES # BLD AUTO: 0.02 10*3/MM3 (ref 0–0.05)
IMM GRANULOCYTES NFR BLD AUTO: 0.3 % (ref 0–0.5)
LYMPHOCYTES # BLD AUTO: 1.88 10*3/MM3 (ref 0.7–3.1)
LYMPHOCYTES NFR BLD AUTO: 25.8 % (ref 19.6–45.3)
MCH RBC QN AUTO: 33.7 PG (ref 26.6–33)
MCHC RBC AUTO-ENTMCNC: 33.7 G/DL (ref 31.5–35.7)
MCV RBC AUTO: 100 FL (ref 79–97)
MONOCYTES # BLD AUTO: 0.43 10*3/MM3 (ref 0.1–0.9)
MONOCYTES NFR BLD AUTO: 5.9 % (ref 5–12)
NEUTROPHILS NFR BLD AUTO: 4.76 10*3/MM3 (ref 1.7–7)
NEUTROPHILS NFR BLD AUTO: 65.4 % (ref 42.7–76)
NRBC BLD AUTO-RTO: 0 /100 WBC (ref 0–0.2)
PLATELET # BLD AUTO: 184 10*3/MM3 (ref 140–450)
PMV BLD AUTO: 10 FL (ref 6–12)
POTASSIUM SERPL-SCNC: 4.3 MMOL/L (ref 3.5–5.2)
PROT SERPL-MCNC: 7.2 G/DL (ref 6–8.5)
RBC # BLD AUTO: 5.08 10*6/MM3 (ref 3.77–5.28)
SODIUM SERPL-SCNC: 136 MMOL/L (ref 136–145)
WBC # BLD AUTO: 7.28 10*3/MM3 (ref 3.4–10.8)

## 2021-03-17 PROCEDURE — 85025 COMPLETE CBC W/AUTO DIFF WBC: CPT | Performed by: PHYSICIAN ASSISTANT

## 2021-03-17 PROCEDURE — 80053 COMPREHEN METABOLIC PANEL: CPT | Performed by: PHYSICIAN ASSISTANT

## 2021-03-17 PROCEDURE — 36415 COLL VENOUS BLD VENIPUNCTURE: CPT | Performed by: PHYSICIAN ASSISTANT

## 2021-03-17 PROCEDURE — 99214 OFFICE O/P EST MOD 30 MIN: CPT | Performed by: PHYSICIAN ASSISTANT

## 2021-03-17 RX ORDER — INSULIN GLARGINE AND LIXISENATIDE 100; 33 U/ML; UG/ML
INJECTION, SOLUTION SUBCUTANEOUS
COMMUNITY
Start: 2021-02-09

## 2021-03-17 RX ORDER — NYSTATIN 100000 U/G
OINTMENT TOPICAL
COMMUNITY
Start: 2021-01-26

## 2021-03-17 NOTE — PROGRESS NOTES
CC: Dizziness and itching    HPI:  Oralia Sánchez is a  45 y.o.  Left-handed female who I am seeing today for some acute issues: Within the past 2 weeks patient has developed 2 new symptoms, firstly she reports significant dizziness especially with head movements and second she complains of widespread generalized itching with no significant rash.  Patient has been recently followed by Dr. Gaytan for history of axonal Guillain Barré (diagnosed April 2020) as well as peripheral neuropathy demonstrated on recent EMG.  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     Patient reports her dizziness has just started the past 2 weeks.  She characterizes the dizziness as the room spinning around her.  She complains of recurrent episodes of dizziness typically come on with specific head movements i.e. looking up while standing or sitting, lying down or getting up from bed, and rolling over in bed.  States the initial severe spinning quality dizziness lasts for about a minute.  But following an episode, she remains to have a sense of imbalance/lightheadedness.  She denies any associated headache, visual blurring or visual loss, nausea or vomiting, hearing loss or tinnitus. She denies any presyncope or syncope.  She states she has never had similar symptoms like this week.    In terms of her pruritus - patient states that symptoms also seem to appear about a week and half ago.  She states itching is widespread but mostly seems to involve her upper and lower extremities as well as her scalp.  She has not noticed any primary skin lesions but does state that from her vigorous scratching she has now developed some excoriations at multiple sites.  As  mentioned she does have history of hypothyroidism which is currently managed by Dr. Choudhury, the patient is uncertain whether or not her last TSH was within normal limits.  She is currently taking Synthroid 112 mcg.  She also has history of some liver dysfunction, she has been told in the past that this is purely from fatty liver, her liver enzymes remain to be quite elevated her most recent lab panel.  Patient reports no history of renal disease, HIV infection, or malignancy.  She also denies any fever, weight loss or night sweats.  Patient does report in the past month her diabetes medication regimen has been changed - she was previously on Janumet but could not tolerate this secondary to significant nausea vomiting.  She was subsequently switched to Soliqua insulin therapy and is now up to 34 units every morning, she reports she was told to titrate insulin up as needed until fasting blood sugar is within normal limits.  She admits her fasting blood sugars remain quite elevated and so she continues to advance her Soliqua.  She denies any other medicine changes. Of note we did try switching her from Lyrica to gabapentin recently for her neuropathy pain, however patient admits she has not been to the pharmacy to  her prescription yet and so remains on the Lyrica.  Patient denies any recent travel, she also reports no other members of her household have similar itching symptoms.      Past Medical History:   Diagnosis Date   • Aneurysm (CMS/HCC)    • Arthritis    • Carpal tunnel syndrome    • Chest pain    • Diabetes mellitus (CMS/HCC)    • Dysmetabolic syndrome X    • Gestational diabetes mellitus    • Guillain Barré syndrome (CMS/HCC)    • Hypothyroidism    • Metabolic disorder    • Nonalcoholic steatohepatitis    • Obesity    • Palpitations    • Sleep apnea    • Spinal headache    • Type 2 diabetes mellitus (CMS/HCC)    • Vitamin D deficiency          Past Surgical History:   Procedure Laterality Date   •  CARPAL TUNNEL RELEASE     • CEREBRAL ANGIOGRAM N/A 3/28/2019    Procedure: DIAGNOSTIC CEREBRAL ANGIOGRAM;  Surgeon: Alexx Barrow MD;  Location: [facility] OR ;  Service: Neurosurgery   • CEREBRAL ANGIOGRAM N/A 10/2/2019    Procedure: CEREBRAL ANGIOGRAM;  Surgeon: Santosh Garza MD;  Location: [facility] OR ;  Service: Interventional Radiology   •  SECTION      x6   • COLONOSCOPY N/A 2020    Procedure: Flexible sigmoidoscopy WITH POLYPECTOMY COLD BIOPSY;  Surgeon: Kendall Villafana MD;  Location: [facility] ENDOSCOPY;  Service: Gastroenterology;  Laterality: N/A;  PRE ABN CT  POST POLYP, SMALL HEMORRHOIDS   • DILATATION AND CURETTAGE      x 2   • EMBOLIZATION CEREBRAL N/A 3/29/2019    Procedure: Cererbal angiogram and embolization of cerebral aneurysm;  Surgeon: Alexx Barrow MD;  Location: [facility] OR ;  Service: Neurosurgery   • MYRINGOTOMY     • TONSILLECTOMY     • TONSILLECTOMY AND ADENOIDECTOMY             Current Outpatient Medications:   •  DULoxetine (CYMBALTA) 60 MG capsule, Take 60 mg by mouth Daily., Disp: , Rfl:   •  gabapentin (NEURONTIN) 300 MG capsule, Take 1 capsule by mouth 3 (Three) Times a Day for 30 days., Disp: 90 capsule, Rfl: 0  •  medroxyPROGESTERone (PROVERA) 10 MG tablet, TAKE 1 (ONE) TABLET BY MOUTH DAILY FOR 10 DAYS EVERY 3 MONTHS, Disp: , Rfl:   •  norethindrone (MICRONOR) 0.35 MG tablet, Take 1 tablet by mouth Daily., Disp: , Rfl:   •  nystatin (MYCOSTATIN) 347837 UNIT/GM ointment, 1/2 (ONE HALF) GRAM TWICE DAILY AS NEEDED, Disp: , Rfl:   •  pregabalin (LYRICA) 150 MG capsule, Take 1 capsule by mouth Every 12 (Twelve) Hours., Disp: 60 capsule, Rfl: 2  •  Synthroid 112 MCG tablet, Take 2 tablets by mouth Daily., Disp: 60 tablet, Rfl: 5  •  Accu-Chek Softclix Lancets lancets, Use to test blood sugar up to 3 times daily., Disp: 100 each, Rfl: 12  •  Blood Glucose Monitoring Suppl (ACCU-CHEK COREY PLUS) w/Device kit, Use to test blood sugar  up to three times daily., Disp: 1 kit, Rfl: 0  •  diphenhydrAMINE (BENADRYL) 25 mg capsule, Take 1 capsule by mouth Every 4 (Four) Hours As Needed for Itching., Disp: 30 capsule, Rfl: 1  •  glucose blood (Accu-Chek Misti) test strip, Use to test blood sugar up to three times daily., Disp: 100 each, Rfl: 12  •  ibuprofen (ADVIL,MOTRIN) 600 MG tablet, Take 1 tablet by mouth Every 8 (Eight) Hours As Needed for Mild Pain ., Disp: 60 tablet, Rfl: 0  •  Melatonin 3 MG tablet dispersible, Place 1 tablet on the tongue At Night As Needed for sleep., Disp: 30 tablet, Rfl: 1  •  polyethylene glycol (MIRALAX) 17 GM/SCOOP powder, Take 1 capful (17 g) and mix with 4-8oz of liquid, Take by mouth 2 (Two) Times a Day As Needed  for constipation., Disp: 510 g, Rfl: 1  •  Soliqua 100-33 UNT-MCG/ML solution pen-injector injection, PLEASE SEE ATTACHED FOR DETAILED DIRECTIONS, Disp: , Rfl:       Family History   Problem Relation Age of Onset   • Hypertension Mother    • Alcohol abuse Father    • Heart attack Father         acute MI   • Stroke Father         CVA   • Heart disease Father    • Diabetes Maternal Grandmother    • Hypertension Maternal Grandmother    • Cancer Maternal Grandmother    • Colon cancer Maternal Grandmother    • Diabetes Paternal Grandmother    • Ulcerative colitis Neg Hx    • Crohn's disease Neg Hx    • Irritable bowel syndrome Neg Hx          Social History     Socioeconomic History   • Marital status:      Spouse name: Not on file   • Number of children: Not on file   • Years of education: Not on file   • Highest education level: Not on file   Tobacco Use   • Smoking status: Former Smoker     Packs/day: 1.50     Years: 5.00     Pack years: 7.50     Types: Cigarettes     Quit date: 3/31/2020     Years since quittin.9   • Smokeless tobacco: Never Used   • Tobacco comment: caffeine use 1 cup coffee daily   Substance and Sexual Activity   • Alcohol use: Not Currently     Alcohol/week: 1.0 - 2.0 standard  "drinks     Types: 1 - 2 Shots of liquor per week   • Drug use: No   • Sexual activity: Defer         Allergies   Allergen Reactions   • No Known Drug Allergy Other (See Comments)     N/A         Pain Scale: 0/10        ROS:  Review of Systems   Constitutional: Negative for activity change, appetite change and fatigue.   Eyes: Positive for redness. Negative for pain and itching.   Respiratory: Negative for cough, choking and shortness of breath.    Skin: Positive for rash.   Allergic/Immunologic: Negative for environmental allergies and food allergies.   Neurological: Positive for dizziness and light-headedness. Negative for tremors, seizures, syncope, facial asymmetry, speech difficulty, weakness, numbness and headaches.   Psychiatric/Behavioral: Negative for agitation, behavioral problems, confusion, decreased concentration, dysphoric mood, hallucinations, self-injury, sleep disturbance and suicidal ideas. The patient is not nervous/anxious and is not hyperactive.      I have reviewed the above review of systems put in by the medical assistant and am in agreement    Physical Exam:  Vitals:    03/17/21 1401   BP: 138/98   Pulse: 87   SpO2: 96%   Weight: 105 kg (232 lb)   Height: 154.9 cm (61\")       Body mass index is 43.84 kg/m².    Physical Exam  General: Obese white female, no acute distress  HEENT: Head is normocephalic atraumatic.  Some excessive cerumen noted in right ear canal  Neck: Supple, no carotid bruits, no thyromegaly  Heart: Regular rate and rhythm  Extremities: Skin appears slightly dry and scaly, there is mild nonpitting pedal edema.  There are scattered excoriations over her ankles, feet and upper arms.       Neurological Exam:   Mental Status: Awake, alert, oriented to person, place and time.  Conversant without evidence of an affective disorder, thought disorder, delusions or hallucinations.  Attention span and concentration are normal.  HCF: No aphasia, apraxia or dysarthria.  Recent and remote " memory intact.  Knowledge of recent events intact.  CN:      I:              II: Visual fields full without left inattention              III, IV, VI: Eye movements intact without nystagmus or ptosis.  Pupils equal round and reactive to light.              V,VII: Light touch and pinprick intact all 3 divisions of V.  Facial muscles symmetrical.              VIII: Hearing intact to finger rub              IX,X: Soft palate elevates symmetrically              XI: Sternomastoid and trapezius are strong.              XII: Tongue midline without atrophy or fasciculations  Motor: Normal tone and bulk in the upper and lower extremities              Power testing: Mild weakness of interossei and APBs bilaterally with good strength in all other muscles tested in the arms.  Lower extremity proximal muscle strength is normal.  Reflexes: Upper extremities:  Trace/+1 diffusely                   Lower extremities:  Absent at knees, trace ankle jerks                   Toe signs: Downgoing  Sensory: Light touch: Reduced in all digits of the right hand.                    Pinprick: Reduced in the toes>feet.                   Vibration:  Intact at the ankles                   Position: Intact great toes     Cerebellar: Finger-to-nose: Intact                      Rapid movement: Intact                      Heel-to-shin: Intact  Gait and Station: Comes to stand slowly but without difficulty.  Mildly broad-based casual walk.  Able to walk on toes and heels.  Moderate difficulty with tandem walk.    No Romberg, no drift    Results:      Lab Results   Component Value Date    GLUCOSE 127 (H) 05/09/2020    BUN 15 06/05/2020    CREATININE 0.61 06/05/2020    EGFRIFNONA 106 06/05/2020    EGFRIFAFRI 128 06/05/2020    BCR 24.6 06/05/2020    CO2 30.4 (H) 06/05/2020    CALCIUM 9.9 06/05/2020    PROTENTOTREF 7.6 09/03/2020    ALBUMIN 3.4 09/03/2020    LABIL2 0.8 09/03/2020     (H) 06/05/2020     (H) 06/05/2020       Lab Results    Component Value Date    WBC 7.28 03/17/2021    HGB 17.1 (H) 03/17/2021    HCT 50.8 (H) 03/17/2021    .0 (H) 03/17/2021     03/17/2021         .  Lab Results   Component Value Date    RPR Non-Reactive 09/03/2020         Lab Results   Component Value Date    TSH 8.230 (H) 12/10/2020    O7TOIYY 18.10 (H) 06/05/2020         Lab Results   Component Value Date    [id number] 878 09/03/2020         Lab Results   Component Value Date    FOLATE 10.30 09/03/2020         Lab Results   Component Value Date    HGBA1C 6.00 (H) 06/05/2020         Lab Results   Component Value Date    GLUCOSE 127 (H) 05/09/2020    BUN 15 06/05/2020    CREATININE 0.61 06/05/2020    EGFRIFNONA 106 06/05/2020    EGFRIFAFRI 128 06/05/2020    BCR 24.6 06/05/2020    K 4.0 06/05/2020    CO2 30.4 (H) 06/05/2020    CALCIUM 9.9 06/05/2020    PROTENTOTREF 7.6 09/03/2020    ALBUMIN 3.4 09/03/2020    LABIL2 0.8 09/03/2020     (H) 06/05/2020     (H) 06/05/2020         Lab Results   Component Value Date    WBC 7.28 03/17/2021    HGB 17.1 (H) 03/17/2021    HCT 50.8 (H) 03/17/2021    .0 (H) 03/17/2021     03/17/2021             Assessment:   1.  Benign paroxysmal positional vertigo  2.  Generalized pruritus without primary skin lesions      Plan:  1.  Signs and symptoms are most consistent with BPPV.  Advised that the best course of treatment would be vestibular rehab, we will refer, note patient prefers a rehab office near her home in [place].  2.  As far as her generalized pruritus, I am uncertain whether or not this could be due to a drug reaction (as the Soliqua was started in February and has been adjusted recently).  However given her history of liver dysfunction, thyroid disease and diabetes I think some routine labs are in order to make sure there is nothing acute going on/evaluate for systemic etiology.  Call patient with results  3.  Noted that patient is scheduled for an MRI of her L-spine on March 31.  She  will need to follow-up with Dr. Gaytan after this.     Time: 30 minutes                    Dictated utilizing Dragon dictation.

## 2021-03-26 ENCOUNTER — TELEPHONE (OUTPATIENT)
Dept: NEUROLOGY | Facility: CLINIC | Age: 46
End: 2021-03-26

## 2021-03-26 NOTE — TELEPHONE ENCOUNTER
Provider: MARISSA  Caller: PT  Relationship to Patient: SELF  Phone Number: [phone]  Reason for Call: PT WOULD LIKE TO HAVE HER THERAPY DONE AT [facility] ON [facility] IN [place].    PLEASE CALL & ADVISE.    THANK YOU.

## 2021-03-31 ENCOUNTER — APPOINTMENT (OUTPATIENT)
Dept: MRI IMAGING | Facility: HOSPITAL | Age: 46
End: 2021-03-31

## 2021-03-31 ENCOUNTER — BULK ORDERING (OUTPATIENT)
Dept: CASE MANAGEMENT | Facility: OTHER | Age: 46
End: 2021-03-31

## 2021-03-31 DIAGNOSIS — Z23 IMMUNIZATION DUE: ICD-10-CM

## 2021-04-02 ENCOUNTER — HOSPITAL ENCOUNTER (OUTPATIENT)
Dept: MRI IMAGING | Facility: HOSPITAL | Age: 46
Discharge: HOME | End: 2021-04-02
Admitting: PHYSICIAN ASSISTANT

## 2021-04-02 DIAGNOSIS — G62.9 PERIPHERAL POLYNEUROPATHY: ICD-10-CM

## 2021-04-02 PROCEDURE — 72148 MRI LUMBAR SPINE W/O DYE: CPT

## 2021-04-06 ENCOUNTER — TELEPHONE (OUTPATIENT)
Dept: NEUROLOGY | Facility: CLINIC | Age: 46
End: 2021-04-06

## 2021-04-06 NOTE — TELEPHONE ENCOUNTER
Provider:     Caller: PT    Relationship to Patient: SELF    Pharmacy: NA    Phone Number: [phone]  Reason for Call: PATIENT JUST TESTED POSITIVE FOR COVID. HER WORK WANTED HER TO CALL  TO LET HIM KNOW THIS INFORMATION D/T HER HAVING GUILLIAN BARRE SYNDROME AND ASKED IF THERE IS ANYTHING HE WOULD SUGGEST. PATIENT SAID IT IS OKAY TO LEAVE A MESSAGE. PLEASE ADVISE.     When was the patient last seen: 2-11-21

## 2021-04-07 NOTE — TELEPHONE ENCOUNTER
Recent publication from [facility] regarding a large epidemiologic study in the [place] did not show an increased risk of Guillain Barré with Covid.    GNS

## 2021-04-07 NOTE — TELEPHONE ENCOUNTER
Provider:   Caller: SENTHIL  Relationship to Patient: SELF    Phone Number: [phone]  PT STATES A VM CAN BE LEFT IF NEEDED.     Reason for Call: PT WAS CALLING TO CHECK TO CHECK ON THE STATUS OF THIS MESSAGE. PLEASE REVIEW AND ADVISE.

## 2021-04-09 NOTE — TELEPHONE ENCOUNTER
Caller: Oralia Sánchez A    Relationship to patient: Self    Best call back number: [phone]    Patient is needing: PT CALLED REQUESTING FOR HER MRI LSPINE WO CONTRAST RESULTSER. PT ALSO STATES SHE NEVER RECEIVED THE VM LEFT FROM [facility]. I ATTEMPTED TO WARM TRANSFER SO MESSAGE COULD BE RELAYED BUT WAS UNSUCCESSFUL AS DARIEL WAS AT [place] AT THE TIME OF PT'S CALL.    PLEASE REVIEW AND ADVISE.

## 2021-04-09 NOTE — TELEPHONE ENCOUNTER
I reviewed the MRI of her lumbar spine.  She has degenerative disc disease with some disc bulging on the right at 2/3 and more midline at L4/5 and L5/S1.  I am not convinced of stenosis sufficient to consider surgery at any level.    Nash, please let her know    GNS

## 2021-09-25 ENCOUNTER — APPOINTMENT (OUTPATIENT)
Dept: GENERAL RADIOLOGY | Facility: HOSPITAL | Age: 46
End: 2021-09-25

## 2021-09-25 ENCOUNTER — APPOINTMENT (OUTPATIENT)
Dept: CT IMAGING | Facility: HOSPITAL | Age: 46
End: 2021-09-25

## 2021-09-25 ENCOUNTER — HOSPITAL ENCOUNTER (EMERGENCY)
Facility: HOSPITAL | Age: 46
Discharge: HOME | End: 2021-09-25
Attending: EMERGENCY MEDICINE | Admitting: EMERGENCY MEDICINE

## 2021-09-25 VITALS
HEART RATE: 110 BPM | DIASTOLIC BLOOD PRESSURE: 87 MMHG | BODY MASS INDEX: 43.29 KG/M2 | RESPIRATION RATE: 18 BRPM | OXYGEN SATURATION: 95 % | SYSTOLIC BLOOD PRESSURE: 151 MMHG | HEIGHT: 61 IN | TEMPERATURE: 97.1 F | WEIGHT: 229.28 LBS

## 2021-09-25 DIAGNOSIS — R05.9 COUGH: ICD-10-CM

## 2021-09-25 DIAGNOSIS — Z86.16 HISTORY OF COVID-19: Primary | ICD-10-CM

## 2021-09-25 DIAGNOSIS — E11.65 HYPERGLYCEMIA DUE TO DIABETES MELLITUS (HCC): ICD-10-CM

## 2021-09-25 LAB
ALBUMIN SERPL-MCNC: 4.5 G/DL (ref 3.5–5.2)
ALBUMIN/GLOB SERPL: 1.4 G/DL
ALP SERPL-CCNC: 129 U/L (ref 39–117)
ALT SERPL W P-5'-P-CCNC: 60 U/L (ref 1–33)
ANION GAP SERPL CALCULATED.3IONS-SCNC: 13.2 MMOL/L (ref 5–15)
AST SERPL-CCNC: 60 U/L (ref 1–32)
BASOPHILS # BLD AUTO: 0.06 10*3/MM3 (ref 0–0.2)
BASOPHILS NFR BLD AUTO: 0.7 % (ref 0–1.5)
BILIRUB SERPL-MCNC: 0.8 MG/DL (ref 0–1.2)
BUN SERPL-MCNC: 6 MG/DL (ref 6–20)
BUN/CREAT SERPL: 9 (ref 7–25)
CALCIUM SPEC-SCNC: 9.5 MG/DL (ref 8.6–10.5)
CHLORIDE SERPL-SCNC: 90 MMOL/L (ref 98–107)
CO2 SERPL-SCNC: 31.8 MMOL/L (ref 22–29)
CREAT SERPL-MCNC: 0.67 MG/DL (ref 0.57–1)
DEPRECATED RDW RBC AUTO: 47.4 FL (ref 37–54)
EOSINOPHIL # BLD AUTO: 0.08 10*3/MM3 (ref 0–0.4)
EOSINOPHIL NFR BLD AUTO: 1 % (ref 0.3–6.2)
ERYTHROCYTE [DISTWIDTH] IN BLOOD BY AUTOMATED COUNT: 12.9 % (ref 12.3–15.4)
GFR SERPL CREATININE-BSD FRML MDRD: 94 ML/MIN/1.73
GLOBULIN UR ELPH-MCNC: 3.3 GM/DL
GLUCOSE SERPL-MCNC: 329 MG/DL (ref 65–99)
HCT VFR BLD AUTO: 50.6 % (ref 34–46.6)
HGB BLD-MCNC: 17.3 G/DL (ref 12–15.9)
HOLD SPECIMEN: NORMAL
HOLD SPECIMEN: NORMAL
IMM GRANULOCYTES # BLD AUTO: 0.04 10*3/MM3 (ref 0–0.05)
IMM GRANULOCYTES NFR BLD AUTO: 0.5 % (ref 0–0.5)
LYMPHOCYTES # BLD AUTO: 2.01 10*3/MM3 (ref 0.7–3.1)
LYMPHOCYTES NFR BLD AUTO: 24.3 % (ref 19.6–45.3)
MCH RBC QN AUTO: 34.1 PG (ref 26.6–33)
MCHC RBC AUTO-ENTMCNC: 34.2 G/DL (ref 31.5–35.7)
MCV RBC AUTO: 99.6 FL (ref 79–97)
MONOCYTES # BLD AUTO: 0.39 10*3/MM3 (ref 0.1–0.9)
MONOCYTES NFR BLD AUTO: 4.7 % (ref 5–12)
NEUTROPHILS NFR BLD AUTO: 5.68 10*3/MM3 (ref 1.7–7)
NEUTROPHILS NFR BLD AUTO: 68.8 % (ref 42.7–76)
NRBC BLD AUTO-RTO: 0.1 /100 WBC (ref 0–0.2)
PLATELET # BLD AUTO: 168 10*3/MM3 (ref 140–450)
PMV BLD AUTO: 10.4 FL (ref 6–12)
POTASSIUM SERPL-SCNC: 4.1 MMOL/L (ref 3.5–5.2)
PROT SERPL-MCNC: 7.8 G/DL (ref 6–8.5)
RBC # BLD AUTO: 5.08 10*6/MM3 (ref 3.77–5.28)
SODIUM SERPL-SCNC: 135 MMOL/L (ref 136–145)
WBC # BLD AUTO: 8.26 10*3/MM3 (ref 3.4–10.8)
WHOLE BLOOD HOLD SPECIMEN: NORMAL
WHOLE BLOOD HOLD SPECIMEN: NORMAL

## 2021-09-25 PROCEDURE — 71045 X-RAY EXAM CHEST 1 VIEW: CPT

## 2021-09-25 PROCEDURE — 99283 EMERGENCY DEPT VISIT LOW MDM: CPT

## 2021-09-25 PROCEDURE — 85025 COMPLETE CBC W/AUTO DIFF WBC: CPT | Performed by: EMERGENCY MEDICINE

## 2021-09-25 PROCEDURE — 99284 EMERGENCY DEPT VISIT MOD MDM: CPT

## 2021-09-25 PROCEDURE — 71275 CT ANGIOGRAPHY CHEST: CPT

## 2021-09-25 PROCEDURE — 80053 COMPREHEN METABOLIC PANEL: CPT | Performed by: EMERGENCY MEDICINE

## 2021-09-25 PROCEDURE — 0 IOPAMIDOL PER 1 ML: Performed by: EMERGENCY MEDICINE

## 2021-09-25 RX ADMIN — IOPAMIDOL 85 ML: 755 INJECTION, SOLUTION INTRAVENOUS at 14:10

## 2021-09-25 NOTE — ED TRIAGE NOTES
"Pt reports COVID + 9/14/21 went to [facility] pt work wants negative test before go back to work, pt also reports sore throat, [facility] sent pt to ER because \"they think I have pneumonia.\" pt reports mild SOA.     Pt arrives in triage with mask on. Triage staff wearing N95 masks and goggles.     "

## 2021-09-25 NOTE — ED NOTES
Pt now c/o vaginal itching, states has hx of b-vag. Has not contacted her OB about it. Dr valdivia aware     Pt wearing face mask for the duration while in ER today. This RN wore capr, gown and gloves while providing care.        Sheree Inman, RN  09/25/21 [id number]

## 2021-09-25 NOTE — ED PROVIDER NOTES
EMERGENCY DEPARTMENT ENCOUNTER    Room Number:  19/19  Date of encounter:  9/25/2021  PCP: Severino Choudhury MD  Historian: Patient    Patient was placed in face mask during triage process. Patient was wearing facemask when I entered the room and throughout our encounter. I wore full protective equipment throughout this patient encounter including a face mask, eye protection, and gloves. Hand hygiene was performed before donning protective equipment and again following doffing of PPE after leaving the room.    HPI:  Chief Complaint: Cough with history of Covid  A complete HPI/ROS/PMH/PSH/SH/FH are unobtainable due to: N/A   Context: Oralia Sánchez is a 45 y.o. female who presents to the ED c/o ongoing cough with recent history of Covid.  Patient was diagnosed with Covid last week with symptoms that started 4 days ago.  She has had ongoing cough with some generalized fatigue and occasional dyspnea.  No hemoptysis.  No abdominal pain, vomiting, diarrhea.  Patient presented to urgent care requesting repeat Covid test so she may return to work but upon evaluation they recommended chest x-ray which they did not have available so patient was referred to the emergency department.  Patient denies significant dyspnea at rest.  No chest pain.      MEDICAL HISTORY REVIEW  Patient was not vaccinated against COVID-19 and did not receive monoclonal antibodies.    PAST MEDICAL HISTORY  Active Ambulatory Problems     Diagnosis Date Noted   • Vitamin D deficiency 09/07/2016   • Awareness of heartbeats 09/07/2016   • Adiposity 09/07/2016   • YOUNG (nonalcoholic steatohepatitis) 09/07/2016   • Hypothyroid 09/07/2016   • Diabetes mellitus arising in pregnancy 09/07/2016   • Diabetes (CMS/[facility]) 09/07/2016   • Metabolic syndrome 09/07/2016   • Chest pain 09/07/2016   • Primary thunderclap headache 03/27/2019   • Elevated LFTs 03/27/2019   • Tobacco abuse 03/28/2019   • Rheumatoid arthritis (CMS/[facility]) 03/28/2019   • Type 2 diabetes  mellitus (CMS/[facility]) 2019   • Morbid obesity (CMS/[facility]) 2019   • UTI (urinary tract infection), bacterial 2019   • Cerebral aneurysm 10/02/2019   • Dyspnea 2020   • Cough 2020   • Hypomagnesemia 2020   • Metabolic acidosis 2020   • Fatigue 2020   • History of COPD 2020   • Abnormal CT scan, colon 2020   • 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 (CMS/[facility]) 2020   • Essential hypertension 2020   • GBS (Guillain [place] syndrome) (CMS/[facility]) 2020   • Elevated transaminase level 2020   • History of gestational diabetes 2020   • Steroid-induced hyperglycemia 2020   • Elevated aldolase level 2020     Resolved Ambulatory Problems     Diagnosis Date Noted   • Abnormal CT of the head 2019   • Cerebral aneurysm rupture (CMS/HCC) 2019     Past Medical History:   Diagnosis Date   • Aneurysm (CMS/[facility])    • Arthritis    • Carpal tunnel syndrome    • Diabetes mellitus (CMS/[facility])    • Dysmetabolic syndrome X    • Gestational diabetes mellitus    • Hypothyroidism    • Metabolic disorder    • Nonalcoholic steatohepatitis    • Obesity    • Palpitations    • Sleep apnea    • Spinal headache          PAST SURGICAL HISTORY  Past Surgical History:   Procedure Laterality Date   • CARPAL TUNNEL RELEASE     • CEREBRAL ANGIOGRAM N/A 3/28/2019    Procedure: DIAGNOSTIC CEREBRAL ANGIOGRAM;  Surgeon: Alexx Barrow MD;  Location: [facility] ;  Service: Neurosurgery   • CEREBRAL ANGIOGRAM N/A 10/2/2019    Procedure: CEREBRAL ANGIOGRAM;  Surgeon: Santosh Garza MD;  Location: [facility] OR ;  Service: Interventional Radiology   •  SECTION      x6   • COLONOSCOPY N/A 2020    Procedure: Flexible sigmoidoscopy WITH POLYPECTOMY COLD BIOPSY;  Surgeon: Kendall Villafana MD;  Location: [facility] ENDOSCOPY;  Service: Gastroenterology;  Laterality: N/A;  PRE ABN CT  POST POLYP, SMALL HEMORRHOIDS   • DILATATION AND  CURETTAGE      x 2   • EMBOLIZATION CEREBRAL N/A 3/29/2019    Procedure: Cererbal angiogram and embolization of cerebral aneurysm;  Surgeon: Alexx Barrow MD;  Location: [facility] ;  Service: Neurosurgery   • MYRINGOTOMY     • TONSILLECTOMY     • TONSILLECTOMY AND ADENOIDECTOMY           FAMILY HISTORY  Family History   Problem Relation Age of Onset   • Hypertension Mother    • Alcohol abuse Father    • Heart attack Father         acute MI   • Stroke Father         CVA   • Heart disease Father    • Diabetes Maternal Grandmother    • Hypertension Maternal Grandmother    • Cancer Maternal Grandmother    • Colon cancer Maternal Grandmother    • Diabetes Paternal Grandmother    • Ulcerative colitis Neg Hx    • Crohn's disease Neg Hx    • Irritable bowel syndrome Neg Hx          SOCIAL HISTORY  Social History     Socioeconomic History   • Marital status:      Spouse name: Not on file   • Number of children: Not on file   • Years of education: Not on file   • Highest education level: Not on file   Tobacco Use   • Smoking status: Former Smoker     Packs/day: 1.50     Years: 5.00     Pack years: 7.50     Types: Cigarettes     Quit date: 3/31/2020     Years since quittin.4   • Smokeless tobacco: Never Used   • Tobacco comment: caffeine use 1 cup coffee daily   Substance and Sexual Activity   • Alcohol use: Not Currently     Alcohol/week: 1.0 - 2.0 standard drinks     Types: 1 - 2 Shots of liquor per week   • Drug use: No   • Sexual activity: Defer         ALLERGIES  No known drug allergy        REVIEW OF SYSTEMS  Review of Systems     All systems reviewed and negative except for those discussed in HPI.       PHYSICAL EXAM    I have reviewed the triage vital signs and nursing notes.    ED Triage Vitals [21 1211]   Temp Heart Rate Resp BP SpO2   97.1 °F (36.2 °C) 111 18 -- 100 %      Temp src Heart Rate Source Patient Position BP Location FiO2 (%)   Tympanic -- -- -- --       Physical  Exam    Physical Exam   Constitutional: No distress.  Nontoxic  HENT:  Head: Normocephalic and atraumatic.   Oropharynx: Mucous membranes are moist.   Eyes: No scleral icterus. No conjunctival pallor.  Neck: Painless range of motion noted. Neck supple.   Cardiovascular: Normal rate, regular rhythm and intact distal pulses.  Pulmonary/Chest: No respiratory distress.  No tachypnea or increased work of breathing.  Speaks in full sentences.     Musculoskeletal: Moves all extremities equally. There is no pedal edema or calf tenderness.   Neurological: Alert.  Baseline strength and sensation noted.   Skin: Skin is pink, warm, and dry. No pallor.   Psychiatric: Mood and affect normal.   Nursing note and vitals reviewed.    LAB RESULTS  Recent Results (from the past 24 hour(s))   Comprehensive Metabolic Panel    Collection Time: 09/25/21  1:02 PM    Specimen: Blood   Result Value Ref Range    Glucose 329 (H) 65 - 99 mg/dL    BUN 6 6 - 20 mg/dL    Creatinine 0.67 0.57 - 1.00 mg/dL    Sodium 135 (L) 136 - 145 mmol/L    Potassium 4.1 3.5 - 5.2 mmol/L    Chloride 90 (L) 98 - 107 mmol/L    CO2 31.8 (H) 22.0 - 29.0 mmol/L    Calcium 9.5 8.6 - 10.5 mg/dL    Total Protein 7.8 6.0 - 8.5 g/dL    Albumin 4.50 3.50 - 5.20 g/dL    ALT (SGPT) 60 (H) 1 - 33 U/L    AST (SGOT) 60 (H) 1 - 32 U/L    Alkaline Phosphatase 129 (H) 39 - 117 U/L    Total Bilirubin 0.8 0.0 - 1.2 mg/dL    eGFR Non African Amer 94 >60 mL/min/1.73    Globulin 3.3 gm/dL    A/G Ratio 1.4 g/dL    BUN/Creatinine Ratio 9.0 7.0 - 25.0    Anion Gap 13.2 5.0 - 15.0 mmol/L   CBC Auto Differential    Collection Time: 09/25/21  1:02 PM    Specimen: Blood   Result Value Ref Range    WBC 8.26 3.40 - 10.80 10*3/mm3    RBC 5.08 3.77 - 5.28 10*6/mm3    Hemoglobin 17.3 (H) 12.0 - 15.9 g/dL    Hematocrit 50.6 (H) 34.0 - 46.6 %    MCV 99.6 (H) 79.0 - 97.0 fL    MCH 34.1 (H) 26.6 - 33.0 pg    MCHC 34.2 31.5 - 35.7 g/dL    RDW 12.9 12.3 - 15.4 %    RDW-SD 47.4 37.0 - 54.0 fl    MPV 10.4  6.0 - 12.0 fL    Platelets 168 140 - 450 10*3/mm3    Neutrophil % 68.8 42.7 - 76.0 %    Lymphocyte % 24.3 19.6 - 45.3 %    Monocyte % 4.7 (L) 5.0 - 12.0 %    Eosinophil % 1.0 0.3 - 6.2 %    Basophil % 0.7 0.0 - 1.5 %    Immature Grans % 0.5 0.0 - 0.5 %    Neutrophils, Absolute 5.68 1.70 - 7.00 10*3/mm3    Lymphocytes, Absolute 2.01 0.70 - 3.10 10*3/mm3    Monocytes, Absolute 0.39 0.10 - 0.90 10*3/mm3    Eosinophils, Absolute 0.08 0.00 - 0.40 10*3/mm3    Basophils, Absolute 0.06 0.00 - 0.20 10*3/mm3    Immature Grans, Absolute 0.04 0.00 - 0.05 10*3/mm3    nRBC 0.1 0.0 - 0.2 /100 WBC   Green Top (Gel)    Collection Time: 09/25/21  1:02 PM   Result Value Ref Range    Extra Tube Hold for add-ons.    Lavender Top    Collection Time: 09/25/21  1:02 PM   Result Value Ref Range    Extra Tube hold for add-on    Gold Top - SST    Collection Time: 09/25/21  1:02 PM   Result Value Ref Range    Extra Tube Hold for add-ons.    Light Blue Top    Collection Time: 09/25/21  1:02 PM   Result Value Ref Range    Extra Tube hold for add-on        Ordered the above labs and independently reviewed the results.        RADIOLOGY  XR Chest 1 View    Result Date: 9/25/2021  XR CHEST 1 VW-  HISTORY: Female who is 45 years-old,  dyspnea, Covid  TECHNIQUE: Frontal view of the chest  COMPARISON: 04/10/2020  FINDINGS: Heart, mediastinum and pulmonary vasculature are unremarkable. No focal pulmonary consolidation, pleural effusion, or pneumothorax. No acute osseous process.      No focal pulmonary consolidation. Follow-up as clinical indications persist.  This report was finalized on 9/25/2021 12:55 PM by Dr. Rogerio Campo M.D.      CT Angiogram Chest    Result Date: 9/25/2021  CT ANGIOGRAM CHEST-  Radiation dose reduction techniques were utilized, including automated exposure control and exposure modulation based on body size.  Clinical: Dyspnea, evaluate for pulmonary embolus  CT scan of the chest with intravenous contrast, ulna embolus  protocol to include coronal, sagittal and three-dimensional reconstruction  COMPARISON 4/7/2020  FINDINGS: Resolved groundglass infiltrates within the interim. No effusion or acute airspace disease.  The aorta is satisfactory in course and caliber, no aneurysm nor dissection.  No pulmonary embolus.  Esophagus is satisfactory in course and caliber. Heart size within normal limits no pericardial abnormality. Few stable small mediastinal lymph nodes reidentified. No mass or adenopathy has developed.  CONCLUSION: 1. Resolved groundglass infiltrates. 2. No pulmonary embolus or acute pulmonary process has developed.   This report was finalized on 9/25/2021 2:45 PM by Dr. Dc Roach M.D.        I ordered the above noted radiological studies. Reviewed by me and discussed with radiologist.  See dictation for official radiology interpretation.      PROCEDURES    Procedures        MEDICATIONS GIVEN IN ER    Medications   iopamidol (ISOVUE-370) 76 % injection 100 mL (85 mL Intravenous Given by Other 9/25/21 1410)         PROGRESS, DATA ANALYSIS, CONSULTS, AND MEDICAL DECISION MAKING    My differential diagnosis for cough includes but is not limited to:  Upper respiratory infection, bronchitis, pneumonia, COPD exacerbation, cough variant asthma, cardiac asthma, coronary artery disease, congestive heart failure, bacterial, viral or lung infections, lung cancer, aspiration pneumonitis, aspiration of foreign body and Covid -19      All labs have been independently reviewed by me.  All radiology studies have been reviewed by me and discussed with radiologist dictating the report.   EKG's independently viewed and interpreted by me.  Discussion below represents my analysis of pertinent findings related to patient's condition, differential diagnosis, treatment plan and final disposition.      ED Course as of Sep 25 1505   Sat Sep 25, 2021   1307 Patient desires evaluation for possible pulmonary embolism after discussion.    [RS]    1421 Glucose(!): 329 [RS]   1421 eGFR Non  Am: 94 [RS]   1503 Patient found to be hyperglycemic without other acute life threats.  Outpatient follow-up with PCP is recommended.  Return to work should be guided by CDC recommendations and employer guidelines.    [RS]      ED Course User Index  [RS] Sundar Gaytan MD       AS OF 15:05 EDT VITALS:    BP - 144/92  HR - 111  TEMP - 97.1 °F (36.2 °C) (Tympanic)  O2 SATS - 95%        DIAGNOSIS  Final diagnoses:   History of COVID-19   Cough   Hyperglycemia due to diabetes mellitus (HCC)         DISPOSITION  DISCHARGE    Patient discharged in stable condition.    Reviewed implications of results, diagnosis, meds, responsibility to follow up, warning signs and symptoms of possible worsening, potential complications and reasons to return to ER.    Patient/Family voiced understanding of above instructions.    Discussed plan for discharge, as there is no emergent indication for admission. Patient referred to primary care provider for regular health maintenance. Pt/family is agreeable and understands need for follow up and possible repeat testing.  Pt is aware that discharge does not mean that nothing is wrong but it indicates no emergency is present that requires admission and they must continue care with follow-up as given below or physician of their choice.     FOLLOW-UP  Severino Choudhury MD  [facility] [place] [address]  [phone]    Schedule an appointment as soon as possible for a visit in 3 days           Medication List      No changes were made to your prescriptions during this visit.            Sundar Gaytan MD  09/25/21 [id number]

## 2021-11-03 ENCOUNTER — TELEPHONE (OUTPATIENT)
Dept: NEUROLOGY | Facility: CLINIC | Age: 46
End: 2021-11-03

## 2021-11-03 NOTE — TELEPHONE ENCOUNTER
The patient indicates her reluctance to get the Covid vaccine because she had previous Guillain-Barré syndrome.  I reviewed the information from the global medical board on Guillain-Barré syndrome and CIDP.  Even if the patient had vaccine associated Guillain-Barré syndrome there is no evidence that the Covid vaccine increases the risk of having an exacerbation.    CATARINO

## 2021-11-03 NOTE — TELEPHONE ENCOUNTER
"The GLOBAL MEDICAL ADVISORY BOARD regarding Guillain Barré syndrome and CIDP stated most recently 10/13/2021 the following    \"Covid vaccines are different from flu vaccines.  We are not seeing evidence that Covid vaccines are more likely to cause Guillain Barré syndrome even in individuals who have had Guillain-Barré syndrome brought on by other vaccines.  Therefore we do not see prior Guillain Barré syndrome as a reason not to get the Covid vaccine\"    Given the significant morbidity and potential mortality of getting Covid 19 I recommend patients to get the vaccine even if they have had previous Guillain-Barré syndrome or CIDP if they are not on immunosuppressive therapies.    GNS  "

## 2021-11-03 NOTE — TELEPHONE ENCOUNTER
Caller: SENTHIL SMITH    Relationship: SELF    What form or medical record are you requesting: PT NEEDING A EXEMPTION LETTER FOR THE COVID VACCINE     Who is requesting this form or medical record from you: PT    How would you like to receive the form or medical records (pick-up, mail, fax):     Timeframe paperwork needed: ASAP    Additional notes:PT IS WANTING A EXEMPTION LETTER FOR THE COVID VACCINE DUE TO SHE HAS GUILLAIN-BARRE. PLEASE CALL HER WHEN SHE CAN  THE LETTER AND IF IT CAN BE HAND SIGNED BY DR. ANN.       PLEASE CALL HER [phone]

## 2021-11-08 NOTE — PLAN OF CARE
Problem: Patient Care Overview  Goal: Plan of Care Review  Outcome: Ongoing (interventions implemented as appropriate)  Flowsheets (Taken 4/9/2020 1633)  Progress: no change  Plan of Care Reviewed With: patient  Outcome Summary: vitals stable, no c/o pain, up with assist, will ctm.     Problem: Fall Risk (Adult)  Goal: Absence of Fall  Outcome: Ongoing (interventions implemented as appropriate)  Flowsheets (Taken 4/9/2020 1633)  Absence of Fall: making progress toward outcome     Problem: Skin Injury Risk (Adult)  Goal: Skin Health and Integrity  Outcome: Ongoing (interventions implemented as appropriate)  Flowsheets (Taken 4/9/2020 1633)  Skin Health and Integrity: making progress toward outcome      Advised to call immediately if any worsening distortion or blurring is noted.

## 2021-11-10 NOTE — TELEPHONE ENCOUNTER
Pt called in stating that she got your msg. But pt wants a written letting stating that it will not effect her GUILLAIN-BARRE and that it is still active.       Please advise.     Call back [phone]

## 2022-08-17 NOTE — PROGRESS NOTES
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 Still altered sensation in trunk/ pelvis as well as in hands. Still decreased .  Tolerates therapies. Has some constipation.         History taken from: patient    Objective     Vital Signs  Temp:  [97.4 °F (36.3 °C)-98.1 °F (36.7 °C)] 98 °F (36.7 °C)  Heart Rate:  [86-93] 89  Resp:  [16-18] 18  BP: (132-145)/(71-93) 132/71    Objective:  Vital signs: (most recent): Blood pressure 132/71, pulse 89, temperature 98 °F (36.7 °C), temperature source Oral, resp. rate 18, height 154.9 cm (61\"), weight 107 kg (235 lb 7.2 oz), SpO2 93 %, not currently breastfeeding.            MENTAL STATUS -  AWAKE / ALERT  HEENT- NCAT, PUPILS EQUALLY ROUND, SCLERAE ANICTERIC, CONJUNCTIVAE PINK, OP MOIST, NO JVD, EARS UNREMARKABLE EXTERNALLY  LUNGS -normal respirations.  Clear to auscultation.  NO WHEEZES, RALES OR RHONCHI  HEART- RRR,    ABD -   SOFT, NT.  NABS.  EXT - NO EDEMA OR CYANOSIS   No warmth or erythema at the bilateral wrist.  NEURO -   MOTOR EXAM - RUE/RLE 5/5. LUE/LLE 5/5.      Results Review:     I reviewed the patient's new clinical results.  Glucose   Date/Time Value Ref Range Status   05/01/2020 1104 143 (H) 70 - 130 mg/dL Final   05/01/2020 0714 105 70 - 130 mg/dL Final   04/30/2020 1603 257 (H) 70 - 130 mg/dL Final   04/30/2020 1106 147 (H) 70 " E advice sent to patient.   - 130 mg/dL Final   04/30/2020 0702 112 70 - 130 mg/dL Final   04/29/2020 1604 207 (H) 70 - 130 mg/dL Final   04/29/2020 1110 197 (H) 70 - 130 mg/dL Final   04/29/2020 0712 99 70 - 130 mg/dL Final           Results from last 7 days   Lab Units 04/29/20  0400 04/28/20  0534 04/27/20  0425   SODIUM mmol/L 139 132* 137   POTASSIUM mmol/L 4.1 3.8 3.8   CHLORIDE mmol/L 95* 90* 93*   CO2 mmol/L 31.9* 29.1* 29.8*   BUN mg/dL 15 17 17   CREATININE mg/dL 0.51* 0.47* 0.58   CALCIUM mg/dL 9.6 9.5 9.5   BILIRUBIN mg/dL 0.6 0.7 0.7   ALK PHOS U/L 109 117 127*   ALT (SGPT) U/L 233* 243* 265*   AST (SGOT) U/L 278* 288* 272*   GLUCOSE mg/dL 106* 102* 136*       NCS/EMG April 29, 2020-  Technical summary:  Nerve conduction studies were obtained in the right arm and right leg.  Skin temperature was at least 32 °C measured on the right palm but only 30 °C at the ankle.  Temperature correction was used where indicated.  Needle examination was obtained on selected muscles of the right arm and right leg     Results:  1.  Prolonged right median sensory latency at 5.1 ms with low amplitude of 4.3 µV.  2.  Prolonged right ulnar sensory latency at 3.9 ms with low amplitude of 4.7 µV.  3.  Prolonged right radial sensory latency at 2.9 ms with low amplitude of 11.7 µV.  4.  Slow right median motor conduction velocity in the forearm at 34.4 m/s with prolonged distal latency of 4.8 ms.  The amplitude was low at 4.3 mV from wrist stimulation.  Normal F-wave.  5.  Borderline right ulnar motor conduction velocity in the below elbow segment at 48.9 m/s with normal velocity in the short segment across the elbow and with normal distal latency, amplitudes and F-wave.  6.  Normal right sural sensory latency with temperature correction and low amplitude of 3 µV.  7.  Normal right superficial peroneal sensory latency with temperature correction with normal amplitude.  8.  Normal right peroneal motor study including F-wave.  9.  Normal right tibial motor  study including F-wave.  10.  Needle examination of selected muscles in the right arm and right leg showed multiple abnormalities.  In the right arm there were fibrillations and positive sharp waves in the flexor pollicis longus, extensor digitorum communis, triceps, biceps and deltoid with normal-appearing motor units and recruitment.  The right abductor pollicis brevis showed normal insertional activities with a moderate increased number of large motor units increased firing rate and reduced interference pattern.  Remaining muscles tested showed normal insertional activities, motor units and recruitment.  Cervical paraspinals at C6 showed no abnormality.     In the right leg there were fibrillations and positive sharp waves in the vastus lateralis, tibialis anterior, peroneus longus and medial gastrocnemius.  These all had normal motor units and recruitment.  The extensor digitorum brevis showed normal insertional activities motor units and recruitment.  Lumbar paraspinals at L5 showed no abnormalities.     Impression:  Abnormal study.  There is evidence of peripheral neuropathy but in addition there are needle exam changes in multiple muscles in the right arm and leg consistent with acute denervation.  This is far greater than expected based on the nerve conduction findings.  This suggests either motor neuron disease or a primarily axonal polyneuropathy such as axonal Guillain Barré syndrome based on the patient's symptom history.  Clinical correlation is suggested.         Medication Review: done  Scheduled Meds:    amLODIPine 10 mg Oral Nightly   budesonide-formoterol 2 puff Inhalation BID - RT   folic acid 1 mg Oral Daily   hydroCHLOROthiazide Oral 25 mg Oral Daily   insulin glargine 20 Units Subcutaneous QAM   insulin lispro 0-14 Units Subcutaneous TID AC   insulin lispro 14 Units Subcutaneous TID With Meals   levothyroxine 200 mcg Oral Q AM   pantoprazole 40 mg Oral BID AC   predniSONE 20 mg Oral Daily    Followed by      [START ON 5/4/2020] predniSONE 10 mg Oral Daily   Followed by      [START ON 5/7/2020] predniSONE 5 mg Oral Daily   pregabalin 100 mg Oral Q12H   sucralfate 1 g Oral 4x Daily AC & at Bedtime     Continuous Infusions:   PRN Meds:.Benzocaine  •  benzonatate  •  calcium carbonate  •  dextrose  •  dextrose  •  diphenhydrAMINE  •  diphenhydrAMINE  •  glucagon (human recombinant)  •  guaiFENesin-dextromethorphan  •  melatonin  •  ondansetron  •  oxyCODONE  •  [START ON 5/4/2020] oxyCODONE  •  [START ON 5/7/2020] oxyCODONE  •  polyethylene glycol  •  potassium chloride **OR** potassium chloride **OR** potassium chloride  •  sodium chloride      Assessment/Plan       YOUNG (nonalcoholic steatohepatitis)    Hypothyroid    Type 2 diabetes mellitus (CMS/HCC)    GBS (Guillain Long Eddy syndrome) (CMS/HCC)    Elevated transaminase level    History of gestational diabetes    Steroid-induced hyperglycemia    Elevated aldolase level      Assessment & Plan  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  This is far greater than expected based on the nerve conduction findings.  This suggests either motor neuron disease or a primarily axonal polyneuropathy such as axonal Guillain Barré syndrome based on the patient's symptom history. F/U Dr Juventino Gaytan in 2-3 months.    Previous question of dermatomyositis or autoimmune process-trial of steroids-tapering off  April 27-has been on prednisone 40 mg daily since April 10 empiric trial for previous question of dermatomyositis but now not felt clinically present-taper off of prednisone 30 mg daily x3 days, 20 mg daily x3 days, 10 mg daily x3 days, 5 mg daily x3 days, then stop.    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 Also on oxycodone.  April 27-has some pain in the upper extremities.  Doubt that it is bilateral joint pain as she has been on a reasonable dose of prednisone for the last 16 days.  May be a variant for the neuropathic pain.  Will titrate up on Lyrica 200 mg every 12 hours, watch for any myoclonic jerks.  May 1- taking oxycodone 5 mg about 5 times a day, need to taper off over next week, changed to Oxycodone 5 mg q 6 hours prn x 3 days, then q 8 hours prn x 3 days, then q 12 hours prn x three days, then stop. SVETLANA reviewed.    DVT prophylaxis - SCDs      Hepatic steatosis-elevated LFTs  April 27-gastroenterology following  April 29 - reviewed with gastroenterology - a combination of fatty liver (the treatment would be weight loss) and Ebstein Horan viral hepatitis (the only treatment would be supportive care)- recommend against a liver biopsy. Follow up in three months with repeat labs and full colonscopy.    April 27-gait 80 feet CTG-min assist.  Team conference in the a.m.    TEAM CONF - April 28- BED CTG. 4 STAIRS MIN. GAIT 100 FEET CTG-MIN RW. TOILET TRANSFERS CTG. SHOWER TRANSFERS CTG.  UBD MIN ASSIST FOR BRA. LBD CTG. BATH CTG. GROOMING SBA.  TOILETING MOD INDEPENDENT. CONTINENT BOWEL AND BLADDER.   DIETICIAN EDUCATED ON DIABETIC DIET ON STEROIDS BUT PATIENT RESISTANT TO INSTRUCTION.   NEEDS TO BE MODIFIED INDEPENDENT FOR HOME  ELOS- TWO WEEKS    Phill Campos MD  05/01/20  13:19    Time:        During rounds, used appropriate personal protective equipment including mask and gloves.  Mask used was standard  procedure mask. Appropriate PPE was worn during the entire visit.  Hand hygiene was completed before and after.

## 2024-03-28 NOTE — INTERVAL H&P NOTE
H&P reviewed. The patient was examined and there are no changes to the H&P.   Risks discussed included but not limited to pain, bleeding (including epidural hemorrhage causing neurological compromise), infection, damaging surrounding structures, headache, intractable headache from dural CSF leak requiring a blood patch and need for further procedures. Denied anticoagulation           (4) rarely moist

## 2024-12-23 NOTE — PROGRESS NOTES
45 y.o.female     Patient Care Team:  Provider, No Known as PCP - General    Chief Complaint: hypothyroid     Subjective    Guillian Janeth in 4/2020  Was on steroids in the hospital which drove BS up   5/2020 inpatient endocrine plan  Type 2 Dm - uncontrolled   Continue Lantus 20 units daily  Continue Humalog 12 units with each meal  Cover with Humalog sliding scale    She is not currently taking any medication for he BS  These were hospital only medication    a1c 6.0, currently managing with diet only      Hypothyroidism   Diagnosed in 2004  Synthroid  200 mcg oral daily  Reports compliance  Tolerating well  Symptoms controlled    tsh 10 9/2020 but she was not taking her medication at this time     Reviewed primary care physician's/consulting physician documentation and lab results :     Interval History      The following portions of the patient's history were reviewed and updated as appropriate: allergies, current medications, past family history, past medical history, past social history, past surgical history and problem list.    Past Medical History:   Diagnosis Date   • Aneurysm (CMS/HCC)    • Arthritis    • Carpal tunnel syndrome    • Chest pain    • Diabetes mellitus (CMS/HCC)    • Dysmetabolic syndrome X    • Gestational diabetes mellitus    • Guillain Barré syndrome (CMS/HCC)    • Hypothyroidism    • Metabolic disorder    • Nonalcoholic steatohepatitis    • Obesity    • Palpitations    • Sleep apnea    • Spinal headache    • Type 2 diabetes mellitus (CMS/HCC)    • Vitamin D deficiency      Family History   Problem Relation Age of Onset   • Hypertension Mother    • Alcohol abuse Father    • Heart attack Father         acute MI   • Stroke Father         CVA   • Heart disease Father    • Diabetes Maternal Grandmother    • Hypertension Maternal Grandmother    • Cancer Maternal Grandmother    • Diabetes Paternal Grandmother      Social History     Socioeconomic History   • Marital status:      Spouse  DM eye exam with [facility], on the [facility]. DM foot exam with Shae Fletcher & see every 3 mo. name: Not on file   • Number of children: Not on file   • Years of education: Not on file   • Highest education level: Not on file   Occupational History   • Occupation: admin   Tobacco Use   • Smoking status: Former Smoker     Packs/day: 1.50     Years: 5.00     Pack years: 7.50     Types: Cigarettes     Quit date: 3/31/2020     Years since quittin.6   • Smokeless tobacco: Never Used   • Tobacco comment: caffeine use 1 cup coffee daily   Substance and Sexual Activity   • Alcohol use: Not Currently     Alcohol/week: 1.0 - 2.0 standard drinks     Types: 1 - 2 Shots of liquor per week   • Drug use: No   • Sexual activity: Defer     Allergies   Allergen Reactions   • No Known Drug Allergy Other (See Comments)     N/A       Current Outpatient Medications:   •  Accu-Chek Softclix Lancets lancets, Use to test blood sugar up to 3 times daily., Disp: 100 each, Rfl: 12  •  Blood Glucose Monitoring Suppl (ACCU-CHEK COREY PLUS) w/Device kit, Use to test blood sugar up to three times daily., Disp: 1 kit, Rfl: 0  •  diphenhydrAMINE (BENADRYL) 25 mg capsule, Take 1 capsule by mouth Every 4 (Four) Hours As Needed for Itching., Disp: 30 capsule, Rfl: 1  •  glucose blood (Accu-Chek Corey) test strip, Use to test blood sugar up to three times daily., Disp: 100 each, Rfl: 12  •  ibuprofen (ADVIL,MOTRIN) 600 MG tablet, Take 1 tablet by mouth Every 8 (Eight) Hours As Needed for Mild Pain ., Disp: 60 tablet, Rfl: 0  •  medroxyPROGESTERone (PROVERA) 10 MG tablet, TAKE 1 (ONE) TABLET BY MOUTH DAILY FOR 10 DAYS EVERY 3 MONTHS, Disp: , Rfl:   •  Melatonin 3 MG tablet dispersible, Place 1 tablet on the tongue At Night As Needed for sleep., Disp: 30 tablet, Rfl: 1  •  norethindrone (MICRONOR) 0.35 MG tablet, Take 1 tablet by mouth Daily., Disp: , Rfl:   •  polyethylene glycol (MIRALAX) 17 GM/SCOOP powder, Take 1 capful (17 g) and mix with 4-8oz of liquid, Take by mouth 2 (Two) Times a Day As Needed  for constipation., Disp: 510 g, Rfl:  "1  •  pregabalin (LYRICA) 150 MG capsule, Take 1 capsule by mouth Every 12 (Twelve) Hours., Disp: 60 capsule, Rfl: 2  •  Synthroid 200 MCG tablet, TAKE 1 TABLET BY MOUTH EVERY DAY, Disp: 30 tablet, Rfl: 0  •  DULoxetine (CYMBALTA) 60 MG capsule, Take 60 mg by mouth Daily., Disp: , Rfl:         Review of Systems   Constitutional: Negative for activity change, appetite change and fatigue.   HENT: Negative for sore throat, trouble swallowing and voice change.    Respiratory: Negative for cough, choking and shortness of breath.    Cardiovascular: Negative for chest pain, palpitations and leg swelling.   Gastrointestinal: Negative for abdominal pain, constipation, diarrhea, nausea and vomiting.   Endocrine: Negative for cold intolerance, heat intolerance, polydipsia, polyphagia and polyuria.   Genitourinary: Negative for decreased urine volume, dysuria, flank pain and urgency.   Musculoskeletal: Positive for gait problem. Negative for arthralgias and myalgias.   Skin: Negative for color change, rash and wound.   Neurological: Positive for weakness. Negative for dizziness, light-headedness, numbness and headaches.   Hematological: Does not bruise/bleed easily.          Objective       Vitals:    12/10/20 1421   BP: 118/72   Weight: 109 kg (241 lb 6.4 oz)   Height: 154.9 cm (61\")     Body mass index is 45.61 kg/m².      Physical Exam  Vitals signs reviewed.   Constitutional:       General: She is not in acute distress.  HENT:      Head: Normocephalic and atraumatic.   Neck:      Musculoskeletal: Normal range of motion and neck supple.   Cardiovascular:      Rate and Rhythm: Normal rate and regular rhythm.   Pulmonary:      Effort: Pulmonary effort is normal. No respiratory distress.   Musculoskeletal: Normal range of motion.         General: No signs of injury.   Skin:     General: Skin is warm and dry.   Neurological:      Mental Status: She is alert and oriented to person, place, and time. Mental status is at baseline. " "     Motor: Weakness present.      Gait: Gait abnormal.   Psychiatric:         Mood and Affect: Mood normal.         Behavior: Behavior normal.         Thought Content: Thought content normal.         Judgment: Judgment normal.       Results Review:      Lab on 09/03/2020   Component Date Value Ref Range Status   • Immunofixation Result, Serum 09/03/2020 Comment   Final    No monoclonality detected.   • IgG 09/03/2020 1023  586 - 1602 mg/dL Final   • IgA 09/03/2020 672* 87 - 352 mg/dL Final   • IgM 09/03/2020 53  26 - 217 mg/dL Final   • Cryoglobulin, Ql, Serum, Rflx 09/03/2020 Comment  None detected Final    None Detected at 72 hours  This test was developed and its performance characteristics  determined by [facility]. It has not been cleared or approved  by the Food and Drug Administration.   • Copper 09/03/2020 142  72 - 166 ug/dL Final                                    Detection Limit = 5     Lab Results   Component Value Date    HGBA1C 6.00 (H) 06/05/2020    HGBA1C 6.68 (H) 04/01/2020    HGBA1C 5.70 (H) 06/11/2019     Lab Results   Component Value Date    MICROALBUR <3.0 06/05/2020    CREATININE 0.61 06/05/2020     Imaging Results (Most Recent)     None                Assessment and Plan:    Diagnoses and all orders for this visit:    1. Hypothyroidism due to Hashimoto's thyroiditis (Primary)  -     TSH  -     T4, Free  -     Hemoglobin A1c; Future  -     TSH; Future  -     T4, Free; Future      Repeat labs today   F/u in 6 months  Continue to follow blood sugars     ADOLPH Arrieta  12/10/20    EMR Dragon / transcription disclaimer:     \"Dictated utilizing Dragon dictation\".  "

## 2025-08-25 ENCOUNTER — HOSPITAL ENCOUNTER (EMERGENCY)
Age: 50
Discharge: HOME | End: 2025-08-25
Attending: EMERGENCY MEDICINE
Payer: COMMERCIAL

## 2025-08-25 VITALS
BODY MASS INDEX: 44.77 KG/M2 | WEIGHT: 237.1 LBS | TEMPERATURE: 97.6 F | DIASTOLIC BLOOD PRESSURE: 42 MMHG | HEIGHT: 61 IN | SYSTOLIC BLOOD PRESSURE: 91 MMHG | RESPIRATION RATE: 10 BRPM | OXYGEN SATURATION: 97 % | HEART RATE: 55 BPM

## 2025-08-25 DIAGNOSIS — K70.30 ALCOHOLIC CIRRHOSIS, UNSPECIFIED WHETHER ASCITES PRESENT (HCC): ICD-10-CM

## 2025-08-25 DIAGNOSIS — R51.9 INTRACTABLE HEADACHE, UNSPECIFIED CHRONICITY PATTERN, UNSPECIFIED HEADACHE TYPE: Primary | ICD-10-CM

## 2025-08-25 LAB
ALBUMIN SERPL-MCNC: 2.6 G/DL (ref 3.4–5)
ALP SERPL-CCNC: 194 U/L (ref 40–129)
ALT SERPL-CCNC: 44 U/L (ref 10–40)
AMMONIA PLAS-SCNC: 49 UMOL/L (ref 11–51)
ANION GAP SERPL CALCULATED.3IONS-SCNC: 10 MMOL/L (ref 3–16)
AST SERPL-CCNC: 66 U/L (ref 15–37)
BACTERIA URNS QL MICRO: ABNORMAL /HPF
BASOPHILS # BLD: 0.1 K/UL (ref 0–0.2)
BASOPHILS NFR BLD: 1 %
BILIRUB DIRECT SERPL-MCNC: 2.9 MG/DL (ref 0–0.3)
BILIRUB INDIRECT SERPL-MCNC: 2.4 MG/DL (ref 0–1)
BILIRUB SERPL-MCNC: 5.3 MG/DL (ref 0–1)
BILIRUB UR QL STRIP.AUTO: NEGATIVE
BUN SERPL-MCNC: 7 MG/DL (ref 7–20)
CALCIUM SERPL-MCNC: 8.7 MG/DL (ref 8.3–10.6)
CHLORIDE SERPL-SCNC: 101 MMOL/L (ref 99–110)
CLARITY UR: CLEAR
CO2 SERPL-SCNC: 25 MMOL/L (ref 21–32)
COLOR UR: YELLOW
CREAT SERPL-MCNC: 0.6 MG/DL (ref 0.6–1.1)
CRYSTALS URNS MICRO: ABNORMAL /HPF
DEPRECATED RDW RBC AUTO: 16.4 % (ref 12.4–15.4)
EOSINOPHIL # BLD: 0.3 K/UL (ref 0–0.6)
EOSINOPHIL NFR BLD: 5.3 %
EPI CELLS #/AREA URNS HPF: ABNORMAL /HPF (ref 0–5)
GFR SERPLBLD CREATININE-BSD FMLA CKD-EPI: >90 ML/MIN/{1.73_M2}
GLUCOSE SERPL-MCNC: 157 MG/DL (ref 70–99)
GLUCOSE UR STRIP.AUTO-MCNC: NEGATIVE MG/DL
HCT VFR BLD AUTO: 35.2 % (ref 36–48)
HGB BLD-MCNC: 12.2 G/DL (ref 12–16)
HGB UR QL STRIP.AUTO: NEGATIVE
KETONES UR STRIP.AUTO-MCNC: NEGATIVE MG/DL
LEUKOCYTE ESTERASE UR QL STRIP.AUTO: NEGATIVE
LIPASE SERPL-CCNC: 49 U/L (ref 13–60)
LYMPHOCYTES # BLD: 1.8 K/UL (ref 1–5.1)
LYMPHOCYTES NFR BLD: 33.8 %
MCH RBC QN AUTO: 36.6 PG (ref 26–34)
MCHC RBC AUTO-ENTMCNC: 34.5 G/DL (ref 31–36)
MCV RBC AUTO: 105.8 FL (ref 80–100)
MONOCYTES # BLD: 0.4 K/UL (ref 0–1.3)
MONOCYTES NFR BLD: 7.1 %
NEUTROPHILS # BLD: 2.8 K/UL (ref 1.7–7.7)
NEUTROPHILS NFR BLD: 52.8 %
NITRITE UR QL STRIP.AUTO: NEGATIVE
PH UR STRIP.AUTO: 6.5 [PH] (ref 5–8)
PLATELET # BLD AUTO: 53 K/UL (ref 135–450)
PMV BLD AUTO: 9.2 FL (ref 5–10.5)
POTASSIUM SERPL-SCNC: 3.7 MMOL/L (ref 3.5–5.1)
PROT SERPL-MCNC: 6.9 G/DL (ref 6.4–8.2)
PROT UR STRIP.AUTO-MCNC: NEGATIVE MG/DL
RBC # BLD AUTO: 3.33 M/UL (ref 4–5.2)
RBC #/AREA URNS HPF: ABNORMAL /HPF (ref 0–4)
SODIUM SERPL-SCNC: 136 MMOL/L (ref 136–145)
SP GR UR STRIP.AUTO: 1.02 (ref 1–1.03)
UA DIPSTICK W REFLEX MICRO PNL UR: ABNORMAL
URN SPEC COLLECT METH UR: ABNORMAL
UROBILINOGEN UR STRIP-ACNC: 1 E.U./DL
WBC # BLD AUTO: 5.4 K/UL (ref 4–11)
WBC #/AREA URNS HPF: ABNORMAL /HPF (ref 0–5)

## 2025-08-25 PROCEDURE — 99284 EMERGENCY DEPT VISIT MOD MDM: CPT

## 2025-08-25 PROCEDURE — 85025 COMPLETE CBC W/AUTO DIFF WBC: CPT

## 2025-08-25 PROCEDURE — 83690 ASSAY OF LIPASE: CPT

## 2025-08-25 PROCEDURE — 2580000003 HC RX 258

## 2025-08-25 PROCEDURE — 82140 ASSAY OF AMMONIA: CPT

## 2025-08-25 PROCEDURE — 80076 HEPATIC FUNCTION PANEL: CPT

## 2025-08-25 PROCEDURE — 80048 BASIC METABOLIC PNL TOTAL CA: CPT

## 2025-08-25 PROCEDURE — 36415 COLL VENOUS BLD VENIPUNCTURE: CPT

## 2025-08-25 PROCEDURE — 81001 URINALYSIS AUTO W/SCOPE: CPT

## 2025-08-25 RX ORDER — INSULIN ASPART 100 [IU]/ML
0-18 INJECTION, SOLUTION INTRAVENOUS; SUBCUTANEOUS
COMMUNITY
Start: 2025-08-03 | End: 2025-09-02

## 2025-08-25 RX ORDER — PANTOPRAZOLE SODIUM 40 MG/1
40 TABLET, DELAYED RELEASE ORAL 2 TIMES DAILY
COMMUNITY
Start: 2025-08-03 | End: 2025-09-02

## 2025-08-25 RX ORDER — LEVOTHYROXINE SODIUM 50 UG/1
50 TABLET ORAL DAILY
COMMUNITY

## 2025-08-25 RX ORDER — BUSPIRONE HYDROCHLORIDE 7.5 MG/1
7.5 TABLET ORAL 3 TIMES DAILY
COMMUNITY
Start: 2025-08-02 | End: 2025-09-01

## 2025-08-25 RX ORDER — SPIRONOLACTONE 25 MG/1
25 TABLET ORAL DAILY
COMMUNITY
Start: 2025-08-02 | End: 2025-09-01

## 2025-08-25 RX ORDER — LACTULOSE 10 G/15ML
SOLUTION ORAL
COMMUNITY
Start: 2025-07-29

## 2025-08-25 RX ORDER — SODIUM CHLORIDE, SODIUM LACTATE, POTASSIUM CHLORIDE, AND CALCIUM CHLORIDE .6; .31; .03; .02 G/100ML; G/100ML; G/100ML; G/100ML
1000 INJECTION, SOLUTION INTRAVENOUS ONCE
Status: COMPLETED | OUTPATIENT
Start: 2025-08-25 | End: 2025-08-25

## 2025-08-25 RX ORDER — HYDROXYZINE HYDROCHLORIDE 25 MG/1
25 TABLET, FILM COATED ORAL 3 TIMES DAILY
COMMUNITY
Start: 2025-08-02 | End: 2025-09-01

## 2025-08-25 RX ORDER — SERTRALINE HYDROCHLORIDE 25 MG/1
25 TABLET, FILM COATED ORAL EVERY MORNING
COMMUNITY
Start: 2025-08-07

## 2025-08-25 RX ORDER — SENNOSIDES 8.6 MG/1
8.6 TABLET ORAL 2 TIMES DAILY
COMMUNITY
Start: 2025-08-02 | End: 2025-09-01

## 2025-08-25 RX ORDER — CARVEDILOL 12.5 MG/1
12.5 TABLET ORAL DAILY
COMMUNITY
Start: 2025-08-04 | End: 2025-09-03

## 2025-08-25 RX ORDER — BLOOD-GLUCOSE METER
EACH MISCELLANEOUS
COMMUNITY
Start: 2025-08-02 | End: 2026-08-02

## 2025-08-25 RX ORDER — PEN NEEDLE, DIABETIC 31 GX3/16"
NEEDLE, DISPOSABLE MISCELLANEOUS
COMMUNITY
Start: 2025-08-04

## 2025-08-25 RX ORDER — POLYETHYLENE GLYCOL 3350 17 G/17G
POWDER, FOR SOLUTION ORAL
COMMUNITY
Start: 2025-08-22

## 2025-08-25 RX ADMIN — SODIUM CHLORIDE, SODIUM LACTATE, POTASSIUM CHLORIDE, AND CALCIUM CHLORIDE 1000 ML: .6; .31; .03; .02 INJECTION, SOLUTION INTRAVENOUS at 11:47

## 2025-08-25 RX ADMIN — SODIUM CHLORIDE, SODIUM LACTATE, POTASSIUM CHLORIDE, AND CALCIUM CHLORIDE 1000 ML: .6; .31; .03; .02 INJECTION, SOLUTION INTRAVENOUS at 12:58

## 2025-08-25 ASSESSMENT — LIFESTYLE VARIABLES
HOW OFTEN DO YOU HAVE A DRINK CONTAINING ALCOHOL: NEVER
HOW MANY STANDARD DRINKS CONTAINING ALCOHOL DO YOU HAVE ON A TYPICAL DAY: PATIENT DOES NOT DRINK

## 2025-08-25 ASSESSMENT — PAIN DESCRIPTION - LOCATION: LOCATION: HEAD

## 2025-08-25 ASSESSMENT — ENCOUNTER SYMPTOMS
CONSTIPATION: 0
VOMITING: 0
NAUSEA: 0
SHORTNESS OF BREATH: 0
ABDOMINAL PAIN: 0

## 2025-08-25 ASSESSMENT — PAIN SCALES - GENERAL: PAINLEVEL_OUTOF10: 1

## 2025-08-25 ASSESSMENT — PAIN - FUNCTIONAL ASSESSMENT: PAIN_FUNCTIONAL_ASSESSMENT: 0-10

## 2025-08-25 ASSESSMENT — PAIN DESCRIPTION - DESCRIPTORS: DESCRIPTORS: ACHING

## 2025-09-01 ENCOUNTER — APPOINTMENT (OUTPATIENT)
Dept: CT IMAGING | Age: 50
End: 2025-09-01
Payer: COMMERCIAL

## 2025-09-01 ENCOUNTER — APPOINTMENT (OUTPATIENT)
Dept: GENERAL RADIOLOGY | Age: 50
End: 2025-09-01
Payer: COMMERCIAL

## 2025-09-01 ENCOUNTER — HOSPITAL ENCOUNTER (INPATIENT)
Age: 50
LOS: 4 days | Discharge: NURSING, REHAB OR LONG-TERM CARE | End: 2025-09-05
Attending: EMERGENCY MEDICINE | Admitting: INTERNAL MEDICINE
Payer: COMMERCIAL

## 2025-09-01 DIAGNOSIS — I95.9 HYPOTENSION, UNSPECIFIED HYPOTENSION TYPE: Primary | ICD-10-CM

## 2025-09-01 DIAGNOSIS — R11.0 NAUSEA: ICD-10-CM

## 2025-09-01 LAB
ALBUMIN SERPL-MCNC: 2.6 G/DL (ref 3.4–5)
ALP SERPL-CCNC: 192 U/L (ref 40–129)
ALT SERPL-CCNC: 34 U/L (ref 10–40)
AMMONIA PLAS-SCNC: 43 UMOL/L (ref 11–51)
AMPHETAMINES UR QL SCN>1000 NG/ML: NORMAL
ANION GAP SERPL CALCULATED.3IONS-SCNC: 8 MMOL/L (ref 3–16)
AST SERPL-CCNC: 54 U/L (ref 15–37)
BARBITURATES UR QL SCN>200 NG/ML: NORMAL
BASOPHILS # BLD: 0.2 K/UL (ref 0–0.2)
BASOPHILS NFR BLD: 4.3 %
BENZODIAZ UR QL SCN>200 NG/ML: NORMAL
BILIRUB DIRECT SERPL-MCNC: 2.9 MG/DL (ref 0–0.3)
BILIRUB INDIRECT SERPL-MCNC: 2.2 MG/DL (ref 0–1)
BILIRUB SERPL-MCNC: 5.1 MG/DL (ref 0–1)
BILIRUB UR QL STRIP.AUTO: ABNORMAL
BUN SERPL-MCNC: 7 MG/DL (ref 7–20)
CALCIUM SERPL-MCNC: 8.9 MG/DL (ref 8.3–10.6)
CANNABINOIDS UR QL SCN>50 NG/ML: NORMAL
CHLORIDE SERPL-SCNC: 104 MMOL/L (ref 99–110)
CLARITY UR: CLEAR
CO2 SERPL-SCNC: 24 MMOL/L (ref 21–32)
COCAINE UR QL SCN: NORMAL
COLOR UR: YELLOW
CORTIS SERPL-MCNC: 4 UG/DL
CREAT SERPL-MCNC: 0.6 MG/DL (ref 0.6–1.1)
DEPRECATED RDW RBC AUTO: 16.3 % (ref 12.4–15.4)
DRUG SCREEN COMMENT UR-IMP: NORMAL
EKG ATRIAL RATE: 58 BPM
EKG DIAGNOSIS: NORMAL
EKG P AXIS: -7 DEGREES
EKG P-R INTERVAL: 170 MS
EKG Q-T INTERVAL: 486 MS
EKG QRS DURATION: 84 MS
EKG QTC CALCULATION (BAZETT): 477 MS
EKG R AXIS: 182 DEGREES
EKG T AXIS: 59 DEGREES
EKG VENTRICULAR RATE: 58 BPM
EOSINOPHIL # BLD: 0.2 K/UL (ref 0–0.6)
EOSINOPHIL NFR BLD: 3.3 %
ETHANOLAMINE SERPL-MCNC: NORMAL MG/DL (ref 0–0.08)
FENTANYL SCREEN, URINE: NORMAL
GFR SERPLBLD CREATININE-BSD FMLA CKD-EPI: >90 ML/MIN/{1.73_M2}
GLUCOSE BLD-MCNC: 192 MG/DL (ref 70–99)
GLUCOSE BLD-MCNC: 240 MG/DL (ref 70–99)
GLUCOSE SERPL-MCNC: 159 MG/DL (ref 70–99)
GLUCOSE UR STRIP.AUTO-MCNC: NEGATIVE MG/DL
HCG SERPL QL: NEGATIVE
HCT VFR BLD AUTO: 36.7 % (ref 36–48)
HGB BLD-MCNC: 12.7 G/DL (ref 12–16)
HGB UR QL STRIP.AUTO: NEGATIVE
INR PPP: 1.97 (ref 0.86–1.14)
KETONES UR STRIP.AUTO-MCNC: ABNORMAL MG/DL
LACTATE BLDV-SCNC: 1.9 MMOL/L (ref 0.4–2)
LACTATE BLDV-SCNC: 2.2 MMOL/L (ref 0.4–2)
LACTATE BLDV-SCNC: 2.9 MMOL/L (ref 0.4–2)
LEUKOCYTE ESTERASE UR QL STRIP.AUTO: NEGATIVE
LIPASE SERPL-CCNC: 51 U/L (ref 13–60)
LYMPHOCYTES # BLD: 1.6 K/UL (ref 1–5.1)
LYMPHOCYTES NFR BLD: 30.6 %
MCH RBC QN AUTO: 36.1 PG (ref 26–34)
MCHC RBC AUTO-ENTMCNC: 34.7 G/DL (ref 31–36)
MCV RBC AUTO: 104.1 FL (ref 80–100)
METHADONE UR QL SCN>300 NG/ML: NORMAL
MONOCYTES # BLD: 0.5 K/UL (ref 0–1.3)
MONOCYTES NFR BLD: 9.4 %
NEUTROPHILS # BLD: 2.8 K/UL (ref 1.7–7.7)
NEUTROPHILS NFR BLD: 52.4 %
NITRITE UR QL STRIP.AUTO: NEGATIVE
NT-PROBNP SERPL-MCNC: 226 PG/ML (ref 0–124)
OPIATES UR QL SCN>300 NG/ML: NORMAL
OXYCODONE UR QL SCN: NORMAL
PCP UR QL SCN>25 NG/ML: NORMAL
PERFORMED ON: ABNORMAL
PERFORMED ON: ABNORMAL
PH UR STRIP.AUTO: 6 [PH] (ref 5–8)
PH UR STRIP: 5 [PH]
PLATELET # BLD AUTO: 59 K/UL (ref 135–450)
PMV BLD AUTO: 8.9 FL (ref 5–10.5)
POTASSIUM SERPL-SCNC: 4.3 MMOL/L (ref 3.5–5.1)
PROT SERPL-MCNC: 6.6 G/DL (ref 6.4–8.2)
PROT UR STRIP.AUTO-MCNC: NEGATIVE MG/DL
PROTHROMBIN TIME: 22.3 SEC (ref 12.1–14.9)
RBC # BLD AUTO: 3.53 M/UL (ref 4–5.2)
REASON FOR REJECTION: NORMAL
REJECTED TEST: NORMAL
SODIUM SERPL-SCNC: 136 MMOL/L (ref 136–145)
SP GR UR STRIP.AUTO: >=1.03 (ref 1–1.03)
TROPONIN, HIGH SENSITIVITY: 11 NG/L (ref 0–14)
TROPONIN, HIGH SENSITIVITY: 9 NG/L (ref 0–14)
TSH SERPL DL<=0.005 MIU/L-ACNC: 3.33 UIU/ML (ref 0.27–4.2)
UA COMPLETE W REFLEX CULTURE PNL UR: ABNORMAL
UA DIPSTICK W REFLEX MICRO PNL UR: ABNORMAL
URN SPEC COLLECT METH UR: ABNORMAL
UROBILINOGEN UR STRIP-ACNC: 0.2 E.U./DL
WBC # BLD AUTO: 5.4 K/UL (ref 4–11)

## 2025-09-01 PROCEDURE — 80074 ACUTE HEPATITIS PANEL: CPT

## 2025-09-01 PROCEDURE — 83880 ASSAY OF NATRIURETIC PEPTIDE: CPT

## 2025-09-01 PROCEDURE — 80048 BASIC METABOLIC PNL TOTAL CA: CPT

## 2025-09-01 PROCEDURE — 85610 PROTHROMBIN TIME: CPT

## 2025-09-01 PROCEDURE — 36415 COLL VENOUS BLD VENIPUNCTURE: CPT

## 2025-09-01 PROCEDURE — 84484 ASSAY OF TROPONIN QUANT: CPT

## 2025-09-01 PROCEDURE — 93005 ELECTROCARDIOGRAM TRACING: CPT

## 2025-09-01 PROCEDURE — 82077 ASSAY SPEC XCP UR&BREATH IA: CPT

## 2025-09-01 PROCEDURE — 74177 CT ABD & PELVIS W/CONTRAST: CPT

## 2025-09-01 PROCEDURE — 82533 TOTAL CORTISOL: CPT

## 2025-09-01 PROCEDURE — 81003 URINALYSIS AUTO W/O SCOPE: CPT

## 2025-09-01 PROCEDURE — 82140 ASSAY OF AMMONIA: CPT

## 2025-09-01 PROCEDURE — 84703 CHORIONIC GONADOTROPIN ASSAY: CPT

## 2025-09-01 PROCEDURE — 2060000000 HC ICU INTERMEDIATE R&B

## 2025-09-01 PROCEDURE — 83690 ASSAY OF LIPASE: CPT

## 2025-09-01 PROCEDURE — 2580000003 HC RX 258

## 2025-09-01 PROCEDURE — 6360000002 HC RX W HCPCS

## 2025-09-01 PROCEDURE — 85025 COMPLETE CBC W/AUTO DIFF WBC: CPT

## 2025-09-01 PROCEDURE — 82607 VITAMIN B-12: CPT

## 2025-09-01 PROCEDURE — 87040 BLOOD CULTURE FOR BACTERIA: CPT

## 2025-09-01 PROCEDURE — 6370000000 HC RX 637 (ALT 250 FOR IP)

## 2025-09-01 PROCEDURE — 80076 HEPATIC FUNCTION PANEL: CPT

## 2025-09-01 PROCEDURE — 2500000003 HC RX 250 WO HCPCS

## 2025-09-01 PROCEDURE — 99285 EMERGENCY DEPT VISIT HI MDM: CPT

## 2025-09-01 PROCEDURE — 87150 DNA/RNA AMPLIFIED PROBE: CPT

## 2025-09-01 PROCEDURE — 83605 ASSAY OF LACTIC ACID: CPT

## 2025-09-01 PROCEDURE — P9047 ALBUMIN (HUMAN), 25%, 50ML: HCPCS

## 2025-09-01 PROCEDURE — 80307 DRUG TEST PRSMV CHEM ANLYZR: CPT

## 2025-09-01 PROCEDURE — 84443 ASSAY THYROID STIM HORMONE: CPT

## 2025-09-01 PROCEDURE — 82746 ASSAY OF FOLIC ACID SERUM: CPT

## 2025-09-01 PROCEDURE — 6360000004 HC RX CONTRAST MEDICATION: Performed by: INTERNAL MEDICINE

## 2025-09-01 PROCEDURE — 71045 X-RAY EXAM CHEST 1 VIEW: CPT

## 2025-09-01 PROCEDURE — 96374 THER/PROPH/DIAG INJ IV PUSH: CPT

## 2025-09-01 RX ORDER — CARVEDILOL 12.5 MG/1
12.5 TABLET ORAL DAILY
Status: DISCONTINUED | OUTPATIENT
Start: 2025-09-01 | End: 2025-09-03

## 2025-09-01 RX ORDER — SODIUM CHLORIDE, SODIUM LACTATE, POTASSIUM CHLORIDE, AND CALCIUM CHLORIDE .6; .31; .03; .02 G/100ML; G/100ML; G/100ML; G/100ML
1000 INJECTION, SOLUTION INTRAVENOUS ONCE
Status: COMPLETED | OUTPATIENT
Start: 2025-09-01 | End: 2025-09-01

## 2025-09-01 RX ORDER — INSULIN GLARGINE 100 [IU]/ML
10 INJECTION, SOLUTION SUBCUTANEOUS NIGHTLY
Status: DISCONTINUED | OUTPATIENT
Start: 2025-09-01 | End: 2025-09-04

## 2025-09-01 RX ORDER — SODIUM CHLORIDE 9 MG/ML
1000 INJECTION, SOLUTION INTRAVENOUS CONTINUOUS
Status: DISCONTINUED | OUTPATIENT
Start: 2025-09-01 | End: 2025-09-01

## 2025-09-01 RX ORDER — POLYETHYLENE GLYCOL 3350 17 G/17G
17 POWDER, FOR SOLUTION ORAL DAILY PRN
Status: DISCONTINUED | OUTPATIENT
Start: 2025-09-01 | End: 2025-09-04

## 2025-09-01 RX ORDER — SODIUM CHLORIDE 0.9 % (FLUSH) 0.9 %
5-40 SYRINGE (ML) INJECTION EVERY 12 HOURS SCHEDULED
Status: DISCONTINUED | OUTPATIENT
Start: 2025-09-01 | End: 2025-09-05 | Stop reason: HOSPADM

## 2025-09-01 RX ORDER — COSYNTROPIN 0.25 MG/ML
250 INJECTION, POWDER, FOR SOLUTION INTRAMUSCULAR; INTRAVENOUS ONCE
Status: COMPLETED | OUTPATIENT
Start: 2025-09-02 | End: 2025-09-02

## 2025-09-01 RX ORDER — INSULIN LISPRO 100 [IU]/ML
0-4 INJECTION, SOLUTION INTRAVENOUS; SUBCUTANEOUS
Status: DISCONTINUED | OUTPATIENT
Start: 2025-09-01 | End: 2025-09-04 | Stop reason: SDUPTHER

## 2025-09-01 RX ORDER — ONDANSETRON 4 MG/1
4 TABLET, ORALLY DISINTEGRATING ORAL EVERY 8 HOURS PRN
Status: DISCONTINUED | OUTPATIENT
Start: 2025-09-01 | End: 2025-09-05 | Stop reason: HOSPADM

## 2025-09-01 RX ORDER — BUSPIRONE HYDROCHLORIDE 5 MG/1
7.5 TABLET ORAL 3 TIMES DAILY
Status: DISCONTINUED | OUTPATIENT
Start: 2025-09-01 | End: 2025-09-05 | Stop reason: HOSPADM

## 2025-09-01 RX ORDER — SPIRONOLACTONE 25 MG/1
25 TABLET ORAL DAILY
Status: DISCONTINUED | OUTPATIENT
Start: 2025-09-01 | End: 2025-09-05 | Stop reason: HOSPADM

## 2025-09-01 RX ORDER — HYDROXYZINE HYDROCHLORIDE 25 MG/1
25 TABLET, FILM COATED ORAL 3 TIMES DAILY
Status: DISCONTINUED | OUTPATIENT
Start: 2025-09-01 | End: 2025-09-05 | Stop reason: HOSPADM

## 2025-09-01 RX ORDER — IOPAMIDOL 755 MG/ML
75 INJECTION, SOLUTION INTRAVASCULAR
Status: COMPLETED | OUTPATIENT
Start: 2025-09-01 | End: 2025-09-01

## 2025-09-01 RX ORDER — PANTOPRAZOLE SODIUM 40 MG/1
40 TABLET, DELAYED RELEASE ORAL 2 TIMES DAILY
Status: DISCONTINUED | OUTPATIENT
Start: 2025-09-01 | End: 2025-09-05 | Stop reason: HOSPADM

## 2025-09-01 RX ORDER — ONDANSETRON 2 MG/ML
4 INJECTION INTRAMUSCULAR; INTRAVENOUS EVERY 6 HOURS PRN
Status: DISCONTINUED | OUTPATIENT
Start: 2025-09-01 | End: 2025-09-05 | Stop reason: HOSPADM

## 2025-09-01 RX ORDER — LEVOTHYROXINE SODIUM 50 UG/1
50 TABLET ORAL DAILY
Status: DISCONTINUED | OUTPATIENT
Start: 2025-09-01 | End: 2025-09-05 | Stop reason: HOSPADM

## 2025-09-01 RX ORDER — SODIUM CHLORIDE 9 MG/ML
INJECTION, SOLUTION INTRAVENOUS PRN
Status: DISCONTINUED | OUTPATIENT
Start: 2025-09-01 | End: 2025-09-05 | Stop reason: HOSPADM

## 2025-09-01 RX ORDER — SERTRALINE HYDROCHLORIDE 25 MG/1
25 TABLET, FILM COATED ORAL EVERY MORNING
Status: DISCONTINUED | OUTPATIENT
Start: 2025-09-02 | End: 2025-09-05 | Stop reason: HOSPADM

## 2025-09-01 RX ORDER — ONDANSETRON 2 MG/ML
4 INJECTION INTRAMUSCULAR; INTRAVENOUS ONCE
Status: COMPLETED | OUTPATIENT
Start: 2025-09-01 | End: 2025-09-01

## 2025-09-01 RX ORDER — LACTULOSE 10 G/15ML
20 SOLUTION ORAL 3 TIMES DAILY
Status: DISCONTINUED | OUTPATIENT
Start: 2025-09-01 | End: 2025-09-05 | Stop reason: HOSPADM

## 2025-09-01 RX ORDER — GLUCAGON 1 MG/ML
1 KIT INJECTION PRN
Status: DISCONTINUED | OUTPATIENT
Start: 2025-09-01 | End: 2025-09-05 | Stop reason: HOSPADM

## 2025-09-01 RX ORDER — ALBUMIN (HUMAN) 12.5 G/50ML
25 SOLUTION INTRAVENOUS ONCE
Status: COMPLETED | OUTPATIENT
Start: 2025-09-01 | End: 2025-09-01

## 2025-09-01 RX ORDER — SODIUM CHLORIDE 0.9 % (FLUSH) 0.9 %
5-40 SYRINGE (ML) INJECTION PRN
Status: DISCONTINUED | OUTPATIENT
Start: 2025-09-01 | End: 2025-09-05 | Stop reason: HOSPADM

## 2025-09-01 RX ORDER — 0.9 % SODIUM CHLORIDE 0.9 %
1000 INTRAVENOUS SOLUTION INTRAVENOUS ONCE
Status: COMPLETED | OUTPATIENT
Start: 2025-09-01 | End: 2025-09-01

## 2025-09-01 RX ORDER — DEXTROSE MONOHYDRATE 100 MG/ML
INJECTION, SOLUTION INTRAVENOUS CONTINUOUS PRN
Status: DISCONTINUED | OUTPATIENT
Start: 2025-09-01 | End: 2025-09-05 | Stop reason: HOSPADM

## 2025-09-01 RX ADMIN — SODIUM CHLORIDE, PRESERVATIVE FREE 10 ML: 5 INJECTION INTRAVENOUS at 21:24

## 2025-09-01 RX ADMIN — PANTOPRAZOLE SODIUM 40 MG: 40 TABLET, DELAYED RELEASE ORAL at 21:23

## 2025-09-01 RX ADMIN — SODIUM CHLORIDE, SODIUM LACTATE, POTASSIUM CHLORIDE, AND CALCIUM CHLORIDE 1000 ML: .6; .31; .03; .02 INJECTION, SOLUTION INTRAVENOUS at 13:15

## 2025-09-01 RX ADMIN — SODIUM CHLORIDE 1000 ML: 0.9 INJECTION, SOLUTION INTRAVENOUS at 09:56

## 2025-09-01 RX ADMIN — ALBUMIN (HUMAN) 25 G: 0.25 INJECTION, SOLUTION INTRAVENOUS at 19:00

## 2025-09-01 RX ADMIN — LACTULOSE 20 G: 20 SOLUTION ORAL at 21:23

## 2025-09-01 RX ADMIN — ONDANSETRON 4 MG: 2 INJECTION, SOLUTION INTRAMUSCULAR; INTRAVENOUS at 09:58

## 2025-09-01 RX ADMIN — HYDROXYZINE HYDROCHLORIDE 25 MG: 25 TABLET, FILM COATED ORAL at 21:23

## 2025-09-01 RX ADMIN — WATER 2000 MG: 1 INJECTION INTRAMUSCULAR; INTRAVENOUS; SUBCUTANEOUS at 21:38

## 2025-09-01 RX ADMIN — INSULIN LISPRO 1 UNITS: 100 INJECTION, SOLUTION INTRAVENOUS; SUBCUTANEOUS at 21:22

## 2025-09-01 RX ADMIN — RIFAXIMIN 550 MG: 550 TABLET ORAL at 21:23

## 2025-09-01 RX ADMIN — SODIUM CHLORIDE, SODIUM LACTATE, POTASSIUM CHLORIDE, AND CALCIUM CHLORIDE 1000 ML: .6; .31; .03; .02 INJECTION, SOLUTION INTRAVENOUS at 11:38

## 2025-09-01 RX ADMIN — INSULIN GLARGINE 10 UNITS: 100 INJECTION, SOLUTION SUBCUTANEOUS at 21:22

## 2025-09-01 RX ADMIN — BUSPIRONE HYDROCHLORIDE 7.5 MG: 5 TABLET ORAL at 21:23

## 2025-09-01 RX ADMIN — INSULIN LISPRO 1 UNITS: 100 INJECTION, SOLUTION INTRAVENOUS; SUBCUTANEOUS at 18:24

## 2025-09-01 RX ADMIN — IOPAMIDOL 75 ML: 755 INJECTION, SOLUTION INTRAVENOUS at 18:46

## 2025-09-01 ASSESSMENT — ENCOUNTER SYMPTOMS
CHEST TIGHTNESS: 0
BLOOD IN STOOL: 0
SORE THROAT: 0
BACK PAIN: 0
ABDOMINAL PAIN: 1
ABDOMINAL PAIN: 0
COLOR CHANGE: 0
EYE PAIN: 0
WHEEZING: 0
DIARRHEA: 1
RHINORRHEA: 0
CONSTIPATION: 0
NAUSEA: 1
VOMITING: 0
COUGH: 0
SHORTNESS OF BREATH: 0
EYE REDNESS: 0

## 2025-09-01 ASSESSMENT — PAIN - FUNCTIONAL ASSESSMENT: PAIN_FUNCTIONAL_ASSESSMENT: 0-10

## 2025-09-01 ASSESSMENT — PAIN SCALES - GENERAL
PAINLEVEL_OUTOF10: 0
PAINLEVEL_OUTOF10: 0

## 2025-09-02 LAB
ALBUMIN SERPL-MCNC: 2.6 G/DL (ref 3.4–5)
ALBUMIN SERPL-MCNC: 3 G/DL (ref 3.4–5)
ALBUMIN/GLOB SERPL: 0.8 {RATIO} (ref 1.1–2.2)
ALBUMIN/GLOB SERPL: 0.8 {RATIO} (ref 1.1–2.2)
ALP SERPL-CCNC: 184 U/L (ref 40–129)
ALP SERPL-CCNC: 208 U/L (ref 40–129)
ALT SERPL-CCNC: 27 U/L (ref 10–40)
ALT SERPL-CCNC: 29 U/L (ref 10–40)
ANION GAP SERPL CALCULATED.3IONS-SCNC: 11 MMOL/L (ref 3–16)
ANION GAP SERPL CALCULATED.3IONS-SCNC: 7 MMOL/L (ref 3–16)
AST SERPL-CCNC: 43 U/L (ref 15–37)
AST SERPL-CCNC: 48 U/L (ref 15–37)
BASOPHILS # BLD: 0.1 K/UL (ref 0–0.2)
BASOPHILS NFR BLD: 1.2 %
BILIRUB SERPL-MCNC: 3.4 MG/DL (ref 0–1)
BILIRUB SERPL-MCNC: 4.5 MG/DL (ref 0–1)
BUN SERPL-MCNC: 8 MG/DL (ref 7–20)
BUN SERPL-MCNC: 9 MG/DL (ref 7–20)
CALCIUM SERPL-MCNC: 8.6 MG/DL (ref 8.3–10.6)
CALCIUM SERPL-MCNC: 8.9 MG/DL (ref 8.3–10.6)
CHLORIDE SERPL-SCNC: 101 MMOL/L (ref 99–110)
CHLORIDE SERPL-SCNC: 106 MMOL/L (ref 99–110)
CO2 SERPL-SCNC: 25 MMOL/L (ref 21–32)
CO2 SERPL-SCNC: 25 MMOL/L (ref 21–32)
CORTIS 1H P 250 UG ACTH RIV SERPL-MCNC: 16 UG/DL
CORTIS 30M P 250 UG ACTH RIV SERPL-MCNC: 11.8 UG/DL
CORTIS SERPL-MCNC: 4.8 UG/DL
CREAT SERPL-MCNC: 0.7 MG/DL (ref 0.6–1.1)
CREAT SERPL-MCNC: 0.7 MG/DL (ref 0.6–1.1)
DEPRECATED RDW RBC AUTO: 16.3 % (ref 12.4–15.4)
EOSINOPHIL # BLD: 0.2 K/UL (ref 0–0.6)
EOSINOPHIL NFR BLD: 3.7 %
FOLATE SERPL-MCNC: 8.33 NG/ML (ref 4.78–24.2)
GFR SERPLBLD CREATININE-BSD FMLA CKD-EPI: >90 ML/MIN/{1.73_M2}
GFR SERPLBLD CREATININE-BSD FMLA CKD-EPI: >90 ML/MIN/{1.73_M2}
GLUCOSE BLD-MCNC: 101 MG/DL (ref 70–99)
GLUCOSE BLD-MCNC: 138 MG/DL (ref 70–99)
GLUCOSE BLD-MCNC: 203 MG/DL (ref 70–99)
GLUCOSE BLD-MCNC: 227 MG/DL (ref 70–99)
GLUCOSE SERPL-MCNC: 207 MG/DL (ref 70–99)
GLUCOSE SERPL-MCNC: 224 MG/DL (ref 70–99)
HAV IGM SERPL QL IA: NORMAL
HBV CORE IGM SERPL QL IA: NORMAL
HBV SURFACE AG SERPL QL IA: NORMAL
HCT VFR BLD AUTO: 29.9 % (ref 36–48)
HCV AB SERPL QL IA: NORMAL
HGB BLD-MCNC: 10.3 G/DL (ref 12–16)
LACTATE BLDV-SCNC: 1.3 MMOL/L (ref 0.4–2)
LYMPHOCYTES # BLD: 2.1 K/UL (ref 1–5.1)
LYMPHOCYTES NFR BLD: 36.1 %
MCH RBC QN AUTO: 36.1 PG (ref 26–34)
MCHC RBC AUTO-ENTMCNC: 34.6 G/DL (ref 31–36)
MCV RBC AUTO: 104.5 FL (ref 80–100)
MONOCYTES # BLD: 0.6 K/UL (ref 0–1.3)
MONOCYTES NFR BLD: 10.8 %
NEUTROPHILS # BLD: 2.8 K/UL (ref 1.7–7.7)
NEUTROPHILS NFR BLD: 48.2 %
PERFORMED ON: ABNORMAL
PLATELET # BLD AUTO: 54 K/UL (ref 135–450)
PMV BLD AUTO: 8.8 FL (ref 5–10.5)
POTASSIUM SERPL-SCNC: 3.9 MMOL/L (ref 3.5–5.1)
POTASSIUM SERPL-SCNC: 4 MMOL/L (ref 3.5–5.1)
PROT SERPL-MCNC: 5.8 G/DL (ref 6.4–8.2)
PROT SERPL-MCNC: 7 G/DL (ref 6.4–8.2)
RBC # BLD AUTO: 2.86 M/UL (ref 4–5.2)
REPORT: NORMAL
SODIUM SERPL-SCNC: 137 MMOL/L (ref 136–145)
SODIUM SERPL-SCNC: 138 MMOL/L (ref 136–145)
VIT B12 SERPL-MCNC: 1931 PG/ML (ref 211–911)
WBC # BLD AUTO: 5.9 K/UL (ref 4–11)

## 2025-09-02 PROCEDURE — 2060000000 HC ICU INTERMEDIATE R&B

## 2025-09-02 PROCEDURE — 83605 ASSAY OF LACTIC ACID: CPT

## 2025-09-02 PROCEDURE — 6360000002 HC RX W HCPCS

## 2025-09-02 PROCEDURE — P9047 ALBUMIN (HUMAN), 25%, 50ML: HCPCS | Performed by: INTERNAL MEDICINE

## 2025-09-02 PROCEDURE — 36415 COLL VENOUS BLD VENIPUNCTURE: CPT

## 2025-09-02 PROCEDURE — 2580000003 HC RX 258: Performed by: INTERNAL MEDICINE

## 2025-09-02 PROCEDURE — 80053 COMPREHEN METABOLIC PANEL: CPT

## 2025-09-02 PROCEDURE — 6370000000 HC RX 637 (ALT 250 FOR IP)

## 2025-09-02 PROCEDURE — 6360000002 HC RX W HCPCS: Performed by: INTERNAL MEDICINE

## 2025-09-02 PROCEDURE — 80400 ACTH STIMULATION PANEL: CPT

## 2025-09-02 PROCEDURE — 2500000003 HC RX 250 WO HCPCS

## 2025-09-02 PROCEDURE — 85025 COMPLETE CBC W/AUTO DIFF WBC: CPT

## 2025-09-02 RX ORDER — MIDODRINE HYDROCHLORIDE 5 MG/1
5 TABLET ORAL
Status: DISCONTINUED | OUTPATIENT
Start: 2025-09-02 | End: 2025-09-05 | Stop reason: HOSPADM

## 2025-09-02 RX ORDER — FUROSEMIDE 10 MG/ML
20 INJECTION INTRAMUSCULAR; INTRAVENOUS ONCE
Status: COMPLETED | OUTPATIENT
Start: 2025-09-02 | End: 2025-09-02

## 2025-09-02 RX ORDER — ALBUMIN (HUMAN) 12.5 G/50ML
25 SOLUTION INTRAVENOUS EVERY 8 HOURS
Status: COMPLETED | OUTPATIENT
Start: 2025-09-02 | End: 2025-09-04

## 2025-09-02 RX ADMIN — ALBUMIN (HUMAN) 25 G: 0.25 INJECTION, SOLUTION INTRAVENOUS at 14:20

## 2025-09-02 RX ADMIN — SODIUM CHLORIDE, PRESERVATIVE FREE 10 ML: 5 INJECTION INTRAVENOUS at 20:37

## 2025-09-02 RX ADMIN — LACTULOSE 20 G: 20 SOLUTION ORAL at 20:36

## 2025-09-02 RX ADMIN — FUROSEMIDE 20 MG: 10 INJECTION, SOLUTION INTRAMUSCULAR; INTRAVENOUS at 16:10

## 2025-09-02 RX ADMIN — RIFAXIMIN 550 MG: 550 TABLET ORAL at 08:38

## 2025-09-02 RX ADMIN — INSULIN LISPRO 1 UNITS: 100 INJECTION, SOLUTION INTRAVENOUS; SUBCUTANEOUS at 20:36

## 2025-09-02 RX ADMIN — HYDROXYZINE HYDROCHLORIDE 25 MG: 25 TABLET, FILM COATED ORAL at 14:20

## 2025-09-02 RX ADMIN — COSYNTROPIN 250 MCG: 0.25 INJECTION, POWDER, LYOPHILIZED, FOR SOLUTION INTRAMUSCULAR; INTRAVENOUS at 08:43

## 2025-09-02 RX ADMIN — FUROSEMIDE 5 MG/HR: 10 INJECTION, SOLUTION INTRAMUSCULAR; INTRAVENOUS at 16:12

## 2025-09-02 RX ADMIN — MIDODRINE HYDROCHLORIDE 5 MG: 5 TABLET ORAL at 17:36

## 2025-09-02 RX ADMIN — ALBUMIN (HUMAN) 25 G: 0.25 INJECTION, SOLUTION INTRAVENOUS at 23:24

## 2025-09-02 RX ADMIN — BUSPIRONE HYDROCHLORIDE 7.5 MG: 5 TABLET ORAL at 20:36

## 2025-09-02 RX ADMIN — LEVOTHYROXINE SODIUM 50 MCG: 0.05 TABLET ORAL at 08:38

## 2025-09-02 RX ADMIN — BUSPIRONE HYDROCHLORIDE 7.5 MG: 5 TABLET ORAL at 14:20

## 2025-09-02 RX ADMIN — SERTRALINE HYDROCHLORIDE 25 MG: 25 TABLET ORAL at 08:38

## 2025-09-02 RX ADMIN — HYDROXYZINE HYDROCHLORIDE 25 MG: 25 TABLET, FILM COATED ORAL at 20:36

## 2025-09-02 RX ADMIN — HYDROXYZINE HYDROCHLORIDE 25 MG: 25 TABLET, FILM COATED ORAL at 08:38

## 2025-09-02 RX ADMIN — MIDODRINE HYDROCHLORIDE 5 MG: 5 TABLET ORAL at 10:54

## 2025-09-02 RX ADMIN — BUSPIRONE HYDROCHLORIDE 7.5 MG: 5 TABLET ORAL at 08:38

## 2025-09-02 RX ADMIN — RIFAXIMIN 550 MG: 550 TABLET ORAL at 20:36

## 2025-09-02 RX ADMIN — INSULIN LISPRO 1 UNITS: 100 INJECTION, SOLUTION INTRAVENOUS; SUBCUTANEOUS at 17:36

## 2025-09-02 RX ADMIN — LACTULOSE 20 G: 20 SOLUTION ORAL at 14:21

## 2025-09-02 RX ADMIN — PANTOPRAZOLE SODIUM 40 MG: 40 TABLET, DELAYED RELEASE ORAL at 08:38

## 2025-09-02 RX ADMIN — WATER 2000 MG: 1 INJECTION INTRAMUSCULAR; INTRAVENOUS; SUBCUTANEOUS at 20:35

## 2025-09-02 RX ADMIN — LACTULOSE 20 G: 20 SOLUTION ORAL at 08:40

## 2025-09-02 RX ADMIN — INSULIN GLARGINE 10 UNITS: 100 INJECTION, SOLUTION SUBCUTANEOUS at 20:36

## 2025-09-02 ASSESSMENT — PAIN SCALES - GENERAL
PAINLEVEL_OUTOF10: 0

## 2025-09-03 ENCOUNTER — APPOINTMENT (OUTPATIENT)
Age: 50
End: 2025-09-03
Attending: INTERNAL MEDICINE
Payer: COMMERCIAL

## 2025-09-03 LAB
ALBUMIN SERPL-MCNC: 3.1 G/DL (ref 3.4–5)
ALBUMIN SERPL-MCNC: 3.4 G/DL (ref 3.4–5)
ALBUMIN/GLOB SERPL: 0.9 {RATIO} (ref 1.1–2.2)
ALBUMIN/GLOB SERPL: 0.9 {RATIO} (ref 1.1–2.2)
ALP SERPL-CCNC: 166 U/L (ref 40–129)
ALP SERPL-CCNC: 182 U/L (ref 40–129)
ALT SERPL-CCNC: 22 U/L (ref 10–40)
ALT SERPL-CCNC: 24 U/L (ref 10–40)
ANION GAP SERPL CALCULATED.3IONS-SCNC: 10 MMOL/L (ref 3–16)
ANION GAP SERPL CALCULATED.3IONS-SCNC: 12 MMOL/L (ref 3–16)
AST SERPL-CCNC: 38 U/L (ref 15–37)
AST SERPL-CCNC: 42 U/L (ref 15–37)
BASOPHILS # BLD: 0.1 K/UL (ref 0–0.2)
BASOPHILS NFR BLD: 0.8 %
BILIRUB SERPL-MCNC: 3.8 MG/DL (ref 0–1)
BILIRUB SERPL-MCNC: 3.8 MG/DL (ref 0–1)
BUN SERPL-MCNC: 9 MG/DL (ref 7–20)
BUN SERPL-MCNC: 9 MG/DL (ref 7–20)
CALCIUM SERPL-MCNC: 8.9 MG/DL (ref 8.3–10.6)
CALCIUM SERPL-MCNC: 9.3 MG/DL (ref 8.3–10.6)
CHLORIDE SERPL-SCNC: 96 MMOL/L (ref 99–110)
CHLORIDE SERPL-SCNC: 99 MMOL/L (ref 99–110)
CO2 SERPL-SCNC: 29 MMOL/L (ref 21–32)
CO2 SERPL-SCNC: 30 MMOL/L (ref 21–32)
CREAT SERPL-MCNC: 0.7 MG/DL (ref 0.6–1.1)
CREAT SERPL-MCNC: 0.7 MG/DL (ref 0.6–1.1)
DEPRECATED RDW RBC AUTO: 16.1 % (ref 12.4–15.4)
ECHO AO ROOT DIAM: 3.1 CM
ECHO AO ROOT INDEX: 1.57 CM/M2
ECHO AV AREA PEAK VELOCITY: 2.5 CM2
ECHO AV AREA/BSA PEAK VELOCITY: 1.3 CM2/M2
ECHO AV PEAK GRADIENT: 12 MMHG
ECHO AV PEAK VELOCITY: 1.8 M/S
ECHO AV VELOCITY RATIO: 0.72
ECHO BSA: 2.14 M2
ECHO EST RA PRESSURE: 8 MMHG
ECHO IVC EXP: 2.6 CM
ECHO IVC INSP: 1.2 CM
ECHO LA AREA 2C: 20.7 CM2
ECHO LA AREA 4C: 25.3 CM2
ECHO LA MAJOR AXIS: 7.1 CM
ECHO LA MINOR AXIS: 5.5 CM
ECHO LA VOL MOD A2C: 63 ML (ref 22–52)
ECHO LA VOL MOD A4C: 67 ML (ref 22–52)
ECHO LA VOLUME INDEX MOD A2C: 32 ML/M2 (ref 16–34)
ECHO LA VOLUME INDEX MOD A4C: 34 ML/M2 (ref 16–34)
ECHO LV E' LATERAL VELOCITY: 8.27 CM/S
ECHO LV E' SEPTAL VELOCITY: 7.37 CM/S
ECHO LV EDV 3D: 179 ML
ECHO LV EDV A2C: 97 ML
ECHO LV EDV A4C: 131 ML
ECHO LV EDV INDEX 3D: 90 ML/M2
ECHO LV EDV INDEX A4C: 66 ML/M2
ECHO LV EDV NDEX A2C: 49 ML/M2
ECHO LV EF PHYSICIAN: 73 %
ECHO LV EJECTION FRACTION 3D: 73 %
ECHO LV EJECTION FRACTION A2C: 61 %
ECHO LV EJECTION FRACTION A4C: 67 %
ECHO LV EJECTION FRACTION BIPLANE: 63 % (ref 55–100)
ECHO LV ESV 3D: 49 ML
ECHO LV ESV A2C: 38 ML
ECHO LV ESV A4C: 44 ML
ECHO LV ESV INDEX 3D: 25 ML/M2
ECHO LV ESV INDEX A2C: 19 ML/M2
ECHO LV ESV INDEX A4C: 22 ML/M2
ECHO LV FRACTIONAL SHORTENING: 35 % (ref 28–44)
ECHO LV INTERNAL DIMENSION DIASTOLE INDEX: 2.78 CM/M2
ECHO LV INTERNAL DIMENSION DIASTOLIC: 5.5 CM (ref 3.9–5.3)
ECHO LV INTERNAL DIMENSION SYSTOLIC INDEX: 1.82 CM/M2
ECHO LV INTERNAL DIMENSION SYSTOLIC: 3.6 CM
ECHO LV IVSD: 1 CM (ref 0.6–0.9)
ECHO LV MASS 2D: 213.2 G (ref 67–162)
ECHO LV MASS 3D INDEX: 107.1 G/M2
ECHO LV MASS 3D: 212 G
ECHO LV MASS INDEX 2D: 107.7 G/M2 (ref 43–95)
ECHO LV POSTERIOR WALL DIASTOLIC: 1 CM (ref 0.6–0.9)
ECHO LV RELATIVE WALL THICKNESS RATIO: 0.36
ECHO LVOT AREA: 3.5 CM2
ECHO LVOT DIAM: 2.1 CM
ECHO LVOT MEAN GRADIENT: 3 MMHG
ECHO LVOT PEAK GRADIENT: 6 MMHG
ECHO LVOT PEAK VELOCITY: 1.3 M/S
ECHO LVOT STROKE VOLUME INDEX: 55.8 ML/M2
ECHO LVOT SV: 110.4 ML
ECHO LVOT VTI: 31.9 CM
ECHO MV A VELOCITY: 0.94 M/S
ECHO MV E DECELERATION TIME (DT): 278 MS
ECHO MV E VELOCITY: 1.24 M/S
ECHO MV E/A RATIO: 1.32
ECHO MV E/E' LATERAL: 14.99
ECHO MV E/E' RATIO (AVERAGED): 15.91
ECHO MV E/E' SEPTAL: 16.82
ECHO PV MAX VELOCITY: 0.8 M/S
ECHO PV PEAK GRADIENT: 2 MMHG
ECHO RA AREA 4C: 20.6 CM2
ECHO RA END SYSTOLIC VOLUME APICAL 4 CHAMBER INDEX BSA: 30 ML/M2
ECHO RA VOLUME: 60 ML
ECHO RV BASAL DIMENSION: 4.2 CM
ECHO RV FREE WALL PEAK S': 7.9 CM/S
ECHO RV MID DIMENSION: 3.2 CM
ECHO RV TAPSE: 3 CM (ref 1.7–?)
EOSINOPHIL # BLD: 0.2 K/UL (ref 0–0.6)
EOSINOPHIL NFR BLD: 2.3 %
GFR SERPLBLD CREATININE-BSD FMLA CKD-EPI: >90 ML/MIN/{1.73_M2}
GFR SERPLBLD CREATININE-BSD FMLA CKD-EPI: >90 ML/MIN/{1.73_M2}
GLUCOSE BLD-MCNC: 144 MG/DL (ref 70–99)
GLUCOSE BLD-MCNC: 196 MG/DL (ref 70–99)
GLUCOSE BLD-MCNC: 197 MG/DL (ref 70–99)
GLUCOSE BLD-MCNC: 270 MG/DL (ref 70–99)
GLUCOSE SERPL-MCNC: 184 MG/DL (ref 70–99)
GLUCOSE SERPL-MCNC: 247 MG/DL (ref 70–99)
HCT VFR BLD AUTO: 30 % (ref 36–48)
HGB BLD-MCNC: 10.5 G/DL (ref 12–16)
LYMPHOCYTES # BLD: 2.6 K/UL (ref 1–5.1)
LYMPHOCYTES NFR BLD: 31.4 %
MAGNESIUM SERPL-MCNC: 1.32 MG/DL (ref 1.8–2.4)
MAGNESIUM SERPL-MCNC: 1.75 MG/DL (ref 1.8–2.4)
MCH RBC QN AUTO: 36.3 PG (ref 26–34)
MCHC RBC AUTO-ENTMCNC: 35 G/DL (ref 31–36)
MCV RBC AUTO: 103.9 FL (ref 80–100)
MONOCYTES # BLD: 0.9 K/UL (ref 0–1.3)
MONOCYTES NFR BLD: 10.8 %
NEUTROPHILS # BLD: 4.6 K/UL (ref 1.7–7.7)
NEUTROPHILS NFR BLD: 54.7 %
PERFORMED ON: ABNORMAL
PLATELET # BLD AUTO: 60 K/UL (ref 135–450)
PMV BLD AUTO: 9 FL (ref 5–10.5)
POTASSIUM SERPL-SCNC: 3.2 MMOL/L (ref 3.5–5.1)
POTASSIUM SERPL-SCNC: 3.5 MMOL/L (ref 3.5–5.1)
PROT SERPL-MCNC: 6.6 G/DL (ref 6.4–8.2)
PROT SERPL-MCNC: 7 G/DL (ref 6.4–8.2)
RBC # BLD AUTO: 2.89 M/UL (ref 4–5.2)
SODIUM SERPL-SCNC: 138 MMOL/L (ref 136–145)
SODIUM SERPL-SCNC: 138 MMOL/L (ref 136–145)
WBC # BLD AUTO: 8.4 K/UL (ref 4–11)

## 2025-09-03 PROCEDURE — 6360000002 HC RX W HCPCS: Performed by: INTERNAL MEDICINE

## 2025-09-03 PROCEDURE — 76376 3D RENDER W/INTRP POSTPROCES: CPT | Performed by: INTERNAL MEDICINE

## 2025-09-03 PROCEDURE — 80053 COMPREHEN METABOLIC PANEL: CPT

## 2025-09-03 PROCEDURE — 2580000003 HC RX 258: Performed by: INTERNAL MEDICINE

## 2025-09-03 PROCEDURE — 2500000003 HC RX 250 WO HCPCS

## 2025-09-03 PROCEDURE — 93306 TTE W/DOPPLER COMPLETE: CPT | Performed by: INTERNAL MEDICINE

## 2025-09-03 PROCEDURE — 6370000000 HC RX 637 (ALT 250 FOR IP)

## 2025-09-03 PROCEDURE — 93306 TTE W/DOPPLER COMPLETE: CPT

## 2025-09-03 PROCEDURE — 83735 ASSAY OF MAGNESIUM: CPT

## 2025-09-03 PROCEDURE — 85025 COMPLETE CBC W/AUTO DIFF WBC: CPT

## 2025-09-03 PROCEDURE — 36415 COLL VENOUS BLD VENIPUNCTURE: CPT

## 2025-09-03 PROCEDURE — P9047 ALBUMIN (HUMAN), 25%, 50ML: HCPCS | Performed by: INTERNAL MEDICINE

## 2025-09-03 PROCEDURE — 2060000000 HC ICU INTERMEDIATE R&B

## 2025-09-03 PROCEDURE — 6370000000 HC RX 637 (ALT 250 FOR IP): Performed by: STUDENT IN AN ORGANIZED HEALTH CARE EDUCATION/TRAINING PROGRAM

## 2025-09-03 PROCEDURE — 6360000002 HC RX W HCPCS

## 2025-09-03 RX ORDER — MAGNESIUM SULFATE IN WATER 40 MG/ML
4000 INJECTION, SOLUTION INTRAVENOUS ONCE
Status: COMPLETED | OUTPATIENT
Start: 2025-09-03 | End: 2025-09-03

## 2025-09-03 RX ORDER — POTASSIUM CHLORIDE 1500 MG/1
40 TABLET, EXTENDED RELEASE ORAL 2 TIMES DAILY WITH MEALS
Status: COMPLETED | OUTPATIENT
Start: 2025-09-03 | End: 2025-09-04

## 2025-09-03 RX ADMIN — RIFAXIMIN 550 MG: 550 TABLET ORAL at 08:13

## 2025-09-03 RX ADMIN — SODIUM CHLORIDE, PRESERVATIVE FREE 10 ML: 5 INJECTION INTRAVENOUS at 22:17

## 2025-09-03 RX ADMIN — HYDROXYZINE HYDROCHLORIDE 25 MG: 25 TABLET, FILM COATED ORAL at 14:11

## 2025-09-03 RX ADMIN — BUSPIRONE HYDROCHLORIDE 7.5 MG: 5 TABLET ORAL at 22:17

## 2025-09-03 RX ADMIN — SERTRALINE HYDROCHLORIDE 25 MG: 25 TABLET ORAL at 08:13

## 2025-09-03 RX ADMIN — POTASSIUM CHLORIDE 40 MEQ: 1500 TABLET, EXTENDED RELEASE ORAL at 22:17

## 2025-09-03 RX ADMIN — LEVOTHYROXINE SODIUM 50 MCG: 0.05 TABLET ORAL at 08:13

## 2025-09-03 RX ADMIN — INSULIN LISPRO 1 UNITS: 100 INJECTION, SOLUTION INTRAVENOUS; SUBCUTANEOUS at 22:21

## 2025-09-03 RX ADMIN — INSULIN LISPRO 1 UNITS: 100 INJECTION, SOLUTION INTRAVENOUS; SUBCUTANEOUS at 11:49

## 2025-09-03 RX ADMIN — RIFAXIMIN 550 MG: 550 TABLET ORAL at 22:17

## 2025-09-03 RX ADMIN — MIDODRINE HYDROCHLORIDE 5 MG: 5 TABLET ORAL at 08:13

## 2025-09-03 RX ADMIN — MIDODRINE HYDROCHLORIDE 5 MG: 5 TABLET ORAL at 17:28

## 2025-09-03 RX ADMIN — ALBUMIN (HUMAN) 25 G: 0.25 INJECTION, SOLUTION INTRAVENOUS at 04:57

## 2025-09-03 RX ADMIN — WATER 2000 MG: 1 INJECTION INTRAMUSCULAR; INTRAVENOUS; SUBCUTANEOUS at 22:16

## 2025-09-03 RX ADMIN — ALBUMIN (HUMAN) 25 G: 0.25 INJECTION, SOLUTION INTRAVENOUS at 14:10

## 2025-09-03 RX ADMIN — LACTULOSE 20 G: 20 SOLUTION ORAL at 08:13

## 2025-09-03 RX ADMIN — INSULIN LISPRO 2 UNITS: 100 INJECTION, SOLUTION INTRAVENOUS; SUBCUTANEOUS at 17:30

## 2025-09-03 RX ADMIN — LACTULOSE 20 G: 20 SOLUTION ORAL at 22:20

## 2025-09-03 RX ADMIN — MAGNESIUM SULFATE HEPTAHYDRATE 4000 MG: 40 INJECTION, SOLUTION INTRAVENOUS at 09:19

## 2025-09-03 RX ADMIN — FUROSEMIDE 5 MG/HR: 10 INJECTION, SOLUTION INTRAMUSCULAR; INTRAVENOUS at 09:28

## 2025-09-03 RX ADMIN — BUSPIRONE HYDROCHLORIDE 7.5 MG: 5 TABLET ORAL at 14:10

## 2025-09-03 RX ADMIN — MIDODRINE HYDROCHLORIDE 5 MG: 5 TABLET ORAL at 11:46

## 2025-09-03 RX ADMIN — LACTULOSE 20 G: 20 SOLUTION ORAL at 14:10

## 2025-09-03 RX ADMIN — HYDROXYZINE HYDROCHLORIDE 25 MG: 25 TABLET, FILM COATED ORAL at 08:13

## 2025-09-03 RX ADMIN — ALBUMIN (HUMAN) 25 G: 0.25 INJECTION, SOLUTION INTRAVENOUS at 22:22

## 2025-09-03 RX ADMIN — BUSPIRONE HYDROCHLORIDE 7.5 MG: 5 TABLET ORAL at 08:19

## 2025-09-03 RX ADMIN — HYDROXYZINE HYDROCHLORIDE 25 MG: 25 TABLET, FILM COATED ORAL at 22:18

## 2025-09-03 RX ADMIN — INSULIN GLARGINE 10 UNITS: 100 INJECTION, SOLUTION SUBCUTANEOUS at 22:20

## 2025-09-03 ASSESSMENT — PAIN SCALES - GENERAL
PAINLEVEL_OUTOF10: 0

## 2025-09-04 LAB
ALBUMIN SERPL-MCNC: 3.7 G/DL (ref 3.4–5)
ALBUMIN SERPL-MCNC: 3.9 G/DL (ref 3.4–5)
ALBUMIN/GLOB SERPL: 1 {RATIO} (ref 1.1–2.2)
ALBUMIN/GLOB SERPL: 1.1 {RATIO} (ref 1.1–2.2)
ALP SERPL-CCNC: 152 U/L (ref 40–129)
ALP SERPL-CCNC: 152 U/L (ref 40–129)
ALT SERPL-CCNC: 20 U/L (ref 10–40)
ALT SERPL-CCNC: 22 U/L (ref 10–40)
ALT SERPL-CCNC: ABNORMAL U/L (ref 10–40)
ANION GAP SERPL CALCULATED.3IONS-SCNC: 11 MMOL/L (ref 3–16)
ANION GAP SERPL CALCULATED.3IONS-SCNC: 12 MMOL/L (ref 3–16)
AST SERPL-CCNC: 38 U/L (ref 15–37)
AST SERPL-CCNC: 74 U/L (ref 15–37)
BASOPHILS # BLD: 0.1 K/UL (ref 0–0.2)
BASOPHILS NFR BLD: 1.5 %
BILIRUB SERPL-MCNC: 4.2 MG/DL (ref 0–1)
BILIRUB SERPL-MCNC: 4.4 MG/DL (ref 0–1)
BUN SERPL-MCNC: 8 MG/DL (ref 7–20)
BUN SERPL-MCNC: 9 MG/DL (ref 7–20)
CALCIUM SERPL-MCNC: 9.7 MG/DL (ref 8.3–10.6)
CALCIUM SERPL-MCNC: 9.8 MG/DL (ref 8.3–10.6)
CHLORIDE SERPL-SCNC: 96 MMOL/L (ref 99–110)
CHLORIDE SERPL-SCNC: 96 MMOL/L (ref 99–110)
CO2 SERPL-SCNC: 30 MMOL/L (ref 21–32)
CO2 SERPL-SCNC: 30 MMOL/L (ref 21–32)
CREAT SERPL-MCNC: 0.6 MG/DL (ref 0.6–1.1)
CREAT SERPL-MCNC: 0.7 MG/DL (ref 0.6–1.1)
DEPRECATED RDW RBC AUTO: 16.3 % (ref 12.4–15.4)
EOSINOPHIL # BLD: 0.2 K/UL (ref 0–0.6)
EOSINOPHIL NFR BLD: 2.6 %
GFR SERPLBLD CREATININE-BSD FMLA CKD-EPI: >90 ML/MIN/{1.73_M2}
GFR SERPLBLD CREATININE-BSD FMLA CKD-EPI: >90 ML/MIN/{1.73_M2}
GLUCOSE BLD-MCNC: 143 MG/DL (ref 70–99)
GLUCOSE BLD-MCNC: 159 MG/DL (ref 70–99)
GLUCOSE BLD-MCNC: 211 MG/DL (ref 70–99)
GLUCOSE BLD-MCNC: 245 MG/DL (ref 70–99)
GLUCOSE SERPL-MCNC: 168 MG/DL (ref 70–99)
GLUCOSE SERPL-MCNC: 184 MG/DL (ref 70–99)
HCT VFR BLD AUTO: 31.7 % (ref 36–48)
HGB BLD-MCNC: 10.8 G/DL (ref 12–16)
LYMPHOCYTES # BLD: 1.8 K/UL (ref 1–5.1)
LYMPHOCYTES NFR BLD: 24.1 %
MCH RBC QN AUTO: 35.5 PG (ref 26–34)
MCHC RBC AUTO-ENTMCNC: 34.2 G/DL (ref 31–36)
MCV RBC AUTO: 103.7 FL (ref 80–100)
MONOCYTES # BLD: 0.7 K/UL (ref 0–1.3)
MONOCYTES NFR BLD: 9.1 %
NEUTROPHILS # BLD: 4.6 K/UL (ref 1.7–7.7)
NEUTROPHILS NFR BLD: 62.7 %
PERFORMED ON: ABNORMAL
PLATELET # BLD AUTO: 61 K/UL (ref 135–450)
PMV BLD AUTO: 8.4 FL (ref 5–10.5)
POTASSIUM SERPL-SCNC: 3.7 MMOL/L (ref 3.5–5.1)
POTASSIUM SERPL-SCNC: 3.8 MMOL/L (ref 3.5–5.1)
POTASSIUM SERPL-SCNC: ABNORMAL MMOL/L (ref 3.5–5.1)
PROT SERPL-MCNC: 7.3 G/DL (ref 6.4–8.2)
PROT SERPL-MCNC: 7.5 G/DL (ref 6.4–8.2)
RBC # BLD AUTO: 3.06 M/UL (ref 4–5.2)
SODIUM SERPL-SCNC: 137 MMOL/L (ref 136–145)
SODIUM SERPL-SCNC: 138 MMOL/L (ref 136–145)
WBC # BLD AUTO: 7.4 K/UL (ref 4–11)

## 2025-09-04 PROCEDURE — 6370000000 HC RX 637 (ALT 250 FOR IP): Performed by: STUDENT IN AN ORGANIZED HEALTH CARE EDUCATION/TRAINING PROGRAM

## 2025-09-04 PROCEDURE — 85025 COMPLETE CBC W/AUTO DIFF WBC: CPT

## 2025-09-04 PROCEDURE — P9047 ALBUMIN (HUMAN), 25%, 50ML: HCPCS | Performed by: INTERNAL MEDICINE

## 2025-09-04 PROCEDURE — 84460 ALANINE AMINO (ALT) (SGPT): CPT

## 2025-09-04 PROCEDURE — 6370000000 HC RX 637 (ALT 250 FOR IP)

## 2025-09-04 PROCEDURE — 2060000000 HC ICU INTERMEDIATE R&B

## 2025-09-04 PROCEDURE — 2500000003 HC RX 250 WO HCPCS

## 2025-09-04 PROCEDURE — 36415 COLL VENOUS BLD VENIPUNCTURE: CPT

## 2025-09-04 PROCEDURE — 80053 COMPREHEN METABOLIC PANEL: CPT

## 2025-09-04 PROCEDURE — 6360000002 HC RX W HCPCS: Performed by: INTERNAL MEDICINE

## 2025-09-04 PROCEDURE — 84132 ASSAY OF SERUM POTASSIUM: CPT

## 2025-09-04 PROCEDURE — 6360000002 HC RX W HCPCS

## 2025-09-04 RX ORDER — BUSPIRONE HYDROCHLORIDE 7.5 MG/1
7.5 TABLET ORAL 3 TIMES DAILY
Qty: 90 TABLET | Refills: 0 | Status: SHIPPED | OUTPATIENT
Start: 2025-09-04 | End: 2025-10-04

## 2025-09-04 RX ORDER — MIDODRINE HYDROCHLORIDE 5 MG/1
5 TABLET ORAL
Qty: 90 TABLET | Refills: 0 | Status: SHIPPED | OUTPATIENT
Start: 2025-09-04

## 2025-09-04 RX ORDER — FUROSEMIDE 40 MG/1
40 TABLET ORAL DAILY
Qty: 30 TABLET | Refills: 0 | Status: SHIPPED | OUTPATIENT
Start: 2025-09-04

## 2025-09-04 RX ORDER — LEVOTHYROXINE SODIUM 50 UG/1
50 TABLET ORAL DAILY
Qty: 30 TABLET | Refills: 3 | Status: SHIPPED | OUTPATIENT
Start: 2025-09-05 | End: 2025-09-04

## 2025-09-04 RX ORDER — INSULIN GLARGINE 100 [IU]/ML
15 INJECTION, SOLUTION SUBCUTANEOUS NIGHTLY
Status: DISCONTINUED | OUTPATIENT
Start: 2025-09-04 | End: 2025-09-04

## 2025-09-04 RX ORDER — FUROSEMIDE 40 MG/1
40 TABLET ORAL DAILY
Status: DISCONTINUED | OUTPATIENT
Start: 2025-09-04 | End: 2025-09-05 | Stop reason: HOSPADM

## 2025-09-04 RX ORDER — POLYETHYLENE GLYCOL 3350 17 G/17G
17 POWDER, FOR SOLUTION ORAL DAILY
Status: DISCONTINUED | OUTPATIENT
Start: 2025-09-04 | End: 2025-09-05 | Stop reason: HOSPADM

## 2025-09-04 RX ORDER — LEVOTHYROXINE SODIUM 50 UG/1
50 TABLET ORAL DAILY
Qty: 30 TABLET | Refills: 0 | Status: SHIPPED | OUTPATIENT
Start: 2025-09-05

## 2025-09-04 RX ORDER — MULTIVIT-MIN/IRON FUM/FOLIC AC 7.5 MG-4
1 TABLET ORAL DAILY
Qty: 30 TABLET | Refills: 0 | Status: SHIPPED | OUTPATIENT
Start: 2025-09-04

## 2025-09-04 RX ORDER — INSULIN GLARGINE 100 [IU]/ML
10 INJECTION, SOLUTION SUBCUTANEOUS NIGHTLY
Status: DISCONTINUED | OUTPATIENT
Start: 2025-09-04 | End: 2025-09-05 | Stop reason: HOSPADM

## 2025-09-04 RX ORDER — HYDROXYZINE HYDROCHLORIDE 25 MG/1
25 TABLET, FILM COATED ORAL 3 TIMES DAILY
Qty: 90 TABLET | Refills: 0 | Status: SHIPPED | OUTPATIENT
Start: 2025-09-04 | End: 2025-10-04

## 2025-09-04 RX ORDER — FUROSEMIDE 40 MG/1
40 TABLET ORAL DAILY
Qty: 60 TABLET | Refills: 3 | Status: SHIPPED | OUTPATIENT
Start: 2025-09-04 | End: 2025-09-04

## 2025-09-04 RX ORDER — MIDODRINE HYDROCHLORIDE 5 MG/1
5 TABLET ORAL
Qty: 90 TABLET | Refills: 3 | Status: SHIPPED | OUTPATIENT
Start: 2025-09-04 | End: 2025-09-04

## 2025-09-04 RX ORDER — INSULIN LISPRO 100 [IU]/ML
0-8 INJECTION, SOLUTION INTRAVENOUS; SUBCUTANEOUS
Status: DISCONTINUED | OUTPATIENT
Start: 2025-09-04 | End: 2025-09-05 | Stop reason: HOSPADM

## 2025-09-04 RX ADMIN — RIFAXIMIN 550 MG: 550 TABLET ORAL at 20:36

## 2025-09-04 RX ADMIN — INSULIN GLARGINE 10 UNITS: 100 INJECTION, SOLUTION SUBCUTANEOUS at 20:36

## 2025-09-04 RX ADMIN — BUSPIRONE HYDROCHLORIDE 7.5 MG: 5 TABLET ORAL at 08:17

## 2025-09-04 RX ADMIN — FUROSEMIDE 40 MG: 40 TABLET ORAL at 12:08

## 2025-09-04 RX ADMIN — POTASSIUM CHLORIDE 40 MEQ: 1500 TABLET, EXTENDED RELEASE ORAL at 08:16

## 2025-09-04 RX ADMIN — SODIUM CHLORIDE, PRESERVATIVE FREE 10 ML: 5 INJECTION INTRAVENOUS at 08:17

## 2025-09-04 RX ADMIN — MIDODRINE HYDROCHLORIDE 5 MG: 5 TABLET ORAL at 12:08

## 2025-09-04 RX ADMIN — MIDODRINE HYDROCHLORIDE 5 MG: 5 TABLET ORAL at 17:27

## 2025-09-04 RX ADMIN — WATER 2000 MG: 1 INJECTION INTRAMUSCULAR; INTRAVENOUS; SUBCUTANEOUS at 20:41

## 2025-09-04 RX ADMIN — LACTULOSE 20 G: 20 SOLUTION ORAL at 08:16

## 2025-09-04 RX ADMIN — SERTRALINE HYDROCHLORIDE 25 MG: 25 TABLET ORAL at 08:16

## 2025-09-04 RX ADMIN — INSULIN LISPRO 1 UNITS: 100 INJECTION, SOLUTION INTRAVENOUS; SUBCUTANEOUS at 12:42

## 2025-09-04 RX ADMIN — HYDROXYZINE HYDROCHLORIDE 25 MG: 25 TABLET, FILM COATED ORAL at 08:16

## 2025-09-04 RX ADMIN — LACTULOSE 20 G: 20 SOLUTION ORAL at 20:36

## 2025-09-04 RX ADMIN — MIDODRINE HYDROCHLORIDE 5 MG: 5 TABLET ORAL at 08:16

## 2025-09-04 RX ADMIN — LACTULOSE 20 G: 20 SOLUTION ORAL at 14:02

## 2025-09-04 RX ADMIN — HYDROXYZINE HYDROCHLORIDE 25 MG: 25 TABLET, FILM COATED ORAL at 14:02

## 2025-09-04 RX ADMIN — HYDROXYZINE HYDROCHLORIDE 25 MG: 25 TABLET, FILM COATED ORAL at 20:36

## 2025-09-04 RX ADMIN — BUSPIRONE HYDROCHLORIDE 7.5 MG: 5 TABLET ORAL at 20:36

## 2025-09-04 RX ADMIN — LEVOTHYROXINE SODIUM 50 MCG: 0.05 TABLET ORAL at 08:16

## 2025-09-04 RX ADMIN — RIFAXIMIN 550 MG: 550 TABLET ORAL at 08:16

## 2025-09-04 RX ADMIN — BUSPIRONE HYDROCHLORIDE 7.5 MG: 5 TABLET ORAL at 14:02

## 2025-09-04 RX ADMIN — ALBUMIN (HUMAN) 25 G: 0.25 INJECTION, SOLUTION INTRAVENOUS at 06:08

## 2025-09-04 RX ADMIN — INSULIN LISPRO 2 UNITS: 100 INJECTION, SOLUTION INTRAVENOUS; SUBCUTANEOUS at 20:36

## 2025-09-04 ASSESSMENT — PAIN SCALES - GENERAL
PAINLEVEL_OUTOF10: 0

## 2025-09-04 ASSESSMENT — VISUAL ACUITY: OU: 1

## 2025-09-05 VITALS
RESPIRATION RATE: 16 BRPM | WEIGHT: 224.87 LBS | OXYGEN SATURATION: 96 % | BODY MASS INDEX: 42.46 KG/M2 | HEART RATE: 62 BPM | DIASTOLIC BLOOD PRESSURE: 50 MMHG | TEMPERATURE: 98.4 F | HEIGHT: 61 IN | SYSTOLIC BLOOD PRESSURE: 110 MMHG

## 2025-09-05 LAB
ALBUMIN SERPL-MCNC: 3.7 G/DL (ref 3.4–5)
ALBUMIN/GLOB SERPL: 1 {RATIO} (ref 1.1–2.2)
ALP SERPL-CCNC: 168 U/L (ref 40–129)
ALT SERPL-CCNC: 23 U/L (ref 10–40)
ANION GAP SERPL CALCULATED.3IONS-SCNC: 10 MMOL/L (ref 3–16)
AST SERPL-CCNC: 41 U/L (ref 15–37)
BACTERIA BLD CULT ORG #2: NORMAL
BACTERIA BLD CULT: ABNORMAL
BACTERIA BLD CULT: ABNORMAL
BASOPHILS # BLD: 0.1 K/UL (ref 0–0.2)
BASOPHILS NFR BLD: 0.8 %
BILIRUB SERPL-MCNC: 4.4 MG/DL (ref 0–1)
BUN SERPL-MCNC: 9 MG/DL (ref 7–20)
CALCIUM SERPL-MCNC: 9.9 MG/DL (ref 8.3–10.6)
CHLORIDE SERPL-SCNC: 98 MMOL/L (ref 99–110)
CO2 SERPL-SCNC: 30 MMOL/L (ref 21–32)
CREAT SERPL-MCNC: 0.6 MG/DL (ref 0.6–1.1)
DEPRECATED RDW RBC AUTO: 16.4 % (ref 12.4–15.4)
EOSINOPHIL # BLD: 0.2 K/UL (ref 0–0.6)
EOSINOPHIL NFR BLD: 2 %
GFR SERPLBLD CREATININE-BSD FMLA CKD-EPI: >90 ML/MIN/{1.73_M2}
GLUCOSE BLD-MCNC: 131 MG/DL (ref 70–99)
GLUCOSE BLD-MCNC: 199 MG/DL (ref 70–99)
GLUCOSE SERPL-MCNC: 139 MG/DL (ref 70–99)
HCT VFR BLD AUTO: 31.7 % (ref 36–48)
HGB BLD-MCNC: 11.3 G/DL (ref 12–16)
LYMPHOCYTES # BLD: 2.5 K/UL (ref 1–5.1)
LYMPHOCYTES NFR BLD: 26.4 %
MCH RBC QN AUTO: 37.3 PG (ref 26–34)
MCHC RBC AUTO-ENTMCNC: 35.8 G/DL (ref 31–36)
MCV RBC AUTO: 104.2 FL (ref 80–100)
MONOCYTES # BLD: 0.9 K/UL (ref 0–1.3)
MONOCYTES NFR BLD: 9.9 %
NEUTROPHILS # BLD: 5.8 K/UL (ref 1.7–7.7)
NEUTROPHILS NFR BLD: 60.9 %
ORGANISM: ABNORMAL
ORGANISM: ABNORMAL
PERFORMED ON: ABNORMAL
PERFORMED ON: ABNORMAL
PLATELET # BLD AUTO: 63 K/UL (ref 135–450)
PMV BLD AUTO: 9.1 FL (ref 5–10.5)
POTASSIUM SERPL-SCNC: 3.7 MMOL/L (ref 3.5–5.1)
PROT SERPL-MCNC: 7.3 G/DL (ref 6.4–8.2)
RBC # BLD AUTO: 3.04 M/UL (ref 4–5.2)
SODIUM SERPL-SCNC: 138 MMOL/L (ref 136–145)
WBC # BLD AUTO: 9.6 K/UL (ref 4–11)

## 2025-09-05 PROCEDURE — 36415 COLL VENOUS BLD VENIPUNCTURE: CPT

## 2025-09-05 PROCEDURE — 80053 COMPREHEN METABOLIC PANEL: CPT

## 2025-09-05 PROCEDURE — 6370000000 HC RX 637 (ALT 250 FOR IP)

## 2025-09-05 PROCEDURE — 85025 COMPLETE CBC W/AUTO DIFF WBC: CPT

## 2025-09-05 PROCEDURE — 2500000003 HC RX 250 WO HCPCS

## 2025-09-05 RX ORDER — MICONAZOLE NITRATE 20 MG/G
POWDER TOPICAL
Qty: 45 G | Refills: 1 | Status: SHIPPED | OUTPATIENT
Start: 2025-09-05

## 2025-09-05 RX ADMIN — MIDODRINE HYDROCHLORIDE 5 MG: 5 TABLET ORAL at 12:13

## 2025-09-05 RX ADMIN — RIFAXIMIN 550 MG: 550 TABLET ORAL at 08:14

## 2025-09-05 RX ADMIN — LACTULOSE 20 G: 20 SOLUTION ORAL at 08:14

## 2025-09-05 RX ADMIN — FUROSEMIDE 40 MG: 40 TABLET ORAL at 08:14

## 2025-09-05 RX ADMIN — MIDODRINE HYDROCHLORIDE 5 MG: 5 TABLET ORAL at 08:14

## 2025-09-05 RX ADMIN — SODIUM CHLORIDE, PRESERVATIVE FREE 10 ML: 5 INJECTION INTRAVENOUS at 08:14

## 2025-09-05 RX ADMIN — HYDROXYZINE HYDROCHLORIDE 25 MG: 25 TABLET, FILM COATED ORAL at 08:14

## 2025-09-05 RX ADMIN — BUSPIRONE HYDROCHLORIDE 7.5 MG: 5 TABLET ORAL at 08:14

## 2025-09-05 RX ADMIN — LACTULOSE 20 G: 20 SOLUTION ORAL at 13:58

## 2025-09-05 RX ADMIN — HYDROXYZINE HYDROCHLORIDE 25 MG: 25 TABLET, FILM COATED ORAL at 13:58

## 2025-09-05 RX ADMIN — SPIRONOLACTONE 25 MG: 25 TABLET ORAL at 08:14

## 2025-09-05 RX ADMIN — INSULIN LISPRO 2 UNITS: 100 INJECTION, SOLUTION INTRAVENOUS; SUBCUTANEOUS at 12:13

## 2025-09-05 RX ADMIN — SERTRALINE HYDROCHLORIDE 25 MG: 25 TABLET ORAL at 08:14

## 2025-09-05 RX ADMIN — LEVOTHYROXINE SODIUM 50 MCG: 0.05 TABLET ORAL at 08:14

## 2025-09-05 RX ADMIN — BUSPIRONE HYDROCHLORIDE 7.5 MG: 5 TABLET ORAL at 13:58

## 2025-09-05 ASSESSMENT — PAIN SCALES - GENERAL
PAINLEVEL_OUTOF10: 0

## (undated) DEVICE — EXTENSION SET, MALE LUER LOCK ADAPTER WITH RETRACTABLE COLLAR

## (undated) DEVICE — RADIFOCUS TORQUE DEVICE MULTI-TORQUE VISE: Brand: RADIFOCUS TORQUE DEVICE

## (undated) DEVICE — SENSR O2 OXIMAX FNGR A/ 18IN NONSTR

## (undated) DEVICE — ENCORE® LATEX ORTHO SIZE 8.5, STERILE LATEX POWDER-FREE SURGICAL GLOVE: Brand: ENCORE

## (undated) DEVICE — SOL NS 500ML

## (undated) DEVICE — PK ANGIO CERBRL RAD 40

## (undated) DEVICE — PINNACLE R/O II INTRODUCER SHEATH WITH RADIOPAQUE MARKER: Brand: PINNACLE

## (undated) DEVICE — GLV SURG SENSICARE PI LF PF 7.5 GRN STRL

## (undated) DEVICE — TOWEL,OR,DSP,ST,BLUE,STD,4/PK,20PK/CS: Brand: MEDLINE

## (undated) DEVICE — ADAPT CLN BIOGUARD AIR/H2O DISP

## (undated) DEVICE — 0.2 MICRON INTRAVENOUS FILTER FOR 96 HOUR USE WITH MICRO-BORE EXTENSION TUBING AN LUER-LOCK ADAPTER: Brand: PALL POSIDYNE® ELD FILTER

## (undated) DEVICE — SHLD ANGIO 2LAYR CIR FEN

## (undated) DEVICE — DEV ANGIO FLOSWITCH HP BX24

## (undated) DEVICE — COPILOT BLEEDBACK CONTROL VALVE: Brand: COPILOT

## (undated) DEVICE — SPNG GZ 2S 2X2 8PLY STRL PK/2

## (undated) DEVICE — DRSNG SURESITE WNDW 4X4.5

## (undated) DEVICE — GLV SURG SENSICARE MICRO PF LF 8 STRL

## (undated) DEVICE — STPCK 3WY HP ROT

## (undated) DEVICE — BG WAST DISPOSABLE DEPOT W/TBG48IN S/COCK SPK1400

## (undated) DEVICE — Device

## (undated) DEVICE — ST ACC MICROPUNCTURE STFF .018 ECHO/PLDM/TP 4F/10CM 21G/7CM

## (undated) DEVICE — CATH GUIDE ENVOY MPC 6F.070IN 90CM

## (undated) DEVICE — TUBING, SUCTION, 1/4" X 10', STRAIGHT: Brand: MEDLINE

## (undated) DEVICE — APPL CHLORAPREP W/TINT 26ML ORNG

## (undated) DEVICE — GLV SURG SENSICARE MICRO PF LF 7.5 STRL

## (undated) DEVICE — PRESSURE MONITORING SET: Brand: TRUWAVE

## (undated) DEVICE — STANDARD GUIDEWIRE WITH HYDROPHILIC COATING: Brand: SYNCHRO 2

## (undated) DEVICE — GOWN,PREVENTION PLUS,XXLARGE,STERILE: Brand: MEDLINE

## (undated) DEVICE — RADIFOCUS GLIDEWIRE: Brand: GLIDEWIRE

## (undated) DEVICE — DIL W/HC 5F .038 20CM

## (undated) DEVICE — SYR LL 3CC

## (undated) DEVICE — ST IV PRI VENOSET NV W/CLAMP 72IN

## (undated) DEVICE — GW AMPLTZ SUPERSTIFF STR .035IN 180CM

## (undated) DEVICE — DRSNG SURESITE WNDW 2.38X2.75

## (undated) DEVICE — CVR PROB 96IN LF STRL

## (undated) DEVICE — BASN GW RINGMASTER

## (undated) DEVICE — GLV SURG BIOGEL LTX PF 7 1/2

## (undated) DEVICE — LN SMPL CO2 SHTRM SD STREAM W/M LUER

## (undated) DEVICE — PINNACLE INTRODUCER SHEATH: Brand: PINNACLE

## (undated) DEVICE — Device: Brand: D-STAT® DRY SILVER CLEAR TOPICAL HEMOSTAT

## (undated) DEVICE — SINGLE-USE BIOPSY FORCEPS: Brand: RADIAL JAW 4

## (undated) DEVICE — SPNG GZ WOVN 4X4IN 12PLY 10/BX STRL

## (undated) DEVICE — KT ORCA ORCAPOD DISP STRL

## (undated) DEVICE — SOL NACL 0.9PCT 1000ML

## (undated) DEVICE — CANN O2 ETCO2 FITS ALL CONN CO2 SMPL A/ 7IN DISP LF

## (undated) DEVICE — CATH TEMPO 5F VER 135 Â° 100CM: Brand: TEMPO

## (undated) DEVICE — DEV CLS VASC MYNXCONTROL 5F

## (undated) DEVICE — TBG ART PRESS 60 IN

## (undated) DEVICE — THE TORRENT IRRIGATION SCOPE CONNECTOR IS USED WITH THE TORRENT IRRIGATION TUBING TO PROVIDE IRRIGATION FLUIDS SUCH AS STERILE WATER DURING GASTROINTESTINAL ENDOSCOPIC PROCEDURES WHEN USED IN CONJUNCTION WITH AN IRRIGATION PUMP (OR ELECTROSURGICAL UNIT).: Brand: TORRENT

## (undated) DEVICE — MANIFLD BLCK 200PSI 2PORT OFF RT

## (undated) DEVICE — GW TORQFLX SS .018IN 40CM

## (undated) DEVICE — DEV CLS FEM FISH COMBICLOSE SHEATH 6F GRN

## (undated) DEVICE — TOTAL TRAY, 16FR 10ML SIL FOLEY, URN: Brand: MEDLINE

## (undated) DEVICE — ADAPT Y ROT GATEWAY PLS

## (undated) DEVICE — KT NEURO CUST